# Patient Record
Sex: MALE | Race: WHITE | HISPANIC OR LATINO | URBAN - METROPOLITAN AREA
[De-identification: names, ages, dates, MRNs, and addresses within clinical notes are randomized per-mention and may not be internally consistent; named-entity substitution may affect disease eponyms.]

---

## 2017-12-04 ENCOUNTER — APPOINTMENT (RX ONLY)
Dept: URBAN - METROPOLITAN AREA CLINIC 4 | Facility: CLINIC | Age: 67
Setting detail: DERMATOLOGY
End: 2017-12-04

## 2017-12-04 DIAGNOSIS — L82.0 INFLAMED SEBORRHEIC KERATOSIS: ICD-10-CM

## 2017-12-04 DIAGNOSIS — L57.0 ACTINIC KERATOSIS: ICD-10-CM

## 2017-12-04 DIAGNOSIS — D22 MELANOCYTIC NEVI: ICD-10-CM

## 2017-12-04 DIAGNOSIS — Z85.820 PERSONAL HISTORY OF MALIGNANT MELANOMA OF SKIN: ICD-10-CM

## 2017-12-04 DIAGNOSIS — L82.1 OTHER SEBORRHEIC KERATOSIS: ICD-10-CM

## 2017-12-04 PROBLEM — I10 ESSENTIAL (PRIMARY) HYPERTENSION: Status: ACTIVE | Noted: 2017-12-04

## 2017-12-04 PROBLEM — E13.9 OTHER SPECIFIED DIABETES MELLITUS WITHOUT COMPLICATIONS: Status: ACTIVE | Noted: 2017-12-04

## 2017-12-04 PROBLEM — D22.71 MELANOCYTIC NEVI OF RIGHT LOWER LIMB, INCLUDING HIP: Status: ACTIVE | Noted: 2017-12-04

## 2017-12-04 PROBLEM — E78.5 HYPERLIPIDEMIA, UNSPECIFIED: Status: ACTIVE | Noted: 2017-12-04

## 2017-12-04 PROBLEM — D22.5 MELANOCYTIC NEVI OF TRUNK: Status: ACTIVE | Noted: 2017-12-04

## 2017-12-04 PROBLEM — D22.72 MELANOCYTIC NEVI OF LEFT LOWER LIMB, INCLUDING HIP: Status: ACTIVE | Noted: 2017-12-04

## 2017-12-04 PROBLEM — D22.61 MELANOCYTIC NEVI OF RIGHT UPPER LIMB, INCLUDING SHOULDER: Status: ACTIVE | Noted: 2017-12-04

## 2017-12-04 PROBLEM — D48.5 NEOPLASM OF UNCERTAIN BEHAVIOR OF SKIN: Status: ACTIVE | Noted: 2017-12-04

## 2017-12-04 PROBLEM — D22.39 MELANOCYTIC NEVI OF OTHER PARTS OF FACE: Status: ACTIVE | Noted: 2017-12-04

## 2017-12-04 PROBLEM — D22.62 MELANOCYTIC NEVI OF LEFT UPPER LIMB, INCLUDING SHOULDER: Status: ACTIVE | Noted: 2017-12-04

## 2017-12-04 PROCEDURE — ? LIQUID NITROGEN

## 2017-12-04 PROCEDURE — 17003 DESTRUCT PREMALG LES 2-14: CPT

## 2017-12-04 PROCEDURE — ? NOTED ON EXAM BUT NOT TREATED

## 2017-12-04 PROCEDURE — ? OBSERVATION

## 2017-12-04 PROCEDURE — 99214 OFFICE O/P EST MOD 30 MIN: CPT | Mod: 25

## 2017-12-04 PROCEDURE — 17000 DESTRUCT PREMALG LESION: CPT

## 2017-12-04 PROCEDURE — ? COUNSELING

## 2017-12-04 ASSESSMENT — LOCATION ZONE DERM
LOCATION ZONE: NECK
LOCATION ZONE: FACE
LOCATION ZONE: LEG
LOCATION ZONE: SCALP
LOCATION ZONE: TRUNK
LOCATION ZONE: ARM

## 2017-12-04 ASSESSMENT — LOCATION DETAILED DESCRIPTION DERM
LOCATION DETAILED: RIGHT INFERIOR MEDIAL UPPER BACK
LOCATION DETAILED: LEFT PROXIMAL DORSAL FOREARM
LOCATION DETAILED: RIGHT SUPERIOR LATERAL NECK
LOCATION DETAILED: RIGHT LATERAL MALAR CHEEK
LOCATION DETAILED: INFERIOR THORACIC SPINE
LOCATION DETAILED: RIGHT ANTERIOR DISTAL THIGH
LOCATION DETAILED: RIGHT PROXIMAL DORSAL FOREARM
LOCATION DETAILED: EPIGASTRIC SKIN
LOCATION DETAILED: LEFT ANTERIOR DISTAL THIGH
LOCATION DETAILED: RIGHT MEDIAL INFERIOR CHEST
LOCATION DETAILED: RIGHT SUPERIOR FOREHEAD
LOCATION DETAILED: LEFT LATERAL PROXIMAL CALF
LOCATION DETAILED: RIGHT PROXIMAL POSTERIOR UPPER ARM
LOCATION DETAILED: RIGHT SUPERIOR UPPER BACK
LOCATION DETAILED: LEFT CENTRAL MALAR CHEEK
LOCATION DETAILED: RIGHT LATERAL ABDOMEN
LOCATION DETAILED: LEFT PROXIMAL POSTERIOR UPPER ARM
LOCATION DETAILED: LEFT INFERIOR POSTAURICULAR SKIN

## 2017-12-04 ASSESSMENT — LOCATION SIMPLE DESCRIPTION DERM
LOCATION SIMPLE: LEFT UPPER ARM
LOCATION SIMPLE: RIGHT FOREHEAD
LOCATION SIMPLE: SCALP
LOCATION SIMPLE: UPPER BACK
LOCATION SIMPLE: RIGHT CHEEK
LOCATION SIMPLE: NECK
LOCATION SIMPLE: LEFT CHEEK
LOCATION SIMPLE: ABDOMEN
LOCATION SIMPLE: LEFT LOWER LEG
LOCATION SIMPLE: LEFT THIGH
LOCATION SIMPLE: RIGHT FOREARM
LOCATION SIMPLE: RIGHT UPPER ARM
LOCATION SIMPLE: RIGHT THIGH
LOCATION SIMPLE: RIGHT UPPER BACK
LOCATION SIMPLE: LEFT FOREARM
LOCATION SIMPLE: CHEST

## 2017-12-04 NOTE — PROCEDURE: LIQUID NITROGEN
Post-Care Instructions: I reviewed with the patient in detail post-care instructions. Patient is to wear sunprotection, and avoid picking at any of the treated lesions. Pt may apply Vaseline to crusted or scabbing areas.
Detail Level: Detailed
Duration Of Freeze Thaw-Cycle (Seconds): 0
Render Post-Care Instructions In Note?: no
Number Of Freeze-Thaw Cycles: 2 freeze-thaw cycles
Consent: The patient's consent was obtained including but not limited to risks of blistering, scarring, darker or lighter pigmentary change, and recurrence.

## 2018-06-05 ENCOUNTER — APPOINTMENT (RX ONLY)
Dept: URBAN - METROPOLITAN AREA CLINIC 4 | Facility: CLINIC | Age: 68
Setting detail: DERMATOLOGY
End: 2018-06-05

## 2018-06-05 DIAGNOSIS — D22 MELANOCYTIC NEVI: ICD-10-CM

## 2018-06-05 DIAGNOSIS — Z85.828 PERSONAL HISTORY OF OTHER MALIGNANT NEOPLASM OF SKIN: ICD-10-CM

## 2018-06-05 DIAGNOSIS — Z85.820 PERSONAL HISTORY OF MALIGNANT MELANOMA OF SKIN: ICD-10-CM

## 2018-06-05 DIAGNOSIS — D18.0 HEMANGIOMA: ICD-10-CM

## 2018-06-05 DIAGNOSIS — L57.0 ACTINIC KERATOSIS: ICD-10-CM

## 2018-06-05 DIAGNOSIS — L91.8 OTHER HYPERTROPHIC DISORDERS OF THE SKIN: ICD-10-CM

## 2018-06-05 DIAGNOSIS — L82.1 OTHER SEBORRHEIC KERATOSIS: ICD-10-CM

## 2018-06-05 PROBLEM — D22.5 MELANOCYTIC NEVI OF TRUNK: Status: ACTIVE | Noted: 2018-06-05

## 2018-06-05 PROBLEM — D18.01 HEMANGIOMA OF SKIN AND SUBCUTANEOUS TISSUE: Status: ACTIVE | Noted: 2018-06-05

## 2018-06-05 PROCEDURE — 99214 OFFICE O/P EST MOD 30 MIN: CPT | Mod: 25

## 2018-06-05 PROCEDURE — 17003 DESTRUCT PREMALG LES 2-14: CPT

## 2018-06-05 PROCEDURE — 17000 DESTRUCT PREMALG LESION: CPT

## 2018-06-05 PROCEDURE — ? LIQUID NITROGEN

## 2018-06-05 PROCEDURE — ? COUNSELING

## 2018-06-05 ASSESSMENT — LOCATION ZONE DERM
LOCATION ZONE: EAR
LOCATION ZONE: LEG
LOCATION ZONE: NECK
LOCATION ZONE: HAND
LOCATION ZONE: TRUNK
LOCATION ZONE: FACE
LOCATION ZONE: ARM
LOCATION ZONE: SCALP

## 2018-06-05 ASSESSMENT — LOCATION DETAILED DESCRIPTION DERM
LOCATION DETAILED: RIGHT MEDIAL FOREHEAD
LOCATION DETAILED: LEFT SUPERIOR PARIETAL SCALP
LOCATION DETAILED: RIGHT LATERAL ABDOMEN
LOCATION DETAILED: LEFT SUPERIOR LATERAL MALAR CHEEK
LOCATION DETAILED: RIGHT CLAVICULAR NECK
LOCATION DETAILED: RIGHT DISTAL PRETIBIAL REGION
LOCATION DETAILED: RIGHT LATERAL SUPERIOR CHEST
LOCATION DETAILED: LEFT FOREHEAD
LOCATION DETAILED: LEFT SUPERIOR FOREHEAD
LOCATION DETAILED: RIGHT INFERIOR POSTAURICULAR SKIN
LOCATION DETAILED: LEFT SUPERIOR OCCIPITAL SCALP
LOCATION DETAILED: RIGHT SUPERIOR LATERAL NECK
LOCATION DETAILED: RIGHT SUPERIOR OCCIPITAL SCALP
LOCATION DETAILED: RIGHT FOREHEAD
LOCATION DETAILED: LEFT INFERIOR UPPER BACK
LOCATION DETAILED: RIGHT CENTRAL TEMPLE
LOCATION DETAILED: RIGHT SUPERIOR PARIETAL SCALP
LOCATION DETAILED: LEFT SUPERIOR PREAURICULAR CHEEK
LOCATION DETAILED: LEFT SUPERIOR LATERAL UPPER BACK
LOCATION DETAILED: RIGHT SUPERIOR MEDIAL UPPER BACK
LOCATION DETAILED: EPIGASTRIC SKIN
LOCATION DETAILED: LEFT DISTAL DORSAL FOREARM
LOCATION DETAILED: RIGHT MEDIAL SUPERIOR CHEST
LOCATION DETAILED: LEFT CENTRAL MALAR CHEEK
LOCATION DETAILED: RIGHT SCAPHA
LOCATION DETAILED: POSTERIOR MID-PARIETAL SCALP
LOCATION DETAILED: LEFT RADIAL DORSAL HAND

## 2018-06-05 ASSESSMENT — LOCATION SIMPLE DESCRIPTION DERM
LOCATION SIMPLE: LEFT FOREARM
LOCATION SIMPLE: LEFT HAND
LOCATION SIMPLE: RIGHT EAR
LOCATION SIMPLE: NECK
LOCATION SIMPLE: CHEST
LOCATION SIMPLE: RIGHT ANTERIOR NECK
LOCATION SIMPLE: POSTERIOR SCALP
LOCATION SIMPLE: LEFT UPPER BACK
LOCATION SIMPLE: SCALP
LOCATION SIMPLE: RIGHT UPPER BACK
LOCATION SIMPLE: LEFT CHEEK
LOCATION SIMPLE: LEFT OCCIPITAL SCALP
LOCATION SIMPLE: RIGHT OCCIPITAL SCALP
LOCATION SIMPLE: LEFT FOREHEAD
LOCATION SIMPLE: RIGHT TEMPLE
LOCATION SIMPLE: RIGHT PRETIBIAL REGION
LOCATION SIMPLE: ABDOMEN
LOCATION SIMPLE: RIGHT FOREHEAD

## 2018-06-05 NOTE — PROCEDURE: LIQUID NITROGEN
Consent: The patient's consent was obtained including but not limited to risks of blistering, scarring, darker or lighter pigmentary change, and recurrence.
Post-Care Instructions: I reviewed with the patient in detail post-care instructions. Patient is to wear sunprotection, and avoid picking at any of the treated lesions. Pt may apply Vaseline to crusted or scabbing areas.
Render Post-Care Instructions In Note?: no
Duration Of Freeze Thaw-Cycle (Seconds): 0
Number Of Freeze-Thaw Cycles: 2 freeze-thaw cycles
Detail Level: Detailed

## 2018-12-10 ENCOUNTER — APPOINTMENT (RX ONLY)
Dept: URBAN - METROPOLITAN AREA CLINIC 4 | Facility: CLINIC | Age: 68
Setting detail: DERMATOLOGY
End: 2018-12-10

## 2018-12-10 DIAGNOSIS — Z85.828 PERSONAL HISTORY OF OTHER MALIGNANT NEOPLASM OF SKIN: ICD-10-CM

## 2018-12-10 DIAGNOSIS — L57.0 ACTINIC KERATOSIS: ICD-10-CM

## 2018-12-10 DIAGNOSIS — D22 MELANOCYTIC NEVI: ICD-10-CM

## 2018-12-10 DIAGNOSIS — L82.1 OTHER SEBORRHEIC KERATOSIS: ICD-10-CM

## 2018-12-10 DIAGNOSIS — D17 BENIGN LIPOMATOUS NEOPLASM: ICD-10-CM

## 2018-12-10 DIAGNOSIS — L72.8 OTHER FOLLICULAR CYSTS OF THE SKIN AND SUBCUTANEOUS TISSUE: ICD-10-CM

## 2018-12-10 DIAGNOSIS — Z85.820 PERSONAL HISTORY OF MALIGNANT MELANOMA OF SKIN: ICD-10-CM

## 2018-12-10 DIAGNOSIS — D18.0 HEMANGIOMA: ICD-10-CM

## 2018-12-10 PROBLEM — D18.01 HEMANGIOMA OF SKIN AND SUBCUTANEOUS TISSUE: Status: ACTIVE | Noted: 2018-12-10

## 2018-12-10 PROBLEM — D48.5 NEOPLASM OF UNCERTAIN BEHAVIOR OF SKIN: Status: ACTIVE | Noted: 2018-12-10

## 2018-12-10 PROBLEM — D17.1 BENIGN LIPOMATOUS NEOPLASM OF SKIN AND SUBCUTANEOUS TISSUE OF TRUNK: Status: ACTIVE | Noted: 2018-12-10

## 2018-12-10 PROBLEM — D22.5 MELANOCYTIC NEVI OF TRUNK: Status: ACTIVE | Noted: 2018-12-10

## 2018-12-10 PROCEDURE — ? MEDICATION COUNSELING

## 2018-12-10 PROCEDURE — ? COUNSELING

## 2018-12-10 PROCEDURE — 17003 DESTRUCT PREMALG LES 2-14: CPT

## 2018-12-10 PROCEDURE — ? PRESCRIPTION

## 2018-12-10 PROCEDURE — ? LIQUID NITROGEN

## 2018-12-10 PROCEDURE — 11100: CPT | Mod: 59

## 2018-12-10 PROCEDURE — 17000 DESTRUCT PREMALG LESION: CPT

## 2018-12-10 PROCEDURE — 17261 DSTRJ MAL LES T/A/L .6-1.0CM: CPT | Mod: 59

## 2018-12-10 PROCEDURE — ? ADDITIONAL NOTES

## 2018-12-10 PROCEDURE — ? BIOPSY BY SHAVE METHOD

## 2018-12-10 PROCEDURE — ? OBSERVATION

## 2018-12-10 PROCEDURE — ? BIOPSY BY SHAVE METHOD AND DESTRUCTION

## 2018-12-10 PROCEDURE — 11101: CPT

## 2018-12-10 PROCEDURE — 99213 OFFICE O/P EST LOW 20 MIN: CPT | Mod: 25

## 2018-12-10 RX ORDER — FLUOROURACIL 50 MG/G
CREAM TOPICAL BID
Qty: 1 | Refills: 1 | Status: ERX

## 2018-12-10 ASSESSMENT — LOCATION ZONE DERM
LOCATION ZONE: FACE
LOCATION ZONE: HAND
LOCATION ZONE: NECK
LOCATION ZONE: ARM
LOCATION ZONE: TRUNK
LOCATION ZONE: LEG
LOCATION ZONE: EAR

## 2018-12-10 ASSESSMENT — LOCATION SIMPLE DESCRIPTION DERM
LOCATION SIMPLE: LEFT UPPER BACK
LOCATION SIMPLE: RIGHT EAR
LOCATION SIMPLE: RIGHT CHEEK
LOCATION SIMPLE: RIGHT UPPER BACK
LOCATION SIMPLE: RIGHT PRETIBIAL REGION
LOCATION SIMPLE: CHEST
LOCATION SIMPLE: NECK
LOCATION SIMPLE: UPPER BACK
LOCATION SIMPLE: ABDOMEN
LOCATION SIMPLE: RIGHT HAND
LOCATION SIMPLE: RIGHT LOWER BACK
LOCATION SIMPLE: RIGHT FOREARM

## 2018-12-10 ASSESSMENT — LOCATION DETAILED DESCRIPTION DERM
LOCATION DETAILED: RIGHT INFERIOR LATERAL UPPER BACK
LOCATION DETAILED: RIGHT INFERIOR PREAURICULAR CHEEK
LOCATION DETAILED: RIGHT RADIAL DORSAL HAND
LOCATION DETAILED: RIGHT ULNAR DORSAL HAND
LOCATION DETAILED: RIGHT SUPERIOR MEDIAL MIDBACK
LOCATION DETAILED: LEFT SUPERIOR MEDIAL UPPER BACK
LOCATION DETAILED: INFERIOR THORACIC SPINE
LOCATION DETAILED: RIGHT INFERIOR MEDIAL MIDBACK
LOCATION DETAILED: RIGHT DISTAL DORSAL FOREARM
LOCATION DETAILED: RIGHT SUPERIOR UPPER BACK
LOCATION DETAILED: RIGHT LATERAL SUPERIOR CHEST
LOCATION DETAILED: RIGHT DISTAL PRETIBIAL REGION
LOCATION DETAILED: RIGHT PROXIMAL DORSAL FOREARM
LOCATION DETAILED: RIGHT SUPERIOR HELIX
LOCATION DETAILED: LEFT RIB CAGE
LOCATION DETAILED: RIGHT SUPERIOR LATERAL NECK
LOCATION DETAILED: EPIGASTRIC SKIN

## 2018-12-10 NOTE — PROCEDURE: BIOPSY BY SHAVE METHOD AND DESTRUCTION
Post-Care Instructions: I reviewed with the patient in detail post-care instructions. Patient is to keep the biopsy site dry overnight, and then apply bacitracin twice daily until healed. Patient may apply hydrogen peroxide soaks to remove any crusting.
Size Of Lesion After Curettage: 0.8
Lab Facility: [id number]
Lab: [id number]
Consent: Written consent was obtained and risks were reviewed including but not limited to scarring, infection, bleeding, scabbing, incomplete removal, nerve damage and allergy to anesthesia.
Size Of Lesion In Cm (Optional): 0
Wound Care: Petrolatum
Detail Level: Detailed
Hemostasis: Drysol
Notification Instructions: Patient will be notified of biopsy results. However, patient instructed to call the office if not contacted within 2 weeks.
Number Of Curettages: 2
Bill As?: Malignant Destruction
Bill For Surgical Tray: no
Biopsy Type: H and E
Destruction Type: electrodesiccation
Billing Type: Third-Party Bill
Anesthesia Volume In Cc: 0.5
Anesthesia Type: 1% lidocaine with epinephrine

## 2018-12-10 NOTE — PROCEDURE: LIQUID NITROGEN
Detail Level: Detailed
Render Post-Care Instructions In Note?: no
Consent: The patient's consent was obtained including but not limited to risks of blistering, scarring, darker or lighter pigmentary change, and recurrence.
Duration Of Freeze Thaw-Cycle (Seconds): 0
Number Of Freeze-Thaw Cycles: 2 freeze-thaw cycles
Post-Care Instructions: I reviewed with the patient in detail post-care instructions. Patient is to wear sunprotection, and avoid picking at any of the treated lesions. Pt may apply Vaseline to crusted or scabbing areas.

## 2018-12-10 NOTE — PROCEDURE: BIOPSY BY SHAVE METHOD
Dressing: bandage
Was A Bandage Applied: Yes
Silver Nitrate Text: The wound bed was treated with silver nitrate after the biopsy was performed.
Electrodesiccation And Curettage Text: The wound bed was treated with electrodesiccation and curettage after the biopsy was performed.
Post-Care Instructions: I reviewed with the patient in detail post-care instructions. Patient is to keep the biopsy site dry overnight, and then apply bacitracin twice daily until healed. Patient may apply hydrogen peroxide soaks to remove any crusting.
Lab: [id number]
Curettage Text: The wound bed was treated with curettage after the biopsy was performed.
Consent: Written consent was obtained and risks were reviewed including but not limited to scarring, infection, bleeding, scabbing, incomplete removal, nerve damage and allergy to anesthesia.
Notification Instructions: Patient will be notified of biopsy results. However, patient instructed to call the office if not contacted within 2 weeks.
Biopsy Method: Personna blade
Hemostasis: Drysol
Detail Level: Detailed
Bill For Surgical Tray: no
Size Of Lesion In Cm: 0
Lab Facility: 
Depth Of Biopsy: dermis
Wound Care: Petrolatum
Anesthesia Type: 1% lidocaine with epinephrine
Cryotherapy Text: The wound bed was treated with cryotherapy after the biopsy was performed.
Billing Type: Third-Party Bill
Biopsy Type: H and E
Anesthesia Volume In Cc: 0.5
Type Of Destruction Used: Curettage
Electrodesiccation Text: The wound bed was treated with electrodesiccation after the biopsy was performed.

## 2018-12-10 NOTE — PROCEDURE: OBSERVATION
Size Of Lesion In Cm (Optional): 6
Detail Level: Detailed
X Size Of Lesion In Cm (Optional): 4
X Size Of Lesion In Cm (Optional): 0

## 2018-12-10 NOTE — PROCEDURE: MEDICATION COUNSELING
Doxepin Pregnancy And Lactation Text: This medication is Pregnancy Category C and it isn't known if it is safe during pregnancy. It is also excreted in breast milk and breast feeding isn't recommended.
Cyclophosphamide Pregnancy And Lactation Text: This medication is Pregnancy Category D and it isn't considered safe during pregnancy. This medication is excreted in breast milk.
Doxycycline Pregnancy And Lactation Text: This medication is Pregnancy Category D and not consider safe during pregnancy. It is also excreted in breast milk but is considered safe for shorter treatment courses.
Minoxidil Counseling: Minoxidil is a topical medication which can increase blood flow where it is applied. It is uncertain how this medication increases hair growth. Side effects are uncommon and include stinging and allergic reactions.
Rifampin Counseling: I discussed with the patient the risks of rifampin including but not limited to liver damage, kidney damage, red-orange body fluids, nausea/vomiting and severe allergy.
Xeljose mz Pregnancy And Lactation Text: This medication is Pregnancy Category D and is not considered safe during pregnancy.  The risk during breast feeding is also uncertain.
Zyclara Pregnancy And Lactation Text: This medication is Pregnancy Category C. It is unknown if this medication is excreted in breast milk.
Spironolactone Counseling: Patient advised regarding risks of diarrhea, abdominal pain, hyperkalemia, birth defects (for female patients), liver toxicity and renal toxicity. The patient may need blood work to monitor liver and kidney function and potassium levels while on therapy. The patient verbalized understanding of the proper use and possible adverse effects of spironolactone.  All of the patient's questions and concerns were addressed.
Nsaids Counseling: NSAID Counseling: I discussed with the patient that NSAIDs should be taken with food. Prolonged use of NSAIDs can result in the development of stomach ulcers.  Patient advised to stop taking NSAIDs if abdominal pain occurs.  The patient verbalized understanding of the proper use and possible adverse effects of NSAIDs.  All of the patient's questions and concerns were addressed.
Bexarotene Counseling:  I discussed with the patient the risks of bexarotene including but not limited to hair loss, dry lips/skin/eyes, liver abnormalities, hyperlipidemia, pancreatitis, depression/suicidal ideation, photosensitivity, drug rash/allergic reactions, hypothyroidism, anemia, leukopenia, infection, cataracts, and teratogenicity.  Patient understands that they will need regular blood tests to check lipid profile, liver function tests, white blood cell count, thyroid function tests and pregnancy test if applicable.
Dapsone Pregnancy And Lactation Text: This medication is Pregnancy Category C and is not considered safe during pregnancy or breast feeding.
Siliq Pregnancy And Lactation Text: The risk during pregnancy and breastfeeding is uncertain with this medication.
Drysol Pregnancy And Lactation Text: This medication is considered safe during pregnancy and breast feeding.
Tazorac Counseling:  Patient advised that medication is irritating and drying.  Patient may need to apply sparingly and wash off after an hour before eventually leaving it on overnight.  The patient verbalized understanding of the proper use and possible adverse effects of tazorac.  All of the patient's questions and concerns were addressed.
Nsaids Pregnancy And Lactation Text: These medications are considered safe up to 30 weeks gestation. It is excreted in breast milk.
Itraconazole Pregnancy And Lactation Text: This medication is Pregnancy Category C and it isn't know if it is safe during pregnancy. It is also excreted in breast milk.
Humira Counseling:  I discussed with the patient the risks of adalimumab including but not limited to myelosuppression, immunosuppression, autoimmune hepatitis, demyelinating diseases, lymphoma, and serious infections.  The patient understands that monitoring is required including a PPD at baseline and must alert us or the primary physician if symptoms of infection or other concerning signs are noted.
Xolair Counseling:  Patient informed of potential adverse effects including but not limited to fever, muscle aches, rash and allergic reactions.  The patient verbalized understanding of the proper use and possible adverse effects of Xolair.  All of the patient's questions and concerns were addressed.
Ketoconazole Counseling:   Patient counseled regarding improving absorption with orange juice.  Adverse effects include but are not limited to breast enlargement, headache, diarrhea, nausea, upset stomach, liver function test abnormalities, taste disturbance, and stomach pain.  There is a rare possibility of liver failure that can occur when taking ketoconazole. The patient understands that monitoring of LFTs may be required, especially at baseline. The patient verbalized understanding of the proper use and possible adverse effects of ketoconazole.  All of the patient's questions and concerns were addressed.
Humira Pregnancy And Lactation Text: This medication is Pregnancy Category B and is considered safe during pregnancy. It is unknown if this medication is excreted in breast milk.
Bexarotene Pregnancy And Lactation Text: This medication is Pregnancy Category X and should not be given to women who are pregnant or may become pregnant. This medication should not be used if you are breast feeding.
Erythromycin Counseling:  I discussed with the patient the risks of erythromycin including but not limited to GI upset, allergic reaction, drug rash, diarrhea, increase in liver enzymes, and yeast infections.
Hydroxyzine Counseling: Patient advised that the medication is sedating and not to drive a car after taking this medication.  Patient informed of potential adverse effects including but not limited to dry mouth, urinary retention, and blurry vision.  The patient verbalized understanding of the proper use and possible adverse effects of hydroxyzine.  All of the patient's questions and concerns were addressed.
Simponi Counseling:  I discussed with the patient the risks of golimumab including but not limited to myelosuppression, immunosuppression, autoimmune hepatitis, demyelinating diseases, lymphoma, and serious infections.  The patient understands that monitoring is required including a PPD at baseline and must alert us or the primary physician if symptoms of infection or other concerning signs are noted.
Minoxidil Pregnancy And Lactation Text: This medication has not been assigned a Pregnancy Risk Category but animal studies failed to show danger with the topical medication. It is unknown if the medication is excreted in breast milk.
Spironolactone Pregnancy And Lactation Text: This medication can cause feminization of the male fetus and should be avoided during pregnancy. The active metabolite is also found in breast milk.
Rifampin Pregnancy And Lactation Text: This medication is Pregnancy Category C and it isn't know if it is safe during pregnancy. It is also excreted in breast milk and should not be used if you are breast feeding.
Erivedge Counseling- I discussed with the patient the risks of Erivedge including but not limited to nausea, vomiting, diarrhea, constipation, weight loss, changes in the sense of taste, decreased appetite, muscle spasms, and hair loss.  The patient verbalized understanding of the proper use and possible adverse effects of Erivedge.  All of the patient's questions and concerns were addressed.
Azithromycin Counseling:  I discussed with the patient the risks of azithromycin including but not limited to GI upset, allergic reaction, drug rash, diarrhea, and yeast infections.
Tetracycline Counseling: Patient counseled regarding possible photosensitivity and increased risk for sunburn.  Patient instructed to avoid sunlight, if possible.  When exposed to sunlight, patients should wear protective clothing, sunglasses, and sunscreen.  The patient was instructed to call the office immediately if the following severe adverse effects occur:  hearing changes, easy bruising/bleeding, severe headache, or vision changes.  The patient verbalized understanding of the proper use and possible adverse effects of tetracycline.  All of the patient's questions and concerns were addressed. Patient understands to avoid pregnancy while on therapy due to potential birth defects.
Tazorac Pregnancy And Lactation Text: This medication is not safe during pregnancy. It is unknown if this medication is excreted in breast milk.
Azithromycin Pregnancy And Lactation Text: This medication is considered safe during pregnancy and is also secreted in breast milk.
Picato Counseling:  I discussed with the patient the risks of Picato including but not limited to erythema, scaling, itching, weeping, crusting, and pain.
Erivedge Pregnancy And Lactation Text: This medication is Pregnancy Category X and is absolutely contraindicated during pregnancy. It is unknown if it is excreted in breast milk.
Cyclosporine Counseling:  I discussed with the patient the risks of cyclosporine including but not limited to hypertension, gingival hyperplasia,myelosuppression, immunosuppression, liver damage, kidney damage, neurotoxicity, lymphoma, and serious infections. The patient understands that monitoring is required including baseline blood pressure, CBC, CMP, lipid panel and uric acid, and then 1-2 times monthly CMP and blood pressure.
Odomzo Counseling- I discussed with the patient the risks of Odomzo including but not limited to nausea, vomiting, diarrhea, constipation, weight loss, changes in the sense of taste, decreased appetite, muscle spasms, and hair loss.  The patient verbalized understanding of the proper use and possible adverse effects of Odomzo.  All of the patient's questions and concerns were addressed.
Cimzia Counseling:  I discussed with the patient the risks of Cimzia including but not limited to immunosuppression, allergic reactions and infections.  The patient understands that monitoring is required including a PPD at baseline and must alert us or the primary physician if symptoms of infection or other concerning signs are noted.
Elidel Counseling: Patient may experience a mild burning sensation during topical application. Elidel is not approved in children less than 2 years of age. There have been case reports of hematologic and skin malignancies in patients using topical calcineurin inhibitors although causality is questionable.
Arava Counseling:  Patient counseled regarding adverse effects of Arava including but not limited to nausea, vomiting, abnormalities in liver function tests. Patients may develop mouth sores, rash, diarrhea, and abnormalities in blood counts. The patient understands that monitoring is required including LFTs and blood counts.  There is a rare possibility of scarring of the liver and lung problems that can occur when taking methotrexate. Persistent nausea, loss of appetite, pale stools, dark urine, cough, and shortness of breath should be reported immediately. Patient advised to discontinue Arava treatment and consult with a physician prior to attempting conception. The patient will have to undergo a treatment to eliminate Arava from the body prior to conception.
Ketoconazole Pregnancy And Lactation Text: This medication is Pregnancy Category C and it isn't know if it is safe during pregnancy. It is also excreted in breast milk and breast feeding isn't recommended.
Ilumya Counseling: I discussed with the patient the risks of tildrakizumab including but not limited to immunosuppression, malignancy, posterior leukoencephalopathy syndrome, and serious infections.  The patient understands that monitoring is required including a PPD at baseline and must alert us or the primary physician if symptoms of infection or other concerning signs are noted.
Erythromycin Pregnancy And Lactation Text: This medication is Pregnancy Category B and is considered safe during pregnancy. It is also excreted in breast milk.
Hydroxyzine Pregnancy And Lactation Text: This medication is not safe during pregnancy and should not be taken. It is also excreted in breast milk and breast feeding isn't recommended.
SSKI Counseling:  I discussed with the patient the risks of SSKI including but not limited to thyroid abnormalities, metallic taste, GI upset, fever, headache, acne, arthralgias, paraesthesias, lymphadenopathy, easy bleeding, arrhythmias, and allergic reaction.
Metronidazole Counseling:  I discussed with the patient the risks of metronidazole including but not limited to seizures, nausea/vomiting, a metallic taste in the mouth, nausea/vomiting and severe allergy.
Stelara Counseling:  I discussed with the patient the risks of ustekinumab including but not limited to immunosuppression, malignancy, posterior leukoencephalopathy syndrome, and serious infections.  The patient understands that monitoring is required including a PPD at baseline and must alert us or the primary physician if symptoms of infection or other concerning signs are noted.
Cyclosporine Pregnancy And Lactation Text: This medication is Pregnancy Category C and it isn't know if it is safe during pregnancy. This medication is excreted in breast milk.
Sski Pregnancy And Lactation Text: This medication is Pregnancy Category D and isn't considered safe during pregnancy. It is excreted in breast milk.
Tetracycline Pregnancy And Lactation Text: This medication is Pregnancy Category D and not consider safe during pregnancy. It is also excreted in breast milk.
Cimzia Pregnancy And Lactation Text: This medication crosses the placenta but can be considered safe in certain situations. Cimzia may be excreted in breast milk.
Topical Clindamycin Counseling: Patient counseled that this medication may cause skin irritation or allergic reactions.  In the event of skin irritation, the patient was advised to reduce the amount of the drug applied or use it less frequently.   The patient verbalized understanding of the proper use and possible adverse effects of clindamycin.  All of the patient's questions and concerns were addressed.
Xolair Pregnancy And Lactation Text: This medication is Pregnancy Category B and is considered safe during pregnancy. This medication is excreted in breast milk.
Bactrim Counseling:  I discussed with the patient the risks of sulfa antibiotics including but not limited to GI upset, allergic reaction, drug rash, diarrhea, dizziness, photosensitivity, and yeast infections.  Rarely, more serious reactions can occur including but not limited to aplastic anemia, agranulocytosis, methemoglobinemia, blood dyscrasias, liver or kidney failure, lung infiltrates or desquamative/blistering drug rashes.
Gabapentin Counseling: I discussed with the patient the risks of gabapentin including but not limited to dizziness, somnolence, fatigue and ataxia.
Isotretinoin Counseling: Patient should get monthly blood tests, not donate blood, not drive at night if vision affected, not share medication, and not undergo elective surgery for 6 months after tx completed. Side effects reviewed, pt to contact office should one occur.
Terbinafine Counseling: Patient counseling regarding adverse effects of terbinafine including but not limited to headache, diarrhea, rash, upset stomach, liver function test abnormalities, itching, taste/smell disturbance, nausea, abdominal pain, and flatulence.  There is a rare possibility of liver failure that can occur when taking terbinafine.  The patient understands that a baseline LFT and kidney function test may be required. The patient verbalized understanding of the proper use and possible adverse effects of terbinafine.  All of the patient's questions and concerns were addressed.
Otezla Counseling: The side effects of Otezla were discussed with the patient, including but not limited to worsening or new depression, weight loss, diarrhea, nausea, upper respiratory tract infection, and headache. Patient instructed to call the office should any adverse effect occur.  The patient verbalized understanding of the proper use and possible adverse effects of Otezla.  All the patient's questions and concerns were addressed.
Eucrisa Counseling: Patient may experience a mild burning sensation during topical application. Eucrisa is not approved in children less than 2 years of age.
Topical Clindamycin Pregnancy And Lactation Text: This medication is Pregnancy Category B and is considered safe during pregnancy. It is unknown if it is excreted in breast milk.
Methotrexate Pregnancy And Lactation Text: This medication is Pregnancy Category X and is known to cause fetal harm. This medication is excreted in breast milk.
Methotrexate Counseling:  Patient counseled regarding adverse effects of methotrexate including but not limited to nausea, vomiting, abnormalities in liver function tests. Patients may develop mouth sores, rash, diarrhea, and abnormalities in blood counts. The patient understands that monitoring is required including LFT's and blood counts.  There is a rare possibility of scarring of the liver and lung problems that can occur when taking methotrexate. Persistent nausea, loss of appetite, pale stools, dark urine, cough, and shortness of breath should be reported immediately. Patient advised to discontinue methotrexate treatment at least three months before attempting to become pregnant.  I discussed the need for folate supplements while taking methotrexate.  These supplements can decrease side effects during methotrexate treatment. The patient verbalized understanding of the proper use and possible adverse effects of methotrexate.  All of the patient's questions and concerns were addressed.
Infliximab Counseling:  I discussed with the patient the risks of infliximab including but not limited to myelosuppression, immunosuppression, autoimmune hepatitis, demyelinating diseases, lymphoma, and serious infections.  The patient understands that monitoring is required including a PPD at baseline and must alert us or the primary physician if symptoms of infection or other concerning signs are noted.
Protopic Counseling: Patient may experience a mild burning sensation during topical application. Protopic is not approved in children less than 2 years of age. There have been case reports of hematologic and skin malignancies in patients using topical calcineurin inhibitors although causality is questionable.
Albendazole Counseling:  I discussed with the patient the risks of albendazole including but not limited to cytopenia, kidney damage, nausea/vomiting and severe allergy.  The patient understands that this medication is being used in an off-label manner.
Isotretinoin Pregnancy And Lactation Text: This medication is Pregnancy Category X and is considered extremely dangerous during pregnancy. It is unknown if it is excreted in breast milk.
Thalidomide Counseling: I discussed with the patient the risks of thalidomide including but not limited to birth defects, anxiety, weakness, chest pain, dizziness, cough and severe allergy.
Gabapentin Pregnancy And Lactation Text: This medication is Pregnancy Category C and isn't considered safe during pregnancy. It is excreted in breast milk.
Detail Level: Simple
Cosentyx Counseling:  I discussed with the patient the risks of Cosentyx including but not limited to worsening of Crohn's disease, immunosuppression, allergic reactions and infections.  The patient understands that monitoring is required including a PPD at baseline and must alert us or the primary physician if symptoms of infection or other concerning signs are noted.
Bactrim Pregnancy And Lactation Text: This medication is Pregnancy Category D and is known to cause fetal risk.  It is also excreted in breast milk.
Fluconazole Counseling:  Patient counseled regarding adverse effects of fluconazole including but not limited to headache, diarrhea, nausea, upset stomach, liver function test abnormalities, taste disturbance, and stomach pain.  There is a rare possibility of liver failure that can occur when taking fluconazole.  The patient understands that monitoring of LFTs and kidney function test may be required, especially at baseline. The patient verbalized understanding of the proper use and possible adverse effects of fluconazole.  All of the patient's questions and concerns were addressed.
Glycopyrrolate Counseling:  I discussed with the patient the risks of glycopyrrolate including but not limited to skin rash, drowsiness, dry mouth, difficulty urinating, and blurred vision.
Taltz Counseling: I discussed with the patient the risks of ixekizumab including but not limited to immunosuppression, serious infections, worsening of inflammatory bowel disease and drug reactions.  The patient understands that monitoring is required including a PPD at baseline and must alert us or the primary physician if symptoms of infection or other concerning signs are noted.
Cephalexin Counseling: I counseled the patient regarding use of cephalexin as an antibiotic for prophylactic and/or therapeutic purposes. Cephalexin (commonly prescribed under brand name Keflex) is a cephalosporin antibiotic which is active against numerous classes of bacteria, including most skin bacteria. Side effects may include nausea, diarrhea, gastrointestinal upset, rash, hives, yeast infections, and in rare cases, hepatitis, kidney disease, seizures, fever, confusion, neurologic symptoms, and others. Patients with severe allergies to penicillin medications are cautioned that there is about a 10% incidence of cross-reactivity with cephalosporins. When possible, patients with penicillin allergies should use alternatives to cephalosporins for antibiotic therapy.
Prednisone Counseling:  I discussed with the patient the risks of prolonged use of prednisone including but not limited to weight gain, insomnia, osteoporosis, mood changes, diabetes, susceptibility to infection, glaucoma and high blood pressure.  In cases where prednisone use is prolonged, patients should be monitored with blood pressure checks, serum glucose levels and an eye exam.  Additionally, the patient may need to be placed on GI prophylaxis, PCP prophylaxis, and calcium and vitamin D supplementation and/or a bisphosphonate.  The patient verbalized understanding of the proper use and the possible adverse effects of prednisone.  All of the patient's questions and concerns were addressed.
Terbinafine Pregnancy And Lactation Text: This medication is Pregnancy Category B and is considered safe during pregnancy. It is also excreted in breast milk and breast feeding isn't recommended.
Clofazimine Counseling:  I discussed with the patient the risks of clofazimine including but not limited to skin and eye pigmentation, liver damage, nausea/vomiting, gastrointestinal bleeding and allergy.
Azathioprine Pregnancy And Lactation Text: This medication is Pregnancy Category D and isn't considered safe during pregnancy. It is unknown if this medication is excreted in breast milk.
Otezla Pregnancy And Lactation Text: This medication is Pregnancy Category C and it isn't known if it is safe during pregnancy. It is unknown if it is excreted in breast milk.
Metronidazole Pregnancy And Lactation Text: This medication is Pregnancy Category B and considered safe during pregnancy.  It is also excreted in breast milk.
Topical Sulfur Applications Counseling: Topical Sulfur Counseling: Patient counseled that this medication may cause skin irritation or allergic reactions.  In the event of skin irritation, the patient was advised to reduce the amount of the drug applied or use it less frequently.   The patient verbalized understanding of the proper use and possible adverse effects of topical sulfur application.  All of the patient's questions and concerns were addressed.
High Dose Vitamin A Pregnancy And Lactation Text: High dose vitamin A therapy is contraindicated during pregnancy and breast feeding.
Azathioprine Counseling:  I discussed with the patient the risks of azathioprine including but not limited to myelosuppression, immunosuppression, hepatotoxicity, lymphoma, and infections.  The patient understands that monitoring is required including baseline LFTs, Creatinine, possible TPMP genotyping and weekly CBCs for the first month and then every 2 weeks thereafter.  The patient verbalized understanding of the proper use and possible adverse effects of azathioprine.  All of the patient's questions and concerns were addressed.
Protopic Pregnancy And Lactation Text: This medication is Pregnancy Category C. It is unknown if this medication is excreted in breast milk when applied topically.
High Dose Vitamin A Counseling: Side effects reviewed, pt to contact office should one occur.
Hydroquinone Counseling:  Patient advised that medication may result in skin irritation, lightening (hypopigmentation), dryness, and burning.  In the event of skin irritation, the patient was advised to reduce the amount of the drug applied or use it less frequently.  Rarely, spots that are treated with hydroquinone can become darker (pseudoochronosis).  Should this occur, patient instructed to stop medication and call the office. The patient verbalized understanding of the proper use and possible adverse effects of hydroquinone.  All of the patient's questions and concerns were addressed.
Albendazole Pregnancy And Lactation Text: This medication is Pregnancy Category C and it isn't known if it is safe during pregnancy. It is also excreted in breast milk.
Carac Counseling:  I discussed with the patient the risks of Carac including but not limited to erythema, scaling, itching, weeping, crusting, and pain.
Ivermectin Counseling:  Patient instructed to take medication on an empty stomach with a full glass of water.  Patient informed of potential adverse effects including but not limited to nausea, diarrhea, dizziness, itching, and swelling of the extremities or lymph nodes.  The patient verbalized understanding of the proper use and possible adverse effects of ivermectin.  All of the patient's questions and concerns were addressed.
Cellcept Counseling:  I discussed with the patient the risks of mycophenolate mofetil including but not limited to infection/immunosuppression, GI upset, hypokalemia, hypercholesterolemia, bone marrow suppression, lymphoproliferative disorders, malignancy, GI ulceration/bleed/perforation, colitis, interstitial lung disease, kidney failure, progressive multifocal leukoencephalopathy, and birth defects.  The patient understands that monitoring is required including a baseline creatinine and regular CBC testing. In addition, patient must alert us immediately if symptoms of infection or other concerning signs are noted.
Carac Pregnancy And Lactation Text: This medication is Pregnancy Category X and contraindicated in pregnancy and in women who may become pregnant. It is unknown if this medication is excreted in breast milk.
Valtrex Counseling: I discussed with the patient the risks of valacyclovir including but not limited to kidney damage, nausea, vomiting and severe allergy.  The patient understands that if the infection seems to be worsening or is not improving, they are to call.
Dupixent Counseling: I discussed with the patient the risks of dupilumab including but not limited to eye infection and irritation, cold sores, injection site reactions, worsening of asthma, allergic reactions and increased risk of parasitic infection.  Live vaccines should be avoided while taking dupilumab. Dupilumab will also interact with certain medications such as warfarin and cyclosporine. The patient understands that monitoring is required and they must alert us or the primary physician if symptoms of infection or other concerning signs are noted.
Topical Sulfur Applications Pregnancy And Lactation Text: This medication is Pregnancy Category C and has an unknown safety profile during pregnancy. It is unknown if this topical medication is excreted in breast milk.
Cephalexin Pregnancy And Lactation Text: This medication is Pregnancy Category B and considered safe during pregnancy.  It is also excreted in breast milk but can be used safely for shorter doses.
Oxybutynin Counseling:  I discussed with the patient the risks of oxybutynin including but not limited to skin rash, drowsiness, dry mouth, difficulty urinating, and blurred vision.
Glycopyrrolate Pregnancy And Lactation Text: This medication is Pregnancy Category B and is considered safe during pregnancy. It is unknown if it is excreted breast milk.
Minocycline Counseling: Patient advised regarding possible photosensitivity and discoloration of the teeth, skin, lips, tongue and gums.  Patient instructed to avoid sunlight, if possible.  When exposed to sunlight, patients should wear protective clothing, sunglasses, and sunscreen.  The patient was instructed to call the office immediately if the following severe adverse effects occur:  hearing changes, easy bruising/bleeding, severe headache, or vision changes.  The patient verbalized understanding of the proper use and possible adverse effects of minocycline.  All of the patient's questions and concerns were addressed.
Clindamycin Counseling: I counseled the patient regarding use of clindamycin as an antibiotic for prophylactic and/or therapeutic purposes. Clindamycin is active against numerous classes of bacteria, including skin bacteria. Side effects may include nausea, diarrhea, gastrointestinal upset, rash, hives, yeast infections, and in rare cases, colitis.
Cimetidine Counseling:  I discussed with the patient the risks of Cimetidine including but not limited to gynecomastia, headache, diarrhea, nausea, drowsiness, arrhythmias, pancreatitis, skin rashes, psychosis, bone marrow suppression and kidney toxicity.
Wartpeel Counseling:  I discussed with the patient the risks of Wartpeel including but not limited to erythema, scaling, itching, weeping, crusting, and pain.
Include Pregnancy/Lactation Warning?: No
Solaraze Counseling:  I discussed with the patient the risks of Solaraze including but not limited to erythema, scaling, itching, weeping, crusting, and pain.
Colchicine Counseling:  Patient counseled regarding adverse effects including but not limited to stomach upset (nausea, vomiting, stomach pain, or diarrhea).  Patient instructed to limit alcohol consumption while taking this medication.  Colchicine may reduce blood counts especially with prolonged use.  The patient understands that monitoring of kidney function and blood counts may be required, especially at baseline. The patient verbalized understanding of the proper use and possible adverse effects of colchicine.  All of the patient's questions and concerns were addressed.
Rituxan Counseling:  I discussed with the patient the risks of Rituxan infusions. Side effects can include infusion reactions, severe drug rashes including mucocutaneous reactions, reactivation of latent hepatitis and other infections and rarely progressive multifocal leukoencephalopathy.  All of the patient's questions and concerns were addressed.
5-Fu Counseling: 5-Fluorouracil Counseling:  I discussed with the patient the risks of 5-fluorouracil including but not limited to erythema, scaling, itching, weeping, crusting, and pain.
Benzoyl Peroxide Counseling: Patient counseled that medicine may cause skin irritation and bleach clothing.  In the event of skin irritation, the patient was advised to reduce the amount of the drug applied or use it less frequently.   The patient verbalized understanding of the proper use and possible adverse effects of benzoyl peroxide.  All of the patient's questions and concerns were addressed.
Rituxan Pregnancy And Lactation Text: This medication is Pregnancy Category C and it isn't know if it is safe during pregnancy. It is unknown if this medication is excreted in breast milk but similar antibodies are known to be excreted.
Solaraze Pregnancy And Lactation Text: This medication is Pregnancy Category B and is considered safe. There is some data to suggest avoiding during the third trimester. It is unknown if this medication is excreted in breast milk.
Valtrex Pregnancy And Lactation Text: this medication is Pregnancy Category B and is considered safe during pregnancy. This medication is not directly found in breast milk but it's metabolite acyclovir is present.
Hydroxychloroquine Counseling:  I discussed with the patient that a baseline ophthalmologic exam is needed at the start of therapy and every year thereafter while on therapy. A CBC may also be warranted for monitoring.  The side effects of this medication were discussed with the patient, including but not limited to agranulocytosis, aplastic anemia, seizures, rashes, retinopathy, and liver toxicity. Patient instructed to call the office should any adverse effect occur.  The patient verbalized understanding of the proper use and possible adverse effects of Plaquenil.  All the patient's questions and concerns were addressed.
Griseofulvin Counseling:  I discussed with the patient the risks of griseofulvin including but not limited to photosensitivity, cytopenia, liver damage, nausea/vomiting and severe allergy.  The patient understands that this medication is best absorbed when taken with a fatty meal (e.g., ice cream or french fries).
Dupixent Pregnancy And Lactation Text: This medication likely crosses the placenta but the risk for the fetus is uncertain. This medication is excreted in breast milk.
Griseofulvin Pregnancy And Lactation Text: This medication is Pregnancy Category X and is known to cause serious birth defects. It is unknown if this medication is excreted in breast milk but breast feeding should be avoided.
Hydroxychloroquine Pregnancy And Lactation Text: This medication has been shown to cause fetal harm but it isn't assigned a Pregnancy Risk Category. There are small amounts excreted in breast milk.
Enbrel Counseling:  I discussed with the patient the risks of etanercept including but not limited to myelosuppression, immunosuppression, autoimmune hepatitis, demyelinating diseases, lymphoma, and infections.  The patient understands that monitoring is required including a PPD at baseline and must alert us or the primary physician if symptoms of infection or other concerning signs are noted.
Clindamycin Pregnancy And Lactation Text: This medication can be used in pregnancy if certain situations. Clindamycin is also present in breast milk.
Tremfya Counseling: I discussed with the patient the risks of guselkumab including but not limited to immunosuppression, serious infections, worsening of inflammatory bowel disease and drug reactions.  The patient understands that monitoring is required including a PPD at baseline and must alert us or the primary physician if symptoms of infection or other concerning signs are noted.
Imiquimod Counseling:  I discussed with the patient the risks of imiquimod including but not limited to erythema, scaling, itching, weeping, crusting, and pain.  Patient understands that the inflammatory response to imiquimod is variable from person to person and was educated regarded proper titration schedule.  If flu-like symptoms develop, patient knows to discontinue the medication and contact us.
Birth Control Pills Counseling: Birth Control Pill Counseling: I discussed with the patient the potential side effects of OCPs including but not limited to increased risk of stroke, heart attack, thrombophlebitis, deep venous thrombosis, hepatic adenomas, breast changes, GI upset, headaches, and depression.  The patient verbalized understanding of the proper use and possible adverse effects of OCPs. All of the patient's questions and concerns were addressed.
Quinolones Counseling:  I discussed with the patient the risks of fluoroquinolones including but not limited to GI upset, allergic reaction, drug rash, diarrhea, dizziness, photosensitivity, yeast infections, liver function test abnormalities, tendonitis/tendon rupture.
Topical Retinoid counseling:  Patient advised to apply a pea-sized amount only at bedtime and wait 30 minutes after washing their face before applying.  If too drying, patient may add a non-comedogenic moisturizer. The patient verbalized understanding of the proper use and possible adverse effects of retinoids.  All of the patient's questions and concerns were addressed.
Dapsone Counseling: I discussed with the patient the risks of dapsone including but not limited to hemolytic anemia, agranulocytosis, rashes, methemoglobinemia, kidney failure, peripheral neuropathy, headaches, GI upset, and liver toxicity.  Patients who start dapsone require monitoring including baseline LFTs and weekly CBCs for the first month, then every month thereafter.  The patient verbalized understanding of the proper use and possible adverse effects of dapsone.  All of the patient's questions and concerns were addressed.
Siliq Counseling:  I discussed with the patient the risks of Siliq including but not limited to new or worsening depression, suicidal thoughts and behavior, immunosuppression, malignancy, posterior leukoencephalopathy syndrome, and serious infections.  The patient understands that monitoring is required including a PPD at baseline and must alert us or the primary physician if symptoms of infection or other concerning signs are noted. There is also a special program designed to monitor depression which is required with Siliq.
Benzoyl Peroxide Pregnancy And Lactation Text: This medication is Pregnancy Category C. It is unknown if benzoyl peroxide is excreted in breast milk.
Xeljanz Counseling: I discussed with the patient the risks of Xeljanz therapy including increased risk of infection, liver issues, headache, diarrhea, or cold symptoms. Live vaccines should be avoided. They were instructed to call if they have any problems.
Acitretin Pregnancy And Lactation Text: This medication is Pregnancy Category X and should not be given to women who are pregnant or may become pregnant in the future. This medication is excreted in breast milk.
Acitretin Counseling:  I discussed with the patient the risks of acitretin including but not limited to hair loss, dry lips/skin/eyes, liver damage, hyperlipidemia, depression/suicidal ideation, photosensitivity.  Serious rare side effects can include but are not limited to pancreatitis, pseudotumor cerebri, bony changes, clot formation/stroke/heart attack.  Patient understands that alcohol is contraindicated since it can result in liver toxicity and significantly prolong the elimination of the drug by many years.
Drysol Counseling:  I discussed with the patient the risks of drysol/aluminum chloride including but not limited to skin rash, itching, irritation, burning.
Itraconazole Counseling:  I discussed with the patient the risks of itraconazole including but not limited to liver damage, nausea/vomiting, neuropathy, and severe allergy.  The patient understands that this medication is best absorbed when taken with acidic beverages such as non-diet cola or ginger ale.  The patient understands that monitoring is required including baseline LFTs and repeat LFTs at intervals.  The patient understands that they are to contact us or the primary physician if concerning signs are noted.
Doxycycline Counseling:  Patient counseled regarding possible photosensitivity and increased risk for sunburn.  Patient instructed to avoid sunlight, if possible.  When exposed to sunlight, patients should wear protective clothing, sunglasses, and sunscreen.  The patient was instructed to call the office immediately if the following severe adverse effects occur:  hearing changes, easy bruising/bleeding, severe headache, or vision changes.  The patient verbalized understanding of the proper use and possible adverse effects of doxycycline.  All of the patient's questions and concerns were addressed.
Cyclophosphamide Counseling:  I discussed with the patient the risks of cyclophosphamide including but not limited to hair loss, hormonal abnormalities, decreased fertility, abdominal pain, diarrhea, nausea and vomiting, bone marrow suppression and infection. The patient understands that monitoring is required while taking this medication.
Zyclara Counseling:  I discussed with the patient the risks of imiquimod including but not limited to erythema, scaling, itching, weeping, crusting, and pain.  Patient understands that the inflammatory response to imiquimod is variable from person to person and was educated regarded proper titration schedule.  If flu-like symptoms develop, patient knows to discontinue the medication and contact us.
Doxepin Counseling:  Patient advised that the medication is sedating and not to drive a car after taking this medication. Patient informed of potential adverse effects including but not limited to dry mouth, urinary retention, and blurry vision.  The patient verbalized understanding of the proper use and possible adverse effects of doxepin.  All of the patient's questions and concerns were addressed.
Birth Control Pills Pregnancy And Lactation Text: This medication should be avoided if pregnant and for the first 30 days post-partum.

## 2018-12-10 NOTE — PROCEDURE: COUNSELING
Detail Level: Generalized
Detail Level: Zone
Detail Level: Detailed
Quality 137: Melanoma: Continuity Of Care - Recall System: Patient information entered into a recall system that includes: target date for the next exam specified AND a process to follow up with patients regarding missed or unscheduled appointments
Quality 224: Stage 0-Iic Melanoma: Overutilization Of Imaging Studies For Only Stage 0-Iic Melanoma: None of the following diagnostic imaging studies ordered: chest X-ray, CT, Ultrasound, MRI, PET, or nuclear medicine scans (ML)
Detail Level: Simple

## 2018-12-18 ENCOUNTER — APPOINTMENT (RX ONLY)
Dept: URBAN - METROPOLITAN AREA CLINIC 4 | Facility: CLINIC | Age: 68
Setting detail: DERMATOLOGY
End: 2018-12-18

## 2018-12-18 ENCOUNTER — RX ONLY (OUTPATIENT)
Age: 68
Setting detail: RX ONLY
End: 2018-12-18

## 2018-12-18 PROBLEM — C43.59 MALIGNANT MELANOMA OF OTHER PART OF TRUNK: Status: ACTIVE | Noted: 2018-12-18

## 2018-12-18 PROCEDURE — 11603 EXC TR-EXT MAL+MARG 2.1-3 CM: CPT | Mod: 79,76

## 2018-12-18 PROCEDURE — ? EXCISION

## 2018-12-18 PROCEDURE — 12034 INTMD RPR S/TR/EXT 7.6-12.5: CPT | Mod: 79

## 2018-12-18 PROCEDURE — 11603 EXC TR-EXT MAL+MARG 2.1-3 CM: CPT | Mod: 79

## 2018-12-18 RX ORDER — DOXYCYCLINE HYCLATE 100 MG/1
TABLET, COATED ORAL
Qty: 14 | Refills: 0 | Status: ERX

## 2018-12-18 NOTE — PROCEDURE: EXCISION
Slit Excision Additional Text (Leave Blank If You Do Not Want): A linear line was drawn on the skin overlying the lesion. An incision was made slowly until the lesion was visualized.  Once visualized, the lesion was removed with blunt dissection.
O-Z Plasty Text: The defect edges were debeveled with a #15 scalpel blade.  Given the location of the defect, shape of the defect and the proximity to free margins an O-Z plasty (double transposition flap) was deemed most appropriate.  Using a sterile surgical marker, the appropriate transposition flaps were drawn incorporating the defect and placing the expected incisions within the relaxed skin tension lines where possible.    The area thus outlined was incised deep to adipose tissue with a #15 scalpel blade.  The skin margins were undermined to an appropriate distance in all directions utilizing iris scissors.  Hemostasis was achieved with electrocautery.  The flaps were then transposed into place, one clockwise and the other counterclockwise, and anchored with interrupted buried subcutaneous sutures.
Muscle Hinge Flap Text: The defect edges were debeveled with a #15 scalpel blade.  Given the size, depth and location of the defect and the proximity to free margins a muscle hinge flap was deemed most appropriate.  Using a sterile surgical marker, an appropriate hinge flap was drawn incorporating the defect. The area thus outlined was incised with a #15 scalpel blade.  The skin margins were undermined to an appropriate distance in all directions utilizing iris scissors.
Show Repair Surgeon Variable: Yes
Repair Performed By Another Provider Text (Leave Blank If You Do Not Want): After the tissue was excised the defect was repaired by another provider.
Complex Repair And M Plasty Text: The defect edges were debeveled with a #15 scalpel blade.  The primary defect was closed partially with a complex linear closure.  Given the location of the remaining defect, shape of the defect and the proximity to free margins an M plasty was deemed most appropriate for complete closure of the defect.  Using a sterile surgical marker, an appropriate advancement flap was drawn incorporating the defect and placing the expected incisions within the relaxed skin tension lines where possible.    The area thus outlined was incised deep to adipose tissue with a #15 scalpel blade.  The skin margins were undermined to an appropriate distance in all directions utilizing iris scissors.
No Repair - Repaired With Adjacent Surgical Defect Text (Leave Blank If You Do Not Want): After the excision the defect was repaired concurrently with another surgical defect which was in close approximation.
Complex Repair And V-Y Plasty Text: The defect edges were debeveled with a #15 scalpel blade.  The primary defect was closed partially with a complex linear closure.  Given the location of the remaining defect, shape of the defect and the proximity to free margins a V-Y plasty was deemed most appropriate for complete closure of the defect.  Using a sterile surgical marker, an appropriate advancement flap was drawn incorporating the defect and placing the expected incisions within the relaxed skin tension lines where possible.    The area thus outlined was incised deep to adipose tissue with a #15 scalpel blade.  The skin margins were undermined to an appropriate distance in all directions utilizing iris scissors.
Post-Care Instructions: I reviewed with the patient in detail post-care instructions. Patient is not to engage in any heavy lifting, exercise, or swimming for the next 14 days. Should the patient develop any fevers, chills, bleeding, severe pain patient will contact the office immediately.
Dermal Autograft Text: The defect edges were debeveled with a #15 scalpel blade.  Given the location of the defect, shape of the defect and the proximity to free margins a dermal autograft was deemed most appropriate.  Using a sterile surgical marker, the primary defect shape was transferred to the donor site. The area thus outlined was incised deep to adipose tissue with a #15 scalpel blade.  The harvested graft was then trimmed of adipose and epidermal tissue until only dermis was left.  The skin graft was then placed in the primary defect and oriented appropriately.
Surgeon Performing The Repair (Optional): Pastor
Excisional Biopsy Additional Text (Leave Blank If You Do Not Want): The margin was drawn around the clinically apparent lesion. An elliptical shape was then drawn on the skin incorporating the lesion and margins.  Incisions were then made along these lines to the appropriate tissue plane and the lesion was extirpated.
Complex Repair And Double M Plasty Text: The defect edges were debeveled with a #15 scalpel blade.  The primary defect was closed partially with a complex linear closure.  Given the location of the remaining defect, shape of the defect and the proximity to free margins a double M plasty was deemed most appropriate for complete closure of the defect.  Using a sterile surgical marker, an appropriate advancement flap was drawn incorporating the defect and placing the expected incisions within the relaxed skin tension lines where possible.    The area thus outlined was incised deep to adipose tissue with a #15 scalpel blade.  The skin margins were undermined to an appropriate distance in all directions utilizing iris scissors.
S Plasty Text: Given the location and shape of the defect, and the orientation of relaxed skin tension lines, an S-plasty was deemed most appropriate for repair.  Using a sterile surgical marker, the appropriate outline of the S-plasty was drawn, incorporating the defect and placing the expected incisions within the relaxed skin tension lines where possible.  The area thus outlined was incised deep to adipose tissue with a #15 scalpel blade.  The skin margins were undermined to an appropriate distance in all directions utilizing iris scissors. The skin flaps were advanced over the defect.  The opposing margins were then approximated with interrupted buried subcutaneous sutures.
Melolabial Transposition Flap Text: The defect edges were debeveled with a #15 scalpel blade.  Given the location of the defect and the proximity to free margins a melolabial flap was deemed most appropriate.  Using a sterile surgical marker, an appropriate melolabial transposition flap was drawn incorporating the defect.    The area thus outlined was incised deep to adipose tissue with a #15 scalpel blade.  The skin margins were undermined to an appropriate distance in all directions utilizing iris scissors.
Validate Referring Provider (Can Hide Referring Provider In Settings Tab): No
V-Y Plasty Text: The defect edges were debeveled with a #15 scalpel blade.  Given the location of the defect, shape of the defect and the proximity to free margins an V-Y advancement flap was deemed most appropriate.  Using a sterile surgical marker, an appropriate advancement flap was drawn incorporating the defect and placing the expected incisions within the relaxed skin tension lines where possible.    The area thus outlined was incised deep to adipose tissue with a #15 scalpel blade.  The skin margins were undermined to an appropriate distance in all directions utilizing iris scissors.
Rhombic Flap Text: The defect edges were debeveled with a #15 scalpel blade.  Given the location of the defect and the proximity to free margins a rhombic flap was deemed most appropriate.  Using a sterile surgical marker, an appropriate rhombic flap was drawn incorporating the defect.    The area thus outlined was incised deep to adipose tissue with a #15 scalpel blade.  The skin margins were undermined to an appropriate distance in all directions utilizing iris scissors.
Home Suture Removal Text: Patient was provided a home suture removal kit and will remove their sutures at home.  If they have any questions or difficulties they will call the office.
Hemostasis: Electrocautery
Advancement Flap (Single) Text: The defect edges were debeveled with a #15 scalpel blade.  Given the location of the defect and the proximity to free margins a single advancement flap was deemed most appropriate.  Using a sterile surgical marker, an appropriate advancement flap was drawn incorporating the defect and placing the expected incisions within the relaxed skin tension lines where possible.    The area thus outlined was incised deep to adipose tissue with a #15 scalpel blade.  The skin margins were undermined to an appropriate distance in all directions utilizing iris scissors.
Skin Substitute Text: The defect edges were debeveled with a #15 scalpel blade.  Given the location of the defect, shape of the defect and the proximity to free margins a skin substitute graft was deemed most appropriate.  The graft material was trimmed to fit the size of the defect. The graft was then placed in the primary defect and oriented appropriately.
Complex Repair And W Plasty Text: The defect edges were debeveled with a #15 scalpel blade.  The primary defect was closed partially with a complex linear closure.  Given the location of the remaining defect, shape of the defect and the proximity to free margins a W plasty was deemed most appropriate for complete closure of the defect.  Using a sterile surgical marker, an appropriate advancement flap was drawn incorporating the defect and placing the expected incisions within the relaxed skin tension lines where possible.    The area thus outlined was incised deep to adipose tissue with a #15 scalpel blade.  The skin margins were undermined to an appropriate distance in all directions utilizing iris scissors.
Estimated Blood Loss (Cc): minimal
Lab: [id number]
Xenograft Text: The defect edges were debeveled with a #15 scalpel blade.  Given the location of the defect, shape of the defect and the proximity to free margins a xenograft was deemed most appropriate.  The graft was then trimmed to fit the size of the defect.  The graft was then placed in the primary defect and oriented appropriately.
Tissue Cultured Epidermal Autograft Text: The defect edges were debeveled with a #15 scalpel blade.  Given the location of the defect, shape of the defect and the proximity to free margins a tissue cultured epidermal autograft was deemed most appropriate.  The graft was then trimmed to fit the size of the defect.  The graft was then placed in the primary defect and oriented appropriately.
H Plasty Text: Given the location of the defect, shape of the defect and the proximity to free margins a H-plasty was deemed most appropriate for repair.  Using a sterile surgical marker, the appropriate advancement arms of the H-plasty were drawn incorporating the defect and placing the expected incisions within the relaxed skin tension lines where possible. The area thus outlined was incised deep to adipose tissue with a #15 scalpel blade. The skin margins were undermined to an appropriate distance in all directions utilizing iris scissors.  The opposing advancement arms were then advanced into place in opposite direction and anchored with interrupted buried subcutaneous sutures.
Perilesional Excision Additional Text (Leave Blank If You Do Not Want): The margin was drawn around the clinically apparent lesion. Incisions were then made along these lines to the appropriate tissue plane and the lesion was extirpated.
Bi-Rhombic Flap Text: The defect edges were debeveled with a #15 scalpel blade.  Given the location of the defect and the proximity to free margins a bi-rhombic flap was deemed most appropriate.  Using a sterile surgical marker, an appropriate rhombic flap was drawn incorporating the defect. The area thus outlined was incised deep to adipose tissue with a #15 scalpel blade.  The skin margins were undermined to an appropriate distance in all directions utilizing iris scissors.
Advancement Flap (Double) Text: The defect edges were debeveled with a #15 scalpel blade.  Given the location of the defect and the proximity to free margins a double advancement flap was deemed most appropriate.  Using a sterile surgical marker, the appropriate advancement flaps were drawn incorporating the defect and placing the expected incisions within the relaxed skin tension lines where possible.    The area thus outlined was incised deep to adipose tissue with a #15 scalpel blade.  The skin margins were undermined to an appropriate distance in all directions utilizing iris scissors.
Helical Rim Advancement Flap Text: The defect edges were debeveled with a #15 blade scalpel.  Given the location of the defect and the proximity to free margins (helical rim) a double helical rim advancement flap was deemed most appropriate.  Using a sterile surgical marker, the appropriate advancement flaps were drawn incorporating the defect and placing the expected incisions between the helical rim and antihelix where possible.  The area thus outlined was incised through and through with a #15 scalpel blade.  With a skin hook and iris scissors, the flaps were gently and sharply undermined and freed up.
Purse String (Intermediate) Text: Given the location of the defect and the characteristics of the surrounding skin a purse string intermediate closure was deemed most appropriate.  Undermining was performed circumfirentially around the surgical defect.  A purse string suture was then placed and tightened.
Lab Facility: [id number]
Burow's Advancement Flap Text: The defect edges were debeveled with a #15 scalpel blade.  Given the location of the defect and the proximity to free margins a Burow's advancement flap was deemed most appropriate.  Using a sterile surgical marker, the appropriate advancement flap was drawn incorporating the defect and placing the expected incisions within the relaxed skin tension lines where possible.    The area thus outlined was incised deep to adipose tissue with a #15 scalpel blade.  The skin margins were undermined to an appropriate distance in all directions utilizing iris scissors.
Positioning (Leave Blank If You Do Not Want): The patient was placed in a comfortable position exposing the surgical site.
W Plasty Text: The lesion was extirpated to the level of the fat with a #15 scalpel blade.  Given the location of the defect, shape of the defect and the proximity to free margins a W-plasty was deemed most appropriate for repair.  Using a sterile surgical marker, the appropriate transposition arms of the W-plasty were drawn incorporating the defect and placing the expected incisions within the relaxed skin tension lines where possible.    The area thus outlined was incised deep to adipose tissue with a #15 scalpel blade.  The skin margins were undermined to an appropriate distance in all directions utilizing iris scissors.  The opposing transposition arms were then transposed into place in opposite direction and anchored with interrupted buried subcutaneous sutures.
Complex Repair And Z Plasty Text: The defect edges were debeveled with a #15 scalpel blade.  The primary defect was closed partially with a complex linear closure.  Given the location of the remaining defect, shape of the defect and the proximity to free margins a Z plasty was deemed most appropriate for complete closure of the defect.  Using a sterile surgical marker, an appropriate advancement flap was drawn incorporating the defect and placing the expected incisions within the relaxed skin tension lines where possible.    The area thus outlined was incised deep to adipose tissue with a #15 scalpel blade.  The skin margins were undermined to an appropriate distance in all directions utilizing iris scissors.
Graft Donor Site Bandage (Optional-Leave Blank If You Don't Want In Note): Steri-strips and a pressure bandage were applied to the donor site.
Z Plasty Text: The lesion was extirpated to the level of the fat with a #15 scalpel blade.  Given the location of the defect, shape of the defect and the proximity to free margins a Z-plasty was deemed most appropriate for repair.  Using a sterile surgical marker, the appropriate transposition arms of the Z-plasty were drawn incorporating the defect and placing the expected incisions within the relaxed skin tension lines where possible.    The area thus outlined was incised deep to adipose tissue with a #15 scalpel blade.  The skin margins were undermined to an appropriate distance in all directions utilizing iris scissors.  The opposing transposition arms were then transposed into place in opposite direction and anchored with interrupted buried subcutaneous sutures.
Purse String (Simple) Text: Given the location of the defect and the characteristics of the surrounding skin a purse string simple closure was deemed most appropriate.  Undermining was performed circumferentially around the surgical defect.  A purse string suture was then placed and tightened.
Bilateral Helical Rim Advancement Flap Text: The defect edges were debeveled with a #15 blade scalpel.  Given the location of the defect and the proximity to free margins (helical rim) a bilateral helical rim advancement flap was deemed most appropriate.  Using a sterile surgical marker, the appropriate advancement flaps were drawn incorporating the defect and placing the expected incisions between the helical rim and antihelix where possible.  The area thus outlined was incised through and through with a #15 scalpel blade.  With a skin hook and iris scissors, the flaps were gently and sharply undermined and freed up.
Complex Repair And Dorsal Nasal Flap Text: The defect edges were debeveled with a #15 scalpel blade.  The primary defect was closed partially with a complex linear closure.  Given the location of the remaining defect, shape of the defect and the proximity to free margins a dorsal nasal flap was deemed most appropriate for complete closure of the defect.  Using a sterile surgical marker, an appropriate flap was drawn incorporating the defect and placing the expected incisions within the relaxed skin tension lines where possible.    The area thus outlined was incised deep to adipose tissue with a #15 scalpel blade.  The skin margins were undermined to an appropriate distance in all directions utilizing iris scissors.
Excision Depth: adipose tissue
Crescentic Advancement Flap Text: The defect edges were debeveled with a #15 scalpel blade.  Given the location of the defect and the proximity to free margins a crescentic advancement flap was deemed most appropriate.  Using a sterile surgical marker, the appropriate advancement flap was drawn incorporating the defect and placing the expected incisions within the relaxed skin tension lines where possible.    The area thus outlined was incised deep to adipose tissue with a #15 scalpel blade.  The skin margins were undermined to an appropriate distance in all directions utilizing iris scissors.
Pre-Excision Curettage Text (Leave Blank If You Do Not Want): Prior to drawing the surgical margin the visible lesion was removed with electrodesiccation and curettage to clearly define the lesion size.
A-T Advancement Flap Text: The defect edges were debeveled with a #15 scalpel blade.  Given the location of the defect, shape of the defect and the proximity to free margins an A-T advancement flap was deemed most appropriate.  Using a sterile surgical marker, an appropriate advancement flap was drawn incorporating the defect and placing the expected incisions within the relaxed skin tension lines where possible.    The area thus outlined was incised deep to adipose tissue with a #15 scalpel blade.  The skin margins were undermined to an appropriate distance in all directions utilizing iris scissors.
Cheek Interpolation Flap Text: A decision was made to reconstruct the defect utilizing an interpolation axial flap and a staged reconstruction.  A telfa template was made of the defect.  This telfa template was then used to outline the Cheek Interpolation flap.  The donor area for the pedicle flap was then injected with anesthesia.  The flap was excised through the skin and subcutaneous tissue down to the layer of the underlying musculature.  The interpolation flap was carefully excised within this deep plane to maintain its blood supply.  The edges of the donor site were undermined.   The donor site was closed in a primary fashion.  The pedicle was then rotated into position and sutured.  Once the tube was sutured into place, adequate blood supply was confirmed with blanching and refill.  The pedicle was then wrapped with xeroform gauze and dressed appropriately with a telfa and gauze bandage to ensure continued blood supply and protect the attached pedicle.
Intermediate Repair Preamble Text (Leave Blank If You Do Not Want): Undermining was performed with blunt dissection.
Ear Star Wedge Flap Text: The defect edges were debeveled with a #15 blade scalpel.  Given the location of the defect and the proximity to free margins (helical rim) an ear star wedge flap was deemed most appropriate.  Using a sterile surgical marker, the appropriate flap was drawn incorporating the defect and placing the expected incisions between the helical rim and antihelix where possible.  The area thus outlined was incised through and through with a #15 scalpel blade.
Scalpel Size: 10 blade
Complex Repair And Split-Thickness Skin Graft Text: The defect edges were debeveled with a #15 scalpel blade.  The primary defect was closed partially with a complex linear closure.  Given the location of the defect, shape of the defect and the proximity to free margins a split thickness skin graft was deemed most appropriate to repair the remaining defect.  The graft was trimmed to fit the size of the remaining defect.  The graft was then placed in the primary defect, oriented appropriately, and sutured into place.
Complex Repair Preamble Text (Leave Blank If You Do Not Want): Extensive wide undermining was performed.
Banner Transposition Flap Text: The defect edges were debeveled with a #15 scalpel blade.  Given the location of the defect and the proximity to free margins a Banner transposition flap was deemed most appropriate.  Using a sterile surgical marker, an appropriate flap drawn around the defect. The area thus outlined was incised deep to adipose tissue with a #15 scalpel blade.  The skin margins were undermined to an appropriate distance in all directions utilizing iris scissors.
Epidermal Sutures: 3-0 Monosof
Complex Repair And Ftsg Text: The defect edges were debeveled with a #15 scalpel blade.  The primary defect was closed partially with a complex linear closure.  Given the location of the defect, shape of the defect and the proximity to free margins a full thickness skin graft was deemed most appropriate to repair the remaining defect.  The graft was trimmed to fit the size of the remaining defect.  The graft was then placed in the primary defect, oriented appropriately, and sutured into place.
Partial Purse String (Intermediate) Text: Given the location of the defect and the characteristics of the surrounding skin an intermediate purse string closure was deemed most appropriate.  Undermining was performed circumferentially around the surgical defect.  A purse string suture was then placed and tightened. Wound tension of the circular defect prevented complete closure of the wound.
O-T Advancement Flap Text: The defect edges were debeveled with a #15 scalpel blade.  Given the location of the defect, shape of the defect and the proximity to free margins an O-T advancement flap was deemed most appropriate.  Using a sterile surgical marker, an appropriate advancement flap was drawn incorporating the defect and placing the expected incisions within the relaxed skin tension lines where possible.    The area thus outlined was incised deep to adipose tissue with a #15 scalpel blade.  The skin margins were undermined to an appropriate distance in all directions utilizing iris scissors.
Complex Repair And Epidermal Autograft Text: The defect edges were debeveled with a #15 scalpel blade.  The primary defect was closed partially with a complex linear closure.  Given the location of the defect, shape of the defect and the proximity to free margins an epidermal autograft was deemed most appropriate to repair the remaining defect.  The graft was trimmed to fit the size of the remaining defect.  The graft was then placed in the primary defect, oriented appropriately, and sutured into place.
Cheek-To-Nose Interpolation Flap Text: A decision was made to reconstruct the defect utilizing an interpolation axial flap and a staged reconstruction.  A telfa template was made of the defect.  This telfa template was then used to outline the Cheek-To-Nose Interpolation flap.  The donor area for the pedicle flap was then injected with anesthesia.  The flap was excised through the skin and subcutaneous tissue down to the layer of the underlying musculature.  The interpolation flap was carefully excised within this deep plane to maintain its blood supply.  The edges of the donor site were undermined.   The donor site was closed in a primary fashion.  The pedicle was then rotated into position and sutured.  Once the tube was sutured into place, adequate blood supply was confirmed with blanching and refill.  The pedicle was then wrapped with xeroform gauze and dressed appropriately with a telfa and gauze bandage to ensure continued blood supply and protect the attached pedicle.
Detail Level: Detailed
Interpolation Flap Text: A decision was made to reconstruct the defect utilizing an interpolation axial flap and a staged reconstruction.  A telfa template was made of the defect.  This telfa template was then used to outline the interpolation flap.  The donor area for the pedicle flap was then injected with anesthesia.  The flap was excised through the skin and subcutaneous tissue down to the layer of the underlying musculature.  The interpolation flap was carefully excised within this deep plane to maintain its blood supply.  The edges of the donor site were undermined.   The donor site was closed in a primary fashion.  The pedicle was then rotated into position and sutured.  Once the tube was sutured into place, adequate blood supply was confirmed with blanching and refill.  The pedicle was then wrapped with xeroform gauze and dressed appropriately with a telfa and gauze bandage to ensure continued blood supply and protect the attached pedicle.
Bilobed Flap Text: The defect edges were debeveled with a #15 scalpel blade.  Given the location of the defect and the proximity to free margins a bilobe flap was deemed most appropriate.  Using a sterile surgical marker, an appropriate bilobe flap drawn around the defect.    The area thus outlined was incised deep to adipose tissue with a #15 scalpel blade.  The skin margins were undermined to an appropriate distance in all directions utilizing iris scissors.
Partial Purse String (Simple) Text: Given the location of the defect and the characteristics of the surrounding skin a simple purse string closure was deemed most appropriate.  Undermining was performed circumferentially around the surgical defect.  A purse string suture was then placed and tightened. Wound tension of the circular defect prevented complete closure of the wound.
Melolabial Interpolation Flap Text: A decision was made to reconstruct the defect utilizing an interpolation axial flap and a staged reconstruction.  A telfa template was made of the defect.  This telfa template was then used to outline the melolabial interpolation flap.  The donor area for the pedicle flap was then injected with anesthesia.  The flap was excised through the skin and subcutaneous tissue down to the layer of the underlying musculature.  The pedicle flap was carefully excised within this deep plane to maintain its blood supply.  The edges of the donor site were undermined.   The donor site was closed in a primary fashion.  The pedicle was then rotated into position and sutured.  Once the tube was sutured into place, adequate blood supply was confirmed with blanching and refill.  The pedicle was then wrapped with xeroform gauze and dressed appropriately with a telfa and gauze bandage to ensure continued blood supply and protect the attached pedicle.
Complex Repair And Double Advancement Flap Text: The defect edges were debeveled with a #15 scalpel blade.  The primary defect was closed partially with a complex linear closure.  Given the location of the remaining defect, shape of the defect and the proximity to free margins a double advancement flap was deemed most appropriate for complete closure of the defect.  Using a sterile surgical marker, an appropriate advancement flap was drawn incorporating the defect and placing the expected incisions within the relaxed skin tension lines where possible.    The area thus outlined was incised deep to adipose tissue with a #15 scalpel blade.  The skin margins were undermined to an appropriate distance in all directions utilizing iris scissors.
O-L Flap Text: The defect edges were debeveled with a #15 scalpel blade.  Given the location of the defect, shape of the defect and the proximity to free margins an O-L flap was deemed most appropriate.  Using a sterile surgical marker, an appropriate advancement flap was drawn incorporating the defect and placing the expected incisions within the relaxed skin tension lines where possible.    The area thus outlined was incised deep to adipose tissue with a #15 scalpel blade.  The skin margins were undermined to an appropriate distance in all directions utilizing iris scissors.
Complex Repair And Single Advancement Flap Text: The defect edges were debeveled with a #15 scalpel blade.  The primary defect was closed partially with a complex linear closure.  Given the location of the remaining defect, shape of the defect and the proximity to free margins a single advancement flap was deemed most appropriate for complete closure of the defect.  Using a sterile surgical marker, an appropriate advancement flap was drawn incorporating the defect and placing the expected incisions within the relaxed skin tension lines where possible.    The area thus outlined was incised deep to adipose tissue with a #15 scalpel blade.  The skin margins were undermined to an appropriate distance in all directions utilizing iris scissors.
Size Of Lesion In Cm: 1.3
V-Y Flap Text: The defect edges were debeveled with a #15 scalpel blade.  Given the location of the defect, shape of the defect and the proximity to free margins a V-Y flap was deemed most appropriate.  Using a sterile surgical marker, an appropriate advancement flap was drawn incorporating the defect and placing the expected incisions within the relaxed skin tension lines where possible.    The area thus outlined was incised deep to adipose tissue with a #15 scalpel blade.  The skin margins were undermined to an appropriate distance in all directions utilizing iris scissors.
Bilobed Transposition Flap Text: The defect edges were debeveled with a #15 scalpel blade.  Given the location of the defect and the proximity to free margins a bilobed transposition flap was deemed most appropriate.  Using a sterile surgical marker, an appropriate bilobe flap drawn around the defect.    The area thus outlined was incised deep to adipose tissue with a #15 scalpel blade.  The skin margins were undermined to an appropriate distance in all directions utilizing iris scissors.
Trilobed Flap Text: The defect edges were debeveled with a #15 scalpel blade.  Given the location of the defect and the proximity to free margins a trilobed flap was deemed most appropriate.  Using a sterile surgical marker, an appropriate trilobed flap drawn around the defect.    The area thus outlined was incised deep to adipose tissue with a #15 scalpel blade.  The skin margins were undermined to an appropriate distance in all directions utilizing iris scissors.
Second Skin Substitute Units (Will Override Primary Defect Units If Greater Than 0): 0
Mastoid Interpolation Flap Text: A decision was made to reconstruct the defect utilizing an interpolation axial flap and a staged reconstruction.  A telfa template was made of the defect.  This telfa template was then used to outline the mastoid interpolation flap.  The donor area for the pedicle flap was then injected with anesthesia.  The flap was excised through the skin and subcutaneous tissue down to the layer of the underlying musculature.  The pedicle flap was carefully excised within this deep plane to maintain its blood supply.  The edges of the donor site were undermined.   The donor site was closed in a primary fashion.  The pedicle was then rotated into position and sutured.  Once the tube was sutured into place, adequate blood supply was confirmed with blanching and refill.  The pedicle was then wrapped with xeroform gauze and dressed appropriately with a telfa and gauze bandage to ensure continued blood supply and protect the attached pedicle.
Information: Selecting Yes will display possible errors in your note based on the variables you have selected. This validation is only offered as a suggestion for you. PLEASE NOTE THAT THE VALIDATION TEXT WILL BE REMOVED WHEN YOU FINALIZE YOUR NOTE. IF YOU WANT TO FAX A PRELIMINARY NOTE YOU WILL NEED TO TOGGLE THIS TO 'NO' IF YOU DO NOT WANT IT IN YOUR FAXED NOTE.
Complex Repair And Modified Advancement Flap Text: The defect edges were debeveled with a #15 scalpel blade.  The primary defect was closed partially with a complex linear closure.  Given the location of the remaining defect, shape of the defect and the proximity to free margins a modified advancement flap was deemed most appropriate for complete closure of the defect.  Using a sterile surgical marker, an appropriate advancement flap was drawn incorporating the defect and placing the expected incisions within the relaxed skin tension lines where possible.    The area thus outlined was incised deep to adipose tissue with a #15 scalpel blade.  The skin margins were undermined to an appropriate distance in all directions utilizing iris scissors.
Complex Repair And Tissue Cultured Epidermal Autograft Text: The defect edges were debeveled with a #15 scalpel blade.  The primary defect was closed partially with a complex linear closure.  Given the location of the defect, shape of the defect and the proximity to free margins an tissue cultured epidermal autograft was deemed most appropriate to repair the remaining defect.  The graft was trimmed to fit the size of the remaining defect.  The graft was then placed in the primary defect, oriented appropriately, and sutured into place.
Complex Repair And Dermal Autograft Text: The defect edges were debeveled with a #15 scalpel blade.  The primary defect was closed partially with a complex linear closure.  Given the location of the defect, shape of the defect and the proximity to free margins an dermal autograft was deemed most appropriate to repair the remaining defect.  The graft was trimmed to fit the size of the remaining defect.  The graft was then placed in the primary defect, oriented appropriately, and sutured into place.
Advancement-Rotation Flap Text: The defect edges were debeveled with a #15 scalpel blade.  Given the location of the defect, shape of the defect and the proximity to free margins an advancement-rotation flap was deemed most appropriate.  Using a sterile surgical marker, an appropriate flap was drawn incorporating the defect and placing the expected incisions within the relaxed skin tension lines where possible. The area thus outlined was incised deep to adipose tissue with a #15 scalpel blade.  The skin margins were undermined to an appropriate distance in all directions utilizing iris scissors.
Complex Repair And A-T Advancement Flap Text: The defect edges were debeveled with a #15 scalpel blade.  The primary defect was closed partially with a complex linear closure.  Given the location of the remaining defect, shape of the defect and the proximity to free margins an A-T advancement flap was deemed most appropriate for complete closure of the defect.  Using a sterile surgical marker, an appropriate advancement flap was drawn incorporating the defect and placing the expected incisions within the relaxed skin tension lines where possible.    The area thus outlined was incised deep to adipose tissue with a #15 scalpel blade.  The skin margins were undermined to an appropriate distance in all directions utilizing iris scissors.
Complex Repair And Xenograft Text: The defect edges were debeveled with a #15 scalpel blade.  The primary defect was closed partially with a complex linear closure.  Given the location of the defect, shape of the defect and the proximity to free margins a xenograft was deemed most appropriate to repair the remaining defect.  The graft was trimmed to fit the size of the remaining defect.  The graft was then placed in the primary defect, oriented appropriately, and sutured into place.
Dorsal Nasal Flap Text: The defect edges were debeveled with a #15 scalpel blade.  Given the location of the defect and the proximity to free margins a dorsal nasal flap was deemed most appropriate.  Using a sterile surgical marker, an appropriate dorsal nasal flap was drawn around the defect.    The area thus outlined was incised deep to adipose tissue with a #15 scalpel blade.  The skin margins were undermined to an appropriate distance in all directions utilizing iris scissors.
Excision Method: Elliptical
Size Of Margin In Cm: 0.4
Posterior Auricular Interpolation Flap Text: A decision was made to reconstruct the defect utilizing an interpolation axial flap and a staged reconstruction.  A telfa template was made of the defect.  This telfa template was then used to outline the posterior auricular interpolation flap.  The donor area for the pedicle flap was then injected with anesthesia.  The flap was excised through the skin and subcutaneous tissue down to the layer of the underlying musculature.  The pedicle flap was carefully excised within this deep plane to maintain its blood supply.  The edges of the donor site were undermined.   The donor site was closed in a primary fashion.  The pedicle was then rotated into position and sutured.  Once the tube was sutured into place, adequate blood supply was confirmed with blanching and refill.  The pedicle was then wrapped with xeroform gauze and dressed appropriately with a telfa and gauze bandage to ensure continued blood supply and protect the attached pedicle.
Mercedes Flap Text: The defect edges were debeveled with a #15 scalpel blade.  Given the location of the defect, shape of the defect and the proximity to free margins a Mercedes flap was deemed most appropriate.  Using a sterile surgical marker, an appropriate advancement flap was drawn incorporating the defect and placing the expected incisions within the relaxed skin tension lines where possible. The area thus outlined was incised deep to adipose tissue with a #15 scalpel blade.  The skin margins were undermined to an appropriate distance in all directions utilizing iris scissors.
Modified Advancement Flap Text: The defect edges were debeveled with a #15 scalpel blade.  Given the location of the defect, shape of the defect and the proximity to free margins a modified advancement flap was deemed most appropriate.  Using a sterile surgical marker, an appropriate advancement flap was drawn incorporating the defect and placing the expected incisions within the relaxed skin tension lines where possible.    The area thus outlined was incised deep to adipose tissue with a #15 scalpel blade.  The skin margins were undermined to an appropriate distance in all directions utilizing iris scissors.
Paramedian Forehead Flap Text: A decision was made to reconstruct the defect utilizing an interpolation axial flap and a staged reconstruction.  A telfa template was made of the defect.  This telfa template was then used to outline the paramedian forehead pedicle flap.  The donor area for the pedicle flap was then injected with anesthesia.  The flap was excised through the skin and subcutaneous tissue down to the layer of the underlying musculature.  The pedicle flap was carefully excised within this deep plane to maintain its blood supply.  The edges of the donor site were undermined.   The donor site was closed in a primary fashion.  The pedicle was then rotated into position and sutured.  Once the tube was sutured into place, adequate blood supply was confirmed with blanching and refill.  The pedicle was then wrapped with xeroform gauze and dressed appropriately with a telfa and gauze bandage to ensure continued blood supply and protect the attached pedicle.
Epidermal Closure: simple interrupted
Complex Repair And O-T Advancement Flap Text: The defect edges were debeveled with a #15 scalpel blade.  The primary defect was closed partially with a complex linear closure.  Given the location of the remaining defect, shape of the defect and the proximity to free margins an O-T advancement flap was deemed most appropriate for complete closure of the defect.  Using a sterile surgical marker, an appropriate advancement flap was drawn incorporating the defect and placing the expected incisions within the relaxed skin tension lines where possible.    The area thus outlined was incised deep to adipose tissue with a #15 scalpel blade.  The skin margins were undermined to an appropriate distance in all directions utilizing iris scissors.
Complex Repair And Skin Substitute Graft Text: The defect edges were debeveled with a #15 scalpel blade.  The primary defect was closed partially with a complex linear closure.  Given the location of the remaining defect, shape of the defect and the proximity to free margins a skin substitute graft was deemed most appropriate to repair the remaining defect.  The graft was trimmed to fit the size of the remaining defect.  The graft was then placed in the primary defect, oriented appropriately, and sutured into place.
Island Pedicle Flap Text: The defect edges were debeveled with a #15 scalpel blade.  Given the location of the defect, shape of the defect and the proximity to free margins an island pedicle advancement flap was deemed most appropriate.  Using a sterile surgical marker, an appropriate advancement flap was drawn incorporating the defect, outlining the appropriate donor tissue and placing the expected incisions within the relaxed skin tension lines where possible.    The area thus outlined was incised deep to adipose tissue with a #15 scalpel blade.  The skin margins were undermined to an appropriate distance in all directions around the primary defect and laterally outward around the island pedicle utilizing iris scissors.  There was minimal undermining beneath the pedicle flap.
Island Pedicle Flap With Canthal Suspension Text: The defect edges were debeveled with a #15 scalpel blade.  Given the location of the defect, shape of the defect and the proximity to free margins an island pedicle advancement flap was deemed most appropriate.  Using a sterile surgical marker, an appropriate advancement flap was drawn incorporating the defect, outlining the appropriate donor tissue and placing the expected incisions within the relaxed skin tension lines where possible. The area thus outlined was incised deep to adipose tissue with a #15 scalpel blade.  The skin margins were undermined to an appropriate distance in all directions around the primary defect and laterally outward around the island pedicle utilizing iris scissors.  There was minimal undermining beneath the pedicle flap. A suspension suture was placed in the canthal tendon to prevent tension and prevent ectropion.
Mucosal Advancement Flap Text: Given the location of the defect, shape of the defect and the proximity to free margins a mucosal advancement flap was deemed most appropriate. Incisions were made with a 15 blade scalpel in the appropriate fashion along the cutaneous vermilion border and the mucosal lip. The remaining actinically damaged mucosal tissue was excised.  The mucosal advancement flap was then elevated to the gingival sulcus with care taken to preserve the neurovascular structures and advanced into the primary defect. Care was taken to ensure that precise realignment of the vermilion border was achieved.
Ftsg Text: The defect edges were debeveled with a #15 scalpel blade.  Given the location of the defect, shape of the defect and the proximity to free margins a full thickness skin graft was deemed most appropriate.  Using a sterile surgical marker, the primary defect shape was transferred to the donor site. The area thus outlined was incised deep to adipose tissue with a #15 scalpel blade.  The harvested graft was then trimmed of adipose tissue until only dermis and epidermis was left.  The skin margins of the secondary defect were undermined to an appropriate distance in all directions utilizing iris scissors.  The secondary defect was closed with interrupted buried subcutaneous sutures.  The skin edges were then re-apposed with running  sutures.  The skin graft was then placed in the primary defect and oriented appropriately.
Path Notes (To The Dermatopathologist): Please check margins.
Anesthesia Type: 1% lidocaine with epinephrine
Lip Wedge Excision Repair Text: Given the location of the defect and the proximity to free margins a full thickness wedge repair was deemed most appropriate.  Using a sterile surgical marker, the appropriate repair was drawn incorporating the defect and placing the expected incisions perpendicular to the vermilion border.  The vermilion border was also meticulously outlined to ensure appropriate reapproximation during the repair.  The area thus outlined was incised through and through with a #15 scalpel blade.  The muscularis and dermis were reaproximated with deep sutures following hemostasis. Care was taken to realign the vermilion border before proceeding with the superficial closure.  Once the vermilion was realigned the superfical and mucosal closure was finished.
Complex Repair And O-L Flap Text: The defect edges were debeveled with a #15 scalpel blade.  The primary defect was closed partially with a complex linear closure.  Given the location of the remaining defect, shape of the defect and the proximity to free margins an O-L flap was deemed most appropriate for complete closure of the defect.  Using a sterile surgical marker, an appropriate flap was drawn incorporating the defect and placing the expected incisions within the relaxed skin tension lines where possible.    The area thus outlined was incised deep to adipose tissue with a #15 scalpel blade.  The skin margins were undermined to an appropriate distance in all directions utilizing iris scissors.
Anesthesia Volume In Cc: 6
Alar Island Pedicle Flap Text: The defect edges were debeveled with a #15 scalpel blade.  Given the location of the defect, shape of the defect and the proximity to the alar rim an island pedicle advancement flap was deemed most appropriate.  Using a sterile surgical marker, an appropriate advancement flap was drawn incorporating the defect, outlining the appropriate donor tissue and placing the expected incisions within the nasal ala running parallel to the alar rim. The area thus outlined was incised with a #15 scalpel blade.  The skin margins were undermined minimally to an appropriate distance in all directions around the primary defect and laterally outward around the island pedicle utilizing iris scissors.  There was minimal undermining beneath the pedicle flap.
Complex Repair And Melolabial Flap Text: The defect edges were debeveled with a #15 scalpel blade.  The primary defect was closed partially with a complex linear closure.  Given the location of the remaining defect, shape of the defect and the proximity to free margins a melolabial flap was deemed most appropriate for complete closure of the defect.  Using a sterile surgical marker, an appropriate advancement flap was drawn incorporating the defect and placing the expected incisions within the relaxed skin tension lines where possible.    The area thus outlined was incised deep to adipose tissue with a #15 scalpel blade.  The skin margins were undermined to an appropriate distance in all directions utilizing iris scissors.
Hatchet Flap Text: The defect edges were debeveled with a #15 scalpel blade.  Given the location of the defect, shape of the defect and the proximity to free margins a hatchet flap was deemed most appropriate.  Using a sterile surgical marker, an appropriate hatchet flap was drawn incorporating the defect and placing the expected incisions within the relaxed skin tension lines where possible.    The area thus outlined was incised deep to adipose tissue with a #15 scalpel blade.  The skin margins were undermined to an appropriate distance in all directions utilizing iris scissors.
Double Island Pedicle Flap Text: The defect edges were debeveled with a #15 scalpel blade.  Given the location of the defect, shape of the defect and the proximity to free margins a double island pedicle advancement flap was deemed most appropriate.  Using a sterile surgical marker, an appropriate advancement flap was drawn incorporating the defect, outlining the appropriate donor tissue and placing the expected incisions within the relaxed skin tension lines where possible.    The area thus outlined was incised deep to adipose tissue with a #15 scalpel blade.  The skin margins were undermined to an appropriate distance in all directions around the primary defect and laterally outward around the island pedicle utilizing iris scissors.  There was minimal undermining beneath the pedicle flap.
Complex Repair And Bilobe Flap Text: The defect edges were debeveled with a #15 scalpel blade.  The primary defect was closed partially with a complex linear closure.  Given the location of the remaining defect, shape of the defect and the proximity to free margins a bilobe flap was deemed most appropriate for complete closure of the defect.  Using a sterile surgical marker, an appropriate advancement flap was drawn incorporating the defect and placing the expected incisions within the relaxed skin tension lines where possible.    The area thus outlined was incised deep to adipose tissue with a #15 scalpel blade.  The skin margins were undermined to an appropriate distance in all directions utilizing iris scissors.
Rotation Flap Text: The defect edges were debeveled with a #15 scalpel blade.  Given the location of the defect, shape of the defect and the proximity to free margins a rotation flap was deemed most appropriate.  Using a sterile surgical marker, an appropriate rotation flap was drawn incorporating the defect and placing the expected incisions within the relaxed skin tension lines where possible.    The area thus outlined was incised deep to adipose tissue with a #15 scalpel blade.  The skin margins were undermined to an appropriate distance in all directions utilizing iris scissors.
Suture Removal: 14 days
Repair Type: Intermediate
Undermining Location (Optional): in the superficial subcutaneous fat
Wound Care: Petrolatum
Complex Repair And Rotation Flap Text: The defect edges were debeveled with a #15 scalpel blade.  The primary defect was closed partially with a complex linear closure.  Given the location of the remaining defect, shape of the defect and the proximity to free margins a rotation flap was deemed most appropriate for complete closure of the defect.  Using a sterile surgical marker, an appropriate advancement flap was drawn incorporating the defect and placing the expected incisions within the relaxed skin tension lines where possible.    The area thus outlined was incised deep to adipose tissue with a #15 scalpel blade.  The skin margins were undermined to an appropriate distance in all directions utilizing iris scissors.
Consent was obtained from the patient. The risks and benefits to therapy were discussed in detail. Specifically, the risks of infection, scarring, bleeding, prolonged wound healing, incomplete removal, allergy to anesthesia, nerve injury and recurrence were addressed. Prior to the procedure, the treatment site was clearly identified and confirmed by the patient.
Cartilage Graft Text: The defect edges were debeveled with a #15 scalpel blade.  Given the location of the defect, shape of the defect, the fact the defect involved a full thickness cartilage defect a cartilage graft was deemed most appropriate.  An appropriate donor site was identified, cleansed, and anesthetized. The cartilage graft was then harvested and transferred to the recipient site, oriented appropriately and then sutured into place.  The secondary defect was then repaired using a primary closure.
Split-Thickness Skin Graft Text: The defect edges were debeveled with a #15 scalpel blade.  Given the location of the defect, shape of the defect and the proximity to free margins a split thickness skin graft was deemed most appropriate.  Using a sterile surgical marker, the primary defect shape was transferred to the donor site. The split thickness graft was then harvested.  The skin graft was then placed in the primary defect and oriented appropriately.
Island Pedicle Flap-Requiring Vessel Identification Text: The defect edges were debeveled with a #15 scalpel blade.  Given the location of the defect, shape of the defect and the proximity to free margins an island pedicle advancement flap was deemed most appropriate.  Using a sterile surgical marker, an appropriate advancement flap was drawn, based on the axial vessel mentioned above, incorporating the defect, outlining the appropriate donor tissue and placing the expected incisions within the relaxed skin tension lines where possible.    The area thus outlined was incised deep to adipose tissue with a #15 scalpel blade.  The skin margins were undermined to an appropriate distance in all directions around the primary defect and laterally outward around the island pedicle utilizing iris scissors.  There was minimal undermining beneath the pedicle flap.
Curvilinear Excision Additional Text (Leave Blank If You Do Not Want): The margin was drawn around the clinically apparent lesion.  A curvilinear shape was then drawn on the skin incorporating the lesion and margins.  Incisions were then made along these lines to the appropriate tissue plane and the lesion was extirpated.
Spiral Flap Text: The defect edges were debeveled with a #15 scalpel blade.  Given the location of the defect, shape of the defect and the proximity to free margins a spiral flap was deemed most appropriate.  Using a sterile surgical marker, an appropriate rotation flap was drawn incorporating the defect and placing the expected incisions within the relaxed skin tension lines where possible. The area thus outlined was incised deep to adipose tissue with a #15 scalpel blade.  The skin margins were undermined to an appropriate distance in all directions utilizing iris scissors.
Complex Repair And Rhombic Flap Text: The defect edges were debeveled with a #15 scalpel blade.  The primary defect was closed partially with a complex linear closure.  Given the location of the remaining defect, shape of the defect and the proximity to free margins a rhombic flap was deemed most appropriate for complete closure of the defect.  Using a sterile surgical marker, an appropriate advancement flap was drawn incorporating the defect and placing the expected incisions within the relaxed skin tension lines where possible.    The area thus outlined was incised deep to adipose tissue with a #15 scalpel blade.  The skin margins were undermined to an appropriate distance in all directions utilizing iris scissors.
Additional Anesthesia Volume In Cc: 9
Composite Graft Text: The defect edges were debeveled with a #15 scalpel blade.  Given the location of the defect, shape of the defect, the proximity to free margins and the fact the defect was full thickness a composite graft was deemed most appropriate.  The defect was outline and then transferred to the donor site.  A full thickness graft was then excised from the donor site. The graft was then placed in the primary defect, oriented appropriately and then sutured into place.  The secondary defect was then repaired using a primary closure.
Intermediate / Complex Repair - Final Wound Length In Cm: 5
Keystone Flap Text: The defect edges were debeveled with a #15 scalpel blade.  Given the location of the defect, shape of the defect a keystone flap was deemed most appropriate.  Using a sterile surgical marker, an appropriate keystone flap was drawn incorporating the defect, outlining the appropriate donor tissue and placing the expected incisions within the relaxed skin tension lines where possible. The area thus outlined was incised deep to adipose tissue with a #15 scalpel blade.  The skin margins were undermined to an appropriate distance in all directions around the primary defect and laterally outward around the flap utilizing iris scissors.
Fusiform Excision Additional Text (Leave Blank If You Do Not Want): The margin was drawn around the clinically apparent lesion.  A fusiform shape was then drawn on the skin incorporating the lesion and margins.  Incisions were then made along these lines to the appropriate tissue plane and the lesion was extirpated.
Star Wedge Flap Text: The defect edges were debeveled with a #15 scalpel blade.  Given the location of the defect, shape of the defect and the proximity to free margins a star wedge flap was deemed most appropriate.  Using a sterile surgical marker, an appropriate rotation flap was drawn incorporating the defect and placing the expected incisions within the relaxed skin tension lines where possible. The area thus outlined was incised deep to adipose tissue with a #15 scalpel blade.  The skin margins were undermined to an appropriate distance in all directions utilizing iris scissors.
O-T Plasty Text: The defect edges were debeveled with a #15 scalpel blade.  Given the location of the defect, shape of the defect and the proximity to free margins an O-T plasty was deemed most appropriate.  Using a sterile surgical marker, an appropriate O-T plasty was drawn incorporating the defect and placing the expected incisions within the relaxed skin tension lines where possible.    The area thus outlined was incised deep to adipose tissue with a #15 scalpel blade.  The skin margins were undermined to an appropriate distance in all directions utilizing iris scissors.
Epidermal Autograft Text: The defect edges were debeveled with a #15 scalpel blade.  Given the location of the defect, shape of the defect and the proximity to free margins an epidermal autograft was deemed most appropriate.  Using a sterile surgical marker, the primary defect shape was transferred to the donor site. The epidermal graft was then harvested.  The skin graft was then placed in the primary defect and oriented appropriately.
Transposition Flap Text: The defect edges were debeveled with a #15 scalpel blade.  Given the location of the defect and the proximity to free margins a transposition flap was deemed most appropriate.  Using a sterile surgical marker, an appropriate transposition flap was drawn incorporating the defect.    The area thus outlined was incised deep to adipose tissue with a #15 scalpel blade.  The skin margins were undermined to an appropriate distance in all directions utilizing iris scissors.
Saucerization Excision Additional Text (Leave Blank If You Do Not Want): The margin was drawn around the clinically apparent lesion.  Incisions were then made along these lines, in a tangential fashion, to the appropriate tissue plane and the lesion was extirpated.
Deep Sutures: 3-0 PGA
Elliptical Excision Additional Text (Leave Blank If You Do Not Want): The margin was drawn around the clinically apparent lesion.  An elliptical shape was then drawn on the skin incorporating the lesion and margins.  Incisions were then made along these lines to the appropriate tissue plane and the lesion was extirpated.
Billing Type: Third-Party Bill
Complex Repair And Transposition Flap Text: The defect edges were debeveled with a #15 scalpel blade.  The primary defect was closed partially with a complex linear closure.  Given the location of the remaining defect, shape of the defect and the proximity to free margins a transposition flap was deemed most appropriate for complete closure of the defect.  Using a sterile surgical marker, an appropriate advancement flap was drawn incorporating the defect and placing the expected incisions within the relaxed skin tension lines where possible.    The area thus outlined was incised deep to adipose tissue with a #15 scalpel blade.  The skin margins were undermined to an appropriate distance in all directions utilizing iris scissors.
Dressing: pressure dressing
Epidermal Sutures: 4-0 Prolene
Deep Sutures: 3-0 Maxon
Size Of Lesion In Cm: 1.5
Rhomboid Transposition Flap Text: The defect edges were debeveled with a #15 scalpel blade.  Given the location of the defect and the proximity to free margins a rhomboid transposition flap was deemed most appropriate.  Using a sterile surgical marker, an appropriate rhomboid flap was drawn incorporating the defect.    The area thus outlined was incised deep to adipose tissue with a #15 scalpel blade.  The skin margins were undermined to an appropriate distance in all directions utilizing iris scissors.
Double O-Z Plasty Text: The defect edges were debeveled with a #15 scalpel blade.  Given the location of the defect, shape of the defect and the proximity to free margins a Double O-Z plasty (double transposition flap) was deemed most appropriate.  Using a sterile surgical marker, the appropriate transposition flaps were drawn incorporating the defect and placing the expected incisions within the relaxed skin tension lines where possible. The area thus outlined was incised deep to adipose tissue with a #15 scalpel blade.  The skin margins were undermined to an appropriate distance in all directions utilizing iris scissors.  Hemostasis was achieved with electrocautery.  The flaps were then transposed into place, one clockwise and the other counterclockwise, and anchored with interrupted buried subcutaneous sutures.

## 2019-01-02 ENCOUNTER — APPOINTMENT (RX ONLY)
Dept: URBAN - METROPOLITAN AREA CLINIC 4 | Facility: CLINIC | Age: 69
Setting detail: DERMATOLOGY
End: 2019-01-02

## 2019-01-02 DIAGNOSIS — Z48.02 ENCOUNTER FOR REMOVAL OF SUTURES: ICD-10-CM

## 2019-01-02 PROBLEM — C44.399 OTHER SPECIFIED MALIGNANT NEOPLASM OF SKIN OF OTHER PARTS OF FACE: Status: ACTIVE | Noted: 2019-01-02

## 2019-01-02 PROCEDURE — ? EXCISION

## 2019-01-02 PROCEDURE — 11642 EXC F/E/E/N/L MAL+MRG 1.1-2: CPT

## 2019-01-02 PROCEDURE — ? SUTURE REMOVAL (GLOBAL PERIOD)

## 2019-01-02 PROCEDURE — 12052 INTMD RPR FACE/MM 2.6-5.0 CM: CPT

## 2019-01-02 ASSESSMENT — LOCATION ZONE DERM: LOCATION ZONE: TRUNK

## 2019-01-02 ASSESSMENT — LOCATION SIMPLE DESCRIPTION DERM
LOCATION SIMPLE: LEFT UPPER BACK
LOCATION SIMPLE: RIGHT LOWER BACK

## 2019-01-02 ASSESSMENT — LOCATION DETAILED DESCRIPTION DERM
LOCATION DETAILED: RIGHT SUPERIOR LATERAL MIDBACK
LOCATION DETAILED: LEFT SUPERIOR UPPER BACK

## 2019-01-02 NOTE — PROCEDURE: SUTURE REMOVAL (GLOBAL PERIOD)
Detail Level: Detailed
Add 61536 Cpt? (Important Note: In 2017 The Use Of 34302 Is Being Tracked By Cms To Determine Future Global Period Reimbursement For Global Periods): no

## 2019-01-02 NOTE — PROCEDURE: EXCISION
Excision Method: Elliptical
Complex Repair And Ftsg Text: The defect edges were debeveled with a #15 scalpel blade.  The primary defect was closed partially with a complex linear closure.  Given the location of the defect, shape of the defect and the proximity to free margins a full thickness skin graft was deemed most appropriate to repair the remaining defect.  The graft was trimmed to fit the size of the remaining defect.  The graft was then placed in the primary defect, oriented appropriately, and sutured into place.
Saucerization Excision Additional Text (Leave Blank If You Do Not Want): The margin was drawn around the clinically apparent lesion.  Incisions were then made along these lines, in a tangential fashion, to the appropriate tissue plane and the lesion was extirpated.
V-Y Flap Text: The defect edges were debeveled with a #15 scalpel blade.  Given the location of the defect, shape of the defect and the proximity to free margins a V-Y flap was deemed most appropriate.  Using a sterile surgical marker, an appropriate advancement flap was drawn incorporating the defect and placing the expected incisions within the relaxed skin tension lines where possible.    The area thus outlined was incised deep to adipose tissue with a #15 scalpel blade.  The skin margins were undermined to an appropriate distance in all directions utilizing iris scissors.
Partial Purse String (Simple) Text: Given the location of the defect and the characteristics of the surrounding skin a simple purse string closure was deemed most appropriate.  Undermining was performed circumferentially around the surgical defect.  A purse string suture was then placed and tightened. Wound tension of the circular defect prevented complete closure of the wound.
Bilobed Flap Text: The defect edges were debeveled with a #15 scalpel blade.  Given the location of the defect and the proximity to free margins a bilobe flap was deemed most appropriate.  Using a sterile surgical marker, an appropriate bilobe flap drawn around the defect.    The area thus outlined was incised deep to adipose tissue with a #15 scalpel blade.  The skin margins were undermined to an appropriate distance in all directions utilizing iris scissors.
Show Additional Anesthesia Variables: Yes
Hemostasis: Electrocautery
Cheek-To-Nose Interpolation Flap Text: A decision was made to reconstruct the defect utilizing an interpolation axial flap and a staged reconstruction.  A telfa template was made of the defect.  This telfa template was then used to outline the Cheek-To-Nose Interpolation flap.  The donor area for the pedicle flap was then injected with anesthesia.  The flap was excised through the skin and subcutaneous tissue down to the layer of the underlying musculature.  The interpolation flap was carefully excised within this deep plane to maintain its blood supply.  The edges of the donor site were undermined.   The donor site was closed in a primary fashion.  The pedicle was then rotated into position and sutured.  Once the tube was sutured into place, adequate blood supply was confirmed with blanching and refill.  The pedicle was then wrapped with xeroform gauze and dressed appropriately with a telfa and gauze bandage to ensure continued blood supply and protect the attached pedicle.
Complex Repair And Split-Thickness Skin Graft Text: The defect edges were debeveled with a #15 scalpel blade.  The primary defect was closed partially with a complex linear closure.  Given the location of the defect, shape of the defect and the proximity to free margins a split thickness skin graft was deemed most appropriate to repair the remaining defect.  The graft was trimmed to fit the size of the remaining defect.  The graft was then placed in the primary defect, oriented appropriately, and sutured into place.
Slit Excision Additional Text (Leave Blank If You Do Not Want): A linear line was drawn on the skin overlying the lesion. An incision was made slowly until the lesion was visualized.  Once visualized, the lesion was removed with blunt dissection.
Complex Repair And Single Advancement Flap Text: The defect edges were debeveled with a #15 scalpel blade.  The primary defect was closed partially with a complex linear closure.  Given the location of the remaining defect, shape of the defect and the proximity to free margins a single advancement flap was deemed most appropriate for complete closure of the defect.  Using a sterile surgical marker, an appropriate advancement flap was drawn incorporating the defect and placing the expected incisions within the relaxed skin tension lines where possible.    The area thus outlined was incised deep to adipose tissue with a #15 scalpel blade.  The skin margins were undermined to an appropriate distance in all directions utilizing iris scissors.
Advancement-Rotation Flap Text: The defect edges were debeveled with a #15 scalpel blade.  Given the location of the defect, shape of the defect and the proximity to free margins an advancement-rotation flap was deemed most appropriate.  Using a sterile surgical marker, an appropriate flap was drawn incorporating the defect and placing the expected incisions within the relaxed skin tension lines where possible. The area thus outlined was incised deep to adipose tissue with a #15 scalpel blade.  The skin margins were undermined to an appropriate distance in all directions utilizing iris scissors.
Bilobed Transposition Flap Text: The defect edges were debeveled with a #15 scalpel blade.  Given the location of the defect and the proximity to free margins a bilobed transposition flap was deemed most appropriate.  Using a sterile surgical marker, an appropriate bilobe flap drawn around the defect.    The area thus outlined was incised deep to adipose tissue with a #15 scalpel blade.  The skin margins were undermined to an appropriate distance in all directions utilizing iris scissors.
Interpolation Flap Text: A decision was made to reconstruct the defect utilizing an interpolation axial flap and a staged reconstruction.  A telfa template was made of the defect.  This telfa template was then used to outline the interpolation flap.  The donor area for the pedicle flap was then injected with anesthesia.  The flap was excised through the skin and subcutaneous tissue down to the layer of the underlying musculature.  The interpolation flap was carefully excised within this deep plane to maintain its blood supply.  The edges of the donor site were undermined.   The donor site was closed in a primary fashion.  The pedicle was then rotated into position and sutured.  Once the tube was sutured into place, adequate blood supply was confirmed with blanching and refill.  The pedicle was then wrapped with xeroform gauze and dressed appropriately with a telfa and gauze bandage to ensure continued blood supply and protect the attached pedicle.
Billing Type: Third-Party Bill
Curettage Prior To Excision?: No
Complex Repair And Epidermal Autograft Text: The defect edges were debeveled with a #15 scalpel blade.  The primary defect was closed partially with a complex linear closure.  Given the location of the defect, shape of the defect and the proximity to free margins an epidermal autograft was deemed most appropriate to repair the remaining defect.  The graft was trimmed to fit the size of the remaining defect.  The graft was then placed in the primary defect, oriented appropriately, and sutured into place.
Anesthesia Volume In Cc: 3
Mercedes Flap Text: The defect edges were debeveled with a #15 scalpel blade.  Given the location of the defect, shape of the defect and the proximity to free margins a Mercedes flap was deemed most appropriate.  Using a sterile surgical marker, an appropriate advancement flap was drawn incorporating the defect and placing the expected incisions within the relaxed skin tension lines where possible. The area thus outlined was incised deep to adipose tissue with a #15 scalpel blade.  The skin margins were undermined to an appropriate distance in all directions utilizing iris scissors.
Trilobed Flap Text: The defect edges were debeveled with a #15 scalpel blade.  Given the location of the defect and the proximity to free margins a trilobed flap was deemed most appropriate.  Using a sterile surgical marker, an appropriate trilobed flap drawn around the defect.    The area thus outlined was incised deep to adipose tissue with a #15 scalpel blade.  The skin margins were undermined to an appropriate distance in all directions utilizing iris scissors.
Melolabial Interpolation Flap Text: A decision was made to reconstruct the defect utilizing an interpolation axial flap and a staged reconstruction.  A telfa template was made of the defect.  This telfa template was then used to outline the melolabial interpolation flap.  The donor area for the pedicle flap was then injected with anesthesia.  The flap was excised through the skin and subcutaneous tissue down to the layer of the underlying musculature.  The pedicle flap was carefully excised within this deep plane to maintain its blood supply.  The edges of the donor site were undermined.   The donor site was closed in a primary fashion.  The pedicle was then rotated into position and sutured.  Once the tube was sutured into place, adequate blood supply was confirmed with blanching and refill.  The pedicle was then wrapped with xeroform gauze and dressed appropriately with a telfa and gauze bandage to ensure continued blood supply and protect the attached pedicle.
Estimated Blood Loss (Cc): minimal
Complex Repair And Double Advancement Flap Text: The defect edges were debeveled with a #15 scalpel blade.  The primary defect was closed partially with a complex linear closure.  Given the location of the remaining defect, shape of the defect and the proximity to free margins a double advancement flap was deemed most appropriate for complete closure of the defect.  Using a sterile surgical marker, an appropriate advancement flap was drawn incorporating the defect and placing the expected incisions within the relaxed skin tension lines where possible.    The area thus outlined was incised deep to adipose tissue with a #15 scalpel blade.  The skin margins were undermined to an appropriate distance in all directions utilizing iris scissors.
Repair Type: Intermediate
Skin Substitute Units (Will Override Primary Defect Units If Greater Than 0): 0
Complex Repair And Dermal Autograft Text: The defect edges were debeveled with a #15 scalpel blade.  The primary defect was closed partially with a complex linear closure.  Given the location of the defect, shape of the defect and the proximity to free margins an dermal autograft was deemed most appropriate to repair the remaining defect.  The graft was trimmed to fit the size of the remaining defect.  The graft was then placed in the primary defect, oriented appropriately, and sutured into place.
Modified Advancement Flap Text: The defect edges were debeveled with a #15 scalpel blade.  Given the location of the defect, shape of the defect and the proximity to free margins a modified advancement flap was deemed most appropriate.  Using a sterile surgical marker, an appropriate advancement flap was drawn incorporating the defect and placing the expected incisions within the relaxed skin tension lines where possible.    The area thus outlined was incised deep to adipose tissue with a #15 scalpel blade.  The skin margins were undermined to an appropriate distance in all directions utilizing iris scissors.
Dorsal Nasal Flap Text: The defect edges were debeveled with a #15 scalpel blade.  Given the location of the defect and the proximity to free margins a dorsal nasal flap was deemed most appropriate.  Using a sterile surgical marker, an appropriate dorsal nasal flap was drawn around the defect.    The area thus outlined was incised deep to adipose tissue with a #15 scalpel blade.  The skin margins were undermined to an appropriate distance in all directions utilizing iris scissors.
Mastoid Interpolation Flap Text: A decision was made to reconstruct the defect utilizing an interpolation axial flap and a staged reconstruction.  A telfa template was made of the defect.  This telfa template was then used to outline the mastoid interpolation flap.  The donor area for the pedicle flap was then injected with anesthesia.  The flap was excised through the skin and subcutaneous tissue down to the layer of the underlying musculature.  The pedicle flap was carefully excised within this deep plane to maintain its blood supply.  The edges of the donor site were undermined.   The donor site was closed in a primary fashion.  The pedicle was then rotated into position and sutured.  Once the tube was sutured into place, adequate blood supply was confirmed with blanching and refill.  The pedicle was then wrapped with xeroform gauze and dressed appropriately with a telfa and gauze bandage to ensure continued blood supply and protect the attached pedicle.
Complex Repair And Modified Advancement Flap Text: The defect edges were debeveled with a #15 scalpel blade.  The primary defect was closed partially with a complex linear closure.  Given the location of the remaining defect, shape of the defect and the proximity to free margins a modified advancement flap was deemed most appropriate for complete closure of the defect.  Using a sterile surgical marker, an appropriate advancement flap was drawn incorporating the defect and placing the expected incisions within the relaxed skin tension lines where possible.    The area thus outlined was incised deep to adipose tissue with a #15 scalpel blade.  The skin margins were undermined to an appropriate distance in all directions utilizing iris scissors.
Positioning (Leave Blank If You Do Not Want): The patient was placed in a comfortable position exposing the surgical site.
Complex Repair And Tissue Cultured Epidermal Autograft Text: The defect edges were debeveled with a #15 scalpel blade.  The primary defect was closed partially with a complex linear closure.  Given the location of the defect, shape of the defect and the proximity to free margins an tissue cultured epidermal autograft was deemed most appropriate to repair the remaining defect.  The graft was trimmed to fit the size of the remaining defect.  The graft was then placed in the primary defect, oriented appropriately, and sutured into place.
Mucosal Advancement Flap Text: Given the location of the defect, shape of the defect and the proximity to free margins a mucosal advancement flap was deemed most appropriate. Incisions were made with a 15 blade scalpel in the appropriate fashion along the cutaneous vermilion border and the mucosal lip. The remaining actinically damaged mucosal tissue was excised.  The mucosal advancement flap was then elevated to the gingival sulcus with care taken to preserve the neurovascular structures and advanced into the primary defect. Care was taken to ensure that precise realignment of the vermilion border was achieved.
Island Pedicle Flap Text: The defect edges were debeveled with a #15 scalpel blade.  Given the location of the defect, shape of the defect and the proximity to free margins an island pedicle advancement flap was deemed most appropriate.  Using a sterile surgical marker, an appropriate advancement flap was drawn incorporating the defect, outlining the appropriate donor tissue and placing the expected incisions within the relaxed skin tension lines where possible.    The area thus outlined was incised deep to adipose tissue with a #15 scalpel blade.  The skin margins were undermined to an appropriate distance in all directions around the primary defect and laterally outward around the island pedicle utilizing iris scissors.  There was minimal undermining beneath the pedicle flap.
Posterior Auricular Interpolation Flap Text: A decision was made to reconstruct the defect utilizing an interpolation axial flap and a staged reconstruction.  A telfa template was made of the defect.  This telfa template was then used to outline the posterior auricular interpolation flap.  The donor area for the pedicle flap was then injected with anesthesia.  The flap was excised through the skin and subcutaneous tissue down to the layer of the underlying musculature.  The pedicle flap was carefully excised within this deep plane to maintain its blood supply.  The edges of the donor site were undermined.   The donor site was closed in a primary fashion.  The pedicle was then rotated into position and sutured.  Once the tube was sutured into place, adequate blood supply was confirmed with blanching and refill.  The pedicle was then wrapped with xeroform gauze and dressed appropriately with a telfa and gauze bandage to ensure continued blood supply and protect the attached pedicle.
Complex Repair And A-T Advancement Flap Text: The defect edges were debeveled with a #15 scalpel blade.  The primary defect was closed partially with a complex linear closure.  Given the location of the remaining defect, shape of the defect and the proximity to free margins an A-T advancement flap was deemed most appropriate for complete closure of the defect.  Using a sterile surgical marker, an appropriate advancement flap was drawn incorporating the defect and placing the expected incisions within the relaxed skin tension lines where possible.    The area thus outlined was incised deep to adipose tissue with a #15 scalpel blade.  The skin margins were undermined to an appropriate distance in all directions utilizing iris scissors.
Complex Repair And Xenograft Text: The defect edges were debeveled with a #15 scalpel blade.  The primary defect was closed partially with a complex linear closure.  Given the location of the defect, shape of the defect and the proximity to free margins a xenograft was deemed most appropriate to repair the remaining defect.  The graft was trimmed to fit the size of the remaining defect.  The graft was then placed in the primary defect, oriented appropriately, and sutured into place.
Pre-Excision Curettage Text (Leave Blank If You Do Not Want): Prior to drawing the surgical margin the visible lesion was removed with electrodesiccation and curettage to clearly define the lesion size.
Hatchet Flap Text: The defect edges were debeveled with a #15 scalpel blade.  Given the location of the defect, shape of the defect and the proximity to free margins a hatchet flap was deemed most appropriate.  Using a sterile surgical marker, an appropriate hatchet flap was drawn incorporating the defect and placing the expected incisions within the relaxed skin tension lines where possible.    The area thus outlined was incised deep to adipose tissue with a #15 scalpel blade.  The skin margins were undermined to an appropriate distance in all directions utilizing iris scissors.
Information: Selecting Yes will display possible errors in your note based on the variables you have selected. This validation is only offered as a suggestion for you. PLEASE NOTE THAT THE VALIDATION TEXT WILL BE REMOVED WHEN YOU FINALIZE YOUR NOTE. IF YOU WANT TO FAX A PRELIMINARY NOTE YOU WILL NEED TO TOGGLE THIS TO 'NO' IF YOU DO NOT WANT IT IN YOUR FAXED NOTE.
Island Pedicle Flap With Canthal Suspension Text: The defect edges were debeveled with a #15 scalpel blade.  Given the location of the defect, shape of the defect and the proximity to free margins an island pedicle advancement flap was deemed most appropriate.  Using a sterile surgical marker, an appropriate advancement flap was drawn incorporating the defect, outlining the appropriate donor tissue and placing the expected incisions within the relaxed skin tension lines where possible. The area thus outlined was incised deep to adipose tissue with a #15 scalpel blade.  The skin margins were undermined to an appropriate distance in all directions around the primary defect and laterally outward around the island pedicle utilizing iris scissors.  There was minimal undermining beneath the pedicle flap. A suspension suture was placed in the canthal tendon to prevent tension and prevent ectropion.
Epidermal Closure Graft Donor Site (Optional): simple interrupted
Paramedian Forehead Flap Text: A decision was made to reconstruct the defect utilizing an interpolation axial flap and a staged reconstruction.  A telfa template was made of the defect.  This telfa template was then used to outline the paramedian forehead pedicle flap.  The donor area for the pedicle flap was then injected with anesthesia.  The flap was excised through the skin and subcutaneous tissue down to the layer of the underlying musculature.  The pedicle flap was carefully excised within this deep plane to maintain its blood supply.  The edges of the donor site were undermined.   The donor site was closed in a primary fashion.  The pedicle was then rotated into position and sutured.  Once the tube was sutured into place, adequate blood supply was confirmed with blanching and refill.  The pedicle was then wrapped with xeroform gauze and dressed appropriately with a telfa and gauze bandage to ensure continued blood supply and protect the attached pedicle.
Suture Removal: 7 days
Complex Repair And O-T Advancement Flap Text: The defect edges were debeveled with a #15 scalpel blade.  The primary defect was closed partially with a complex linear closure.  Given the location of the remaining defect, shape of the defect and the proximity to free margins an O-T advancement flap was deemed most appropriate for complete closure of the defect.  Using a sterile surgical marker, an appropriate advancement flap was drawn incorporating the defect and placing the expected incisions within the relaxed skin tension lines where possible.    The area thus outlined was incised deep to adipose tissue with a #15 scalpel blade.  The skin margins were undermined to an appropriate distance in all directions utilizing iris scissors.
Complex Repair And Skin Substitute Graft Text: The defect edges were debeveled with a #15 scalpel blade.  The primary defect was closed partially with a complex linear closure.  Given the location of the remaining defect, shape of the defect and the proximity to free margins a skin substitute graft was deemed most appropriate to repair the remaining defect.  The graft was trimmed to fit the size of the remaining defect.  The graft was then placed in the primary defect, oriented appropriately, and sutured into place.
Complex Repair Preamble Text (Leave Blank If You Do Not Want): Extensive wide undermining was performed.
Epidermal Sutures: 6-0 Nylon
Rotation Flap Text: The defect edges were debeveled with a #15 scalpel blade.  Given the location of the defect, shape of the defect and the proximity to free margins a rotation flap was deemed most appropriate.  Using a sterile surgical marker, an appropriate rotation flap was drawn incorporating the defect and placing the expected incisions within the relaxed skin tension lines where possible.    The area thus outlined was incised deep to adipose tissue with a #15 scalpel blade.  The skin margins were undermined to an appropriate distance in all directions utilizing iris scissors.
Alar Island Pedicle Flap Text: The defect edges were debeveled with a #15 scalpel blade.  Given the location of the defect, shape of the defect and the proximity to the alar rim an island pedicle advancement flap was deemed most appropriate.  Using a sterile surgical marker, an appropriate advancement flap was drawn incorporating the defect, outlining the appropriate donor tissue and placing the expected incisions within the nasal ala running parallel to the alar rim. The area thus outlined was incised with a #15 scalpel blade.  The skin margins were undermined minimally to an appropriate distance in all directions around the primary defect and laterally outward around the island pedicle utilizing iris scissors.  There was minimal undermining beneath the pedicle flap.
Detail Level: Detailed
Lip Wedge Excision Repair Text: Given the location of the defect and the proximity to free margins a full thickness wedge repair was deemed most appropriate.  Using a sterile surgical marker, the appropriate repair was drawn incorporating the defect and placing the expected incisions perpendicular to the vermilion border.  The vermilion border was also meticulously outlined to ensure appropriate reapproximation during the repair.  The area thus outlined was incised through and through with a #15 scalpel blade.  The muscularis and dermis were reaproximated with deep sutures following hemostasis. Care was taken to realign the vermilion border before proceeding with the superficial closure.  Once the vermilion was realigned the superfical and mucosal closure was finished.
Complex Repair And O-L Flap Text: The defect edges were debeveled with a #15 scalpel blade.  The primary defect was closed partially with a complex linear closure.  Given the location of the remaining defect, shape of the defect and the proximity to free margins an O-L flap was deemed most appropriate for complete closure of the defect.  Using a sterile surgical marker, an appropriate flap was drawn incorporating the defect and placing the expected incisions within the relaxed skin tension lines where possible.    The area thus outlined was incised deep to adipose tissue with a #15 scalpel blade.  The skin margins were undermined to an appropriate distance in all directions utilizing iris scissors.
Intermediate Repair Preamble Text (Leave Blank If You Do Not Want): Undermining was performed with blunt dissection.
Excisional Biopsy Additional Text (Leave Blank If You Do Not Want): The margin was drawn around the clinically apparent lesion. An elliptical shape was then drawn on the skin incorporating the lesion and margins.  Incisions were then made along these lines to the appropriate tissue plane and the lesion was extirpated.
Path Notes (To The Dermatopathologist): Please check margins.
Spiral Flap Text: The defect edges were debeveled with a #15 scalpel blade.  Given the location of the defect, shape of the defect and the proximity to free margins a spiral flap was deemed most appropriate.  Using a sterile surgical marker, an appropriate rotation flap was drawn incorporating the defect and placing the expected incisions within the relaxed skin tension lines where possible. The area thus outlined was incised deep to adipose tissue with a #15 scalpel blade.  The skin margins were undermined to an appropriate distance in all directions utilizing iris scissors.
Double Island Pedicle Flap Text: The defect edges were debeveled with a #15 scalpel blade.  Given the location of the defect, shape of the defect and the proximity to free margins a double island pedicle advancement flap was deemed most appropriate.  Using a sterile surgical marker, an appropriate advancement flap was drawn incorporating the defect, outlining the appropriate donor tissue and placing the expected incisions within the relaxed skin tension lines where possible.    The area thus outlined was incised deep to adipose tissue with a #15 scalpel blade.  The skin margins were undermined to an appropriate distance in all directions around the primary defect and laterally outward around the island pedicle utilizing iris scissors.  There was minimal undermining beneath the pedicle flap.
Ftsg Text: The defect edges were debeveled with a #15 scalpel blade.  Given the location of the defect, shape of the defect and the proximity to free margins a full thickness skin graft was deemed most appropriate.  Using a sterile surgical marker, the primary defect shape was transferred to the donor site. The area thus outlined was incised deep to adipose tissue with a #15 scalpel blade.  The harvested graft was then trimmed of adipose tissue until only dermis and epidermis was left.  The skin margins of the secondary defect were undermined to an appropriate distance in all directions utilizing iris scissors.  The secondary defect was closed with interrupted buried subcutaneous sutures.  The skin edges were then re-apposed with running  sutures.  The skin graft was then placed in the primary defect and oriented appropriately.
Complex Repair And Bilobe Flap Text: The defect edges were debeveled with a #15 scalpel blade.  The primary defect was closed partially with a complex linear closure.  Given the location of the remaining defect, shape of the defect and the proximity to free margins a bilobe flap was deemed most appropriate for complete closure of the defect.  Using a sterile surgical marker, an appropriate advancement flap was drawn incorporating the defect and placing the expected incisions within the relaxed skin tension lines where possible.    The area thus outlined was incised deep to adipose tissue with a #15 scalpel blade.  The skin margins were undermined to an appropriate distance in all directions utilizing iris scissors.
Perilesional Excision Additional Text (Leave Blank If You Do Not Want): The margin was drawn around the clinically apparent lesion. Incisions were then made along these lines to the appropriate tissue plane and the lesion was extirpated.
Star Wedge Flap Text: The defect edges were debeveled with a #15 scalpel blade.  Given the location of the defect, shape of the defect and the proximity to free margins a star wedge flap was deemed most appropriate.  Using a sterile surgical marker, an appropriate rotation flap was drawn incorporating the defect and placing the expected incisions within the relaxed skin tension lines where possible. The area thus outlined was incised deep to adipose tissue with a #15 scalpel blade.  The skin margins were undermined to an appropriate distance in all directions utilizing iris scissors.
Island Pedicle Flap-Requiring Vessel Identification Text: The defect edges were debeveled with a #15 scalpel blade.  Given the location of the defect, shape of the defect and the proximity to free margins an island pedicle advancement flap was deemed most appropriate.  Using a sterile surgical marker, an appropriate advancement flap was drawn, based on the axial vessel mentioned above, incorporating the defect, outlining the appropriate donor tissue and placing the expected incisions within the relaxed skin tension lines where possible.    The area thus outlined was incised deep to adipose tissue with a #15 scalpel blade.  The skin margins were undermined to an appropriate distance in all directions around the primary defect and laterally outward around the island pedicle utilizing iris scissors.  There was minimal undermining beneath the pedicle flap.
Split-Thickness Skin Graft Text: The defect edges were debeveled with a #15 scalpel blade.  Given the location of the defect, shape of the defect and the proximity to free margins a split thickness skin graft was deemed most appropriate.  Using a sterile surgical marker, the primary defect shape was transferred to the donor site. The split thickness graft was then harvested.  The skin graft was then placed in the primary defect and oriented appropriately.
Complex Repair And Melolabial Flap Text: The defect edges were debeveled with a #15 scalpel blade.  The primary defect was closed partially with a complex linear closure.  Given the location of the remaining defect, shape of the defect and the proximity to free margins a melolabial flap was deemed most appropriate for complete closure of the defect.  Using a sterile surgical marker, an appropriate advancement flap was drawn incorporating the defect and placing the expected incisions within the relaxed skin tension lines where possible.    The area thus outlined was incised deep to adipose tissue with a #15 scalpel blade.  The skin margins were undermined to an appropriate distance in all directions utilizing iris scissors.
Transposition Flap Text: The defect edges were debeveled with a #15 scalpel blade.  Given the location of the defect and the proximity to free margins a transposition flap was deemed most appropriate.  Using a sterile surgical marker, an appropriate transposition flap was drawn incorporating the defect.    The area thus outlined was incised deep to adipose tissue with a #15 scalpel blade.  The skin margins were undermined to an appropriate distance in all directions utilizing iris scissors.
Keystone Flap Text: The defect edges were debeveled with a #15 scalpel blade.  Given the location of the defect, shape of the defect a keystone flap was deemed most appropriate.  Using a sterile surgical marker, an appropriate keystone flap was drawn incorporating the defect, outlining the appropriate donor tissue and placing the expected incisions within the relaxed skin tension lines where possible. The area thus outlined was incised deep to adipose tissue with a #15 scalpel blade.  The skin margins were undermined to an appropriate distance in all directions around the primary defect and laterally outward around the flap utilizing iris scissors.
Cartilage Graft Text: The defect edges were debeveled with a #15 scalpel blade.  Given the location of the defect, shape of the defect, the fact the defect involved a full thickness cartilage defect a cartilage graft was deemed most appropriate.  An appropriate donor site was identified, cleansed, and anesthetized. The cartilage graft was then harvested and transferred to the recipient site, oriented appropriately and then sutured into place.  The secondary defect was then repaired using a primary closure.
Complex Repair And Rotation Flap Text: The defect edges were debeveled with a #15 scalpel blade.  The primary defect was closed partially with a complex linear closure.  Given the location of the remaining defect, shape of the defect and the proximity to free margins a rotation flap was deemed most appropriate for complete closure of the defect.  Using a sterile surgical marker, an appropriate advancement flap was drawn incorporating the defect and placing the expected incisions within the relaxed skin tension lines where possible.    The area thus outlined was incised deep to adipose tissue with a #15 scalpel blade.  The skin margins were undermined to an appropriate distance in all directions utilizing iris scissors.
Consent was obtained from the patient. The risks and benefits to therapy were discussed in detail. Specifically, the risks of infection, scarring, bleeding, prolonged wound healing, incomplete removal, allergy to anesthesia, nerve injury and recurrence were addressed. Prior to the procedure, the treatment site was clearly identified and confirmed by the patient.
Repair Performed By Another Provider Text (Leave Blank If You Do Not Want): After the tissue was excised the defect was repaired by another provider.
O-T Plasty Text: The defect edges were debeveled with a #15 scalpel blade.  Given the location of the defect, shape of the defect and the proximity to free margins an O-T plasty was deemed most appropriate.  Using a sterile surgical marker, an appropriate O-T plasty was drawn incorporating the defect and placing the expected incisions within the relaxed skin tension lines where possible.    The area thus outlined was incised deep to adipose tissue with a #15 scalpel blade.  The skin margins were undermined to an appropriate distance in all directions utilizing iris scissors.
Composite Graft Text: The defect edges were debeveled with a #15 scalpel blade.  Given the location of the defect, shape of the defect, the proximity to free margins and the fact the defect was full thickness a composite graft was deemed most appropriate.  The defect was outline and then transferred to the donor site.  A full thickness graft was then excised from the donor site. The graft was then placed in the primary defect, oriented appropriately and then sutured into place.  The secondary defect was then repaired using a primary closure.
Muscle Hinge Flap Text: The defect edges were debeveled with a #15 scalpel blade.  Given the size, depth and location of the defect and the proximity to free margins a muscle hinge flap was deemed most appropriate.  Using a sterile surgical marker, an appropriate hinge flap was drawn incorporating the defect. The area thus outlined was incised with a #15 scalpel blade.  The skin margins were undermined to an appropriate distance in all directions utilizing iris scissors.
Complex Repair And Rhombic Flap Text: The defect edges were debeveled with a #15 scalpel blade.  The primary defect was closed partially with a complex linear closure.  Given the location of the remaining defect, shape of the defect and the proximity to free margins a rhombic flap was deemed most appropriate for complete closure of the defect.  Using a sterile surgical marker, an appropriate advancement flap was drawn incorporating the defect and placing the expected incisions within the relaxed skin tension lines where possible.    The area thus outlined was incised deep to adipose tissue with a #15 scalpel blade.  The skin margins were undermined to an appropriate distance in all directions utilizing iris scissors.
No Repair - Repaired With Adjacent Surgical Defect Text (Leave Blank If You Do Not Want): After the excision the defect was repaired concurrently with another surgical defect which was in close approximation.
O-Z Plasty Text: The defect edges were debeveled with a #15 scalpel blade.  Given the location of the defect, shape of the defect and the proximity to free margins an O-Z plasty (double transposition flap) was deemed most appropriate.  Using a sterile surgical marker, the appropriate transposition flaps were drawn incorporating the defect and placing the expected incisions within the relaxed skin tension lines where possible.    The area thus outlined was incised deep to adipose tissue with a #15 scalpel blade.  The skin margins were undermined to an appropriate distance in all directions utilizing iris scissors.  Hemostasis was achieved with electrocautery.  The flaps were then transposed into place, one clockwise and the other counterclockwise, and anchored with interrupted buried subcutaneous sutures.
Epidermal Autograft Text: The defect edges were debeveled with a #15 scalpel blade.  Given the location of the defect, shape of the defect and the proximity to free margins an epidermal autograft was deemed most appropriate.  Using a sterile surgical marker, the primary defect shape was transferred to the donor site. The epidermal graft was then harvested.  The skin graft was then placed in the primary defect and oriented appropriately.
Melolabial Transposition Flap Text: The defect edges were debeveled with a #15 scalpel blade.  Given the location of the defect and the proximity to free margins a melolabial flap was deemed most appropriate.  Using a sterile surgical marker, an appropriate melolabial transposition flap was drawn incorporating the defect.    The area thus outlined was incised deep to adipose tissue with a #15 scalpel blade.  The skin margins were undermined to an appropriate distance in all directions utilizing iris scissors.
Complex Repair And Transposition Flap Text: The defect edges were debeveled with a #15 scalpel blade.  The primary defect was closed partially with a complex linear closure.  Given the location of the remaining defect, shape of the defect and the proximity to free margins a transposition flap was deemed most appropriate for complete closure of the defect.  Using a sterile surgical marker, an appropriate advancement flap was drawn incorporating the defect and placing the expected incisions within the relaxed skin tension lines where possible.    The area thus outlined was incised deep to adipose tissue with a #15 scalpel blade.  The skin margins were undermined to an appropriate distance in all directions utilizing iris scissors.
Advancement Flap (Single) Text: The defect edges were debeveled with a #15 scalpel blade.  Given the location of the defect and the proximity to free margins a single advancement flap was deemed most appropriate.  Using a sterile surgical marker, an appropriate advancement flap was drawn incorporating the defect and placing the expected incisions within the relaxed skin tension lines where possible.    The area thus outlined was incised deep to adipose tissue with a #15 scalpel blade.  The skin margins were undermined to an appropriate distance in all directions utilizing iris scissors.
Scalpel Size: 15 blade
Double O-Z Plasty Text: The defect edges were debeveled with a #15 scalpel blade.  Given the location of the defect, shape of the defect and the proximity to free margins a Double O-Z plasty (double transposition flap) was deemed most appropriate.  Using a sterile surgical marker, the appropriate transposition flaps were drawn incorporating the defect and placing the expected incisions within the relaxed skin tension lines where possible. The area thus outlined was incised deep to adipose tissue with a #15 scalpel blade.  The skin margins were undermined to an appropriate distance in all directions utilizing iris scissors.  Hemostasis was achieved with electrocautery.  The flaps were then transposed into place, one clockwise and the other counterclockwise, and anchored with interrupted buried subcutaneous sutures.
Dermal Autograft Text: The defect edges were debeveled with a #15 scalpel blade.  Given the location of the defect, shape of the defect and the proximity to free margins a dermal autograft was deemed most appropriate.  Using a sterile surgical marker, the primary defect shape was transferred to the donor site. The area thus outlined was incised deep to adipose tissue with a #15 scalpel blade.  The harvested graft was then trimmed of adipose and epidermal tissue until only dermis was left.  The skin graft was then placed in the primary defect and oriented appropriately.
Anesthesia Type: 1% lidocaine with epinephrine
Complex Repair And V-Y Plasty Text: The defect edges were debeveled with a #15 scalpel blade.  The primary defect was closed partially with a complex linear closure.  Given the location of the remaining defect, shape of the defect and the proximity to free margins a V-Y plasty was deemed most appropriate for complete closure of the defect.  Using a sterile surgical marker, an appropriate advancement flap was drawn incorporating the defect and placing the expected incisions within the relaxed skin tension lines where possible.    The area thus outlined was incised deep to adipose tissue with a #15 scalpel blade.  The skin margins were undermined to an appropriate distance in all directions utilizing iris scissors.
Rhombic Flap Text: The defect edges were debeveled with a #15 scalpel blade.  Given the location of the defect and the proximity to free margins a rhombic flap was deemed most appropriate.  Using a sterile surgical marker, an appropriate rhombic flap was drawn incorporating the defect.    The area thus outlined was incised deep to adipose tissue with a #15 scalpel blade.  The skin margins were undermined to an appropriate distance in all directions utilizing iris scissors.
Post-Care Instructions: I reviewed with the patient in detail post-care instructions. Patient is not to engage in any heavy lifting, exercise, or swimming for the next 14 days. Should the patient develop any fevers, chills, bleeding, severe pain patient will contact the office immediately.
Advancement Flap (Double) Text: The defect edges were debeveled with a #15 scalpel blade.  Given the location of the defect and the proximity to free margins a double advancement flap was deemed most appropriate.  Using a sterile surgical marker, the appropriate advancement flaps were drawn incorporating the defect and placing the expected incisions within the relaxed skin tension lines where possible.    The area thus outlined was incised deep to adipose tissue with a #15 scalpel blade.  The skin margins were undermined to an appropriate distance in all directions utilizing iris scissors.
Skin Substitute Text: The defect edges were debeveled with a #15 scalpel blade.  Given the location of the defect, shape of the defect and the proximity to free margins a skin substitute graft was deemed most appropriate.  The graft material was trimmed to fit the size of the defect. The graft was then placed in the primary defect and oriented appropriately.
Rhomboid Transposition Flap Text: The defect edges were debeveled with a #15 scalpel blade.  Given the location of the defect and the proximity to free margins a rhomboid transposition flap was deemed most appropriate.  Using a sterile surgical marker, an appropriate rhomboid flap was drawn incorporating the defect.    The area thus outlined was incised deep to adipose tissue with a #15 scalpel blade.  The skin margins were undermined to an appropriate distance in all directions utilizing iris scissors.
Complex Repair And M Plasty Text: The defect edges were debeveled with a #15 scalpel blade.  The primary defect was closed partially with a complex linear closure.  Given the location of the remaining defect, shape of the defect and the proximity to free margins an M plasty was deemed most appropriate for complete closure of the defect.  Using a sterile surgical marker, an appropriate advancement flap was drawn incorporating the defect and placing the expected incisions within the relaxed skin tension lines where possible.    The area thus outlined was incised deep to adipose tissue with a #15 scalpel blade.  The skin margins were undermined to an appropriate distance in all directions utilizing iris scissors.
S Plasty Text: Given the location and shape of the defect, and the orientation of relaxed skin tension lines, an S-plasty was deemed most appropriate for repair.  Using a sterile surgical marker, the appropriate outline of the S-plasty was drawn, incorporating the defect and placing the expected incisions within the relaxed skin tension lines where possible.  The area thus outlined was incised deep to adipose tissue with a #15 scalpel blade.  The skin margins were undermined to an appropriate distance in all directions utilizing iris scissors. The skin flaps were advanced over the defect.  The opposing margins were then approximated with interrupted buried subcutaneous sutures.
Home Suture Removal Text: Patient was provided a home suture removal kit and will remove their sutures at home.  If they have any questions or difficulties they will call the office.
Burow's Advancement Flap Text: The defect edges were debeveled with a #15 scalpel blade.  Given the location of the defect and the proximity to free margins a Burow's advancement flap was deemed most appropriate.  Using a sterile surgical marker, the appropriate advancement flap was drawn incorporating the defect and placing the expected incisions within the relaxed skin tension lines where possible.    The area thus outlined was incised deep to adipose tissue with a #15 scalpel blade.  The skin margins were undermined to an appropriate distance in all directions utilizing iris scissors.
Excision Depth: adipose tissue
Tissue Cultured Epidermal Autograft Text: The defect edges were debeveled with a #15 scalpel blade.  Given the location of the defect, shape of the defect and the proximity to free margins a tissue cultured epidermal autograft was deemed most appropriate.  The graft was then trimmed to fit the size of the defect.  The graft was then placed in the primary defect and oriented appropriately.
Bi-Rhombic Flap Text: The defect edges were debeveled with a #15 scalpel blade.  Given the location of the defect and the proximity to free margins a bi-rhombic flap was deemed most appropriate.  Using a sterile surgical marker, an appropriate rhombic flap was drawn incorporating the defect. The area thus outlined was incised deep to adipose tissue with a #15 scalpel blade.  The skin margins were undermined to an appropriate distance in all directions utilizing iris scissors.
Complex Repair And Double M Plasty Text: The defect edges were debeveled with a #15 scalpel blade.  The primary defect was closed partially with a complex linear closure.  Given the location of the remaining defect, shape of the defect and the proximity to free margins a double M plasty was deemed most appropriate for complete closure of the defect.  Using a sterile surgical marker, an appropriate advancement flap was drawn incorporating the defect and placing the expected incisions within the relaxed skin tension lines where possible.    The area thus outlined was incised deep to adipose tissue with a #15 scalpel blade.  The skin margins were undermined to an appropriate distance in all directions utilizing iris scissors.
Graft Donor Site Bandage (Optional-Leave Blank If You Don't Want In Note): Steri-strips and a pressure bandage were applied to the donor site.
V-Y Plasty Text: The defect edges were debeveled with a #15 scalpel blade.  Given the location of the defect, shape of the defect and the proximity to free margins an V-Y advancement flap was deemed most appropriate.  Using a sterile surgical marker, an appropriate advancement flap was drawn incorporating the defect and placing the expected incisions within the relaxed skin tension lines where possible.    The area thus outlined was incised deep to adipose tissue with a #15 scalpel blade.  The skin margins were undermined to an appropriate distance in all directions utilizing iris scissors.
Crescentic Advancement Flap Text: The defect edges were debeveled with a #15 scalpel blade.  Given the location of the defect and the proximity to free margins a crescentic advancement flap was deemed most appropriate.  Using a sterile surgical marker, the appropriate advancement flap was drawn incorporating the defect and placing the expected incisions within the relaxed skin tension lines where possible.    The area thus outlined was incised deep to adipose tissue with a #15 scalpel blade.  The skin margins were undermined to an appropriate distance in all directions utilizing iris scissors.
Xenograft Text: The defect edges were debeveled with a #15 scalpel blade.  Given the location of the defect, shape of the defect and the proximity to free margins a xenograft was deemed most appropriate.  The graft was then trimmed to fit the size of the defect.  The graft was then placed in the primary defect and oriented appropriately.
Helical Rim Advancement Flap Text: The defect edges were debeveled with a #15 blade scalpel.  Given the location of the defect and the proximity to free margins (helical rim) a double helical rim advancement flap was deemed most appropriate.  Using a sterile surgical marker, the appropriate advancement flaps were drawn incorporating the defect and placing the expected incisions between the helical rim and antihelix where possible.  The area thus outlined was incised through and through with a #15 scalpel blade.  With a skin hook and iris scissors, the flaps were gently and sharply undermined and freed up.
Complex Repair And W Plasty Text: The defect edges were debeveled with a #15 scalpel blade.  The primary defect was closed partially with a complex linear closure.  Given the location of the remaining defect, shape of the defect and the proximity to free margins a W plasty was deemed most appropriate for complete closure of the defect.  Using a sterile surgical marker, an appropriate advancement flap was drawn incorporating the defect and placing the expected incisions within the relaxed skin tension lines where possible.    The area thus outlined was incised deep to adipose tissue with a #15 scalpel blade.  The skin margins were undermined to an appropriate distance in all directions utilizing iris scissors.
H Plasty Text: Given the location of the defect, shape of the defect and the proximity to free margins a H-plasty was deemed most appropriate for repair.  Using a sterile surgical marker, the appropriate advancement arms of the H-plasty were drawn incorporating the defect and placing the expected incisions within the relaxed skin tension lines where possible. The area thus outlined was incised deep to adipose tissue with a #15 scalpel blade. The skin margins were undermined to an appropriate distance in all directions utilizing iris scissors.  The opposing advancement arms were then advanced into place in opposite direction and anchored with interrupted buried subcutaneous sutures.
Lab Facility: 
Size Of Lesion In Cm: 0.6
Undermining Location (Optional): in the superficial subcutaneous fat
Wound Care: Petrolatum
Curvilinear Excision Additional Text (Leave Blank If You Do Not Want): The margin was drawn around the clinically apparent lesion.  A curvilinear shape was then drawn on the skin incorporating the lesion and margins.  Incisions were then made along these lines to the appropriate tissue plane and the lesion was extirpated.
A-T Advancement Flap Text: The defect edges were debeveled with a #15 scalpel blade.  Given the location of the defect, shape of the defect and the proximity to free margins an A-T advancement flap was deemed most appropriate.  Using a sterile surgical marker, an appropriate advancement flap was drawn incorporating the defect and placing the expected incisions within the relaxed skin tension lines where possible.    The area thus outlined was incised deep to adipose tissue with a #15 scalpel blade.  The skin margins were undermined to an appropriate distance in all directions utilizing iris scissors.
Purse String (Intermediate) Text: Given the location of the defect and the characteristics of the surrounding skin a purse string intermediate closure was deemed most appropriate.  Undermining was performed circumfirentially around the surgical defect.  A purse string suture was then placed and tightened.
Complex Repair And Z Plasty Text: The defect edges were debeveled with a #15 scalpel blade.  The primary defect was closed partially with a complex linear closure.  Given the location of the remaining defect, shape of the defect and the proximity to free margins a Z plasty was deemed most appropriate for complete closure of the defect.  Using a sterile surgical marker, an appropriate advancement flap was drawn incorporating the defect and placing the expected incisions within the relaxed skin tension lines where possible.    The area thus outlined was incised deep to adipose tissue with a #15 scalpel blade.  The skin margins were undermined to an appropriate distance in all directions utilizing iris scissors.
Bilateral Helical Rim Advancement Flap Text: The defect edges were debeveled with a #15 blade scalpel.  Given the location of the defect and the proximity to free margins (helical rim) a bilateral helical rim advancement flap was deemed most appropriate.  Using a sterile surgical marker, the appropriate advancement flaps were drawn incorporating the defect and placing the expected incisions between the helical rim and antihelix where possible.  The area thus outlined was incised through and through with a #15 scalpel blade.  With a skin hook and iris scissors, the flaps were gently and sharply undermined and freed up.
Lab: [id number]
W Plasty Text: The lesion was extirpated to the level of the fat with a #15 scalpel blade.  Given the location of the defect, shape of the defect and the proximity to free margins a W-plasty was deemed most appropriate for repair.  Using a sterile surgical marker, the appropriate transposition arms of the W-plasty were drawn incorporating the defect and placing the expected incisions within the relaxed skin tension lines where possible.    The area thus outlined was incised deep to adipose tissue with a #15 scalpel blade.  The skin margins were undermined to an appropriate distance in all directions utilizing iris scissors.  The opposing transposition arms were then transposed into place in opposite direction and anchored with interrupted buried subcutaneous sutures.
Additional Anesthesia Volume In Cc: 9
Purse String (Simple) Text: Given the location of the defect and the characteristics of the surrounding skin a purse string simple closure was deemed most appropriate.  Undermining was performed circumferentially around the surgical defect.  A purse string suture was then placed and tightened.
Fusiform Excision Additional Text (Leave Blank If You Do Not Want): The margin was drawn around the clinically apparent lesion.  A fusiform shape was then drawn on the skin incorporating the lesion and margins.  Incisions were then made along these lines to the appropriate tissue plane and the lesion was extirpated.
O-T Advancement Flap Text: The defect edges were debeveled with a #15 scalpel blade.  Given the location of the defect, shape of the defect and the proximity to free margins an O-T advancement flap was deemed most appropriate.  Using a sterile surgical marker, an appropriate advancement flap was drawn incorporating the defect and placing the expected incisions within the relaxed skin tension lines where possible.    The area thus outlined was incised deep to adipose tissue with a #15 scalpel blade.  The skin margins were undermined to an appropriate distance in all directions utilizing iris scissors.
Ear Star Wedge Flap Text: The defect edges were debeveled with a #15 blade scalpel.  Given the location of the defect and the proximity to free margins (helical rim) an ear star wedge flap was deemed most appropriate.  Using a sterile surgical marker, the appropriate flap was drawn incorporating the defect and placing the expected incisions between the helical rim and antihelix where possible.  The area thus outlined was incised through and through with a #15 scalpel blade.
Z Plasty Text: The lesion was extirpated to the level of the fat with a #15 scalpel blade.  Given the location of the defect, shape of the defect and the proximity to free margins a Z-plasty was deemed most appropriate for repair.  Using a sterile surgical marker, the appropriate transposition arms of the Z-plasty were drawn incorporating the defect and placing the expected incisions within the relaxed skin tension lines where possible.    The area thus outlined was incised deep to adipose tissue with a #15 scalpel blade.  The skin margins were undermined to an appropriate distance in all directions utilizing iris scissors.  The opposing transposition arms were then transposed into place in opposite direction and anchored with interrupted buried subcutaneous sutures.
Deep Sutures: 5-0 Polysorb
Dressing: pressure dressing
Complex Repair And Dorsal Nasal Flap Text: The defect edges were debeveled with a #15 scalpel blade.  The primary defect was closed partially with a complex linear closure.  Given the location of the remaining defect, shape of the defect and the proximity to free margins a dorsal nasal flap was deemed most appropriate for complete closure of the defect.  Using a sterile surgical marker, an appropriate flap was drawn incorporating the defect and placing the expected incisions within the relaxed skin tension lines where possible.    The area thus outlined was incised deep to adipose tissue with a #15 scalpel blade.  The skin margins were undermined to an appropriate distance in all directions utilizing iris scissors.
Size Of Margin In Cm: 0.3
Elliptical Excision Additional Text (Leave Blank If You Do Not Want): The margin was drawn around the clinically apparent lesion.  An elliptical shape was then drawn on the skin incorporating the lesion and margins.  Incisions were then made along these lines to the appropriate tissue plane and the lesion was extirpated.
Partial Purse String (Intermediate) Text: Given the location of the defect and the characteristics of the surrounding skin an intermediate purse string closure was deemed most appropriate.  Undermining was performed circumferentially around the surgical defect.  A purse string suture was then placed and tightened. Wound tension of the circular defect prevented complete closure of the wound.
O-L Flap Text: The defect edges were debeveled with a #15 scalpel blade.  Given the location of the defect, shape of the defect and the proximity to free margins an O-L flap was deemed most appropriate.  Using a sterile surgical marker, an appropriate advancement flap was drawn incorporating the defect and placing the expected incisions within the relaxed skin tension lines where possible.    The area thus outlined was incised deep to adipose tissue with a #15 scalpel blade.  The skin margins were undermined to an appropriate distance in all directions utilizing iris scissors.
Banner Transposition Flap Text: The defect edges were debeveled with a #15 scalpel blade.  Given the location of the defect and the proximity to free margins a Banner transposition flap was deemed most appropriate.  Using a sterile surgical marker, an appropriate flap drawn around the defect. The area thus outlined was incised deep to adipose tissue with a #15 scalpel blade.  The skin margins were undermined to an appropriate distance in all directions utilizing iris scissors.
Cheek Interpolation Flap Text: A decision was made to reconstruct the defect utilizing an interpolation axial flap and a staged reconstruction.  A telfa template was made of the defect.  This telfa template was then used to outline the Cheek Interpolation flap.  The donor area for the pedicle flap was then injected with anesthesia.  The flap was excised through the skin and subcutaneous tissue down to the layer of the underlying musculature.  The interpolation flap was carefully excised within this deep plane to maintain its blood supply.  The edges of the donor site were undermined.   The donor site was closed in a primary fashion.  The pedicle was then rotated into position and sutured.  Once the tube was sutured into place, adequate blood supply was confirmed with blanching and refill.  The pedicle was then wrapped with xeroform gauze and dressed appropriately with a telfa and gauze bandage to ensure continued blood supply and protect the attached pedicle.

## 2019-01-09 ENCOUNTER — APPOINTMENT (RX ONLY)
Dept: URBAN - METROPOLITAN AREA CLINIC 4 | Facility: CLINIC | Age: 69
Setting detail: DERMATOLOGY
End: 2019-01-09

## 2019-01-09 DIAGNOSIS — Z48.02 ENCOUNTER FOR REMOVAL OF SUTURES: ICD-10-CM

## 2019-01-09 PROCEDURE — ? SUTURE REMOVAL (GLOBAL PERIOD)

## 2019-01-09 ASSESSMENT — LOCATION SIMPLE DESCRIPTION DERM: LOCATION SIMPLE: LEFT TEMPLE

## 2019-01-09 ASSESSMENT — LOCATION ZONE DERM: LOCATION ZONE: FACE

## 2019-01-09 ASSESSMENT — LOCATION DETAILED DESCRIPTION DERM: LOCATION DETAILED: LEFT MEDIAL TEMPLE

## 2019-01-09 NOTE — PROCEDURE: SUTURE REMOVAL (GLOBAL PERIOD)
Add 31619 Cpt? (Important Note: In 2017 The Use Of 19092 Is Being Tracked By Cms To Determine Future Global Period Reimbursement For Global Periods): no
Detail Level: Detailed

## 2019-03-18 ENCOUNTER — APPOINTMENT (RX ONLY)
Dept: URBAN - METROPOLITAN AREA CLINIC 4 | Facility: CLINIC | Age: 69
Setting detail: DERMATOLOGY
End: 2019-03-18

## 2019-03-18 DIAGNOSIS — Z09 ENCOUNTER FOR FOLLOW-UP EXAMINATION AFTER COMPLETED TREATMENT FOR CONDITIONS OTHER THAN MALIGNANT NEOPLASM: ICD-10-CM

## 2019-03-18 DIAGNOSIS — D22 MELANOCYTIC NEVI: ICD-10-CM

## 2019-03-18 PROBLEM — D48.5 NEOPLASM OF UNCERTAIN BEHAVIOR OF SKIN: Status: ACTIVE | Noted: 2019-03-18

## 2019-03-18 PROBLEM — D22.5 MELANOCYTIC NEVI OF TRUNK: Status: ACTIVE | Noted: 2019-03-18

## 2019-03-18 PROBLEM — L20.84 INTRINSIC (ALLERGIC) ECZEMA: Status: ACTIVE | Noted: 2019-03-18

## 2019-03-18 PROCEDURE — ? TREATMENT REGIMEN

## 2019-03-18 PROCEDURE — 11103 TANGNTL BX SKIN EA SEP/ADDL: CPT

## 2019-03-18 PROCEDURE — 99213 OFFICE O/P EST LOW 20 MIN: CPT | Mod: 25

## 2019-03-18 PROCEDURE — 11102 TANGNTL BX SKIN SINGLE LES: CPT

## 2019-03-18 PROCEDURE — ? BIOPSY BY SHAVE METHOD

## 2019-03-18 PROCEDURE — ? OBSERVATION

## 2019-03-18 PROCEDURE — ? COUNSELING

## 2019-03-18 ASSESSMENT — LOCATION DETAILED DESCRIPTION DERM
LOCATION DETAILED: LEFT LATERAL UPPER BACK
LOCATION DETAILED: LEFT SUPERIOR LATERAL UPPER BACK
LOCATION DETAILED: RIGHT MEDIAL SUPERIOR CHEST
LOCATION DETAILED: LEFT INFERIOR UPPER BACK
LOCATION DETAILED: LEFT LATERAL ABDOMEN
LOCATION DETAILED: RIGHT DISTAL DORSAL FOREARM
LOCATION DETAILED: LEFT PROXIMAL DORSAL FOREARM
LOCATION DETAILED: LEFT INFERIOR MEDIAL MIDBACK

## 2019-03-18 ASSESSMENT — LOCATION SIMPLE DESCRIPTION DERM
LOCATION SIMPLE: LEFT LOWER BACK
LOCATION SIMPLE: LEFT FOREARM
LOCATION SIMPLE: ABDOMEN
LOCATION SIMPLE: RIGHT FOREARM
LOCATION SIMPLE: LEFT UPPER BACK
LOCATION SIMPLE: CHEST

## 2019-03-18 ASSESSMENT — LOCATION ZONE DERM
LOCATION ZONE: ARM
LOCATION ZONE: TRUNK

## 2019-03-18 NOTE — PROCEDURE: BIOPSY BY SHAVE METHOD
Anesthesia Volume In Cc: 0.5
Dressing: bandage
Type Of Destruction Used: Curettage
Electrodesiccation And Curettage Text: The wound bed was treated with electrodesiccation and curettage after the biopsy was performed.
Billing Type: Third-Party Bill
Hemostasis: Drysol
Was A Bandage Applied: Yes
Notification Instructions: Patient will be notified of biopsy results. However, patient instructed to call the office if not contacted within 2 weeks.
Curettage Text: The wound bed was treated with curettage after the biopsy was performed.
Lab: [id number]
Bill For Surgical Tray: no
Detail Level: Detailed
Post-Care Instructions: I reviewed with the patient in detail post-care instructions. Patient is to keep the biopsy site dry overnight, and then apply bacitracin twice daily until healed. Patient may apply hydrogen peroxide soaks to remove any crusting.
Silver Nitrate Text: The wound bed was treated with silver nitrate after the biopsy was performed.
Biopsy Method: Personna blade
Consent: Written consent was obtained and risks were reviewed including but not limited to scarring, infection, bleeding, scabbing, incomplete removal, nerve damage and allergy to anesthesia.
Cryotherapy Text: The wound bed was treated with cryotherapy after the biopsy was performed.
Anesthesia Type: 1% lidocaine with epinephrine
Size Of Lesion In Cm: 0
Electrodesiccation Text: The wound bed was treated with electrodesiccation after the biopsy was performed.
Biopsy Type: H and E
Lab Facility: 
Wound Care: Petrolatum
Depth Of Biopsy: dermis

## 2019-06-19 ENCOUNTER — APPOINTMENT (RX ONLY)
Dept: URBAN - METROPOLITAN AREA CLINIC 4 | Facility: CLINIC | Age: 69
Setting detail: DERMATOLOGY
End: 2019-06-19

## 2019-06-19 DIAGNOSIS — Z85.820 PERSONAL HISTORY OF MALIGNANT MELANOMA OF SKIN: ICD-10-CM

## 2019-06-19 DIAGNOSIS — D18.0 HEMANGIOMA: ICD-10-CM

## 2019-06-19 DIAGNOSIS — L57.0 ACTINIC KERATOSIS: ICD-10-CM

## 2019-06-19 DIAGNOSIS — D22 MELANOCYTIC NEVI: ICD-10-CM

## 2019-06-19 PROBLEM — D22.5 MELANOCYTIC NEVI OF TRUNK: Status: ACTIVE | Noted: 2019-06-19

## 2019-06-19 PROBLEM — D48.5 NEOPLASM OF UNCERTAIN BEHAVIOR OF SKIN: Status: ACTIVE | Noted: 2019-06-19

## 2019-06-19 PROBLEM — D18.01 HEMANGIOMA OF SKIN AND SUBCUTANEOUS TISSUE: Status: ACTIVE | Noted: 2019-06-19

## 2019-06-19 PROCEDURE — ? OBSERVATION

## 2019-06-19 PROCEDURE — 99214 OFFICE O/P EST MOD 30 MIN: CPT | Mod: 25

## 2019-06-19 PROCEDURE — ? COUNSELING

## 2019-06-19 PROCEDURE — ? LIQUID NITROGEN

## 2019-06-19 PROCEDURE — ? BIOPSY BY SHAVE METHOD

## 2019-06-19 PROCEDURE — 11102 TANGNTL BX SKIN SINGLE LES: CPT

## 2019-06-19 PROCEDURE — 17003 DESTRUCT PREMALG LES 2-14: CPT

## 2019-06-19 PROCEDURE — 17000 DESTRUCT PREMALG LESION: CPT | Mod: 59

## 2019-06-19 ASSESSMENT — LOCATION DETAILED DESCRIPTION DERM
LOCATION DETAILED: LEFT SUPERIOR LATERAL NECK
LOCATION DETAILED: RIGHT LATERAL SUPERIOR CHEST
LOCATION DETAILED: RIGHT LATERAL ABDOMEN
LOCATION DETAILED: RIGHT INFERIOR UPPER BACK
LOCATION DETAILED: EPIGASTRIC SKIN
LOCATION DETAILED: RIGHT SUPERIOR UPPER BACK
LOCATION DETAILED: RIGHT RADIAL DORSAL HAND
LOCATION DETAILED: RIGHT MEDIAL UPPER BACK
LOCATION DETAILED: RIGHT ULNAR DORSAL HAND
LOCATION DETAILED: RIGHT INFERIOR LATERAL NECK

## 2019-06-19 ASSESSMENT — LOCATION SIMPLE DESCRIPTION DERM
LOCATION SIMPLE: RIGHT ANTERIOR NECK
LOCATION SIMPLE: ABDOMEN
LOCATION SIMPLE: CHEST
LOCATION SIMPLE: NECK
LOCATION SIMPLE: RIGHT UPPER BACK
LOCATION SIMPLE: RIGHT HAND

## 2019-06-19 ASSESSMENT — LOCATION ZONE DERM
LOCATION ZONE: HAND
LOCATION ZONE: NECK
LOCATION ZONE: TRUNK

## 2019-06-19 NOTE — PROCEDURE: OBSERVATION
Detail Level: Detailed
X Size Of Lesion In Cm (Optional): 0
Morphology Per Location (Optional): Irregular brown macule

## 2019-06-19 NOTE — PROCEDURE: COUNSELING
Detail Level: Generalized
When Should The Patient Follow-Up For Their Next Full-Body Skin Exam?: 3 Months
Quality 137: Melanoma: Continuity Of Care - Recall System: Patient information entered into a recall system that includes: target date for the next exam specified AND a process to follow up with patients regarding missed or unscheduled appointments
Detail Level: Detailed
Detail Level: Zone

## 2019-06-19 NOTE — PROCEDURE: BIOPSY BY SHAVE METHOD
Bill For Surgical Tray: no
Lab: [id number]
Consent: Written consent was obtained and risks were reviewed including but not limited to scarring, infection, bleeding, scabbing, incomplete removal, nerve damage and allergy to anesthesia.
Anesthesia Type: 1% lidocaine with epinephrine
Size Of Lesion In Cm: 0
Curettage Text: The wound bed was treated with curettage after the biopsy was performed.
Notification Instructions: Patient will be notified of biopsy results. However, patient instructed to call the office if not contacted within 2 weeks.
Wound Care: Petrolatum
Lab Facility: 
Cryotherapy Text: The wound bed was treated with cryotherapy after the biopsy was performed.
Depth Of Biopsy: dermis
Type Of Destruction Used: Curettage
Anesthesia Volume In Cc: 0.5
Biopsy Type: H and E
Electrodesiccation Text: The wound bed was treated with electrodesiccation after the biopsy was performed.
Was A Bandage Applied: Yes
Hemostasis: Drysol
Silver Nitrate Text: The wound bed was treated with silver nitrate after the biopsy was performed.
Detail Level: Detailed
Biopsy Method: Personna blade
Electrodesiccation And Curettage Text: The wound bed was treated with electrodesiccation and curettage after the biopsy was performed.
Billing Type: Third-Party Bill
Dressing: bandage
Post-Care Instructions: I reviewed with the patient in detail post-care instructions. Patient is to keep the biopsy site dry overnight, and then apply bacitracin twice daily until healed. Patient may apply hydrogen peroxide soaks to remove any crusting.

## 2019-06-19 NOTE — HPI: MELANOMA F/U (HISTORY OF MALIGNANT MELANOMA)
What Is The Reason For Today's Visit?: Evaluation for suspicious moles
Additional History: Patient recently had a open MRI scan at spine office , he states they found a mass on his thoracic spine. He is going to be scheduled for further evaluation.
Year Excised?: 2018, 2008, 2001

## 2019-06-19 NOTE — PROCEDURE: LIQUID NITROGEN
Render Note In Bullet Format When Appropriate: No
Duration Of Freeze Thaw-Cycle (Seconds): 0
Detail Level: Detailed
Consent: The patient's consent was obtained including but not limited to risks of blistering, scarring, darker or lighter pigmentary change, and recurrence.
Number Of Freeze-Thaw Cycles: 2 freeze-thaw cycles
Post-Care Instructions: I reviewed with the patient in detail post-care instructions. Patient is to wear sunprotection, and avoid picking at any of the treated lesions. Pt may apply Vaseline to crusted or scabbing areas.

## 2019-07-15 ENCOUNTER — APPOINTMENT (OUTPATIENT)
Dept: RADIOLOGY | Facility: MEDICAL CENTER | Age: 69
DRG: 029 | End: 2019-07-15
Attending: INTERNAL MEDICINE
Payer: MEDICARE

## 2019-07-15 ENCOUNTER — HOSPITAL ENCOUNTER (INPATIENT)
Facility: MEDICAL CENTER | Age: 69
LOS: 10 days | DRG: 029 | End: 2019-07-25
Attending: EMERGENCY MEDICINE | Admitting: INTERNAL MEDICINE
Payer: MEDICARE

## 2019-07-15 ENCOUNTER — APPOINTMENT (OUTPATIENT)
Dept: RADIOLOGY | Facility: MEDICAL CENTER | Age: 69
DRG: 029 | End: 2019-07-15
Attending: EMERGENCY MEDICINE
Payer: MEDICARE

## 2019-07-15 DIAGNOSIS — M48.9 MASS OF THORACIC VERTEBRA: ICD-10-CM

## 2019-07-15 DIAGNOSIS — E11.9 TYPE 2 DIABETES MELLITUS WITHOUT COMPLICATION, WITHOUT LONG-TERM CURRENT USE OF INSULIN (HCC): ICD-10-CM

## 2019-07-15 DIAGNOSIS — R53.1 WEAKNESS: ICD-10-CM

## 2019-07-15 LAB
ALBUMIN SERPL BCP-MCNC: 4.7 G/DL (ref 3.2–4.9)
ALBUMIN/GLOB SERPL: 1.5 G/DL
ALP SERPL-CCNC: 50 U/L (ref 30–99)
ALT SERPL-CCNC: 28 U/L (ref 2–50)
ANION GAP SERPL CALC-SCNC: 14 MMOL/L (ref 0–11.9)
AST SERPL-CCNC: 20 U/L (ref 12–45)
BASOPHILS # BLD AUTO: 0.5 % (ref 0–1.8)
BASOPHILS # BLD: 0.04 K/UL (ref 0–0.12)
BILIRUB SERPL-MCNC: 0.5 MG/DL (ref 0.1–1.5)
BUN SERPL-MCNC: 25 MG/DL (ref 8–22)
CALCIUM SERPL-MCNC: 9.7 MG/DL (ref 8.5–10.5)
CHLORIDE SERPL-SCNC: 100 MMOL/L (ref 96–112)
CO2 SERPL-SCNC: 18 MMOL/L (ref 20–33)
CREAT SERPL-MCNC: 1.08 MG/DL (ref 0.5–1.4)
EOSINOPHIL # BLD AUTO: 0.36 K/UL (ref 0–0.51)
EOSINOPHIL NFR BLD: 4.1 % (ref 0–6.9)
ERYTHROCYTE [DISTWIDTH] IN BLOOD BY AUTOMATED COUNT: 44.1 FL (ref 35.9–50)
GLOBULIN SER CALC-MCNC: 3.2 G/DL (ref 1.9–3.5)
GLUCOSE SERPL-MCNC: 113 MG/DL (ref 65–99)
HCT VFR BLD AUTO: 32.6 % (ref 42–52)
HGB BLD-MCNC: 11.1 G/DL (ref 14–18)
IMM GRANULOCYTES # BLD AUTO: 0.05 K/UL (ref 0–0.11)
IMM GRANULOCYTES NFR BLD AUTO: 0.6 % (ref 0–0.9)
LYMPHOCYTES # BLD AUTO: 1.96 K/UL (ref 1–4.8)
LYMPHOCYTES NFR BLD: 22.1 % (ref 22–41)
MCH RBC QN AUTO: 32.5 PG (ref 27–33)
MCHC RBC AUTO-ENTMCNC: 34 G/DL (ref 33.7–35.3)
MCV RBC AUTO: 95.3 FL (ref 81.4–97.8)
MONOCYTES # BLD AUTO: 0.74 K/UL (ref 0–0.85)
MONOCYTES NFR BLD AUTO: 8.3 % (ref 0–13.4)
NEUTROPHILS # BLD AUTO: 5.73 K/UL (ref 1.82–7.42)
NEUTROPHILS NFR BLD: 64.4 % (ref 44–72)
NRBC # BLD AUTO: 0 K/UL
NRBC BLD-RTO: 0 /100 WBC
PLATELET # BLD AUTO: 196 K/UL (ref 164–446)
PMV BLD AUTO: 9.7 FL (ref 9–12.9)
POTASSIUM SERPL-SCNC: 4.4 MMOL/L (ref 3.6–5.5)
PROT SERPL-MCNC: 7.9 G/DL (ref 6–8.2)
RBC # BLD AUTO: 3.42 M/UL (ref 4.7–6.1)
SODIUM SERPL-SCNC: 132 MMOL/L (ref 135–145)
WBC # BLD AUTO: 8.9 K/UL (ref 4.8–10.8)

## 2019-07-15 PROCEDURE — 85025 COMPLETE CBC W/AUTO DIFF WBC: CPT

## 2019-07-15 PROCEDURE — 72128 CT CHEST SPINE W/O DYE: CPT

## 2019-07-15 PROCEDURE — 99223 1ST HOSP IP/OBS HIGH 75: CPT | Performed by: INTERNAL MEDICINE

## 2019-07-15 PROCEDURE — 71046 X-RAY EXAM CHEST 2 VIEWS: CPT

## 2019-07-15 PROCEDURE — 80053 COMPREHEN METABOLIC PANEL: CPT

## 2019-07-15 PROCEDURE — 99285 EMERGENCY DEPT VISIT HI MDM: CPT

## 2019-07-15 PROCEDURE — 700102 HCHG RX REV CODE 250 W/ 637 OVERRIDE(OP): Performed by: EMERGENCY MEDICINE

## 2019-07-15 PROCEDURE — A9270 NON-COVERED ITEM OR SERVICE: HCPCS | Performed by: EMERGENCY MEDICINE

## 2019-07-15 PROCEDURE — 770001 HCHG ROOM/CARE - MED/SURG/GYN PRIV*

## 2019-07-15 RX ORDER — OMEPRAZOLE 20 MG/1
20 CAPSULE, DELAYED RELEASE ORAL DAILY
COMMUNITY
End: 2020-02-16

## 2019-07-15 RX ORDER — LORATADINE 10 MG/1
10 TABLET ORAL DAILY
Status: DISCONTINUED | OUTPATIENT
Start: 2019-07-16 | End: 2019-07-25 | Stop reason: HOSPADM

## 2019-07-15 RX ORDER — ATORVASTATIN CALCIUM 20 MG/1
20 TABLET, FILM COATED ORAL NIGHTLY
Status: DISCONTINUED | OUTPATIENT
Start: 2019-07-15 | End: 2019-07-25 | Stop reason: HOSPADM

## 2019-07-15 RX ORDER — SODIUM CHLORIDE 9 MG/ML
INJECTION, SOLUTION INTRAVENOUS CONTINUOUS
Status: DISCONTINUED | OUTPATIENT
Start: 2019-07-16 | End: 2019-07-17

## 2019-07-15 RX ORDER — TERAZOSIN 5 MG/1
5 CAPSULE ORAL 2 TIMES DAILY
Status: DISCONTINUED | OUTPATIENT
Start: 2019-07-16 | End: 2019-07-25 | Stop reason: HOSPADM

## 2019-07-15 RX ORDER — SPIRONOLACTONE 25 MG/1
25 TABLET ORAL DAILY
COMMUNITY
End: 2020-02-16

## 2019-07-15 RX ORDER — POLYETHYLENE GLYCOL 3350 17 G/17G
1 POWDER, FOR SOLUTION ORAL
Status: DISCONTINUED | OUTPATIENT
Start: 2019-07-15 | End: 2019-07-20

## 2019-07-15 RX ORDER — GLIPIZIDE 5 MG/1
5 TABLET ORAL
Status: DISCONTINUED | OUTPATIENT
Start: 2019-07-16 | End: 2019-07-17

## 2019-07-15 RX ORDER — OXYCODONE HYDROCHLORIDE 5 MG/1
5 TABLET ORAL
Status: DISCONTINUED | OUTPATIENT
Start: 2019-07-15 | End: 2019-07-20

## 2019-07-15 RX ORDER — ACETAMINOPHEN 325 MG/1
650 TABLET ORAL EVERY 6 HOURS PRN
Status: DISCONTINUED | OUTPATIENT
Start: 2019-07-15 | End: 2019-07-25 | Stop reason: HOSPADM

## 2019-07-15 RX ORDER — METHOCARBAMOL 750 MG/1
750 TABLET, FILM COATED ORAL 2 TIMES DAILY
Status: DISCONTINUED | OUTPATIENT
Start: 2019-07-15 | End: 2019-07-20

## 2019-07-15 RX ORDER — MORPHINE SULFATE 4 MG/ML
4 INJECTION, SOLUTION INTRAMUSCULAR; INTRAVENOUS
Status: DISCONTINUED | OUTPATIENT
Start: 2019-07-15 | End: 2019-07-20

## 2019-07-15 RX ORDER — LISINOPRIL 40 MG/1
40 TABLET ORAL DAILY
Status: ON HOLD | COMMUNITY
End: 2019-08-19

## 2019-07-15 RX ORDER — AMOXICILLIN 250 MG
2 CAPSULE ORAL 2 TIMES DAILY
Status: DISCONTINUED | OUTPATIENT
Start: 2019-07-15 | End: 2019-07-20

## 2019-07-15 RX ORDER — AMLODIPINE BESYLATE 5 MG/1
10 TABLET ORAL DAILY
Status: DISCONTINUED | OUTPATIENT
Start: 2019-07-16 | End: 2019-07-25 | Stop reason: HOSPADM

## 2019-07-15 RX ORDER — ONDANSETRON 2 MG/ML
4 INJECTION INTRAMUSCULAR; INTRAVENOUS EVERY 4 HOURS PRN
Status: DISCONTINUED | OUTPATIENT
Start: 2019-07-15 | End: 2019-07-20

## 2019-07-15 RX ORDER — GLIPIZIDE 5 MG/1
2.5 TABLET ORAL
Status: DISCONTINUED | OUTPATIENT
Start: 2019-07-16 | End: 2019-07-17

## 2019-07-15 RX ORDER — OMEPRAZOLE 20 MG/1
20 CAPSULE, DELAYED RELEASE ORAL DAILY
Status: DISCONTINUED | OUTPATIENT
Start: 2019-07-16 | End: 2019-07-25 | Stop reason: HOSPADM

## 2019-07-15 RX ORDER — GLIPIZIDE 5 MG/1
2.5-5 TABLET ORAL 2 TIMES DAILY
Status: DISCONTINUED | OUTPATIENT
Start: 2019-07-15 | End: 2019-07-15

## 2019-07-15 RX ORDER — HYDROCODONE BITARTRATE AND ACETAMINOPHEN 5; 325 MG/1; MG/1
1 TABLET ORAL ONCE
Status: COMPLETED | OUTPATIENT
Start: 2019-07-15 | End: 2019-07-15

## 2019-07-15 RX ORDER — LISINOPRIL 20 MG/1
40 TABLET ORAL DAILY
Status: DISCONTINUED | OUTPATIENT
Start: 2019-07-16 | End: 2019-07-25 | Stop reason: HOSPADM

## 2019-07-15 RX ORDER — SPIRONOLACTONE 25 MG/1
25 TABLET ORAL DAILY
Status: DISCONTINUED | OUTPATIENT
Start: 2019-07-16 | End: 2019-07-25 | Stop reason: HOSPADM

## 2019-07-15 RX ORDER — OXYCODONE HYDROCHLORIDE 10 MG/1
10 TABLET ORAL
Status: DISCONTINUED | OUTPATIENT
Start: 2019-07-15 | End: 2019-07-20

## 2019-07-15 RX ORDER — TRAMADOL HYDROCHLORIDE 50 MG/1
50 TABLET ORAL EVERY 4 HOURS PRN
Status: ON HOLD | COMMUNITY
End: 2019-07-25

## 2019-07-15 RX ORDER — ONDANSETRON 4 MG/1
4 TABLET, ORALLY DISINTEGRATING ORAL EVERY 4 HOURS PRN
Status: DISCONTINUED | OUTPATIENT
Start: 2019-07-15 | End: 2019-07-20

## 2019-07-15 RX ORDER — BISACODYL 10 MG
10 SUPPOSITORY, RECTAL RECTAL
Status: DISCONTINUED | OUTPATIENT
Start: 2019-07-15 | End: 2019-07-20

## 2019-07-15 RX ORDER — CLOPIDOGREL BISULFATE 75 MG/1
75 TABLET ORAL DAILY
Status: ON HOLD | COMMUNITY
End: 2019-07-25

## 2019-07-15 RX ORDER — PRAZOSIN HYDROCHLORIDE 5 MG/1
5 CAPSULE ORAL NIGHTLY
Status: DISCONTINUED | OUTPATIENT
Start: 2019-07-15 | End: 2019-07-17

## 2019-07-15 RX ORDER — DICLOFENAC SODIUM 75 MG/1
75 TABLET, DELAYED RELEASE ORAL 2 TIMES DAILY PRN
Status: ON HOLD | COMMUNITY
End: 2019-07-25

## 2019-07-15 RX ORDER — METHOCARBAMOL 750 MG/1
750 TABLET, FILM COATED ORAL 2 TIMES DAILY
Status: ON HOLD | COMMUNITY
End: 2019-07-25

## 2019-07-15 RX ADMIN — HYDROCODONE BITARTRATE AND ACETAMINOPHEN 1 TABLET: 5; 325 TABLET ORAL at 21:24

## 2019-07-15 ASSESSMENT — ENCOUNTER SYMPTOMS
BLURRED VISION: 0
DIZZINESS: 0
MYALGIAS: 0
DIARRHEA: 0
SPUTUM PRODUCTION: 0
COUGH: 0
VOMITING: 0
SEIZURES: 0
SORE THROAT: 0
FOCAL WEAKNESS: 1
ABDOMINAL PAIN: 0
HEADACHES: 0
SHORTNESS OF BREATH: 0
NECK PAIN: 0
WHEEZING: 0
BRUISES/BLEEDS EASILY: 0
FLANK PAIN: 0
BLOOD IN STOOL: 0
CHILLS: 0
DIAPHORESIS: 0
NAUSEA: 0
SENSORY CHANGE: 1
FEVER: 0
PALPITATIONS: 0
BACK PAIN: 1

## 2019-07-16 ENCOUNTER — ANESTHESIA (OUTPATIENT)
Dept: RADIOLOGY | Facility: MEDICAL CENTER | Age: 69
DRG: 029 | End: 2019-07-16
Payer: MEDICARE

## 2019-07-16 ENCOUNTER — APPOINTMENT (OUTPATIENT)
Dept: RADIOLOGY | Facility: MEDICAL CENTER | Age: 69
DRG: 029 | End: 2019-07-16
Attending: INTERNAL MEDICINE
Payer: MEDICARE

## 2019-07-16 ENCOUNTER — ANESTHESIA EVENT (OUTPATIENT)
Dept: RADIOLOGY | Facility: MEDICAL CENTER | Age: 69
DRG: 029 | End: 2019-07-16
Payer: MEDICARE

## 2019-07-16 LAB
GLUCOSE BLD-MCNC: 140 MG/DL (ref 65–99)
GLUCOSE BLD-MCNC: 180 MG/DL (ref 65–99)
GLUCOSE BLD-MCNC: 224 MG/DL (ref 65–99)

## 2019-07-16 PROCEDURE — 700105 HCHG RX REV CODE 258: Performed by: INTERNAL MEDICINE

## 2019-07-16 PROCEDURE — 700117 HCHG RX CONTRAST REV CODE 255: Performed by: INTERNAL MEDICINE

## 2019-07-16 PROCEDURE — A9270 NON-COVERED ITEM OR SERVICE: HCPCS | Performed by: INTERNAL MEDICINE

## 2019-07-16 PROCEDURE — 700102 HCHG RX REV CODE 250 W/ 637 OVERRIDE(OP): Performed by: HOSPITALIST

## 2019-07-16 PROCEDURE — 82962 GLUCOSE BLOOD TEST: CPT

## 2019-07-16 PROCEDURE — 700111 HCHG RX REV CODE 636 W/ 250 OVERRIDE (IP)

## 2019-07-16 PROCEDURE — 72147 MRI CHEST SPINE W/DYE: CPT

## 2019-07-16 PROCEDURE — 94760 N-INVAS EAR/PLS OXIMETRY 1: CPT

## 2019-07-16 PROCEDURE — A9585 GADOBUTROL INJECTION: HCPCS | Performed by: INTERNAL MEDICINE

## 2019-07-16 PROCEDURE — 770001 HCHG ROOM/CARE - MED/SURG/GYN PRIV*

## 2019-07-16 PROCEDURE — 700111 HCHG RX REV CODE 636 W/ 250 OVERRIDE (IP): Performed by: HOSPITALIST

## 2019-07-16 PROCEDURE — 700101 HCHG RX REV CODE 250

## 2019-07-16 PROCEDURE — 99233 SBSQ HOSP IP/OBS HIGH 50: CPT | Performed by: HOSPITALIST

## 2019-07-16 PROCEDURE — 4410588 MR-THORACIC SPINE-WITH

## 2019-07-16 PROCEDURE — 700102 HCHG RX REV CODE 250 W/ 637 OVERRIDE(OP): Performed by: INTERNAL MEDICINE

## 2019-07-16 PROCEDURE — 160002 HCHG RECOVERY MINUTES (STAT)

## 2019-07-16 PROCEDURE — 71260 CT THORAX DX C+: CPT

## 2019-07-16 RX ORDER — MIDAZOLAM HYDROCHLORIDE 1 MG/ML
1 INJECTION INTRAMUSCULAR; INTRAVENOUS
Status: DISCONTINUED | OUTPATIENT
Start: 2019-07-16 | End: 2019-07-16 | Stop reason: HOSPADM

## 2019-07-16 RX ORDER — METOPROLOL TARTRATE 1 MG/ML
1 INJECTION, SOLUTION INTRAVENOUS
Status: DISCONTINUED | OUTPATIENT
Start: 2019-07-16 | End: 2019-07-16 | Stop reason: HOSPADM

## 2019-07-16 RX ORDER — OXYCODONE HCL 5 MG/5 ML
10 SOLUTION, ORAL ORAL
Status: DISCONTINUED | OUTPATIENT
Start: 2019-07-16 | End: 2019-07-16 | Stop reason: HOSPADM

## 2019-07-16 RX ORDER — HALOPERIDOL 5 MG/ML
1 INJECTION INTRAMUSCULAR
Status: DISCONTINUED | OUTPATIENT
Start: 2019-07-16 | End: 2019-07-16 | Stop reason: HOSPADM

## 2019-07-16 RX ORDER — MORPHINE SULFATE 4 MG/ML
2 INJECTION, SOLUTION INTRAMUSCULAR; INTRAVENOUS
Status: DISCONTINUED | OUTPATIENT
Start: 2019-07-16 | End: 2019-07-16 | Stop reason: HOSPADM

## 2019-07-16 RX ORDER — MORPHINE SULFATE 10 MG/ML
5 INJECTION, SOLUTION INTRAMUSCULAR; INTRAVENOUS
Status: DISCONTINUED | OUTPATIENT
Start: 2019-07-16 | End: 2019-07-16 | Stop reason: HOSPADM

## 2019-07-16 RX ORDER — SODIUM CHLORIDE, SODIUM LACTATE, POTASSIUM CHLORIDE, CALCIUM CHLORIDE 600; 310; 30; 20 MG/100ML; MG/100ML; MG/100ML; MG/100ML
INJECTION, SOLUTION INTRAVENOUS CONTINUOUS
Status: DISCONTINUED | OUTPATIENT
Start: 2019-07-16 | End: 2019-07-16 | Stop reason: HOSPADM

## 2019-07-16 RX ORDER — MIDAZOLAM HYDROCHLORIDE 1 MG/ML
INJECTION INTRAMUSCULAR; INTRAVENOUS
Status: DISPENSED
Start: 2019-07-16 | End: 2019-07-17

## 2019-07-16 RX ORDER — DEXAMETHASONE SODIUM PHOSPHATE 4 MG/ML
4 INJECTION, SOLUTION INTRA-ARTICULAR; INTRALESIONAL; INTRAMUSCULAR; INTRAVENOUS; SOFT TISSUE EVERY 6 HOURS
Status: DISCONTINUED | OUTPATIENT
Start: 2019-07-17 | End: 2019-07-16

## 2019-07-16 RX ORDER — HYDRALAZINE HYDROCHLORIDE 20 MG/ML
10 INJECTION INTRAMUSCULAR; INTRAVENOUS
Status: DISCONTINUED | OUTPATIENT
Start: 2019-07-16 | End: 2019-07-16 | Stop reason: HOSPADM

## 2019-07-16 RX ORDER — DEXAMETHASONE SODIUM PHOSPHATE 4 MG/ML
4 INJECTION, SOLUTION INTRA-ARTICULAR; INTRALESIONAL; INTRAMUSCULAR; INTRAVENOUS; SOFT TISSUE EVERY 6 HOURS
Status: DISCONTINUED | OUTPATIENT
Start: 2019-07-16 | End: 2019-07-20

## 2019-07-16 RX ORDER — GADOBUTROL 604.72 MG/ML
10 INJECTION INTRAVENOUS ONCE
Status: COMPLETED | OUTPATIENT
Start: 2019-07-16 | End: 2019-07-16

## 2019-07-16 RX ORDER — DIPHENHYDRAMINE HYDROCHLORIDE 50 MG/ML
12.5 INJECTION INTRAMUSCULAR; INTRAVENOUS
Status: DISCONTINUED | OUTPATIENT
Start: 2019-07-16 | End: 2019-07-16 | Stop reason: HOSPADM

## 2019-07-16 RX ORDER — MEPERIDINE HYDROCHLORIDE 25 MG/ML
12.5 INJECTION INTRAMUSCULAR; INTRAVENOUS; SUBCUTANEOUS
Status: DISCONTINUED | OUTPATIENT
Start: 2019-07-16 | End: 2019-07-16 | Stop reason: HOSPADM

## 2019-07-16 RX ADMIN — ATORVASTATIN CALCIUM 20 MG: 20 TABLET, FILM COATED ORAL at 20:22

## 2019-07-16 RX ADMIN — INSULIN HUMAN 1 UNITS: 100 INJECTION, SOLUTION PARENTERAL at 22:15

## 2019-07-16 RX ADMIN — OMEPRAZOLE 20 MG: 20 CAPSULE, DELAYED RELEASE ORAL at 06:20

## 2019-07-16 RX ADMIN — ACETAMINOPHEN 650 MG: 325 TABLET, FILM COATED ORAL at 22:22

## 2019-07-16 RX ADMIN — OXYCODONE HYDROCHLORIDE 10 MG: 10 TABLET ORAL at 23:26

## 2019-07-16 RX ADMIN — TERAZOSIN HYDROCHLORIDE 5 MG: 5 CAPSULE ORAL at 16:23

## 2019-07-16 RX ADMIN — IOHEXOL 100 ML: 350 INJECTION, SOLUTION INTRAVENOUS at 11:55

## 2019-07-16 RX ADMIN — GADOBUTROL 10 ML: 604.72 INJECTION INTRAVENOUS at 13:25

## 2019-07-16 RX ADMIN — TERAZOSIN HYDROCHLORIDE 5 MG: 5 CAPSULE ORAL at 06:22

## 2019-07-16 RX ADMIN — SITAGLIPTIN 100 MG: 100 TABLET, FILM COATED ORAL at 06:20

## 2019-07-16 RX ADMIN — VERAPAMIL HYDROCHLORIDE 180 MG: 180 TABLET, FILM COATED, EXTENDED RELEASE ORAL at 06:20

## 2019-07-16 RX ADMIN — PRAZOSIN HYDROCHLORIDE 5 MG: 5 CAPSULE ORAL at 02:29

## 2019-07-16 RX ADMIN — AMLODIPINE BESYLATE 10 MG: 5 TABLET ORAL at 06:20

## 2019-07-16 RX ADMIN — GLIPIZIDE 2.5 MG: 5 TABLET ORAL at 01:55

## 2019-07-16 RX ADMIN — SPIRONOLACTONE 25 MG: 25 TABLET ORAL at 06:22

## 2019-07-16 RX ADMIN — PRAZOSIN HYDROCHLORIDE 5 MG: 5 CAPSULE ORAL at 20:47

## 2019-07-16 RX ADMIN — OXYCODONE HYDROCHLORIDE 10 MG: 10 TABLET ORAL at 20:22

## 2019-07-16 RX ADMIN — GLIPIZIDE 2.5 MG: 5 TABLET ORAL at 20:21

## 2019-07-16 RX ADMIN — SODIUM CHLORIDE: 9 INJECTION, SOLUTION INTRAVENOUS at 16:24

## 2019-07-16 RX ADMIN — SODIUM CHLORIDE: 9 INJECTION, SOLUTION INTRAVENOUS at 01:58

## 2019-07-16 RX ADMIN — VERAPAMIL HYDROCHLORIDE 180 MG: 180 TABLET, FILM COATED, EXTENDED RELEASE ORAL at 01:54

## 2019-07-16 RX ADMIN — OXYCODONE HYDROCHLORIDE 5 MG: 5 TABLET ORAL at 11:04

## 2019-07-16 RX ADMIN — VERAPAMIL HYDROCHLORIDE 180 MG: 180 TABLET, FILM COATED, EXTENDED RELEASE ORAL at 16:22

## 2019-07-16 RX ADMIN — ATORVASTATIN CALCIUM 20 MG: 20 TABLET, FILM COATED ORAL at 01:55

## 2019-07-16 RX ADMIN — METHOCARBAMOL TABLETS 750 MG: 750 TABLET, COATED ORAL at 16:22

## 2019-07-16 RX ADMIN — METHOCARBAMOL TABLETS 750 MG: 750 TABLET, COATED ORAL at 06:19

## 2019-07-16 RX ADMIN — LISINOPRIL 40 MG: 20 TABLET ORAL at 06:20

## 2019-07-16 RX ADMIN — LORATADINE 10 MG: 10 TABLET ORAL at 06:20

## 2019-07-16 RX ADMIN — OXYCODONE HYDROCHLORIDE 5 MG: 5 TABLET ORAL at 02:33

## 2019-07-16 RX ADMIN — GLIPIZIDE 5 MG: 5 TABLET ORAL at 06:22

## 2019-07-16 RX ADMIN — DEXAMETHASONE SODIUM PHOSPHATE 4 MG: 4 INJECTION, SOLUTION INTRA-ARTICULAR; INTRALESIONAL; INTRAMUSCULAR; INTRAVENOUS; SOFT TISSUE at 21:42

## 2019-07-16 RX ADMIN — METHOCARBAMOL TABLETS 750 MG: 750 TABLET, COATED ORAL at 01:54

## 2019-07-16 ASSESSMENT — ENCOUNTER SYMPTOMS
HEARTBURN: 0
DEPRESSION: 0
TINGLING: 0
NECK PAIN: 0
HEMOPTYSIS: 0
SINUS PAIN: 0
NAUSEA: 0
VOMITING: 0
MYALGIAS: 0
BRUISES/BLEEDS EASILY: 0
HEADACHES: 0
COUGH: 0
DIZZINESS: 0
BLURRED VISION: 0
FEVER: 0
PALPITATIONS: 0

## 2019-07-16 ASSESSMENT — COPD QUESTIONNAIRES
COPD SCREENING SCORE: 4
DO YOU EVER COUGH UP ANY MUCUS OR PHLEGM?: NO/ONLY WITH OCCASIONAL COLDS OR INFECTIONS
HAVE YOU SMOKED AT LEAST 100 CIGARETTES IN YOUR ENTIRE LIFE: YES
DURING THE PAST 4 WEEKS HOW MUCH DID YOU FEEL SHORT OF BREATH: NONE/LITTLE OF THE TIME

## 2019-07-16 ASSESSMENT — COGNITIVE AND FUNCTIONAL STATUS - GENERAL
TURNING FROM BACK TO SIDE WHILE IN FLAT BAD: A LITTLE
MOVING FROM LYING ON BACK TO SITTING ON SIDE OF FLAT BED: A LOT
DAILY ACTIVITIY SCORE: 19
STANDING UP FROM CHAIR USING ARMS: A LOT
DRESSING REGULAR UPPER BODY CLOTHING: A LITTLE
SUGGESTED CMS G CODE MODIFIER MOBILITY: CL
CLIMB 3 TO 5 STEPS WITH RAILING: TOTAL
DRESSING REGULAR LOWER BODY CLOTHING: A LITTLE
TOILETING: A LOT
SUGGESTED CMS G CODE MODIFIER DAILY ACTIVITY: CK
MOVING TO AND FROM BED TO CHAIR: A LITTLE
HELP NEEDED FOR BATHING: A LITTLE
MOBILITY SCORE: 13
WALKING IN HOSPITAL ROOM: A LOT

## 2019-07-16 ASSESSMENT — LIFESTYLE VARIABLES
ALCOHOL_USE: YES
TOTAL SCORE: 0
HOW MANY TIMES IN THE PAST YEAR HAVE YOU HAD 5 OR MORE DRINKS IN A DAY: 0
ON A TYPICAL DAY WHEN YOU DRINK ALCOHOL HOW MANY DRINKS DO YOU HAVE: 2
HAVE YOU EVER FELT YOU SHOULD CUT DOWN ON YOUR DRINKING: NO
EVER_SMOKED: YES
HAVE PEOPLE ANNOYED YOU BY CRITICIZING YOUR DRINKING: NO
TOTAL SCORE: 0
EVER HAD A DRINK FIRST THING IN THE MORNING TO STEADY YOUR NERVES TO GET RID OF A HANGOVER: NO
CONSUMPTION TOTAL: NEGATIVE
AVERAGE NUMBER OF DAYS PER WEEK YOU HAVE A DRINK CONTAINING ALCOHOL: 1
EVER FELT BAD OR GUILTY ABOUT YOUR DRINKING: NO
TOTAL SCORE: 0

## 2019-07-16 NOTE — THERAPY
Occupational Therapy Contact Note    OT order received. Pt with new T5 mass, awaiting neurosurgery consultation. Will hold until POC has been established.    Sarah Beth Parsons, OTR/L

## 2019-07-16 NOTE — ED TRIAGE NOTES
Chief Complaint   Patient presents with   • Sent by MD     unable to walk x1wk worsening, found t5 mass     Pt wheeled to triage sent by [facility] for further eval. Symptom started 3wks ago with back pain, progressed to unable to ambulate. He had CT showing t5 mass affecting spinal cord . Sensation intact however unable to standup with out falling. He had GLF couple days ago.

## 2019-07-16 NOTE — ED NOTES
Med rec updated and complete. Allergies reviewed. Pt denies  Oral antibiotic use in last 14 days at home. All morning doses   Taken.  Home pharmacy [facility]

## 2019-07-16 NOTE — OR NURSING
1344 Pt arrived from MRI, rec'd report from . [facility]. Oral airway in place, no s/s of resp distress. Pt sleepy.   1350 Oral airway d/c'd by . FSBS 131,  aware. Pt awake, denies pain.   1400 Pt tolerating sips of water. Pt moved to Phase 2.   1415 Pt eating popsicle without difficulty.   1430 Report to MAURIZIO Barajas. Waiting for transport.   1500 Continuing to wait for transport. Pt denies needs or pain at this time.   1505 Transport here. Pt taken via gurney. No belongings. Pt appeared stable.

## 2019-07-16 NOTE — PROGRESS NOTES
Neurosurgery Progress Note    Subjective:  This is a 69-year-old male who was seen in the office of [facility] in  for complaints of back pain with bilateral lower extremity dysfunction.  Imaging at that time did reveal a mass at the level of T5.  He was to obtain MRI with contrast.  That had not yet been done.  He has now noted rapid change in his ability to ambulate.  He states his legs give way from underneath him.  He has had multiple falls.  Due to this, he was seen in the emergency department yesterday.  MRI with contrast is pending.  He denies upper extremity symptoms.    Exam:  The thoracic spine is nontender.  There is no step-off or deformity.  Motor is 5/5 to the bilateral upper extremities.  There is no Zhou's.  Straight leg raising is negative.  Motor is intact to manual muscle strength testing.  Sensation is intact.  Positional sense is intact.  There is no clonus.    BP  Min: 125/72  Max: 180/84  Pulse  Av.6  Min: 74  Max: 93  Resp  Av.4  Min: 16  Max: 18  Temp  Av.9 °C (98.4 °F)  Min: 36.8 °C (98.2 °F)  Max: 37 °C (98.6 °F)  SpO2  Av.1 %  Min: 95 %  Max: 99 %    No Data Recorded    Recent Labs      07/15/19   2115   WBC  8.9   RBC  3.42*   HEMOGLOBIN  11.1*   HEMATOCRIT  32.6*   MCV  95.3   MCH  32.5   MCHC  34.0   RDW  44.1   PLATELETCT  196   MPV  9.7     Recent Labs      07/15/19   2115   SODIUM  132*   POTASSIUM  4.4   CHLORIDE  100   CO2  18*   GLUCOSE  113*   BUN  25*   CREATININE  1.08   CALCIUM  9.7               Intake/Output       07/15/19 07 - 19 0659 19 07 - 19 0659       Total  Total       Intake    Total Intake -- -- -- -- -- --       Output    Urine  --  350 350  --  -- --    Number of Times Voided -- 1 x 1 x -- -- --    Urine Void (mL) -- 350 350 -- -- --    Total Output -- 350 350 -- -- --       Net I/O     -- -350 -350 -- -- --            Intake/Output Summary (Last 24 hours) at 19  0738  Last data filed at 07/15/19 2101   Gross per 24 hour   Intake                0 ml   Output              350 ml   Net             -350 ml            • senna-docusate  2 Tab BID    And   • polyethylene glycol/lytes  1 Packet QDAY PRN    And   • magnesium hydroxide  30 mL QDAY PRN    And   • bisacodyl  10 mg QDAY PRN   • Respiratory Care per Protocol   Continuous RT   • NS   Continuous   • acetaminophen  650 mg Q6HRS PRN   • Pharmacy Consult Request  1 Each PHARMACY TO DOSE    And   • oxyCODONE immediate-release  5 mg Q3HRS PRN    And   • oxyCODONE immediate-release  10 mg Q3HRS PRN    And   • morphine injection  4 mg Q3HRS PRN   • ondansetron  4 mg Q4HRS PRN   • ondansetron  4 mg Q4HRS PRN   • amLODIPine  10 mg DAILY   • atorvastatin  20 mg Nightly   • lisinopril  40 mg DAILY   • loratadine  10 mg DAILY   • methocarbamol  750 mg BID   • omeprazole  20 mg DAILY   • prazosin  5 mg Nightly   • SITagliptin  100 mg DAILY   • spironolactone  25 mg DAILY   • terazosin  5 mg BID   • verapamil ER  180 mg BID   • glipiZIDE  5 mg QAM AC    And   • glipiZIDE  2.5 mg QHS       Assessment and Plan:  Hospital day #2  T5 mass noted on CT scan.  MRI with and without contrast of the thoracic spine is pending.  We will follow.

## 2019-07-16 NOTE — DISCHARGE PLANNING
Anticipated Discharge Disposition:   home    Action:   Met with pt. He lives with wife. He has a FWW at home.   He has no dc concerns at this point.     Assessment completed.     Barriers to Discharge:   Medical clearance-MRI/CT pending.     Plan:   RN kindly notify CM if there are any discharge concerns.

## 2019-07-16 NOTE — CARE PLAN
Problem: Safety  Goal: Will remain free from injury  Outcome: PROGRESSING AS EXPECTED  Fall risk assessed using Acosta-Conor. Fall precautions in place. Pt calls appropriately.     Problem: Infection  Goal: Will remain free from infection  Outcome: PROGRESSING AS EXPECTED  VSS, no s/s of infection. Hand hygiene completed before and after pt care.

## 2019-07-16 NOTE — PROGRESS NOTES
[facility] Medicine Daily Progress Note    Date of Service  7/16/2019    Chief Complaint  69 y.o. male admitted 7/15/2019 with back pain with a T 5 mass      Interval Problem Update  Patient is resting in bed, continue having back pain, bilateral leg weakness but only with standing up for ambulation, no numbness no tingling, no bowel or urinary problems, he is alert oriented follows commands he is able to go to give good history.  There is concern for possible spine compression, MRI report still pending     Consultants/Specialty  Neurosurgery    Code Status  Full code    Disposition  To be determined    Review of Systems  Review of Systems   Constitutional: Negative for fever.   HENT: Negative for congestion and sinus pain.    Eyes: Negative for blurred vision.   Respiratory: Negative for cough and hemoptysis.    Cardiovascular: Negative for chest pain and palpitations.   Gastrointestinal: Negative for heartburn, nausea and vomiting.   Genitourinary: Negative for dysuria, frequency and urgency.   Musculoskeletal: Negative for myalgias and neck pain.   Skin: Negative for itching.   Neurological: Negative for dizziness, tingling and headaches.   Endo/Heme/Allergies: Does not bruise/bleed easily.   Psychiatric/Behavioral: Negative for depression.        Physical Exam  Temp:  [36.7 °C (98 °F)-37.3 °C (99.1 °F)] 36.7 °C (98 °F)  Pulse:  [70-93] 77  Resp:  [12-18] 16  BP: (125-180)/(72-85) 158/73  SpO2:  [95 %-99 %] 99 %    Physical Exam   Constitutional: He is oriented to person, place, and time. He appears well-nourished. No distress.   HENT:   Head: Normocephalic and atraumatic.   Mouth/Throat: No oropharyngeal exudate.   Eyes: Conjunctivae are normal. Right eye exhibits no discharge. Left eye exhibits no discharge. No scleral icterus.   Neck: Normal range of motion. Neck supple. No JVD present.   Cardiovascular: Regular rhythm and normal heart sounds.    Pulmonary/Chest: Effort normal and breath sounds normal. No  stridor. No respiratory distress. He has no wheezes. He has no rales.   Abdominal: Soft. Bowel sounds are normal. He exhibits no distension. There is no tenderness. There is no rebound.   Musculoskeletal: Normal range of motion. He exhibits no edema.   Lymphadenopathy:     He has no cervical adenopathy.   Neurological: He is alert and oriented to person, place, and time. He exhibits normal muscle tone.   Skin: Skin is dry. No erythema.   Psychiatric: He has a normal mood and affect.   Nursing note and vitals reviewed.      Fluids    Intake/Output Summary (Last 24 hours) at 07/16/19 1418  Last data filed at 07/16/19 0954   Gross per 24 hour   Intake                0 ml   Output             1525 ml   Net            -1525 ml       Laboratory  Recent Labs      07/15/19   2115   WBC  8.9   RBC  3.42*   HEMOGLOBIN  11.1*   HEMATOCRIT  32.6*   MCV  95.3   MCH  32.5   MCHC  34.0   RDW  44.1   PLATELETCT  196   MPV  9.7     Recent Labs      07/15/19   2115   SODIUM  132*   POTASSIUM  4.4   CHLORIDE  100   CO2  18*   GLUCOSE  113*   BUN  25*   CREATININE  1.08   CALCIUM  9.7                   Imaging  CT-CHEST,ABDOMEN,PELVIS WITH   Final Result      Small bilateral pulmonary nodules. Follow-up CT chest is recommended.      Large lobulated mass replacing the posterior elements of T5 with significant spinal canal stenosis.      Atherosclerotic plaque, including coronary artery calcification.      Mildly enlarged subcarinal, cardiophrenic and borderline retroperitoneal and retrocrural lymph nodes are nonspecific.      Enlarged left adrenal gland with a 1.8 cm nodule which is indeterminate and could be metastatic. PET/CT would be beneficial for further evaluation.      Moderate amount of colonic stool. Colonic diverticulosis.      Distended bladder.                  CT-TSPINE W/O PLUS RECONS   Final Result         1.  Lytic expansile lesion in the pedicles and posterior elements, appearance most compatible with metastatic or  primary bone disease. Question possible compromise of the cord. Could be further evaluated with MRI without and with contrast.   2.  Atherosclerotic calcifications are noted.      These findings were discussed with the patient's clinician, SUSAN BARLOW, on 7/15/2019 10:29 PM.      DX-CHEST-2 VIEWS   Final Result         1.  Right infrahilar density suggests infiltrate.      MR-THORACIC SPINE-WITH    (Results Pending)        Assessment/Plan  Mass of thoracic vertebra- (present on admission)   Assessment & Plan    Neurosurgery has been consulted  MRI T-spine today, continue pain management, neurological exam is intact.     Obesity- (present on admission)   Assessment & Plan    Body mass index is 31.57 kg/m².       Dyslipidemia- (present on admission)   Assessment & Plan    Continue Lipitor     Essential hypertension- (present on admission)   Assessment & Plan    Continue verapamil, Hytrin, Aldactone and prazosin     Type 2 diabetes mellitus (HCC)- (present on admission)   Assessment & Plan    Continue glipizide and Januvia  Will start Accu-Cheks every 6 hours if blood sugar is elevated will add sliding scale insulin.              VTE prophylaxis: SCDs    Discussed case with Dr Marroquin regarding MRI findings recommending iv decadron 4 mg iv q6hrs, neurosurgery will re-eval in am.  No surgical intervention at this time.  neurochecks every 4hrs.

## 2019-07-16 NOTE — ANESTHESIA PREPROCEDURE EVALUATION
Relevant Problems   (+) Dyslipidemia   (+) Essential hypertension   (+) Mass of thoracic vertebra   (+) Obesity   (+) Type 2 diabetes mellitus (HCC)       Physical Exam    Airway   Mallampati: II  TM distance: >3 FB  Neck ROM: full       Cardiovascular - normal exam  Rhythm: regular  Rate: normal  (-) murmur     Dental - normal exam         Pulmonary - normal exam  Breath sounds clear to auscultation     Abdominal    Neurological - normal exam                 Anesthesia Plan    ASA 2       Plan - general       Airway plan will be LMA        Induction: intravenous    Postoperative Plan: Postoperative administration of opioids is intended.    Pertinent diagnostic labs and testing reviewed    Informed Consent:    Anesthetic plan and risks discussed with patient.    Use of blood products discussed with: patient whom consented to blood products.

## 2019-07-16 NOTE — ED NOTES
Patient able to stand while bracing himself. Patient reports that he feels weak in the legs and reports that he cannot ambulate safely. Patient assisted to bed using a turn and pivot. Patient denies any incontinence issues.

## 2019-07-16 NOTE — PROGRESS NOTES
0125 Pt transferred to unit. Pt A&Ox4. Numbness and tingling to bilateral feet (new today per pt). Pain 4/10, declines medication at this time. Bed alarm on. Bed locked and in lowest position. Treaded socks in place. Pt educated to call for assistance. Call light within reach. SCDs on.

## 2019-07-16 NOTE — ASSESSMENT & PLAN NOTE
POD #6  Path results still pending - good candidate for acute rehab but they recommend path results review prior to transfer  Continue PT/ OT   Continue supportive care

## 2019-07-16 NOTE — ED PROVIDER NOTES
"ED Provider Note    Scribed for Amado Doyle M.D. by Maximilian Conley. 7/15/2019, 9:04 PM.    Primary care provider: Mario Latham M.D.  Means of arrival: Walk In  History obtained from: Patient  History limited by: None    CHIEF COMPLAINT  Chief Complaint   Patient presents with   • Sent by MD     unable to walk x1wk worsening, found t5 mass       HPI  Ian Laird is a 69 y.o. male who presents to the Emergency Department for evaluation of constant back pain onset 6 weeks ago and worsening in severity since then. He reports that his pain radiates into his bilateral shoulder blades. The patient states that recently he went to the ED in [place] for his pain where they did X rays and a CT and thought he might have arthritis in his back. In late June he went to [facility] and had an MRI done which showed a mass around T5. The patient also endorses experiencing associated bilateral leg weakness and numbness which he describes as his legs being \"limp noodles\", causing him to fall occasionally. He has not had any previous back surgeries. He states that he was a regular smoker but quit 11 years ago. He denies experiencing recent weight loss, chest pain, shortness of breath, arm numbness, and incontinence.     REVIEW OF SYSTEMS  Pertinent positives include back pain, leg numbness, and leg weakness. Pertinent negatives include no recent weight loss, chest pain, shortness of breath, arm numbness, and incontinence. All other systems negative.    PAST MEDICAL HISTORY   has a past medical history of Cancer (HCC); Diabetes; Dyslipidemia; and Hypertension.    SURGICAL HISTORY   has a past surgical history that includes other surgical procedure.    SOCIAL HISTORY  Social History   Substance Use Topics   • Smoking status: Former Smoker   • Smokeless tobacco: Never Used   • Alcohol use Yes      History   Drug Use No       FAMILY HISTORY  Family History   Problem Relation Age of Onset   • Cancer Mother    • " "Cancer Father        CURRENT MEDICATIONS  Home Medications     Reviewed by Lisa Pinedo (Pharmacy Tech) on 07/15/19 at 2314  Med List Status: Complete   Medication Last Dose Status   amlodipine (NORVASC) 10 MG TABS 7/15/2019 Active   atorvastatin (LIPITOR) 20 MG TABS 7/14/2019 Active   Cholecalciferol (VITAMIN D-3) 5000 UNITS TABS 7/15/2019 Active   clopidogrel (PLAVIX) 75 MG Tab 7/15/2019 Active   diclofenac EC (VOLTAREN) 75 MG Tablet Delayed Response 7/15/2019 Active   glipiZIDE (GLUCOTROL) 5 MG TABS 7/15/2019 Active   lisinopril (PRINIVIL, ZESTRIL) 40 MG tablet 7/15/2019 Active   loratadine (CLARITIN) 10 MG TABS 7/15/2019 Active   metformin (GLUCOPHAGE) 1000 MG tablet 7/15/2019 Active   methocarbamol (ROBAXIN) 750 MG Tab 7/15/2019 Active   omeprazole (PRILOSEC) 20 MG delayed-release capsule 7/15/2019 Active   prazosin (MINIPRESS) 5 MG CAPS  Active   sitagliptin (JANUVIA) 100 MG TABS 7/15/2019 Active   spironolactone (ALDACTONE) 25 MG Tab 7/15/2019 Active   terazosin (HYTRIN) 5 MG CAPS 7/15/2019 Active   tramadol (ULTRAM) 50 MG Tab 7/15/2019 Active   verapamil ER (CALAN-SR) 180 MG Tab CR 7/15/2019 Active                ALLERGIES  No Known Allergies    PHYSICAL EXAM  VITAL SIGNS: BP (!) 165/85   Pulse 84   Temp 37 °C (98.6 °F) (Temporal)   Resp 18   Ht 1.778 m (5' 10\")   Wt 99.8 kg (220 lb)   SpO2 99%   BMI 31.57 kg/m²     Constitutional: Well developed, Well nourished, no acute distress.   HENT: Normocephalic, Atraumatic, Oropharynx moist.   Eyes: Conjunctiva normal, No discharge.   Neck: Supple, No stridor, no midline tenderness.    Cardiovascular: Normal heart rate, Normal rhythm, No murmurs, equal pulses.   Pulmonary: Normal breath sounds, No respiratory distress, No wheezing, No rales, No rhonchi.  Chest: No chest wall tenderness or deformity.   Abdomen:Soft, Obese, No tenderness, No masses, no rebound, no guarding.   Back: No CVA tenderness. No bony tenderness throughout.   Musculoskeletal: No " major deformities noted, No tenderness.   Skin: Warm, Dry, No erythema, No rash.   Neurologic: Alert & oriented x 3, Normal motor function,  No focal deficits noted. 5/5 strength in upper and lower extremities. Subjective decreased sensation to the left leg which he states is chronic for the last 4 years.   Psychiatric: Affect normal, Judgment normal, Mood normal.     LABS  Results for orders placed or performed during the hospital encounter of 07/15/19   CBC WITH DIFFERENTIAL   Result Value Ref Range    WBC 8.9 4.8 - 10.8 K/uL    RBC 3.42 (L) 4.70 - 6.10 M/uL    Hemoglobin 11.1 (L) 14.0 - 18.0 g/dL    Hematocrit 32.6 (L) 42.0 - 52.0 %    MCV 95.3 81.4 - 97.8 fL    MCH 32.5 27.0 - 33.0 pg    MCHC 34.0 33.7 - 35.3 g/dL    RDW 44.1 35.9 - 50.0 fL    Platelet Count 196 164 - 446 K/uL    MPV 9.7 9.0 - 12.9 fL    Neutrophils-Polys 64.40 44.00 - 72.00 %    Lymphocytes 22.10 22.00 - 41.00 %    Monocytes 8.30 0.00 - 13.40 %    Eosinophils 4.10 0.00 - 6.90 %    Basophils 0.50 0.00 - 1.80 %    Immature Granulocytes 0.60 0.00 - 0.90 %    Nucleated RBC 0.00 /100 WBC    Neutrophils (Absolute) 5.73 1.82 - 7.42 K/uL    Lymphs (Absolute) 1.96 1.00 - 4.80 K/uL    Monos (Absolute) 0.74 0.00 - 0.85 K/uL    Eos (Absolute) 0.36 0.00 - 0.51 K/uL    Baso (Absolute) 0.04 0.00 - 0.12 K/uL    Immature Granulocytes (abs) 0.05 0.00 - 0.11 K/uL    NRBC (Absolute) 0.00 K/uL   COMP METABOLIC PANEL   Result Value Ref Range    Sodium 132 (L) 135 - 145 mmol/L    Potassium 4.4 3.6 - 5.5 mmol/L    Chloride 100 96 - 112 mmol/L    Co2 18 (L) 20 - 33 mmol/L    Anion Gap 14.0 (H) 0.0 - 11.9    Glucose 113 (H) 65 - 99 mg/dL    Bun 25 (H) 8 - 22 mg/dL    Creatinine 1.08 0.50 - 1.40 mg/dL    Calcium 9.7 8.5 - 10.5 mg/dL    AST(SGOT) 20 12 - 45 U/L    ALT(SGPT) 28 2 - 50 U/L    Alkaline Phosphatase 50 30 - 99 U/L    Total Bilirubin 0.5 0.1 - 1.5 mg/dL    Albumin 4.7 3.2 - 4.9 g/dL    Total Protein 7.9 6.0 - 8.2 g/dL    Globulin 3.2 1.9 - 3.5 g/dL    A-G Ratio  "1.5 g/dL   ESTIMATED GFR   Result Value Ref Range    GFR If African American >60 >60 mL/min/1.73 m 2    GFR If Non African American >60 >60 mL/min/1.73 m 2       All labs reviewed by me.     RADIOLOGY  CT-TSPINE W/O PLUS RECONS   Final Result         1.  Lytic expansile lesion in the pedicles and posterior elements, appearance most compatible with metastatic or primary bone disease. Question possible compromise of the cord. Could be further evaluated with MRI without and with contrast.   2.  Atherosclerotic calcifications are noted.      These findings were discussed with the patient's clinician, SUSAN BARLOW, on 7/15/2019 10:29 PM.      DX-CHEST-2 VIEWS   Final Result         1.  Right infrahilar density suggests infiltrate.      CT-CHEST,ABDOMEN,PELVIS WITH    (Results Pending)   MR-THORACIC SPINE-WITH    (Results Pending)     The radiologist's interpretation of all radiological studies have been reviewed by me.    COURSE & MEDICAL DECISION MAKING  Pertinent Labs & Imaging studies reviewed. (See chart for details)    9:04 PM - Patient seen and examined at bedside. Patient will be treated with Norco. Ordered CT-TSpine, Dx-Chest, MR-Thoracic, CBC, and CMP to evaluate his symptoms. The differential diagnoses include but are not limited to: Thoracic mass, possible metastatic disease.    9:06 PM I obtained and reviewed the patient's previous MRI results which indicated that he has an \"expansile STIR hyperintense mass in the posterior elements T5 with posterior epidural extension severely narrows the spinal canal at the T5 level with mass effect on the cord and resulting moderate bilateral neural foraminal narrowing at T5-T6. No other osseous lesion is seen.     9:11 PM Consult with Dr. Early (Neurosurgery) who recommended getting an MRI with contrast and a CT with and without contrast of the T spine. He also wanted a hospitalist admit.     9:14 PM I updated the patient on the plan of care including hospital " admission and scans. He understood and agreed to the plan of care.     10:15 PM Consult with Dr. Schuster (Hospitalist) who agreed to admit the patient.     Medical Decision Making: At this point time patient appears to have a expansile mass at the T4 vertebrae.  Patient strength seems to be equal bilaterally.  He has not had any loss of bowel or bladder function I do not think he has cauda equina.  Patient will be admitted for further evaluation and neurosurgical consult.    DISPOSITION:  Patient will be admitted to Dr. Schuster (Hospitalist) in guarded condition.     FINAL IMPRESSION  1. Mass of thoracic vertebra    2. Weakness          Maximilian DE LA ROSA (Scribe), am scribing for, and in the presence of, Amado Doyle M.D.    Electronically signed by: Maximilian Conley (Scribe), 7/15/2019    Amado DE LA ROSA M.D. personally performed the services described in this documentation, as scribed by Maximilian Conley in my presence, and it is both accurate and complete. C.     The note accurately reflects work and decisions made by me.  Amado Doyle  7/16/2019  5:00 AM

## 2019-07-16 NOTE — PROGRESS NOTES
"2 RN skin check complete.   Devices in place: SCD, and PIV.  Skin assessed under devices intact.  No confirmed pressure ulcers found. No signs of skin breakdown  Scabbed abrasions to bilateral knees, pt stated from fall. Scab to heel of left foot. No bleeding, will continue to monitor.  Scattered scabs and bruising. Scab to top of pts head, pt states \"squamous cell to be removed by dermatologist\"    Pillows in place for support and position. Pt turns independently.  "

## 2019-07-16 NOTE — DISCHARGE PLANNING
Care Transition Team Assessment    Information Source  Orientation : Oriented x 4  Who is responsible for making decisions for patient? : Patient         Elopement Risk  Legal Hold: No  Ambulatory or Self Mobile in Wheelchair: Yes  Disoriented: No  Psychiatric Symptoms: None  History of Wandering: No  Elopement this Admit: No  Vocalizing Wanting to Leave: No  Displays Behaviors, Body Language Wanting to Leave: No-Not at Risk for Elopement  Elopement Risk: Not at Risk for Elopement    Interdisciplinary Discharge Planning  Does Admitting Nurse Feel This Could be a Complex Discharge?: No  Primary Care Physician: Dr. Latham  Lives with - Patient's Self Care Capacity: Spouse  Patient or legal guardian wants to designate a caregiver (see row info): No  Support Systems: Spouse / Significant Other  Housing / Facility: [address]  Do You Take your Prescribed Medications Regularly: Yes  Able to Return to Previous ADL's: Yes  Mobility Issues: Yes  Prior Services: None  Patient Expects to be Discharged to:: Home  Assistance Needed: No  Durable Medical Equipment: Walker    Discharge Preparedness  What is your plan after discharge?: Home with help  What are your discharge supports?: Spouse  Prior Functional Level: Ambulatory, Drives Self, Independent with Activities of Daily Living, Independent with Medication Management  Difficulity with ADLs: Walking  Difficulity with IADLs: Driving    Functional Assesment  Prior Functional Level: Ambulatory, Drives Self, Independent with Activities of Daily Living, Independent with Medication Management    Finances  Financial Barriers to Discharge: No  Prescription Coverage: Yes    Vision / Hearing Impairment  Right Eye Vision: Impaired, Wears Glasses  Left Eye Vision: Impaired, Wears Glasses              Domestic Abuse  Have you ever been the victim of abuse or violence?: No    Psychological Assessment  History of Substance Abuse: None    Discharge Risks or Barriers  Discharge risks or  barriers?: No    Anticipated Discharge Information  Anticipated discharge disposition: Home

## 2019-07-16 NOTE — H&P
Hospital Medicine History & Physical Note    Date of Service  7/15/2019    Primary Care Physician  Mario Latham M.D.    Consultants  Neurosurgery    Code Status  Full code    Chief Complaint  Back pain    History of Presenting Illness  69 y.o. male with a past medical history of type 2 diabetes mellitus, dyslipidemia, hypertension, melanoma who presented 7/15/2019 with worsening back pain for the past 6 weeks.  The patient reports progressively worsening mid back pain.  He also developed symptoms of bilateral lower extremity weakness with numbness.  He was evaluated by [facility] and underwent an MRI thoracic spine that revealed a T5 mass with posterior epidural extension severely narrowing the spinal canal at T5 level with mass-effect on the cord and resulting moderate bilateral neuroforaminal narrowing at T5-T6.  He denies any fevers, chills, chest pain, shortness of breath, bowel or bladder incontinence.  He reports a history of melanoma and underwent resection x2 with clear margins.  His father has a history of prostate cancer.  He takes Plavix daily.  He is an ex-smoker and quit 20 years ago.  He denies any unintentional weight loss or night sweats.    Chest x-ray interpreted by me reveals right infrahilar density    Review of Systems  Review of Systems   Constitutional: Negative for chills, diaphoresis and fever.   HENT: Negative for hearing loss and sore throat.    Eyes: Negative for blurred vision.   Respiratory: Negative for cough, sputum production, shortness of breath and wheezing.    Cardiovascular: Negative for chest pain, palpitations and leg swelling.   Gastrointestinal: Negative for abdominal pain, blood in stool, diarrhea, nausea and vomiting.   Genitourinary: Negative for dysuria, flank pain and urgency.   Musculoskeletal: Positive for back pain. Negative for joint pain, myalgias and neck pain.   Skin: Negative for rash.   Neurological: Positive for sensory change and focal weakness. Negative for  dizziness, seizures and headaches.   Endo/Heme/Allergies: Does not bruise/bleed easily.   Psychiatric/Behavioral: Negative for suicidal ideas.   All other systems reviewed and are negative.      Past Medical History   has a past medical history of Cancer (HCC); Diabetes; Dyslipidemia; and Hypertension.    Surgical History   has a past surgical history that includes other surgical procedure.     Family History  family history includes Cancer in his father and mother.     Social History   reports that he has quit smoking. He has never used smokeless tobacco. He reports that he drinks alcohol. He reports that he does not use drugs.    Allergies  No Known Allergies    Medications  Prior to Admission Medications   Prescriptions Last Dose Informant Patient Reported? Taking?   Cholecalciferol (VITAMIN D-3) 5000 UNITS TABS 7/15/2019 at 0800  Yes No   Sig: Take 5,000 Units by mouth every day.   amlodipine (NORVASC) 10 MG TABS 7/15/2019 at 0800  Yes No   Sig: Take 10 mg by mouth every day.   atorvastatin (LIPITOR) 20 MG TABS 7/14/2019 at 2100  Yes No   Sig: Take 20 mg by mouth every evening.   clopidogrel (PLAVIX) 75 MG Tab 7/15/2019 at 0800  Yes Yes   Sig: Take 75 mg by mouth every day.   diclofenac EC (VOLTAREN) 75 MG Tablet Delayed Response 7/15/2019 at 0800  Yes Yes   Sig: Take 75 mg by mouth 2 times a day as needed.   glipiZIDE (GLUCOTROL) 5 MG TABS 7/15/2019 at 0800  Yes No   Sig: Take 2.5-5 mg by mouth 2 times a day. 5 mg with breakfast  2.5 mg with dinner   lisinopril (PRINIVIL, ZESTRIL) 40 MG tablet 7/15/2019 at 0800  Yes Yes   Sig: Take 40 mg by mouth every day.   loratadine (CLARITIN) 10 MG TABS 7/15/2019 at 0800  Yes No   Sig: Take 10 mg by mouth every day.   metformin (GLUCOPHAGE) 1000 MG tablet 7/15/2019 at 0900  Yes No   Sig: Take 500-1,000 mg by mouth 2 times a day, with meals. 1000 mg with breakfast    500 mg with dinner   methocarbamol (ROBAXIN) 750 MG Tab 7/15/2019 at 0800  Yes Yes   Sig: Take 750 mg by  mouth 2 Times a Day.   omeprazole (PRILOSEC) 20 MG delayed-release capsule 7/15/2019 at 0800  Yes Yes   Sig: Take 20 mg by mouth every day.   prazosin (MINIPRESS) 5 MG CAPS   Yes No   Sig: Take 5 mg by mouth every evening.   sitagliptin (JANUVIA) 100 MG TABS 7/15/2019 at 0800  Yes No   Sig: Take 100 mg by mouth every day.   spironolactone (ALDACTONE) 25 MG Tab 7/15/2019 at 0800  Yes Yes   Sig: Take 25 mg by mouth every day.   terazosin (HYTRIN) 5 MG CAPS 7/15/2019 at 0800  Yes No   Sig: Take 5 mg by mouth 2 times a day.   tramadol (ULTRAM) 50 MG Tab 7/15/2019 at 0800  Yes Yes   Sig: Take 50 mg by mouth every four hours as needed for Mild Pain.   verapamil ER (CALAN-SR) 180 MG Tab CR 7/15/2019 at 0800  Yes Yes   Sig: Take 180 mg by mouth 2 Times a Day.      Facility-Administered Medications: None       Physical Exam  Temp:  [36.8 °C (98.2 °F)-37 °C (98.6 °F)] 37 °C (98.6 °F)  Pulse:  [79-86] 86  Resp:  [16-18] 18  BP: (156-165)/(73-85) 165/85  SpO2:  [95 %-99 %] 95 %    Physical Exam   Constitutional: He is oriented to person, place, and time. He appears well-developed and well-nourished. No distress.   HENT:   Head: Normocephalic and atraumatic.   Mouth/Throat: Oropharynx is clear and moist.   Eyes: Pupils are equal, round, and reactive to light. Conjunctivae are normal. No scleral icterus.   Neck: Normal range of motion. Neck supple.   Cardiovascular: Normal rate, regular rhythm and normal heart sounds.    Pulmonary/Chest: Effort normal and breath sounds normal. No respiratory distress. He has no wheezes. He has no rales.   Abdominal: Soft. Bowel sounds are normal. He exhibits no distension. There is no tenderness. There is no rebound.   Musculoskeletal: Normal range of motion. He exhibits no edema or tenderness.   Lymphadenopathy:     He has no cervical adenopathy.   Neurological: He is alert and oriented to person, place, and time. No cranial nerve deficit. Coordination normal.   Strength and sensation intact  bilaterally   Skin: Skin is warm. No rash noted.   Psychiatric: He has a normal mood and affect. His behavior is normal.   Nursing note and vitals reviewed.      Laboratory:  Recent Labs      07/15/19   2115   WBC  8.9   RBC  3.42*   HEMOGLOBIN  11.1*   HEMATOCRIT  32.6*   MCV  95.3   MCH  32.5   MCHC  34.0   RDW  44.1   PLATELETCT  196   MPV  9.7     Recent Labs      07/15/19   2115   SODIUM  132*   POTASSIUM  4.4   CHLORIDE  100   CO2  18*   GLUCOSE  113*   BUN  25*   CREATININE  1.08   CALCIUM  9.7     Recent Labs      07/15/19   2115   ALTSGPT  28   ASTSGOT  20   ALKPHOSPHAT  50   TBILIRUBIN  0.5   GLUCOSE  113*                 No results for input(s): TROPONINI in the last 72 hours.    Urinalysis:    No results found     Imaging:  DX-CHEST-2 VIEWS   Final Result         1.  Right infrahilar density suggests infiltrate.      MR-THORACIC SPINE-WITH    (Results Pending)   CT-TSPINE W/O PLUS RECONS    (Results Pending)   CT-CHEST,ABDOMEN,PELVIS WITH    (Results Pending)         Assessment/Plan:  I anticipate this patient will require at least two midnights for appropriate medical management, necessitating inpatient admission.    Mass of thoracic vertebra- (present on admission)   Assessment & Plan    Neurosurgery has been consulted  MRI thoracic spine with IV contrast and CT thoracic spine with and without IV contrast has been ordered  CT chest, abdomen and pelvis with IV contrast to evaluate for other lesions  Pain control with Tylenol, oxycodone and IV morphine for breakthrough.  Monitor respiratory status closely  PT OT     Obesity- (present on admission)   Assessment & Plan    Body mass index is 31.57 kg/m².       Dyslipidemia- (present on admission)   Assessment & Plan    Continue Lipitor     Essential hypertension- (present on admission)   Assessment & Plan    Continue verapamil, Hytrin, Aldactone and prazosin     Type 2 diabetes mellitus (HCC)- (present on admission)   Assessment & Plan    Continue glipizide  and Januvia  Start on insulin sliding scale with serial Accu-Checks  Hypoglycemic protocol in place             VTE prophylaxis: SCD

## 2019-07-17 PROBLEM — I73.9 PVD (PERIPHERAL VASCULAR DISEASE) (HCC): Status: ACTIVE | Noted: 2019-07-17

## 2019-07-17 LAB
ANION GAP SERPL CALC-SCNC: 12 MMOL/L (ref 0–11.9)
BASOPHILS # BLD AUTO: 0.1 % (ref 0–1.8)
BASOPHILS # BLD: 0.01 K/UL (ref 0–0.12)
BUN SERPL-MCNC: 18 MG/DL (ref 8–22)
CALCIUM SERPL-MCNC: 9.6 MG/DL (ref 8.5–10.5)
CHLORIDE SERPL-SCNC: 101 MMOL/L (ref 96–112)
CO2 SERPL-SCNC: 19 MMOL/L (ref 20–33)
CREAT SERPL-MCNC: 1.09 MG/DL (ref 0.5–1.4)
EKG IMPRESSION: NORMAL
EOSINOPHIL # BLD AUTO: 0 K/UL (ref 0–0.51)
EOSINOPHIL NFR BLD: 0 % (ref 0–6.9)
ERYTHROCYTE [DISTWIDTH] IN BLOOD BY AUTOMATED COUNT: 46.5 FL (ref 35.9–50)
EST. AVERAGE GLUCOSE BLD GHB EST-MCNC: 148 MG/DL
GLUCOSE BLD-MCNC: 209 MG/DL (ref 65–99)
GLUCOSE BLD-MCNC: 232 MG/DL (ref 65–99)
GLUCOSE BLD-MCNC: 274 MG/DL (ref 65–99)
GLUCOSE BLD-MCNC: 281 MG/DL (ref 65–99)
GLUCOSE SERPL-MCNC: 322 MG/DL (ref 65–99)
HBA1C MFR BLD: 6.8 % (ref 0–5.6)
HCT VFR BLD AUTO: 33.6 % (ref 42–52)
HGB BLD-MCNC: 11.6 G/DL (ref 14–18)
IMM GRANULOCYTES # BLD AUTO: 0.06 K/UL (ref 0–0.11)
IMM GRANULOCYTES NFR BLD AUTO: 0.6 % (ref 0–0.9)
INR PPP: 1.07 (ref 0.87–1.13)
LYMPHOCYTES # BLD AUTO: 0.71 K/UL (ref 1–4.8)
LYMPHOCYTES NFR BLD: 7.1 % (ref 22–41)
MCH RBC QN AUTO: 33.9 PG (ref 27–33)
MCHC RBC AUTO-ENTMCNC: 34.5 G/DL (ref 33.7–35.3)
MCV RBC AUTO: 98.2 FL (ref 81.4–97.8)
MONOCYTES # BLD AUTO: 0.16 K/UL (ref 0–0.85)
MONOCYTES NFR BLD AUTO: 1.6 % (ref 0–13.4)
NEUTROPHILS # BLD AUTO: 9.01 K/UL (ref 1.82–7.42)
NEUTROPHILS NFR BLD: 90.6 % (ref 44–72)
NRBC # BLD AUTO: 0 K/UL
NRBC BLD-RTO: 0 /100 WBC
PLATELET # BLD AUTO: 204 K/UL (ref 164–446)
PMV BLD AUTO: 9.8 FL (ref 9–12.9)
POTASSIUM SERPL-SCNC: 4.5 MMOL/L (ref 3.6–5.5)
PROTHROMBIN TIME: 14.1 SEC (ref 12–14.6)
RBC # BLD AUTO: 3.42 M/UL (ref 4.7–6.1)
SODIUM SERPL-SCNC: 132 MMOL/L (ref 135–145)
TSH SERPL DL<=0.005 MIU/L-ACNC: 1.15 UIU/ML (ref 0.38–5.33)
WBC # BLD AUTO: 10 K/UL (ref 4.8–10.8)

## 2019-07-17 PROCEDURE — 700105 HCHG RX REV CODE 258: Performed by: INTERNAL MEDICINE

## 2019-07-17 PROCEDURE — 700111 HCHG RX REV CODE 636 W/ 250 OVERRIDE (IP): Performed by: HOSPITALIST

## 2019-07-17 PROCEDURE — 82962 GLUCOSE BLOOD TEST: CPT

## 2019-07-17 PROCEDURE — 770001 HCHG ROOM/CARE - MED/SURG/GYN PRIV*

## 2019-07-17 PROCEDURE — 93010 ELECTROCARDIOGRAM REPORT: CPT | Performed by: INTERNAL MEDICINE

## 2019-07-17 PROCEDURE — 83036 HEMOGLOBIN GLYCOSYLATED A1C: CPT

## 2019-07-17 PROCEDURE — 85025 COMPLETE CBC W/AUTO DIFF WBC: CPT

## 2019-07-17 PROCEDURE — 85610 PROTHROMBIN TIME: CPT

## 2019-07-17 PROCEDURE — 36415 COLL VENOUS BLD VENIPUNCTURE: CPT

## 2019-07-17 PROCEDURE — 99232 SBSQ HOSP IP/OBS MODERATE 35: CPT | Performed by: FAMILY MEDICINE

## 2019-07-17 PROCEDURE — 700102 HCHG RX REV CODE 250 W/ 637 OVERRIDE(OP): Performed by: INTERNAL MEDICINE

## 2019-07-17 PROCEDURE — 84443 ASSAY THYROID STIM HORMONE: CPT

## 2019-07-17 PROCEDURE — 80048 BASIC METABOLIC PNL TOTAL CA: CPT

## 2019-07-17 PROCEDURE — 93005 ELECTROCARDIOGRAM TRACING: CPT | Performed by: FAMILY MEDICINE

## 2019-07-17 PROCEDURE — A9270 NON-COVERED ITEM OR SERVICE: HCPCS | Performed by: INTERNAL MEDICINE

## 2019-07-17 RX ORDER — HYDRALAZINE HYDROCHLORIDE 20 MG/ML
10 INJECTION INTRAMUSCULAR; INTRAVENOUS EVERY 6 HOURS PRN
Status: DISCONTINUED | OUTPATIENT
Start: 2019-07-17 | End: 2019-07-25 | Stop reason: HOSPADM

## 2019-07-17 RX ADMIN — INSULIN HUMAN 2 UNITS: 100 INJECTION, SOLUTION PARENTERAL at 07:35

## 2019-07-17 RX ADMIN — VERAPAMIL HYDROCHLORIDE 180 MG: 180 TABLET, FILM COATED, EXTENDED RELEASE ORAL at 05:23

## 2019-07-17 RX ADMIN — SPIRONOLACTONE 25 MG: 25 TABLET ORAL at 05:23

## 2019-07-17 RX ADMIN — DEXAMETHASONE SODIUM PHOSPHATE 4 MG: 4 INJECTION, SOLUTION INTRA-ARTICULAR; INTRALESIONAL; INTRAMUSCULAR; INTRAVENOUS; SOFT TISSUE at 11:34

## 2019-07-17 RX ADMIN — TERAZOSIN HYDROCHLORIDE 5 MG: 5 CAPSULE ORAL at 05:23

## 2019-07-17 RX ADMIN — LORATADINE 10 MG: 10 TABLET ORAL at 05:23

## 2019-07-17 RX ADMIN — GLIPIZIDE 5 MG: 5 TABLET ORAL at 05:22

## 2019-07-17 RX ADMIN — LISINOPRIL 40 MG: 20 TABLET ORAL at 05:22

## 2019-07-17 RX ADMIN — DEXAMETHASONE SODIUM PHOSPHATE 4 MG: 4 INJECTION, SOLUTION INTRA-ARTICULAR; INTRALESIONAL; INTRAMUSCULAR; INTRAVENOUS; SOFT TISSUE at 05:23

## 2019-07-17 RX ADMIN — DEXAMETHASONE SODIUM PHOSPHATE 4 MG: 4 INJECTION, SOLUTION INTRA-ARTICULAR; INTRALESIONAL; INTRAMUSCULAR; INTRAVENOUS; SOFT TISSUE at 23:54

## 2019-07-17 RX ADMIN — ATORVASTATIN CALCIUM 20 MG: 20 TABLET, FILM COATED ORAL at 21:33

## 2019-07-17 RX ADMIN — METHOCARBAMOL TABLETS 750 MG: 750 TABLET, COATED ORAL at 16:40

## 2019-07-17 RX ADMIN — OMEPRAZOLE 20 MG: 20 CAPSULE, DELAYED RELEASE ORAL at 05:22

## 2019-07-17 RX ADMIN — DEXAMETHASONE SODIUM PHOSPHATE 4 MG: 4 INJECTION, SOLUTION INTRA-ARTICULAR; INTRALESIONAL; INTRAMUSCULAR; INTRAVENOUS; SOFT TISSUE at 16:41

## 2019-07-17 RX ADMIN — VERAPAMIL HYDROCHLORIDE 180 MG: 180 TABLET, FILM COATED, EXTENDED RELEASE ORAL at 16:57

## 2019-07-17 RX ADMIN — TERAZOSIN HYDROCHLORIDE 5 MG: 5 CAPSULE ORAL at 16:40

## 2019-07-17 RX ADMIN — SODIUM CHLORIDE: 9 INJECTION, SOLUTION INTRAVENOUS at 05:23

## 2019-07-17 RX ADMIN — OXYCODONE HYDROCHLORIDE 5 MG: 5 TABLET ORAL at 16:41

## 2019-07-17 RX ADMIN — AMLODIPINE BESYLATE 10 MG: 5 TABLET ORAL at 05:22

## 2019-07-17 RX ADMIN — SITAGLIPTIN 100 MG: 100 TABLET, FILM COATED ORAL at 05:22

## 2019-07-17 RX ADMIN — METHOCARBAMOL TABLETS 750 MG: 750 TABLET, COATED ORAL at 05:22

## 2019-07-17 ASSESSMENT — ENCOUNTER SYMPTOMS
DIARRHEA: 0
NERVOUS/ANXIOUS: 0
SPEECH CHANGE: 0
SENSORY CHANGE: 1
DIZZINESS: 0
FALLS: 0
FLANK PAIN: 0
WEAKNESS: 0
BLURRED VISION: 0
HEADACHES: 0
HEARTBURN: 0
FOCAL WEAKNESS: 1
CHILLS: 0
ABDOMINAL PAIN: 0
SHORTNESS OF BREATH: 0
NECK PAIN: 0
FEVER: 0
SORE THROAT: 0
PALPITATIONS: 0
NAUSEA: 0
VOMITING: 0
WHEEZING: 0
COUGH: 0
BACK PAIN: 1

## 2019-07-17 NOTE — PROGRESS NOTES
07/16/19 RN MOBILITY NOTE    Surgery patient?: no  Date of surgery: n/a  Ambulated 50 ft on day of surgery? (N/A if today is not date of surgery): n/a  Number of times ambulated 50 feet or greater today: 2  Patient has been up to chair, edge of bed or HOB 90 degrees for all meals?: 2/2  Goal met? (goal is ambulating at least 50 feet 2 times on day shift, one time on night shift): no  If patient did not meet mobility goal, why?: pt unsteady, x2 assist

## 2019-07-17 NOTE — PROGRESS NOTES
2030 Pt moved to chair, sitting up. Pt A&Ox4. Numbness and tingling to bilateral feet. Pain 8/10, previously medicated per MAR. Bed locked and in lowest position. Chair alarm in use. Pt educated to call for assistance. Call light within reach. Treaded socks in use. SCDs off at this time.

## 2019-07-17 NOTE — DISCHARGE PLANNING
Anticipated Discharge Disposition:   TBD    Action:   Discussed in IDT rounds. Pt is pending for surgery so MD said that dispo is dependent on outcome of surgery    Barriers to Discharge:   Pending surgery for decompression and biopsy    Plan:   RN kindly notify CM if there are any discharge concerns.   CM following.

## 2019-07-17 NOTE — CARE PLAN
Problem: Safety  Goal: Will remain free from injury  Outcome: PROGRESSING AS EXPECTED  Patient educated to call for assistance. Precautions in place; Call light within reach. Bed alarm in use.  Bed locked and in lowest position. Treaded socks in place. Hourly rounding. Will continue to monitor.    Problem: Bowel/Gastric:  Goal: Normal bowel function is maintained or improved  Outcome: PROGRESSING AS EXPECTED

## 2019-07-17 NOTE — PROGRESS NOTES
[facility] Medicine Daily Progress Note    Date of Service  7/17/2019    Chief Complaint  69 y.o. male admitted 7/15/2019 with T5 mass.    Hospital Course  Admitted with T5 mass with complaints of increasing weakness of both legs.    Interval Problem Update  T5 mass - will need surgery  HTN - sbp 120-160  Diabetes - mid 100s to 200s    Consultants/Specialty  Neurosurgery    Code Status  Full    Disposition  TBD    Review of Systems  Review of Systems   Constitutional: Negative for chills, fever and malaise/fatigue.   HENT: Negative for hearing loss and sore throat.    Eyes: Negative for blurred vision.   Respiratory: Negative for cough, shortness of breath and wheezing.    Cardiovascular: Negative for chest pain, palpitations and leg swelling.   Gastrointestinal: Negative for abdominal pain, diarrhea, heartburn, nausea and vomiting.   Genitourinary: Negative for dysuria, flank pain and hematuria.   Musculoskeletal: Positive for back pain. Negative for falls and neck pain.   Skin: Negative for rash.   Neurological: Positive for sensory change and focal weakness. Negative for dizziness, speech change, weakness and headaches.   Psychiatric/Behavioral: The patient is not nervous/anxious.         Physical Exam  Temp:  [36.6 °C (97.9 °F)-37.2 °C (99 °F)] 37.1 °C (98.8 °F)  Pulse:  [69-93] 93  Resp:  [12-18] 16  BP: (125-163)/(68-82) 125/71  SpO2:  [93 %-99 %] 93 %    Physical Exam   Constitutional: He is oriented to person, place, and time. He appears well-developed and well-nourished.   HENT:   Head: Normocephalic and atraumatic.   Eyes: Pupils are equal, round, and reactive to light. Conjunctivae are normal.   Neck: No tracheal deviation present. No thyromegaly present.   Cardiovascular: Normal rate and regular rhythm.    Pulmonary/Chest: Effort normal and breath sounds normal.   Abdominal: Soft. Bowel sounds are normal. He exhibits no distension. There is no tenderness.   Musculoskeletal: He exhibits no edema.    Lymphadenopathy:     He has no cervical adenopathy.   Neurological: He is alert and oriented to person, place, and time. No cranial nerve deficit.   MMT 5/5   Skin: Skin is warm and dry.   Nursing note and vitals reviewed.      Fluids    Intake/Output Summary (Last 24 hours) at 07/17/19 1127  Last data filed at 07/17/19 0222   Gross per 24 hour   Intake              480 ml   Output             2300 ml   Net            -1820 ml       Laboratory  Recent Labs      07/15/19   2115  07/17/19   1011   WBC  8.9  10.0   RBC  3.42*  3.42*   HEMOGLOBIN  11.1*  11.6*   HEMATOCRIT  32.6*  33.6*   MCV  95.3  98.2*   MCH  32.5  33.9*   MCHC  34.0  34.5   RDW  44.1  46.5   PLATELETCT  196  204   MPV  9.7  9.8     Recent Labs      07/15/19   2115  07/17/19   1011   SODIUM  132*  132*   POTASSIUM  4.4  4.5   CHLORIDE  100  101   CO2  18*  19*   GLUCOSE  113*  322*   BUN  25*  18   CREATININE  1.08  1.09   CALCIUM  9.7  9.6     Recent Labs      07/17/19   1011   INR  1.07               Imaging  MR-THORACIC SPINE-WITH   Final Result      1.  Destructive marrow replacing mass involving the T5 vertebra with destruction of the posterior elements and epidural soft tissue mass primarily dorsal with cord compression and marked central stenosis. Differential considerations would include osseous    metastatic disease, plasmacytoma (multiple myeloma), or other primary spinal tumors. The lesion appears amenable to needle biopsy.   2.  T9-T10 small left paramedian disc protrusion.   3.  Bilateral foraminal stenosis at T5-T6 where the T5 soft tissue mass encroaches on the foramina.   4. The case was discussed (call report) with DR PLUMMER on duty for  [facility] at 1445 hours 7/16/2019.      CT-CHEST,ABDOMEN,PELVIS WITH   Final Result      Small bilateral pulmonary nodules. Follow-up CT chest is recommended.      Large lobulated mass replacing the posterior elements of T5 with significant spinal canal stenosis.      Atherosclerotic  plaque, including coronary artery calcification.      Mildly enlarged subcarinal, cardiophrenic and borderline retroperitoneal and retrocrural lymph nodes are nonspecific.      Enlarged left adrenal gland with a 1.8 cm nodule which is indeterminate and could be metastatic. PET/CT would be beneficial for further evaluation.      Moderate amount of colonic stool. Colonic diverticulosis.      Distended bladder.                  CT-TSPINE W/O PLUS RECONS   Final Result         1.  Lytic expansile lesion in the pedicles and posterior elements, appearance most compatible with metastatic or primary bone disease. Question possible compromise of the cord. Could be further evaluated with MRI without and with contrast.   2.  Atherosclerotic calcifications are noted.      These findings were discussed with the patient's clinician, SUSAN BARLOW, on 7/15/2019 10:29 PM.      DX-CHEST-2 VIEWS   Final Result         1.  Right infrahilar density suggests infiltrate.      EC-ECHOCARDIOGRAM COMPLETE W/O CONT    (Results Pending)        Assessment/Plan  * Mass of thoracic vertebra- (present on admission)   Assessment & Plan    IV Decadron  Neurosurgery following     PVD (peripheral vascular disease) (HCC)- (present on admission)   Assessment & Plan    Lipitor  Hold Plavix     Essential hypertension- (present on admission)   Assessment & Plan    Verapamil, Aldactone, Terazosin, Norvasc, Lisinopril  Add IV Hydralazine prn  Check Echocardiogram, EKG     Type 2 diabetes mellitus without complication, without long-term current use of insulin (HCC)- (present on admission)   Assessment & Plan    Adjust SSI     Obesity- (present on admission)   Assessment & Plan    Body mass index is 31.57 kg/m².       Dyslipidemia- (present on admission)   Assessment & Plan    Lipitor          VTE prophylaxis: SCD

## 2019-07-17 NOTE — PROGRESS NOTES
Neurosurgery Progress Note    Subjective:  This is a 69-year-old male who was seen in the office of [facility] in  for complaints of back pain with bilateral lower extremity dysfunction.  Imaging at that time did reveal a mass at the level of T5.  He was to obtain MRI with contrast.  That had not yet been done.  He has now noted rapid change in his ability to ambulate.  He states his legs give way from underneath him.  He has had multiple falls.  Due to this, he was seen in the emergency department   Patient still feels unsteady on his feet.   Continues to have thoracic spine pain.   T MRI shows soft tissue/bone lesion that extends into the posterior canal causing severe stenosis.   Dr. Marroquin has reviewed the imaging.     Exam:  The thoracic spine is nontender.  There is no step-off or deformity.    Lower motor is 5/5.    There is no Zhou's.    Straight leg raising is negative.     Sensation is intact.  Positional sense is intact.    There is no clonus.    BP  Min: 125/71  Max: 163/82  Pulse  Av.3  Min: 69  Max: 93  Resp  Av.4  Min: 12  Max: 18  Temp  Av.9 °C (98.5 °F)  Min: 36.6 °C (97.9 °F)  Max: 37.2 °C (99 °F)  SpO2  Av.3 %  Min: 93 %  Max: 99 %    No Data Recorded    Recent Labs      07/15/19   2115   WBC  8.9   RBC  3.42*   HEMOGLOBIN  11.1*   HEMATOCRIT  32.6*   MCV  95.3   MCH  32.5   MCHC  34.0   RDW  44.1   PLATELETCT  196   MPV  9.7     Recent Labs      07/15/19   2115   SODIUM  132*   POTASSIUM  4.4   CHLORIDE  100   CO2  18*   GLUCOSE  113*   BUN  25*   CREATININE  1.08   CALCIUM  9.7               Intake/Output       19 07 - 19 0659 19 07 - 19 0659      [id number] Total 3644-1613 9700-6849 Total       Intake    P.O.  360  120 480  --  -- --    P.O. 360 120 480 -- -- --    Total Intake 360 120 480 -- -- --       Output    Urine  1575  1900 3475  --  -- --    Number of Times Voided 4 x 3 x 7 x -- -- --    Urine Void (mL) 9829 8013 2604  -- -- --    Stool  --  -- --  --  -- --    Number of Times Stooled 1 x -- 1 x -- -- --    Total Output 1578 2447 8142 -- -- --       Net I/O     -[phone] -9219 -3267 -- -- --            Intake/Output Summary (Last 24 hours) at 07/17/19 0815  Last data filed at 07/17/19 0222   Gross per 24 hour   Intake              480 ml   Output             2975 ml   Net            -2495 ml            • hydrALAZINE  10 mg Q6HRS PRN   • insulin regular  1-6 Units 4X/DAY ACHS    And   • glucose  16 g Q15 MIN PRN    And   • dextrose 10% bolus  250 mL Q15 MIN PRN   • dexamethasone  4 mg Q6HRS   • senna-docusate  2 Tab BID    And   • polyethylene glycol/lytes  1 Packet QDAY PRN    And   • magnesium hydroxide  30 mL QDAY PRN    And   • bisacodyl  10 mg QDAY PRN   • Respiratory Care per Protocol   Continuous RT   • NS   Continuous   • acetaminophen  650 mg Q6HRS PRN   • Pharmacy Consult Request  1 Each PHARMACY TO DOSE    And   • oxyCODONE immediate-release  5 mg Q3HRS PRN    And   • oxyCODONE immediate-release  10 mg Q3HRS PRN    And   • morphine injection  4 mg Q3HRS PRN   • ondansetron  4 mg Q4HRS PRN   • ondansetron  4 mg Q4HRS PRN   • amLODIPine  10 mg DAILY   • atorvastatin  20 mg Nightly   • lisinopril  40 mg DAILY   • loratadine  10 mg DAILY   • methocarbamol  750 mg BID   • omeprazole  20 mg DAILY   • prazosin  5 mg Nightly   • SITagliptin  100 mg DAILY   • spironolactone  25 mg DAILY   • terazosin  5 mg BID   • verapamil ER  180 mg BID       Assessment and Plan:  Hospital day #3  Patient will require open decompression and biopsy  Dr Velacso has him on the schedule for Sunday for a tumor resection and possible fusion  Continue steroids

## 2019-07-17 NOTE — CARE PLAN
Problem: Venous Thromboembolism (VTW)/Deep Vein Thrombosis (DVT) Prevention:  Goal: Patient will participate in Venous Thrombosis (VTE)/Deep Vein Thrombosis (DVT)Prevention Measures  Outcome: PROGRESSING AS EXPECTED  Pt declined SCD's. Has been in a chair most of the day.     Problem: Respiratory:  Goal: Respiratory status will improve  Outcome: PROGRESSING AS EXPECTED  Pt tolerating room air.

## 2019-07-17 NOTE — THERAPY
consult received; per chart, patient will require open decompression and biopsy with Dr. Marroquin/Rod sorting out OR time / plan; will hold until post op or final neuro POC establishe

## 2019-07-18 ENCOUNTER — APPOINTMENT (OUTPATIENT)
Dept: RADIOLOGY | Facility: MEDICAL CENTER | Age: 69
DRG: 029 | End: 2019-07-18
Attending: HOSPITALIST
Payer: MEDICARE
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
## 2019-07-18 LAB
ANION GAP SERPL CALC-SCNC: 12 MMOL/L (ref 0–11.9)
BUN SERPL-MCNC: 25 MG/DL (ref 8–22)
CALCIUM SERPL-MCNC: 10.2 MG/DL (ref 8.5–10.5)
CHLORIDE SERPL-SCNC: 99 MMOL/L (ref 96–112)
CO2 SERPL-SCNC: 20 MMOL/L (ref 20–33)
CREAT SERPL-MCNC: 1.06 MG/DL (ref 0.5–1.4)
GLUCOSE BLD-MCNC: 232 MG/DL (ref 65–99)
GLUCOSE BLD-MCNC: 235 MG/DL (ref 65–99)
GLUCOSE BLD-MCNC: 259 MG/DL (ref 65–99)
GLUCOSE SERPL-MCNC: 258 MG/DL (ref 65–99)
POTASSIUM SERPL-SCNC: 4.5 MMOL/L (ref 3.6–5.5)
SODIUM SERPL-SCNC: 131 MMOL/L (ref 135–145)

## 2019-07-18 PROCEDURE — 700102 HCHG RX REV CODE 250 W/ 637 OVERRIDE(OP): Performed by: INTERNAL MEDICINE

## 2019-07-18 PROCEDURE — 99232 SBSQ HOSP IP/OBS MODERATE 35: CPT | Performed by: FAMILY MEDICINE

## 2019-07-18 PROCEDURE — 70450 CT HEAD/BRAIN W/O DYE: CPT

## 2019-07-18 PROCEDURE — 80048 BASIC METABOLIC PNL TOTAL CA: CPT

## 2019-07-18 PROCEDURE — 36415 COLL VENOUS BLD VENIPUNCTURE: CPT

## 2019-07-18 PROCEDURE — 700102 HCHG RX REV CODE 250 W/ 637 OVERRIDE(OP): Performed by: FAMILY MEDICINE

## 2019-07-18 PROCEDURE — 82962 GLUCOSE BLOOD TEST: CPT

## 2019-07-18 PROCEDURE — A9270 NON-COVERED ITEM OR SERVICE: HCPCS | Performed by: INTERNAL MEDICINE

## 2019-07-18 PROCEDURE — 770001 HCHG ROOM/CARE - MED/SURG/GYN PRIV*

## 2019-07-18 PROCEDURE — 700111 HCHG RX REV CODE 636 W/ 250 OVERRIDE (IP): Performed by: HOSPITALIST

## 2019-07-18 RX ORDER — INSULIN GLARGINE 100 [IU]/ML
10 INJECTION, SOLUTION SUBCUTANEOUS
Status: DISCONTINUED | OUTPATIENT
Start: 2019-07-18 | End: 2019-07-19

## 2019-07-18 RX ADMIN — INSULIN GLARGINE 10 UNITS: 100 INJECTION, SOLUTION SUBCUTANEOUS at 11:47

## 2019-07-18 RX ADMIN — DEXAMETHASONE SODIUM PHOSPHATE 4 MG: 4 INJECTION, SOLUTION INTRA-ARTICULAR; INTRALESIONAL; INTRAMUSCULAR; INTRAVENOUS; SOFT TISSUE at 04:40

## 2019-07-18 RX ADMIN — TERAZOSIN HYDROCHLORIDE 5 MG: 5 CAPSULE ORAL at 04:40

## 2019-07-18 RX ADMIN — DEXAMETHASONE SODIUM PHOSPHATE 4 MG: 4 INJECTION, SOLUTION INTRA-ARTICULAR; INTRALESIONAL; INTRAMUSCULAR; INTRAVENOUS; SOFT TISSUE at 11:52

## 2019-07-18 RX ADMIN — DEXAMETHASONE SODIUM PHOSPHATE 4 MG: 4 INJECTION, SOLUTION INTRA-ARTICULAR; INTRALESIONAL; INTRAMUSCULAR; INTRAVENOUS; SOFT TISSUE at 16:35

## 2019-07-18 RX ADMIN — ATORVASTATIN CALCIUM 20 MG: 20 TABLET, FILM COATED ORAL at 22:04

## 2019-07-18 RX ADMIN — METHOCARBAMOL TABLETS 750 MG: 750 TABLET, COATED ORAL at 16:30

## 2019-07-18 RX ADMIN — VERAPAMIL HYDROCHLORIDE 180 MG: 180 TABLET, FILM COATED, EXTENDED RELEASE ORAL at 16:30

## 2019-07-18 RX ADMIN — LISINOPRIL 40 MG: 20 TABLET ORAL at 04:39

## 2019-07-18 RX ADMIN — OMEPRAZOLE 20 MG: 20 CAPSULE, DELAYED RELEASE ORAL at 04:40

## 2019-07-18 RX ADMIN — LORATADINE 10 MG: 10 TABLET ORAL at 04:40

## 2019-07-18 RX ADMIN — SPIRONOLACTONE 25 MG: 25 TABLET ORAL at 04:40

## 2019-07-18 RX ADMIN — VERAPAMIL HYDROCHLORIDE 180 MG: 180 TABLET, FILM COATED, EXTENDED RELEASE ORAL at 04:40

## 2019-07-18 RX ADMIN — TERAZOSIN HYDROCHLORIDE 5 MG: 5 CAPSULE ORAL at 16:30

## 2019-07-18 RX ADMIN — METHOCARBAMOL TABLETS 750 MG: 750 TABLET, COATED ORAL at 04:40

## 2019-07-18 RX ADMIN — OXYCODONE HYDROCHLORIDE 5 MG: 5 TABLET ORAL at 22:05

## 2019-07-18 RX ADMIN — AMLODIPINE BESYLATE 10 MG: 5 TABLET ORAL at 04:39

## 2019-07-18 ASSESSMENT — ENCOUNTER SYMPTOMS
FOCAL WEAKNESS: 1
BLURRED VISION: 0
HEARTBURN: 0
SHORTNESS OF BREATH: 0
BACK PAIN: 1
NAUSEA: 0
FALLS: 0
NERVOUS/ANXIOUS: 0
FLANK PAIN: 0
SENSORY CHANGE: 1
WEAKNESS: 0
ABDOMINAL PAIN: 0
DIZZINESS: 0
PALPITATIONS: 0
WHEEZING: 0
COUGH: 0
FEVER: 0
NECK PAIN: 0
CHILLS: 0
SPEECH CHANGE: 0
SORE THROAT: 0
HEADACHES: 0
DIARRHEA: 0
VOMITING: 0

## 2019-07-18 ASSESSMENT — PATIENT HEALTH QUESTIONNAIRE - PHQ9
2. FEELING DOWN, DEPRESSED, IRRITABLE, OR HOPELESS: NOT AT ALL
1. LITTLE INTEREST OR PLEASURE IN DOING THINGS: NOT AT ALL
SUM OF ALL RESPONSES TO PHQ9 QUESTIONS 1 AND 2: 0

## 2019-07-18 NOTE — THERAPY
Pt now scheduled for surgery on Sunday; please reconsult post op as appropriate to assist with dc planning as needed; per chart, pt is mobilizing as able with nursing staff currently

## 2019-07-18 NOTE — PROGRESS NOTES
Neurosurgery Progress Note    Subjective:  This is a 69-year-old male who was seen in the office of Vero in  for complaints of back pain with bilateral lower extremity dysfunction.  Imaging at that time did reveal a mass at the level of T5.    He has now noted rapid change in his ability to ambulate.  He states his legs give way from underneath him.   He was readmitted through the ER with rapid worsening of symptoms  He did sustain a fall last night attempting to get to the bathroom on his own  He also had a bout of confusion and a CT head was performed which demonstrated no acute abnormality  This morning his mentation is clear  Continues to have thoracic spine pain.   He denies bowel or bladder dysfunction  Thoracic MRI with gadolinium demonstrates destructive mass involving the T5 vertebrae with destruction of the posterior elements and epidural soft tissue mass primarily dorsal with cord compression and marketed central stenosis      Exam:  The thoracic spine is nontender.  There is no step-off or deformity.    Motor is 4+/5 to the anterior tibialis and quadriceps otherwise 5/5  There is no Zhou's.    Straight leg raising is negative.     There is sensory dysesthesia to the bilateral feet in a nondermatomal distribution  There is no clonus.    BP  Min: 137/95  Max: 167/80  Pulse  Av  Min: 84  Max: 95  Resp  Av.4  Min: 16  Max: 18  Temp  Av.9 °C (98.5 °F)  Min: 36.8 °C (98.3 °F)  Max: 37.2 °C (98.9 °F)  SpO2  Av.8 %  Min: 94 %  Max: 97 %    No Data Recorded    Recent Labs      07/15/19   2115  07/17/19   1011   WBC  8.9  10.0   RBC  3.42*  3.42*   HEMOGLOBIN  11.1*  11.6*   HEMATOCRIT  32.6*  33.6*   MCV  95.3  98.2*   MCH  32.5  33.9*   MCHC  34.0  34.5   RDW  44.1  46.5   PLATELETCT  196  204   MPV  9.7  9.8     Recent Labs      07/15/19   2115  07/17/19   1011   SODIUM  132*  132*   POTASSIUM  4.4  4.5   CHLORIDE  100  101   CO2  18*  19*   GLUCOSE  113*  322*   BUN  25*  18    CREATININE  1.08  1.09   CALCIUM  9.7  9.6     Recent Labs      07/17/19   1011   INR  1.07           Intake/Output       07/17/19 0700 - 07/18/19 0659 07/18/19 0700 - 07/19/19 0659      8340-9169 5809-2077 Total 5515-8374 7181-7645 Total       Intake    Total Intake -- -- -- -- -- --       Output    Urine  --  2000 2000  --  -- --    Number of Times Voided 1 x 3 x 4 x -- -- --    Urine Void (mL) -- 2000 2000 -- -- --    Stool  --  -- --  --  -- --    Number of Times Stooled 1 x -- 1 x -- -- --    Total Output -- 2000 2000 -- -- --       Net I/O     -- -2000 -2000 -- -- --            Intake/Output Summary (Last 24 hours) at 07/18/19 0818  Last data filed at 07/18/19 0400   Gross per 24 hour   Intake                0 ml   Output             2000 ml   Net            -2000 ml            • insulin glargine  10 Units QAM INSULIN   • hydrALAZINE  10 mg Q6HRS PRN   • insulin regular  2-10 Units 4X/DAY ACHS    And   • glucose  16 g Q15 MIN PRN    And   • dextrose 10% bolus  250 mL Q15 MIN PRN   • dexamethasone  4 mg Q6HRS   • senna-docusate  2 Tab BID    And   • polyethylene glycol/lytes  1 Packet QDAY PRN    And   • magnesium hydroxide  30 mL QDAY PRN    And   • bisacodyl  10 mg QDAY PRN   • Respiratory Care per Protocol   Continuous RT   • acetaminophen  650 mg Q6HRS PRN   • Pharmacy Consult Request  1 Each PHARMACY TO DOSE    And   • oxyCODONE immediate-release  5 mg Q3HRS PRN    And   • oxyCODONE immediate-release  10 mg Q3HRS PRN    And   • morphine injection  4 mg Q3HRS PRN   • ondansetron  4 mg Q4HRS PRN   • ondansetron  4 mg Q4HRS PRN   • amLODIPine  10 mg DAILY   • atorvastatin  20 mg Nightly   • lisinopril  40 mg DAILY   • loratadine  10 mg DAILY   • methocarbamol  750 mg BID   • omeprazole  20 mg DAILY   • spironolactone  25 mg DAILY   • terazosin  5 mg BID   • verapamil ER  180 mg BID       Assessment and Plan:  Hospital day #4  Patient will require open decompression and biopsy  Dr Velasco has him on the  schedule for Sunday for a tumor resection and possible fusion  Continue steroids  Patient should not ambulate independently without wheelchair  We will discuss with Dr. Velasco

## 2019-07-18 NOTE — PROGRESS NOTES
Patient with progressive myelopathy. Full strength with some ankle inversion, sensation intact. No known primary,history of melanoma, this would not be typical of melanoma in this location, primary bone tumor v other mets. OR first case Saturday for T4-6 lami, bilateral T5 transpedicular approach for epidural and soft tissue tumor debulking (will send intraop frozen),will try for near complete enbloc resection, T4-6 stealth fusion. Made patient npo midnight Saturday am, he understands R/B/A

## 2019-07-18 NOTE — PROGRESS NOTES
RN MOBILITY NOTE     Surgery patient?: NO  Date of surgery:  NA  Ambulated 50 ft on day of surgery? (N/A if today is not date of surgery): NA  Number of times ambulated 50 feet or greater today: 1  Patient has been up to chair, edge of bed or HOB 90 degrees for all meals?: Yes (up to chair all day)  Goal met? (goal is ambulating at least 50 feet 2 times on day shift, one time on night shift): No  If patient did not meet mobility goal, why?: Pt has LE weakness, x2 assist. Pt scheduled for surgery Saturday.

## 2019-07-18 NOTE — PROGRESS NOTES
Bedside report recieved, assumed care.   Pt is A&Ox4, denies pain, reports numbness in BLE.   Pt up to chair for breakfast.   Plan of care discussed. All needs met at this time.   Chair alarm on. Bed locked and in bed in low position, call light and belongings within reach, upper rails up. Treaded socks on.    Pt has home wheelchair in bathroom.

## 2019-07-18 NOTE — CARE PLAN
Problem: Safety  Goal: Will remain free from falls  Outcome: PROGRESSING SLOWER THAN EXPECTED  Fall risk assessed using Acosta-Conor Scale. High Fall risk. Bed alarm and chair alarm are in place. Pt educated to call for assistance when needing to use restroom. x2 person assist. Pt verbalized understanding.   Pt had fall last night. MD's aware.     Problem: Mobility  Goal: Risk for activity intolerance will decrease  Outcome: PROGRESSING SLOWER THAN EXPECTED  BLE numbness and weakness. X2 person assist with FWW. Mobilizing to bathroom once per shift. Up to chair during daytime.

## 2019-07-18 NOTE — PROGRESS NOTES
"2125 Pt sitting up in chair. Pt A&Ox4. Numbness and tingling to bilateral feet. Pain 2/10, Medication declined at this time. Bed locked and in lowest position. Chair alarm in use. Pt educated to call for assistance. Call light within reach. Treaded socks in use. SCDs refused, education provided. Bed alarm placed.    2354 Administered medication to pt. Pt A&Ox4. Numbness and tingling to bilateral feet. Pain 2/10, Medication declined at this time. All needs met at this time    07/18/19  0135 Alarm from bathroom ringing. This RN ran to pt's room. Pt found in bathroom on ground. This RN with help from another RN moved pt to wheelchair. Pt A&Ox4. PERRL. Pt states he did not hit his head, states \"my legs gave out while I was holding the hand rail and I slowly slid to the ground\". No complaints of pain at this time. Scab on right knee reopened and bleeding. Wound cleaned and bandaged. MEHTA. Vital signs stable. Pt moved back to bed. Asked pt why he did not call, pt stated \"I did not want to bother you. I thought I could do it on my own\". Bed alarm in place, sent down to clinical engineering for evaluation. Frame alarm on. Pt re-educated to call for assistance. Call light within reach. Treaded socks in use. Pt requests we do not call his wife at this time.    0150  Paged hospitalist on call.     0151 Dr. Gasca notified. No new orders at this time.    0310 Frame alarm went off. Pt attempting to get out of bed. Pt confused. Pt A&Ox2, unable to answer where he is and why. Pt remembers fall, still states he did not hit head. Paged hospitalist for STAT head CT. All other neuro intake.    0330 Pt left unit to CT scan with transfer. Pt in stable condition.     0400 Pt arrived back from CT scan with transfer.  Pt A&Ox4. Bed locked and in lowest position. Frame alarm in use. Pt educated to call for assistance. Call light within reach. Treaded socks in use.  "

## 2019-07-18 NOTE — PROGRESS NOTES
07/17/19          RN MOBILITY NOTE     Surgery patient?: no  Date of surgery: n/a  Ambulated 50 ft on day of surgery? (N/A if today is not date of surgery): n/a  Number of times ambulated 50 feet or greater today: 1  Patient has been up to chair, edge of bed or HOB 90 degrees for all meals?: 3/3  Goal met? (goal is ambulating at least 50 feet 2 times on day shift, one time on night shift): no  If patient did not meet mobility goal, why?: pt unsteady, x2 assist

## 2019-07-18 NOTE — PROGRESS NOTES
[facility] Medicine Daily Progress Note    Date of Service  7/18/2019    Chief Complaint  69 y.o. male admitted 7/15/2019 with T5 mass.    Hospital Course  Admitted with T5 mass with complaints of increasing weakness of both legs.    Interval Problem Update  T5 mass - for surgery 7/21/2019  HTN - sbp 120-160, Echo pending  Diabetes - BS 200s  Fall - he fell down getting out of bed himself and scraped his knee    Consultants/Specialty  Neurosurgery    Code Status  Full    Disposition  TBD    Review of Systems  Review of Systems   Constitutional: Negative for chills, fever and malaise/fatigue.   HENT: Negative for hearing loss and sore throat.    Eyes: Negative for blurred vision.   Respiratory: Negative for cough, shortness of breath and wheezing.    Cardiovascular: Negative for chest pain, palpitations and leg swelling.   Gastrointestinal: Negative for abdominal pain, diarrhea, heartburn, nausea and vomiting.   Genitourinary: Negative for dysuria, flank pain and hematuria.   Musculoskeletal: Positive for back pain. Negative for falls and neck pain.   Skin: Negative for rash.   Neurological: Positive for sensory change and focal weakness. Negative for dizziness, speech change, weakness and headaches.   Psychiatric/Behavioral: The patient is not nervous/anxious.         Physical Exam  Temp:  [36.8 °C (98.3 °F)-37.2 °C (98.9 °F)] 37.1 °C (98.8 °F)  Pulse:  [84-95] 85  Resp:  [16-18] 18  BP: (122-167)/(68-95) 122/68  SpO2:  [94 %-97 %] 95 %    Physical Exam   Constitutional: He is oriented to person, place, and time. He appears well-developed and well-nourished.   HENT:   Head: Normocephalic and atraumatic.   Eyes: Pupils are equal, round, and reactive to light. Conjunctivae are normal.   Neck: No tracheal deviation present. No thyromegaly present.   Cardiovascular: Normal rate and regular rhythm.    Pulmonary/Chest: Effort normal and breath sounds normal.   Abdominal: Soft. Bowel sounds are normal. He exhibits no  distension. There is no tenderness.   Musculoskeletal: He exhibits no edema.   Lymphadenopathy:     He has no cervical adenopathy.   Neurological: He is alert and oriented to person, place, and time. No cranial nerve deficit.   MMT 5/5   Skin: Skin is warm and dry.   Nursing note and vitals reviewed.      Fluids    Intake/Output Summary (Last 24 hours) at 07/18/19 1121  Last data filed at 07/18/19 0800   Gross per 24 hour   Intake              320 ml   Output             2000 ml   Net            -1680 ml       Laboratory  Recent Labs      07/15/19   2115  07/17/19   1011   WBC  8.9  10.0   RBC  3.42*  3.42*   HEMOGLOBIN  11.1*  11.6*   HEMATOCRIT  32.6*  33.6*   MCV  95.3  98.2*   MCH  32.5  33.9*   MCHC  34.0  34.5   RDW  44.1  46.5   PLATELETCT  196  204   MPV  9.7  9.8     Recent Labs      07/15/19   2115  07/17/19   1011  07/18/19   0807   SODIUM  132*  132*  131*   POTASSIUM  4.4  4.5  4.5   CHLORIDE  100  101  99   CO2  18*  19*  20   GLUCOSE  113*  322*  258*   BUN  25*  18  25*   CREATININE  1.08  1.09  1.06   CALCIUM  9.7  9.6  10.2     Recent Labs      07/17/19   1011   INR  1.07               Imaging  CT-HEAD W/O   Final Result         1.  No acute intracranial abnormality is identified, there are nonspecific white matter changes, commonly associated with small vessel ischemic disease.  Associated mild cerebral atrophy is noted.   2.  Atherosclerosis.      MR-THORACIC SPINE-WITH   Final Result      1.  Destructive marrow replacing mass involving the T5 vertebra with destruction of the posterior elements and epidural soft tissue mass primarily dorsal with cord compression and marked central stenosis. Differential considerations would include osseous    metastatic disease, plasmacytoma (multiple myeloma), or other primary spinal tumors. The lesion appears amenable to needle biopsy.   2.  T9-T10 small left paramedian disc protrusion.   3.  Bilateral foraminal stenosis at T5-T6 where the T5 soft tissue mass  encroaches on the foramina.   4. The case was discussed (call report) with DR PLUMMER on duty for  LACY FERRARO at 1445 hours 7/16/2019.      CT-CHEST,ABDOMEN,PELVIS WITH   Final Result      Small bilateral pulmonary nodules. Follow-up CT chest is recommended.      Large lobulated mass replacing the posterior elements of T5 with significant spinal canal stenosis.      Atherosclerotic plaque, including coronary artery calcification.      Mildly enlarged subcarinal, cardiophrenic and borderline retroperitoneal and retrocrural lymph nodes are nonspecific.      Enlarged left adrenal gland with a 1.8 cm nodule which is indeterminate and could be metastatic. PET/CT would be beneficial for further evaluation.      Moderate amount of colonic stool. Colonic diverticulosis.      Distended bladder.                  CT-TSPINE W/O PLUS RECONS   Final Result         1.  Lytic expansile lesion in the pedicles and posterior elements, appearance most compatible with metastatic or primary bone disease. Question possible compromise of the cord. Could be further evaluated with MRI without and with contrast.   2.  Atherosclerotic calcifications are noted.      These findings were discussed with the patient's clinician, SUSAN BARLOW, on 7/15/2019 10:29 PM.      DX-CHEST-2 VIEWS   Final Result         1.  Right infrahilar density suggests infiltrate.      EC-ECHOCARDIOGRAM COMPLETE W/O CONT    (Results Pending)        Assessment/Plan  * Mass of thoracic vertebra- (present on admission)   Assessment & Plan    IV Decadron  Plan for Surgery - 7/21/2019     Fall   Assessment & Plan    PT and OT  Check Vit b12 and D levels     PVD (peripheral vascular disease) (HCC)- (present on admission)   Assessment & Plan    Lipitor  Hold Plavix     Essential hypertension- (present on admission)   Assessment & Plan    Verapamil, Aldactone, Terazosin, Norvasc, Lisinopril  IV Hydralazine prn  Echocardiogram pending     Type 2 diabetes mellitus  without complication, without long-term current use of insulin (HCC)- (present on admission)   Assessment & Plan    SSI, start Lantus 10 u     Obesity- (present on admission)   Assessment & Plan    Body mass index is 31.57 kg/m².       Dyslipidemia- (present on admission)   Assessment & Plan    Lipitor          VTE prophylaxis: SCD

## 2019-07-18 NOTE — THERAPY
Occupational Therapy Contact Note    Pt has been scheduled for surgery on Sunday. Has been getting up to chair/wheelchair with nursing staff. Will defer eval until post op and attempt as appropriate.    Sarah Beth Parsons, OTR/L

## 2019-07-19 ENCOUNTER — APPOINTMENT (OUTPATIENT)
Dept: CARDIOLOGY | Facility: MEDICAL CENTER | Age: 69
DRG: 029 | End: 2019-07-19
Attending: FAMILY MEDICINE
Payer: MEDICARE

## 2019-07-19 PROBLEM — R79.89 LOW VITAMIN B12 LEVEL: Status: ACTIVE | Noted: 2019-07-19

## 2019-07-19 LAB
25(OH)D3 SERPL-MCNC: 71 NG/ML (ref 30–100)
ANION GAP SERPL CALC-SCNC: 12 MMOL/L (ref 0–11.9)
APTT PPP: 23.6 SEC (ref 24.7–36)
BUN SERPL-MCNC: 27 MG/DL (ref 8–22)
CALCIUM SERPL-MCNC: 10.1 MG/DL (ref 8.5–10.5)
CHLORIDE SERPL-SCNC: 99 MMOL/L (ref 96–112)
CO2 SERPL-SCNC: 21 MMOL/L (ref 20–33)
CREAT SERPL-MCNC: 1.06 MG/DL (ref 0.5–1.4)
GLUCOSE BLD-MCNC: 238 MG/DL (ref 65–99)
GLUCOSE BLD-MCNC: 253 MG/DL (ref 65–99)
GLUCOSE BLD-MCNC: 263 MG/DL (ref 65–99)
GLUCOSE BLD-MCNC: 268 MG/DL (ref 65–99)
GLUCOSE BLD-MCNC: 273 MG/DL (ref 65–99)
GLUCOSE SERPL-MCNC: 225 MG/DL (ref 65–99)
LV EJECT FRACT  99904: 65
LV EJECT FRACT MOD 2C 99903: 57.3
LV EJECT FRACT MOD 4C 99902: 60
LV EJECT FRACT MOD BP 99901: 49.48
POTASSIUM SERPL-SCNC: 4.4 MMOL/L (ref 3.6–5.5)
SODIUM SERPL-SCNC: 132 MMOL/L (ref 135–145)
VIT B12 SERPL-MCNC: 388 PG/ML (ref 211–911)

## 2019-07-19 PROCEDURE — A9270 NON-COVERED ITEM OR SERVICE: HCPCS | Performed by: INTERNAL MEDICINE

## 2019-07-19 PROCEDURE — 82962 GLUCOSE BLOOD TEST: CPT

## 2019-07-19 PROCEDURE — 85730 THROMBOPLASTIN TIME PARTIAL: CPT

## 2019-07-19 PROCEDURE — 36415 COLL VENOUS BLD VENIPUNCTURE: CPT

## 2019-07-19 PROCEDURE — 82607 VITAMIN B-12: CPT

## 2019-07-19 PROCEDURE — 82306 VITAMIN D 25 HYDROXY: CPT

## 2019-07-19 PROCEDURE — 700111 HCHG RX REV CODE 636 W/ 250 OVERRIDE (IP): Performed by: HOSPITALIST

## 2019-07-19 PROCEDURE — 93306 TTE W/DOPPLER COMPLETE: CPT | Mod: 26 | Performed by: INTERNAL MEDICINE

## 2019-07-19 PROCEDURE — 80048 BASIC METABOLIC PNL TOTAL CA: CPT

## 2019-07-19 PROCEDURE — 700102 HCHG RX REV CODE 250 W/ 637 OVERRIDE(OP): Performed by: INTERNAL MEDICINE

## 2019-07-19 PROCEDURE — 99232 SBSQ HOSP IP/OBS MODERATE 35: CPT | Performed by: FAMILY MEDICINE

## 2019-07-19 PROCEDURE — 770001 HCHG ROOM/CARE - MED/SURG/GYN PRIV*

## 2019-07-19 PROCEDURE — 93306 TTE W/DOPPLER COMPLETE: CPT

## 2019-07-19 PROCEDURE — 700102 HCHG RX REV CODE 250 W/ 637 OVERRIDE(OP): Performed by: FAMILY MEDICINE

## 2019-07-19 RX ORDER — INSULIN GLARGINE 100 [IU]/ML
20 INJECTION, SOLUTION SUBCUTANEOUS
Status: DISCONTINUED | OUTPATIENT
Start: 2019-07-20 | End: 2019-07-25 | Stop reason: HOSPADM

## 2019-07-19 RX ADMIN — METHOCARBAMOL TABLETS 750 MG: 750 TABLET, COATED ORAL at 05:24

## 2019-07-19 RX ADMIN — AMLODIPINE BESYLATE 10 MG: 5 TABLET ORAL at 05:24

## 2019-07-19 RX ADMIN — DEXAMETHASONE SODIUM PHOSPHATE 4 MG: 4 INJECTION, SOLUTION INTRA-ARTICULAR; INTRALESIONAL; INTRAMUSCULAR; INTRAVENOUS; SOFT TISSUE at 00:00

## 2019-07-19 RX ADMIN — METHOCARBAMOL TABLETS 750 MG: 750 TABLET, COATED ORAL at 17:37

## 2019-07-19 RX ADMIN — INSULIN GLARGINE 10 UNITS: 100 INJECTION, SOLUTION SUBCUTANEOUS at 06:00

## 2019-07-19 RX ADMIN — DEXAMETHASONE SODIUM PHOSPHATE 4 MG: 4 INJECTION, SOLUTION INTRA-ARTICULAR; INTRALESIONAL; INTRAMUSCULAR; INTRAVENOUS; SOFT TISSUE at 17:41

## 2019-07-19 RX ADMIN — LISINOPRIL 40 MG: 20 TABLET ORAL at 05:23

## 2019-07-19 RX ADMIN — DEXAMETHASONE SODIUM PHOSPHATE 4 MG: 4 INJECTION, SOLUTION INTRA-ARTICULAR; INTRALESIONAL; INTRAMUSCULAR; INTRAVENOUS; SOFT TISSUE at 06:00

## 2019-07-19 RX ADMIN — DEXAMETHASONE SODIUM PHOSPHATE 4 MG: 4 INJECTION, SOLUTION INTRA-ARTICULAR; INTRALESIONAL; INTRAMUSCULAR; INTRAVENOUS; SOFT TISSUE at 12:46

## 2019-07-19 RX ADMIN — VERAPAMIL HYDROCHLORIDE 180 MG: 180 TABLET, FILM COATED, EXTENDED RELEASE ORAL at 05:24

## 2019-07-19 RX ADMIN — SENNOSIDES, DOCUSATE SODIUM 2 TABLET: 50; 8.6 TABLET, FILM COATED ORAL at 05:24

## 2019-07-19 RX ADMIN — TERAZOSIN HYDROCHLORIDE 5 MG: 5 CAPSULE ORAL at 17:45

## 2019-07-19 RX ADMIN — OXYCODONE HYDROCHLORIDE 5 MG: 5 TABLET ORAL at 19:26

## 2019-07-19 RX ADMIN — DEXAMETHASONE SODIUM PHOSPHATE 4 MG: 4 INJECTION, SOLUTION INTRA-ARTICULAR; INTRALESIONAL; INTRAMUSCULAR; INTRAVENOUS; SOFT TISSUE at 23:16

## 2019-07-19 RX ADMIN — VERAPAMIL HYDROCHLORIDE 180 MG: 180 TABLET, FILM COATED, EXTENDED RELEASE ORAL at 17:44

## 2019-07-19 RX ADMIN — OMEPRAZOLE 20 MG: 20 CAPSULE, DELAYED RELEASE ORAL at 05:23

## 2019-07-19 RX ADMIN — TERAZOSIN HYDROCHLORIDE 5 MG: 5 CAPSULE ORAL at 06:00

## 2019-07-19 RX ADMIN — SPIRONOLACTONE 25 MG: 25 TABLET ORAL at 06:00

## 2019-07-19 RX ADMIN — ATORVASTATIN CALCIUM 20 MG: 20 TABLET, FILM COATED ORAL at 21:09

## 2019-07-19 RX ADMIN — SENNOSIDES, DOCUSATE SODIUM 2 TABLET: 50; 8.6 TABLET, FILM COATED ORAL at 17:37

## 2019-07-19 RX ADMIN — LORATADINE 10 MG: 10 TABLET ORAL at 05:23

## 2019-07-19 ASSESSMENT — ENCOUNTER SYMPTOMS
BLURRED VISION: 0
COUGH: 0
SENSORY CHANGE: 1
WHEEZING: 0
CHILLS: 0
BACK PAIN: 1
FEVER: 0
NERVOUS/ANXIOUS: 0
PALPITATIONS: 0
SORE THROAT: 0
FOCAL WEAKNESS: 1
DIARRHEA: 0
SPEECH CHANGE: 0
NAUSEA: 0
FLANK PAIN: 0
WEAKNESS: 0
VOMITING: 0
NECK PAIN: 0
FALLS: 0
HEARTBURN: 0
SHORTNESS OF BREATH: 0
HEADACHES: 0
ABDOMINAL PAIN: 0
DIZZINESS: 0

## 2019-07-19 NOTE — PROGRESS NOTES
Neurosurgery Progress Note    Subjective:  Patient seen and evaluated by Dr. Velasco yesterday   Admitted with progressive myelopathy  Continues to have thoracic spine pain.   He denies bowel or bladder dysfunction  Thoracic MRI with gadolinium demonstrates destructive mass involving the T5 vertebrae with destruction of the posterior elements and epidural soft tissue mass primarily dorsal with cord compression and marketed central stenosis  Scheduled for OR tomorrow AM      Exam:  The thoracic spine is nontender.  There is no step-off or deformity.    Motor is 4+/5 to the anterior tibialis and quadriceps otherwise 5/5  There is no Zhou's.    Straight leg raising is negative.     There is sensory dysesthesia to the bilateral feet in a nondermatomal distribution  There is no clonus.    BP  Min: 122/68  Max: 149/74  Pulse  Av  Min: 72  Max: 85  Resp  Av  Min: 16  Max: 18  Temp  Av °C (98.6 °F)  Min: 36.8 °C (98.2 °F)  Max: 37.1 °C (98.8 °F)  SpO2  Av.3 %  Min: 95 %  Max: 100 %    No Data Recorded    Recent Labs      19   1011   WBC  10.0   RBC  3.42*   HEMOGLOBIN  11.6*   HEMATOCRIT  33.6*   MCV  98.2*   MCH  33.9*   MCHC  34.5   RDW  46.5   PLATELETCT  204   MPV  9.8     Recent Labs      19   1011  19   0807  19   0351   SODIUM  132*  131*  132*   POTASSIUM  4.5  4.5  4.4   CHLORIDE  101  99  99   CO2  19*  20  21   GLUCOSE  322*  258*  225*   BUN  18  25*  27*   CREATININE  1.09  1.06  1.06   CALCIUM  9.6  10.2  10.1     Recent Labs      19   1011  19   0351   APTT   --   23.6*   INR  1.07   --            Intake/Output       19 07 - 19 0659 19 07 - 19 0659      [id number] Total 4086-0934 [id number] Total       Intake    P.O.  920  -- 920  --  -- --    P.O. 920 -- 920 -- -- --    Total Intake 920 -- 920 -- -- --       Output    Urine  --  -- --  --  -- --    Number of Times Voided 2 x -- 2 x -- -- --    Stool  --  -- --  --   -- --    Number of Times Stooled 1 x -- 1 x -- -- --    Total Output -- -- -- -- -- --       Net I/O     920 -- 920 -- -- --            Intake/Output Summary (Last 24 hours) at 07/19/19 0822  Last data filed at 07/18/19 1800   Gross per 24 hour   Intake              600 ml   Output                0 ml   Net              600 ml            • [START ON 7/20/2019] insulin glargine  20 Units QAM INSULIN   • hydrALAZINE  10 mg Q6HRS PRN   • insulin regular  2-10 Units 4X/DAY ACHS    And   • glucose  16 g Q15 MIN PRN    And   • dextrose 10% bolus  250 mL Q15 MIN PRN   • dexamethasone  4 mg Q6HRS   • senna-docusate  2 Tab BID    And   • polyethylene glycol/lytes  1 Packet QDAY PRN    And   • magnesium hydroxide  30 mL QDAY PRN    And   • bisacodyl  10 mg QDAY PRN   • Respiratory Care per Protocol   Continuous RT   • acetaminophen  650 mg Q6HRS PRN   • Pharmacy Consult Request  1 Each PHARMACY TO DOSE    And   • oxyCODONE immediate-release  5 mg Q3HRS PRN    And   • oxyCODONE immediate-release  10 mg Q3HRS PRN    And   • morphine injection  4 mg Q3HRS PRN   • ondansetron  4 mg Q4HRS PRN   • ondansetron  4 mg Q4HRS PRN   • amLODIPine  10 mg DAILY   • atorvastatin  20 mg Nightly   • lisinopril  40 mg DAILY   • loratadine  10 mg DAILY   • methocarbamol  750 mg BID   • omeprazole  20 mg DAILY   • spironolactone  25 mg DAILY   • terazosin  5 mg BID   • verapamil ER  180 mg BID       Assessment and Plan:  Hospital day #5  Scheduled for thoracic 4-6 laminectomy, bilateral T5 transpedicular approach for tumor debulking, thoracic 4-6 fusion with stealth guidance  Continue steroids  Consent/R/B/A  NPO after MN

## 2019-07-19 NOTE — PROGRESS NOTES
Hospital Medicine Daily Progress Note    Date of Service  7/19/2019    Chief Complaint  69 y.o. male admitted 7/15/2019 with T5 mass.    Hospital Course  Admitted with T5 mass with complaints of increasing weakness of both legs.    Interval Problem Update  T5 mass - surgery has been moved up to tomorrow  HTN - sbp 120-140, Echo pending  Diabetes - BS 200s  Low B12    Consultants/Specialty  Neurosurgery    Code Status  Full    Disposition  Rehab?    Review of Systems  Review of Systems   Constitutional: Negative for chills, fever and malaise/fatigue.   HENT: Negative for hearing loss and sore throat.    Eyes: Negative for blurred vision.   Respiratory: Negative for cough, shortness of breath and wheezing.    Cardiovascular: Negative for chest pain, palpitations and leg swelling.   Gastrointestinal: Negative for abdominal pain, diarrhea, heartburn, nausea and vomiting.   Genitourinary: Negative for dysuria, flank pain and hematuria.   Musculoskeletal: Positive for back pain. Negative for falls and neck pain.   Skin: Negative for rash.   Neurological: Positive for sensory change and focal weakness. Negative for dizziness, speech change, weakness and headaches.   Psychiatric/Behavioral: The patient is not nervous/anxious.         Physical Exam  Temp:  [36.7 °C (98 °F)-37.1 °C (98.7 °F)] 36.7 °C (98 °F)  Pulse:  [72-80] 76  Resp:  [16-18] 18  BP: (136-149)/(66-74) 136/69  SpO2:  [95 %-100 %] 95 %    Physical Exam   Constitutional: He is oriented to person, place, and time. He appears well-developed and well-nourished.   HENT:   Head: Normocephalic and atraumatic.   Eyes: Pupils are equal, round, and reactive to light. Conjunctivae are normal.   Neck: No tracheal deviation present. No thyromegaly present.   Cardiovascular: Normal rate and regular rhythm.    Pulmonary/Chest: Effort normal and breath sounds normal.   Abdominal: Soft. Bowel sounds are normal. He exhibits no distension. There is no tenderness.    Musculoskeletal: He exhibits no edema.   Lymphadenopathy:     He has no cervical adenopathy.   Neurological: He is alert and oriented to person, place, and time. No cranial nerve deficit.   MMT 5/5   Skin: Skin is warm and dry.   Nursing note and vitals reviewed.      Fluids    Intake/Output Summary (Last 24 hours) at 07/19/19 1335  Last data filed at 07/19/19 0831   Gross per 24 hour   Intake              360 ml   Output                0 ml   Net              360 ml       Laboratory  Recent Labs      07/17/19   1011   WBC  10.0   RBC  3.42*   HEMOGLOBIN  11.6*   HEMATOCRIT  33.6*   MCV  98.2*   MCH  33.9*   MCHC  34.5   RDW  46.5   PLATELETCT  204   MPV  9.8     Recent Labs      07/17/19   1011  07/18/19   0807  07/19/19   0351   SODIUM  132*  131*  132*   POTASSIUM  4.5  4.5  4.4   CHLORIDE  101  99  99   CO2  19*  20  21   GLUCOSE  322*  258*  225*   BUN  18  25*  27*   CREATININE  1.09  1.06  1.06   CALCIUM  9.6  10.2  10.1     Recent Labs      07/17/19   1011  07/19/19   0351   APTT   --   23.6*   INR  1.07   --                Imaging  CT-HEAD W/O   Final Result         1.  No acute intracranial abnormality is identified, there are nonspecific white matter changes, commonly associated with small vessel ischemic disease.  Associated mild cerebral atrophy is noted.   2.  Atherosclerosis.      MR-THORACIC SPINE-WITH   Final Result      1.  Destructive marrow replacing mass involving the T5 vertebra with destruction of the posterior elements and epidural soft tissue mass primarily dorsal with cord compression and marked central stenosis. Differential considerations would include osseous    metastatic disease, plasmacytoma (multiple myeloma), or other primary spinal tumors. The lesion appears amenable to needle biopsy.   2.  T9-T10 small left paramedian disc protrusion.   3.  Bilateral foraminal stenosis at T5-T6 where the T5 soft tissue mass encroaches on the foramina.   4. The case was discussed (call report)  with DR PLUMMER on duty for  LACY FERRARO at 1445 hours 7/16/2019.      CT-CHEST,ABDOMEN,PELVIS WITH   Final Result      Small bilateral pulmonary nodules. Follow-up CT chest is recommended.      Large lobulated mass replacing the posterior elements of T5 with significant spinal canal stenosis.      Atherosclerotic plaque, including coronary artery calcification.      Mildly enlarged subcarinal, cardiophrenic and borderline retroperitoneal and retrocrural lymph nodes are nonspecific.      Enlarged left adrenal gland with a 1.8 cm nodule which is indeterminate and could be metastatic. PET/CT would be beneficial for further evaluation.      Moderate amount of colonic stool. Colonic diverticulosis.      Distended bladder.                  CT-TSPINE W/O PLUS RECONS   Final Result         1.  Lytic expansile lesion in the pedicles and posterior elements, appearance most compatible with metastatic or primary bone disease. Question possible compromise of the cord. Could be further evaluated with MRI without and with contrast.   2.  Atherosclerotic calcifications are noted.      These findings were discussed with the patient's clinician, SUSAN BARLOW, on 7/15/2019 10:29 PM.      DX-CHEST-2 VIEWS   Final Result         1.  Right infrahilar density suggests infiltrate.      EC-ECHOCARDIOGRAM COMPLETE W/O CONT    (Results Pending)        Assessment/Plan  * Mass of thoracic vertebra- (present on admission)   Assessment & Plan    IV Decadron  Plan for Surgery - 7/20/2019     Fall   Assessment & Plan    PT and OT     Low vitamin B12 level- (present on admission)   Assessment & Plan    Check homocysteine level     PVD (peripheral vascular disease) (HCC)- (present on admission)   Assessment & Plan    Lipitor  Hold Plavix     Essential hypertension- (present on admission)   Assessment & Plan    Verapamil, Aldactone, Terazosin, Norvasc, Lisinopril  IV Hydralazine prn  Echocardiogram pending     Type 2 diabetes mellitus  without complication, without long-term current use of insulin (HCC)- (present on admission)   Assessment & Plan    SSI, increase Lantus to 20 u     Obesity- (present on admission)   Assessment & Plan    Body mass index is 31.57 kg/m².       Dyslipidemia- (present on admission)   Assessment & Plan    Lipitor          VTE prophylaxis: SCD

## 2019-07-19 NOTE — DISCHARGE PLANNING
· RPG ordered received from Dr. Lamas  · Medicare listed as insurance.   · Dx:  T5 mass; surgery planned for 7/20. Progressive myelopathy.    · This referral will not be sent to Physiatry at this time.  Will continue to follow.  · Thank you and I appreciate referral.

## 2019-07-19 NOTE — DISCHARGE PLANNING
Anticipated Discharge Disposition:   CM asked MD for physiatry consult.     Action:   Notified Dr. Aquino of patient who agreed to follow pt after surgery.  Discussed during IDT rounds. MD is agreeable to order a physiatry consult.   Rounding done. Pt aware that physiatry will see him after surgery.     Barriers to Discharge:   Pending surgery.    Plan:   Follow up with Dr. Aquino post surgery.

## 2019-07-20 ENCOUNTER — ANESTHESIA EVENT (OUTPATIENT)
Dept: SURGERY | Facility: MEDICAL CENTER | Age: 69
DRG: 029 | End: 2019-07-20
Payer: MEDICARE

## 2019-07-20 ENCOUNTER — APPOINTMENT (OUTPATIENT)
Dept: RADIOLOGY | Facility: MEDICAL CENTER | Age: 69
DRG: 029 | End: 2019-07-20
Attending: NEUROLOGICAL SURGERY
Payer: MEDICARE

## 2019-07-20 ENCOUNTER — ANESTHESIA (OUTPATIENT)
Dept: SURGERY | Facility: MEDICAL CENTER | Age: 69
DRG: 029 | End: 2019-07-20
Payer: MEDICARE

## 2019-07-20 LAB
ABO + RH BLD: NORMAL
ABO GROUP BLD: NORMAL
BARCODED ABORH UBTYP: 6200
BARCODED ABORH UBTYP: 6200
BARCODED ABORH UBTYP: 9500
BARCODED PRD CODE UBPRD: NORMAL
BARCODED UNIT NUM UBUNT: NORMAL
BLD GP AB SCN SERPL QL: NORMAL
COMPONENT R 8504R: NORMAL
GLUCOSE BLD-MCNC: 263 MG/DL (ref 65–99)
GLUCOSE BLD-MCNC: 294 MG/DL (ref 65–99)
GLUCOSE BLD-MCNC: 295 MG/DL (ref 65–99)
HCYS SERPL-SCNC: 17.17 UMOL/L
PRODUCT TYPE UPROD: NORMAL
RH BLD: NORMAL
UNIT STATUS USTAT: NORMAL

## 2019-07-20 PROCEDURE — 4A11X4G MONITORING OF PERIPHERAL NERVOUS ELECTRICAL ACTIVITY, INTRAOPERATIVE, EXTERNAL APPROACH: ICD-10-PCS | Performed by: NEUROLOGICAL SURGERY

## 2019-07-20 PROCEDURE — 700105 HCHG RX REV CODE 258: Performed by: PHYSICIAN ASSISTANT

## 2019-07-20 PROCEDURE — 95861 NEEDLE EMG 2 EXTREMITIES: CPT | Performed by: NEUROLOGICAL SURGERY

## 2019-07-20 PROCEDURE — 86923 COMPATIBILITY TEST ELECTRIC: CPT | Mod: 91

## 2019-07-20 PROCEDURE — 110371 HCHG SHELL REV 272: Performed by: NEUROLOGICAL SURGERY

## 2019-07-20 PROCEDURE — 88342 IMHCHEM/IMCYTCHM 1ST ANTB: CPT

## 2019-07-20 PROCEDURE — 700105 HCHG RX REV CODE 258: Performed by: ANESTHESIOLOGY

## 2019-07-20 PROCEDURE — 82962 GLUCOSE BLOOD TEST: CPT

## 2019-07-20 PROCEDURE — 500864 HCHG NEEDLE, SPINAL 18G: Performed by: NEUROLOGICAL SURGERY

## 2019-07-20 PROCEDURE — 700111 HCHG RX REV CODE 636 W/ 250 OVERRIDE (IP): Performed by: NEUROLOGICAL SURGERY

## 2019-07-20 PROCEDURE — 88331 PATH CONSLTJ SURG 1 BLK 1SPC: CPT | Mod: 91

## 2019-07-20 PROCEDURE — 700102 HCHG RX REV CODE 250 W/ 637 OVERRIDE(OP): Performed by: INTERNAL MEDICINE

## 2019-07-20 PROCEDURE — 700102 HCHG RX REV CODE 250 W/ 637 OVERRIDE(OP): Performed by: PHYSICIAN ASSISTANT

## 2019-07-20 PROCEDURE — 88369 M/PHMTRC ALYSISHQUANT/SEMIQ: CPT

## 2019-07-20 PROCEDURE — 86850 RBC ANTIBODY SCREEN: CPT

## 2019-07-20 PROCEDURE — 88305 TISSUE EXAM BY PATHOLOGIST: CPT | Mod: 59

## 2019-07-20 PROCEDURE — A9270 NON-COVERED ITEM OR SERVICE: HCPCS | Performed by: PHYSICIAN ASSISTANT

## 2019-07-20 PROCEDURE — 500445 HCHG HEMOSTAT, SURGICEL 4X8: Performed by: NEUROLOGICAL SURGERY

## 2019-07-20 PROCEDURE — 01N80ZZ RELEASE THORACIC NERVE, OPEN APPROACH: ICD-10-PCS | Performed by: NEUROLOGICAL SURGERY

## 2019-07-20 PROCEDURE — 95937 NEUROMUSCULAR JUNCTION TEST: CPT | Performed by: NEUROLOGICAL SURGERY

## 2019-07-20 PROCEDURE — 160031 HCHG SURGERY MINUTES - 1ST 30 MINS LEVEL 5: Performed by: NEUROLOGICAL SURGERY

## 2019-07-20 PROCEDURE — 160009 HCHG ANES TIME/MIN: Performed by: NEUROLOGICAL SURGERY

## 2019-07-20 PROCEDURE — 700106 HCHG RX REV CODE 271: Performed by: NEUROLOGICAL SURGERY

## 2019-07-20 PROCEDURE — 95925 SOMATOSENSORY TESTING: CPT | Performed by: NEUROLOGICAL SURGERY

## 2019-07-20 PROCEDURE — 160048 HCHG OR STATISTICAL LEVEL 1-5: Performed by: NEUROLOGICAL SURGERY

## 2019-07-20 PROCEDURE — 88311 DECALCIFY TISSUE: CPT

## 2019-07-20 PROCEDURE — 501838 HCHG SUTURE GENERAL: Performed by: NEUROLOGICAL SURGERY

## 2019-07-20 PROCEDURE — 700105 HCHG RX REV CODE 258: Performed by: NEUROLOGICAL SURGERY

## 2019-07-20 PROCEDURE — 95939 C MOTOR EVOKED UPR&LWR LIMBS: CPT | Performed by: NEUROLOGICAL SURGERY

## 2019-07-20 PROCEDURE — 700111 HCHG RX REV CODE 636 W/ 250 OVERRIDE (IP): Performed by: ANESTHESIOLOGY

## 2019-07-20 PROCEDURE — 770001 HCHG ROOM/CARE - MED/SURG/GYN PRIV*

## 2019-07-20 PROCEDURE — A4314 CATH W/DRAINAGE 2-WAY LATEX: HCPCS | Performed by: NEUROLOGICAL SURGERY

## 2019-07-20 PROCEDURE — 700111 HCHG RX REV CODE 636 W/ 250 OVERRIDE (IP): Performed by: PHYSICIAN ASSISTANT

## 2019-07-20 PROCEDURE — A9270 NON-COVERED ITEM OR SERVICE: HCPCS | Performed by: INTERNAL MEDICINE

## 2019-07-20 PROCEDURE — 700101 HCHG RX REV CODE 250: Performed by: ANESTHESIOLOGY

## 2019-07-20 PROCEDURE — 88313 SPECIAL STAINS GROUP 2: CPT

## 2019-07-20 PROCEDURE — 00BT0ZZ EXCISION OF SPINAL MENINGES, OPEN APPROACH: ICD-10-PCS | Performed by: NEUROLOGICAL SURGERY

## 2019-07-20 PROCEDURE — 00NX0ZZ RELEASE THORACIC SPINAL CORD, OPEN APPROACH: ICD-10-PCS | Performed by: NEUROLOGICAL SURGERY

## 2019-07-20 PROCEDURE — 160042 HCHG SURGERY MINUTES - EA ADDL 1 MIN LEVEL 5: Performed by: NEUROLOGICAL SURGERY

## 2019-07-20 PROCEDURE — 700111 HCHG RX REV CODE 636 W/ 250 OVERRIDE (IP): Performed by: HOSPITALIST

## 2019-07-20 PROCEDURE — 700102 HCHG RX REV CODE 250 W/ 637 OVERRIDE(OP): Performed by: HOSPITALIST

## 2019-07-20 PROCEDURE — C1713 ANCHOR/SCREW BN/BN,TIS/BN: HCPCS | Performed by: NEUROLOGICAL SURGERY

## 2019-07-20 PROCEDURE — 88341 IMHCHEM/IMCYTCHM EA ADD ANTB: CPT | Mod: 91

## 2019-07-20 PROCEDURE — 72070 X-RAY EXAM THORAC SPINE 2VWS: CPT

## 2019-07-20 PROCEDURE — 700112 HCHG RX REV CODE 229: Performed by: PHYSICIAN ASSISTANT

## 2019-07-20 PROCEDURE — 99232 SBSQ HOSP IP/OBS MODERATE 35: CPT | Performed by: HOSPITALIST

## 2019-07-20 PROCEDURE — 500885 HCHG PACK, JACKSON TABLE: Performed by: NEUROLOGICAL SURGERY

## 2019-07-20 PROCEDURE — 160036 HCHG PACU - EA ADDL 30 MINS PHASE I: Performed by: NEUROLOGICAL SURGERY

## 2019-07-20 PROCEDURE — 0RG70K1 FUSION OF 2 TO 7 THORACIC VERTEBRAL JOINTS WITH NONAUTOLOGOUS TISSUE SUBSTITUTE, POSTERIOR APPROACH, POSTERIOR COLUMN, OPEN APPROACH: ICD-10-PCS | Performed by: NEUROLOGICAL SURGERY

## 2019-07-20 PROCEDURE — 86900 BLOOD TYPING SEROLOGIC ABO: CPT

## 2019-07-20 PROCEDURE — 88307 TISSUE EXAM BY PATHOLOGIST: CPT

## 2019-07-20 PROCEDURE — 160002 HCHG RECOVERY MINUTES (STAT): Performed by: NEUROLOGICAL SURGERY

## 2019-07-20 PROCEDURE — 700101 HCHG RX REV CODE 250: Performed by: NEUROLOGICAL SURGERY

## 2019-07-20 PROCEDURE — 83090 ASSAY OF HOMOCYSTEINE: CPT

## 2019-07-20 PROCEDURE — 160035 HCHG PACU - 1ST 60 MINS PHASE I: Performed by: NEUROLOGICAL SURGERY

## 2019-07-20 PROCEDURE — 88368 INSITU HYBRIDIZATION MANUAL: CPT

## 2019-07-20 PROCEDURE — A9270 NON-COVERED ITEM OR SERVICE: HCPCS | Performed by: HOSPITALIST

## 2019-07-20 PROCEDURE — 88342 IMHCHEM/IMCYTCHM 1ST ANTB: CPT | Mod: 91

## 2019-07-20 PROCEDURE — 95940 IONM IN OPERATNG ROOM 15 MIN: CPT | Performed by: NEUROLOGICAL SURGERY

## 2019-07-20 PROCEDURE — 86901 BLOOD TYPING SEROLOGIC RH(D): CPT

## 2019-07-20 PROCEDURE — 36415 COLL VENOUS BLD VENIPUNCTURE: CPT

## 2019-07-20 DEVICE — SCREW MAS  SOLERA 5.5 X 30MM (1TCX8+3TCX6=26): Type: IMPLANTABLE DEVICE | Status: FUNCTIONAL

## 2019-07-20 DEVICE — SCREW SOLERA SET SCREW (1TCX40+3TCX21+2TCX10=123): Type: IMPLANTABLE DEVICE | Status: FUNCTIONAL

## 2019-07-20 DEVICE — SCREW MAS  SOLERA 5.5 X 40MM (1TCX8+3TCX8=32): Type: IMPLANTABLE DEVICE | Status: FUNCTIONAL

## 2019-07-20 DEVICE — MAGNIFUSE 1X5CM: Type: IMPLANTABLE DEVICE | Status: FUNCTIONAL

## 2019-07-20 DEVICE — ROD STRAIGHT TITANIUM ALLOY ROD 5.5 X 500MM (1TCX3+3TCX2=9): Type: IMPLANTABLE DEVICE | Status: FUNCTIONAL

## 2019-07-20 DEVICE — SCREW MAS SOLERA 4.0MM X 30MM (2TCONX6=12): Type: IMPLANTABLE DEVICE | Status: FUNCTIONAL

## 2019-07-20 RX ORDER — ALPRAZOLAM 0.25 MG/1
0.5 TABLET ORAL 3 TIMES DAILY PRN
Status: DISCONTINUED | OUTPATIENT
Start: 2019-07-20 | End: 2019-07-25 | Stop reason: HOSPADM

## 2019-07-20 RX ORDER — HYDROMORPHONE HYDROCHLORIDE 1 MG/ML
0.4 INJECTION, SOLUTION INTRAMUSCULAR; INTRAVENOUS; SUBCUTANEOUS
Status: DISCONTINUED | OUTPATIENT
Start: 2019-07-20 | End: 2019-07-20 | Stop reason: HOSPADM

## 2019-07-20 RX ORDER — HYDRALAZINE HYDROCHLORIDE 20 MG/ML
5 INJECTION INTRAMUSCULAR; INTRAVENOUS
Status: DISCONTINUED | OUTPATIENT
Start: 2019-07-20 | End: 2019-07-20 | Stop reason: HOSPADM

## 2019-07-20 RX ORDER — DIPHENHYDRAMINE HCL 25 MG
25 TABLET ORAL EVERY 6 HOURS PRN
Status: DISCONTINUED | OUTPATIENT
Start: 2019-07-20 | End: 2019-07-25 | Stop reason: HOSPADM

## 2019-07-20 RX ORDER — ONDANSETRON 2 MG/ML
4 INJECTION INTRAMUSCULAR; INTRAVENOUS
Status: DISCONTINUED | OUTPATIENT
Start: 2019-07-20 | End: 2019-07-20 | Stop reason: HOSPADM

## 2019-07-20 RX ORDER — OXYCODONE HYDROCHLORIDE 5 MG/1
5 TABLET ORAL
Status: DISCONTINUED | OUTPATIENT
Start: 2019-07-20 | End: 2019-07-25 | Stop reason: HOSPADM

## 2019-07-20 RX ORDER — ONDANSETRON 4 MG/1
4 TABLET, ORALLY DISINTEGRATING ORAL EVERY 4 HOURS PRN
Status: DISCONTINUED | OUTPATIENT
Start: 2019-07-20 | End: 2019-07-25 | Stop reason: HOSPADM

## 2019-07-20 RX ORDER — HYDROMORPHONE HYDROCHLORIDE 1 MG/ML
0.5 INJECTION, SOLUTION INTRAMUSCULAR; INTRAVENOUS; SUBCUTANEOUS
Status: DISCONTINUED | OUTPATIENT
Start: 2019-07-20 | End: 2019-07-25 | Stop reason: HOSPADM

## 2019-07-20 RX ORDER — VANCOMYCIN HYDROCHLORIDE 1 G/20ML
INJECTION, POWDER, LYOPHILIZED, FOR SOLUTION INTRAVENOUS
Status: COMPLETED | OUTPATIENT
Start: 2019-07-20 | End: 2019-07-20

## 2019-07-20 RX ORDER — SODIUM CHLORIDE 9 MG/ML
INJECTION, SOLUTION INTRAVENOUS CONTINUOUS
Status: DISCONTINUED | OUTPATIENT
Start: 2019-07-20 | End: 2019-07-22

## 2019-07-20 RX ORDER — SODIUM CHLORIDE, SODIUM LACTATE, POTASSIUM CHLORIDE, AND CALCIUM CHLORIDE .6; .31; .03; .02 G/100ML; G/100ML; G/100ML; G/100ML
IRRIGANT IRRIGATION
Status: DISCONTINUED | OUTPATIENT
Start: 2019-07-20 | End: 2019-07-20 | Stop reason: HOSPADM

## 2019-07-20 RX ORDER — DOCUSATE SODIUM 100 MG/1
100 CAPSULE, LIQUID FILLED ORAL 2 TIMES DAILY
Status: DISCONTINUED | OUTPATIENT
Start: 2019-07-20 | End: 2019-07-25 | Stop reason: HOSPADM

## 2019-07-20 RX ORDER — CALCIUM CARBONATE 500 MG/1
500 TABLET, CHEWABLE ORAL 2 TIMES DAILY
Status: DISCONTINUED | OUTPATIENT
Start: 2019-07-20 | End: 2019-07-25 | Stop reason: HOSPADM

## 2019-07-20 RX ORDER — SODIUM CHLORIDE, SODIUM LACTATE, POTASSIUM CHLORIDE, CALCIUM CHLORIDE 600; 310; 30; 20 MG/100ML; MG/100ML; MG/100ML; MG/100ML
INJECTION, SOLUTION INTRAVENOUS
Status: DISCONTINUED | OUTPATIENT
Start: 2019-07-20 | End: 2019-07-20 | Stop reason: SURG

## 2019-07-20 RX ORDER — METHOCARBAMOL 750 MG/1
750 TABLET, FILM COATED ORAL 4 TIMES DAILY
Status: DISCONTINUED | OUTPATIENT
Start: 2019-07-20 | End: 2019-07-25 | Stop reason: HOSPADM

## 2019-07-20 RX ORDER — SODIUM CHLORIDE 9 MG/ML
INJECTION, SOLUTION INTRAVENOUS
Status: COMPLETED | OUTPATIENT
Start: 2019-07-20 | End: 2019-07-20

## 2019-07-20 RX ORDER — DEXAMETHASONE SODIUM PHOSPHATE 4 MG/ML
4 INJECTION, SOLUTION INTRA-ARTICULAR; INTRALESIONAL; INTRAMUSCULAR; INTRAVENOUS; SOFT TISSUE EVERY 8 HOURS
Status: COMPLETED | OUTPATIENT
Start: 2019-07-20 | End: 2019-07-21

## 2019-07-20 RX ORDER — OXYCODONE HYDROCHLORIDE 5 MG/1
10 TABLET ORAL
Status: DISCONTINUED | OUTPATIENT
Start: 2019-07-20 | End: 2019-07-25 | Stop reason: HOSPADM

## 2019-07-20 RX ORDER — OXYCODONE HCL 5 MG/5 ML
10 SOLUTION, ORAL ORAL
Status: DISCONTINUED | OUTPATIENT
Start: 2019-07-20 | End: 2019-07-20 | Stop reason: HOSPADM

## 2019-07-20 RX ORDER — BACITRACIN 50000 [IU]/1
INJECTION, POWDER, FOR SOLUTION INTRAMUSCULAR
Status: DISCONTINUED | OUTPATIENT
Start: 2019-07-20 | End: 2019-07-20 | Stop reason: HOSPADM

## 2019-07-20 RX ORDER — BUPIVACAINE HYDROCHLORIDE AND EPINEPHRINE 5; 5 MG/ML; UG/ML
INJECTION, SOLUTION EPIDURAL; INTRACAUDAL; PERINEURAL
Status: DISCONTINUED | OUTPATIENT
Start: 2019-07-20 | End: 2019-07-20 | Stop reason: HOSPADM

## 2019-07-20 RX ORDER — BISACODYL 10 MG
10 SUPPOSITORY, RECTAL RECTAL
Status: DISCONTINUED | OUTPATIENT
Start: 2019-07-20 | End: 2019-07-25 | Stop reason: HOSPADM

## 2019-07-20 RX ORDER — ENEMA 19; 7 G/133ML; G/133ML
1 ENEMA RECTAL
Status: DISCONTINUED | OUTPATIENT
Start: 2019-07-20 | End: 2019-07-25 | Stop reason: HOSPADM

## 2019-07-20 RX ORDER — CLONIDINE HYDROCHLORIDE 0.1 MG/1
0.1 TABLET ORAL EVERY 4 HOURS PRN
Status: DISCONTINUED | OUTPATIENT
Start: 2019-07-20 | End: 2019-07-25 | Stop reason: HOSPADM

## 2019-07-20 RX ORDER — ONDANSETRON 2 MG/ML
INJECTION INTRAMUSCULAR; INTRAVENOUS PRN
Status: DISCONTINUED | OUTPATIENT
Start: 2019-07-20 | End: 2019-07-20 | Stop reason: SURG

## 2019-07-20 RX ORDER — MEPERIDINE HYDROCHLORIDE 25 MG/ML
12.5 INJECTION INTRAMUSCULAR; INTRAVENOUS; SUBCUTANEOUS
Status: DISCONTINUED | OUTPATIENT
Start: 2019-07-20 | End: 2019-07-20 | Stop reason: HOSPADM

## 2019-07-20 RX ORDER — POLYETHYLENE GLYCOL 3350 17 G/17G
1 POWDER, FOR SOLUTION ORAL 2 TIMES DAILY PRN
Status: DISCONTINUED | OUTPATIENT
Start: 2019-07-20 | End: 2019-07-25 | Stop reason: HOSPADM

## 2019-07-20 RX ORDER — HYDROMORPHONE HYDROCHLORIDE 2 MG/ML
INJECTION, SOLUTION INTRAMUSCULAR; INTRAVENOUS; SUBCUTANEOUS PRN
Status: DISCONTINUED | OUTPATIENT
Start: 2019-07-20 | End: 2019-07-20 | Stop reason: SURG

## 2019-07-20 RX ORDER — MAGNESIUM HYDROXIDE 1200 MG/15ML
LIQUID ORAL
Status: COMPLETED | OUTPATIENT
Start: 2019-07-20 | End: 2019-07-20

## 2019-07-20 RX ORDER — DEXAMETHASONE SODIUM PHOSPHATE 4 MG/ML
INJECTION, SOLUTION INTRA-ARTICULAR; INTRALESIONAL; INTRAMUSCULAR; INTRAVENOUS; SOFT TISSUE
Status: DISPENSED
Start: 2019-07-20 | End: 2019-07-21

## 2019-07-20 RX ORDER — DIPHENHYDRAMINE HYDROCHLORIDE 50 MG/ML
12.5 INJECTION INTRAMUSCULAR; INTRAVENOUS
Status: DISCONTINUED | OUTPATIENT
Start: 2019-07-20 | End: 2019-07-20 | Stop reason: HOSPADM

## 2019-07-20 RX ORDER — CEFAZOLIN SODIUM 2 G/100ML
2 INJECTION, SOLUTION INTRAVENOUS EVERY 8 HOURS
Status: COMPLETED | OUTPATIENT
Start: 2019-07-20 | End: 2019-07-20

## 2019-07-20 RX ORDER — SODIUM CHLORIDE, SODIUM LACTATE, POTASSIUM CHLORIDE, CALCIUM CHLORIDE 600; 310; 30; 20 MG/100ML; MG/100ML; MG/100ML; MG/100ML
INJECTION, SOLUTION INTRAVENOUS CONTINUOUS
Status: DISCONTINUED | OUTPATIENT
Start: 2019-07-20 | End: 2019-07-20 | Stop reason: HOSPADM

## 2019-07-20 RX ORDER — HYDROMORPHONE HYDROCHLORIDE 1 MG/ML
0.1 INJECTION, SOLUTION INTRAMUSCULAR; INTRAVENOUS; SUBCUTANEOUS
Status: DISCONTINUED | OUTPATIENT
Start: 2019-07-20 | End: 2019-07-20 | Stop reason: HOSPADM

## 2019-07-20 RX ORDER — DIPHENHYDRAMINE HYDROCHLORIDE 50 MG/ML
25 INJECTION INTRAMUSCULAR; INTRAVENOUS EVERY 6 HOURS PRN
Status: DISCONTINUED | OUTPATIENT
Start: 2019-07-20 | End: 2019-07-25 | Stop reason: HOSPADM

## 2019-07-20 RX ORDER — DEXAMETHASONE SODIUM PHOSPHATE 4 MG/ML
INJECTION, SOLUTION INTRA-ARTICULAR; INTRALESIONAL; INTRAMUSCULAR; INTRAVENOUS; SOFT TISSUE PRN
Status: DISCONTINUED | OUTPATIENT
Start: 2019-07-20 | End: 2019-07-20 | Stop reason: SURG

## 2019-07-20 RX ORDER — ONDANSETRON 2 MG/ML
4 INJECTION INTRAMUSCULAR; INTRAVENOUS EVERY 4 HOURS PRN
Status: DISCONTINUED | OUTPATIENT
Start: 2019-07-20 | End: 2019-07-25 | Stop reason: HOSPADM

## 2019-07-20 RX ORDER — CEFAZOLIN SODIUM 1 G/3ML
INJECTION, POWDER, FOR SOLUTION INTRAMUSCULAR; INTRAVENOUS PRN
Status: DISCONTINUED | OUTPATIENT
Start: 2019-07-20 | End: 2019-07-20 | Stop reason: SURG

## 2019-07-20 RX ORDER — AMOXICILLIN 250 MG
1 CAPSULE ORAL NIGHTLY
Status: DISCONTINUED | OUTPATIENT
Start: 2019-07-20 | End: 2019-07-25 | Stop reason: HOSPADM

## 2019-07-20 RX ORDER — BUPIVACAINE HYDROCHLORIDE 5 MG/ML
INJECTION, SOLUTION EPIDURAL; INTRACAUDAL
Status: DISCONTINUED | OUTPATIENT
Start: 2019-07-20 | End: 2019-07-20 | Stop reason: HOSPADM

## 2019-07-20 RX ORDER — PHENYLEPHRINE HYDROCHLORIDE 10 MG/ML
INJECTION, SOLUTION INTRAMUSCULAR; INTRAVENOUS; SUBCUTANEOUS PRN
Status: DISCONTINUED | OUTPATIENT
Start: 2019-07-20 | End: 2019-07-20 | Stop reason: SURG

## 2019-07-20 RX ORDER — OXYCODONE HCL 5 MG/5 ML
5 SOLUTION, ORAL ORAL
Status: DISCONTINUED | OUTPATIENT
Start: 2019-07-20 | End: 2019-07-20 | Stop reason: HOSPADM

## 2019-07-20 RX ORDER — HYDROMORPHONE HYDROCHLORIDE 1 MG/ML
0.2 INJECTION, SOLUTION INTRAMUSCULAR; INTRAVENOUS; SUBCUTANEOUS
Status: DISCONTINUED | OUTPATIENT
Start: 2019-07-20 | End: 2019-07-20 | Stop reason: HOSPADM

## 2019-07-20 RX ORDER — AMOXICILLIN 250 MG
1 CAPSULE ORAL
Status: DISCONTINUED | OUTPATIENT
Start: 2019-07-20 | End: 2019-07-25 | Stop reason: HOSPADM

## 2019-07-20 RX ADMIN — OMEPRAZOLE 20 MG: 20 CAPSULE, DELAYED RELEASE ORAL at 04:54

## 2019-07-20 RX ADMIN — DEXAMETHASONE SODIUM PHOSPHATE 4 MG: 4 INJECTION, SOLUTION INTRA-ARTICULAR; INTRALESIONAL; INTRAMUSCULAR; INTRAVENOUS; SOFT TISSUE at 20:42

## 2019-07-20 RX ADMIN — MIDAZOLAM HYDROCHLORIDE 2 MG: 1 INJECTION, SOLUTION INTRAMUSCULAR; INTRAVENOUS at 07:42

## 2019-07-20 RX ADMIN — PHENYLEPHRINE HYDROCHLORIDE 100 MCG: 10 INJECTION INTRAVENOUS at 08:40

## 2019-07-20 RX ADMIN — SODIUM CHLORIDE: 9 INJECTION, SOLUTION INTRAVENOUS at 15:18

## 2019-07-20 RX ADMIN — VERAPAMIL HYDROCHLORIDE 180 MG: 180 TABLET, FILM COATED, EXTENDED RELEASE ORAL at 17:29

## 2019-07-20 RX ADMIN — PROPOFOL 130 MG: 10 INJECTION, EMULSION INTRAVENOUS at 07:47

## 2019-07-20 RX ADMIN — CEFAZOLIN SODIUM 2 G: 2 INJECTION, SOLUTION INTRAVENOUS at 22:12

## 2019-07-20 RX ADMIN — METHOCARBAMOL TABLETS 750 MG: 750 TABLET, COATED ORAL at 04:54

## 2019-07-20 RX ADMIN — SENNOSIDES, DOCUSATE SODIUM 1 TABLET: 50; 8.6 TABLET, FILM COATED ORAL at 20:40

## 2019-07-20 RX ADMIN — METHOCARBAMOL TABLETS 750 MG: 750 TABLET, COATED ORAL at 17:31

## 2019-07-20 RX ADMIN — ACETAMINOPHEN 650 MG: 325 TABLET, FILM COATED ORAL at 22:10

## 2019-07-20 RX ADMIN — FENTANYL CITRATE 50 MCG: 50 INJECTION, SOLUTION INTRAMUSCULAR; INTRAVENOUS at 08:05

## 2019-07-20 RX ADMIN — HYDROMORPHONE HYDROCHLORIDE 1 MG: 2 INJECTION, SOLUTION INTRAMUSCULAR; INTRAVENOUS; SUBCUTANEOUS at 11:47

## 2019-07-20 RX ADMIN — PHENYLEPHRINE HYDROCHLORIDE 100 MCG: 10 INJECTION INTRAVENOUS at 08:52

## 2019-07-20 RX ADMIN — PHENYLEPHRINE HYDROCHLORIDE 100 MCG: 10 INJECTION INTRAVENOUS at 09:05

## 2019-07-20 RX ADMIN — SODIUM CHLORIDE: 9 INJECTION, SOLUTION INTRAVENOUS at 22:13

## 2019-07-20 RX ADMIN — METHOCARBAMOL TABLETS 750 MG: 750 TABLET, COATED ORAL at 15:21

## 2019-07-20 RX ADMIN — FENTANYL CITRATE 50 MCG: 50 INJECTION, SOLUTION INTRAMUSCULAR; INTRAVENOUS at 10:04

## 2019-07-20 RX ADMIN — PROPOFOL 120 MCG/KG/MIN: 10 INJECTION, EMULSION INTRAVENOUS at 07:47

## 2019-07-20 RX ADMIN — FENTANYL CITRATE 50 MCG: 0.05 INJECTION, SOLUTION INTRAMUSCULAR; INTRAVENOUS at 13:36

## 2019-07-20 RX ADMIN — FENTANYL CITRATE 50 MCG: 50 INJECTION, SOLUTION INTRAMUSCULAR; INTRAVENOUS at 11:21

## 2019-07-20 RX ADMIN — AMLODIPINE BESYLATE 10 MG: 5 TABLET ORAL at 04:54

## 2019-07-20 RX ADMIN — VITAMIN D, TAB 1000IU (100/BT) 5000 UNITS: 25 TAB at 15:21

## 2019-07-20 RX ADMIN — ANTACID TABLETS 500 MG: 500 TABLET, CHEWABLE ORAL at 17:29

## 2019-07-20 RX ADMIN — VERAPAMIL HYDROCHLORIDE 180 MG: 180 TABLET, FILM COATED, EXTENDED RELEASE ORAL at 04:54

## 2019-07-20 RX ADMIN — SODIUM CHLORIDE, POTASSIUM CHLORIDE, SODIUM LACTATE AND CALCIUM CHLORIDE: 600; 310; 30; 20 INJECTION, SOLUTION INTRAVENOUS at 08:55

## 2019-07-20 RX ADMIN — ROCURONIUM BROMIDE 28 MG: 10 INJECTION, SOLUTION INTRAVENOUS at 07:47

## 2019-07-20 RX ADMIN — METFORMIN HYDROCHLORIDE 500 MG: 500 TABLET, FILM COATED ORAL at 17:00

## 2019-07-20 RX ADMIN — LORATADINE 10 MG: 10 TABLET ORAL at 04:54

## 2019-07-20 RX ADMIN — PHENYLEPHRINE HYDROCHLORIDE 100 MCG: 10 INJECTION INTRAVENOUS at 10:43

## 2019-07-20 RX ADMIN — FENTANYL CITRATE 50 MCG: 50 INJECTION, SOLUTION INTRAMUSCULAR; INTRAVENOUS at 08:15

## 2019-07-20 RX ADMIN — ONDANSETRON 4 MG: 2 INJECTION INTRAMUSCULAR; INTRAVENOUS at 11:43

## 2019-07-20 RX ADMIN — ALPRAZOLAM 0.5 MG: 0.25 TABLET ORAL at 22:10

## 2019-07-20 RX ADMIN — SODIUM CHLORIDE, POTASSIUM CHLORIDE, SODIUM LACTATE AND CALCIUM CHLORIDE: 600; 310; 30; 20 INJECTION, SOLUTION INTRAVENOUS at 07:47

## 2019-07-20 RX ADMIN — METHOCARBAMOL TABLETS 750 MG: 750 TABLET, COATED ORAL at 20:40

## 2019-07-20 RX ADMIN — DOCUSATE SODIUM 100 MG: 100 CAPSULE, LIQUID FILLED ORAL at 18:00

## 2019-07-20 RX ADMIN — DEXAMETHASONE SODIUM PHOSPHATE 4 MG: 4 INJECTION, SOLUTION INTRA-ARTICULAR; INTRALESIONAL; INTRAMUSCULAR; INTRAVENOUS; SOFT TISSUE at 13:29

## 2019-07-20 RX ADMIN — SENNOSIDES, DOCUSATE SODIUM 2 TABLET: 50; 8.6 TABLET, FILM COATED ORAL at 04:55

## 2019-07-20 RX ADMIN — CEFAZOLIN 2 G: 330 INJECTION, POWDER, FOR SOLUTION INTRAMUSCULAR; INTRAVENOUS at 07:52

## 2019-07-20 RX ADMIN — CEFAZOLIN SODIUM 2 G: 2 INJECTION, SOLUTION INTRAVENOUS at 15:18

## 2019-07-20 RX ADMIN — SODIUM CHLORIDE, POTASSIUM CHLORIDE, SODIUM LACTATE AND CALCIUM CHLORIDE: 600; 310; 30; 20 INJECTION, SOLUTION INTRAVENOUS at 10:20

## 2019-07-20 RX ADMIN — TERAZOSIN HYDROCHLORIDE 5 MG: 5 CAPSULE ORAL at 18:00

## 2019-07-20 RX ADMIN — FENTANYL CITRATE 50 MCG: 50 INJECTION, SOLUTION INTRAMUSCULAR; INTRAVENOUS at 07:47

## 2019-07-20 RX ADMIN — ATORVASTATIN CALCIUM 20 MG: 20 TABLET, FILM COATED ORAL at 20:40

## 2019-07-20 RX ADMIN — DEXAMETHASONE SODIUM PHOSPHATE 4 MG: 4 INJECTION, SOLUTION INTRA-ARTICULAR; INTRALESIONAL; INTRAMUSCULAR; INTRAVENOUS; SOFT TISSUE at 04:56

## 2019-07-20 RX ADMIN — HYDROMORPHONE HYDROCHLORIDE 1 MG: 2 INJECTION, SOLUTION INTRAMUSCULAR; INTRAVENOUS; SUBCUTANEOUS at 11:53

## 2019-07-20 RX ADMIN — DEXAMETHASONE SODIUM PHOSPHATE 4 MG: 4 INJECTION, SOLUTION INTRA-ARTICULAR; INTRALESIONAL; INTRAMUSCULAR; INTRAVENOUS; SOFT TISSUE at 07:55

## 2019-07-20 RX ADMIN — PHENYLEPHRINE HYDROCHLORIDE 100 MCG: 10 INJECTION INTRAVENOUS at 09:36

## 2019-07-20 RX ADMIN — OXYCODONE HYDROCHLORIDE 5 MG: 5 TABLET ORAL at 20:40

## 2019-07-20 RX ADMIN — LIDOCAINE HYDROCHLORIDE 40 MG: 20 INJECTION, SOLUTION INTRAVENOUS at 07:47

## 2019-07-20 ASSESSMENT — ENCOUNTER SYMPTOMS
CHILLS: 0
WHEEZING: 0
NECK PAIN: 0
FOCAL WEAKNESS: 1
SPEECH CHANGE: 0
SORE THROAT: 0
BACK PAIN: 0
NERVOUS/ANXIOUS: 0
ABDOMINAL PAIN: 0
FEVER: 0
COUGH: 0
SENSORY CHANGE: 0
NAUSEA: 0
DIZZINESS: 0
PALPITATIONS: 0
VOMITING: 0
FLANK PAIN: 0
BLURRED VISION: 0
DIARRHEA: 0
WEAKNESS: 0
FALLS: 0
SHORTNESS OF BREATH: 0
HEARTBURN: 0
HEADACHES: 0

## 2019-07-20 ASSESSMENT — PAIN SCALES - GENERAL: PAIN_LEVEL: 1

## 2019-07-20 NOTE — ANESTHESIA PREPROCEDURE EVALUATION
Relevant Problems   (+) Essential hypertension   (+) Mass of thoracic vertebra   (+) Type 2 diabetes mellitus without complication, without long-term current use of insulin (HCC)       Physical Exam    Airway   Mallampati: III  TM distance: >3 FB  Neck ROM: full       Cardiovascular - normal exam  Rhythm: regular  Rate: normal  (-) murmur     Dental - normal exam  (+) implants           Pulmonary - normal exam  Breath sounds clear to auscultation  (+) decreased breath sounds     Abdominal    Neurological - normal exam                 Anesthesia Plan    ASA 2       Plan - general       Airway plan will be ETT        Induction: intravenous    Postoperative Plan: Postoperative administration of opioids is intended.    Pertinent diagnostic labs and testing reviewed    Informed Consent:    Anesthetic plan and risks discussed with patient.    Use of blood products discussed with: patient whom consented to blood products.

## 2019-07-20 NOTE — PROGRESS NOTES
Dr. Gasca was called via telephone and asked if pt is supposed to get his trazosin before Sx. MD stated to hold the med

## 2019-07-20 NOTE — OR SURGEON
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    Anesthesiologist/Type of Anesthesia:  Anesthesiologist: Gorge Thomas M.D./General    Surgical Staff:  Assistant: Trae Covarrubias P.A.-C.  Circulator: Val Encarnacion; Brandi Gabriel R.NMaru  Relief Scrub: Ziggy Wayne  Scrub Person: Stuart Dennis  Radiology Technologist: Jose Regalado : Trina Heller    Specimens removed if any:  ID Type Source Tests Collected by Time Destination   A : THORACIC EPIDURAL TUMOR Other Other PATHOLOGY SPECIMEN Val Encarnacion 7/20/2019 0849    B : T5 MUSCLE Other Other PATHOLOGY SPECIMEN Win Velasco III, M.D. 7/20/2019 0850    C : RIGHT T5 MUSCLE Other Other PATHOLOGY SPECIMEN Win Velasco III, M.D. 7/20/2019 0854    D : LEFT T5 MUSCLE Other Other PATHOLOGY SPECIMEN Win Velasco III, M.D. 7/20/2019 0854    E : T4 Spinous Process Other Other PATHOLOGY SPECIMEN Win Velasco III, M.D. 7/20/2019 0953    F : Thoracic Epidural Tumor Other Other PATHOLOGY SPECIMEN Win Velasco III, M.D. 7/20/2019 0957    G : T5 Pedicle Margin Other Other PATHOLOGY SPECIMEN Win Velasco III, M.D. 7/20/2019 1055        Estimated Blood Loss: 500 cc    Findings: See Op Note    Complications: None        7/20/2019 12:12 PM Trae Covarrubias P.A.-C.

## 2019-07-20 NOTE — ANESTHESIA POSTPROCEDURE EVALUATION
After Visit Summary   11/30/2017    Reji Lockwood    MRN: [id number]           Patient Information     Date Of Birth          1984        Visit Information        Provider Department      11/30/2017 9:45 AM [facility] [employer]; NURSE ONLY [facility] [facility]        Today's Diagnoses     Cervical insufficiency during pregnancy, antepartum, second trimester        Supervision of high risk pregnancy, antepartum, second trimester        AMA (advanced maternal age) multigravida 35+, second trimester           Follow-ups after your visit        Your next 10 appointments already scheduled     Dec 04, 2017  2:45 PM CST   Telephone Visit with Mg Cde Provider   [facility]    [address] [place] [id number]   [phone]           Note: this is not an onsite visit; there is no need to come to the facility.            Dec 08, 2017  1:00 PM CST   Office Visit with Main Norwood MD   [facility]    [address] [place] [id number] [phone]           Bring a current list of meds and any records pertaining to this visit. For Physicals, please bring immunization records and any forms needing to be filled out. Please arrive 10 minutes early to complete paperwork.            Dec 12, 2017 11:00 AM CST   Return Visit with Mg Cde Provider   [facility]    [address] [place] [id number]   [phone]            Mar 08, 2018 10:40 AM CST   Return Visit with Breana Hood MD   [facility])    [address] [place] [id number]   [phone]              Who to contact     If you have questions or need follow up information about today's clinic visit or your schedule please contact [facility] directly at        Patient: Ian Laird    Procedure Summary     Date:  07/20/19 Room / Location:  [facility] 05 / SURGERY [facility]    Anesthesia Start:  0742 Anesthesia Stop:  1218    Procedures:       EXTRADURAL TUMOR COMPLETE RESECTION, T3-T7 STEALTH FUSION (Spine Thoracic)      LAMINECTOMY, SPINE, THORACIC- T4-6; BILATERAL T5 TRANSPEDICULAR APPROACH (Spine Thoracic) Diagnosis:  (Thoracic Epidural Tumor, Thoracic Myelopathy)    Surgeon:  Win Velasco III, M.D. Responsible Provider:  Gorge Thomas M.D.    Anesthesia Type:  general ASA Status:  2          Final Anesthesia Type: general  Last vitals  BP   Blood Pressure : 151/75, NIBP: 119/53    Temp   37 °C (98.6 °F)    Pulse   Pulse: 78, Heart Rate (Monitored): 77   Resp   (!) 10    SpO2   100 %      Anesthesia Post Evaluation    Patient location during evaluation: PACU  Patient participation: complete - patient participated  Level of consciousness: confused  Pain score: 1    Airway patency: patent  Anesthetic complications: no  Cardiovascular status: hemodynamically stable  Respiratory status: acceptable  Hydration status: euvolemic    PONV: none           Nurse Pain Score: 1 (NPRS)         "[phone].  Normal or non-critical lab and imaging results will be communicated to you by FreshRealmhart, letter or phone within 4 business days after the clinic has received the results. If you do not hear from us within 7 days, please contact the clinic through FreshRealmhart or phone. If you have a critical or abnormal lab result, we will notify you by phone as soon as possible.  Submit refill requests through Tiller or call your pharmacy and they will forward the refill request to us. Please allow 3 business days for your refill to be completed.          Additional Information About Your Visit        FreshRealmharCoupmon Information     Tiller lets you send messages to your doctor, view your test results, renew your prescriptions, schedule appointments and more. To sign up, go to [email or web address] . Click on \"Log in\" on the left side of the screen, which will take you to the Welcome page. Then click on \"Sign up Now\" on the right side of the page.     You will be asked to enter the access code listed below, as well as some personal information. Please follow the directions to create your username and password.     Your access code is: [email or web address]  Expires: 12/10/2017  2:39 PM     Your access code will  in 90 days. If you need help or a new code, please call your [facility] clinic or [phone].        Care EveryWhere ID     This is your Care EveryWhere ID. This could be used by other organizations to access your [facility] medical records  [id number]        Your Vitals Were     Last Period                   2017 (Within Weeks)            Blood Pressure from Last 3 Encounters:   17 110/68   17 103/70   17 103/68    Weight from Last 3 Encounters:   17 65.8 kg (145 lb)   17 64.9 kg (143 lb)   17 65.8 kg (145 lb 1 oz)              We Performed the Following     HYDROXYPROGESTERONE      INJECTION INTRAMUSCULAR OR SUB-Q          Today's Medication Changes          These changes " are accurate as of: 11/30/17 10:48 AM.  If you have any questions, ask your nurse or doctor.               These medicines have changed or have updated prescriptions.        Dose/Directions    * insulin  UNIT/ML injection   Commonly known as:  NovoLIN N VIAL   This may have changed:  Another medication with the same name was changed. Make sure you understand how and when to take each.   Used for:  Gestational diabetes mellitus        5 units before breakfast and 5 units before dinner   Quantity:  10 mL   Refills:  3       * insulin isophane human 100 UNIT/ML injection   Commonly known as:  HumuLIN N PEN   This may have changed:  additional instructions   Used for:  Gestational diabetes mellitus        Take 5 units before breakfast  And 5 units before dinner   Quantity:  15 mL   Refills:  1       * Notice:  This list has 2 medication(s) that are the same as other medications prescribed for you. Read the directions carefully, and ask your doctor or other care provider to review them with you.             Primary Care Provider Office Phone # Fax #    Fadia Georgia Serrano [phone]       [address]  [facility] [address]        Equal Access to Services     [facility]: Hadii aad ku hadasho Soomaali, waaxda luqadaha, qaybta kaalmada adeegyaangela, ana daily . So [facility] [phone].    ATENCIÓN: Si habla español, tiene a gilliam disposición servicios gratuitos de asistencia lingüística. Deborah al [phone].    We comply with applicable federal civil rights laws and [place] laws. We do not discriminate on the basis of race, color, national origin, age, disability, sex, sexual orientation, or gender identity.            Thank you!     Thank you for choosing [facility]  for your care. Our goal is always to provide you with excellent care. Hearing back from our patients is one way we can continue to improve our services. Please take a  few minutes to complete the written survey that you may receive in the mail after your visit with us. Thank you!             Your Updated Medication List - Protect others around you: Learn how to safely use, store and throw away your medicines at www.disposemymeds.org.          This list is accurate as of: 11/30/17 10:48 AM.  Always use your most recent med list.                   Brand Name Dispense Instructions for use Diagnosis    acetaminophen 325 MG tablet    TYLENOL    100 tablet    Take 2 tablets (650 mg) by mouth every 4 hours as needed for mild pain or fever    Cervical insufficiency during pregnancy, antepartum, second trimester       acetone (Urine) test Strp     25 each    Use one strip daily to check for urine ketones    Abnormal maternal glucose tolerance, antepartum       * blood glucose monitoring lancets     100 each    Use to test blood sugar 4 times daily or as directed.    Abnormal maternal glucose tolerance, antepartum       * blood glucose monitoring lancets     100 each    Use to test blood sugar 4 times daily or as directed.    Gestational diabetes mellitus       blood glucose monitoring meter device kit     1 kit    Use to test blood sugar 4 times daily or as directed.    Gestational diabetes mellitus (GDM), antepartum, gestational diabetes method of control unspecified       blood glucose monitoring test strip    no brand specified    200 strip    Use to test blood sugars 4 times daily or as directed    Abnormal maternal glucose tolerance, antepartum       docusate sodium 100 MG tablet    COLACE    60 tablet    Take 100 mg by mouth daily    Cervical insufficiency during pregnancy, antepartum, second trimester       hydroxyprogesterone caproate in oil 250 mg/mL intramuscular injection    LYDIA    5 mL    Inject 1 mL (250 mg) into the muscle every 7 days    Cervical insufficiency during pregnancy, antepartum, second trimester, Supervision of high risk pregnancy, antepartum, second trimester,  "History of  delivery, currently pregnant in second trimester       * insulin  UNIT/ML injection    NovoLIN N VIAL    10 mL    5 units before breakfast and 5 units before dinner    Gestational diabetes mellitus       * insulin isophane human 100 UNIT/ML injection    HumuLIN N PEN    15 mL    Take 5 units before breakfast  And 5 units before dinner    Gestational diabetes mellitus       insulin pen needle 31G X 5 MM     100 each    Use 2 pen needles daily or as directed.    Gestational diabetes mellitus       insulin syringe-needle U-100 31G X 15/64\" 0.3 ML     100 each    Use 2 syringes daily or as directed.    Gestational diabetes mellitus       ondansetron 4 MG ODT tab    ZOFRAN ODT    30 tablet    Take 1-2 tablets (4-8 mg) by mouth every 8 hours as needed for nausea    Nausea/vomiting in pregnancy       prenatal multivitamin plus iron 27-0.8 MG Tabs per tablet     100 tablet    Take 1 tablet by mouth daily    Pregnancy test positive       tenofovir 300 MG tablet    VIREAD    90 tablet    Take 1 tablet (300 mg) by mouth daily    Viral hepatitis B chronic (H)       * Notice:  This list has 4 medication(s) that are the same as other medications prescribed for you. Read the directions carefully, and ask your doctor or other care provider to review them with you.      "

## 2019-07-20 NOTE — OR NURSING
PT from OR w/ OPA in place, accompanied by RN/ANES.  OPA d/c at 1249, Dr. Velasco called after PT awake and moving extremities, PT still w/ weakness on RLE, Dr. Velasco advised of same.  Telephone order given for Decadron 4mg once dose now and q8hrs for three doses. PT A/O x1 presently, is able to tell me he is having pain but not able to verbalize severity, PT confused and wanting to get up out of bed, able to redirect PT verbally and reorient to place/time/event.  Attempted to call PT wife for update, however, phone number is house phone and PT wife not in waiting area for surgery, went for lunch.  Longoria w/ stat lock in place draining clear yellow urine, RASHARD to bulb suction draining serosanguinous drainage, emptied x3.  DSG over staples, xeroform, 4x4 w/ medipore tape, CDI. PT given Fentanyl 50 mcg, VSS, Oxygen weaned to 3L Oxymask.  Will call report to floor.

## 2019-07-20 NOTE — ANESTHESIA PROCEDURE NOTES
Airway  Date/Time: 7/20/2019 7:50 AM  Performed by: STEPHANIE MONTOYA  Authorized by: STEPHANIE MONTOYA     Location:  OR  Urgency:  Elective  Difficult Airway: No    Indications for Airway Management:  Anesthesia  Spontaneous Ventilation: absent    Sedation Level:  Deep  Preoxygenated: Yes    Patient Position:  Sniffing  Mask Difficulty Assessment:  1 - vent by mask  Final Airway Type:  Endotracheal airway  Final Endotracheal Airway:  ETT  Cuffed: Yes    Technique Used for Successful ETT Placement:  Direct laryngoscopy  Insertion Site:  Oral  Blade Type:  Trey  Laryngoscope Blade/Videolaryngoscope Blade Size:  3  ETT Size (mm):  7.5  Measured from:  Teeth  ETT to Teeth (cm):  25  Placement Verified by: auscultation and capnometry    Cormack-Lehane Classification:  Grade IIb - view of arytenoids or posterior of glottis only  Number of Attempts at Approach:  1

## 2019-07-20 NOTE — ANESTHESIA TIME REPORT
Anesthesia Start and Stop Event Times     Date Time Event    7/20/2019 0742 Anesthesia Start     1218 Anesthesia Stop        Responsible Staff  07/20/19    Name Role Begin End    Gorge Thomas M.D. Anesth 0742 1218        Preop Diagnosis (Free Text):  Pre-op Diagnosis     Thoracic Epidural Tumor, Thoracic Myelopathy        Preop Diagnosis (Codes):  Diagnosis Information     Diagnosis Code(s):         Post op Diagnosis  Intradural extramedullary thoracic tumor  Thoracic Myelopathy    Premium Reason  E. Weekend    Comments:

## 2019-07-20 NOTE — OP REPORT
DATE OF SERVICE:  07/20/2019    PREOPERATIVE DIAGNOSES:  1.  Thoracic myelopathy.  2.  Thoracic stenosis.  3.  Extradural thoracic mass.    POSTOPERATIVE DIAGNOSES:  1.  Thoracic myelopathy.  2.  Thoracic stenosis.  3.  Extradural thoracic mass.    PROCEDURES PERFORMED:  1.  Stealth guidance for the spine.  2.  T3, T4, T6, and T7 stealth-guided pedicle screw fixation.  3.  T4, T5, and T6 laminectomy and decompression of thecal sac and nerve   roots.  4.  Left T5 transpedicular approach, 59 modifier.  5.  Right T5 transpedicular approach, 59 modifier.  6.  Thoracic extradural mass resection in gross total.  7.  T3, T4, T5, T6, T7, posterolateral allograft/pillo fusion.  8.  Intraoperative monitoring.    SURGEON:  Win Velasco III, MD    ASSISTANT:  Trae Covarrubias PA-C    ANESTHESIA:  General.    ESTIMATED BLOOD LOSS:  300 mL    FINDINGS:  Well-decompressed thecal sac complete resection with essentially   negative margins.  Frozen consistent with either melanoma or neural origin   tumor, unlikely sarcoma.  No change in motors and SSEPs.    DRAINS:  Left subfascial RASHARD.    COMPLICATIONS:  None.    SPECIMENS SENT; frozen (neural or possible melanoma origin, negative muscle margins), permanents    DISPOSITION:  Extubated to recovery and to the floor.    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  I explained the risks,   benefits, and alternatives of a transpedicular approach, decompression and   fusion including pain, infection, bleeding, CSF leak, failure to completely   resolve symptoms, neurologic deficit including pain, weakness, numbness, bowel   or bladder difficulties, failure fixation, failure fusion, need for rostral   caudal extensions due to junctional stenosis, and need for adjuvant   treatments.  He understood the risks, benefits, and agreed  to consent.    SUMMARY OF OPERATIVE PROCEDURE:  The patient was brought to the operating   suite, placed under general anesthesia, Longoria catheter was placed, and lines   secured.  Upper extremity motors and SSEPs were run at baseline by NMA   monitoring.  There are very poor signals, but followable in the distal lower   extremities.  MAPs were kept greater than 80 during the case.  He was rolled   in the prone position on a chest, hip, thigh Juan table, arms in a    tucked position.  All padded pressure points secured.  X-ray fluoroscopy was   brought in to draw a midline T3-T7 incision.  This was infiltrated with   Marcaine with epinephrine.  Preoperative antibiotics were given.  Proper   timeout was performed.  The patient was prepped and draped in sterile fashion.    A linear incision was made and soft tissue dissected with bipolar and   monopolar electrocautery.  We found the dorsal fascia, incised it sharply.    Using subperiosteal techniques, we stripped the muscle off the T3, T4, T5, T6,   and T7 lamina.  We then carefully constructed margins due to the questionable   soft tissue extension.  There was definitely appeared some muscle involved as well, so that was resected around T5 using visualization techniques of tissue consistency to look for 1 cm margins.    We sent muscle biopsies around the periphery and only a piece that was deep to   right along the bone of T5, came back positive on part, so we think we have essentially negative   margins in the muscle.  There was not too much muscle resected that would that require a   plastic surgery muscle flap reconstruction.  Intermittent motors and SSEPs only   changed during some of the extradural mass removal on the right side, but it   returned to normal within 20 minutes.  Once we isolated all the correct levels, we turned our   attention on the stealth guidance.    Stealth guidance for the spine:  Using a spinous process clamp with the   [employer]  O-arm system set on T6, we draped the patient appropriately and ran   an O-arm spin and all the trajectories were excellent.  Respirations were held   to improve accuracy.    T3-T4 and T6-T7 stealth-guided pedicle screw fixation:  T5 pedicles were not   usable due to being infiltrated by tumor.  Using the [employer] O-arm reference   frame tools, we make small corticectomy guided by the reference frame.  I   made a small  hole, awled in under stealth, undertapped under stealth.    On the left T3, we placed 5.5x30 mm screw and on the right 4.0x30 mm screw.    At T4, we placed a 5.5x30 mm screw on the left and 4.0x30 mm screw on the   right.  On bilaterally at T6, we placed 5.5x40 mm screws and bilaterally at   T7, we placed 5.5x40 mm screws.  We draped the patient again with the O-arm   spin and all our trajectories were excellent for the screws.    T4, T5, and T6 laminectomy, foraminotomy, and decompression of thecal sac and   nerve roots:  We noticed that the T4 lamina had mostly been eaten away and   isolated soft, fleshy tumor sending for frozen and it came back a questionable   neural versus melanoma origin.  It did not appear to be sarcoma.  We decided,   we are still going to be able to get because the bony margins on the CAT scan   showed it was just involving T5.  We again were able to get a complete   resection.  We removed the spinous process of T4 and T6 and then drilled off   the lamina of T4, T5, and T6 widely.  T5 is essentially out of the laminectomy   due to tumor.  We used sweeping with the [employer] and loupe   magnification with some microscopic instruments to tease tumor off of the T4   and the T6 area.  We had wide decompression.  We bipolared back to bleeding,   epidural veins and turned our attention to the transpedicular approach.    Left T5 and right T5 transpedicular approach, 59 modifier:  This maneuver   required additional level of skills, risk and expertise to do so without    causing neurologic deficit while skeletonizing the exiting T5 nerve roots to allow for gross total resection,   justifying 59 modifier coding for the entire case.  Using sweeping with the   [employer] dental and loupe magnification techniques, we were able to tease off the   nerve roots exiting at T5, both left and the right.  We then cored out the   pedicle deeper than what the stealth showed.  It was the level of tumor   involvement and sent tumor biopsies for permanent of the T5 pedicle basis to   prove we got through to it and used a curette circumferentially in that   particular space to make sure we had all the way down to normal bone.  We were   able to resect with a transpedicular approaches down to the costal head above   and below and we saw those clean bony margins and along with not violating   the pleura getting down to the pleural edge, we felt we had a gross total   resection.  The epidural tumor along the lateral spaces was also easily   identifiable.    Thoracic epidural, gross total resection of tumor:  Using loupe magnification   and careful instruments work, we slowly peeled off the tumor completely   circumferentially at T4, T5, and T6.  It was globular, mildly vascular, not particularly colored, appearing to me like adenomatous-type tissue. Again, with the transpedicular approach,   we were able to get into the pedicle, resected the eaten out pedicle tumor   and using curettes and getting down to the pedicle of T4 and the pedicle of T6   to make sure the creeping epidural tumor was completely resected.  We felt   very confident we had a gross total resection.  There did not appear to be any   of T4-T6 bone invaded by the tumor.  The right side was a poly   rootlet exit zone with the DRG well noted.  Some of the tumor was very   adherent.  We were able to tease it off.  We did run motors and there was a   slight drop on the right side about 15% in the motors amplitudes that returned   after about  30 minutes.  We were able to get complete resection with the   transpedicular approaches going down into the pedicle.       T3, T4, T5, T6, and T7 allograft/pillo fusion:  We decorticated the transverse   process and costal heads of T3, T4, T5, T6, and T7.  To that, we laid down   bags of allograft MagniFuse absorbable allograft for eventual onlay fusion.    This was supplemented with a 5.5 lordotic titanium alloy pillo bent with   kyphosis, laid neutrally in the setscrews using manufacture's torque device to   tighten on the locking caps.  We used 2 crosslinks.  We were are happy with   our final construct.  There were no changes in the motors and SSEPs.    We copiously irrigated with bacitracin infused saline.  We tunneled out 7 flat   fluted RASHARD subfascially and secured to the skin with a stitch.  We laid down   vancomycin powder and closed the wound in anatomic layers after infiltrating   the muscles with Marcaine with 0 Vicryl for the muscle, 0 Vicryl for the   fascia, 2-0 Vicryl for the dermis, and staples for the skin.  Sterile dressing   was applied.  The patient was rolled from prone to supine, Longoria removed, and   extubated without incident.    There were no complications.  Needles and sponge count were correct at the end   of the case.       ____________________________________     MD PACO Biggs IIIP / ROMANA    DD:  07/20/2019 12:10:45  DT:  07/20/2019 14:32:53    D#:  [id number]  Job#:  [id number]

## 2019-07-20 NOTE — PROGRESS NOTES
Pt was told he was NPO (nothing by mouth) and to avoid eating or drinking anything in prep for Sx in AM. Pt verbalized understanding. NPO sign placed on doorway

## 2019-07-20 NOTE — PROGRESS NOTES
[facility] Medicine Daily Progress Note    Date of Service  7/20/2019    Chief Complaint  69 y.o. male admitted 7/15/2019 with T5 mass.    Hospital Course  Admitted with T5 mass with complaints of increasing weakness of both legs.    Interval Problem Update  Some confusion s/p surgery consistent with anesthesia  Denies pain  No sob    Consultants/Specialty  Neurosurgery    Code Status  Full    Disposition  Rehab?    Review of Systems  Review of Systems   Constitutional: Negative for chills, fever and malaise/fatigue.   HENT: Negative for hearing loss and sore throat.    Eyes: Negative for blurred vision.   Respiratory: Negative for cough, shortness of breath and wheezing.    Cardiovascular: Negative for chest pain, palpitations and leg swelling.   Gastrointestinal: Negative for abdominal pain, diarrhea, heartburn, nausea and vomiting.   Genitourinary: Negative for dysuria, flank pain and hematuria.   Musculoskeletal: Negative for back pain, falls and neck pain.   Skin: Negative for rash.   Neurological: Positive for focal weakness. Negative for dizziness, sensory change, speech change, weakness and headaches.   Psychiatric/Behavioral: The patient is not nervous/anxious.         Physical Exam  Temp:  [36.2 °C (97.1 °F)-37 °C (98.6 °F)] 37 °C (98.6 °F)  Pulse:  [65-83] 78  Resp:  [7-20] 10  BP: (145-154)/(59-75) 151/75  SpO2:  [86 %-100 %] 100 %    Physical Exam   Constitutional: He is oriented to person, place, and time. He appears well-developed and well-nourished.   HENT:   Head: Normocephalic and atraumatic.   Eyes: Pupils are equal, round, and reactive to light. Conjunctivae are normal.   Neck: No tracheal deviation present. No thyromegaly present.   Cardiovascular: Normal rate and regular rhythm.    Pulmonary/Chest: Effort normal and breath sounds normal.   Abdominal: Soft. Bowel sounds are normal. He exhibits no distension. There is no tenderness.   Musculoskeletal: He exhibits no edema.   Lymphadenopathy:     He  has no cervical adenopathy.   Neurological: He is alert and oriented to person, place, and time. No cranial nerve deficit.   Right LE weakness   Skin: Skin is warm and dry.   Surgical bandages in place   Nursing note and vitals reviewed.      Fluids    Intake/Output Summary (Last 24 hours) at 07/20/19 1542  Last data filed at 07/20/19 1400   Gross per 24 hour   Intake             3240 ml   Output             4650 ml   Net            -1410 ml       Laboratory      Recent Labs      07/18/19   0807  07/19/19   0351   SODIUM  131*  132*   POTASSIUM  4.5  4.4   CHLORIDE  99  99   CO2  20  21   GLUCOSE  258*  225*   BUN  25*  27*   CREATININE  1.06  1.06   CALCIUM  10.2  10.1     Recent Labs      07/19/19   0351   APTT  23.6*               Imaging  DX-THORACIC SPINE-2 VIEWS   Final Result      3 intraoperative spot images submitted for surgical hardware localization for diagnostic purposes demonstrate posterior pillo and transpedicular screw fixation transfixing previously seen destructive T5 mass. See surgical report for details.   Fluoroscopy Time:  12 seconds.  3 fluoroscopic images were obtained.      EC-ECHOCARDIOGRAM COMPLETE W/O CONT   Final Result      CT-HEAD W/O   Final Result         1.  No acute intracranial abnormality is identified, there are nonspecific white matter changes, commonly associated with small vessel ischemic disease.  Associated mild cerebral atrophy is noted.   2.  Atherosclerosis.      MR-THORACIC SPINE-WITH   Final Result      1.  Destructive marrow replacing mass involving the T5 vertebra with destruction of the posterior elements and epidural soft tissue mass primarily dorsal with cord compression and marked central stenosis. Differential considerations would include osseous    metastatic disease, plasmacytoma (multiple myeloma), or other primary spinal tumors. The lesion appears amenable to needle biopsy.   2.  T9-T10 small left paramedian disc protrusion.   3.  Bilateral foraminal stenosis  at T5-T6 where the T5 soft tissue mass encroaches on the foramina.   4. The case was discussed (call report) with DR PLUMMER on duty for  [facility] at 1445 hours 7/16/2019.      CT-CHEST,ABDOMEN,PELVIS WITH   Final Result      Small bilateral pulmonary nodules. Follow-up CT chest is recommended.      Large lobulated mass replacing the posterior elements of T5 with significant spinal canal stenosis.      Atherosclerotic plaque, including coronary artery calcification.      Mildly enlarged subcarinal, cardiophrenic and borderline retroperitoneal and retrocrural lymph nodes are nonspecific.      Enlarged left adrenal gland with a 1.8 cm nodule which is indeterminate and could be metastatic. PET/CT would be beneficial for further evaluation.      Moderate amount of colonic stool. Colonic diverticulosis.      Distended bladder.                  CT-TSPINE W/O PLUS RECONS   Final Result         1.  Lytic expansile lesion in the pedicles and posterior elements, appearance most compatible with metastatic or primary bone disease. Question possible compromise of the cord. Could be further evaluated with MRI without and with contrast.   2.  Atherosclerotic calcifications are noted.      These findings were discussed with the patient's clinician, SUSAN BARLOW, on 7/15/2019 10:29 PM.      DX-CHEST-2 VIEWS   Final Result         1.  Right infrahilar density suggests infiltrate.      DX-PORTABLE FLUORO > 1 HOUR    (Results Pending)   DX-O-ARM    (Results Pending)        Assessment/Plan  * Mass of thoracic vertebra- (present on admission)   Assessment & Plan    IV Decadron  Plan for Surgery - 7/20/2019     Fall   Assessment & Plan    PT and OT     Low vitamin B12 level- (present on admission)   Assessment & Plan    Check homocysteine level     PVD (peripheral vascular disease) (HCC)- (present on admission)   Assessment & Plan    Lipitor  Hold Plavix     Essential hypertension- (present on admission)   Assessment &  Plan    Verapamil, Aldactone, Terazosin, Norvasc, Lisinopril  IV Hydralazine prn  Echocardiogram pending     Type 2 diabetes mellitus without complication, without long-term current use of insulin (HCC)- (present on admission)   Assessment & Plan    SSI, increase Lantus to 20 u     Obesity- (present on admission)   Assessment & Plan    Body mass index is 31.57 kg/m².       Dyslipidemia- (present on admission)   Assessment & Plan    Lipitor          VTE prophylaxis: SCD       no

## 2019-07-21 PROBLEM — K59.00 CONSTIPATION: Status: ACTIVE | Noted: 2019-07-21

## 2019-07-21 LAB
ALBUMIN SERPL BCP-MCNC: 3.4 G/DL (ref 3.2–4.9)
ALBUMIN/GLOB SERPL: 1.5 G/DL
ALP SERPL-CCNC: 34 U/L (ref 30–99)
ALT SERPL-CCNC: 22 U/L (ref 2–50)
ANION GAP SERPL CALC-SCNC: 10 MMOL/L (ref 0–11.9)
AST SERPL-CCNC: 29 U/L (ref 12–45)
BASOPHILS # BLD AUTO: 0.1 % (ref 0–1.8)
BASOPHILS # BLD: 0.01 K/UL (ref 0–0.12)
BILIRUB SERPL-MCNC: 0.4 MG/DL (ref 0.1–1.5)
BUN SERPL-MCNC: 29 MG/DL (ref 8–22)
CALCIUM SERPL-MCNC: 8.7 MG/DL (ref 8.5–10.5)
CHLORIDE SERPL-SCNC: 95 MMOL/L (ref 96–112)
CO2 SERPL-SCNC: 23 MMOL/L (ref 20–33)
CREAT SERPL-MCNC: 1.05 MG/DL (ref 0.5–1.4)
EOSINOPHIL # BLD AUTO: 0 K/UL (ref 0–0.51)
EOSINOPHIL NFR BLD: 0 % (ref 0–6.9)
ERYTHROCYTE [DISTWIDTH] IN BLOOD BY AUTOMATED COUNT: 45.1 FL (ref 35.9–50)
GLOBULIN SER CALC-MCNC: 2.3 G/DL (ref 1.9–3.5)
GLUCOSE BLD-MCNC: 167 MG/DL (ref 65–99)
GLUCOSE BLD-MCNC: 172 MG/DL (ref 65–99)
GLUCOSE BLD-MCNC: 284 MG/DL (ref 65–99)
GLUCOSE BLD-MCNC: 289 MG/DL (ref 65–99)
GLUCOSE SERPL-MCNC: 244 MG/DL (ref 65–99)
HCT VFR BLD AUTO: 25.1 % (ref 42–52)
HGB BLD-MCNC: 8.5 G/DL (ref 14–18)
IMM GRANULOCYTES # BLD AUTO: 0.15 K/UL (ref 0–0.11)
IMM GRANULOCYTES NFR BLD AUTO: 1 % (ref 0–0.9)
LYMPHOCYTES # BLD AUTO: 0.67 K/UL (ref 1–4.8)
LYMPHOCYTES NFR BLD: 4.4 % (ref 22–41)
MCH RBC QN AUTO: 33.2 PG (ref 27–33)
MCHC RBC AUTO-ENTMCNC: 33.6 G/DL (ref 33.7–35.3)
MCV RBC AUTO: 98.8 FL (ref 81.4–97.8)
MONOCYTES # BLD AUTO: 1.48 K/UL (ref 0–0.85)
MONOCYTES NFR BLD AUTO: 9.8 % (ref 0–13.4)
NEUTROPHILS # BLD AUTO: 12.8 K/UL (ref 1.82–7.42)
NEUTROPHILS NFR BLD: 84.7 % (ref 44–72)
NRBC # BLD AUTO: 0 K/UL
NRBC BLD-RTO: 0 /100 WBC
PLATELET # BLD AUTO: 161 K/UL (ref 164–446)
PMV BLD AUTO: 10 FL (ref 9–12.9)
POTASSIUM SERPL-SCNC: 4.6 MMOL/L (ref 3.6–5.5)
PROT SERPL-MCNC: 5.7 G/DL (ref 6–8.2)
RBC # BLD AUTO: 2.5 M/UL (ref 4.7–6.1)
SODIUM SERPL-SCNC: 128 MMOL/L (ref 135–145)
WBC # BLD AUTO: 15.1 K/UL (ref 4.8–10.8)

## 2019-07-21 PROCEDURE — 80053 COMPREHEN METABOLIC PANEL: CPT

## 2019-07-21 PROCEDURE — 700112 HCHG RX REV CODE 229: Performed by: PHYSICIAN ASSISTANT

## 2019-07-21 PROCEDURE — 700102 HCHG RX REV CODE 250 W/ 637 OVERRIDE(OP): Performed by: INTERNAL MEDICINE

## 2019-07-21 PROCEDURE — 36415 COLL VENOUS BLD VENIPUNCTURE: CPT

## 2019-07-21 PROCEDURE — 700111 HCHG RX REV CODE 636 W/ 250 OVERRIDE (IP): Performed by: PHYSICIAN ASSISTANT

## 2019-07-21 PROCEDURE — A9270 NON-COVERED ITEM OR SERVICE: HCPCS | Performed by: INTERNAL MEDICINE

## 2019-07-21 PROCEDURE — A9270 NON-COVERED ITEM OR SERVICE: HCPCS | Performed by: HOSPITALIST

## 2019-07-21 PROCEDURE — 97166 OT EVAL MOD COMPLEX 45 MIN: CPT

## 2019-07-21 PROCEDURE — A9270 NON-COVERED ITEM OR SERVICE: HCPCS | Performed by: PHYSICIAN ASSISTANT

## 2019-07-21 PROCEDURE — 700102 HCHG RX REV CODE 250 W/ 637 OVERRIDE(OP): Performed by: FAMILY MEDICINE

## 2019-07-21 PROCEDURE — 700102 HCHG RX REV CODE 250 W/ 637 OVERRIDE(OP): Performed by: PHYSICIAN ASSISTANT

## 2019-07-21 PROCEDURE — 770001 HCHG ROOM/CARE - MED/SURG/GYN PRIV*

## 2019-07-21 PROCEDURE — 82962 GLUCOSE BLOOD TEST: CPT | Mod: 91

## 2019-07-21 PROCEDURE — 97162 PT EVAL MOD COMPLEX 30 MIN: CPT

## 2019-07-21 PROCEDURE — 700105 HCHG RX REV CODE 258: Performed by: PHYSICIAN ASSISTANT

## 2019-07-21 PROCEDURE — 85025 COMPLETE CBC W/AUTO DIFF WBC: CPT

## 2019-07-21 PROCEDURE — 700102 HCHG RX REV CODE 250 W/ 637 OVERRIDE(OP): Performed by: HOSPITALIST

## 2019-07-21 PROCEDURE — 700111 HCHG RX REV CODE 636 W/ 250 OVERRIDE (IP): Performed by: NEUROLOGICAL SURGERY

## 2019-07-21 PROCEDURE — 99232 SBSQ HOSP IP/OBS MODERATE 35: CPT | Performed by: HOSPITALIST

## 2019-07-21 RX ADMIN — VERAPAMIL HYDROCHLORIDE 180 MG: 180 TABLET, FILM COATED, EXTENDED RELEASE ORAL at 16:28

## 2019-07-21 RX ADMIN — DOCUSATE SODIUM 100 MG: 100 CAPSULE, LIQUID FILLED ORAL at 04:41

## 2019-07-21 RX ADMIN — VITAMIN D, TAB 1000IU (100/BT) 5000 UNITS: 25 TAB at 04:42

## 2019-07-21 RX ADMIN — ANTACID TABLETS 500 MG: 500 TABLET, CHEWABLE ORAL at 04:41

## 2019-07-21 RX ADMIN — TERAZOSIN HYDROCHLORIDE 5 MG: 5 CAPSULE ORAL at 16:27

## 2019-07-21 RX ADMIN — METHOCARBAMOL TABLETS 750 MG: 750 TABLET, COATED ORAL at 16:27

## 2019-07-21 RX ADMIN — ALPRAZOLAM 0.5 MG: 0.25 TABLET ORAL at 19:53

## 2019-07-21 RX ADMIN — METHOCARBAMOL TABLETS 750 MG: 750 TABLET, COATED ORAL at 19:53

## 2019-07-21 RX ADMIN — SODIUM CHLORIDE: 9 INJECTION, SOLUTION INTRAVENOUS at 04:38

## 2019-07-21 RX ADMIN — SPIRONOLACTONE 25 MG: 25 TABLET ORAL at 04:42

## 2019-07-21 RX ADMIN — ATORVASTATIN CALCIUM 20 MG: 20 TABLET, FILM COATED ORAL at 19:53

## 2019-07-21 RX ADMIN — AMLODIPINE BESYLATE 10 MG: 5 TABLET ORAL at 04:40

## 2019-07-21 RX ADMIN — INSULIN GLARGINE 20 UNITS: 100 INJECTION, SOLUTION SUBCUTANEOUS at 08:38

## 2019-07-21 RX ADMIN — TERAZOSIN HYDROCHLORIDE 5 MG: 5 CAPSULE ORAL at 04:44

## 2019-07-21 RX ADMIN — ANTACID TABLETS 500 MG: 500 TABLET, CHEWABLE ORAL at 16:28

## 2019-07-21 RX ADMIN — LORATADINE 10 MG: 10 TABLET ORAL at 04:42

## 2019-07-21 RX ADMIN — OXYCODONE HYDROCHLORIDE 10 MG: 5 TABLET ORAL at 17:44

## 2019-07-21 RX ADMIN — METFORMIN HYDROCHLORIDE 500 MG: 500 TABLET, FILM COATED ORAL at 16:28

## 2019-07-21 RX ADMIN — POLYETHYLENE GLYCOL 3350 1 PACKET: 17 POWDER, FOR SOLUTION ORAL at 04:46

## 2019-07-21 RX ADMIN — SENNOSIDES, DOCUSATE SODIUM 1 TABLET: 50; 8.6 TABLET, FILM COATED ORAL at 19:53

## 2019-07-21 RX ADMIN — HYDROMORPHONE HYDROCHLORIDE 0.5 MG: 1 INJECTION, SOLUTION INTRAMUSCULAR; INTRAVENOUS; SUBCUTANEOUS at 06:30

## 2019-07-21 RX ADMIN — OXYCODONE HYDROCHLORIDE 5 MG: 5 TABLET ORAL at 21:36

## 2019-07-21 RX ADMIN — DOCUSATE SODIUM 100 MG: 100 CAPSULE, LIQUID FILLED ORAL at 16:28

## 2019-07-21 RX ADMIN — OMEPRAZOLE 20 MG: 20 CAPSULE, DELAYED RELEASE ORAL at 04:41

## 2019-07-21 RX ADMIN — OXYCODONE HYDROCHLORIDE 10 MG: 5 TABLET ORAL at 11:37

## 2019-07-21 RX ADMIN — VERAPAMIL HYDROCHLORIDE 180 MG: 180 TABLET, FILM COATED, EXTENDED RELEASE ORAL at 04:41

## 2019-07-21 RX ADMIN — SODIUM CHLORIDE: 9 INJECTION, SOLUTION INTRAVENOUS at 14:56

## 2019-07-21 RX ADMIN — ENOXAPARIN SODIUM 40 MG: 100 INJECTION SUBCUTANEOUS at 16:28

## 2019-07-21 RX ADMIN — OXYCODONE HYDROCHLORIDE 10 MG: 5 TABLET ORAL at 08:32

## 2019-07-21 RX ADMIN — LISINOPRIL 40 MG: 20 TABLET ORAL at 04:40

## 2019-07-21 RX ADMIN — METHOCARBAMOL TABLETS 750 MG: 750 TABLET, COATED ORAL at 09:38

## 2019-07-21 RX ADMIN — ACETAMINOPHEN 650 MG: 325 TABLET, FILM COATED ORAL at 19:53

## 2019-07-21 RX ADMIN — METFORMIN HYDROCHLORIDE 1000 MG: 500 TABLET, FILM COATED ORAL at 08:33

## 2019-07-21 RX ADMIN — DEXAMETHASONE SODIUM PHOSPHATE 4 MG: 4 INJECTION, SOLUTION INTRA-ARTICULAR; INTRALESIONAL; INTRAMUSCULAR; INTRAVENOUS; SOFT TISSUE at 04:47

## 2019-07-21 RX ADMIN — OXYCODONE HYDROCHLORIDE 10 MG: 5 TABLET ORAL at 04:41

## 2019-07-21 ASSESSMENT — COGNITIVE AND FUNCTIONAL STATUS - GENERAL
SUGGESTED CMS G CODE MODIFIER MOBILITY: CL
TOILETING: A LITTLE
PERSONAL GROOMING: A LITTLE
DRESSING REGULAR UPPER BODY CLOTHING: A LITTLE
EATING MEALS: A LITTLE
MOBILITY SCORE: 12
MOVING FROM LYING ON BACK TO SITTING ON SIDE OF FLAT BED: UNABLE
MOVING TO AND FROM BED TO CHAIR: A LOT
SUGGESTED CMS G CODE MODIFIER DAILY ACTIVITY: CK
STANDING UP FROM CHAIR USING ARMS: A LITTLE
TURNING FROM BACK TO SIDE WHILE IN FLAT BAD: A LITTLE
HELP NEEDED FOR BATHING: A LOT
DAILY ACTIVITIY SCORE: 16
DRESSING REGULAR LOWER BODY CLOTHING: A LOT
CLIMB 3 TO 5 STEPS WITH RAILING: TOTAL
WALKING IN HOSPITAL ROOM: A LOT

## 2019-07-21 ASSESSMENT — GAIT ASSESSMENTS
DEVIATION: ATAXIC;DECREASED BASE OF SUPPORT
ASSISTIVE DEVICE: FRONT WHEEL WALKER
GAIT LEVEL OF ASSIST: MODERATE ASSIST
DISTANCE (FEET): 15

## 2019-07-21 ASSESSMENT — ENCOUNTER SYMPTOMS
FOCAL WEAKNESS: 0
DIARRHEA: 0
FLANK PAIN: 0
WHEEZING: 0
FALLS: 0
WEAKNESS: 0
SENSORY CHANGE: 0
CHILLS: 0
SHORTNESS OF BREATH: 0
COUGH: 0
SORE THROAT: 0
NAUSEA: 0
PALPITATIONS: 0
ABDOMINAL PAIN: 0
NECK PAIN: 0
DIZZINESS: 0
SPEECH CHANGE: 0
FEVER: 0
BACK PAIN: 1
BLURRED VISION: 0
NERVOUS/ANXIOUS: 0
HEARTBURN: 0
VOMITING: 0
HEADACHES: 0

## 2019-07-21 ASSESSMENT — ACTIVITIES OF DAILY LIVING (ADL): TOILETING: INDEPENDENT

## 2019-07-21 NOTE — CARE PLAN
Problem: Respiratory:  Goal: Respiratory status will improve    Intervention: Educate and encourage incentive spirometry usage  Pt was given an IS and was educated on its use. Patient demonstrated proper use of the device (see flow sheet)

## 2019-07-21 NOTE — THERAPY
"Physical Therapy Evaluation completed.   Bed Mobility:  Supine to Sit:  (up in chair)  Transfers: Sit to Stand: Minimal Assist  Gait: Level Of Assist: Moderate Assist with Front-Wheel Walker       Plan of Care: Will benefit from Physical Therapy 5 times per week  Discharge Recommendations: Equipment: Will Continue to Assess for Equipment Needs. Post-acute therapy Discharge to a transitional care facility for continued skilled therapy services.    See \"Rehab Therapy-Acute\" Patient Summary Report for complete documentation.       Pt is a 68 y/o male presenting to acute setting s/p T4 - T6 laminectomy with resection of tumor, T3 - T7 stealth guided fusion. Pt with decreased balance, coordination and safety awareness overall but motivated to mobilize and increase functional mobility. Prior to admission, he was using a FWW and assistance from his spouse to get around at home and in the community. Mod assist required for ambulation to assist in maintaining upright posture and correct LOB as Pt with delayed to absent self correction. PT to continue working with Pt in acute setting to increase functional mobility and coordination, recommend DC to post acute transitional care setting for continued therapy intervention prior to return home.   "

## 2019-07-21 NOTE — PROGRESS NOTES
[facility] Medicine Daily Progress Note    Date of Service  7/21/2019    Chief Complaint  69 y.o. male admitted 7/15/2019 with T5 mass.    Hospital Course  Admitted with T5 mass with complaints of increasing weakness of both legs.    Interval Problem Update  Confusion improving but not completely resolved  Surgical site pain 3/10, no numbness or tingling  axox3    Consultants/Specialty  Neurosurgery    Code Status  Full    Disposition  snf or rehab    Review of Systems  Review of Systems   Constitutional: Negative for chills, fever and malaise/fatigue.   HENT: Negative for hearing loss and sore throat.    Eyes: Negative for blurred vision.   Respiratory: Negative for cough, shortness of breath and wheezing.    Cardiovascular: Negative for chest pain, palpitations and leg swelling.   Gastrointestinal: Negative for abdominal pain, diarrhea, heartburn, nausea and vomiting.   Genitourinary: Negative for dysuria, flank pain and hematuria.   Musculoskeletal: Positive for back pain. Negative for falls and neck pain.   Skin: Negative for rash.   Neurological: Negative for dizziness, sensory change, speech change, focal weakness, weakness and headaches.   Psychiatric/Behavioral: The patient is not nervous/anxious.         Physical Exam  Temp:  [36.2 °C (97.1 °F)-37 °C (98.6 °F)] 36.9 °C (98.4 °F)  Pulse:  [71-85] 75  Resp:  [9-20] 17  BP: (110-113)/(50-59) 113/50  SpO2:  [85 %-100 %] 95 %    Physical Exam   Constitutional: He is oriented to person, place, and time. He appears well-developed and well-nourished. No distress.   HENT:   Head: Normocephalic and atraumatic.   Right Ear: External ear normal.   Left Ear: External ear normal.   Nose: Nose normal.   Mouth/Throat: Oropharynx is clear and moist. No oropharyngeal exudate.   Eyes: Pupils are equal, round, and reactive to light. Conjunctivae and EOM are normal. Right eye exhibits no discharge. Left eye exhibits no discharge. No scleral icterus.   Neck: Normal range of  motion. Neck supple. No JVD present. No tracheal deviation present. No thyromegaly present.   Cardiovascular: Normal rate, regular rhythm, normal heart sounds and intact distal pulses.  Exam reveals no gallop and no friction rub.    No murmur heard.  Pulmonary/Chest: Effort normal and breath sounds normal. No stridor. No respiratory distress. He has no wheezes. He has no rales. He exhibits no tenderness.   Abdominal: Soft. Bowel sounds are normal. He exhibits no distension and no mass. There is no tenderness. There is no rebound and no guarding.   Musculoskeletal: Normal range of motion. He exhibits no edema, tenderness or deformity.   Lymphadenopathy:     He has no cervical adenopathy.   Neurological: He is alert and oriented to person, place, and time. He has normal reflexes. No cranial nerve deficit. He exhibits normal muscle tone. Coordination normal.   Skin: Skin is warm and dry. No rash noted. He is not diaphoretic. No erythema. No pallor.   katelin and staples in place   Psychiatric: He has a normal mood and affect.   Nursing note and vitals reviewed.      Fluids    Intake/Output Summary (Last 24 hours) at 07/21/19 1241  Last data filed at 07/21/19 1139   Gross per 24 hour   Intake             1950 ml   Output             3090 ml   Net            -1140 ml       Laboratory  Recent Labs      07/21/19   0259   WBC  15.1*   RBC  2.50*   HEMOGLOBIN  8.5*   HEMATOCRIT  25.1*   MCV  98.8*   MCH  33.2*   MCHC  33.6*   RDW  45.1   PLATELETCT  161*   MPV  10.0     Recent Labs      07/19/19   0351  07/21/19   0259   SODIUM  132*  128*   POTASSIUM  4.4  4.6   CHLORIDE  99  95*   CO2  21  23   GLUCOSE  225*  244*   BUN  27*  29*   CREATININE  1.06  1.05   CALCIUM  10.1  8.7     Recent Labs      07/19/19   0351   APTT  23.6*               Imaging  DX-THORACIC SPINE-2 VIEWS   Final Result      3 intraoperative spot images submitted for surgical hardware localization for diagnostic purposes demonstrate posterior pillo and  transpedicular screw fixation transfixing previously seen destructive T5 mass. See surgical report for details.   Fluoroscopy Time:  12 seconds.  3 fluoroscopic images were obtained.      EC-ECHOCARDIOGRAM COMPLETE W/O CONT   Final Result      CT-HEAD W/O   Final Result         1.  No acute intracranial abnormality is identified, there are nonspecific white matter changes, commonly associated with small vessel ischemic disease.  Associated mild cerebral atrophy is noted.   2.  Atherosclerosis.      MR-THORACIC SPINE-WITH   Final Result      1.  Destructive marrow replacing mass involving the T5 vertebra with destruction of the posterior elements and epidural soft tissue mass primarily dorsal with cord compression and marked central stenosis. Differential considerations would include osseous    metastatic disease, plasmacytoma (multiple myeloma), or other primary spinal tumors. The lesion appears amenable to needle biopsy.   2.  T9-T10 small left paramedian disc protrusion.   3.  Bilateral foraminal stenosis at T5-T6 where the T5 soft tissue mass encroaches on the foramina.   4. The case was discussed (call report) with DR PLUMMER on duty for  [facility] at 1445 hours 7/16/2019.      CT-CHEST,ABDOMEN,PELVIS WITH   Final Result      Small bilateral pulmonary nodules. Follow-up CT chest is recommended.      Large lobulated mass replacing the posterior elements of T5 with significant spinal canal stenosis.      Atherosclerotic plaque, including coronary artery calcification.      Mildly enlarged subcarinal, cardiophrenic and borderline retroperitoneal and retrocrural lymph nodes are nonspecific.      Enlarged left adrenal gland with a 1.8 cm nodule which is indeterminate and could be metastatic. PET/CT would be beneficial for further evaluation.      Moderate amount of colonic stool. Colonic diverticulosis.      Distended bladder.                  CT-TSPINE W/O PLUS RECONS   Final Result         1.  Lytic  expansile lesion in the pedicles and posterior elements, appearance most compatible with metastatic or primary bone disease. Question possible compromise of the cord. Could be further evaluated with MRI without and with contrast.   2.  Atherosclerotic calcifications are noted.      These findings were discussed with the patient's clinician, SUSAN BARLOW, on 7/15/2019 10:29 PM.      DX-CHEST-2 VIEWS   Final Result         1.  Right infrahilar density suggests infiltrate.      DX-PORTABLE FLUORO > 1 HOUR    (Results Pending)   DX-O-ARM    (Results Pending)        Assessment/Plan  * Mass of thoracic vertebra- (present on admission)   Assessment & Plan    POD #1  Path pending  PT/ OT pending  Pain controlled     Fall   Assessment & Plan    PT and OT     PVD (peripheral vascular disease) (HCC)- (present on admission)   Assessment & Plan    Lipitor  Plavix held     Essential hypertension- (present on admission)   Assessment & Plan    Continue current regimen as tolerated     Type 2 diabetes mellitus without complication, without long-term current use of insulin (HCC)- (present on admission)   Assessment & Plan    Continue ssi and lantus     Constipation   Assessment & Plan    No abdominal pain, + flatus, tolerating clears  Following  Discussed with nursing - will increase bowel regimen     Obesity- (present on admission)   Assessment & Plan    Body mass index is 31.57 kg/m².       Dyslipidemia- (present on admission)   Assessment & Plan    Lipitor          VTE prophylaxis: SCD

## 2019-07-21 NOTE — THERAPY
"Occupational Therapy Evaluation completed.   Functional Status: Pt is a 70 y/o male admitted with BLE weakness found to have T5 mass, now s/p T3-T7 lami/fusion with tumor resection. He was pleasant and cooperative, however with poor insight into deficits and poor safety awareness in general. Latanya sit<>stand. ModA functional mobility with FWW to the bathroom. Latanya toilet txf. Latanya toileting. MaxA LB dressing. Pt limited by weakness, fatigue, impaired balance, and impaired safety awareness which impacts independence in self care and functional mobility.  Plan of Care: Will benefit from Occupational Therapy 5 times per week  Discharge Recommendations:  Equipment: Will Continue to Assess for Equipment Needs. Recommend post-acute placement for additional occupational therapy services prior to discharge home. Patient can tolerate post-acute therapies at a 5x/week frequency. Please consider PM&R consult.      See \"Rehab Therapy-Acute\" Patient Summary Report for complete documentation.    "

## 2019-07-21 NOTE — PROGRESS NOTES
Received report from PACU RN, pt arrived to floor via transport in bed, pt confused, wife at bedside, will continue to monitor, pt assessed and stable, bed locked and in lowest position, call light within reach. Hourly rounding in place.

## 2019-07-22 PROBLEM — D61.818 PANCYTOPENIA (HCC): Status: ACTIVE | Noted: 2019-07-22

## 2019-07-22 LAB
ANION GAP SERPL CALC-SCNC: 7 MMOL/L (ref 0–11.9)
BASOPHILS # BLD AUTO: 0.1 % (ref 0–1.8)
BASOPHILS # BLD: 0.01 K/UL (ref 0–0.12)
BUN SERPL-MCNC: 41 MG/DL (ref 8–22)
CALCIUM SERPL-MCNC: 8.5 MG/DL (ref 8.5–10.5)
CHLORIDE SERPL-SCNC: 100 MMOL/L (ref 96–112)
CO2 SERPL-SCNC: 23 MMOL/L (ref 20–33)
CREAT SERPL-MCNC: 1.26 MG/DL (ref 0.5–1.4)
EOSINOPHIL # BLD AUTO: 0.01 K/UL (ref 0–0.51)
EOSINOPHIL NFR BLD: 0.1 % (ref 0–6.9)
ERYTHROCYTE [DISTWIDTH] IN BLOOD BY AUTOMATED COUNT: 46.5 FL (ref 35.9–50)
ERYTHROCYTE [DISTWIDTH] IN BLOOD BY AUTOMATED COUNT: 46.6 FL (ref 35.9–50)
GLUCOSE BLD-MCNC: 110 MG/DL (ref 65–99)
GLUCOSE BLD-MCNC: 157 MG/DL (ref 65–99)
GLUCOSE BLD-MCNC: 172 MG/DL (ref 65–99)
GLUCOSE BLD-MCNC: 186 MG/DL (ref 65–99)
GLUCOSE SERPL-MCNC: 115 MG/DL (ref 65–99)
HCT VFR BLD AUTO: 20.9 % (ref 42–52)
HCT VFR BLD AUTO: 21.3 % (ref 42–52)
HGB BLD-MCNC: 7 G/DL (ref 14–18)
HGB BLD-MCNC: 7.1 G/DL (ref 14–18)
IMM GRANULOCYTES # BLD AUTO: 0.06 K/UL (ref 0–0.11)
IMM GRANULOCYTES NFR BLD AUTO: 0.6 % (ref 0–0.9)
LYMPHOCYTES # BLD AUTO: 1.58 K/UL (ref 1–4.8)
LYMPHOCYTES NFR BLD: 15.8 % (ref 22–41)
MCH RBC QN AUTO: 33.2 PG (ref 27–33)
MCH RBC QN AUTO: 33.3 PG (ref 27–33)
MCHC RBC AUTO-ENTMCNC: 33.3 G/DL (ref 33.7–35.3)
MCHC RBC AUTO-ENTMCNC: 33.5 G/DL (ref 33.7–35.3)
MCV RBC AUTO: 99.5 FL (ref 81.4–97.8)
MCV RBC AUTO: 99.5 FL (ref 81.4–97.8)
MONOCYTES # BLD AUTO: 1.18 K/UL (ref 0–0.85)
MONOCYTES NFR BLD AUTO: 11.8 % (ref 0–13.4)
NEUTROPHILS # BLD AUTO: 7.19 K/UL (ref 1.82–7.42)
NEUTROPHILS NFR BLD: 71.6 % (ref 44–72)
NRBC # BLD AUTO: 0 K/UL
NRBC BLD-RTO: 0 /100 WBC
PATHOLOGY CONSULT NOTE: NORMAL
PLATELET # BLD AUTO: 135 K/UL (ref 164–446)
PLATELET # BLD AUTO: 150 K/UL (ref 164–446)
PMV BLD AUTO: 10.4 FL (ref 9–12.9)
PMV BLD AUTO: 9.6 FL (ref 9–12.9)
POTASSIUM SERPL-SCNC: 4 MMOL/L (ref 3.6–5.5)
RBC # BLD AUTO: 2.1 M/UL (ref 4.7–6.1)
RBC # BLD AUTO: 2.14 M/UL (ref 4.7–6.1)
SODIUM SERPL-SCNC: 130 MMOL/L (ref 135–145)
WBC # BLD AUTO: 10 K/UL (ref 4.8–10.8)
WBC # BLD AUTO: 8.1 K/UL (ref 4.8–10.8)

## 2019-07-22 PROCEDURE — A9270 NON-COVERED ITEM OR SERVICE: HCPCS | Performed by: INTERNAL MEDICINE

## 2019-07-22 PROCEDURE — 85025 COMPLETE CBC W/AUTO DIFF WBC: CPT

## 2019-07-22 PROCEDURE — 770001 HCHG ROOM/CARE - MED/SURG/GYN PRIV*

## 2019-07-22 PROCEDURE — 82962 GLUCOSE BLOOD TEST: CPT | Mod: 91

## 2019-07-22 PROCEDURE — 80048 BASIC METABOLIC PNL TOTAL CA: CPT

## 2019-07-22 PROCEDURE — A9270 NON-COVERED ITEM OR SERVICE: HCPCS | Performed by: PHYSICIAN ASSISTANT

## 2019-07-22 PROCEDURE — 700102 HCHG RX REV CODE 250 W/ 637 OVERRIDE(OP): Performed by: HOSPITALIST

## 2019-07-22 PROCEDURE — 700102 HCHG RX REV CODE 250 W/ 637 OVERRIDE(OP): Performed by: PHYSICIAN ASSISTANT

## 2019-07-22 PROCEDURE — 99232 SBSQ HOSP IP/OBS MODERATE 35: CPT | Performed by: HOSPITALIST

## 2019-07-22 PROCEDURE — 700112 HCHG RX REV CODE 229: Performed by: PHYSICIAN ASSISTANT

## 2019-07-22 PROCEDURE — 85027 COMPLETE CBC AUTOMATED: CPT

## 2019-07-22 PROCEDURE — A9270 NON-COVERED ITEM OR SERVICE: HCPCS | Performed by: HOSPITALIST

## 2019-07-22 PROCEDURE — 700105 HCHG RX REV CODE 258: Performed by: PHYSICIAN ASSISTANT

## 2019-07-22 PROCEDURE — 36415 COLL VENOUS BLD VENIPUNCTURE: CPT

## 2019-07-22 PROCEDURE — 700102 HCHG RX REV CODE 250 W/ 637 OVERRIDE(OP): Performed by: INTERNAL MEDICINE

## 2019-07-22 PROCEDURE — 700102 HCHG RX REV CODE 250 W/ 637 OVERRIDE(OP): Performed by: FAMILY MEDICINE

## 2019-07-22 RX ADMIN — VERAPAMIL HYDROCHLORIDE 180 MG: 180 TABLET, FILM COATED, EXTENDED RELEASE ORAL at 05:33

## 2019-07-22 RX ADMIN — OXYCODONE HYDROCHLORIDE 10 MG: 5 TABLET ORAL at 10:20

## 2019-07-22 RX ADMIN — OXYCODONE HYDROCHLORIDE 10 MG: 5 TABLET ORAL at 16:21

## 2019-07-22 RX ADMIN — LISINOPRIL 40 MG: 20 TABLET ORAL at 05:33

## 2019-07-22 RX ADMIN — ACETAMINOPHEN 650 MG: 325 TABLET, FILM COATED ORAL at 05:34

## 2019-07-22 RX ADMIN — METHOCARBAMOL TABLETS 750 MG: 750 TABLET, COATED ORAL at 20:32

## 2019-07-22 RX ADMIN — ATORVASTATIN CALCIUM 20 MG: 20 TABLET, FILM COATED ORAL at 20:32

## 2019-07-22 RX ADMIN — ANTACID TABLETS 500 MG: 500 TABLET, CHEWABLE ORAL at 05:36

## 2019-07-22 RX ADMIN — INSULIN GLARGINE 20 UNITS: 100 INJECTION, SOLUTION SUBCUTANEOUS at 07:55

## 2019-07-22 RX ADMIN — VERAPAMIL HYDROCHLORIDE 180 MG: 180 TABLET, FILM COATED, EXTENDED RELEASE ORAL at 17:24

## 2019-07-22 RX ADMIN — OXYCODONE HYDROCHLORIDE 10 MG: 5 TABLET ORAL at 20:31

## 2019-07-22 RX ADMIN — METFORMIN HYDROCHLORIDE 500 MG: 500 TABLET, FILM COATED ORAL at 17:24

## 2019-07-22 RX ADMIN — ANTACID TABLETS 500 MG: 500 TABLET, CHEWABLE ORAL at 17:24

## 2019-07-22 RX ADMIN — METFORMIN HYDROCHLORIDE 1000 MG: 500 TABLET, FILM COATED ORAL at 07:55

## 2019-07-22 RX ADMIN — MAGNESIUM HYDROXIDE 30 ML: 400 SUSPENSION ORAL at 06:15

## 2019-07-22 RX ADMIN — DOCUSATE SODIUM 100 MG: 100 CAPSULE, LIQUID FILLED ORAL at 05:32

## 2019-07-22 RX ADMIN — TERAZOSIN HYDROCHLORIDE 5 MG: 5 CAPSULE ORAL at 05:35

## 2019-07-22 RX ADMIN — AMLODIPINE BESYLATE 10 MG: 5 TABLET ORAL at 05:32

## 2019-07-22 RX ADMIN — METHOCARBAMOL TABLETS 750 MG: 750 TABLET, COATED ORAL at 17:24

## 2019-07-22 RX ADMIN — SODIUM CHLORIDE: 9 INJECTION, SOLUTION INTRAVENOUS at 00:30

## 2019-07-22 RX ADMIN — METHOCARBAMOL TABLETS 750 MG: 750 TABLET, COATED ORAL at 13:25

## 2019-07-22 RX ADMIN — TERAZOSIN HYDROCHLORIDE 5 MG: 5 CAPSULE ORAL at 17:24

## 2019-07-22 RX ADMIN — OMEPRAZOLE 20 MG: 20 CAPSULE, DELAYED RELEASE ORAL at 05:32

## 2019-07-22 RX ADMIN — VITAMIN D, TAB 1000IU (100/BT) 5000 UNITS: 25 TAB at 05:35

## 2019-07-22 RX ADMIN — LORATADINE 10 MG: 10 TABLET ORAL at 05:33

## 2019-07-22 RX ADMIN — METHOCARBAMOL TABLETS 750 MG: 750 TABLET, COATED ORAL at 09:28

## 2019-07-22 RX ADMIN — SPIRONOLACTONE 25 MG: 25 TABLET ORAL at 05:33

## 2019-07-22 ASSESSMENT — ENCOUNTER SYMPTOMS
BACK PAIN: 1
FOCAL WEAKNESS: 0
NAUSEA: 0
CHILLS: 0
COUGH: 0
PALPITATIONS: 0
DIARRHEA: 0
NECK PAIN: 0
DIZZINESS: 0
WHEEZING: 0
VOMITING: 0
SORE THROAT: 0
HEADACHES: 0
ABDOMINAL PAIN: 0
NERVOUS/ANXIOUS: 0
WEAKNESS: 0
SHORTNESS OF BREATH: 0
FLANK PAIN: 0
FEVER: 0
FALLS: 0
HEARTBURN: 0
SPEECH CHANGE: 0
SENSORY CHANGE: 0
BLURRED VISION: 0

## 2019-07-22 NOTE — DISCHARGE PLANNING
Physiatry Dr. Aquino recommending pt appropriate for inpatient rehab. Will follow for medical clearance/bed availability [facility].

## 2019-07-22 NOTE — PROGRESS NOTES
[facility] Medicine Daily Progress Note    Date of Service  7/22/2019    Chief Complaint  69 y.o. male admitted 7/15/2019 with T5 mass.    Hospital Course  Admitted with T5 mass with complaints of increasing weakness of both legs.    Interval Problem Update  Confusion resolving  axox3  Current back pain 7/10, no numbness or tingling  Large BM this am  Denies sob  No dizziness,   Blood loss and anemia discussed along for transfusion if hbg less 7, risks/ benefits understood and accepted  His wife is at bedside    Consultants/Specialty  Neurosurgery    Code Status  Full    Disposition  snf or rehab    Review of Systems  Review of Systems   Constitutional: Negative for chills, fever and malaise/fatigue.   HENT: Negative for hearing loss and sore throat.    Eyes: Negative for blurred vision.   Respiratory: Negative for cough, shortness of breath and wheezing.    Cardiovascular: Negative for chest pain, palpitations and leg swelling.   Gastrointestinal: Negative for abdominal pain, diarrhea, heartburn, nausea and vomiting.   Genitourinary: Negative for dysuria, flank pain and hematuria.   Musculoskeletal: Positive for back pain. Negative for falls and neck pain.   Skin: Negative for rash.   Neurological: Negative for dizziness, sensory change, speech change, focal weakness, weakness and headaches.   Psychiatric/Behavioral: The patient is not nervous/anxious.         Physical Exam  Temp:  [36.7 °C (98.1 °F)-37.3 °C (99.2 °F)] 37.3 °C (99.2 °F)  Pulse:  [70-83] 81  Resp:  [16-17] 16  BP: (100-143)/(50-66) 128/61  SpO2:  [86 %-98 %] 97 %    Physical Exam   Constitutional: He is oriented to person, place, and time. He appears well-developed and well-nourished. No distress.   HENT:   Head: Normocephalic and atraumatic.   Right Ear: External ear normal.   Left Ear: External ear normal.   Nose: Nose normal.   Mouth/Throat: Oropharynx is clear and moist. No oropharyngeal exudate.   Eyes: Pupils are equal, round, and reactive to  light. Conjunctivae and EOM are normal. Right eye exhibits no discharge. Left eye exhibits no discharge. No scleral icterus.   Neck: Normal range of motion. Neck supple. No JVD present. No tracheal deviation present. No thyromegaly present.   Cardiovascular: Normal rate, regular rhythm, normal heart sounds and intact distal pulses.  Exam reveals no gallop and no friction rub.    No murmur heard.  Pulmonary/Chest: Effort normal and breath sounds normal. No stridor. No respiratory distress. He has no wheezes. He has no rales. He exhibits no tenderness.   Abdominal: Soft. Bowel sounds are normal. He exhibits no distension and no mass. There is no tenderness. There is no rebound and no guarding.   Musculoskeletal: Normal range of motion. He exhibits no edema, tenderness or deformity.   Lymphadenopathy:     He has no cervical adenopathy.   Neurological: He is alert and oriented to person, place, and time. He has normal reflexes. No cranial nerve deficit. He exhibits normal muscle tone. Coordination normal.   Skin: Skin is warm and dry. No rash noted. He is not diaphoretic. No erythema. No pallor.   katelin and staples in place   Psychiatric: He has a normal mood and affect.   Nursing note and vitals reviewed.      Fluids    Intake/Output Summary (Last 24 hours) at 07/22/19 0732  Last data filed at 07/22/19 0612   Gross per 24 hour   Intake             2640 ml   Output             2725 ml   Net              -85 ml       Laboratory  Recent Labs      07/21/19   0259  07/22/19   0233  07/22/19   0630   WBC  15.1*  10.0  8.1   RBC  2.50*  2.14*  2.10*   HEMOGLOBIN  8.5*  7.1*  7.0*   HEMATOCRIT  25.1*  21.3*  20.9*   MCV  98.8*  99.5*  99.5*   MCH  33.2*  33.2*  33.3*   MCHC  33.6*  33.3*  33.5*   RDW  45.1  46.5  46.6   PLATELETCT  161*  150*  135*   MPV  10.0  10.4  9.6     Recent Labs      07/21/19   0259  07/22/19   0233   SODIUM  128*  130*   POTASSIUM  4.6  4.0   CHLORIDE  95*  100   CO2  23  23   GLUCOSE  244*  115*   BUN   29*  41*   CREATININE  1.05  1.26   CALCIUM  8.7  8.5                   Imaging  DX-THORACIC SPINE-2 VIEWS   Final Result      3 intraoperative spot images submitted for surgical hardware localization for diagnostic purposes demonstrate posterior pillo and transpedicular screw fixation transfixing previously seen destructive T5 mass. See surgical report for details.   Fluoroscopy Time:  12 seconds.  3 fluoroscopic images were obtained.      EC-ECHOCARDIOGRAM COMPLETE W/O CONT   Final Result      CT-HEAD W/O   Final Result         1.  No acute intracranial abnormality is identified, there are nonspecific white matter changes, commonly associated with small vessel ischemic disease.  Associated mild cerebral atrophy is noted.   2.  Atherosclerosis.      MR-THORACIC SPINE-WITH   Final Result      1.  Destructive marrow replacing mass involving the T5 vertebra with destruction of the posterior elements and epidural soft tissue mass primarily dorsal with cord compression and marked central stenosis. Differential considerations would include osseous    metastatic disease, plasmacytoma (multiple myeloma), or other primary spinal tumors. The lesion appears amenable to needle biopsy.   2.  T9-T10 small left paramedian disc protrusion.   3.  Bilateral foraminal stenosis at T5-T6 where the T5 soft tissue mass encroaches on the foramina.   4. The case was discussed (call report) with DR PLUMMER on duty for  [facility] at 1445 hours 7/16/2019.      CT-CHEST,ABDOMEN,PELVIS WITH   Final Result      Small bilateral pulmonary nodules. Follow-up CT chest is recommended.      Large lobulated mass replacing the posterior elements of T5 with significant spinal canal stenosis.      Atherosclerotic plaque, including coronary artery calcification.      Mildly enlarged subcarinal, cardiophrenic and borderline retroperitoneal and retrocrural lymph nodes are nonspecific.      Enlarged left adrenal gland with a 1.8 cm nodule which is  indeterminate and could be metastatic. PET/CT would be beneficial for further evaluation.      Moderate amount of colonic stool. Colonic diverticulosis.      Distended bladder.                  CT-TSPINE W/O PLUS RECONS   Final Result         1.  Lytic expansile lesion in the pedicles and posterior elements, appearance most compatible with metastatic or primary bone disease. Question possible compromise of the cord. Could be further evaluated with MRI without and with contrast.   2.  Atherosclerotic calcifications are noted.      These findings were discussed with the patient's clinician, SUSAN BARLOW, on 7/15/2019 10:29 PM.      DX-CHEST-2 VIEWS   Final Result         1.  Right infrahilar density suggests infiltrate.      DX-PORTABLE FLUORO > 1 HOUR    (Results Pending)   DX-O-ARM    (Results Pending)        Assessment/Plan  * Mass of thoracic vertebra- (present on admission)   Assessment & Plan    POD #2  Path still pending  PT/ OT   Continue pain control     Fall   Assessment & Plan    PT and OT     PVD (peripheral vascular disease) (HCC)- (present on admission)   Assessment & Plan    Lipitor  Plavix held     Essential hypertension- (present on admission)   Assessment & Plan    Continue current regimen as tolerated     Type 2 diabetes mellitus without complication, without long-term current use of insulin (HCC)- (present on admission)   Assessment & Plan    Continue ssi and lantus     Pancytopenia (HCC)   Assessment & Plan    Wbc wnl  With some acute blood loss at surgery site - plavix and lovenox held - following  Thrombocytopenia mild - continue to follow     Constipation   Assessment & Plan    No abdominal pain, + flatus, tolerating clears  Following  Discussed with nursing - will increase bowel regimen     Obesity- (present on admission)   Assessment & Plan    Body mass index is 31.57 kg/m².       Dyslipidemia- (present on admission)   Assessment & Plan    Lipitor          VTE prophylaxis: SCD

## 2019-07-22 NOTE — DISCHARGE PLANNING
Anticipated Discharge Disposition:   [facility] is accepting.     Action:   Discussed with MD during IDT rounds. Not medically clear yet    Barriers to Discharge:   High drain output and H/H down trending. Path results pending.     Plan:   Follow up with MD and [facility].     Addendum:  Discussed with Dr Aquino. He is waiting for path result.

## 2019-07-22 NOTE — PROGRESS NOTES
Neurosurgery Progress Note    Subjective:  POD#2  T4 - T6 laminectomy with resection of tumor, T3 - T7 stealth guided fusion.  Doing well.with good strength in legs.  Pain to op site.  Controlled with Meds.  Hgb at 7.0/Hct 20.9.  Pt had been typed and crossed for 2 units at time of surgery.  Will discuss with Hospitalists and consider transfusing 1 unit today.  Ambulated with PT  No upper or lower extremity radicular symptoms.  Awaiting final pathology  Drain at 100cc/8hours    Exam:  Dressing dry  Motor 5/5 to bilateral upper and lower ext.  Sensory intact.    BP  Min: 100/53  Max: 143/53  Pulse  Av.4  Min: 70  Max: 83  Resp  Av.5  Min: 16  Max: 17  Temp  Av.1 °C (98.7 °F)  Min: 36.7 °C (98.1 °F)  Max: 37.3 °C (99.2 °F)  SpO2  Av.9 %  Min: 86 %  Max: 98 %    No Data Recorded    Recent Labs      19   0233  07/22/19   0630   WBC  15.1*  10.0  8.1   RBC  2.50*  2.14*  2.10*   HEMOGLOBIN  8.5*  7.1*  7.0*   HEMATOCRIT  25.1*  21.3*  20.9*   MCV  98.8*  99.5*  99.5*   MCH  33.2*  33.2*  33.3*   MCHC  33.6*  33.3*  33.5*   RDW  45.1  46.5  46.6   PLATELETCT  161*  150*  135*   MPV  10.0  10.4  9.6     Recent Labs      19   0233   SODIUM  128*  130*   POTASSIUM  4.6  4.0   CHLORIDE  95*  100   CO2  23  23   GLUCOSE  244*  115*   BUN  29*  41*   CREATININE  1.05  1.26   CALCIUM  8.7  8.5               Intake/Output       19 - 19 - 19 Total  Total       Intake    P.O.  --  240 240  --  -- --    P.O. -- 240 240 -- -- --    I.V.  --   2400  --  -- --    Volume (mL) (NS infusion) -- 2400 -- -- --    Total Intake -- 2640 -- -- --       Output    Urine  --  2425  --  -- --    Number of Times Voided 2 x -- 2 x -- -- --    Output (mL) ([REMOVED] Urethral Catheter Latex 16 Fr.) -- 2425 -- -- --    Drains  100  200 300  --  -- --    Output (mL)  (Closed/Suction Drain 1 Left Back Juan Rea) 100 200 300 -- -- --    Total Output 100 2625 2725 -- -- --       Net I/O     -100 15 -85 -- -- --            Intake/Output Summary (Last 24 hours) at 07/22/19 0734  Last data filed at 07/22/19 0612   Gross per 24 hour   Intake             2640 ml   Output             2725 ml   Net              -85 ml            • Pharmacy Consult Request  1 Each PHARMACY TO DOSE   • MD ALERT...DO NOT ADMINISTER NSAIDS or ASPIRIN unless ORDERED By Neurosurgery  1 Each PRN   • docusate sodium  100 mg BID   • senna-docusate  1 Tab Nightly   • senna-docusate  1 Tab Q24HRS PRN   • polyethylene glycol/lytes  1 Packet BID PRN   • magnesium hydroxide  30 mL QDAY PRN   • bisacodyl  10 mg Q24HRS PRN   • fleet  1 Each Once PRN   • NS   Continuous   • enoxaparin  40 mg DAILY   • HYDROmorphone  0.5 mg Q3HRS PRN   • oxyCODONE immediate release  10 mg Q3HRS PRN   • oxyCODONE immediate-release  5 mg Q3HRS PRN   • diphenhydrAMINE  25 mg Q6HRS PRN    Or   • diphenhydrAMINE  25 mg Q6HRS PRN   • methocarbamol  750 mg 4X/DAY   • ondansetron  4 mg Q4HRS PRN   • ondansetron  4 mg Q4HRS PRN   • ALPRAZolam  0.5 mg TID PRN   • cloNIDine  0.1 mg Q4HRS PRN   • benzocaine-menthol  1 Lozenge Q2HRS PRN   • calcium carbonate  500 mg BID   • vitamin D  5,000 Units DAILY   • metFORMIN  1,000 mg QDAY with Breakfast    And   • metFORMIN  500 mg AC PM MEAL   • insulin glargine  20 Units QAM INSULIN   • hydrALAZINE  10 mg Q6HRS PRN   • insulin regular  2-10 Units 4X/DAY ACHS    And   • glucose  16 g Q15 MIN PRN    And   • dextrose 10% bolus  250 mL Q15 MIN PRN   • Respiratory Care per Protocol   Continuous RT   • acetaminophen  650 mg Q6HRS PRN   • amLODIPine  10 mg DAILY   • atorvastatin  20 mg Nightly   • lisinopril  40 mg DAILY   • loratadine  10 mg DAILY   • omeprazole  20 mg DAILY   • spironolactone  25 mg DAILY   • terazosin  5 mg BID   • verapamil ER  180 mg BID       Assessment and Plan:  Hospital day #8  POD #2  T4 - T6 laminectomy with resection of tumor, T3 - T7 stealth guided fusion.  Hospitalists to decide on whether to transfuse 1 unit today as Hgb 7.0/Hct 20.9  PT/OT to continue to ambulate.  Drain to thumbprint compression  Awaiting Pathology.

## 2019-07-22 NOTE — PROGRESS NOTES
Patient's AM Hgb was 7.1 and 8.5 yesterday AM. Patient got dose of Lovenox at 1648 yesterday. Patient does have RASHARD drain that is still putting out. It put out 100cc this AM and 100cc yesterday evening. Patient has lovenox ordered for this AM. Current bp is 143/53 and satting at 98% on 0.5LNC. Page sent to Dr. Carter for clarification on AM lovenox.     0545: Dr. Carter called back. New order received: get another Hgb draw at 0630 and hold Lovenox for now pending result of 0630 blood draw. Call her back with result

## 2019-07-22 NOTE — CONSULTS
Medical chart review completed.     Patient is a 69 y.o. year-old male with a past medical history significant for Type 2 diabetes, dyslipidemia, hypertension, melanoma admitted to [facility] on 7/15/2019  8:36 PM with worsening back pain for the past 6 weeks, bilateral lower extremity weakness and numbness.  He was found to have a T5 mass with posterior epidural extension severely narrowing of the canal at T5.  He underwent T4-T6 laminectomy and T3-T7 posterior spinal fixation with resection of extradural mass by Dr. Velasco on 7/20.   Pathology is still pending.    He denied bowel or bladder incontinence.  He had a history of melanoma and underwent resection x2 with clean margins.    Prior function:  Prior surgery patient was able to mobilize short household distances with front wheel walker.  Spouse assisted in the community and up and down porch steps    Current function:  Physical Therapy Evaluation completed.   Bed Mobility:  Supine to Sit:  (up in chair)  Transfers: Sit to Stand: Minimal Assist  Gait: Level Of Assist: Moderate Assist with Front-Wheel Walker         Occupational Therapy Evaluation completed.   Functional Status: Pt is a 68 y/o male admitted with BLE weakness found to have T5 mass, now s/p T3-T7 lami/fusion with tumor resection. He was pleasant and cooperative, however with poor insight into deficits and poor safety awareness in general. Latanya sit<>stand. ModA functional mobility with FWW to the bathroom. Latanya toilet txf. Latanya toileting. MaxA LB dressing. Pt limited by weakness, fatigue, impaired balance, and impaired safety awareness which impacts independence in self care and functional mobility.    Patient lives in a single-story home with 2 stairs to enter with his wife, previously used single-point cane, front wheel walker    PMH:  Past Medical History:   Diagnosis Date   • Cancer (HCC)    • Diabetes    • Dyslipidemia    • Hypertension        PSH:  Past Surgical History:    Procedure Laterality Date   • THORACIC FUSION O-ARM  7/20/2019    Procedure: EXTRADURAL TUMOR COMPLETE RESECTION, T3-T7 STEALTH FUSION;  Surgeon: Win Velasco III, M.D.;  Location: SUR[facility];  Service: Neurosurgery   • THORACIC LAMINECTOMY  7/20/2019    Procedure: LAMINECTOMY, SPINE, THORACIC- T4-6; BILATERAL T5 TRANSPEDICULAR APPROACH;  Surgeon: Win Velasco III, M.D.;  Location: SURGERY [facility];  Service: Neurosurgery   • OTHER SURGICAL PROCEDURE      has melonoma excisions every 3 mos       FAMILY HISTORY:  Family History   Problem Relation Age of Onset   • Cancer Mother    • Cancer Father        MEDICATIONS:  Current Facility-Administered Medications   Medication Dose   • Pharmacy Consult Request ...Pain Management Review 1 Each  1 Each   • MD ALERT...DO NOT ADMINISTER NSAIDS or ASPIRIN unless ORDERED By Neurosurgery 1 Each  1 Each   • docusate sodium (COLACE) capsule 100 mg  100 mg   • senna-docusate (PERICOLACE or SENOKOT S) 8.6-50 MG per tablet 1 Tab  1 Tab   • senna-docusate (PERICOLACE or SENOKOT S) 8.6-50 MG per tablet 1 Tab  1 Tab   • polyethylene glycol/lytes (MIRALAX) PACKET 1 Packet  1 Packet   • magnesium hydroxide (MILK OF MAGNESIA) suspension 30 mL  30 mL   • bisacodyl (DULCOLAX) suppository 10 mg  10 mg   • fleet enema 133 mL  1 Each   • enoxaparin (LOVENOX) inj 40 mg  40 mg   • HYDROmorphone pf (DILAUDID) injection 0.5 mg  0.5 mg   • oxyCODONE immediate-release (ROXICODONE) tablet 10 mg  10 mg   • oxyCODONE immediate-release (ROXICODONE) tablet 5 mg  5 mg   • diphenhydrAMINE (BENADRYL) tablet/capsule 25 mg  25 mg    Or   • diphenhydrAMINE (BENADRYL) injection 25 mg  25 mg   • methocarbamol (ROBAXIN) tablet 750 mg  750 mg   • ondansetron (ZOFRAN) syringe/vial injection 4 mg  4 mg   • ondansetron (ZOFRAN ODT) dispertab 4 mg  4 mg   • ALPRAZolam (XANAX) tablet 0.5 mg  0.5 mg   • cloNIDine (CATAPRES) tablet 0.1 mg  0.1 mg   • benzocaine-menthol (CEPACOL) lozenge 1 Lozenge   1 Lozenge   • calcium carbonate (TUMS) chewable tab 500 mg  500 mg   • vitamin D (cholecalciferol) tablet 5,000 Units  5,000 Units   • metFORMIN (GLUCOPHAGE) tablet 1,000 mg  1,000 mg    And   • metFORMIN (GLUCOPHAGE) tablet 500 mg  500 mg   • insulin glargine (LANTUS) injection 20 Units  20 Units   • hydrALAZINE (APRESOLINE) injection 10 mg  10 mg   • insulin regular (HUMULIN R) injection 2-10 Units  2-10 Units    And   • glucose 4 g chewable tablet 16 g  16 g    And   • DEXTROSE 10% BOLUS 250 mL  250 mL   • Respiratory Care per Protocol     • acetaminophen (TYLENOL) tablet 650 mg  650 mg   • amLODIPine (NORVASC) tablet 10 mg  10 mg   • atorvastatin (LIPITOR) tablet 20 mg  20 mg   • lisinopril (PRINIVIL) tablet 40 mg  40 mg   • loratadine (CLARITIN) tablet 10 mg  10 mg   • omeprazole (PRILOSEC) capsule 20 mg  20 mg   • spironolactone (ALDACTONE) tablet 25 mg  25 mg   • terazosin (HYTRIN) capsule 5 mg  5 mg   • verapamil ER (CALAN-SR) tablet 180 mg  180 mg       ALLERGIES:  Patient has no known allergies.    PSYCHOSOCIAL HISTORY:  Living Site:  Home  Living With:  spouse  Caregiver's availability:  24/7  Number of stairs:  2  Substance use history:  none    T5 spinal mass: T5 spinal cord injury status post T4-6 laminectomy, resection of extradural mass, T3-7 posterior spinal fixation/fusion.  Resulting lower extremity weakness and numbness  -Pathology is still pending, would recommend verifying primary source of extradural thoracic mass prior to transfer to acute rehabilitation, allow for further work-up if required.    Orthostatic hypotension: Patient is at high risk for orthostatic hypotension after incomplete spinal cord injury at the level of T5 with history of essential hypertension  - Monitor orthostatic BP, may require significant alteration in antihypertensive medication.  - If dizziness is limiting participation consider starting Midrin 5 mg 3 times daily, or 5 mg as needed 30 minutes prior to getting out  of bed    Hyponatremia: Na 130  -Consider further work-up of cause of hyponatremia, urine electrolytes, creatinine, osmoles, serum electrolytes, osmole's    The patient presents  with cognitive deficits and functional deficits in mobility/self-cares/swallowing/speech, and Minimal  de-conditioning. Pre-morbidly, this patient lived in a single level home with Two steps to enter,with spouse  The patient was evaluated by acute care Physical Therapy and Occupational Therapy; currently requiring moderate assistance for mobility and moderate assistance for ADLs. The patient's current diet is Regular with Thin liquids.     The patient is   a Very Good candidate for an acute inpatient rehabilitation program with a coordinated program of care at an intensity and frequency not available at a lower level of care.     Note: This recommendation requires that patient has at least CGA/Minimal Assistance needs in at least two therapy disciplines.  If patient progresses to no longer need CGA/Latanya with at least two therapy disciplines they may be more appropriate for Skilled nursing facility versus home with home health.      This recommendation is substantiated by the patient's current medical condition with intervention and assessment of medical issues requiring an acute level of care for patient's safety and maximum outcome. A coordinated program of care will be provided by an interdisciplinary team including physical therapy, occupational therapy, hospitalist, physiatry, rehab nursing and rehab psychology. Rehab goals include improved , mobility, self-care management, strength and conditioning/endurance, pain management, bowel and bladder management, mood and affect, and safety with independent home management including caregiver training. Estimated length of stay is approximately 21 days. Rehab potential: Very Good. Disposition: to pre-morbid independent living setting with supportive care of patient's spouse. We will continue to  follow with you in anticipation of discharge to acute inpatient rehabilitation when medically stable to do so at the discretion of the attending physician. Thank you for allowing us to participate in this patient's care. Please call with any questions regarding this recommendation.    Adelfo Aquino D.O.

## 2019-07-22 NOTE — PROGRESS NOTES
Received report from night RN, POC discussed. Rounded with Trae Covarrubias PA-C, changed RASHARD compression to thumbprint.

## 2019-07-22 NOTE — CARE PLAN
Problem: Safety  Goal: Will remain free from falls  Outcome: PROGRESSING AS EXPECTED  Assess LOC, assess environment. Maintain clutter-free environment, call light in place, treaded socks on, bed alarm on, bed lowered and locked, personal belongings close to pt. Encourage pt to call before getting out of bed. Offer toileting opportunities. Fall precautions in place, upper bedrails up.     Problem: Respiratory:  Goal: Respiratory status will improve  Outcome: PROGRESSING AS EXPECTED  Assess V/S, assess work of breathing, assess perfusion. Administer oxygen as ordered. Demonstrate, educate and encourage proper use of IS. Demonstrate proper coughing.

## 2019-07-22 NOTE — PROGRESS NOTES
Seen by MD at bedside, wife at bedside, plan of care discussed, IVF stopped per MD, MD aware about the Hbg and Hct result, no new orders made will continue to monitor.

## 2019-07-22 NOTE — ASSESSMENT & PLAN NOTE
Wbc wnl  H/h continue to slowly decrease - acute blood loss at surgery site - plavix and lovenox held - following and hemodilution likely contributing - iv fluids stopped  See above  Thrombocytopenia mild - continue to follow

## 2019-07-23 ENCOUNTER — HOSPITAL ENCOUNTER (OUTPATIENT)
Dept: RADIOLOGY | Facility: MEDICAL CENTER | Age: 69
End: 2019-07-23

## 2019-07-23 PROBLEM — D61.818 PANCYTOPENIA (HCC): Status: RESOLVED | Noted: 2019-07-22 | Resolved: 2019-07-23

## 2019-07-23 PROBLEM — D64.9 ANEMIA: Status: ACTIVE | Noted: 2019-07-23

## 2019-07-23 PROBLEM — D69.6 THROMBOCYTOPENIA (HCC): Status: ACTIVE | Noted: 2019-07-23

## 2019-07-23 LAB
ANION GAP SERPL CALC-SCNC: 4 MMOL/L (ref 0–11.9)
BASOPHILS # BLD AUTO: 0.1 % (ref 0–1.8)
BASOPHILS # BLD: 0.01 K/UL (ref 0–0.12)
BUN SERPL-MCNC: 26 MG/DL (ref 8–22)
CALCIUM SERPL-MCNC: 8.1 MG/DL (ref 8.5–10.5)
CHLORIDE SERPL-SCNC: 100 MMOL/L (ref 96–112)
CO2 SERPL-SCNC: 27 MMOL/L (ref 20–33)
CREAT SERPL-MCNC: 1.02 MG/DL (ref 0.5–1.4)
EOSINOPHIL # BLD AUTO: 0.15 K/UL (ref 0–0.51)
EOSINOPHIL NFR BLD: 1.6 % (ref 0–6.9)
ERYTHROCYTE [DISTWIDTH] IN BLOOD BY AUTOMATED COUNT: 46.2 FL (ref 35.9–50)
GLUCOSE BLD-MCNC: 119 MG/DL (ref 65–99)
GLUCOSE BLD-MCNC: 135 MG/DL (ref 65–99)
GLUCOSE BLD-MCNC: 155 MG/DL (ref 65–99)
GLUCOSE BLD-MCNC: 173 MG/DL (ref 65–99)
GLUCOSE BLD-MCNC: 173 MG/DL (ref 65–99)
GLUCOSE SERPL-MCNC: 148 MG/DL (ref 65–99)
HCT VFR BLD AUTO: 19.9 % (ref 42–52)
HGB BLD-MCNC: 6.7 G/DL (ref 14–18)
IMM GRANULOCYTES # BLD AUTO: 0.06 K/UL (ref 0–0.11)
IMM GRANULOCYTES NFR BLD AUTO: 0.6 % (ref 0–0.9)
LYMPHOCYTES # BLD AUTO: 1.42 K/UL (ref 1–4.8)
LYMPHOCYTES NFR BLD: 15.3 % (ref 22–41)
MCH RBC QN AUTO: 33.2 PG (ref 27–33)
MCHC RBC AUTO-ENTMCNC: 33.7 G/DL (ref 33.7–35.3)
MCV RBC AUTO: 98.5 FL (ref 81.4–97.8)
MONOCYTES # BLD AUTO: 0.93 K/UL (ref 0–0.85)
MONOCYTES NFR BLD AUTO: 10 % (ref 0–13.4)
NEUTROPHILS # BLD AUTO: 6.69 K/UL (ref 1.82–7.42)
NEUTROPHILS NFR BLD: 72.4 % (ref 44–72)
NRBC # BLD AUTO: 0 K/UL
NRBC BLD-RTO: 0 /100 WBC
PLATELET # BLD AUTO: 146 K/UL (ref 164–446)
PMV BLD AUTO: 9.5 FL (ref 9–12.9)
POTASSIUM SERPL-SCNC: 4.3 MMOL/L (ref 3.6–5.5)
RBC # BLD AUTO: 2.02 M/UL (ref 4.7–6.1)
SODIUM SERPL-SCNC: 131 MMOL/L (ref 135–145)
WBC # BLD AUTO: 9.3 K/UL (ref 4.8–10.8)

## 2019-07-23 PROCEDURE — 85025 COMPLETE CBC W/AUTO DIFF WBC: CPT

## 2019-07-23 PROCEDURE — 770001 HCHG ROOM/CARE - MED/SURG/GYN PRIV*

## 2019-07-23 PROCEDURE — A9270 NON-COVERED ITEM OR SERVICE: HCPCS | Performed by: HOSPITALIST

## 2019-07-23 PROCEDURE — 700102 HCHG RX REV CODE 250 W/ 637 OVERRIDE(OP): Performed by: INTERNAL MEDICINE

## 2019-07-23 PROCEDURE — 36415 COLL VENOUS BLD VENIPUNCTURE: CPT

## 2019-07-23 PROCEDURE — 99232 SBSQ HOSP IP/OBS MODERATE 35: CPT | Performed by: HOSPITALIST

## 2019-07-23 PROCEDURE — P9016 RBC LEUKOCYTES REDUCED: HCPCS

## 2019-07-23 PROCEDURE — A9270 NON-COVERED ITEM OR SERVICE: HCPCS | Performed by: PHYSICIAN ASSISTANT

## 2019-07-23 PROCEDURE — 700102 HCHG RX REV CODE 250 W/ 637 OVERRIDE(OP): Performed by: HOSPITALIST

## 2019-07-23 PROCEDURE — 80048 BASIC METABOLIC PNL TOTAL CA: CPT

## 2019-07-23 PROCEDURE — A9270 NON-COVERED ITEM OR SERVICE: HCPCS | Performed by: INTERNAL MEDICINE

## 2019-07-23 PROCEDURE — 97530 THERAPEUTIC ACTIVITIES: CPT

## 2019-07-23 PROCEDURE — 700112 HCHG RX REV CODE 229: Performed by: PHYSICIAN ASSISTANT

## 2019-07-23 PROCEDURE — 700102 HCHG RX REV CODE 250 W/ 637 OVERRIDE(OP): Performed by: PHYSICIAN ASSISTANT

## 2019-07-23 PROCEDURE — 97116 GAIT TRAINING THERAPY: CPT

## 2019-07-23 PROCEDURE — 97110 THERAPEUTIC EXERCISES: CPT

## 2019-07-23 PROCEDURE — 36430 TRANSFUSION BLD/BLD COMPNT: CPT

## 2019-07-23 PROCEDURE — 700102 HCHG RX REV CODE 250 W/ 637 OVERRIDE(OP): Performed by: FAMILY MEDICINE

## 2019-07-23 PROCEDURE — 82962 GLUCOSE BLOOD TEST: CPT

## 2019-07-23 PROCEDURE — 86923 COMPATIBILITY TEST ELECTRIC: CPT

## 2019-07-23 RX ADMIN — DOCUSATE SODIUM 100 MG: 100 CAPSULE, LIQUID FILLED ORAL at 05:55

## 2019-07-23 RX ADMIN — LISINOPRIL 40 MG: 20 TABLET ORAL at 05:56

## 2019-07-23 RX ADMIN — DOCUSATE SODIUM 100 MG: 100 CAPSULE, LIQUID FILLED ORAL at 17:54

## 2019-07-23 RX ADMIN — ANTACID TABLETS 500 MG: 500 TABLET, CHEWABLE ORAL at 17:54

## 2019-07-23 RX ADMIN — TERAZOSIN HYDROCHLORIDE 5 MG: 5 CAPSULE ORAL at 05:56

## 2019-07-23 RX ADMIN — METHOCARBAMOL TABLETS 750 MG: 750 TABLET, COATED ORAL at 20:17

## 2019-07-23 RX ADMIN — OXYCODONE HYDROCHLORIDE 10 MG: 5 TABLET ORAL at 19:22

## 2019-07-23 RX ADMIN — OXYCODONE HYDROCHLORIDE 5 MG: 5 TABLET ORAL at 15:14

## 2019-07-23 RX ADMIN — SPIRONOLACTONE 25 MG: 25 TABLET ORAL at 05:54

## 2019-07-23 RX ADMIN — OXYCODONE HYDROCHLORIDE 10 MG: 5 TABLET ORAL at 12:01

## 2019-07-23 RX ADMIN — OXYCODONE HYDROCHLORIDE 10 MG: 5 TABLET ORAL at 01:11

## 2019-07-23 RX ADMIN — VITAMIN D, TAB 1000IU (100/BT) 5000 UNITS: 25 TAB at 05:53

## 2019-07-23 RX ADMIN — METHOCARBAMOL TABLETS 750 MG: 750 TABLET, COATED ORAL at 12:01

## 2019-07-23 RX ADMIN — METFORMIN HYDROCHLORIDE 1000 MG: 500 TABLET, FILM COATED ORAL at 08:17

## 2019-07-23 RX ADMIN — TERAZOSIN HYDROCHLORIDE 5 MG: 5 CAPSULE ORAL at 17:54

## 2019-07-23 RX ADMIN — METFORMIN HYDROCHLORIDE 500 MG: 500 TABLET, FILM COATED ORAL at 17:54

## 2019-07-23 RX ADMIN — AMLODIPINE BESYLATE 10 MG: 5 TABLET ORAL at 05:56

## 2019-07-23 RX ADMIN — INSULIN GLARGINE 20 UNITS: 100 INJECTION, SOLUTION SUBCUTANEOUS at 06:00

## 2019-07-23 RX ADMIN — ATORVASTATIN CALCIUM 20 MG: 20 TABLET, FILM COATED ORAL at 20:17

## 2019-07-23 RX ADMIN — SENNOSIDES, DOCUSATE SODIUM 1 TABLET: 50; 8.6 TABLET, FILM COATED ORAL at 20:17

## 2019-07-23 RX ADMIN — OXYCODONE HYDROCHLORIDE 10 MG: 5 TABLET ORAL at 08:17

## 2019-07-23 RX ADMIN — METHOCARBAMOL TABLETS 750 MG: 750 TABLET, COATED ORAL at 17:54

## 2019-07-23 RX ADMIN — OMEPRAZOLE 20 MG: 20 CAPSULE, DELAYED RELEASE ORAL at 05:53

## 2019-07-23 RX ADMIN — VERAPAMIL HYDROCHLORIDE 180 MG: 180 TABLET, FILM COATED, EXTENDED RELEASE ORAL at 05:54

## 2019-07-23 RX ADMIN — METHOCARBAMOL TABLETS 750 MG: 750 TABLET, COATED ORAL at 08:17

## 2019-07-23 RX ADMIN — LORATADINE 10 MG: 10 TABLET ORAL at 05:55

## 2019-07-23 RX ADMIN — ANTACID TABLETS 500 MG: 500 TABLET, CHEWABLE ORAL at 05:53

## 2019-07-23 RX ADMIN — VERAPAMIL HYDROCHLORIDE 180 MG: 180 TABLET, FILM COATED, EXTENDED RELEASE ORAL at 17:54

## 2019-07-23 ASSESSMENT — ENCOUNTER SYMPTOMS
SORE THROAT: 0
VOMITING: 0
CHILLS: 0
ABDOMINAL PAIN: 0
SPEECH CHANGE: 0
FALLS: 0
COUGH: 0
HEADACHES: 0
BLURRED VISION: 0
DIZZINESS: 0
PALPITATIONS: 0
SENSORY CHANGE: 0
BACK PAIN: 1
SHORTNESS OF BREATH: 0
NECK PAIN: 0
FOCAL WEAKNESS: 0
HEARTBURN: 0
FEVER: 0
WHEEZING: 0
NAUSEA: 0
NERVOUS/ANXIOUS: 0
DIARRHEA: 0
FLANK PAIN: 0
WEAKNESS: 0

## 2019-07-23 ASSESSMENT — COGNITIVE AND FUNCTIONAL STATUS - GENERAL
STANDING UP FROM CHAIR USING ARMS: A LITTLE
MOVING TO AND FROM BED TO CHAIR: A LOT
WALKING IN HOSPITAL ROOM: A LOT
TURNING FROM BACK TO SIDE WHILE IN FLAT BAD: A LOT
MOVING FROM LYING ON BACK TO SITTING ON SIDE OF FLAT BED: UNABLE
MOBILITY SCORE: 11
SUGGESTED CMS G CODE MODIFIER MOBILITY: CL
CLIMB 3 TO 5 STEPS WITH RAILING: TOTAL

## 2019-07-23 ASSESSMENT — GAIT ASSESSMENTS
DISTANCE (FEET): 10
ASSISTIVE DEVICE: FRONT WHEEL WALKER
GAIT LEVEL OF ASSIST: MODERATE ASSIST

## 2019-07-23 NOTE — PROGRESS NOTES
"2 RN skin check complete.   Devices in place PIV and SCDs.  Skin assessed under devices intact.  No confirmed pressure ulcers found. No signs of skin breakdown.  Bottom pink and blanching. Heels pink and blanching.  Surgical incision to thoracic spine (see wound LDA).   Scabbed abrasions to bilateral knees, pt states from fall at home. No bleeding noted, open to air. Scattered scabs. Scab to top of pts head, pt states \"to be removed by dermatologist\".    Interventions in place include: frequent repositioning, pillows for support and positioning.  "

## 2019-07-23 NOTE — THERAPY
"Physical Therapy Treatment completed.   Bed Mobility:  Supine to Sit:  (up in chair)  Transfers: Sit to Stand: Moderate Assist (Min-ModA dependent on fatigue)  Gait: Level Of Assist: Moderate Assist with Front-Wheel Walker       Plan of Care: Will benefit from Physical Therapy 5 times per week  Discharge Recommendations: Equipment: Will Continue to Assess for Equipment Needs. Post-acute therapy Recommend post-acute placement for continued physical therapy services prior to discharge home. Patient can tolerate post-acute therapies at a 5x/week frequency.       See \"Rehab Therapy-Acute\" Patient Summary Report for complete documentation.     Per RN, MD states pt not getting redrawn for HgB until tomorrow and is cleared to work w/ PT. Pt continues to present very motivated to participate. Pt has a hard time sequencing mobility. He requires visual and manual cues to perform most cues. Pt w/ delayed initiation for gait causing him to have fast bursts of gait. He demonstrates good strength however poor control of strength over time. Pt continues to be at a level in which he will benefit from post acute therapy.  "

## 2019-07-23 NOTE — PROGRESS NOTES
[facility] Medicine Daily Progress Note    Date of Service  7/23/2019    Chief Complaint  69 y.o. male admitted 7/15/2019 with T5 mass.    Hospital Course  Admitted with T5 mass with complaints of increasing weakness of both legs.    Interval Problem Update  Pain currently controlled and is positional, rated as 4/10  Denies dizziness, no lightheadedness, denies sob, no cp  Tolerated prbc transfusion early this am  axox3  Ros otherwise negative    Consultants/Specialty  Neurosurgery    Code Status  Full    Disposition  Acute rehab    Review of Systems  Review of Systems   Constitutional: Negative for chills, fever and malaise/fatigue.   HENT: Negative for hearing loss and sore throat.    Eyes: Negative for blurred vision.   Respiratory: Negative for cough, shortness of breath and wheezing.    Cardiovascular: Negative for chest pain, palpitations and leg swelling.   Gastrointestinal: Negative for abdominal pain, diarrhea, heartburn, nausea and vomiting.   Genitourinary: Negative for dysuria, flank pain and hematuria.   Musculoskeletal: Positive for back pain. Negative for falls and neck pain.   Skin: Negative for rash.   Neurological: Negative for dizziness, sensory change, speech change, focal weakness, weakness and headaches.   Psychiatric/Behavioral: The patient is not nervous/anxious.         Physical Exam  Temp:  [36.8 °C (98.3 °F)-37.4 °C (99.4 °F)] 37.1 °C (98.8 °F)  Pulse:  [72-85] 74  Resp:  [15-18] 16  BP: (126-151)/(46-64) 151/64  SpO2:  [94 %-99 %] 94 %    Physical Exam   Constitutional: He is oriented to person, place, and time. He appears well-developed and well-nourished. No distress.   HENT:   Head: Normocephalic and atraumatic.   Right Ear: External ear normal.   Left Ear: External ear normal.   Nose: Nose normal.   Mouth/Throat: Oropharynx is clear and moist. No oropharyngeal exudate.   Eyes: Pupils are equal, round, and reactive to light. Conjunctivae and EOM are normal. Right eye exhibits no  discharge. Left eye exhibits no discharge. No scleral icterus.   Neck: Normal range of motion. Neck supple. No JVD present. No tracheal deviation present. No thyromegaly present.   Cardiovascular: Normal rate, regular rhythm, normal heart sounds and intact distal pulses.  Exam reveals no gallop and no friction rub.    No murmur heard.  Pulmonary/Chest: Effort normal and breath sounds normal. No stridor. No respiratory distress. He has no wheezes. He has no rales. He exhibits no tenderness.   Abdominal: Soft. Bowel sounds are normal. He exhibits no distension and no mass. There is no tenderness. There is no rebound and no guarding.   Musculoskeletal: Normal range of motion. He exhibits no edema, tenderness or deformity.   Lymphadenopathy:     He has no cervical adenopathy.   Neurological: He is alert and oriented to person, place, and time. He has normal reflexes. No cranial nerve deficit. He exhibits normal muscle tone. Coordination normal.   Skin: Skin is warm and dry. No rash noted. He is not diaphoretic. No erythema. No pallor.   katelin and staples in place   Psychiatric: He has a normal mood and affect.   Nursing note and vitals reviewed.      Fluids    Intake/Output Summary (Last 24 hours) at 07/23/19 1144  Last data filed at 07/23/19 0345   Gross per 24 hour   Intake              750 ml   Output             1375 ml   Net             -625 ml       Laboratory  Recent Labs      07/22/19   0233  07/22/19   0630  07/23/19   0203   WBC  10.0  8.1  9.3   RBC  2.14*  2.10*  2.02*   HEMOGLOBIN  7.1*  7.0*  6.7*   HEMATOCRIT  21.3*  20.9*  19.9*   MCV  99.5*  99.5*  98.5*   MCH  33.2*  33.3*  33.2*   MCHC  33.3*  33.5*  33.7   RDW  46.5  46.6  46.2   PLATELETCT  150*  135*  146*   MPV  10.4  9.6  9.5     Recent Labs      07/21/19   0259  07/22/19   0233  07/23/19   0203   SODIUM  128*  130*  131*   POTASSIUM  4.6  4.0  4.3   CHLORIDE  95*  100  100   CO2  23 23  27   GLUCOSE  244*  115*  148*   BUN  29*  41*  26*    CREATININE  1.05  1.26  1.02   CALCIUM  8.7  8.5  8.1*                   Imaging  OUTSIDE IMAGES-MR LUMBAR SPINE   Final Result      OUTSIDE IMAGES-MR THORACIC SPINE   Final Result      DX-O-ARM   Final Result      Portable O-arm utilized for 4.17 seconds.         INTERPRETING LOCATION: [address] [place] [address], [place], [id number]      DX-PORTABLE FLUORO > 1 HOUR   Final Result         Portable fluoroscopy utilized for 12 seconds.      DX-THORACIC SPINE-2 VIEWS   Final Result      3 intraoperative spot images submitted for surgical hardware localization for diagnostic purposes demonstrate posterior pillo and transpedicular screw fixation transfixing previously seen destructive T5 mass. See surgical report for details.   Fluoroscopy Time:  12 seconds.  3 fluoroscopic images were obtained.      EC-ECHOCARDIOGRAM COMPLETE W/O CONT   Final Result      CT-HEAD W/O   Final Result         1.  No acute intracranial abnormality is identified, there are nonspecific white matter changes, commonly associated with small vessel ischemic disease.  Associated mild cerebral atrophy is noted.   2.  Atherosclerosis.      MR-THORACIC SPINE-WITH   Final Result      1.  Destructive marrow replacing mass involving the T5 vertebra with destruction of the posterior elements and epidural soft tissue mass primarily dorsal with cord compression and marked central stenosis. Differential considerations would include osseous    metastatic disease, plasmacytoma (multiple myeloma), or other primary spinal tumors. The lesion appears amenable to needle biopsy.   2.  T9-T10 small left paramedian disc protrusion.   3.  Bilateral foraminal stenosis at T5-T6 where the T5 soft tissue mass encroaches on the foramina.   4. The case was discussed (call report) with DR PLUMMER on duty for  [facility] at 1445 hours 7/16/2019.      CT-CHEST,ABDOMEN,PELVIS WITH   Final Result      Small bilateral pulmonary nodules. Follow-up CT chest is recommended.      Large lobulated  mass replacing the posterior elements of T5 with significant spinal canal stenosis.      Atherosclerotic plaque, including coronary artery calcification.      Mildly enlarged subcarinal, cardiophrenic and borderline retroperitoneal and retrocrural lymph nodes are nonspecific.      Enlarged left adrenal gland with a 1.8 cm nodule which is indeterminate and could be metastatic. PET/CT would be beneficial for further evaluation.      Moderate amount of colonic stool. Colonic diverticulosis.      Distended bladder.                  CT-TSPINE W/O PLUS RECONS   Final Result         1.  Lytic expansile lesion in the pedicles and posterior elements, appearance most compatible with metastatic or primary bone disease. Question possible compromise of the cord. Could be further evaluated with MRI without and with contrast.   2.  Atherosclerotic calcifications are noted.      These findings were discussed with the patient's clinician, SUSAN BARLOW, on 7/15/2019 10:29 PM.      DX-CHEST-2 VIEWS   Final Result         1.  Right infrahilar density suggests infiltrate.           Assessment/Plan  * Mass of thoracic vertebra- (present on admission)   Assessment & Plan    POD #4  Path results pending - seen by psychiatry and good candidate for acute rehab but they recommend path results review prior to transfer  PT/ OT   Continue pain control     Fall   Assessment & Plan    PT and OT     PVD (peripheral vascular disease) (HCC)- (present on admission)   Assessment & Plan    Lipitor  Plavix held     Essential hypertension- (present on admission)   Assessment & Plan    Continue current regimen as tolerated     Type 2 diabetes mellitus without complication, without long-term current use of insulin (HCC)- (present on admission)   Assessment & Plan    Continue ssi and lantus     Thrombocytopenia (HCC)   Assessment & Plan    Mild  improving     Anemia   Assessment & Plan    Acute blood loss anemia   Bloody output from katelin  decreasing  Plavix and lovenox stopped  H/h continue to slowly decrease, prbc transfusion in progress  Continue to follow     Constipation   Assessment & Plan    resolved     Obesity- (present on admission)   Assessment & Plan    Body mass index is 31.57 kg/m².       Dyslipidemia- (present on admission)   Assessment & Plan    Lipitor          VTE prophylaxis: SCD

## 2019-07-23 NOTE — PROGRESS NOTES
RN MOBILITY NOTE     Surgery patient?: YES  Date of surgery: 07/20/2019   Ambulated 50 ft on day of surgery? (N/A if today is not date of surgery): N/A  Number of times ambulated 50 feet or greater today: 4  Patient has been up to chair, edge of bed or HOB 90 degrees for all meals?: YES  Goal met? (goal is ambulating at least 50 feet 2 times on day shift, one time on night shift): GOAL MET  If patient did not meet mobility goal, why?:

## 2019-07-23 NOTE — PROGRESS NOTES
Bedside report received from night shift RN, care assumed. Pt assessed, A&Ox4. Pt denies any pain/needs at this time.Bed locked in lowest position, bed alarm on. Call light and personal belongings within reach.    0820: pt is sitting in the chair, pain 6/10, medicated for pain per MAR.

## 2019-07-23 NOTE — ASSESSMENT & PLAN NOTE
Acute blood loss anemia   Bloody output from katelin resolved  Plavix and lovenox stopped  H/h much improved s/o transfusion  Continue to follow

## 2019-07-23 NOTE — PROGRESS NOTES
RN MOBILITY NOTE - 7/22/19    Surgery patient?: yes  Date of surgery: 07/20  Ambulated 50 ft on day of surgery? (N/A if today is not date of surgery): n/a  Number of times ambulated 50 feet or greater today: 2  Patient has been up to chair, edge of bed or HOB 90 degrees for all meals?: 3/3  Goal met? (goal is ambulating at least 50 feet 2 times on day shift, one time on night shift): no  If patient did not meet mobility goal, why?: pt x2 assist, unstable

## 2019-07-23 NOTE — PROGRESS NOTES
2010 Pt moved to bathroom via [employer] x2 assist. Pt A&Ox3, disoriented to time. Pt denies numbness and tingling at this time. Pain 8/10, medicated per MAR. Bed alarm on. Bed locked and in lowest position. Call light within reach. Treaded socks in place. SCDs in use. Pt educated to call for assistance. Verbalized understanding. Pt denies nausea and dizziness at this time    07/23/19  0250 Hospitalist paged regarding pt's Hgb of 6.7    0306 Second page.     0309 Dr. Gasca updated. Order to transfuse 1 unit of packed RBCs    0405  Blood transfusion started.

## 2019-07-23 NOTE — PROGRESS NOTES
Neurosurgery Progress Note    Subjective:  POD#3  T4 - T6 laminectomy with resection of tumor, T3 - T7 stealth guided fusion.  Doing well.with good strength in legs.  Pain to op site.  Controlled with Meds.  Hgb at 6.7/Hct 19.9.  Pt got one unit of blood this AM after this lab was drawn.   Ambulated with PT  No upper or lower extremity radicular symptoms.  Has some incisional pain.  Awaiting final pathology still.  Drain at 70cc/8hours.  Rehab has seen and stated that it would be best to hold off on transferring to rehab until after final pathology is obtained.     Exam:  Dressing dry  Motor 5/5 to bilateral upper and lower ext.  Sensory intact.    BP  Min: 126/46  Max: 151/64  Pulse  Av.5  Min: 72  Max: 85  Resp  Av.3  Min: 15  Max: 18  Temp  Av.1 °C (98.8 °F)  Min: 36.8 °C (98.3 °F)  Max: 37.4 °C (99.4 °F)  SpO2  Av.2 %  Min: 94 %  Max: 99 %    No Data Recorded    Recent Labs      19   0233  19   0630  19   0203   WBC  10.0  8.1  9.3   RBC  2.14*  2.10*  2.02*   HEMOGLOBIN  7.1*  7.0*  6.7*   HEMATOCRIT  21.3*  20.9*  19.9*   MCV  99.5*  99.5*  98.5*   MCH  33.2*  33.3*  33.2*   MCHC  33.3*  33.5*  33.7   RDW  46.5  46.6  46.2   PLATELETCT  150*  135*  146*   MPV  10.4  9.6  9.5     Recent Labs      19   0259  19   0233  19   0203   SODIUM  128*  130*  131*   POTASSIUM  4.6  4.0  4.3   CHLORIDE  95*  100  100   CO2  23  23  27   GLUCOSE  244*  115*  148*   BUN  29*  41*  26*   CREATININE  1.05  1.26  1.02   CALCIUM  8.7  8.5  8.1*               Intake/Output       19 07 - 19 0659 19 07 - 19 0659       Total  Total       Intake    P.O.  765  -- 765  --  -- --    P.O. 765 -- 765 -- -- --    Blood  --  350 350  --  -- --    Volume (RELEASE RED BLOOD CELLS) -- 350 350 -- -- --    Total Intake  -- -- --       Output    Urine  525  700 1225  --  -- --    Number of Times Voided 2 x 3 x 5 x  -- -- --    Urine Void (mL)  -- -- --    Drains  85  150 235  --  -- --    Output (mL) (Closed/Suction Drain 1 Left Back Juan Rea) 85 150 235 -- -- --    Stool  --  -- --  --  -- --    Number of Times Stooled 1 x -- 1 x -- -- --    Total Output  -- -- --       Net I/O     155 -500 -345 -- -- --            Intake/Output Summary (Last 24 hours) at 07/23/19 0804  Last data filed at 07/23/19 0345   Gross per 24 hour   Intake             1115 ml   Output             1460 ml   Net             -345 ml            • Pharmacy Consult Request  1 Each PHARMACY TO DOSE   • MD ALERT...DO NOT ADMINISTER NSAIDS or ASPIRIN unless ORDERED By Neurosurgery  1 Each PRN   • docusate sodium  100 mg BID   • senna-docusate  1 Tab Nightly   • senna-docusate  1 Tab Q24HRS PRN   • polyethylene glycol/lytes  1 Packet BID PRN   • magnesium hydroxide  30 mL QDAY PRN   • bisacodyl  10 mg Q24HRS PRN   • fleet  1 Each Once PRN   • enoxaparin  40 mg DAILY   • HYDROmorphone  0.5 mg Q3HRS PRN   • oxyCODONE immediate release  10 mg Q3HRS PRN   • oxyCODONE immediate-release  5 mg Q3HRS PRN   • diphenhydrAMINE  25 mg Q6HRS PRN    Or   • diphenhydrAMINE  25 mg Q6HRS PRN   • methocarbamol  750 mg 4X/DAY   • ondansetron  4 mg Q4HRS PRN   • ondansetron  4 mg Q4HRS PRN   • ALPRAZolam  0.5 mg TID PRN   • cloNIDine  0.1 mg Q4HRS PRN   • benzocaine-menthol  1 Lozenge Q2HRS PRN   • calcium carbonate  500 mg BID   • vitamin D  5,000 Units DAILY   • metFORMIN  1,000 mg QDAY with Breakfast    And   • metFORMIN  500 mg AC PM MEAL   • insulin glargine  20 Units QAM INSULIN   • hydrALAZINE  10 mg Q6HRS PRN   • insulin regular  2-10 Units 4X/DAY ACHS    And   • glucose  16 g Q15 MIN PRN    And   • dextrose 10% bolus  250 mL Q15 MIN PRN   • Respiratory Care per Protocol   Continuous RT   • acetaminophen  650 mg Q6HRS PRN   • amLODIPine  10 mg DAILY   • atorvastatin  20 mg Nightly   • lisinopril  40 mg DAILY   • loratadine  10 mg DAILY   •  omeprazole  20 mg DAILY   • spironolactone  25 mg DAILY   • terazosin  5 mg BID   • verapamil ER  180 mg BID       Assessment and Plan:  Hospital day #9  POD #3 T4 - T6 laminectomy with resection of tumor, T3 - T7 stealth guided fusion.  Monitor H&H daily.   PT/OT to continue to ambulate.  Drain to zero compression now.  Awaiting Pathology.  Rehab transfer pending final path results.

## 2019-07-24 PROBLEM — D69.6 THROMBOCYTOPENIA (HCC): Status: RESOLVED | Noted: 2019-07-23 | Resolved: 2019-07-24

## 2019-07-24 LAB
BASOPHILS # BLD AUTO: 0 % (ref 0–1.8)
BASOPHILS # BLD: 0 K/UL (ref 0–0.12)
EOSINOPHIL # BLD AUTO: 0.3 K/UL (ref 0–0.51)
EOSINOPHIL NFR BLD: 3 % (ref 0–6.9)
ERYTHROCYTE [DISTWIDTH] IN BLOOD BY AUTOMATED COUNT: 50.2 FL (ref 35.9–50)
GLUCOSE BLD-MCNC: 146 MG/DL (ref 65–99)
GLUCOSE BLD-MCNC: 164 MG/DL (ref 65–99)
GLUCOSE BLD-MCNC: 169 MG/DL (ref 65–99)
GLUCOSE BLD-MCNC: 173 MG/DL (ref 65–99)
GLUCOSE BLD-MCNC: 199 MG/DL (ref 65–99)
HCT VFR BLD AUTO: 24 % (ref 42–52)
HGB BLD-MCNC: 8.2 G/DL (ref 14–18)
IMM GRANULOCYTES # BLD AUTO: 0.1 K/UL (ref 0–0.11)
IMM GRANULOCYTES NFR BLD AUTO: 1 % (ref 0–0.9)
LYMPHOCYTES # BLD AUTO: 1.47 K/UL (ref 1–4.8)
LYMPHOCYTES NFR BLD: 14.6 % (ref 22–41)
MCH RBC QN AUTO: 32.9 PG (ref 27–33)
MCHC RBC AUTO-ENTMCNC: 34.5 G/DL (ref 33.7–35.3)
MCV RBC AUTO: 95.5 FL (ref 81.4–97.8)
MONOCYTES # BLD AUTO: 1 K/UL (ref 0–0.85)
MONOCYTES NFR BLD AUTO: 10 % (ref 0–13.4)
NEUTROPHILS # BLD AUTO: 7.18 K/UL (ref 1.82–7.42)
NEUTROPHILS NFR BLD: 71.4 % (ref 44–72)
NRBC # BLD AUTO: 0 K/UL
NRBC BLD-RTO: 0 /100 WBC
PLATELET # BLD AUTO: 165 K/UL (ref 164–446)
PMV BLD AUTO: 9.9 FL (ref 9–12.9)
RBC # BLD AUTO: 2.46 M/UL (ref 4.7–6.1)
WBC # BLD AUTO: 10.1 K/UL (ref 4.8–10.8)

## 2019-07-24 PROCEDURE — A9270 NON-COVERED ITEM OR SERVICE: HCPCS | Performed by: PHYSICIAN ASSISTANT

## 2019-07-24 PROCEDURE — A9270 NON-COVERED ITEM OR SERVICE: HCPCS | Performed by: INTERNAL MEDICINE

## 2019-07-24 PROCEDURE — 770001 HCHG ROOM/CARE - MED/SURG/GYN PRIV*

## 2019-07-24 PROCEDURE — 99232 SBSQ HOSP IP/OBS MODERATE 35: CPT | Performed by: HOSPITALIST

## 2019-07-24 PROCEDURE — 700112 HCHG RX REV CODE 229: Performed by: PHYSICIAN ASSISTANT

## 2019-07-24 PROCEDURE — 82962 GLUCOSE BLOOD TEST: CPT | Mod: 91

## 2019-07-24 PROCEDURE — 97535 SELF CARE MNGMENT TRAINING: CPT

## 2019-07-24 PROCEDURE — 700102 HCHG RX REV CODE 250 W/ 637 OVERRIDE(OP): Performed by: FAMILY MEDICINE

## 2019-07-24 PROCEDURE — A9270 NON-COVERED ITEM OR SERVICE: HCPCS | Performed by: HOSPITALIST

## 2019-07-24 PROCEDURE — 700102 HCHG RX REV CODE 250 W/ 637 OVERRIDE(OP): Performed by: INTERNAL MEDICINE

## 2019-07-24 PROCEDURE — 85025 COMPLETE CBC W/AUTO DIFF WBC: CPT

## 2019-07-24 PROCEDURE — 700102 HCHG RX REV CODE 250 W/ 637 OVERRIDE(OP): Performed by: HOSPITALIST

## 2019-07-24 PROCEDURE — 700102 HCHG RX REV CODE 250 W/ 637 OVERRIDE(OP): Performed by: PHYSICIAN ASSISTANT

## 2019-07-24 PROCEDURE — 36415 COLL VENOUS BLD VENIPUNCTURE: CPT

## 2019-07-24 RX ADMIN — TERAZOSIN HYDROCHLORIDE 5 MG: 5 CAPSULE ORAL at 06:16

## 2019-07-24 RX ADMIN — METFORMIN HYDROCHLORIDE 500 MG: 500 TABLET, FILM COATED ORAL at 17:57

## 2019-07-24 RX ADMIN — METFORMIN HYDROCHLORIDE 1000 MG: 500 TABLET, FILM COATED ORAL at 08:04

## 2019-07-24 RX ADMIN — METHOCARBAMOL TABLETS 750 MG: 750 TABLET, COATED ORAL at 17:56

## 2019-07-24 RX ADMIN — OXYCODONE HYDROCHLORIDE 5 MG: 5 TABLET ORAL at 18:40

## 2019-07-24 RX ADMIN — LORATADINE 10 MG: 10 TABLET ORAL at 06:14

## 2019-07-24 RX ADMIN — VERAPAMIL HYDROCHLORIDE 180 MG: 180 TABLET, FILM COATED, EXTENDED RELEASE ORAL at 06:16

## 2019-07-24 RX ADMIN — AMLODIPINE BESYLATE 10 MG: 5 TABLET ORAL at 06:16

## 2019-07-24 RX ADMIN — METHOCARBAMOL TABLETS 750 MG: 750 TABLET, COATED ORAL at 12:14

## 2019-07-24 RX ADMIN — TERAZOSIN HYDROCHLORIDE 5 MG: 5 CAPSULE ORAL at 17:57

## 2019-07-24 RX ADMIN — OMEPRAZOLE 20 MG: 20 CAPSULE, DELAYED RELEASE ORAL at 06:16

## 2019-07-24 RX ADMIN — METHOCARBAMOL TABLETS 750 MG: 750 TABLET, COATED ORAL at 08:04

## 2019-07-24 RX ADMIN — SENNOSIDES, DOCUSATE SODIUM 1 TABLET: 50; 8.6 TABLET, FILM COATED ORAL at 20:42

## 2019-07-24 RX ADMIN — ATORVASTATIN CALCIUM 20 MG: 20 TABLET, FILM COATED ORAL at 20:42

## 2019-07-24 RX ADMIN — METHOCARBAMOL TABLETS 750 MG: 750 TABLET, COATED ORAL at 20:42

## 2019-07-24 RX ADMIN — ANTACID TABLETS 500 MG: 500 TABLET, CHEWABLE ORAL at 06:14

## 2019-07-24 RX ADMIN — LISINOPRIL 40 MG: 20 TABLET ORAL at 06:15

## 2019-07-24 RX ADMIN — OXYCODONE HYDROCHLORIDE 10 MG: 5 TABLET ORAL at 10:19

## 2019-07-24 RX ADMIN — ANTACID TABLETS 500 MG: 500 TABLET, CHEWABLE ORAL at 17:57

## 2019-07-24 RX ADMIN — INSULIN GLARGINE 20 UNITS: 100 INJECTION, SOLUTION SUBCUTANEOUS at 06:20

## 2019-07-24 RX ADMIN — DOCUSATE SODIUM 100 MG: 100 CAPSULE, LIQUID FILLED ORAL at 06:14

## 2019-07-24 RX ADMIN — SPIRONOLACTONE 25 MG: 25 TABLET ORAL at 06:17

## 2019-07-24 RX ADMIN — VERAPAMIL HYDROCHLORIDE 180 MG: 180 TABLET, FILM COATED, EXTENDED RELEASE ORAL at 17:56

## 2019-07-24 RX ADMIN — VITAMIN D, TAB 1000IU (100/BT) 5000 UNITS: 25 TAB at 06:14

## 2019-07-24 RX ADMIN — OXYCODONE HYDROCHLORIDE 5 MG: 5 TABLET ORAL at 14:36

## 2019-07-24 RX ADMIN — DOCUSATE SODIUM 100 MG: 100 CAPSULE, LIQUID FILLED ORAL at 17:57

## 2019-07-24 RX ADMIN — OXYCODONE HYDROCHLORIDE 10 MG: 5 TABLET ORAL at 07:12

## 2019-07-24 ASSESSMENT — ENCOUNTER SYMPTOMS
BACK PAIN: 1
FOCAL WEAKNESS: 0
PALPITATIONS: 0
BLURRED VISION: 0
CHILLS: 0
NAUSEA: 0
HEARTBURN: 0
ABDOMINAL PAIN: 0
WEAKNESS: 0
FEVER: 0
SPEECH CHANGE: 0
FLANK PAIN: 0
COUGH: 0
DIARRHEA: 0
HEADACHES: 0
NERVOUS/ANXIOUS: 0
WHEEZING: 0
DIZZINESS: 0
SENSORY CHANGE: 0
NECK PAIN: 0
FALLS: 0
VOMITING: 0
SORE THROAT: 0
SHORTNESS OF BREATH: 0

## 2019-07-24 ASSESSMENT — COGNITIVE AND FUNCTIONAL STATUS - GENERAL
EATING MEALS: A LITTLE
TOILETING: A LITTLE
PERSONAL GROOMING: A LITTLE
DRESSING REGULAR UPPER BODY CLOTHING: A LITTLE
DAILY ACTIVITIY SCORE: 16
SUGGESTED CMS G CODE MODIFIER DAILY ACTIVITY: CK
HELP NEEDED FOR BATHING: A LOT
DRESSING REGULAR LOWER BODY CLOTHING: A LOT

## 2019-07-24 NOTE — PROGRESS NOTES
RN MOBILITY NOTE - 7/23/19     Surgery patient?: yes  Date of surgery: 07/20  Ambulated 50 ft on day of surgery? (N/A if today is not date of surgery): n/a  Number of times ambulated 50 feet or greater today: 4  Patient has been up to chair, edge of bed or HOB 90 degrees for all meals?: 3/3  Goal met? (goal is ambulating at least 50 feet 2 times on day shift, one time on night shift): no  If patient did not meet mobility goal, why?: pt x2 assist, unstable

## 2019-07-24 NOTE — THERAPY
"Occupational Therapy Treatment completed with focus on ADLs, ADL transfers, patient education and cognition.  Functional Status: Pt seen today for OT treatment. He was pleasant and cooperative, still with mild difficulty sequencing ADLs and txfs. Min-modA sit<>stand with FWW, improved as session went on but initially more difficulty for him. Latanya functional mobility to the sink with FWW. Latanya grooming in stance. Pt fatigued and required sitting down to finish rest of grooming routine. MaxA LB dressing. He is making progress, remains limited by weakness, fatigue, impaired balance, and impaired cognition which impacts independence in self care and functional mobility.  Plan of Care: Will benefit from Occupational Therapy 5 times per week  Discharge Recommendations:  Equipment Will Continue to Assess for Equipment Needs. Recommend post-acute placement for additional occupational therapy services prior to discharge home. Patient can tolerate post-acute therapies at a 5x/week frequency.      See \"Rehab Therapy-Acute\" Patient Summary Report for complete documentation.   "

## 2019-07-24 NOTE — PROGRESS NOTES
2008  Pt A&Ox4. Pt denies numbness and tingling at this time. Pain 5/10, repositioned. Bed alarm on. Bed locked and in lowest position. Call light within reach. Treaded socks in place. SCDs in use. Pt educated to call for assistance. Verbalized understanding. Pt denies nausea and dizziness at this time

## 2019-07-24 NOTE — CARE PLAN
Problem: Safety  Goal: Will remain free from injury  Outcome: PROGRESSING AS EXPECTED  Patient educated to call for assistance. Precautions in place; Call light within reach. Bed alarm in use.  Bed locked and in lowest position. Treaded socks in place. Hourly rounding. Will continue to monitor.    Problem: Skin Integrity  Goal: Risk for impaired skin integrity will decrease  Outcome: PROGRESSING AS EXPECTED

## 2019-07-24 NOTE — PROGRESS NOTES
[facility] Medicine Daily Progress Note    Date of Service  7/24/2019    Chief Complaint  69 y.o. male admitted 7/15/2019 with T5 mass.    Hospital Course  Admitted with T5 mass with complaints of increasing weakness of both legs.    Interval Problem Update  Did well with PT this am  Reports surgical site pain improved currently 4/10  Path reports pending  No sob  axox3  Ros otherwise negative    Consultants/Specialty  Neurosurgery    Code Status  Full    Disposition  Acute rehab    Review of Systems  Review of Systems   Constitutional: Negative for chills, fever and malaise/fatigue.   HENT: Negative for hearing loss and sore throat.    Eyes: Negative for blurred vision.   Respiratory: Negative for cough, shortness of breath and wheezing.    Cardiovascular: Negative for chest pain, palpitations and leg swelling.   Gastrointestinal: Negative for abdominal pain, diarrhea, heartburn, nausea and vomiting.   Genitourinary: Negative for dysuria, flank pain and hematuria.   Musculoskeletal: Positive for back pain. Negative for falls and neck pain.   Skin: Negative for rash.   Neurological: Negative for dizziness, sensory change, speech change, focal weakness, weakness and headaches.   Psychiatric/Behavioral: The patient is not nervous/anxious.         Physical Exam  Temp:  [36.7 °C (98.1 °F)-37.5 °C (99.5 °F)] 37.1 °C (98.8 °F)  Pulse:  [77-90] 83  Resp:  [16-18] 16  BP: (102-151)/(56-70) 102/56  SpO2:  [94 %-98 %] 98 %    Physical Exam   Constitutional: He is oriented to person, place, and time. He appears well-developed and well-nourished. No distress.   HENT:   Head: Normocephalic and atraumatic.   Right Ear: External ear normal.   Left Ear: External ear normal.   Nose: Nose normal.   Mouth/Throat: Oropharynx is clear and moist. No oropharyngeal exudate.   Eyes: Pupils are equal, round, and reactive to light. Conjunctivae and EOM are normal. Right eye exhibits no discharge. Left eye exhibits no discharge. No scleral  icterus.   Neck: Normal range of motion. Neck supple. No JVD present. No tracheal deviation present. No thyromegaly present.   Cardiovascular: Normal rate, regular rhythm, normal heart sounds and intact distal pulses.  Exam reveals no gallop and no friction rub.    No murmur heard.  Pulmonary/Chest: Effort normal and breath sounds normal. No stridor. No respiratory distress. He has no wheezes. He has no rales. He exhibits no tenderness.   Abdominal: Soft. Bowel sounds are normal. He exhibits no distension and no mass. There is no tenderness. There is no rebound and no guarding.   Musculoskeletal: Normal range of motion. He exhibits no edema, tenderness or deformity.   Lymphadenopathy:     He has no cervical adenopathy.   Neurological: He is alert and oriented to person, place, and time. He has normal reflexes. No cranial nerve deficit. He exhibits normal muscle tone. Coordination normal.   Skin: Skin is warm and dry. No rash noted. He is not diaphoretic. No erythema. No pallor.   katelin and staples in place   Psychiatric: He has a normal mood and affect.   Nursing note and vitals reviewed.      Fluids    Intake/Output Summary (Last 24 hours) at 07/24/19 1128  Last data filed at 07/24/19 0635   Gross per 24 hour   Intake              720 ml   Output             3220 ml   Net            -2500 ml       Laboratory  Recent Labs      07/22/19   0630  07/23/19   0203  07/24/19   0250   WBC  8.1  9.3  10.1   RBC  2.10*  2.02*  2.46*   HEMOGLOBIN  7.0*  6.7*  8.2*   HEMATOCRIT  20.9*  19.9*  24.0*   MCV  99.5*  98.5*  95.5   MCH  33.3*  33.2*  32.9   MCHC  33.5*  33.7  34.5   RDW  46.6  46.2  50.2*   PLATELETCT  135*  146*  165   MPV  9.6  9.5  9.9     Recent Labs      07/22/19   0233  07/23/19   0203   SODIUM  130*  131*   POTASSIUM  4.0  4.3   CHLORIDE  100  100   CO2  23  27   GLUCOSE  115*  148*   BUN  41*  26*   CREATININE  1.26  1.02   CALCIUM  8.5  8.1*                   Imaging  OUTSIDE IMAGES-MR LUMBAR SPINE   Final  Result      OUTSIDE IMAGES-MR THORACIC SPINE   Final Result      DX-O-ARM   Final Result      Portable O-arm utilized for 4.17 seconds.         INTERPRETING LOCATION: [facility], [place], [address]      DX-PORTABLE FLUORO > 1 HOUR   Final Result         Portable fluoroscopy utilized for 12 seconds.      DX-THORACIC SPINE-2 VIEWS   Final Result      3 intraoperative spot images submitted for surgical hardware localization for diagnostic purposes demonstrate posterior pillo and transpedicular screw fixation transfixing previously seen destructive T5 mass. See surgical report for details.   Fluoroscopy Time:  12 seconds.  3 fluoroscopic images were obtained.      EC-ECHOCARDIOGRAM COMPLETE W/O CONT   Final Result      CT-HEAD W/O   Final Result         1.  No acute intracranial abnormality is identified, there are nonspecific white matter changes, commonly associated with small vessel ischemic disease.  Associated mild cerebral atrophy is noted.   2.  Atherosclerosis.      MR-THORACIC SPINE-WITH   Final Result      1.  Destructive marrow replacing mass involving the T5 vertebra with destruction of the posterior elements and epidural soft tissue mass primarily dorsal with cord compression and marked central stenosis. Differential considerations would include osseous    metastatic disease, plasmacytoma (multiple myeloma), or other primary spinal tumors. The lesion appears amenable to needle biopsy.   2.  T9-T10 small left paramedian disc protrusion.   3.  Bilateral foraminal stenosis at T5-T6 where the T5 soft tissue mass encroaches on the foramina.   4. The case was discussed (call report) with DR PLUMMER on duty for  [facility] at 1445 hours 7/16/2019.      CT-CHEST,ABDOMEN,PELVIS WITH   Final Result      Small bilateral pulmonary nodules. Follow-up CT chest is recommended.      Large lobulated mass replacing the posterior elements of T5 with significant spinal canal stenosis.      Atherosclerotic plaque, including  coronary artery calcification.      Mildly enlarged subcarinal, cardiophrenic and borderline retroperitoneal and retrocrural lymph nodes are nonspecific.      Enlarged left adrenal gland with a 1.8 cm nodule which is indeterminate and could be metastatic. PET/CT would be beneficial for further evaluation.      Moderate amount of colonic stool. Colonic diverticulosis.      Distended bladder.                  CT-TSPINE W/O PLUS RECONS   Final Result         1.  Lytic expansile lesion in the pedicles and posterior elements, appearance most compatible with metastatic or primary bone disease. Question possible compromise of the cord. Could be further evaluated with MRI without and with contrast.   2.  Atherosclerotic calcifications are noted.      These findings were discussed with the patient's clinician, SUSAN BARLOW, on 7/15/2019 10:29 PM.      DX-CHEST-2 VIEWS   Final Result         1.  Right infrahilar density suggests infiltrate.           Assessment/Plan  * Mass of thoracic vertebra- (present on admission)   Assessment & Plan    POD #5  Path results pending - seen by psychiatry and good candidate for acute rehab but they recommend path results review prior to transfer  Continue PT/ OT   Continue pain control     Fall   Assessment & Plan    Continue PT and OT     PVD (peripheral vascular disease) (HCC)- (present on admission)   Assessment & Plan    Lipitor  Plavix held     Essential hypertension- (present on admission)   Assessment & Plan    Continue current regimen as tolerated     Type 2 diabetes mellitus without complication, without long-term current use of insulin (HCC)- (present on admission)   Assessment & Plan    Continue ssi and lantus     Anemia   Assessment & Plan    Acute blood loss anemia   Bloody output from katelin resolved  Plavix and lovenox stopped  H/h much improved s/o transfusion  Continue to follow     Constipation   Assessment & Plan    resolved     Obesity- (present on admission)    Assessment & Plan    Body mass index is 31.57 kg/m².       Dyslipidemia- (present on admission)   Assessment & Plan    Lipitor          VTE prophylaxis: SCD

## 2019-07-24 NOTE — PROGRESS NOTES
Neurosurgery Progress Note    Subjective:  POD#4  T4 - T6 laminectomy with resection of tumor, T3 - T7 stealth guided fusion.  Doing well with good strength in legs.  Pain to op site.  Controlled with Meds.  Transfused yesterday; Hb 8.2 yesterday  Ambulated with PT  No upper or lower extremity radicular symptoms.  Has some incisional pain.  Awaiting final pathology still.  Drain at 10cc/8hours.  Rehab has seen and stated that it would be best to hold off on transferring to rehab until after final pathology is obtained.     Exam:  Dressing dry with strike through  Motor 5/5 to bilateral upper and lower ext.  Sensory intact.    BP  Min: 102/56  Max: 151/70  Pulse  Av.8  Min: 77  Max: 90  Resp  Av.5  Min: 16  Max: 18  Temp  Av.2 °C (98.9 °F)  Min: 36.7 °C (98.1 °F)  Max: 37.5 °C (99.5 °F)  SpO2  Av.5 %  Min: 94 %  Max: 98 %    No Data Recorded    Recent Labs      19   0630  19   0203  19   0250   WBC  8.1  9.3  10.1   RBC  2.10*  2.02*  2.46*   HEMOGLOBIN  7.0*  6.7*  8.2*   HEMATOCRIT  20.9*  19.9*  24.0*   MCV  99.5*  98.5*  95.5   MCH  33.3*  33.2*  32.9   MCHC  33.5*  33.7  34.5   RDW  46.6  46.2  50.2*   PLATELETCT  135*  146*  165   MPV  9.6  9.5  9.9     Recent Labs      19   0233  19   0203   SODIUM  130*  131*   POTASSIUM  4.0  4.3   CHLORIDE  100  100   CO2  23  27   GLUCOSE  115*  148*   BUN  41*  26*   CREATININE  1.26  1.02   CALCIUM  8.5  8.1*               Intake/Output       19 - 1959 19 - 19 0659       Total  Total       Intake    P.O.  360  360 720  --  -- --    P.O. 360 360 720 -- -- --    Total Intake 360 360 720 -- -- --       Output    Urine  --  3150 3150  --  -- --    Number of Times Voided 3 x -- 3 x -- -- --    Urine Void (mL) -- 3150 3150 -- -- --    Drains  50  20 70  --  -- --    Output (mL) (Closed/Suction Drain 1 Left Back [facility]) 50 20 70 -- -- --    Stool   --  -- --  --  -- --    Number of Times Stooled 1 x -- 1 x -- -- --    Total Output 50 3170 3220 -- -- --       Net I/O     310 -0052 -2500 -- -- --            Intake/Output Summary (Last 24 hours) at 07/24/19 0820  Last data filed at 07/24/19 0635   Gross per 24 hour   Intake              720 ml   Output             3220 ml   Net            -2500 ml            • Pharmacy Consult Request  1 Each PHARMACY TO DOSE   • MD ALERT...DO NOT ADMINISTER NSAIDS or ASPIRIN unless ORDERED By Neurosurgery  1 Each PRN   • docusate sodium  100 mg BID   • senna-docusate  1 Tab Nightly   • senna-docusate  1 Tab Q24HRS PRN   • polyethylene glycol/lytes  1 Packet BID PRN   • magnesium hydroxide  30 mL QDAY PRN   • bisacodyl  10 mg Q24HRS PRN   • fleet  1 Each Once PRN   • HYDROmorphone  0.5 mg Q3HRS PRN   • oxyCODONE immediate release  10 mg Q3HRS PRN   • oxyCODONE immediate-release  5 mg Q3HRS PRN   • diphenhydrAMINE  25 mg Q6HRS PRN    Or   • diphenhydrAMINE  25 mg Q6HRS PRN   • methocarbamol  750 mg 4X/DAY   • ondansetron  4 mg Q4HRS PRN   • ondansetron  4 mg Q4HRS PRN   • ALPRAZolam  0.5 mg TID PRN   • cloNIDine  0.1 mg Q4HRS PRN   • benzocaine-menthol  1 Lozenge Q2HRS PRN   • calcium carbonate  500 mg BID   • vitamin D  5,000 Units DAILY   • metFORMIN  1,000 mg QDAY with Breakfast    And   • metFORMIN  500 mg AC PM MEAL   • insulin glargine  20 Units QAM INSULIN   • hydrALAZINE  10 mg Q6HRS PRN   • insulin regular  2-10 Units 4X/DAY ACHS    And   • glucose  16 g Q15 MIN PRN    And   • dextrose 10% bolus  250 mL Q15 MIN PRN   • Respiratory Care per Protocol   Continuous RT   • acetaminophen  650 mg Q6HRS PRN   • amLODIPine  10 mg DAILY   • atorvastatin  20 mg Nightly   • lisinopril  40 mg DAILY   • loratadine  10 mg DAILY   • omeprazole  20 mg DAILY   • spironolactone  25 mg DAILY   • terazosin  5 mg BID   • verapamil ER  180 mg BID       Assessment and Plan:  Hospital day #10  POD #4 T4 - T6 laminectomy with resection of tumor,  T3 - T7 stealth guided fusion.  Monitor H&H daily.   PT/OT to continue to ambulate.  Dc drain  Awaiting Pathology.  Rehab transfer pending final path results.

## 2019-07-25 ENCOUNTER — HOSPITAL ENCOUNTER (INPATIENT)
Facility: REHABILITATION | Age: 69
LOS: 25 days | DRG: 052 | End: 2019-08-19
Attending: PHYSICAL MEDICINE & REHABILITATION | Admitting: PHYSICAL MEDICINE & REHABILITATION
Payer: MEDICARE

## 2019-07-25 VITALS
SYSTOLIC BLOOD PRESSURE: 124 MMHG | HEIGHT: 70 IN | OXYGEN SATURATION: 93 % | RESPIRATION RATE: 18 BRPM | HEART RATE: 69 BPM | BODY MASS INDEX: 31.5 KG/M2 | TEMPERATURE: 98.1 F | DIASTOLIC BLOOD PRESSURE: 48 MMHG | WEIGHT: 220 LBS

## 2019-07-25 PROBLEM — N17.9 ACUTE KIDNEY INJURY (HCC): Status: ACTIVE | Noted: 2019-07-25

## 2019-07-25 PROBLEM — K59.2 NEUROGENIC BOWEL: Status: ACTIVE | Noted: 2019-07-25

## 2019-07-25 PROBLEM — K59.00 CONSTIPATION: Status: RESOLVED | Noted: 2019-07-21 | Resolved: 2019-07-25

## 2019-07-25 PROBLEM — S24.102A: Status: ACTIVE | Noted: 2019-07-25

## 2019-07-25 PROBLEM — N31.9 NEUROGENIC BLADDER: Status: ACTIVE | Noted: 2019-07-25

## 2019-07-25 PROBLEM — C90.30 PLASMACYTOMA OF BONE (HCC): Status: ACTIVE | Noted: 2019-07-25

## 2019-07-25 PROBLEM — M79.2 NEUROPATHIC PAIN: Status: ACTIVE | Noted: 2019-07-25

## 2019-07-25 PROBLEM — E55.9 VITAMIN D DEFICIENCY: Status: ACTIVE | Noted: 2019-07-25

## 2019-07-25 PROBLEM — E87.1 HYPONATREMIA: Status: ACTIVE | Noted: 2019-07-25

## 2019-07-25 PROBLEM — M48.9 MASS OF THORACIC VERTEBRA: Status: RESOLVED | Noted: 2019-07-15 | Resolved: 2019-07-25

## 2019-07-25 LAB
BASOPHILS # BLD AUTO: 0.1 % (ref 0–1.8)
BASOPHILS # BLD: 0.01 K/UL (ref 0–0.12)
EOSINOPHIL # BLD AUTO: 0.3 K/UL (ref 0–0.51)
EOSINOPHIL NFR BLD: 2.9 % (ref 0–6.9)
ERYTHROCYTE [DISTWIDTH] IN BLOOD BY AUTOMATED COUNT: 48.2 FL (ref 35.9–50)
GLUCOSE BLD-MCNC: 119 MG/DL (ref 65–99)
GLUCOSE BLD-MCNC: 151 MG/DL (ref 65–99)
GLUCOSE BLD-MCNC: 155 MG/DL (ref 65–99)
GLUCOSE BLD-MCNC: 162 MG/DL (ref 65–99)
GLUCOSE BLD-MCNC: 166 MG/DL (ref 65–99)
HCT VFR BLD AUTO: 23.8 % (ref 42–52)
HGB BLD-MCNC: 8.4 G/DL (ref 14–18)
IMM GRANULOCYTES # BLD AUTO: 0.09 K/UL (ref 0–0.11)
IMM GRANULOCYTES NFR BLD AUTO: 0.9 % (ref 0–0.9)
LYMPHOCYTES # BLD AUTO: 1.34 K/UL (ref 1–4.8)
LYMPHOCYTES NFR BLD: 12.9 % (ref 22–41)
MCH RBC QN AUTO: 33.6 PG (ref 27–33)
MCHC RBC AUTO-ENTMCNC: 35.3 G/DL (ref 33.7–35.3)
MCV RBC AUTO: 95.2 FL (ref 81.4–97.8)
MONOCYTES # BLD AUTO: 1.13 K/UL (ref 0–0.85)
MONOCYTES NFR BLD AUTO: 10.9 % (ref 0–13.4)
NEUTROPHILS # BLD AUTO: 7.51 K/UL (ref 1.82–7.42)
NEUTROPHILS NFR BLD: 72.3 % (ref 44–72)
NRBC # BLD AUTO: 0 K/UL
NRBC BLD-RTO: 0 /100 WBC
PLATELET # BLD AUTO: 179 K/UL (ref 164–446)
PMV BLD AUTO: 9.9 FL (ref 9–12.9)
RBC # BLD AUTO: 2.5 M/UL (ref 4.7–6.1)
WBC # BLD AUTO: 10.4 K/UL (ref 4.8–10.8)

## 2019-07-25 PROCEDURE — 85025 COMPLETE CBC W/AUTO DIFF WBC: CPT

## 2019-07-25 PROCEDURE — 97116 GAIT TRAINING THERAPY: CPT

## 2019-07-25 PROCEDURE — A9270 NON-COVERED ITEM OR SERVICE: HCPCS | Performed by: PHYSICAL MEDICINE & REHABILITATION

## 2019-07-25 PROCEDURE — 82962 GLUCOSE BLOOD TEST: CPT | Mod: 91

## 2019-07-25 PROCEDURE — A9270 NON-COVERED ITEM OR SERVICE: HCPCS | Performed by: INTERNAL MEDICINE

## 2019-07-25 PROCEDURE — 82962 GLUCOSE BLOOD TEST: CPT

## 2019-07-25 PROCEDURE — 700102 HCHG RX REV CODE 250 W/ 637 OVERRIDE(OP): Performed by: INTERNAL MEDICINE

## 2019-07-25 PROCEDURE — 700112 HCHG RX REV CODE 229: Performed by: PHYSICIAN ASSISTANT

## 2019-07-25 PROCEDURE — 700102 HCHG RX REV CODE 250 W/ 637 OVERRIDE(OP): Performed by: PHYSICIAN ASSISTANT

## 2019-07-25 PROCEDURE — 36415 COLL VENOUS BLD VENIPUNCTURE: CPT

## 2019-07-25 PROCEDURE — A9270 NON-COVERED ITEM OR SERVICE: HCPCS | Performed by: HOSPITALIST

## 2019-07-25 PROCEDURE — 99239 HOSP IP/OBS DSCHRG MGMT >30: CPT | Performed by: HOSPITALIST

## 2019-07-25 PROCEDURE — A9270 NON-COVERED ITEM OR SERVICE: HCPCS | Performed by: PHYSICIAN ASSISTANT

## 2019-07-25 PROCEDURE — 700102 HCHG RX REV CODE 250 W/ 637 OVERRIDE(OP): Performed by: PHYSICAL MEDICINE & REHABILITATION

## 2019-07-25 PROCEDURE — 700102 HCHG RX REV CODE 250 W/ 637 OVERRIDE(OP): Performed by: HOSPITALIST

## 2019-07-25 PROCEDURE — 94760 N-INVAS EAR/PLS OXIMETRY 1: CPT

## 2019-07-25 PROCEDURE — 700102 HCHG RX REV CODE 250 W/ 637 OVERRIDE(OP): Performed by: FAMILY MEDICINE

## 2019-07-25 PROCEDURE — 770010 HCHG ROOM/CARE - REHAB SEMI PRIVAT*

## 2019-07-25 PROCEDURE — 97530 THERAPEUTIC ACTIVITIES: CPT

## 2019-07-25 PROCEDURE — 99223 1ST HOSP IP/OBS HIGH 75: CPT | Mod: AI | Performed by: PHYSICAL MEDICINE & REHABILITATION

## 2019-07-25 RX ORDER — AMLODIPINE BESYLATE 5 MG/1
10 TABLET ORAL DAILY
Status: DISCONTINUED | OUTPATIENT
Start: 2019-07-26 | End: 2019-08-19 | Stop reason: HOSPADM

## 2019-07-25 RX ORDER — METHOCARBAMOL 750 MG/1
750 TABLET, FILM COATED ORAL 4 TIMES DAILY
Status: CANCELLED | OUTPATIENT
Start: 2019-07-25

## 2019-07-25 RX ORDER — ONDANSETRON 2 MG/ML
4 INJECTION INTRAMUSCULAR; INTRAVENOUS 4 TIMES DAILY PRN
Status: DISCONTINUED | OUTPATIENT
Start: 2019-07-25 | End: 2019-08-19 | Stop reason: HOSPADM

## 2019-07-25 RX ORDER — TERAZOSIN 5 MG/1
5 CAPSULE ORAL 2 TIMES DAILY
Status: DISCONTINUED | OUTPATIENT
Start: 2019-07-25 | End: 2019-08-19 | Stop reason: HOSPADM

## 2019-07-25 RX ORDER — DEXTROSE MONOHYDRATE 25 G/50ML
50 INJECTION, SOLUTION INTRAVENOUS
Status: DISCONTINUED | OUTPATIENT
Start: 2019-07-25 | End: 2019-07-26

## 2019-07-25 RX ORDER — ECHINACEA PURPUREA EXTRACT 125 MG
2 TABLET ORAL PRN
Status: DISCONTINUED | OUTPATIENT
Start: 2019-07-25 | End: 2019-08-19 | Stop reason: HOSPADM

## 2019-07-25 RX ORDER — LISINOPRIL 20 MG/1
40 TABLET ORAL DAILY
Status: DISCONTINUED | OUTPATIENT
Start: 2019-07-26 | End: 2019-08-19 | Stop reason: HOSPADM

## 2019-07-25 RX ORDER — TRAZODONE HYDROCHLORIDE 50 MG/1
50 TABLET ORAL
Status: DISCONTINUED | OUTPATIENT
Start: 2019-07-25 | End: 2019-08-19 | Stop reason: HOSPADM

## 2019-07-25 RX ORDER — DULOXETIN HYDROCHLORIDE 30 MG/1
30 CAPSULE, DELAYED RELEASE ORAL DAILY
Status: DISCONTINUED | OUTPATIENT
Start: 2019-07-25 | End: 2019-07-31

## 2019-07-25 RX ORDER — ALUMINA, MAGNESIA, AND SIMETHICONE 2400; 2400; 240 MG/30ML; MG/30ML; MG/30ML
20 SUSPENSION ORAL
Status: DISCONTINUED | OUTPATIENT
Start: 2019-07-25 | End: 2019-08-19 | Stop reason: HOSPADM

## 2019-07-25 RX ORDER — OXYCODONE HYDROCHLORIDE 5 MG/1
5 TABLET ORAL EVERY 6 HOURS PRN
Start: 2019-07-25 | End: 2019-07-25

## 2019-07-25 RX ORDER — SPIRONOLACTONE 25 MG/1
25 TABLET ORAL DAILY
Status: DISCONTINUED | OUTPATIENT
Start: 2019-07-26 | End: 2019-08-19 | Stop reason: HOSPADM

## 2019-07-25 RX ORDER — ACETAMINOPHEN 325 MG/1
650 TABLET ORAL EVERY 4 HOURS PRN
Status: DISCONTINUED | OUTPATIENT
Start: 2019-07-25 | End: 2019-08-19 | Stop reason: HOSPADM

## 2019-07-25 RX ORDER — OMEPRAZOLE 20 MG/1
20 CAPSULE, DELAYED RELEASE ORAL DAILY
Status: CANCELLED | OUTPATIENT
Start: 2019-07-26

## 2019-07-25 RX ORDER — METHOCARBAMOL 750 MG/1
750 TABLET, FILM COATED ORAL 4 TIMES DAILY
Status: DISCONTINUED | OUTPATIENT
Start: 2019-07-25 | End: 2019-08-02

## 2019-07-25 RX ORDER — LORATADINE 10 MG/1
10 TABLET ORAL DAILY
Status: DISCONTINUED | OUTPATIENT
Start: 2019-07-26 | End: 2019-08-19 | Stop reason: HOSPADM

## 2019-07-25 RX ORDER — INSULIN GLARGINE 100 [IU]/ML
20 INJECTION, SOLUTION SUBCUTANEOUS EVERY MORNING
Qty: 10 ML | Status: ON HOLD
Start: 2019-07-26 | End: 2019-08-19

## 2019-07-25 RX ORDER — CALCIUM CARBONATE 500 MG/1
500 TABLET, CHEWABLE ORAL 2 TIMES DAILY
Status: DISCONTINUED | OUTPATIENT
Start: 2019-07-25 | End: 2019-08-16

## 2019-07-25 RX ORDER — INSULIN GLARGINE 100 [IU]/ML
20 INJECTION, SOLUTION SUBCUTANEOUS EVERY MORNING
Status: DISCONTINUED | OUTPATIENT
Start: 2019-07-26 | End: 2019-07-26

## 2019-07-25 RX ORDER — PRAZOSIN HYDROCHLORIDE 1 MG/1
5 CAPSULE ORAL NIGHTLY
Status: DISCONTINUED | OUTPATIENT
Start: 2019-07-25 | End: 2019-07-25

## 2019-07-25 RX ORDER — OXYCODONE HYDROCHLORIDE 5 MG/1
5 TABLET ORAL
Status: DISCONTINUED | OUTPATIENT
Start: 2019-07-25 | End: 2019-08-12

## 2019-07-25 RX ORDER — ACETAMINOPHEN 325 MG/1
650 TABLET ORAL EVERY 6 HOURS PRN
Qty: 30 TAB | Refills: 0 | Status: ON HOLD
Start: 2019-07-25 | End: 2019-08-19

## 2019-07-25 RX ORDER — OXYCODONE HYDROCHLORIDE 10 MG/1
10 TABLET ORAL EVERY 6 HOURS PRN
Start: 2019-07-25 | End: 2019-07-25

## 2019-07-25 RX ORDER — INSULIN GLARGINE 100 [IU]/ML
0.2 INJECTION, SOLUTION SUBCUTANEOUS EVERY EVENING
Status: DISCONTINUED | OUTPATIENT
Start: 2019-07-25 | End: 2019-07-25

## 2019-07-25 RX ORDER — POLYVINYL ALCOHOL 14 MG/ML
1 SOLUTION/ DROPS OPHTHALMIC PRN
Status: DISCONTINUED | OUTPATIENT
Start: 2019-07-25 | End: 2019-08-19 | Stop reason: HOSPADM

## 2019-07-25 RX ORDER — OXYCODONE HYDROCHLORIDE 10 MG/1
10 TABLET ORAL
Status: DISCONTINUED | OUTPATIENT
Start: 2019-07-25 | End: 2019-08-12

## 2019-07-25 RX ORDER — OMEPRAZOLE 20 MG/1
20 CAPSULE, DELAYED RELEASE ORAL DAILY
Status: DISCONTINUED | OUTPATIENT
Start: 2019-07-26 | End: 2019-07-25

## 2019-07-25 RX ORDER — ONDANSETRON 4 MG/1
4 TABLET, ORALLY DISINTEGRATING ORAL 4 TIMES DAILY PRN
Status: DISCONTINUED | OUTPATIENT
Start: 2019-07-25 | End: 2019-08-19 | Stop reason: HOSPADM

## 2019-07-25 RX ORDER — ATORVASTATIN CALCIUM 10 MG/1
20 TABLET, FILM COATED ORAL NIGHTLY
Status: DISCONTINUED | OUTPATIENT
Start: 2019-07-25 | End: 2019-08-19 | Stop reason: HOSPADM

## 2019-07-25 RX ORDER — CALCIUM CARBONATE 500 MG/1
500 TABLET, CHEWABLE ORAL 2 TIMES DAILY
Status: CANCELLED | OUTPATIENT
Start: 2019-07-25

## 2019-07-25 RX ORDER — OMEPRAZOLE 20 MG/1
20 CAPSULE, DELAYED RELEASE ORAL DAILY
Status: DISCONTINUED | OUTPATIENT
Start: 2019-07-26 | End: 2019-08-19 | Stop reason: HOSPADM

## 2019-07-25 RX ORDER — ACETAMINOPHEN 500 MG
1000 TABLET ORAL 3 TIMES DAILY
Status: DISCONTINUED | OUTPATIENT
Start: 2019-07-25 | End: 2019-08-02

## 2019-07-25 RX ORDER — ALPRAZOLAM 0.5 MG/1
0.5 TABLET ORAL 3 TIMES DAILY PRN
Qty: 30 TAB | Refills: 0 | Status: ON HOLD
Start: 2019-07-25 | End: 2019-08-19

## 2019-07-25 RX ORDER — M-VIT,TX,IRON,MINS/CALC/FOLIC 27MG-0.4MG
1 TABLET ORAL
Status: DISCONTINUED | OUTPATIENT
Start: 2019-07-26 | End: 2019-08-19 | Stop reason: HOSPADM

## 2019-07-25 RX ADMIN — SPIRONOLACTONE 25 MG: 25 TABLET ORAL at 06:32

## 2019-07-25 RX ADMIN — METHOCARBAMOL 750 MG: 750 TABLET, FILM COATED ORAL at 20:37

## 2019-07-25 RX ADMIN — ANTACID TABLETS 500 MG: 500 TABLET, CHEWABLE ORAL at 06:29

## 2019-07-25 RX ADMIN — TERAZOSIN HYDROCHLORIDE 5 MG: 5 CAPSULE ORAL at 06:32

## 2019-07-25 RX ADMIN — INSULIN GLARGINE 20 UNITS: 100 INJECTION, SOLUTION SUBCUTANEOUS at 06:34

## 2019-07-25 RX ADMIN — ACETAMINOPHEN 1000 MG: 500 TABLET, FILM COATED ORAL at 20:37

## 2019-07-25 RX ADMIN — OXYCODONE HYDROCHLORIDE 10 MG: 5 TABLET ORAL at 10:27

## 2019-07-25 RX ADMIN — TERAZOSIN HYDROCHLORIDE 5 MG: 5 CAPSULE ORAL at 18:40

## 2019-07-25 RX ADMIN — METHOCARBAMOL TABLETS 750 MG: 750 TABLET, COATED ORAL at 14:09

## 2019-07-25 RX ADMIN — AMLODIPINE BESYLATE 10 MG: 5 TABLET ORAL at 06:30

## 2019-07-25 RX ADMIN — DULOXETINE HYDROCHLORIDE 30 MG: 30 CAPSULE, DELAYED RELEASE ORAL at 18:41

## 2019-07-25 RX ADMIN — OXYCODONE HYDROCHLORIDE 10 MG: 5 TABLET ORAL at 15:48

## 2019-07-25 RX ADMIN — VITAMIN D, TAB 1000IU (100/BT) 5000 UNITS: 25 TAB at 06:32

## 2019-07-25 RX ADMIN — OXYCODONE HYDROCHLORIDE 5 MG: 5 TABLET ORAL at 00:34

## 2019-07-25 RX ADMIN — METHOCARBAMOL 750 MG: 750 TABLET, FILM COATED ORAL at 18:41

## 2019-07-25 RX ADMIN — OMEPRAZOLE 20 MG: 20 CAPSULE, DELAYED RELEASE ORAL at 06:30

## 2019-07-25 RX ADMIN — ACETAMINOPHEN 1000 MG: 500 TABLET, FILM COATED ORAL at 18:41

## 2019-07-25 RX ADMIN — METHOCARBAMOL TABLETS 750 MG: 750 TABLET, COATED ORAL at 07:55

## 2019-07-25 RX ADMIN — LISINOPRIL 40 MG: 20 TABLET ORAL at 06:30

## 2019-07-25 RX ADMIN — INSULIN LISPRO 1 UNITS: 100 INJECTION, SOLUTION INTRAVENOUS; SUBCUTANEOUS at 20:48

## 2019-07-25 RX ADMIN — LORATADINE 10 MG: 10 TABLET ORAL at 06:32

## 2019-07-25 RX ADMIN — CALCIUM CARBONATE (ANTACID) CHEW TAB 500 MG 500 MG: 500 CHEW TAB at 20:37

## 2019-07-25 RX ADMIN — VERAPAMIL HYDROCHLORIDE 180 MG: 180 TABLET, FILM COATED, EXTENDED RELEASE ORAL at 06:30

## 2019-07-25 RX ADMIN — VERAPAMIL HYDROCHLORIDE 180 MG: 180 TABLET, FILM COATED, EXTENDED RELEASE ORAL at 20:37

## 2019-07-25 RX ADMIN — ATORVASTATIN CALCIUM 20 MG: 10 TABLET, FILM COATED ORAL at 20:36

## 2019-07-25 RX ADMIN — OXYCODONE HYDROCHLORIDE 5 MG: 5 TABLET ORAL at 06:30

## 2019-07-25 RX ADMIN — METFORMIN HYDROCHLORIDE 1000 MG: 500 TABLET, FILM COATED ORAL at 07:54

## 2019-07-25 RX ADMIN — DOCUSATE SODIUM 100 MG: 100 CAPSULE, LIQUID FILLED ORAL at 06:30

## 2019-07-25 RX ADMIN — METFORMIN HYDROCHLORIDE 500 MG: 500 TABLET, FILM COATED ORAL at 18:41

## 2019-07-25 ASSESSMENT — LIFESTYLE VARIABLES
ON A TYPICAL DAY WHEN YOU DRINK ALCOHOL HOW MANY DRINKS DO YOU HAVE: 1
HAVE YOU EVER FELT YOU SHOULD CUT DOWN ON YOUR DRINKING: NO
TOTAL SCORE: 0
AVERAGE NUMBER OF DAYS PER WEEK YOU HAVE A DRINK CONTAINING ALCOHOL: 0
HAVE PEOPLE ANNOYED YOU BY CRITICIZING YOUR DRINKING: NO
TOTAL SCORE: 0
TOTAL SCORE: 0
EVER FELT BAD OR GUILTY ABOUT YOUR DRINKING: NO
EVER_SMOKED: YES
ALCOHOL_USE: YES
CONSUMPTION TOTAL: NEGATIVE
HOW MANY TIMES IN THE PAST YEAR HAVE YOU HAD 5 OR MORE DRINKS IN A DAY: 0
EVER HAD A DRINK FIRST THING IN THE MORNING TO STEADY YOUR NERVES TO GET RID OF A HANGOVER: NO

## 2019-07-25 ASSESSMENT — ENCOUNTER SYMPTOMS
HEADACHES: 0
DIARRHEA: 0
SHORTNESS OF BREATH: 0
WEAKNESS: 0
SORE THROAT: 0
SPEECH CHANGE: 0
FOCAL WEAKNESS: 0
FEVER: 0
PALPITATIONS: 0
COUGH: 0
HEARTBURN: 0
FLANK PAIN: 0
NECK PAIN: 0
BACK PAIN: 1
FALLS: 0
DIZZINESS: 0
BLURRED VISION: 0
SENSORY CHANGE: 0
VOMITING: 0
WHEEZING: 0
NAUSEA: 0
CHILLS: 0
NERVOUS/ANXIOUS: 0
ABDOMINAL PAIN: 0

## 2019-07-25 ASSESSMENT — COGNITIVE AND FUNCTIONAL STATUS - GENERAL
MOVING TO AND FROM BED TO CHAIR: A LOT
TURNING FROM BACK TO SIDE WHILE IN FLAT BAD: A LOT
STANDING UP FROM CHAIR USING ARMS: A LITTLE
CLIMB 3 TO 5 STEPS WITH RAILING: TOTAL
MOVING FROM LYING ON BACK TO SITTING ON SIDE OF FLAT BED: UNABLE
MOBILITY SCORE: 11
SUGGESTED CMS G CODE MODIFIER MOBILITY: CL
WALKING IN HOSPITAL ROOM: A LOT

## 2019-07-25 ASSESSMENT — GAIT ASSESSMENTS
ASSISTIVE DEVICE: FRONT WHEEL WALKER
GAIT LEVEL OF ASSIST: MODERATE ASSIST
DISTANCE (FEET): 15

## 2019-07-25 NOTE — DISCHARGE INSTRUCTIONS
Discharge Instructions    Discharged to home by car with friend. Discharged via wheelchair, hospital escort: Yes.  Special equipment needed: Walker    Be sure to schedule a follow-up appointment with your primary care doctor or any specialists as instructed.     Discharge Plan:   Diet Plan: Discussed  Activity Level: Discussed  Confirmed Follow up Appointment: Patient to Call and Schedule Appointment  Confirmed Symptoms Management: Discussed  Medication Reconciliation Updated: No (Comments)  Influenza Vaccine Indication: Not indicated: Previously immunized this influenza season and > 8 years of age    I understand that a diet low in cholesterol, fat, and sodium is recommended for good health. Unless I have been given specific instructions below for another diet, I accept this instruction as my diet prescription.   Other diet: diabetic    Special Instructions: None    · Is patient discharged on Warfarin / Coumadin?   No     Depression / Suicide Risk    As you are discharged from this [facility] facility, it is important to learn how to keep safe from harming yourself.    Recognize the warning signs:  · Abrupt changes in personality, positive or negative- including increase in energy   · Giving away possessions  · Change in eating patterns- significant weight changes-  positive or negative  · Change in sleeping patterns- unable to sleep or sleeping all the time   · Unwillingness or inability to communicate  · Depression  · Unusual sadness, discouragement and loneliness  · Talk of wanting to die  · Neglect of personal appearance   · Rebelliousness- reckless behavior  · Withdrawal from people/activities they love  · Confusion- inability to concentrate     If you or a loved one observes any of these behaviors or has concerns about self-harm, here's what you can do:  · Talk about it- your feelings and reasons for harming yourself  · Remove any means that you might use to hurt yourself (examples: pills, rope, extension  cords, firearm)  · Get professional help from the community (Mental Health, Substance Abuse, psychological counseling)  · Do not be alone:Call your Safe Contact- someone whom you trust who will be there for you.  · Call your local CRISIS HOTLINE 054-3446 or 646-113-2599  · Call your local Children's Mobile Crisis Response Team [place] (090) 829-3315 or www.[employer]  · Call the toll free National Suicide Prevention Hotlines   · National Suicide Prevention Lifeline 528-061-PSHK (3208)  · National Hope Line Network 800-SUICIDE (731-4362)

## 2019-07-25 NOTE — PROGRESS NOTES
RN MOBILITY NOTE - 7/24/19     Surgery patient?: yes  Date of surgery: 07/20  Ambulated 50 ft on day of surgery? (N/A if today is not date of surgery): n/a  Number of times ambulated 50 feet or greater today: 1  Patient has been up to chair, edge of bed or HOB 90 degrees for all meals?: 1/3  Goal met? (goal is ambulating at least 50 feet 2 times on day shift, one time on night shift): no  If patient did not meet mobility goal, why?: pt x2 assist, unstable

## 2019-07-25 NOTE — THERAPY
"Physical Therapy Treatment completed.   Bed Mobility:  Supine to Sit:  (up in chair)  Transfers: Sit to Stand: Moderate Assist (Latanya w/ repetition)  Gait: Level Of Assist: Moderate Assist with Front-Wheel Walker       Plan of Care: Will benefit from Physical Therapy 5 times per week  Discharge Recommendations: Equipment: Will Continue to Assess for Equipment Needs. Post-acute therapy Recommend post-acute placement for continued physical therapy services prior to discharge home. Patient can tolerate post-acute therapies at a 5x/week frequency.       See \"Rehab Therapy-Acute\" Patient Summary Report for complete documentation.     Pt presenting w/ more energy than previous tx. Pt did not remember any education from previous tx. He needed max cues for safety awareness. He is easily distracted and almost fell while attempting to stand d/t this distraction. Pt did well w/ repetition and able to stand w/ only Latanya. Pt does require a lot of encouragement as he is hard on himself. Pt is very motivated to participate and will benefit from post acute therapy.  "

## 2019-07-25 NOTE — PROGRESS NOTES
Neurosurgery Progress Note    Subjective:  POD#5  T4 - T6 laminectomy with resection of tumor, T3 - T7 stealth guided fusion.  Doing well with good strength in legs.  Pain to op site.  Controlled with Meds.  Transfused yesterday; Hb 8.2 yesterday  Ambulated with PT  No upper or lower extremity radicular symptoms.  Has some incisional pain.  Awaiting final pathology still.  Rehab has seen and stated that it would be best to hold off on transferring to rehab until after final pathology is obtained.     Exam:  Dressing dry with strike through  Motor 5/5 to bilateral upper and lower ext.  Sensory intact.    BP  Min: 118/97  Max: 136/64  Pulse  Av.7  Min: 78  Max: 92  Resp  Av.3  Min: 15  Max: 17  Temp  Av.3 °C (99.1 °F)  Min: 36.8 °C (98.3 °F)  Max: 37.6 °C (99.7 °F)  SpO2  Av %  Min: 96 %  Max: 98 %    No Data Recorded    Recent Labs      19   0203  19   0250  19   0304   WBC  9.3  10.1  10.4   RBC  2.02*  2.46*  2.50*   HEMOGLOBIN  6.7*  8.2*  8.4*   HEMATOCRIT  19.9*  24.0*  23.8*   MCV  98.5*  95.5  95.2   MCH  33.2*  32.9  33.6*   MCHC  33.7  34.5  35.3   RDW  46.2  50.2*  48.2   PLATELETCT  146*  165  179   MPV  9.5  9.9  9.9     Recent Labs      19   0203   SODIUM  131*   POTASSIUM  4.3   CHLORIDE  100   CO2  27   GLUCOSE  148*   BUN  26*   CREATININE  1.02   CALCIUM  8.1*               Intake/Output       19 - 19 0659 19 - 19 Total [id number] Total       Intake    Total Intake -- -- -- -- -- --       Output    Urine  --  1700 1700  --  -- --    Number of Times Voided 1 x 3 x 4 x -- -- --    Urine Void (mL) -- 1700 1700 -- -- --    Total Output -- 1700 1700 -- -- --       Net I/O     -- -1700 -1700 -- -- --            Intake/Output Summary (Last 24 hours) at 19 0820  Last data filed at 19 0220   Gross per 24 hour   Intake                0 ml   Output             1700 ml   Net             -1700 ml            • Pharmacy Consult Request  1 Each PHARMACY TO DOSE   • MD ALERT...DO NOT ADMINISTER NSAIDS or ASPIRIN unless ORDERED By Neurosurgery  1 Each PRN   • docusate sodium  100 mg BID   • senna-docusate  1 Tab Nightly   • senna-docusate  1 Tab Q24HRS PRN   • polyethylene glycol/lytes  1 Packet BID PRN   • magnesium hydroxide  30 mL QDAY PRN   • bisacodyl  10 mg Q24HRS PRN   • fleet  1 Each Once PRN   • HYDROmorphone  0.5 mg Q3HRS PRN   • oxyCODONE immediate release  10 mg Q3HRS PRN   • oxyCODONE immediate-release  5 mg Q3HRS PRN   • diphenhydrAMINE  25 mg Q6HRS PRN    Or   • diphenhydrAMINE  25 mg Q6HRS PRN   • methocarbamol  750 mg 4X/DAY   • ondansetron  4 mg Q4HRS PRN   • ondansetron  4 mg Q4HRS PRN   • ALPRAZolam  0.5 mg TID PRN   • cloNIDine  0.1 mg Q4HRS PRN   • benzocaine-menthol  1 Lozenge Q2HRS PRN   • calcium carbonate  500 mg BID   • vitamin D  5,000 Units DAILY   • metFORMIN  1,000 mg QDAY with Breakfast    And   • metFORMIN  500 mg AC PM MEAL   • insulin glargine  20 Units QAM INSULIN   • hydrALAZINE  10 mg Q6HRS PRN   • insulin regular  2-10 Units 4X/DAY ACHS    And   • glucose  16 g Q15 MIN PRN    And   • dextrose 10% bolus  250 mL Q15 MIN PRN   • Respiratory Care per Protocol   Continuous RT   • acetaminophen  650 mg Q6HRS PRN   • amLODIPine  10 mg DAILY   • atorvastatin  20 mg Nightly   • lisinopril  40 mg DAILY   • loratadine  10 mg DAILY   • omeprazole  20 mg DAILY   • spironolactone  25 mg DAILY   • terazosin  5 mg BID   • verapamil ER  180 mg BID       Assessment and Plan:  Hospital day #11  POD #5 T4 - T6 laminectomy with resection of tumor, T3 - T7 stealth guided fusion.  Monitor H&H daily.   PT/OT to continue to ambulate.  Awaiting Pathology.  Rehab transfer pending final path results.

## 2019-07-25 NOTE — DISCHARGE PLANNING
F/U with client/spouse Marion at bedside to discuss IRF referral. Explained specifics of inpatient rehab, answered questions/concerns. Both agreeable to admission. They are aware admit pending Friday morning, 10a. Provided [facility] contact information for any additional questions. Will follow to assist as needed with transition to [facility].

## 2019-07-25 NOTE — H&P
REHABILITATION HISTORY AND PHYSICAL/POST ADMISSION EVALUATION    7/25/2019  4:54 PM  Ian Laird  [birth year]  Admission  7/25/2019  4:21 PM    PRIMARY REHAB DIAGNOSIS:    This patient is a 69 y.o. male admitted for acute inpatient rehabilitation with reason for admission/[facility] code of 04.111 Paraplegia, Incomplete due to the etiologic diagnosis of T5 spinal cord injury (HCC).    HPI:  Patient is a 69-year-old male with past medical history significant for type 2 diabetes, dyslipidemia, hypertension, melanoma x2 status post resection, admitted to [facility] on 7/15 with worsening mid back pain for the past 6 weeks, bilateral lower extremity weakness and numbness.  He was found to have a T5 mass with posterior epidural extension, severely narrowing of the canal at T5.  He underwent T4-T6 laminectomy and T3-T7 posterior spinal fusion with resection of epidural mass by Dr. Velasco on 7/20.  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  Sample is still undergoing studies to evaluate for lymphoma.  His hospitalization was complicated by blood loss anemia with hemoglobin down to 6.7, required 1 unit of packed red blood cell transfusion on 7/23.  His Plavix and Lovenox were held.  Reinitiation of these agents will need to be cleared through neurosurgery.  Prior to surgery patient had decreased his functional status to mobilization of short distances in the household with front wheel walker.  Spouse was currently assisting him in the community and up and down steps.      Patient was evaluated by physiatry and Physical Therapy, Occupational Therapy and determined to be appropriate for acute inpatient rehab and was admitted to [facility] on 7/25    Upon admission, the patient reports significant bloating and gas, and frequent bowel movements, difficulty with a sensation of bowel movements, has had  multiple incontinence.    He reports significant improving strength in bilateral lower extremities right is still stronger than left he reports improvement in sensation in bilateral lower extremities since surgery.  He still feels weak when going to stand up or utilize his lower extremities.  He reports difficulty with motor planning.    He localizes pain to the midline incision in his mid back, he describes this as 6-8/10 achy-sharp, increased with mobility, constant, no radiation down the lower extremities, started after surgery.    Pre-mobidly, the patient lived in a single level home with spouse.  With this acute therapeutic intervention, this patient hopes to improve his functional status, and return to independent living with the supportive care of spouse.        REVIEW OF SYSTEMS:     ROS:  Gen: No fatigue, confusion, significant weight loss  Eyes: no blurry vision, double vision or pain in eyes  ENT: no changes in hearing, runny nose, nose bleeds, sinus pain  CV: No CP, palpitation  Lungs: no shortness of breath, changes in secretions, changes in cough, pain with coughing  Abd: No abd pain, nausea, vomiting, pain with eating  : no blood in urine, suprapubic pain  Ext: No swelling in legs, asymmetric atrophy  Neuro: no changes in strength or sensation in last 24 hours  Skin: no new ulcers/skin breakdown appreciated by patient  Mood: No changes in mood today, increase in depression or anxiety  Heme: no bruising, or bleeding  Rest of 14 point review of systems is negative        PMH:  Past Medical History:   Diagnosis Date   • Cancer (HCC)    • Diabetes    • Dyslipidemia    • Hypertension        PSH:  Past Surgical History:   Procedure Laterality Date   • THORACIC FUSION O-ARM  7/20/2019    Procedure: EXTRADURAL TUMOR COMPLETE RESECTION, T3-T7 STEALTH FUSION;  Surgeon: Win Velasco III, M.D.;  Location: SURGERY [facility];  Service: Neurosurgery   • THORACIC LAMINECTOMY  7/20/2019    Procedure:  LAMINECTOMY, SPINE, THORACIC- T4-6; BILATERAL T5 TRANSPEDICULAR APPROACH;  Surgeon: Win Velasco III, M.D.;  Location: SURGERY [facility];  Service: Neurosurgery   • OTHER SURGICAL PROCEDURE      has melonoma excisions every 3 mos       FAMILY HISTORY:  No family history of plasmacytoma or spinal cord injury    MEDICATIONS:  Current Facility-Administered Medications   Medication Dose   • [START ON 7/26/2019] amLODIPine (NORVASC) tablet 10 mg  10 mg   • atorvastatin (LIPITOR) tablet 20 mg  20 mg   • [START ON 7/26/2019] vitamin D (cholecalciferol) tablet 4,000 Units  4,000 Units   • glucose 4 g chewable tablet 16 g  16 g   • [START ON 7/26/2019] insulin glargine (LANTUS) injection 20 Units  20 Units   • [START ON 7/26/2019] lisinopril (PRINIVIL) tablet 40 mg  40 mg   • loratadine (CLARITIN) tablet 10 mg  10 mg   • metFORMIN (GLUCOPHAGE) tablet 500-1,000 mg  500-1,000 mg   • omeprazole (PRILOSEC) capsule 20 mg  20 mg   • prazosin (MINIPRESS) capsule 5 mg  5 mg   • spironolactone (ALDACTONE) tablet 25 mg  25 mg   • terazosin (HYTRIN) capsule 5 mg  5 mg   • verapamil ER (CALAN-SR) tablet 180 mg  180 mg   • Pharmacy Consult Request ...Pain Management Review 1 Each  1 Each   • Respiratory Care per Protocol     • acetaminophen (TYLENOL) tablet 1,000 mg  1,000 mg   • oxyCODONE immediate-release (ROXICODONE) tablet 5 mg  5 mg   • oxyCODONE immediate release (ROXICODONE) tablet 10 mg  10 mg   • acetaminophen (TYLENOL) tablet 650 mg  650 mg   • insulin glargine (LANTUS) injection 0.2 Units/kg/day  0.2 Units/kg/day    And   • insulin lispro (HUMALOG) injection 0.2 Units/kg/day  0.2 Units/kg/day    And   • insulin lispro (HUMALOG) injection 1-6 Units  1-6 Units    And   • glucose 4 g chewable tablet 16 g  16 g    And   • DEXTROSE 10% BOLUS 250 mL  250 mL   • enoxaparin (LOVENOX) inj 40 mg  40 mg   • [START ON 7/26/2019] therapeutic multivitamin-minerals (THERAGRAN-M) tablet 1 Tab  1 Tab   • artificial tears 1.4 %  ophthalmic solution 1 Drop  1 Drop   • benzocaine-menthol (CEPACOL) lozenge 1 Lozenge  1 Lozenge   • mag hydrox-al hydrox-simeth (MAALOX PLUS ES or MYLANTA DS) suspension 20 mL  20 mL   • ondansetron (ZOFRAN ODT) dispertab 4 mg  4 mg    Or   • ondansetron (ZOFRAN) syringe/vial injection 4 mg  4 mg   • traZODone (DESYREL) tablet 50 mg  50 mg   • sodium chloride (OCEAN) 0.65 % nasal spray 2 Spray  2 Spray   • calcium carbonate (TUMS) chewable tab 500 mg  500 mg   • methocarbamol (ROBAXIN) tablet 750 mg  750 mg   • [START ON 7/26/2019] metFORMIN (GLUCOPHAGE) tablet 1,000 mg  1,000 mg    And   • metFORMIN (GLUCOPHAGE) tablet 500 mg  500 mg   • [START ON 7/26/2019] omeprazole (PRILOSEC) capsule 20 mg  20 mg       ALLERGIES:  Patient has no known allergies.    PSYCHOSOCIAL HISTORY:  Living Site:  Home  Living With:  spouse  Caregiver's availability:  24/7  Number of stairs:  2  Substance use history:  none  Occupation:    LEVEL OF FUNCTION PRIOR TO DISABILTY:  Prior to onset patient was independent, immediately prior to hospitalization patient was utilizing a four-wheel walker for household mobility and required significant assistance from wife stairs    LEVEL OF FUNCTION PRIOR TO ADMISSION to [facility]:  Physical Therapy Treatment completed.   Bed Mobility: Supine to Sit: (up in chair)  Transfers: Sit to Stand: Moderate Assist (Latanya w/ repetition)  Gait: Level Of Assist: Moderate Assist with Front-Wheel Walker    Occupational Therapy Treatment completed with focus on ADLs, ADL transfers, patient education and cognition.  Functional Status: Pt seen today for OT treatment. He was pleasant and cooperative, still with mild difficulty sequencing ADLs and txfs. Min-modA sit<>stand with FWW, improved as session went on but initially more difficulty for him. Latanya functional mobility to the sink with FWW. Latanya grooming in stance. Pt fatigued and required sitting down to finish rest of grooming routine. MaxA  "LB dressing. He is making progress, remains limited by weakness, fatigue, impaired balance, and impaired cognition which impacts independence in self care and functional mobility.    CURRENT LEVEL OF FUNCTION:   Same as level of function prior to admission to [facility]    PHYSICAL EXAM:     VITAL SIGNS:   height is 1.778 m (5' 10\") and weight is 100.8 kg (222 lb 4.8 oz). His oral temperature is 36.9 °C (98.4 °F). His blood pressure is 153/61 and his pulse is 80. His respiration is 20 and oxygen saturation is 96%.     GENERAL: No apparent distress  HEENT: Normocephalic/atraumatic, EOMI, PERRL and No nystagmus  CARDIAC: Regular rate and rhythm, normal S1, S2   LUNGS: Clear to auscultation bilaterally.  Cough force is strong enough to independently clear secretions.  There is no sign of accessory muscle use.    ABDOMINAL: bowel sounds present, soft, nontender and protuberant    EXTREMITIES: no contractures, spasticity, or edema.  No calf tenderness bilaterally, 2+ bilateral DP/PT pulses.    SKIN:  There is no sign of pressure injury or skin breakdown.  The back surgical incision is clean, dry and intact, without signs of dehiscence.      NEURO:    Mental status:  A&Ox4 (person, place, date, situation) answers questions appropriately follows commands  Speech/language: fluent, no aphasia or dysarthria    CRANIAL NERVES:  2,3: visual acuity grossly intact, PERRL  3,4,6: EOMI bilaterally, no nystagmus or diplopia  5: intact in all branches  7: no facial asymmetry  8: hearing grossly intact  9,10: symmetric palate elevation  11: SCM/Trapezius strength 5/5 bilaterally  12: tongue protrudes midline    Motor:   Motor Exam Upper Extremities   ? Myotome R L   Shoulder flexion C5 5 5   Elbow flexion C5 5 5   Wrist extension C6 5 5   Elbow extension C7 5 5   Finger flexion C8 5 5   Finger abduction T1 5 5     Motor Exam Lower Extremities    ? Myotome R L   Hip flexion L2 4 4   Knee extension L3 4 4   Ankle " dorsiflexion L4 4 4   Toe extension L5 4 4   Ankle plantarflexion S1 4 4   Left leg is significantly weaker than right      Sensory: With light touch/pin prick, last normal sensory level is T5      DTRs: 2+ in bilateral biceps, brachioradialis, patellar, and Achilles tendons  Babinski: - bilaterally  Zhou: - bilaterally    Tone: no spasticity noted      RADIOLOGY:  MRI of T-spine 7/16:  1.  Destructive marrow replacing mass involving the T5 vertebra with destruction of the posterior elements and epidural soft tissue mass primarily dorsal with cord compression and marked central stenosis. Differential considerations would include osseous   metastatic disease, plasmacytoma (multiple myeloma), or other primary spinal tumors. The lesion appears amenable to needle biopsy.  2.  T9-T10 small left paramedian disc protrusion.  3.  Bilateral foraminal stenosis at T5-T6 where the T5 soft tissue mass encroaches on the foramina.      LABS:  Recent Labs      07/23/19   0203   SODIUM  131*   POTASSIUM  4.3   CHLORIDE  100   CO2  27   GLUCOSE  148*   BUN  26*   CREATININE  1.02   CALCIUM  8.1*     Recent Labs      07/23/19   0203  07/24/19   0250  07/25/19   0304   WBC  9.3  10.1  10.4   RBC  2.02*  2.46*  2.50*   HEMOGLOBIN  6.7*  8.2*  8.4*   HEMATOCRIT  19.9*  24.0*  23.8*   MCV  98.5*  95.5  95.2   MCH  33.2*  32.9  33.6*   MCHC  33.7  34.5  35.3   RDW  46.2  50.2*  48.2   PLATELETCT  146*  165  179   MPV  9.5  9.9  9.9             IMPAIRMENTS:   ADLs/IADLs  Mobility      RELEVANT CHANGES SINCE PREADMISSION EVALUATION:    Status unchanged    The patient's rehabilitation potential is Excellent  The patient's medical prognosis is excellent    PLAN:   Discussion and Recommendations:   1. The patient requires an acute inpatient rehabilitation program with a coordinated program of care at an intensity and frequency not available at a lower level of care. This recommendation is substantiated by the patient's medical physicians who  recommend that the patient's intervention and assessment of medical issues needs to be done at an acute level of care for patient's safety and maximum outcome.   2. A coordinated program of care will be supplied by an interdisciplinary team of physical therapy, occupational therapy, rehab physician, rehab nursing, and, if needed, speech therapy and rehab psychology. Rehab team presents a patient-specific rehabilitation and education program concentrating on prevention of future problems related to accessibility, mobility, skin, bowel, bladder, sexuality, and psychosocial and medical/surgical problems.   3. Need for Rehabilitation Physician: The rehab physician will be evaluating the patient on a multi-weekly basis to help coordinate the program of care. The rehab physician communicates between medical physicians, therapists, and nurses to maximize the patient's potential outcome. Specific areas in which the rehab physician will be providing daily assessment include the following:   A. Assessing the patient's heart rate and blood pressure response (vitals monitoring) to activity and making adjustments in medications or conservative measures as needed.   B. The rehab physician will be assessing the frequency at which the program can be increased to allow the patient to reach optimal functional outcome.   C. The rehab physician will also provide assessments in daily skin care, especially in light of patient's impairments in mobility.   D. The rehab physician will provide special expertise in understanding how to work with functional impairment and recommend appropriate interventions, compensatory techniques, and education that will facilitate the patient's outcome.   4. Rehab R.N.   The rehab RN will be working with patient to carry over in room mobility and activities of daily living when the patient is not in 3 hours of skilled therapy. Rehab nursing will be working in conjunction with rehab physician to address all  the medical issues above and continue to assess laboratory work and discuss abnormalities with the treating physicians, assess vitals, and response to activity, and discuss and report abnormalities with the rehab physician. Rehab RN will also continue daily skin care, supervise bladder/bowel program, instruct in medication administration, and ensure patient safety.     5. Therapies to treat at intensity and frequency of (may change after completion of evaluation by all therapeutic disciplines):       PT:  Physical therapy to address mobility, transfer, gait training and evaluation for adaptive equipment needs 1hour/day at least 5 days/week for the duration of the ELOS (see below)       OT:  Occupational therapy to address ADLs, self-care, home management training, functional mobility/transfers and assistive device evaluation, and community re-integration 1hour/day at least 5 days/week for the duration of the ELOS (see below).        ST/Dysphagia:  Speech therapy to address speech, language, and cognitive deficits as well as swallowing difficulties with retraining/dysphagia management and community re-integration with comprehension, expression, cognitive training 1hour/day at least 5 days/week for the duration of the ELOS (see below).     6. Medical management / Rehabilitation Issues/ Adverse Potential as part of rehabilitation plan       T AIS D spinal cord injury: Status post T4-6 laminectomy and T3-7 posterior spinal fusion with resection by Dr. Velasco on 7/20 preliminary pathology suggesting plasmablastic plasmacytoma, significant improvement in lower extremity strength since surgical resection.  -No brace at this time  -The patient will begin a comprehensive interdisciplinary rehabilitation program. In the first few days, we will focus on mobilization with a goal of wheelchair propulsion within the first week of the rehabilitation hospital stay.    -Throughout the rehabilitation hospital stay, we will continue to  work on educating the patient regarding the complications of spinal cord injury including, but not limited to, neurogenic bowel, neurogenic bladder, sexual health, autonomic dysfunction, autonomic dysreflexia, and protection principles, and the long-term sequela of spinal cord injury. We will also work with our colleagues in physical and occupational therapy on optimal equipment management in anticipation of a community discharge.    Plasmablastic plasmacytoma: Primary mass, sample is still undergoing multiple tests to rule out lymphoma  -We will need to follow-up with oncology and radiation oncology as outpatient  -Commonly progresses to multiple myeloma, will discuss with hospitalist about completing further work-up with SPEP and UPEP    Neuropathic pain: Below level neuropathic pain with pins-and-needles sensation  -Start gabapentin 300 mg 3 times daily  Nociceptive pain: Acute postop pain, at midline, site of surgery  -Gabapentin has also been shown to improve nociceptive pain up to 600 mg 3 times a day  -Oxycodone 5-10 mg as needed every 3 hours  -Tylenol 1000 mg 3 times daily  -Robaxin 750 mg 4 times a day  -Lidocaine patch to either side of surgical incision, periscapular area    PRECIOUS: Creatinine elevated 1.26 on 7/22, likely dehydration  -Check CMP in a.m.  -Educate patient on increasing p.o. fluid intake, goal of 2 L/day with majority of intake prior to 5:00pm    Episode of blood loss anemia: Hemoglobin on 7/23 of 6.7, status post 1 unit packed red blood cells  -Hemoglobin on 7/25 of 8.4  -Check CBC in a.m.  -Plan for transfusion if hemoglobin is less than 7.0    Hyponatremia: Sodium on 7/23 131  -Check CMP in a.m.  -Consult hospitalist  -If sodium remains low we will perform full work-up including urine lytes, creatinine, osmolarity, serum electrolytes and osmolarity drawn at the same time    Hypertension: Consult hospitalist  -Norvasc 10 mg daily  -Lisinopril 40 mg daily  -Spironolactone 25 mg  daily  -Terazosin 5 mg twice daily  -Verapamil 80 mg twice daily    Type 2 diabetes:  -Metformin 1000 mg every morning/500 mill  -Lantus 20 units every morning  -Insulin sliding scale, moderate  -Consult hospitalist    BPH:  -Terazosin 5 mg twice daily    Anxiety/adjustment disorder:  -Start Cymbalta 30 mg daily, will also improve neuropathic pain  -Consults psychologist    Neurogenic bladder  -Discussed with patient at length possible bladder/detrusor dyssynergy, will monitor with postvoid residuals  -Start voiding trial, if can't void in 6 hours or prn check pvr and if >500cc then ICP,if able to void then check PVR, if PVR is >200cc then ICP      Neurogenic bowel: Significant constipation and increased gas, incontinence  -In the setting of an upper motor neuron bowel, we will need to utilize a suppository along with digital stimulation to facilitate complete evacuation of the bowels. We will also need to utilize a shower commode chair in order to facilitate an upright posture which will allow for a more efficient elimination pattern.  - BTP including senna 2 tablets q. noon, Colace 200 mg twice daily, Magic bullet suppository every afternoon with digital stimulation.      Skin  Due to the sensory impairments following spinal cord injury, skin protection principles are of the utmost importance.     Plan to institute an every two-hour turning pattern overnight while the patient is in bed. When the patient is out of bed, an every 15 minute repositioning or weight shifting pattern will be utilized in order to help train the patient for long-term skin protection. We will also discuss skin monitoring and its importance with the patient and the caregivers.      Vitamin deficiency  In the posttraumatic setting, there is a high likelihood of vitamin D deficiency. We will plan to check a vitamin D level on admission and supplement as necessary to improve fusion and wound healing.    Nutrition  With the assistance of our  dietitian team, we will work to optimize nutrition for wound healing and recovery from spinal cord injury. We will also discussed long-term dietary needs following a spinal injury in order to prevent excessive weight gain in the future.    DVT prophylaxis  Patient required holding of Plavix and Lovenox secondary to blood loss.  We will left clear with neurosurgery prior to reinitiating Lovenox.  Discussed with patient risks and benefits      Estimated discharge: 10-14 days    Verified CODE STATUS as full on admission     I completed medication reconciliation on admission and did not identify any clinically significant medication issues    Admission care coordination time today was 75 min, and entire time spent in face-to-face contact was >50% in counseling and coordination of care as detailed in A/P above.        GOALS/EXPECTED LEVEL OF FUNCTION BASED ON CURRENT MEDICAL AND FUNCTIONAL STATUS (may change based on patient's medical status and rate of impairment recovery):  Transfers:   Modified Independent  Mobility/Gait:   Modified Independent  ADL's:   Modified Independent    DISPOSITION: Discharge to pre-morbid independent living setting with the supportive care of patient's spouse.      Adelfo Aquino D.O.  7/25/2019  4:54 PM

## 2019-07-25 NOTE — DISCHARGE SUMMARY
Discharge Summary    CHIEF COMPLAINT ON ADMISSION  Chief Complaint   Patient presents with   • Sent by MD     unable to walk x1wk worsening, found t5 mass       Reason for Admission  weakness in both legs, back pain     CODE STATUS  Full Code    HPI & HOSPITAL COURSE  This is a 69 y.o. Male with history of DM, htn, DL, and melanoma who presented with a 6 week history of worsening back pain and progressive weakness.  He was found to have a mass at T5 with secondary mass effect.  He underwent surgical resection of the tumor and did require a blood transfusion perioperatively.  His acute surgical site bleeding has now resolved and his hemoglobin is stable.  His plavix was held and lovenox was stopped and re initiation of these agents will need to be cleared by neurosurgery prior to restarting.  Pathology results are still pending and will need to be followed up at rehab.  He is doing well with physical therapy.  His insulin regimen and blood pressure regimens will require monitoring and possible titration.  He is being transferred to acute rehab this afternoon.       Therefore, he is discharged in fair and stable condition to an inpatient rehabilitation hospital.    The patient met 2-midnight criteria for an inpatient stay at the time of discharge.      FOLLOW UP ITEMS POST DISCHARGE  Acute rehab    DISCHARGE DIAGNOSES  Principal Problem (Resolved):    Mass of thoracic vertebra POA: Yes  Active Problems:    Fall POA: No    Type 2 diabetes mellitus without complication, without long-term current use of insulin (HCC) POA: Yes    Essential hypertension POA: Yes    PVD (peripheral vascular disease) (HCC) POA: Yes    Low vitamin B12 level POA: Yes    Dyslipidemia POA: Yes    Obesity POA: Yes    Anemia POA: Unknown  Resolved Problems:    Constipation POA: Unknown    Pancytopenia (HCC) POA: Unknown    Thrombocytopenia (HCC) POA: Unknown      FOLLOW UP  No future appointments.  No follow-up provider specified.    MEDICATIONS  ON DISCHARGE     Medication List      START taking these medications      Instructions   acetaminophen 325 MG Tabs  Commonly known as:  TYLENOL   Take 2 Tabs by mouth every 6 hours as needed (Mild Pain; (Pain scale 1-3); Temp greater than 100.5 F).  Dose:  650 mg     ALPRAZolam 0.5 MG Tabs  Commonly known as:  XANAX   Take 1 Tab by mouth 3 times a day as needed for Anxiety or Sleep for up to 3 days.  Dose:  0.5 mg     benzocaine-menthol 15-3.6 MG Lozg  Commonly known as:  CEPACOL   Spray 1 Lozenge in mouth/throat every 2 hours as needed (for sore throat).  Dose:  1 Lozenge     glucose 4 g chewable tablet   Take 4 Tabs by mouth as needed for Low Blood Sugar (If FSBG is less than or equal to 70 mg/dL and patient able to eat or drink).  Dose:  16 g     insulin glargine 100 UNIT/ML Soln  Start taking on:  7/26/2019  Commonly known as:  LANTUS   Inject 20 Units as instructed every morning.  Dose:  20 Units     insulin regular 100 Unit/mL Soln  Commonly known as:  HUMULIN R   Inject 2-10 Units as instructed 4 Times a Day,Before Meals and at Bedtime.  Dose:  2-10 Units     * oxyCODONE immediate release 10 MG immediate release tablet  Commonly known as:  ROXICODONE   Take 1 Tab by mouth every 6 hours as needed for up to 3 days.  Dose:  10 mg     * oxyCODONE immediate-release 5 MG Tabs  Commonly known as:  ROXICODONE   Take 1 Tab by mouth every 6 hours as needed for up to 3 days.  Dose:  5 mg        * This list has 2 medication(s) that are the same as other medications prescribed for you. Read the directions carefully, and ask your doctor or other care provider to review them with you.            CONTINUE taking these medications      Instructions   GLUCOPHAGE 1000 MG tablet  Generic drug:  metformin   Take 500-1,000 mg by mouth 2 times a day, with meals. 1000 mg with breakfast  500 mg with dinner  Dose:  500-1000 mg     LIPITOR 20 MG Tabs  Generic drug:  atorvastatin   Take 20 mg by mouth every evening.  Dose:  20 mg      lisinopril 40 MG tablet  Commonly known as:  PRINIVIL, ZESTRIL   Take 40 mg by mouth every day.  Dose:  40 mg     loratadine 10 MG Tabs  Commonly known as:  CLARITIN   Take 10 mg by mouth every day.  Dose:  10 mg     NORVASC 10 MG Tabs  Generic drug:  amLODIPine   Take 10 mg by mouth every day.  Dose:  10 mg     omeprazole 20 MG delayed-release capsule  Commonly known as:  PRILOSEC   Take 20 mg by mouth every day.  Dose:  20 mg     prazosin 5 MG Caps  Commonly known as:  MINIPRESS   Take 5 mg by mouth every evening.  Dose:  5 mg     spironolactone 25 MG Tabs  Commonly known as:  ALDACTONE   Take 25 mg by mouth every day.  Dose:  25 mg     terazosin 5 MG Caps  Commonly known as:  HYTRIN   Take 5 mg by mouth 2 times a day.  Dose:  5 mg     verapamil  MG Tbcr  Commonly known as:  CALAN-SR   Take 180 mg by mouth 2 Times a Day.  Dose:  180 mg     Vitamin D-3 5000 units Tabs   Take 5,000 Units by mouth every day.  Dose:  5000 Units        STOP taking these medications    diclofenac EC 75 MG Tbec  Commonly known as:  VOLTAREN     glipiZIDE 5 MG Tabs  Commonly known as:  GLUCOTROL     JANUVIA 100 MG Tabs  Generic drug:  SITagliptin     methocarbamol 750 MG Tabs  Commonly known as:  ROBAXIN     PLAVIX 75 MG Tabs  Generic drug:  clopidogrel     tramadol 50 MG Tabs  Commonly known as:  ULTRAM            Allergies  No Known Allergies    DIET  Orders Placed This Encounter   Procedures   • Diet Order Diabetic     Standing Status:   Standing     Number of Occurrences:   1     Order Specific Question:   Diet:     Answer:   Diabetic [3]       ACTIVITY  As tolerated and directed by rehab.    LINES, DRAINS, AND WOUNDS  This is an automated list. Peripheral IVs will be removed prior to discharge.  Peripheral IV 07/20/19 22 G Left Wrist (Active)   Site Assessment Clean;Dry;Intact 7/24/2019  8:25 PM   Dressing Type Transparent 7/24/2019  8:25 PM   Line Status Saline locked 7/24/2019  8:25 PM   Dressing Status Clean;Dry;Intact  7/24/2019  8:25 PM   Dressing Intervention N/A 7/22/2019  9:00 AM   Infiltration Grading ([facility], [facility]) 0 7/24/2019  8:25 PM   Phlebitis Scale ([facility] Only) 0 7/24/2019  8:25 PM          Peripheral IV 07/20/19 22 G Left Wrist (Active)   Site Assessment Clean;Dry;Intact 7/24/2019  8:25 PM   Dressing Type Transparent 7/24/2019  8:25 PM   Line Status Saline locked 7/24/2019  8:25 PM   Dressing Status Clean;Dry;Intact 7/24/2019  8:25 PM   Dressing Intervention N/A 7/22/2019  9:00 AM   Infiltration Grading ([facility], CV) 0 7/24/2019  8:25 PM   Phlebitis Scale ([facility] Only) 0 7/24/2019  8:25 PM               MENTAL STATUS ON TRANSFER  Level of Consciousness: Alert  Orientation : Disoriented to Time  Speech: Speech Clear    CONSULTATIONS  Rod    PROCEDURES  OUTSIDE IMAGES-MR LUMBAR SPINE   Final Result      OUTSIDE IMAGES-MR THORACIC SPINE   Final Result      DX-O-ARM   Final Result      Portable O-arm utilized for 4.17 seconds.         INTERPRETING LOCATION: [address] [place] [address], [id number]      DX-PORTABLE FLUORO > 1 HOUR   Final Result         Portable fluoroscopy utilized for 12 seconds.      DX-THORACIC SPINE-2 VIEWS   Final Result      3 intraoperative spot images submitted for surgical hardware localization for diagnostic purposes demonstrate posterior pillo and transpedicular screw fixation transfixing previously seen destructive T5 mass. See surgical report for details.   Fluoroscopy Time:  12 seconds.  3 fluoroscopic images were obtained.      EC-ECHOCARDIOGRAM COMPLETE W/O CONT   Final Result      CT-HEAD W/O   Final Result         1.  No acute intracranial abnormality is identified, there are nonspecific white matter changes, commonly associated with small vessel ischemic disease.  Associated mild cerebral atrophy is noted.   2.  Atherosclerosis.      MR-THORACIC SPINE-WITH   Final Result      1.  Destructive marrow replacing mass involving the T5 vertebra with destruction of the posterior elements and epidural  soft tissue mass primarily dorsal with cord compression and marked central stenosis. Differential considerations would include osseous    metastatic disease, plasmacytoma (multiple myeloma), or other primary spinal tumors. The lesion appears amenable to needle biopsy.   2.  T9-T10 small left paramedian disc protrusion.   3.  Bilateral foraminal stenosis at T5-T6 where the T5 soft tissue mass encroaches on the foramina.   4. The case was discussed (call report) with DR PLUMMER on duty for  [facility] at 1445 hours 7/16/2019.      CT-CHEST,ABDOMEN,PELVIS WITH   Final Result      Small bilateral pulmonary nodules. Follow-up CT chest is recommended.      Large lobulated mass replacing the posterior elements of T5 with significant spinal canal stenosis.      Atherosclerotic plaque, including coronary artery calcification.      Mildly enlarged subcarinal, cardiophrenic and borderline retroperitoneal and retrocrural lymph nodes are nonspecific.      Enlarged left adrenal gland with a 1.8 cm nodule which is indeterminate and could be metastatic. PET/CT would be beneficial for further evaluation.      Moderate amount of colonic stool. Colonic diverticulosis.      Distended bladder.                  CT-TSPINE W/O PLUS RECONS   Final Result         1.  Lytic expansile lesion in the pedicles and posterior elements, appearance most compatible with metastatic or primary bone disease. Question possible compromise of the cord. Could be further evaluated with MRI without and with contrast.   2.  Atherosclerotic calcifications are noted.      These findings were discussed with the patient's clinician, SUSAN BARLOW, on 7/15/2019 10:29 PM.      DX-CHEST-2 VIEWS   Final Result         1.  Right infrahilar density suggests infiltrate.            LABORATORY  Lab Results   Component Value Date    SODIUM 131 (L) 07/23/2019    POTASSIUM 4.3 07/23/2019    CHLORIDE 100 07/23/2019    CO2 27 07/23/2019    GLUCOSE 148 (H) 07/23/2019     BUN 26 (H) 07/23/2019    CREATININE 1.02 07/23/2019        Lab Results   Component Value Date    WBC 10.4 07/25/2019    HEMOGLOBIN 8.4 (L) 07/25/2019    HEMATOCRIT 23.8 (L) 07/25/2019    PLATELETCT 179 07/25/2019        Total time of the discharge process exceeds 45 minutes.

## 2019-07-25 NOTE — DISCHARGE PLANNING
Anticipated Discharge Disposition:   [facility]    Action:   [facility] confirmed that they are accepting pt today at 4pm  Dr. Murphy agreeable.   Pt aware.     Barriers to Discharge:   none    Plan:   Dc at 4pm

## 2019-07-25 NOTE — PROGRESS NOTES
"2025 Pt A&Ox3, disoriented to time. Pt states he has \"muscle pain to left leg\". Pt denies numbness and tingling. Pain 4/10, rest and repositioned. Bed alarm on. Bed locked and in lowest position. Pt educated to call for assistance. Call light within reach. SCDs in use. Treaded socks in place.  "

## 2019-07-25 NOTE — PREADMISSION SCREENING NOTE
Pre-Admission Screening Form    Patient Information:   Name: Ian Laird     MRN: [id number]       : 1950      Age: 69 y.o.   Gender: male      Race: White [7]       Marital Status:  [2]  Family Contact: Marion Laird        Relationship: Spouse [17]  Home Phone: [phone]           Cell Phone:   Advanced Directives: None  Code Status:  FULL  Current Attending Provider: Usha Murphy M.D.  Referring Physician: Dr. Lamas      Physiatrist Consult: Dr. Aquino        Referral Date: 2019  Primary Payor Source:  MEDICARE  Secondary Payor Source:  [employer]    Medical Information:   Date of Admission to Acute Care Sett[place]/15/2019  Room Number: S154/00  Rehabilitation Diagnosis: 04.111 Paraplegia, Incomplete    There is no immunization history on file for this patient.  No Known Allergies  Past Medical History:   Diagnosis Date   • Cancer (HCC)    • Diabetes    • Dyslipidemia    • Hypertension      Past Surgical History:   Procedure Laterality Date   • THORACIC FUSION O-ARM  2019    Procedure: EXTRADURAL TUMOR COMPLETE RESECTION, T3-T7 STEALTH FUSION;  Surgeon: Win Velasco III, M.D.;  Location: SURGERY [facility];  Service: Neurosurgery   • THORACIC LAMINECTOMY  2019    Procedure: LAMINECTOMY, SPINE, THORACIC- T4-6; BILATERAL T5 TRANSPEDICULAR APPROACH;  Surgeon: Win Velasco III, M.D.;  Location: SURGERY [facility];  Service: Neurosurgery   • OTHER SURGICAL PROCEDURE      has melonoma excisions every 3 mos       History Leading to Admission, Conditions that Caused the Need for Rehab (CMS):     David Schuster M.D. Physician Signed Hospital Medicine H&P Date of Service: 7/15/2019 11:20 PM         []Hide copied text  []Hover for attribution information  Hospital Medicine History & Physical Note     Date of Service  7/15/2019     Primary Care Physician  Mario Latham M.D.     Consultants  Neurosurgery     Code Status  Full code     Chief Complaint  Back pain     History of  Presenting Illness  69 y.o. male with a past medical history of type 2 diabetes mellitus, dyslipidemia, hypertension, melanoma who presented 7/15/2019 with worsening back pain for the past 6 weeks.  The patient reports progressively worsening mid back pain.  He also developed symptoms of bilateral lower extremity weakness with numbness.  He was evaluated by [facility] [place] and underwent an MRI thoracic spine that revealed a T5 mass with posterior epidural extension severely narrowing the spinal canal at T5 level with mass-effect on the cord and resulting moderate bilateral neuroforaminal narrowing at T5-T6.  He denies any fevers, chills, chest pain, shortness of breath, bowel or bladder incontinence.  He reports a history of melanoma and underwent resection x2 with clear margins.  His father has a history of prostate cancer.  He takes Plavix daily.  He is an ex-smoker and quit 20 years ago.  He denies any unintentional weight loss or night sweats.             Assessment/Plan:  I anticipate this patient will require at least two midnights for appropriate medical management, necessitating inpatient admission.         Mass of thoracic vertebra- (present on admission)   Assessment & Plan     Neurosurgery has been consulted  MRI thoracic spine with IV contrast and CT thoracic spine with and without IV contrast has been ordered  CT chest, abdomen and pelvis with IV contrast to evaluate for other lesions  Pain control with Tylenol, oxycodone and IV morphine for breakthrough.  Monitor respiratory status closely  PT OT      Obesity- (present on admission)   Assessment & Plan     Body mass index is 31.57 kg/m².         Dyslipidemia- (present on admission)   Assessment & Plan     Continue Lipitor      Essential hypertension- (present on admission)   Assessment & Plan     Continue verapamil, Hytrin, Aldactone and prazosin      Type 2 diabetes mellitus (HCC)- (present on admission)   Assessment & Plan     Continue glipizide and  Januvia  Start on insulin sliding scale with serial Accu-Checks  Hypoglycemic protocol in place                  VTE prophylaxis: SCD    Win Velasco III, M.D. Physician Signed Surgery Neurosurgery OP Report Date of Service: 7/20/2019 12:00 AM         []Hide copied text  []Hover for attribution information  DATE OF SERVICE:  07/20/2019     PREOPERATIVE DIAGNOSES:  1.  Thoracic myelopathy.  2.  Thoracic stenosis.  3.  Extradural thoracic mass.     POSTOPERATIVE DIAGNOSES:  1.  Thoracic myelopathy.  2.  Thoracic stenosis.  3.  Extradural thoracic mass.     PROCEDURES PERFORMED:  1.  Stealth guidance for the spine.  2.  T3, T4, T6, and T7 stealth-guided pedicle screw fixation.  3.  T4, T5, and T6 laminectomy and decompression of thecal sac and nerve   roots.  4.  Left T5 transpedicular approach, 59 modifier.  5.  Right T5 transpedicular approach, 59 modifier.  6.  Thoracic extradural mass resection in gross total.  7.  T3, T4, T5, T6, T7, posterolateral allograft/pillo fusion.  8.  Intraoperative monitoring.     SURGEON:  MD Adelfo Biggs III, D.O. Physician Addendum Physical Medicine & Rehab Consults Date of Service: 7/22/2019 11:53 AM   Consult Orders:   IP CONSULT FOR PHYSIATRY [[id number]] ordered by Cisco Lamas M.D. at 07/22/19 0935      Expand All Collapse All    []Hide copied text  Medical chart review completed.      Patient is a 69 y.o. year-old male with a past medical history significant for Type 2 diabetes, dyslipidemia, hypertension, melanoma admitted to [facility] on 7/15/2019  8:36 PM with worsening back pain for the past 6 weeks, bilateral lower extremity weakness and numbness.  He was found to have a T5 mass with posterior epidural extension severely narrowing of the canal at T5.  He underwent T4-T6 laminectomy and T3-T7 posterior spinal fixation with resection of extradural mass by Dr. Velasco on 7/20.   Pathology is still pending.     He denied bowel or  bladder incontinence.  He had a history of melanoma and underwent resection x2 with clean margins.     Prior function:  Prior surgery patient was able to mobilize short household distances with front wheel walker.  Spouse assisted in the community and up and down porch steps     Current function:  Physical Therapy Evaluation completed.   Bed Mobility:  Supine to Sit:  (up in chair)  Transfers: Sit to Stand: Minimal Assist  Gait: Level Of Assist: Moderate Assist with Front-Wheel Walker          Occupational Therapy Evaluation completed.   Functional Status: Pt is a 70 y/o male admitted with BLE weakness found to have T5 mass, now s/p T3-T7 lami/fusion with tumor resection. He was pleasant and cooperative, however with poor insight into deficits and poor safety awareness in general. Latanya sit<>stand. ModA functional mobility with FWW to the bathroom. Latanya toilet txf. Latanya toileting. MaxA LB dressing. Pt limited by weakness, fatigue, impaired balance, and impaired safety awareness which impacts independence in self care and functional mobility.     Patient lives in a single-story home with 2 stairs to enter with his wife, previously used single-point cane, front wheel walker         PSYCHOSOCIAL HISTORY:  Living Site:  Home  Living With:  spouse  Caregiver's availability:  24/7  Number of stairs:  2  Substance use history:  none     T5 spinal mass: T5 spinal cord injury status post T4-6 laminectomy, resection of extradural mass, T3-7 posterior spinal fixation/fusion.  Resulting lower extremity weakness and numbness  -Pathology is still pending, would recommend verifying primary source of extradural thoracic mass prior to transfer to acute rehabilitation, allow for further work-up if required.     Orthostatic hypotension: Patient is at high risk for orthostatic hypotension after incomplete spinal cord injury at the level of T5 with history of essential hypertension  - Monitor orthostatic BP, may require significant  alteration in antihypertensive medication.  - If dizziness is limiting participation consider starting Midrin 5 mg 3 times daily, or 5 mg as needed 30 minutes prior to getting out of bed     Hyponatremia: Na 130  -Consider further work-up of cause of hyponatremia, urine electrolytes, creatinine, osmoles, serum electrolytes, osmole's     The patient presents  with cognitive deficits and functional deficits in mobility/self-cares/swallowing/speech, and Minimal  de-conditioning. Pre-morbidly, this patient lived in a single level home with Two steps to enter,with spouse  The patient was evaluated by acute care Physical Therapy and Occupational Therapy; currently requiring moderate assistance for mobility and moderate assistance for ADLs. The patient's current diet is Regular with Thin liquids.      The patient is   a Very Good candidate for an acute inpatient rehabilitation program with a coordinated program of care at an intensity and frequency not available at a lower level of care.      Note: This recommendation requires that patient has at least CGA/Minimal Assistance needs in at least two therapy disciplines.  If patient progresses to no longer need CGA/Latanya with at least two therapy disciplines they may be more appropriate for Skilled nursing facility versus home with home health.       This recommendation is substantiated by the patient's current medical condition with intervention and assessment of medical issues requiring an acute level of care for patient's safety and maximum outcome. A coordinated program of care will be provided by an interdisciplinary team including physical therapy, occupational therapy, hospitalist, physiatry, rehab nursing and rehab psychology. Rehab goals include improved , mobility, self-care management, strength and conditioning/endurance, pain management, bowel and bladder management, mood and affect, and safety with independent home management including caregiver training. Estimated  "length of stay is approximately 21 days. Rehab potential: Very Good. Disposition: to pre-morbid independent living setting with supportive care of patient's spouse. We will continue to follow with you in anticipation of discharge to acute inpatient rehabilitation when medically stable to do so at the discretion of the attending physician. Thank you for allowing us to participate in this patient's care. Please call with any questions regarding this recommendation.     Adelfo Aquino D.O.       Co-morbidities: as listed above and below   Potential Risk - Complications: Contractures, Deep Vein Thrombosis, Incontinence, Malnutrition, Pain, Paralysis, Perceptual Impairment, Pneumonia, Pressure Ulcer, Urinary Tract Infection and surgical wound risk infection risk , oncology follow up   Level of Risk: High    Ongoing Medical Management Needed (Medical/Nursing Needs):   Patient Active Problem List    Diagnosis Date Noted   • Fall 07/18/2019     Priority: High   • Mass of thoracic vertebra 07/15/2019     Priority: High   • Low vitamin B12 level 07/19/2019     Priority: Medium   • PVD (peripheral vascular disease) ([facility]) 07/17/2019     Priority: Medium   • Type 2 diabetes mellitus without complication, without long-term current use of insulin ([facility]) 07/13/2015     Priority: Medium   • Essential hypertension 07/13/2015     Priority: Medium   • Dyslipidemia 07/13/2015     Priority: Low   • Obesity 07/13/2015     Priority: Low   • Anemia 07/23/2019   • Constipation 07/21/2019     Jenn Gallo R.N. Registered Nurse Addendum Progress Notes Date of Service: 7/24/2019  8:25 PM           2025  Pt A&Ox3, disoriented to time. Pt states he has \"muscle pain to left leg\". Pt denies numbness and tingling. Pain 4/10, rest and repositioned. Bed alarm on. Bed locked and in lowest position. Pt educated to call for assistance. Call light within reach. SCDs in use. Treaded socks in place.          Current Vital Signs:   Temperature: 36.2 °C " "(97.1 °F) Pulse: 83 Respiration: 20 Blood Pressure : 132/55  Weight: 99.8 kg (220 lb) Height: 177.8 cm (5' 10\")  Pulse Oximetry: 97 % O2 (LPM): 0      Completed Laboratory Reports:  Recent Labs      07/22/19   1137  07/22/19   1717  07/22/19   2034  07/23/19   0203  07/23/19   0559  07/23/19   0816  07/23/19   1204  07/23/19   1743  07/23/19 2020 07/24/19   0250  07/24/19   0619  07/24/19   0802  07/24/19   1138  07/24/19   1754  07/24/19   2045  07/25/19   0304  07/25/19   0625  07/25/19   0752   WBC   --    --    --   9.3   --    --    --    --    --   10.1   --    --    --    --    --   10.4   --    --    HEMOGLOBIN   --    --    --   6.7*   --    --    --    --    --   8.2*   --    --    --    --    --   8.4*   --    --    HEMATOCRIT   --    --    --   19.9*   --    --    --    --    --   24.0*   --    --    --    --    --   23.8*   --    --    PLATELETCT   --    --    --   146*   --    --    --    --    --   165   --    --    --    --    --   179   --    --    SODIUM   --    --    --   131*   --    --    --    --    --    --    --    --    --    --    --    --    --    --    POTASSIUM   --    --    --   4.3   --    --    --    --    --    --    --    --    --    --    --    --    --    --    BUN   --    --    --   26*   --    --    --    --    --    --    --    --    --    --    --    --    --    --    CREATININE   --    --    --   1.02   --    --    --    --    --    --    --    --    --    --    --    --    --    --    GLUCOSE   --    --    --   148*   --    --    --    --    --    --    --    --    --    --    --    --    --    --    POCGLUCOSE  172*  157*  186*   --   119*  135*  155*  173*  173*   --   146*  199*  173*  164*  169*   --   162*  155*     Additional Labs: Not Applicable    Prior Living Situation:   Housing / Facility: 1 Story House  Steps Into Home: 2  Steps In Home: 0  Lives with - Patient's Self Care Capacity: Spouse  Equipment Owned: Single Point Cane, Wheelchair, Front-Wheel " Walker    Prior Level of Function / Living Situation:   Physical Therapy: Prior Services: Intermittent Physical Support for ADL Per Family  Housing / Facility: 1 Story House  Steps Into Home: 2  Steps In Home: 0  Bathroom Set up: Walk In Shower, Shower Chair  Equipment Owned: Single Point Cane, Wheelchair, Front-Wheel Walker  Lives with - Patient's Self Care Capacity: Spouse  Bed Mobility: Independent  Transfer Status: Independent  Ambulation: Independent  Distance Ambulation (Feet): 150  Assistive Devices Used: Front-Wheel Walker  Stairs: Required Assist  Current Level of Function:   Level Of Assist: Moderate Assist  Assistive Device: Front Wheel Walker  Distance (Feet): 10  Deviation: Ataxic, Increased Base Of Support, Other (Comment), Shuffled Gait (Inconsistent step length)  # of Stairs Climbed: 0  Weight Bearing Status: no restrictions   Comments: Pt w/ fast bursts of gait after delayed initiation.  Supine to Sit:  (up in chair)  Sit to Supine:  (back to a chair )  Scooting: Minimal Assist  Comments: NT up in chair pre/post tx  Sit to Stand: Moderate Assist (to Isabel)  Bed, Chair, Wheelchair Transfer: Minimal Assist  Toilet Transfers: Minimal Assist  Transfer Method: Stand Pivot  Skilled Intervention: Verbal Cuing, Tactile Cuing, Sequencing  Comments: difficulty sequencing sit<>stand initially from chair. did better second time.  Sitting in Chair: 10+ min  Sitting Edge of Bed: NT  Standing: 10 min total  Occupational Therapy:   Self Feeding: Independent  Grooming / Hygiene: Independent  Bathing: Independent  Dressing: Independent  Toileting: Independent  Medication Management: Independent  Laundry: Independent  Kitchen Mobility: Independent  Finances: Independent  Home Management: Independent  Shopping: Independent  Prior Level Of Mobility: Independent Without Device in Community, Independent Without Device in Home  Driving / Transportation: Driving Independent  Prior Services: Intermittent Physical Support for  ADL Per Family  Housing / Facility: 1 Story House  Occupation (Pre-Hospital Vocational): Retired Due To Age  Current Level of Function:   Upper Body Dressing: Minimal Assist  Lower Body Dressing: Maximal Assist  Toileting: Minimal Assist  Skilled Intervention: Verbal Cuing, Tactile Cuing, Sequencing, Postural Facilitation  Speech Language Pathology:      Rehabilitation Prognosis/Potential: Good  Estimated Length of Stay: 14-21 days    Nursing:   Orientation : Disoriented to Time  Continent    Scope/Intensity of Services Recommended:  Physical Therapy: 1 hr / day  5 days / week. Therapeutic Interventions Required: Maximize Endurance, Mobility, Strength and Safety  Occupational Therapy: 1 hr / day 5 days / week. Therapeutic Interventions Required: Maximize Self Care, ADLs, IADLs and Energy Conservation  Speech & Language Pathology: 1 hr / day 5 days / week. Therapeutic Interventions Required: Maximize Cognition and Safety  Rehabilitation Nursin/7. Therapeutic Interventions Required: Monitor Pain, Skin, Wound(s), Vital Signs, Intake and Output, Labs, Safety and Family Training  Rehabilitation Physician: 3 - 5 days / week. Therapeutic Interventions Required: Medical Management  Respiratory Care: consult . Therapeutic Interventions Required: Pulmonary Toileting  Dietician: consult . Therapeutic Interventions Required: nutritional need     Rehabilitation Goals and Plan (Expected frequency & duration of treatment in the IRF):   Return to the Community, Modified Independent Level of Care and Minimal Assist Level of Care  Anticipated Date of Rehabilitation Admission: 2019  Patient/Family oriented IRF level of care/facility/plan: Yes  Patient/Family willing to participate in IRF care/facility/plan: Yes  Patient able to tolerate IRF level of care proposed: Yes  Patient has potential to benefit IRF level of care proposed: Yes  Comments: Not Applicable    Special Needs or Precautions - Medical Necessity:  Safety  Concerns/Precautions:  Fall Risk / High Risk for Falls, Balance and Cognition  Complex Wound Care: post surgical   Pain Management  Current Medications:    Current Facility-Administered Medications Ordered in Epic   Medication Dose Route Frequency Provider Last Rate Last Dose   • Pharmacy Consult Request ...Pain Management Review 1 Each  1 Each Other PHARMACY TO DOSE TREVOR JettCMaru       • MD ALERT...DO NOT ADMINISTER NSAIDS or ASPIRIN unless ORDERED By Neurosurgery 1 Each  1 Each Other PRN ARUN Jett.-C.       • docusate sodium (COLACE) capsule 100 mg  100 mg Oral BID DAVID Jett.A.-C.   100 mg at 07/25/19 0630   • senna-docusate (PERICOLACE or SENOKOT S) 8.6-50 MG per tablet 1 Tab  1 Tab Oral Nightly DAVID Jett.A.-C.   1 Tab at 07/24/19 2042   • senna-docusate (PERICOLACE or SENOKOT S) 8.6-50 MG per tablet 1 Tab  1 Tab Oral Q24HRS PRN DAVID Jett.A.-C.       • polyethylene glycol/lytes (MIRALAX) PACKET 1 Packet  1 Packet Oral BID PRN MARIS JettA.-C.   1 Packet at 07/21/19 0446   • magnesium hydroxide (MILK OF MAGNESIA) suspension 30 mL  30 mL Oral QDAY PRN DAVID Jett.A.-CMaru   30 mL at 07/22/19 0615   • bisacodyl (DULCOLAX) suppository 10 mg  10 mg Rectal Q24HRS PRN DAVID Jett.A.-C.       • fleet enema 133 mL  1 Each Rectal Once PRN DAVID Jett.A.-C.       • HYDROmorphone pf (DILAUDID) injection 0.5 mg  0.5 mg Intravenous Q3HRS PRN DAVID Jett.A.-CMaru   0.5 mg at 07/21/19 0630   • oxyCODONE immediate-release (ROXICODONE) tablet 10 mg  10 mg Oral Q3HRS PRN DAVID Jett.A.-C.   10 mg at 07/25/19 1027   • oxyCODONE immediate-release (ROXICODONE) tablet 5 mg  5 mg Oral Q3HRS PRN Trae Covarrubias P.A.-C.   5 mg at 07/25/19 0630   • diphenhydrAMINE (BENADRYL) tablet/capsule 25 mg  25 mg Oral Q6HRS PRN Trae Covarrubias P.A.-C.        Or   • diphenhydrAMINE (BENADRYL) injection 25 mg  25 mg Intravenous Q6HRS PRN  DAVID Jett.A.-C.       • methocarbamol (ROBAXIN) tablet 750 mg  750 mg Oral 4X/DAY DAVID Jett.A.-C.   750 mg at 07/25/19 0755   • ondansetron (ZOFRAN) syringe/vial injection 4 mg  4 mg Intravenous Q4HRS PRN DAVID Jett.A.-C.       • ondansetron (ZOFRAN ODT) dispertab 4 mg  4 mg Oral Q4HRS PRN DAVID Jett.A.-C.       • ALPRAZolam (XANAX) tablet 0.5 mg  0.5 mg Oral TID PRN DAVID Jett.A.-C.   0.5 mg at 07/21/19 1953   • cloNIDine (CATAPRES) tablet 0.1 mg  0.1 mg Oral Q4HRS PRN DAVID Jett.A.-C.       • benzocaine-menthol (CEPACOL) lozenge 1 Lozenge  1 Lozenge Mouth/Throat Q2HRS PRN DAVID Jett.A.-C.       • calcium carbonate (TUMS) chewable tab 500 mg  500 mg Oral BID DAVID Jett.A.-C.   500 mg at 07/25/19 0629   • vitamin D (cholecalciferol) tablet 5,000 Units  5,000 Units Oral DAILY DAVID Jett.A.-CMaru   5,000 Units at 07/25/19 0632   • metFORMIN (GLUCOPHAGE) tablet 1,000 mg  1,000 mg Oral QDAY with Breakfast Usha Murphy M.D.   1,000 mg at 07/25/19 0754    And   • metFORMIN (GLUCOPHAGE) tablet 500 mg  500 mg Oral AC PM MEAL Usha Murphy M.D.   500 mg at 07/24/19 1757   • insulin glargine (LANTUS) injection 20 Units  20 Units Subcutaneous QAM JAYCEE Lamas M.D.   20 Units at 07/25/19 0634   • hydrALAZINE (APRESOLINE) injection 10 mg  10 mg Intravenous Q6HRS PRN Peck Liwanag, M.D.       • insulin regular (HUMULIN R) injection 2-10 Units  2-10 Units Subcutaneous 4X/DAY JUAN Lamas M.D.   2 Units at 07/25/19 0756    And   • glucose 4 g chewable tablet 16 g  16 g Oral Q15 MIN PRN Cisco Lamas M.D.        And   • DEXTROSE 10% BOLUS 250 mL  250 mL Intravenous Q15 MIN PRN Cisco Lamas M.D.       • Respiratory Care per Protocol   Nebulization Continuous RT David Schuster M.D.       • acetaminophen (TYLENOL) tablet 650 mg  650 mg Oral Q6HRS PRN David Schuster M.D.   650 mg at 07/22/19 0576   • amLODIPine  (NORVASC) tablet 10 mg  10 mg Oral DAILY David Schuster, M.D.   10 mg at 07/25/19 0630   • atorvastatin (LIPITOR) tablet 20 mg  20 mg Oral Nightly David Mariely, M.D.   20 mg at 07/24/19 2042   • lisinopril (PRINIVIL) tablet 40 mg  40 mg Oral DAILY David Mariely, M.D.   40 mg at 07/25/19 0630   • loratadine (CLARITIN) tablet 10 mg  10 mg Oral DAILY David Mariely, M.D.   10 mg at 07/25/19 0632   • omeprazole (PRILOSEC) capsule 20 mg  20 mg Oral DAILY David Mariely, M.D.   20 mg at 07/25/19 0630   • spironolactone (ALDACTONE) tablet 25 mg  25 mg Oral DAILY David Schuster, M.D.   25 mg at 07/25/19 0632   • terazosin (HYTRIN) capsule 5 mg  5 mg Oral BID David Schuster, M.D.   5 mg at 07/25/19 0632   • verapamil ER (CALAN-SR) tablet 180 mg  180 mg Oral BID David Schuster, M.D.   180 mg at 07/25/19 0630     No current [facility]-ordered outpatient prescriptions on file.     Diet:   DIET ORDERS (Through next 24h)    Start     Ordered    07/20/19 1724  Diet Order Diabetic  ALL MEALS     Question:  Diet:  Answer:  Diabetic    07/20/19 1723          Anticipated Discharge Destination / Patient/Family Goal:  Destination: Home with Assistance Support System: Spouse and Family   Anticipated home health services: OT, PT, Nursing and Aide  Previously used HH service/ provider: Not Applicable  Anticipated DME Needs: Walker  Outpatient Services: OT and PT  Alternative resources to address additional identified needs:     Pre-Screen Completed: 7/25/2019 11:27 AM Dewayne Marsh

## 2019-07-25 NOTE — PROGRESS NOTES
[facility] Medicine Daily Progress Note    Date of Service  7/25/2019    Chief Complaint  69 y.o. male admitted 7/15/2019 with T5 mass.    Hospital Course  Admitted with T5 mass with complaints of increasing weakness of both legs.    Interval Problem Update  Pain well controlled  Continues to do well with PT  Denies dizziness  No numbness  axox4  Discussed with neurosurgery  Path still pending    Consultants/Specialty  Neurosurgery    Code Status  Full    Disposition  Acute rehab    Review of Systems  Review of Systems   Constitutional: Negative for chills, fever and malaise/fatigue.   HENT: Negative for hearing loss and sore throat.    Eyes: Negative for blurred vision.   Respiratory: Negative for cough, shortness of breath and wheezing.    Cardiovascular: Negative for chest pain, palpitations and leg swelling.   Gastrointestinal: Negative for abdominal pain, diarrhea, heartburn, nausea and vomiting.   Genitourinary: Negative for dysuria, flank pain and hematuria.   Musculoskeletal: Positive for back pain. Negative for falls and neck pain.   Skin: Negative for rash.   Neurological: Negative for dizziness, sensory change, speech change, focal weakness, weakness and headaches.   Psychiatric/Behavioral: The patient is not nervous/anxious.         Physical Exam  Temp:  [36.2 °C (97.1 °F)-37.6 °C (99.7 °F)] 36.2 °C (97.1 °F)  Pulse:  [78-92] 83  Resp:  [15-20] 20  BP: (118-136)/(55-97) 132/55  SpO2:  [96 %-98 %] 97 %    Physical Exam   Constitutional: He is oriented to person, place, and time. He appears well-developed and well-nourished. No distress.   HENT:   Head: Normocephalic and atraumatic.   Right Ear: External ear normal.   Left Ear: External ear normal.   Nose: Nose normal.   Mouth/Throat: Oropharynx is clear and moist. No oropharyngeal exudate.   Eyes: Pupils are equal, round, and reactive to light. Conjunctivae and EOM are normal. Right eye exhibits no discharge. Left eye exhibits no discharge. No scleral  icterus.   Neck: Normal range of motion. Neck supple. No JVD present. No tracheal deviation present. No thyromegaly present.   Cardiovascular: Normal rate, regular rhythm, normal heart sounds and intact distal pulses.  Exam reveals no gallop and no friction rub.    No murmur heard.  Pulmonary/Chest: Effort normal and breath sounds normal. No stridor. No respiratory distress. He has no wheezes. He has no rales. He exhibits no tenderness.   Abdominal: Soft. Bowel sounds are normal. He exhibits no distension and no mass. There is no tenderness. There is no rebound and no guarding.   Musculoskeletal: Normal range of motion. He exhibits no edema, tenderness or deformity.   Lymphadenopathy:     He has no cervical adenopathy.   Neurological: He is alert and oriented to person, place, and time. He has normal reflexes. No cranial nerve deficit. He exhibits normal muscle tone. Coordination normal.   Skin: Skin is warm and dry. No rash noted. He is not diaphoretic. No erythema. No pallor.   katelin and staples in place   Psychiatric: He has a normal mood and affect.   Nursing note and vitals reviewed.      Fluids    Intake/Output Summary (Last 24 hours) at 07/25/19 1044  Last data filed at 07/25/19 0220   Gross per 24 hour   Intake                0 ml   Output             1700 ml   Net            -1700 ml       Laboratory  Recent Labs      07/23/19   0203  07/24/19   0250  07/25/19   0304   WBC  9.3  10.1  10.4   RBC  2.02*  2.46*  2.50*   HEMOGLOBIN  6.7*  8.2*  8.4*   HEMATOCRIT  19.9*  24.0*  23.8*   MCV  98.5*  95.5  95.2   MCH  33.2*  32.9  33.6*   MCHC  33.7  34.5  35.3   RDW  46.2  50.2*  48.2   PLATELETCT  146*  165  179   MPV  9.5  9.9  9.9     Recent Labs      07/23/19   0203   SODIUM  131*   POTASSIUM  4.3   CHLORIDE  100   CO2  27   GLUCOSE  148*   BUN  26*   CREATININE  1.02   CALCIUM  8.1*                   Imaging  OUTSIDE IMAGES-MR LUMBAR SPINE   Final Result      OUTSIDE IMAGES-MR THORACIC SPINE   Final Result       DX-O-ARM   Final Result      Portable O-arm utilized for 4.17 seconds.         INTERPRETING LOCATION: [address] [place] [address], [place], [address]      DX-PORTABLE FLUORO > 1 HOUR   Final Result         Portable fluoroscopy utilized for 12 seconds.      DX-THORACIC SPINE-2 VIEWS   Final Result      3 intraoperative spot images submitted for surgical hardware localization for diagnostic purposes demonstrate posterior pillo and transpedicular screw fixation transfixing previously seen destructive T5 mass. See surgical report for details.   Fluoroscopy Time:  12 seconds.  3 fluoroscopic images were obtained.      EC-ECHOCARDIOGRAM COMPLETE W/O CONT   Final Result      CT-HEAD W/O   Final Result         1.  No acute intracranial abnormality is identified, there are nonspecific white matter changes, commonly associated with small vessel ischemic disease.  Associated mild cerebral atrophy is noted.   2.  Atherosclerosis.      MR-THORACIC SPINE-WITH   Final Result      1.  Destructive marrow replacing mass involving the T5 vertebra with destruction of the posterior elements and epidural soft tissue mass primarily dorsal with cord compression and marked central stenosis. Differential considerations would include osseous    metastatic disease, plasmacytoma (multiple myeloma), or other primary spinal tumors. The lesion appears amenable to needle biopsy.   2.  T9-T10 small left paramedian disc protrusion.   3.  Bilateral foraminal stenosis at T5-T6 where the T5 soft tissue mass encroaches on the foramina.   4. The case was discussed (call report) with DR PLUMMER on duty for  [facility] at 1445 hours 7/16/2019.      CT-CHEST,ABDOMEN,PELVIS WITH   Final Result      Small bilateral pulmonary nodules. Follow-up CT chest is recommended.      Large lobulated mass replacing the posterior elements of T5 with significant spinal canal stenosis.      Atherosclerotic plaque, including coronary artery calcification.      Mildly enlarged subcarinal,  cardiophrenic and borderline retroperitoneal and retrocrural lymph nodes are nonspecific.      Enlarged left adrenal gland with a 1.8 cm nodule which is indeterminate and could be metastatic. PET/CT would be beneficial for further evaluation.      Moderate amount of colonic stool. Colonic diverticulosis.      Distended bladder.                  CT-TSPINE W/O PLUS RECONS   Final Result         1.  Lytic expansile lesion in the pedicles and posterior elements, appearance most compatible with metastatic or primary bone disease. Question possible compromise of the cord. Could be further evaluated with MRI without and with contrast.   2.  Atherosclerotic calcifications are noted.      These findings were discussed with the patient's clinician, SUSAN BARLOW, on 7/15/2019 10:29 PM.      DX-CHEST-2 VIEWS   Final Result         1.  Right infrahilar density suggests infiltrate.           Assessment/Plan  * Mass of thoracic vertebra- (present on admission)   Assessment & Plan    POD #5  Path results pending - seen by psychiatry and good candidate for acute rehab but they recommend path results review prior to transfer  Continue PT/ OT   Continue pain control     Fall   Assessment & Plan    Continue PT and OT     PVD (peripheral vascular disease) (HCC)- (present on admission)   Assessment & Plan    Lipitor  Plavix held     Essential hypertension- (present on admission)   Assessment & Plan    Continue current regimen as tolerated     Type 2 diabetes mellitus without complication, without long-term current use of insulin (HCC)- (present on admission)   Assessment & Plan    Continue ssi and lantus     Anemia   Assessment & Plan    Acute blood loss anemia   Bloody output from katelin resolved  Plavix and lovenox stopped  H/h much improved s/o transfusion  Continue to follow     Constipation   Assessment & Plan    resolved     Obesity- (present on admission)   Assessment & Plan    Body mass index is 31.57 kg/m².        Dyslipidemia- (present on admission)   Assessment & Plan    Lipitor          VTE prophylaxis: SCD

## 2019-07-25 NOTE — DISCHARGE PLANNING
Dr. Aquino will accept Ian to acute inpatient rehab this afternoon, pending medical clearance. Tiger text to Dr. Usha Murphy. BILL Lloyd.  Bedside RN Rodrigue aware. Monitoring for updates.

## 2019-07-26 PROBLEM — Z85.820 HISTORY OF MELANOMA: Status: ACTIVE | Noted: 2019-07-26

## 2019-07-26 PROBLEM — N40.0 BPH (BENIGN PROSTATIC HYPERPLASIA): Status: ACTIVE | Noted: 2019-07-26

## 2019-07-26 LAB
25(OH)D3 SERPL-MCNC: 65 NG/ML (ref 30–100)
ALBUMIN SERPL BCP-MCNC: 3.2 G/DL (ref 3.2–4.9)
ALBUMIN/GLOB SERPL: 1.1 G/DL
ALP SERPL-CCNC: 36 U/L (ref 30–99)
ALT SERPL-CCNC: 14 U/L (ref 2–50)
ANION GAP SERPL CALC-SCNC: 9 MMOL/L (ref 0–11.9)
AST SERPL-CCNC: 15 U/L (ref 12–45)
BASOPHILS # BLD AUTO: 0.1 % (ref 0–1.8)
BASOPHILS # BLD: 0.01 K/UL (ref 0–0.12)
BILIRUB SERPL-MCNC: 0.4 MG/DL (ref 0.1–1.5)
BUN SERPL-MCNC: 16 MG/DL (ref 8–22)
CALCIUM SERPL-MCNC: 8.4 MG/DL (ref 8.5–10.5)
CHLORIDE SERPL-SCNC: 96 MMOL/L (ref 96–112)
CHOLEST SERPL-MCNC: 101 MG/DL (ref 100–199)
CO2 SERPL-SCNC: 25 MMOL/L (ref 20–33)
CREAT SERPL-MCNC: 1.02 MG/DL (ref 0.5–1.4)
CREAT UR-MCNC: 70.1 MG/DL
EOSINOPHIL # BLD AUTO: 0.35 K/UL (ref 0–0.51)
EOSINOPHIL NFR BLD: 3.4 % (ref 0–6.9)
ERYTHROCYTE [DISTWIDTH] IN BLOOD BY AUTOMATED COUNT: 48.7 FL (ref 35.9–50)
EST. AVERAGE GLUCOSE BLD GHB EST-MCNC: 148 MG/DL
GLOBULIN SER CALC-MCNC: 2.8 G/DL (ref 1.9–3.5)
GLUCOSE BLD-MCNC: 117 MG/DL (ref 65–99)
GLUCOSE BLD-MCNC: 153 MG/DL (ref 65–99)
GLUCOSE BLD-MCNC: 179 MG/DL (ref 65–99)
GLUCOSE SERPL-MCNC: 148 MG/DL (ref 65–99)
HBA1C MFR BLD: 6.8 % (ref 0–5.6)
HCT VFR BLD AUTO: 23.8 % (ref 42–52)
HDLC SERPL-MCNC: 24 MG/DL
HGB BLD-MCNC: 8 G/DL (ref 14–18)
IMM GRANULOCYTES # BLD AUTO: 0.18 K/UL (ref 0–0.11)
IMM GRANULOCYTES NFR BLD AUTO: 1.7 % (ref 0–0.9)
INR PPP: 1.01 (ref 0.87–1.13)
LDLC SERPL CALC-MCNC: 58 MG/DL
LYMPHOCYTES # BLD AUTO: 1.59 K/UL (ref 1–4.8)
LYMPHOCYTES NFR BLD: 15.3 % (ref 22–41)
MCH RBC QN AUTO: 32.7 PG (ref 27–33)
MCHC RBC AUTO-ENTMCNC: 33.6 G/DL (ref 33.7–35.3)
MCV RBC AUTO: 97.1 FL (ref 81.4–97.8)
MONOCYTES # BLD AUTO: 1.21 K/UL (ref 0–0.85)
MONOCYTES NFR BLD AUTO: 11.6 % (ref 0–13.4)
NEUTROPHILS # BLD AUTO: 7.07 K/UL (ref 1.82–7.42)
NEUTROPHILS NFR BLD: 67.9 % (ref 44–72)
NRBC # BLD AUTO: 0 K/UL
NRBC BLD-RTO: 0 /100 WBC
OSMOLALITY UR: 299 MOSM/KG H2O (ref 300–900)
PLATELET # BLD AUTO: 194 K/UL (ref 164–446)
PMV BLD AUTO: 9.7 FL (ref 9–12.9)
POTASSIUM SERPL-SCNC: 4 MMOL/L (ref 3.6–5.5)
PREALB SERPL-MCNC: 16 MG/DL (ref 18–38)
PROT SERPL-MCNC: 6 G/DL (ref 6–8.2)
PROTHROMBIN TIME: 13.5 SEC (ref 12–14.6)
RBC # BLD AUTO: 2.45 M/UL (ref 4.7–6.1)
SODIUM SERPL-SCNC: 130 MMOL/L (ref 135–145)
SODIUM UR-SCNC: 44 MMOL/L
TRIGL SERPL-MCNC: 95 MG/DL (ref 0–149)
TSH SERPL DL<=0.005 MIU/L-ACNC: 2.69 UIU/ML (ref 0.38–5.33)
WBC # BLD AUTO: 10.4 K/UL (ref 4.8–10.8)

## 2019-07-26 PROCEDURE — 97166 OT EVAL MOD COMPLEX 45 MIN: CPT

## 2019-07-26 PROCEDURE — 85610 PROTHROMBIN TIME: CPT

## 2019-07-26 PROCEDURE — 80053 COMPREHEN METABOLIC PANEL: CPT

## 2019-07-26 PROCEDURE — 82570 ASSAY OF URINE CREATININE: CPT

## 2019-07-26 PROCEDURE — 85025 COMPLETE CBC W/AUTO DIFF WBC: CPT

## 2019-07-26 PROCEDURE — 83036 HEMOGLOBIN GLYCOSYLATED A1C: CPT

## 2019-07-26 PROCEDURE — 92610 EVALUATE SWALLOWING FUNCTION: CPT

## 2019-07-26 PROCEDURE — 83935 ASSAY OF URINE OSMOLALITY: CPT

## 2019-07-26 PROCEDURE — 82962 GLUCOSE BLOOD TEST: CPT | Mod: 91

## 2019-07-26 PROCEDURE — 97530 THERAPEUTIC ACTIVITIES: CPT

## 2019-07-26 PROCEDURE — 99223 1ST HOSP IP/OBS HIGH 75: CPT | Performed by: HOSPITALIST

## 2019-07-26 PROCEDURE — A9270 NON-COVERED ITEM OR SERVICE: HCPCS | Performed by: HOSPITALIST

## 2019-07-26 PROCEDURE — A9270 NON-COVERED ITEM OR SERVICE: HCPCS | Performed by: PHYSICAL MEDICINE & REHABILITATION

## 2019-07-26 PROCEDURE — 82306 VITAMIN D 25 HYDROXY: CPT

## 2019-07-26 PROCEDURE — 84300 ASSAY OF URINE SODIUM: CPT

## 2019-07-26 PROCEDURE — 84443 ASSAY THYROID STIM HORMONE: CPT

## 2019-07-26 PROCEDURE — 92523 SPEECH SOUND LANG COMPREHEN: CPT

## 2019-07-26 PROCEDURE — 97535 SELF CARE MNGMENT TRAINING: CPT

## 2019-07-26 PROCEDURE — 99233 SBSQ HOSP IP/OBS HIGH 50: CPT | Performed by: PHYSICAL MEDICINE & REHABILITATION

## 2019-07-26 PROCEDURE — 700101 HCHG RX REV CODE 250: Performed by: PHYSICAL MEDICINE & REHABILITATION

## 2019-07-26 PROCEDURE — 97162 PT EVAL MOD COMPLEX 30 MIN: CPT

## 2019-07-26 PROCEDURE — 84134 ASSAY OF PREALBUMIN: CPT

## 2019-07-26 PROCEDURE — 770010 HCHG ROOM/CARE - REHAB SEMI PRIVAT*

## 2019-07-26 PROCEDURE — 36415 COLL VENOUS BLD VENIPUNCTURE: CPT

## 2019-07-26 PROCEDURE — 700102 HCHG RX REV CODE 250 W/ 637 OVERRIDE(OP): Performed by: HOSPITALIST

## 2019-07-26 PROCEDURE — 700102 HCHG RX REV CODE 250 W/ 637 OVERRIDE(OP): Performed by: PHYSICAL MEDICINE & REHABILITATION

## 2019-07-26 PROCEDURE — 80061 LIPID PANEL: CPT

## 2019-07-26 RX ORDER — INSULIN GLARGINE 100 [IU]/ML
18 INJECTION, SOLUTION SUBCUTANEOUS EVERY MORNING
Status: DISCONTINUED | OUTPATIENT
Start: 2019-07-27 | End: 2019-07-28

## 2019-07-26 RX ORDER — GABAPENTIN 300 MG/1
300 CAPSULE ORAL 3 TIMES DAILY
Status: DISCONTINUED | OUTPATIENT
Start: 2019-07-26 | End: 2019-07-29

## 2019-07-26 RX ORDER — LIDOCAINE 50 MG/G
2 PATCH TOPICAL EVERY 24 HOURS
Status: DISCONTINUED | OUTPATIENT
Start: 2019-07-26 | End: 2019-08-19 | Stop reason: HOSPADM

## 2019-07-26 RX ORDER — DEXTROSE MONOHYDRATE 25 G/50ML
50 INJECTION, SOLUTION INTRAVENOUS
Status: DISCONTINUED | OUTPATIENT
Start: 2019-07-26 | End: 2019-08-07

## 2019-07-26 RX ADMIN — DULOXETINE HYDROCHLORIDE 30 MG: 30 CAPSULE, DELAYED RELEASE ORAL at 08:22

## 2019-07-26 RX ADMIN — TERAZOSIN HYDROCHLORIDE 5 MG: 5 CAPSULE ORAL at 17:48

## 2019-07-26 RX ADMIN — OMEPRAZOLE 20 MG: 20 CAPSULE, DELAYED RELEASE ORAL at 08:22

## 2019-07-26 RX ADMIN — GABAPENTIN 300 MG: 300 CAPSULE ORAL at 14:38

## 2019-07-26 RX ADMIN — METHOCARBAMOL 750 MG: 750 TABLET, FILM COATED ORAL at 08:22

## 2019-07-26 RX ADMIN — INSULIN GLARGINE 20 UNITS: 100 INJECTION, SOLUTION SUBCUTANEOUS at 08:01

## 2019-07-26 RX ADMIN — CALCIUM CARBONATE (ANTACID) CHEW TAB 500 MG 500 MG: 500 CHEW TAB at 20:07

## 2019-07-26 RX ADMIN — ACETAMINOPHEN 1000 MG: 500 TABLET, FILM COATED ORAL at 08:22

## 2019-07-26 RX ADMIN — METHOCARBAMOL 750 MG: 750 TABLET, FILM COATED ORAL at 20:06

## 2019-07-26 RX ADMIN — METFORMIN HYDROCHLORIDE 1000 MG: 500 TABLET, FILM COATED ORAL at 08:22

## 2019-07-26 RX ADMIN — CALCIUM CARBONATE (ANTACID) CHEW TAB 500 MG 500 MG: 500 CHEW TAB at 08:22

## 2019-07-26 RX ADMIN — INSULIN LISPRO 2 UNITS: 100 INJECTION, SOLUTION INTRAVENOUS; SUBCUTANEOUS at 20:13

## 2019-07-26 RX ADMIN — VERAPAMIL HYDROCHLORIDE 180 MG: 180 TABLET, FILM COATED, EXTENDED RELEASE ORAL at 22:05

## 2019-07-26 RX ADMIN — ACETAMINOPHEN 1000 MG: 500 TABLET, FILM COATED ORAL at 14:38

## 2019-07-26 RX ADMIN — LISINOPRIL 40 MG: 20 TABLET ORAL at 08:22

## 2019-07-26 RX ADMIN — LORATADINE 10 MG: 10 TABLET ORAL at 08:22

## 2019-07-26 RX ADMIN — METHOCARBAMOL 750 MG: 750 TABLET, FILM COATED ORAL at 17:48

## 2019-07-26 RX ADMIN — INSULIN LISPRO 1 UNITS: 100 INJECTION, SOLUTION INTRAVENOUS; SUBCUTANEOUS at 08:01

## 2019-07-26 RX ADMIN — ACETAMINOPHEN 1000 MG: 500 TABLET, FILM COATED ORAL at 20:06

## 2019-07-26 RX ADMIN — GABAPENTIN 300 MG: 300 CAPSULE ORAL at 11:21

## 2019-07-26 RX ADMIN — METHOCARBAMOL 750 MG: 750 TABLET, FILM COATED ORAL at 13:00

## 2019-07-26 RX ADMIN — GABAPENTIN 300 MG: 300 CAPSULE ORAL at 20:06

## 2019-07-26 RX ADMIN — MULTIPLE VITAMINS W/ MINERALS TAB 1 TABLET: TAB at 11:21

## 2019-07-26 RX ADMIN — LIDOCAINE 2 PATCH: 50 PATCH CUTANEOUS at 11:18

## 2019-07-26 RX ADMIN — METFORMIN HYDROCHLORIDE 1000 MG: 500 TABLET, FILM COATED ORAL at 17:48

## 2019-07-26 RX ADMIN — ATORVASTATIN CALCIUM 20 MG: 10 TABLET, FILM COATED ORAL at 20:06

## 2019-07-26 ASSESSMENT — PATIENT HEALTH QUESTIONNAIRE - PHQ9
1. LITTLE INTEREST OR PLEASURE IN DOING THINGS: NOT AT ALL
SUM OF ALL RESPONSES TO PHQ9 QUESTIONS 1 AND 2: 0
2. FEELING DOWN, DEPRESSED, IRRITABLE, OR HOPELESS: NOT AT ALL

## 2019-07-26 ASSESSMENT — ENCOUNTER SYMPTOMS
EYES NEGATIVE: 1
PALPITATIONS: 0
COUGH: 0
FEVER: 0
FOCAL WEAKNESS: 1
SHORTNESS OF BREATH: 0
NAUSEA: 0
VOMITING: 0
CHILLS: 0
BACK PAIN: 1
ABDOMINAL PAIN: 0

## 2019-07-26 ASSESSMENT — BRIEF INTERVIEW FOR MENTAL STATUS (BIMS)
ASKED TO RECALL SOCK: YES, NO CUE REQUIRED
GENERAL MEMORY AND RECALL ABILITY: CURRENT SEASON;LOCATION OF OWN ROOM;STAFF NAMES AND FACES;RECOGNIZES APPROPRIATE HEALTHCARE SETTING
ASKED TO RECALL BLUE: YES, NO CUE REQUIRED
INITIAL REPETITION OF BED BLUE SOCK - FIRST ATTEMPT: 3
GENERAL MEMORY AND RECALL ABILITY: CURRENT SEASON;LOCATION OF OWN ROOM;RECOGNIZES APPROPRIATE HEALTHCARE SETTING
BIMS SUMMARY SCORE: 15
WHAT YEAR IS IT: CORRECT
WHAT MONTH IS IT: ACCURATE WITHIN 5 DAYS
WHAT DAY OF THE WEEK IS IT: CORRECT
ASKED TO RECALL BED: YES, NO CUE REQUIRED

## 2019-07-26 ASSESSMENT — ACTIVITIES OF DAILY LIVING (ADL): TOILETING: INDEPENDENT

## 2019-07-26 NOTE — CARE PLAN
Problem: Safety  Goal: Will remain free from injury  Call light with in reach. Redirection to use call light for assistance. Non skid socks. Upper siderails up x2 while in bed. Denies any pain. No respiratory distress. Able to make needs known. Assisted as needed. HS [id number], insulin given as ordered. Will continue to monitor.

## 2019-07-26 NOTE — ASSESSMENT & PLAN NOTE
H&H: 8.2 (8/13) --> 8.6 (8/15)  Fe: < 10%  B12: 263  Folate: 8.8  S/P PRBC prior to Rehab transfer  2nd to Fe deficiency  ? 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

## 2019-07-26 NOTE — ASSESSMENT & PLAN NOTE
BP a little labile and occ rises up  On Norvasc: 10 mg daily  On Lisinopril: 40 mg daily  On Verapamil ER: 180 mg bid  Note: also on Aldactone  Cont to monitor

## 2019-07-26 NOTE — CARE PLAN
Problem: Safety  Goal: Will remain free from injury  Outcome: PROGRESSING AS EXPECTED  Patient demonstrates good safety technique this shift.  Asks for assistance when needed and does not attempt self transfer.  Able to verbalize needs.      Problem: Bowel/Gastric:  Goal: Normal bowel function is maintained or improved  Outcome: PROGRESSING AS EXPECTED  Patient having regular bowel movements; last BM 7/25/19.  Denies s/s constipation; bowel meds available if needed.

## 2019-07-26 NOTE — PROGRESS NOTES
Patient admitted to facility at 1630 via w/c; accompanied by hospital transport.  Patient assisted to room and positioned in bed for comfort and safety; call light within reach.  Patient assisted with stowing belongings and oriented to room and facility.  Admission assessment performed and documented in computer.  Received and reviewed education binder. Admission paperwork completed; signed copies placed in chart.  Will continue to monitor.

## 2019-07-26 NOTE — CARE PLAN
Problem: Safety  Goal: Will remain free from injury  Outcome: PROGRESSING AS EXPECTED  Patient use call light for assistance, remains free from injury. Wife is by bedside.     Problem: Pain Management  Goal: Pain level will decrease to patient's comfort goal  Outcome: PROGRESSING AS EXPECTED  Patient said his pain is well controlled. Some soreness around incision site. Lidocaine patches ordered and placed to area. Up and participated in therapies. Tolerated well.

## 2019-07-26 NOTE — FLOWSHEET NOTE
07/25/19 1934   Type of Assessment   Assessment Yes   Patient History   Pulmonary Diagnosis None   Surgical Procedures Laminectomy/fusion/removal of spinal mass   Home O2 No   Home Treatments/Frequency No   COPD Risk Screening   Do you have a history of COPD? No   Smoking History   Have you ever smoked Yes   Have you smoked in the last 12 months No   Confirm Quit Date 07/25/07   Level Of Consciousness   Level of Consciousness Alert   Chest Exam   Respiration 16   Pulse 81   Oximetry   #Pulse Oximetry (Single Determination) Yes   Oxygen   Home O2 Use Prior To Admission? No   Pulse Oximetry 96 %   O2 Daily Delivery Respiratory  Room Air with O2 Available

## 2019-07-26 NOTE — CONSULTS
HOSPITAL MEDICINE CONSULTATION    Requesting Physician:  Dr. Aquino    Reason for Consult:  Hypertension, Diabetes, Hyponatremia    History of Present Illness:  The patient is a 69-year-old  male with past medical history significant for peripheral vascular disease, hypertension, diabetes, dyslipidemia, benign prostatic hyperplasia, and melanoma status post resection x 2.  He was admitted to [facility] on 7/15/19 for progressively worsening back pain and leg weakness x 6 weeks.  MRI revealed a spinal mass with cord compression and central stenosis.  The patient underwent extradural mass resection at T5, T4-T6 laminectomy, and T3-T7 spinal fusion on 7/20/19 by Dr. Velasco.  Post-operatively, he required blood transfusion and has remained off Plavix.  Echocardiogram shows ejection fraction 65% and mild aortic stenosis.  Due to his ongoing functional debility, the patient was transferred to [facility] on 7/25/19.  Hospital Medicine consultation is requested to assist in the management of this patient's HTN and DM.  His outpatient blood pressure regimen includes Norvasc, Lisinopril, Verapamil, and Aldactone.  He takes Metformin, Glipizide, and Januvia for his diabetes.    Review of Systems:  Review of Systems   Constitutional: Negative for chills and fever.   HENT: Negative.    Eyes: Negative.    Respiratory: Negative for cough and shortness of breath.    Cardiovascular: Negative for chest pain and palpitations.   Gastrointestinal: Negative for abdominal pain, nausea and vomiting.   Musculoskeletal: Positive for back pain.        Wound pain   Skin: Negative for itching and rash.   Neurological: Positive for focal weakness.       Allergies:  No Known Allergies    Medications:    Current Facility-Administered Medications:   •  gabapentin (NEURONTIN) capsule 300 mg, 300 mg, Oral, TID, Adelfo Aquino, D.O., 300 mg at 07/26/19 1121  •  lidocaine (LIDODERM) 5 % 2 Patch, 2  Patch, Transdermal, Q24HR, Adelfo Aquino D.O., 2 Patch at 07/26/19 1118  •  [DISCONTINUED] insulin glargine (LANTUS) injection 0.2 Units/kg/day, 0.2 Units/kg/day, Subcutaneous, Q EVENING **AND** [DISCONTINUED] insulin lispro (HUMALOG) injection 0.2 Units/kg/day, 0.2 Units/kg/day, Subcutaneous, TID AC, Stopped at 07/25/19 1715 **AND** insulin lispro (HUMALOG) injection 2-12 Units, 2-12 Units, Subcutaneous, 4X/DAY ACHS **AND** Accu-Chek ACHS, , , Q AC AND BEDTIME(S) **AND** NOTIFY MD and PharmD, , , Once **AND** glucose 4 g chewable tablet 16 g, 16 g, Oral, Q15 MIN PRN **AND** dextrose 50% (D50W) injection 50 mL, 50 mL, Intravenous, Q15 MIN PRN, Marina Plata M.D.  •  [START ON 7/27/2019] insulin glargine (LANTUS) injection 18 Units, 18 Units, Subcutaneous, QAM, Marina Plata M.D.  •  metFORMIN (GLUCOPHAGE) tablet 1,000 mg, 1,000 mg, Oral, BID WITH MEALS, Marina Plata M.D.  •  amLODIPine (NORVASC) tablet 10 mg, 10 mg, Oral, DAILY, JUAN JOSÉ Vickers.O., Stopped at 07/26/19 0900  •  atorvastatin (LIPITOR) tablet 20 mg, 20 mg, Oral, Nightly, Adelfo Aquino D.O., 20 mg at 07/25/19 2036  •  glucose 4 g chewable tablet 16 g, 16 g, Oral, PRN, Adelfo Aquino D.O.  •  lisinopril (PRINIVIL) tablet 40 mg, 40 mg, Oral, DAILY, Adelfo Aquino D.O., 40 mg at 07/26/19 0822  •  loratadine (CLARITIN) tablet 10 mg, 10 mg, Oral, DAILY, Adelfo Aquino D.O., 10 mg at 07/26/19 0822  •  omeprazole (PRILOSEC) capsule 20 mg, 20 mg, Oral, DAILY, ANISH VickersO., 20 mg at 07/26/19 0822  •  spironolactone (ALDACTONE) tablet 25 mg, 25 mg, Oral, DAILY, JUAN JOSÉ Vickers.O., Stopped at 07/26/19 0900  •  terazosin (HYTRIN) capsule 5 mg, 5 mg, Oral, BID, ANISH VickersO., Stopped at 07/26/19 0500  •  verapamil ER (CALAN-SR) tablet 180 mg, 180 mg, Oral, BID, ANISH VickersO., Stopped at 07/26/19 0900  •  Pharmacy Consult Request ...Pain Management Review 1 Each, 1 Each, Other, PHARMACY TO DOSE, Adelfo Aquino D.O.  •   Respiratory Care per Protocol, , Nebulization, Continuous RT, Adelfo Aquino D.O.  •  acetaminophen (TYLENOL) tablet 1,000 mg, 1,000 mg, Oral, TID, Adelfo Aquino D.O., 1,000 mg at 07/26/19 0822  •  oxyCODONE immediate-release (ROXICODONE) tablet 5 mg, 5 mg, Oral, Q3HRS PRN, Adelfo Aquino D.O.  •  oxyCODONE immediate release (ROXICODONE) tablet 10 mg, 10 mg, Oral, Q3HRS PRN, Adelfo Aquino D.O.  •  acetaminophen (TYLENOL) tablet 650 mg, 650 mg, Oral, Q4HRS PRN, JUAN JOSÉ Vickers.O.  •  therapeutic multivitamin-minerals (THERAGRAN-M) tablet 1 Tab, 1 Tab, Oral, DAILY WITH LUNCH, JUAN JOSÉ Vickers.O., 1 Tab at 07/26/19 1121  •  artificial tears 1.4 % ophthalmic solution 1 Drop, 1 Drop, Both Eyes, PRN, Adelfo Aquino D.O.  •  benzocaine-menthol (CEPACOL) lozenge 1 Lozenge, 1 Lozenge, Mouth/Throat, Q2HRS PRN, JUAN JOSÉ Vickers.O.  •  mag hydrox-al hydrox-simeth (MAALOX PLUS ES or MYLANTA DS) suspension 20 mL, 20 mL, Oral, Q2HRS PRN, Adelfo Aquino D.O.  •  ondansetron (ZOFRAN ODT) dispertab 4 mg, 4 mg, Oral, 4X/DAY PRN **OR** ondansetron (ZOFRAN) syringe/vial injection 4 mg, 4 mg, Intramuscular, 4X/DAY PRN, Adelfo Aquino, D.O.  •  traZODone (DESYREL) tablet 50 mg, 50 mg, Oral, QHS PRN, JUAN JOSÉ Vickers.O.  •  sodium chloride (OCEAN) 0.65 % nasal spray 2 Spray, 2 Spray, Nasal, PRN, Adelfo Aquino D.O.  •  calcium carbonate (TUMS) chewable tab 500 mg, 500 mg, Oral, BID, Adelfo Aquino D.O., 500 mg at 07/26/19 0822  •  methocarbamol (ROBAXIN) tablet 750 mg, 750 mg, Oral, 4X/DAY, Adelfo Aquino D.O., 750 mg at 07/26/19 0822  •  DULoxetine (CYMBALTA) capsule 30 mg, 30 mg, Oral, DAILY, Adelfo Aquino D.O., 30 mg at 07/26/19 0822    Past Medical/Surgical History:  Past Medical History:   Diagnosis Date   • Cancer (HCC)    • Diabetes    • Dyslipidemia    • Hypertension      Past Surgical History:   Procedure Laterality Date   • THORACIC FUSION O-ARM  7/20/2019    Procedure: EXTRADURAL  TUMOR COMPLETE RESECTION, T3-T7 STEALTH FUSION;  Surgeon: Win Velasco III, M.D.;  Location: SURGERY [facility];  Service: Neurosurgery   • THORACIC LAMINECTOMY  7/20/2019    Procedure: LAMINECTOMY, SPINE, THORACIC- T4-6; BILATERAL T5 TRANSPEDICULAR APPROACH;  Surgeon: Win Velasco III, M.D.;  Location: SURGERY [facility];  Service: Neurosurgery   • OTHER SURGICAL PROCEDURE      has melonoma excisions every 3 mos       Social History:  Social History     Social History   • Marital status:      Spouse name: N/A   • Number of children: N/A   • Years of education: N/A     Occupational History   • Not on file.     Social History Main Topics   • Smoking status: Former Smoker   • Smokeless tobacco: Never Used   • Alcohol use Yes   • Drug use: No   • Sexual activity: Yes     Partners: Female     Other Topics Concern   • Not on file     Social History Narrative   • No narrative on file       Family History  Family History   Problem Relation Age of Onset   • Cancer Mother    • Cancer Father        Physical Examination:   Vitals:    07/25/19 1829 07/25/19 1934 07/26/19 0500 07/26/19 0645   BP: 121/55  108/58 117/58   Pulse: 67 81  70   Resp: 18 16  18   Temp: 36.6 °C (97.8 °F)   36.6 °C (97.8 °F)   TempSrc: Oral   Temporal   SpO2:  96%     Weight:       Height:           Physical Exam   Constitutional: He is oriented to person, place, and time. No distress.   HENT:   Head: Normocephalic and atraumatic.   Right Ear: External ear normal.   Left Ear: External ear normal.   Eyes: Conjunctivae and EOM are normal. Right eye exhibits no discharge. Left eye exhibits no discharge.   Neck: Normal range of motion. Neck supple. No tracheal deviation present.   Cardiovascular: Normal rate and regular rhythm.    Pulmonary/Chest: Effort normal and breath sounds normal. No stridor. No respiratory distress. He has no wheezes.   Abdominal: Soft. Bowel sounds are normal. He exhibits no distension. There is no tenderness.    Musculoskeletal: He exhibits no edema or tenderness.   Mid-upper back incision clean and dry w/ intact staples   Neurological: He is alert and oriented to person, place, and time.   Skin: Skin is warm and dry. He is not diaphoretic.   Vitals reviewed.      Laboratory Data:  Recent Labs      07/24/19   0250  07/25/19   0304  07/26/19   0555   WBC  10.1  10.4  10.4   RBC  2.46*  2.50*  2.45*   HEMOGLOBIN  8.2*  8.4*  8.0*   HEMATOCRIT  24.0*  23.8*  23.8*   MCV  95.5  95.2  97.1   MCH  32.9  33.6*  32.7   MCHC  34.5  35.3  33.6*   RDW  50.2*  48.2  48.7   PLATELETCT  165  179  194   MPV  9.9  9.9  9.7     Recent Labs      07/26/19   0555   SODIUM  130*   POTASSIUM  4.0   CHLORIDE  96   CO2  25   GLUCOSE  148*   BUN  16   CREATININE  1.02   CALCIUM  8.4*       Imaging:  No orders to display       Impressions/Recommendations:  Type 2 diabetes mellitus without complication, without long-term current use of insulin ([facility])  HbA1c pending  Increase Metformin  Decrease Lantus and adjust SSI  Outpt meds include Metformin, Glipizide, and Januvia    Essential hypertension  Echo EF 65% w/ mild AS  On max doses of Norvasc and Lisinopril  Also on Aldactone and Verapamil  Observe blood pressure trends    Anemia  S/P PRBC prior to Rehab transfer  Follow H/H    T5 spinal cord injury (HCC)  S/P T5 extradural mass resection w/ T4-T6 laminectomy and T3-T7 fusion on 7/20/19 by Dr. Velasco  Pathology pending  Wound care    Hyponatremia  Treatment usually not indicated unless Na+<130 or pt symptomatic  Monitor electrolytes for now    Dyslipidemia  On Lipitor    BPH (benign prostatic hyperplasia)  Monitor for orthostatic hypotension on Hytrin    History of melanoma  S/P resection x 2  Outpt F/U    PVD (peripheral vascular disease) (HCC)  Plavix on hold post-operatively    Full Code    Thank you for the opportunity to assist in this patient's care.  We will continue to follow along with you.

## 2019-07-26 NOTE — ASSESSMENT & PLAN NOTE
Hba1c 6.8 (7/26)  BS   On Metformin: 1000 mg bid  On accuchecks bid  Note: home meds include Metformin, Glipizide, and Januvia  Cont to monitor

## 2019-07-26 NOTE — REHAB-CM IDT TEAM NOTE
Case Management    DC Planning  DC destination/dispostion:  Patient lives in a single level home with his wife.  He has 2 entry steps.    Referrals: DM[employer] placard    DC Needs: Patient has a 4ww, cane, w/c, shower chair and grab bars.  He will need follow up therapy in [place].  He will need follow up with PCP and neurosurgery.    Barriers to discharge:       Strengths: good support for home.    Section completed by:  Marium Naqvi R.N.

## 2019-07-26 NOTE — DISCHARGE PLANNING
CASE MANAGEMENT INITIAL ASSESSMENT    Admit Date:  7/25/2019     I met with patient and his wife to discuss role of case management / discharge planning / team conference.  Patient is a  69 y.o. male transferred from [facility].  He is alert and able to provide information.  His wife also assisted with this.  His admitting physician for rehab is Dr. Aquino.    Diagnosis: 04.111 paraplegia, incomplete  T5 spinal cord injury (HCC)    Co-morbidities:   Patient Active Problem List    Diagnosis Date Noted   • Fall 07/18/2019     Priority: High   • Low vitamin B12 level 07/19/2019     Priority: Medium   • PVD (peripheral vascular disease) ([facility]) 07/17/2019     Priority: Medium   • Type 2 diabetes mellitus without complication, without long-term current use of insulin ([facility]) 07/13/2015     Priority: Medium   • Essential hypertension 07/13/2015     Priority: Medium   • Dyslipidemia 07/13/2015     Priority: Low   • Obesity 07/13/2015     Priority: Low   • BPH (benign prostatic hyperplasia) 07/26/2019   • History of melanoma 07/26/2019   • T5 spinal cord injury (HCC) 07/25/2019   • Neuropathic pain 07/25/2019   • Neurogenic bladder 07/25/2019   • Neurogenic bowel 07/25/2019   • Hyponatremia 07/25/2019   • 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HCC) 07/25/2019   • Acute kidney injury (HCC) 07/25/2019   • Vitamin D deficiency 07/25/2019   • Anemia 07/23/2019     Prior Living Situation:  Housing / Facility: 1 Story House  Lives with - Patient's Self Care Capacity: Spouse    Prior Level of Function:  Medication Management: Independent  Finances: Independent  Home Management: Independent  Shopping: Independent  Prior Level Of Mobility: Independent Without Device in Community, Supervision Without Device in Home  Driving / Transportation: Driving Independent    Support Systems:  Primary : Wife is Marion Laird at [phone] or [phone]  Other support systems:      Previous Services Utilized:   Equipment Owned: 4-Wheel Walker, Single Point  Cane, Wheelchair, Tub / Shower Seat, Grab Bar(s) In Tub / Shower  Prior Services: None, Intermittent Physical Support for ADL Per Family    Other Information:  Occupation (Pre-Hospital Vocational): Retired Due To Age  Primary Payor Source: Medicare A, Medicare B  Secondary Payor Source: Supplemental Insurance  Primary Care Practitioner : Dr. Mario Latham  Other MDs: Dr. Velasco for neurosurgery    Additional Case Management Questions:  Have you ever received case management services for yourself or a family member?  no    Do you feel you have and an understanding of what services  provide?  We have reviewed this and I have provided introductory letter.    Do you have any additional questions regarding case management?    no      CASE MANAGEMENT PLAN OF CARE   Individualized Goals:   1. Patient wants to be able to don and wear his own pants.  2. He would like to be able to go up his 2 entry steps at his home.    Barriers:   1. Stairs.    Patient / Family Goal:  Patient / Family Goal: Patient plans to return home with his wife.  He will need therapy follow up in [place].  His wife is very supportive.    Plan:  1. Continue to follow patient through hospitalization and provide discharge planning in collaboration with patient, family, physicians and ancillary services.     2. Utilize community resources to ensure a safe discharge.

## 2019-07-26 NOTE — THERAPY
Speech Language Pathology   Initial Assessment     Patient Name: Ian Laird  AGE:  69 y.o., SEX:  male  Medical Record #: [id number]  Today's Date: 7/26/2019     Subjective  Patient seen for 2 part evaluation this date.  Patient and spouse report patient below cognitive baseline at this time.  Report high prior level of function with no cognitive-linguistic or swallow concerns prior to referring episode.  Patient PLOF was I for ADLs/IADLs.  Patient does not endorse any swallowing impairment at this time. Spouse present and serves as supplemental .      Objective   07/26/19 1131   Prior Living Situation   Prior Services None   Housing / Facility [address]   Lives with - Patient's Self Care Capacity Spouse   Prior Level Of Function   Communication Within Functional Limits   Swallow Within Functional Limits   Dentition Intact   Dentures None   Hearing Within Functional Limits for Evaluation   Hearing Aid None   Vision Wears Corrective Lenses;Within Functional Limits for Evaluation   Patient's Primary Language English   Education Completed College   Education Years Completed 16   Occupation (Pre-Hospital Vocational) Retired Due To Age   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    IRF-ZEUS:  Prior Functioning: Everyday Activities   Self Care Independent   Indoor Mobility (Ambulation) Independent   Stairs Independent   Functional Cognition Independent   IRF-ZEUS:  Cognitive Pattern Assessment   Cognitive Pattern Assessment Used Staff Assessment   IRF-ZEUS:  Staff Assessment for Mental Status   Memory/Recall Ability (3-Day Assessment Period) Current season;Location of own room;Recognizes appropriate healthcare setting   Tracheostomy   Tracheostomy No   Labial Function   Labial Function (WDL) WDL   Lingual Function   Lingual Function (WDL) WDL   Jaw Function   Jaw Function (WDL) WDL   Velar Function   Velar Function (WDL) WDL   Laryngeal Function   Laryngeal Function (WDL) WDL   Swallowing    Swallowing (WDL) WDL   Dysphagia Strategies / Recommendations   Strategies / Interventions Recommended (Yes / No) No   Barriers To Oral Feeding   Barriers To Oral Feeding None   Cervical Ausculatation Not Tested   Pre-Feeding Oral Stimulation Trial Not Tested   IRF-ZEUS:  Swallowing/Nutritional Status   Swallowing/Nutritional Status Regular food   IRF-ZEUS:  Eating   Assistance Needed Set-up / clean-up   [place] Physical Assistance Level No physical assistance or only touching/steadying assist   CARE Score 5   Discharge Goal:  Assistance Needed Independent   Discharge Goal:  Physical Assistance Level No physical assistance or only touching/steadying assist   Discharge Goal:  Score 6   Outcome Measures   Outcome Measures Utilized MASA   MASA ([place] Assessment of Swallowing Ability)   Alertness 10   Cooperation 10   Auditory Comprehension 10   Respiration 10   Respiratory Rate for Swallow 5   Dysphasia 5   Dyspraxia 5   Dysarthria 5   Saliva 5   Lip Seal 5   Tongue Movement 10   Tongue Strength 10   Tongue Coordination 10   Oral Preparation 10   Gag 5   Palate 10   Bolus Clearance 10   Oral Transit 10   Cough Reflex 1  (unable to evaluate reflexive coughing)   Voluntary Cough 8   Voice 10   Trache 10   Pharygneal Phase 10   Pharyngeal Response 10   Diet Recommendations Regular   Fluid Recommendations Regular   Swallow Integrity Unlikely dysphagia/aspiration   Total Score 194   Dysphagia Severity No abnormality detected   Aspiration Severity No abnormality detected   Problem List   Problem List Memory Deficit;Executive Function Deficit   Current Discharge Plan   Current Discharge Plan Return to Prior Living Situation   Benefit   Therapy Benefit Patient would benefit from Inpatient Rehab Speech-Language Pathology to address above identified deficits.   Interdisciplinary Plan of Care Collaboration   IDT Collaboration with  Family / Caregiver   Patient Position at End of Therapy Seated  (eating lunch in dining room with  spouse.)   Speech Language Pathologist Assigned   Assigned SLP / Pager # [phone]   SLP Total Time Spent   SLP Individual Total Time Spent (Mins) 30   SLP Charge Group   SLP Oral Pharyngeal Evaluation Oral Pharyngeal Evaluation        07/26/19 1331   SCCAN (Scales of Cognitive and Communicative Ability for Neurorehabilitation)   Oral Expression - Raw Score 16   Oral Expression - Scale Performance Score 84   Orientation - Raw Score 12   Orientation - Scale Performance Score 100   Memory - Raw Score 12   Memory - Scale Performance Score 63   Speech Comprehension - Raw Score 13   Speech Comprehension - Scale Performance Score 100   Reading Comprehension - Raw Score 9   Reading Comprehension - Scale Performance Score 75   Writing - Raw Score 6   Writing - Scale Performance Score 86   Attention - Raw Score 9   Attention - Scale Performance Score 56   Problem Solving - Raw Score 15   Problem Solving - Scale Performance Score 65   SCCAN Total Raw Score 75   SCCAN Degree of Severity Mild Impairment   Problem List   Problem List Memory Deficit;Verbal Problem Solving Deficits;Attention Deficit   Interdisciplinary Plan of Care Collaboration   IDT Collaboration with  Family / Caregiver   Patient Position at End of Therapy In Bed;Bed Alarm On;Call Light within Reach;Tray Table within Reach;Phone within Reach;Family / Friend in Room   Collaboration Comments Discussed evaluation findings and plan of care.   SLP Total Time Spent   SLP Individual Total Time Spent (Mins) 60   SLP Charge Group   SLP Speech Language Evaluation Speech Sound Language Comprehension       FIM Eating Score:  5 - Standby Prompting/Supervision or Set-up  Eating Description:  Set-up of equipment or meal/tube feeding    FIM Comprehension Score:  4 - Minimal Assistance  Comprehension Description:  Verbal cues    FIM Expression Score:  7 - Independent  Expression Description:       FIM Social Interaction Score:  6 - Modified Independent  Social Interaction  Description:  Medication    FIM Problem Solving Score:  5 - Standby Prompting/Supervision or Set-up  Problem Solving Description:  Bed/chair alarm, Seat belt, Therapy schedule, Verbal cueing    FIM Memory Score:  5 - Standby Prompting/Supervision or Set-up  Memory Description:  Verbal cueing, Bed/chair alarm, Seat belt, Therapy schedule      Assessment  Patient is 69 y.o. male with a diagnosis of incomplete paraplegia (T5 SCI) with new onset cognitive-communication impairments.  Clinical swallow evaluation completed this date is within functional limits.  No evidence of dysphagia, apraxia, dysarthria, or aphasia.  Oral motor mechanism and cranial nerve testing unremarkable.  Jeannie protocol completed with PASS response. Recommend continue with current diet of regular texture and thin liquids and no SLP dysphagia follow up.  Cognitive-Communication assessment via SCCAN with Mild impairment with substantial impairments in memory, attention, and problem solving.  Patient and spouse report patient is below cognitive baseline.  Additional factors influencing patient status/progress (ie: cognitive factors, co-morbidities, social support, etc): strong family support, new onset cognitive-communication impairments, high PLOF, motivation to improve, emerging insight/awareness to cognitive status, cognitive-communication deficit.        Plan  Recommend Speech Therapy 30-60 minutes per day 5-6 days per week for 2-3 weeks for the following treatments:  SLP Self Care / ADL Training , SLP Cognitive Skill Development and SLP Group Treatment.      Goals:  Long term and short term goals have been discussed with patient and spouse and they are in agreement.         Speech Therapy Problems           Problem: Memory STGs     Dates: Start: 07/26/19       Goal: STG-Within one week, patient will     Dates: Start: 07/26/19       Description: 1) Individualized goal:  perform simple prospective memory/recall tasks with 90% when provided  clinician supervision assist  2) Interventions:  SLP Cognitive Skill Development and SLP Group Treatment               Problem: Problem Solving STGs     Dates: Start: 07/26/19       Goal: STG-Within one week, patient will     Dates: Start: 07/26/19       Description: 1) Individualized goal:  Perform complex attention tasks with 90% accuracy and clinician supervision  2) Interventions: SLP Self Care / ADL Training , SLP Cognitive Skill Development and SLP Group Treatme               Problem: Speech/Swallowing LTGs     Dates: Start: 07/26/19       Goal: LTG-By discharge, patient will solve complex problem     Dates: Start: 07/26/19       Description: 1) Individualized goal:  For home and community with 90% accuracy and MOD I for meta-cognitive strategies  2) Interventions:  SLP Self Care / ADL Training , SLP Cognitive Skill Development and SLP Group Treatment             Goal: LTG-By discharge, patient will     Dates: Start: 07/26/19       Description: 1) Individualized goal:  Perform functional prospective memory tasks with mod I to achieve 90% accuracy.  2) Interventions:  SLP Self Care / ADL Training , SLP Cognitive Skill Development and SLP Group Treatme

## 2019-07-26 NOTE — PROGRESS NOTES
"Rehab Progress Note     Encounter Date: 7/26/2019    CC: LE weakness    Interval Events (Subjective)  He reports lower extremity weakness left greater than right  He had episode of waking up in the middle the night not knowing where he was.   He denies any confusion this morning.  He denies any dizziness when getting out of bed    He reports continued pain at the site of the incision.  He describes the pain as 5/10 achy-sharp, constant, worse with movement, no radiation, started after surgery.     He urinated >1000 cc this morning PVR of 146  Bowel: Small BM this morning, was able to hold long enough to get to the toilet, no incontinence    ROS:  Gen: No fatigue, confusion, significant weight loss  Eyes: no blurry vision, double vision or pain in eyes  ENT: no changes in hearing, runny nose, nose bleeds, sinus pain  CV: No CP, palpitations,   Lungs: no shortness of breath, changes in secretions, changes in cough, pain with coughing  Abd: mild abd pain, no nausea, vomiting, pain with eating  : no blood in urine,suprapubic pain  Ext: No swelling in legs, asymmetric atrophy  Neuro: no changes in strength or sensation  Skin: no new ulcers/skin breakdown appreciated by patient  Mood: No changes in mood today, increase in depression or anxiety  Heme: no bruising, or bleeding  Rest of 14 point review of systems is negative    Objective:  Vitals: /58   Pulse 70   Temp 36.6 °C (97.8 °F) (Temporal)   Resp 18   Ht 1.778 m (5' 10\")   Wt 100.8 kg (222 lb 4.8 oz)   SpO2 96%   Gen: NAD, Body mass index is 31.9 kg/m².  HEENT: NC/AT, PERRLA, moist mucous membranes  Cardio: RRR, no mumurs  Pulm: CTAB, with normal respiratory effort  Abd: Hard mild tenderness, protuberant, active bowel sounds,   Ext: No peripheral edema. +dorsal pedalis pulses bilaterally.    Motor Exam Upper Extremities   ? Myotome R L   Shoulder flexion C5 5 5   Elbow flexion C5 5 5   Wrist extension C6 5 5   Elbow extension C7 5 5   Finger flexion C8 " 5 5   Finger abduction T1 5 5     Motor Exam Lower Extremities    ? Myotome R L   Hip flexion L2 4 4   Knee extension L3 5 4   Ankle dorsiflexion L4 4 4   Toe extension L5 5 4   Ankle plantarflexion S1 4 4     Tone: No past density present, no clonus present    Recent Results (from the past 72 hour(s))   ACCU-CHEK GLUCOSE    Collection Time: 07/23/19 12:04 PM   Result Value Ref Range    Glucose - Accu-Ck 155 (H) 65 - 99 mg/dL   ACCU-CHEK GLUCOSE    Collection Time: 07/23/19  5:43 PM   Result Value Ref Range    Glucose - Accu-Ck 173 (H) 65 - 99 mg/dL   ACCU-CHEK GLUCOSE    Collection Time: 07/23/19  8:20 PM   Result Value Ref Range    Glucose - Accu-Ck 173 (H) 65 - 99 mg/dL   CBC WITH DIFFERENTIAL    Collection Time: 07/24/19  2:50 AM   Result Value Ref Range    WBC 10.1 4.8 - 10.8 K/uL    RBC 2.46 (L) 4.70 - 6.10 M/uL    Hemoglobin 8.2 (L) 14.0 - 18.0 g/dL    Hematocrit 24.0 (L) 42.0 - 52.0 %    MCV 95.5 81.4 - 97.8 fL    MCH 32.9 27.0 - 33.0 pg    MCHC 34.5 33.7 - 35.3 g/dL    RDW 50.2 (H) 35.9 - 50.0 fL    Platelet Count 165 164 - 446 K/uL    MPV 9.9 9.0 - 12.9 fL    Neutrophils-Polys 71.40 44.00 - 72.00 %    Lymphocytes 14.60 (L) 22.00 - 41.00 %    Monocytes 10.00 0.00 - 13.40 %    Eosinophils 3.00 0.00 - 6.90 %    Basophils 0.00 0.00 - 1.80 %    Immature Granulocytes 1.00 (H) 0.00 - 0.90 %    Nucleated RBC 0.00 /100 WBC    Neutrophils (Absolute) 7.18 1.82 - 7.42 K/uL    Lymphs (Absolute) 1.47 1.00 - 4.80 K/uL    Monos (Absolute) 1.00 (H) 0.00 - 0.85 K/uL    Eos (Absolute) 0.30 0.00 - 0.51 K/uL    Baso (Absolute) 0.00 0.00 - 0.12 K/uL    Immature Granulocytes (abs) 0.10 0.00 - 0.11 K/uL    NRBC (Absolute) 0.00 K/uL   ACCU-CHEK GLUCOSE    Collection Time: 07/24/19  6:19 AM   Result Value Ref Range    Glucose - Accu-Ck 146 (H) 65 - 99 mg/dL   ACCU-CHEK GLUCOSE    Collection Time: 07/24/19  8:02 AM   Result Value Ref Range    Glucose - Accu-Ck 199 (H) 65 - 99 mg/dL   ACCU-CHEK GLUCOSE    Collection Time: 07/24/19  11:38 AM   Result Value Ref Range    Glucose - Accu-Ck 173 (H) 65 - 99 mg/dL   ACCU-CHEK GLUCOSE    Collection Time: 07/24/19  5:54 PM   Result Value Ref Range    Glucose - Accu-Ck 164 (H) 65 - 99 mg/dL   ACCU-CHEK GLUCOSE    Collection Time: 07/24/19  8:45 PM   Result Value Ref Range    Glucose - Accu-Ck 169 (H) 65 - 99 mg/dL   CBC WITH DIFFERENTIAL    Collection Time: 07/25/19  3:04 AM   Result Value Ref Range    WBC 10.4 4.8 - 10.8 K/uL    RBC 2.50 (L) 4.70 - 6.10 M/uL    Hemoglobin 8.4 (L) 14.0 - 18.0 g/dL    Hematocrit 23.8 (L) 42.0 - 52.0 %    MCV 95.2 81.4 - 97.8 fL    MCH 33.6 (H) 27.0 - 33.0 pg    MCHC 35.3 33.7 - 35.3 g/dL    RDW 48.2 35.9 - 50.0 fL    Platelet Count 179 164 - 446 K/uL    MPV 9.9 9.0 - 12.9 fL    Neutrophils-Polys 72.30 (H) 44.00 - 72.00 %    Lymphocytes 12.90 (L) 22.00 - 41.00 %    Monocytes 10.90 0.00 - 13.40 %    Eosinophils 2.90 0.00 - 6.90 %    Basophils 0.10 0.00 - 1.80 %    Immature Granulocytes 0.90 0.00 - 0.90 %    Nucleated RBC 0.00 /100 WBC    Neutrophils (Absolute) 7.51 (H) 1.82 - 7.42 K/uL    Lymphs (Absolute) 1.34 1.00 - 4.80 K/uL    Monos (Absolute) 1.13 (H) 0.00 - 0.85 K/uL    Eos (Absolute) 0.30 0.00 - 0.51 K/uL    Baso (Absolute) 0.01 0.00 - 0.12 K/uL    Immature Granulocytes (abs) 0.09 0.00 - 0.11 K/uL    NRBC (Absolute) 0.00 K/uL   ACCU-CHEK GLUCOSE    Collection Time: 07/25/19  6:25 AM   Result Value Ref Range    Glucose - Accu-Ck 162 (H) 65 - 99 mg/dL   ACCU-CHEK GLUCOSE    Collection Time: 07/25/19  7:52 AM   Result Value Ref Range    Glucose - Accu-Ck 155 (H) 65 - 99 mg/dL   ACCU-CHEK GLUCOSE    Collection Time: 07/25/19 12:14 PM   Result Value Ref Range    Glucose - Accu-Ck 151 (H) 65 - 99 mg/dL   ACCU-CHEK GLUCOSE    Collection Time: 07/25/19  5:44 PM   Result Value Ref Range    Glucose - Accu-Ck 119 (H) 65 - 99 mg/dL   ACCU-CHEK GLUCOSE    Collection Time: 07/25/19  8:32 PM   Result Value Ref Range    Glucose - Accu-Ck 166 (H) 65 - 99 mg/dL   CBC with  Differential    Collection Time: 07/26/19  5:55 AM   Result Value Ref Range    WBC 10.4 4.8 - 10.8 K/uL    RBC 2.45 (L) 4.70 - 6.10 M/uL    Hemoglobin 8.0 (L) 14.0 - 18.0 g/dL    Hematocrit 23.8 (L) 42.0 - 52.0 %    MCV 97.1 81.4 - 97.8 fL    MCH 32.7 27.0 - 33.0 pg    MCHC 33.6 (L) 33.7 - 35.3 g/dL    RDW 48.7 35.9 - 50.0 fL    Platelet Count 194 164 - 446 K/uL    MPV 9.7 9.0 - 12.9 fL    Neutrophils-Polys 67.90 44.00 - 72.00 %    Lymphocytes 15.30 (L) 22.00 - 41.00 %    Monocytes 11.60 0.00 - 13.40 %    Eosinophils 3.40 0.00 - 6.90 %    Basophils 0.10 0.00 - 1.80 %    Immature Granulocytes 1.70 (H) 0.00 - 0.90 %    Nucleated RBC 0.00 /100 WBC    Neutrophils (Absolute) 7.07 1.82 - 7.42 K/uL    Lymphs (Absolute) 1.59 1.00 - 4.80 K/uL    Monos (Absolute) 1.21 (H) 0.00 - 0.85 K/uL    Eos (Absolute) 0.35 0.00 - 0.51 K/uL    Baso (Absolute) 0.01 0.00 - 0.12 K/uL    Immature Granulocytes (abs) 0.18 (H) 0.00 - 0.11 K/uL    NRBC (Absolute) 0.00 K/uL   Comp Metabolic Panel (CMP)    Collection Time: 07/26/19  5:55 AM   Result Value Ref Range    Sodium 130 (L) 135 - 145 mmol/L    Potassium 4.0 3.6 - 5.5 mmol/L    Chloride 96 96 - 112 mmol/L    Co2 25 20 - 33 mmol/L    Anion Gap 9.0 0.0 - 11.9    Glucose 148 (H) 65 - 99 mg/dL    Bun 16 8 - 22 mg/dL    Creatinine 1.02 0.50 - 1.40 mg/dL    Calcium 8.4 (L) 8.5 - 10.5 mg/dL    AST(SGOT) 15 12 - 45 U/L    ALT(SGPT) 14 2 - 50 U/L    Alkaline Phosphatase 36 30 - 99 U/L    Total Bilirubin 0.4 0.1 - 1.5 mg/dL    Albumin 3.2 3.2 - 4.9 g/dL    Total Protein 6.0 6.0 - 8.2 g/dL    Globulin 2.8 1.9 - 3.5 g/dL    A-G Ratio 1.1 g/dL   Lipid Profile (Lipid Panel)    Collection Time: 07/26/19  5:55 AM   Result Value Ref Range    Cholesterol,Tot 101 100 - 199 mg/dL    Triglycerides 95 0 - 149 mg/dL    HDL 24 (A) >=40 mg/dL    LDL 58 <100 mg/dL   Prealbumin    Collection Time: 07/26/19  5:55 AM   Result Value Ref Range    Pre-Albumin 16.0 (L) 18.0 - 38.0 mg/dL   Prothrombin time    Collection  Time: 07/26/19  5:55 AM   Result Value Ref Range    PT 13.5 12.0 - 14.6 sec    INR 1.01 0.87 - 1.13   TSH with Reflex to FT4    Collection Time: 07/26/19  5:55 AM   Result Value Ref Range    TSH 2.690 0.380 - 5.330 uIU/mL   Vitamin D, 25-hydroxy (blood)    Collection Time: 07/26/19  5:55 AM   Result Value Ref Range    25-Hydroxy   Vitamin D 25 65 30 - 100 ng/mL   ESTIMATED GFR    Collection Time: 07/26/19  5:55 AM   Result Value Ref Range    GFR If African American >60 >60 mL/min/1.73 m 2    GFR If Non African American >60 >60 mL/min/1.73 m 2       Current Facility-Administered Medications   Medication Frequency   • amLODIPine (NORVASC) tablet 10 mg DAILY   • atorvastatin (LIPITOR) tablet 20 mg Nightly   • vitamin D (cholecalciferol) tablet 4,000 Units DAILY AT NOON   • glucose 4 g chewable tablet 16 g PRN   • insulin glargine (LANTUS) injection 20 Units QAM   • lisinopril (PRINIVIL) tablet 40 mg DAILY   • loratadine (CLARITIN) tablet 10 mg DAILY   • omeprazole (PRILOSEC) capsule 20 mg DAILY   • spironolactone (ALDACTONE) tablet 25 mg DAILY   • terazosin (HYTRIN) capsule 5 mg BID   • verapamil ER (CALAN-SR) tablet 180 mg BID   • Pharmacy Consult Request ...Pain Management Review 1 Each PHARMACY TO DOSE   • Respiratory Care per Protocol Continuous RT   • acetaminophen (TYLENOL) tablet 1,000 mg TID   • oxyCODONE immediate-release (ROXICODONE) tablet 5 mg Q3HRS PRN   • oxyCODONE immediate release (ROXICODONE) tablet 10 mg Q3HRS PRN   • acetaminophen (TYLENOL) tablet 650 mg Q4HRS PRN   • insulin lispro (HUMALOG) injection 1-6 Units 4X/DAY ACHS    And   • glucose 4 g chewable tablet 16 g Q15 MIN PRN    And   • dextrose 50% (D50W) injection 50 mL Q15 MIN PRN   • therapeutic multivitamin-minerals (THERAGRAN-M) tablet 1 Tab DAILY WITH LUNCH   • artificial tears 1.4 % ophthalmic solution 1 Drop PRN   • benzocaine-menthol (CEPACOL) lozenge 1 Lozenge Q2HRS PRN   • mag hydrox-al hydrox-simeth (MAALOX PLUS ES or MYLANTA DS)  suspension 20 mL Q2HRS PRN   • ondansetron (ZOFRAN ODT) dispertab 4 mg 4X/DAY PRN    Or   • ondansetron (ZOFRAN) syringe/vial injection 4 mg 4X/DAY PRN   • traZODone (DESYREL) tablet 50 mg QHS PRN   • sodium chloride (OCEAN) 0.65 % nasal spray 2 Spray PRN   • calcium carbonate (TUMS) chewable tab 500 mg BID   • methocarbamol (ROBAXIN) tablet 750 mg 4X/DAY   • metFORMIN (GLUCOPHAGE) tablet 1,000 mg QDAY with Breakfast    And   • metFORMIN (GLUCOPHAGE) tablet 500 mg AC PM MEAL   • DULoxetine (CYMBALTA) capsule 30 mg DAILY       Orders Placed This Encounter   Procedures   • Diet Order Diabetic     Standing Status:   Standing     Number of Occurrences:   1     Order Specific Question:   Diet:     Answer:   Diabetic [3]       Assessment:  Active Hospital Problems    Diagnosis   • *T5 spinal cord injury (HCC)   • PVD (peripheral vascular disease) (HCC)   • Type 2 diabetes mellitus without complication, without long-term current use of insulin (HCC)   • Essential hypertension   • Dyslipidemia   • Obesity   • Neuropathic pain   • Neurogenic bladder   • Neurogenic bowel   • Hyponatremia   • 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HCC)   • Acute kidney injury (HCC)   • Vitamin D deficiency   • Anemia       Medical Decision Making and Plan:    T5 AIS D spinal cord injury: Status post T4-6 laminectomy and T3-7 posterior spinal fusion with resection of mass by Dr. Velasco on 7/20, preliminary pathology suggest plasmablastic plasmacytoma, significant improvement in lower extremity strength since surgical resection.  -No brace at this time  -Continue comprehensive acute inpatient rehabilitation    Plasmablastic plasmacytoma: Primary mass  - Final pathology is pending  - Follow-up with oncology and radiation oncology as outpatient    Neuropathic pain: Lower extremity pins-and-needles on admission  - Gabapentin 300 mg 3 times daily    Nociceptive pain: Acute postoperative pain, site of incision  - Oxycodone 5 mg - 10 mg as needed every 3  hours  -Tylenol 1000 mg 3 times daily  -Robaxin 750 mg 4 times a day  -Lidocaine patch x2 to either side of incision    Acute kidney injury: Creatinine elevated up to 1.26 on 7/22  -Creatinine 1.02 on admission    Blood loss anemia: Hemoglobin on 7/23 of 6.7, status post 1 unit packed red blood cell transfusion, hemoglobin of 8.0 on 7/20  - Plan for transfusion if hemoglobin is less than 7.0  -Holding Plavix which was originally started for peripheral vascular disease  -Holding Lovenox secondary to bleeding on 7/22  -Recheck CBC in a.m.    Hyponatremia: Sodium on admission of 130  -Consult hospitalist  - Check serum electrolytes and osmolarity in a.m., check urine electrolytes and osmolarity in a.m.    Hypertension: Consult hospitalist  -Norvasc 10 mg daily  -Lisinopril 40 mg daily  -Spironolactone 25 mg daily  - Terazosin 5 mg twice daily  -Verapamil 80 mg twice daily    Type 2 diabetes:  -Metformin 1000 mg every morning/500 mg every afternoon  -Lantus 20 units every morning  -Insulin sliding scale, moderate  - Consult hospitalist    BPH:  -Terazosin 5 mg twice daily    Anxiety/adjustment disorder:  -Cymbalta 30 mg daily, this will also aid and neuropathic pain  -Consult psychologist    Neurogenic bladder:  - voiding trial, if can't void in 6 hours or prn check pvr and if >500cc then ICP,if able to void then check PVR, if PVR is >200cc then ICP    Neurogenic bowel:  -BTP including senna 2 tablets q. noon, Colace 200 mg twice daily, Magic bullet suppository with digital stimulation every afternoon    Vitamin D deficiency: Vitamin D level on admission of 65, toxic level  - Discontinue cholecalciferol    DVT prophylaxis: Lovenox held secondary to bleeding at site of incision.  -SCDs when in bed  -Discussed with neurosurgery on when to restart Lovenox    Total time:  38 minutes.  I spent greater than 50% of the time for patient care and coordination on this date, including unit/floor time, and face-to-face time with  the patient as per assessment and plan above including anemia management with rechecking in a.m., holding of Lovenox and Plavix, hyponatremia evaluation with labs in the morning, vitamin D toxicity.    Adelfo Aquino D.O.

## 2019-07-26 NOTE — THERAPY
"Occupational Therapy   Initial Evaluation     Patient Name: Ian Laird  Age:  69 y.o., Sex:  male  Medical Record #: [id number]  Today's Date: 7/26/2019     Subjective    \"I haven't had a shower in weeks, this is a luxury!\"     Objective     07/26/19 0701   Prior Living Situation   Prior Services None;Home-Independent  (spouse assist with stairs and IADLs prior to surgery)   Housing / Facility 1 Story House  (in [place])   Steps Into Home 2   Steps In Home 0   Rail None   Elevator No   Bathroom Set up Walk In Shower;Grab Bars;Shower Chair  (shower with 4\" lip)   Equipment Owned Front-Wheel Walker;4-Wheel Walker;Single Point Cane;Wheelchair;Tub / Shower Seat;Grab Bar(s) In Tub / Shower   Lives with - Patient's Self Care Capacity Spouse   Comments pt reports that spouse was providing assist and he was using FWW and w/c prior to surgery as his function was declining.   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)   Leisure Interests Other (Comments)  (has 5 goats, tractor, farming)   IRF-ZEUS:  Prior Functioning: Everyday Activities   Self Care Independent   Indoor Mobility (Ambulation) Independent   Stairs Independent  (needed some assist prior to surgery)   Functional Cognition Independent   Prior Device Use None of the given options  (using SPC, FWW and w/c prior to surgery)   Cognition    Orientation Level Oriented x 4   Level of Consciousness Alert   IRF-ZEUS:  Cognitive " Pattern Assessment   Cognitive Pattern Assessment Used Staff Assessment   IRF-ZEUS:  Staff Assessment for Mental Status   Memory/Recall Ability (3-Day Assessment Period) Current season;Location of own room;Staff names and faces;Recognizes appropriate healthcare setting   Vision Screen   Vision Not tested  (wears glasses, reports no changes/difficulty)   Passive ROM Upper Body   Passive ROM Upper Body WDL   Active ROM Upper Body   Active ROM Upper Body  WDL   Dominant Hand Right   Strength Upper Body   Upper Body Strength  WDL   Sensation Upper Body   Upper Extremity Sensation  WDL   Comments reports impaired BLE sensation   Upper Body Muscle Tone   Upper Body Muscle Tone  WDL   Comments no spasticity noted.   Balance Assessment   Sitting Balance (Static) Fair   Sitting Balance (Dynamic) Fair -   Standing Balance (Static) Fair -   Standing Balance (Dynamic) Poor +   Weight Shift Sitting Fair   Weight Shift Standing Poor   Comments during ADLs and bathroom transfers.   Bed Mobility    Supine to Sit Moderate Assist   Sit to Stand Moderate Assist   Scooting Minimal Assist   Rolling Minimal Assist to Rt.   Coordination Upper Body   Coordination WDL   Comments intact for finger to nose and thumb/finger opposition bilaterally   IRF-ZEUS:  Eating   Assistance Needed Supervision;Set-up / clean-up   Strong Physical Assistance Level No physical assistance or only touching/steadying assist   CARE Score 4   Discharge Goal:  Assistance Needed Independent   Discharge Goal:  Physical Assistance Level No physical assistance or only touching/steadying assist   Discharge Goal:  Score 6   IRF-ZEUS:  Oral Hygiene   Assistance Needed Set-up / clean-up;Supervision   Physical Assistance Level No physical assistance   CARE Score 4   Discharge Goal:  Assistance Needed Independent   Discharge Goal:  Physical Assistance Level No physical assistance or only touching/steadying assist   Discharge Goal:  Score 6   IRF-ZEUS:  Shower/Bathe Self    Assistance Needed Set-up / clean-up;Supervision;Verbal cues;Physical assistance   Physical Assistance Level 25%-49%   CARE Score 3   Discharge Goal:  Assistance Needed Independent;Adaptive equipment   Discharge Goal:  Physical Assistance Level No physical assistance or only touching/steadying assist   Discharge Goal Score 6   IRF-ZEUS:  Upper Body Dressing   Assistance Needed Set-up / clean-up;Supervision   Physical Assistance Level No physical assistance or only touching/steadying assist   CARE Score 4   Discharge Goal:  Assistance Needed Independent   Discharge Goal:  Physical Assistance Level No physical assistance or only touching/steadying assist   Dischage Goal:  Score 6   IRF-ZEUS:  Lower Body Dressing   Assistance Needed Set-up / clean-up;Supervision;Verbal cues;Physical assistance   Physical Assistance Level 50%-74%   CARE Score 2   Discharge Goal:  Assistance Needed Independent;Adaptive equipment   Discharge Goal:  Physical Assistance Level No physical assistance or only touching/steadying assist   Discharge Goal:  Score 6   IRF ZEUS:  Putting On/Taking Off Footwear   Assistance Needed Physical assistance   Physical Assistance Level Total assistance   CARE Score 1   Discharge Goal:  Assistance Needed Independent;Adaptive equipment   Discharge Goal:  Physical Assistance Level No physical assistance or only touching/steadying assist   Discharge Goal:  Score 6   IRF-ZEUS:  Toileting Hygiene   Assistance Needed Set-up / clean-up;Supervision;Verbal cues;Physical assistance   Physical Assistance Level 25%-49%   CARE Score 3   Discharge Goal:  Assistance Needed Independent;Adaptive equipment   Discharge Goal:  Physical Assistance Level No physical assistance or only touching/steadying assist   Discharge Goal:  Score 6   IRF-ZEUS:  Toilet Transfer   Assistance Needed Supervision;Verbal cues;Physical assistance   Physical Assistance Level 25%-49%   CARE Score 3   Discharge Goal:  Assistance Needed Independent;Adaptive  equipment   Discharge Goal:  Physical Assistance Level No physical assistance or only touching/steadying assist   Discahrge Goal:  Score 6   IRF-ZEUS:  Hearing, Speech, and Vision   Expression of Ideas and Wants 4   Understanding Verbal and Non-Verbal Content 4   Problem List   Problem List Decreased Active Daily Living Skills;Decreased Homemaking Skills;Decreased Functional Mobility;Decreased Activity Tolerance;Impaired Postural Control / Balance;Limited Knowledge of Post Op Precautions;Other (Comments)  (spinal prec, BLE weakness and impaired sensation)   Precautions   Precautions Fall Risk;Spinal / Back Precautions ;Other (See Comments)   Comments no brace, monitor for orthostatic BP   Current Discharge Plan   Current Discharge Plan Return to Prior Living Situation   Benefit    Therapy Benefit Patient Would Benefit from Inpatient Rehab Occupational Therapy to Maximize Coffee with ADLs, IADLs and Functional Mobility.   Interdisciplinary Plan of Care Collaboration   IDT Collaboration with  Nursing   Patient Position at End of Therapy Seated;Other (Comments)  (cafeteria for breakfast)   Collaboration Comments RN for medications   Equipment Needs   Assistive Device / DME Parallel Bars;Front-Wheel Walker;Wheelchair;Shower Chair;Grab Bars In Shower / Tub;Grab Bars By Toilet   Adaptive Equipment Reacher;Sock Aide;Dressing Stick;Long Handled Shoe Horn;Elastic Shoe Laces;Long Handled Sponge   OT Total Time Spent   OT Individual Total Time Spent (Mins) 60   OT Charge Group   Charges Yes   OT Self Care / ADL 1   OT Evaluation OT Evaluation Mod       FIM Eating Score:  5 - Standby Prompting/Supervision or Set-up  Eating Description:  Set-up of equipment or meal/tube feeding    FIM Grooming Score:  5 - Standby Prompting/Supervision or Set-up  Grooming Description:  Supervision for safety, Set-up of equipment (set up/supv for oral care and combing hair seated at sink.)    FIM Bathing Score:  3 - Moderate  Assistance  Bathing Description:  Grab bar, Tub bench, Hand held shower, Increased time, Verbal cueing, Supervision for safety, Set-up of equipment (assist to wash distal BLEs/feet; CGA/GB for standing balance and assist for thouroughness with washing buttocks.)    FIM Upper Body Dressin - Standby Prompting/Supervision or Set-up  Upper Body Dressing Description:  Increased time, Supervision for safety, Set-up of equipment (SBA/set up to don/doff pullover shirt.)    FIM Lower Body Dressing Score:  2 - Max Assistance  Lower Body Dressing Description:  Increased time, Supervision for safety, Verbal cueing, Set-up of equipment (assist to thread BLEs into underwear and pants; GB/CGA and min A to pull up pants; total A to don tino hose and tread socks.)    FIM Toiletin - Max Assistance  Toileting Description:  Grab bar, Increased time, Supervision for safety, Verbal cueing, Set-up of equipment    FIM Toilet Transfer Score:  3 - Moderate Assistance  Toilet Transfer Description:  Grab bar, Increased time, Supervision for safety, Verbal cueing, Set-up of equipment, Requires lift (w/c<>toilet SPT with GB and assist for lift.)    FIM Tub/Shower Transfers Score:  3 - Moderate Assistance  Tub/Shower Transfers Description:  Grab bar, Increased time, Supervision for safety, Verbal cueing, Set-up of equipment, Requires lift (w/c<>tub bench SPT with GB and assist for lift.)      Assessment  Patient is 69 y.o. male with a diagnosis of T4-T6 laminectomy and T3-T7 posterior spinal fusion with resection of epidural mass after worsening mid back pain for the past 6 weeks, bilateral lower extremity weakness and numbness.  Per H&P pt has PMH significant for type 2 diabetes, dyslipidemia, hypertension, melanoma x2 status post resection. Pt lives in a [facility] with [facility] in [place] with his spouse. Pt reports that he was independent with ADLs, IADLs, functional mobility and driving, and very [place]. Just prior to  his surgery, his spouse was providing assist and he was using a w/c and FWW as his function was declining. During OT evaluation pt completed ADLs with max A to supervision, and transfers from w/c level with mod A. Pt presents with spinal prec, BLE weakness, impaired BLE sensation, impaired motor planning, and limited endurance. Pt will benefit from skilled inpt OT services to address these deficits to return to independent living with the support of his family.       Plan  Recommend Occupational Therapy  minutes per day 5-7 days per week for 10-14 days for the following treatments:  OT Group Therapy, OT Self Care/ADL, OT Community Reintegration, OT Manual Ther Technique, OT Neuro Re-Ed/Balance, OT Sensory Int Techniques, OT Therapeutic Activity, OT Evaluation and OT Therapeutic Exercise.    Goals:  Long term and short term goals have been discussed with patient and they are in agreement.         Occupational Therapy Goals           Problem: Bathing     Dates: Start: 07/26/19       Goal: STG-Within one week, patient will bathe     Dates: Start: 07/26/19       Description: 1) Individualized Goal:  Supervision with use of AE/DME prn to maintain spinal prec.  2) Interventions:  OT Group Therapy, OT Self Care/ADL, OT Community Reintegration, OT Manual Ther Technique, OT Neuro Re-Ed/Balance, OT Sensory Int Techniques, OT Therapeutic Activity, OT Evaluation and OT Therapeutic Exercise               Problem: Dressing     Dates: Start: 07/26/19       Goal: STG-Within one week, patient will dress LB     Dates: Start: 07/26/19       Description: 1) Individualized Goal:  supervision with use of AE prn to maintain spinal prec.  2) Interventions:  OT Group Therapy, OT Self Care/ADL, OT Community Reintegration, OT Manual Ther Technique, OT Neuro Re-Ed/Balance, OT Sensory Int Techniques, OT Therapeutic Activity, OT Evaluation and OT Therapeutic Exercise               Problem: Functional Transfers     Dates: Start: 07/26/19        Goal: STG-Within one week, patient will     Dates: Start: 07/26/19       Description: 1) Individualized Goal:  Transfer to toilet and shower at supervision level with AE/DME prn.  2) Interventions:  OT Group Therapy, OT Self Care/ADL, OT Community Reintegration, OT Manual Ther Technique, OT Neuro Re-Ed/Balance, OT Sensory Int Techniques, OT Therapeutic Activity, OT Evaluation and OT Therapeutic Exercise               Problem: IADL's     Dates: Start: 07/26/19       Goal: STG-Within one week, patient will access kitchen area     Dates: Start: 07/26/19       Description: 1) Individualized Goal:  Supervision with AE/DME prn.  2) Interventions:  OT Group Therapy, OT Self Care/ADL, OT Community Reintegration, OT Manual Ther Technique, OT Neuro Re-Ed/Balance, OT Sensory Int Techniques, OT Therapeutic Activity, OT Evaluation and OT Therapeutic Exercise               Problem: OT Long Term Goals     Dates: Start: 07/26/19       Goal: LTG-By discharge, patient will complete basic self care tasks     Dates: Start: 07/26/19       Description: 1) Individualized Goal:  Mod I with use of AE/AD/DME prn.  2) Interventions:  OT Group Therapy, OT Self Care/ADL, OT Community Reintegration, OT Manual Ther Technique, OT Neuro Re-Ed/Balance, OT Sensory Int Techniques, OT Therapeutic Activity, OT Evaluation and OT Therapeutic Exercise             Goal: LTG-By discharge, patient will perform bathroom transfers     Dates: Start: 07/26/19       Description: 1) Individualized Goal:  Mod I with use of AD/DME prn.  2) Interventions:  OT Group Therapy, OT Self Care/ADL, OT Community Reintegration, OT Manual Ther Technique, OT Neuro Re-Ed/Balance, OT Sensory Int Techniques, OT Therapeutic Activity, OT Evaluation and OT Therapeutic Exercise             Goal: LTG-By discharge, patient will complete basic home management     Dates: Start: 07/26/19       Description: 1) Individualized Goal:  Mod I with use of AE/AD/DME prn.  2) Interventions:  OT  Group Therapy, OT Self Care/ADL, OT Community Reintegration, OT Manual Ther Technique, OT Neuro Re-Ed/Balance, OT Sensory Int Techniques, OT Therapeutic Activity, OT Evaluation and OT Therapeutic Exercise

## 2019-07-26 NOTE — PROGRESS NOTES
Received shift report and assumed care of patient.  Patient awake, calm and stable, taking shower with OT at this time. Will continue to monitor.

## 2019-07-27 PROBLEM — R14.0 ABDOMINAL DISTENSION: Status: ACTIVE | Noted: 2019-07-27

## 2019-07-27 LAB
ANION GAP SERPL CALC-SCNC: 10 MMOL/L (ref 0–11.9)
BASOPHILS # BLD AUTO: 0.1 % (ref 0–1.8)
BASOPHILS # BLD: 0.01 K/UL (ref 0–0.12)
BUN SERPL-MCNC: 13 MG/DL (ref 8–22)
CALCIUM SERPL-MCNC: 8.6 MG/DL (ref 8.5–10.5)
CHLORIDE SERPL-SCNC: 96 MMOL/L (ref 96–112)
CO2 SERPL-SCNC: 25 MMOL/L (ref 20–33)
CREAT SERPL-MCNC: 0.98 MG/DL (ref 0.5–1.4)
EOSINOPHIL # BLD AUTO: 0.24 K/UL (ref 0–0.51)
EOSINOPHIL NFR BLD: 2.9 % (ref 0–6.9)
ERYTHROCYTE [DISTWIDTH] IN BLOOD BY AUTOMATED COUNT: 46.9 FL (ref 35.9–50)
GLUCOSE BLD-MCNC: 112 MG/DL (ref 65–99)
GLUCOSE BLD-MCNC: 121 MG/DL (ref 65–99)
GLUCOSE BLD-MCNC: 122 MG/DL (ref 65–99)
GLUCOSE BLD-MCNC: 124 MG/DL (ref 65–99)
GLUCOSE BLD-MCNC: 126 MG/DL (ref 65–99)
GLUCOSE SERPL-MCNC: 122 MG/DL (ref 65–99)
HCT VFR BLD AUTO: 25 % (ref 42–52)
HGB BLD-MCNC: 8.4 G/DL (ref 14–18)
IMM GRANULOCYTES # BLD AUTO: 0.13 K/UL (ref 0–0.11)
IMM GRANULOCYTES NFR BLD AUTO: 1.6 % (ref 0–0.9)
LYMPHOCYTES # BLD AUTO: 1.37 K/UL (ref 1–4.8)
LYMPHOCYTES NFR BLD: 16.6 % (ref 22–41)
MCH RBC QN AUTO: 32.4 PG (ref 27–33)
MCHC RBC AUTO-ENTMCNC: 33.6 G/DL (ref 33.7–35.3)
MCV RBC AUTO: 96.5 FL (ref 81.4–97.8)
MONOCYTES # BLD AUTO: 0.83 K/UL (ref 0–0.85)
MONOCYTES NFR BLD AUTO: 10.1 % (ref 0–13.4)
NEUTROPHILS # BLD AUTO: 5.67 K/UL (ref 1.82–7.42)
NEUTROPHILS NFR BLD: 68.7 % (ref 44–72)
NRBC # BLD AUTO: 0 K/UL
NRBC BLD-RTO: 0 /100 WBC
OSMOLALITY SERPL: 276 MOSM/KG H2O (ref 278–298)
PLATELET # BLD AUTO: 221 K/UL (ref 164–446)
PMV BLD AUTO: 9.6 FL (ref 9–12.9)
POTASSIUM SERPL-SCNC: 3.8 MMOL/L (ref 3.6–5.5)
RBC # BLD AUTO: 2.59 M/UL (ref 4.7–6.1)
SODIUM SERPL-SCNC: 131 MMOL/L (ref 135–145)
WBC # BLD AUTO: 8.3 K/UL (ref 4.8–10.8)

## 2019-07-27 PROCEDURE — 99233 SBSQ HOSP IP/OBS HIGH 50: CPT | Performed by: HOSPITALIST

## 2019-07-27 PROCEDURE — 80048 BASIC METABOLIC PNL TOTAL CA: CPT

## 2019-07-27 PROCEDURE — 85025 COMPLETE CBC W/AUTO DIFF WBC: CPT

## 2019-07-27 PROCEDURE — 82962 GLUCOSE BLOOD TEST: CPT

## 2019-07-27 PROCEDURE — G0515 COGNITIVE SKILLS DEVELOPMENT: HCPCS

## 2019-07-27 PROCEDURE — A9270 NON-COVERED ITEM OR SERVICE: HCPCS | Performed by: HOSPITALIST

## 2019-07-27 PROCEDURE — 83930 ASSAY OF BLOOD OSMOLALITY: CPT

## 2019-07-27 PROCEDURE — 700101 HCHG RX REV CODE 250: Performed by: PHYSICAL MEDICINE & REHABILITATION

## 2019-07-27 PROCEDURE — 700102 HCHG RX REV CODE 250 W/ 637 OVERRIDE(OP): Performed by: HOSPITALIST

## 2019-07-27 PROCEDURE — 770010 HCHG ROOM/CARE - REHAB SEMI PRIVAT*

## 2019-07-27 PROCEDURE — 97112 NEUROMUSCULAR REEDUCATION: CPT

## 2019-07-27 PROCEDURE — A9270 NON-COVERED ITEM OR SERVICE: HCPCS | Performed by: PHYSICAL MEDICINE & REHABILITATION

## 2019-07-27 PROCEDURE — 97530 THERAPEUTIC ACTIVITIES: CPT

## 2019-07-27 PROCEDURE — 36415 COLL VENOUS BLD VENIPUNCTURE: CPT

## 2019-07-27 PROCEDURE — 99232 SBSQ HOSP IP/OBS MODERATE 35: CPT | Performed by: PHYSICAL MEDICINE & REHABILITATION

## 2019-07-27 PROCEDURE — 700102 HCHG RX REV CODE 250 W/ 637 OVERRIDE(OP): Performed by: PHYSICAL MEDICINE & REHABILITATION

## 2019-07-27 RX ORDER — SIMETHICONE 80 MG
80 TABLET,CHEWABLE ORAL
Status: DISCONTINUED | OUTPATIENT
Start: 2019-07-27 | End: 2019-07-30

## 2019-07-27 RX ADMIN — TERAZOSIN HYDROCHLORIDE 5 MG: 5 CAPSULE ORAL at 05:49

## 2019-07-27 RX ADMIN — METHOCARBAMOL 750 MG: 750 TABLET, FILM COATED ORAL at 08:17

## 2019-07-27 RX ADMIN — SIMETHICONE CHEW TAB 80 MG 80 MG: 80 TABLET ORAL at 20:27

## 2019-07-27 RX ADMIN — TERAZOSIN HYDROCHLORIDE 5 MG: 5 CAPSULE ORAL at 17:51

## 2019-07-27 RX ADMIN — LIDOCAINE 2 PATCH: 50 PATCH CUTANEOUS at 08:19

## 2019-07-27 RX ADMIN — METHOCARBAMOL 750 MG: 750 TABLET, FILM COATED ORAL at 11:52

## 2019-07-27 RX ADMIN — ATORVASTATIN CALCIUM 20 MG: 10 TABLET, FILM COATED ORAL at 20:27

## 2019-07-27 RX ADMIN — MULTIPLE VITAMINS W/ MINERALS TAB 1 TABLET: TAB at 11:52

## 2019-07-27 RX ADMIN — GABAPENTIN 300 MG: 300 CAPSULE ORAL at 08:17

## 2019-07-27 RX ADMIN — ACETAMINOPHEN 1000 MG: 500 TABLET, FILM COATED ORAL at 15:22

## 2019-07-27 RX ADMIN — CALCIUM CARBONATE (ANTACID) CHEW TAB 500 MG 500 MG: 500 CHEW TAB at 08:17

## 2019-07-27 RX ADMIN — METFORMIN HYDROCHLORIDE 1000 MG: 500 TABLET, FILM COATED ORAL at 17:51

## 2019-07-27 RX ADMIN — ACETAMINOPHEN 1000 MG: 500 TABLET, FILM COATED ORAL at 20:27

## 2019-07-27 RX ADMIN — INSULIN GLARGINE 18 UNITS: 100 INJECTION, SOLUTION SUBCUTANEOUS at 07:18

## 2019-07-27 RX ADMIN — OMEPRAZOLE 20 MG: 20 CAPSULE, DELAYED RELEASE ORAL at 08:17

## 2019-07-27 RX ADMIN — GABAPENTIN 300 MG: 300 CAPSULE ORAL at 15:22

## 2019-07-27 RX ADMIN — SPIRONOLACTONE 25 MG: 25 TABLET ORAL at 08:17

## 2019-07-27 RX ADMIN — AMLODIPINE BESYLATE 10 MG: 5 TABLET ORAL at 08:16

## 2019-07-27 RX ADMIN — METFORMIN HYDROCHLORIDE 1000 MG: 500 TABLET, FILM COATED ORAL at 08:17

## 2019-07-27 RX ADMIN — GABAPENTIN 300 MG: 300 CAPSULE ORAL at 20:27

## 2019-07-27 RX ADMIN — LISINOPRIL 40 MG: 20 TABLET ORAL at 08:17

## 2019-07-27 RX ADMIN — METHOCARBAMOL 750 MG: 750 TABLET, FILM COATED ORAL at 17:52

## 2019-07-27 RX ADMIN — SIMETHICONE CHEW TAB 80 MG 80 MG: 80 TABLET ORAL at 17:51

## 2019-07-27 RX ADMIN — DULOXETINE HYDROCHLORIDE 30 MG: 30 CAPSULE, DELAYED RELEASE ORAL at 08:17

## 2019-07-27 RX ADMIN — ACETAMINOPHEN 1000 MG: 500 TABLET, FILM COATED ORAL at 08:17

## 2019-07-27 RX ADMIN — VERAPAMIL HYDROCHLORIDE 180 MG: 180 TABLET, FILM COATED, EXTENDED RELEASE ORAL at 20:27

## 2019-07-27 RX ADMIN — VERAPAMIL HYDROCHLORIDE 180 MG: 180 TABLET, FILM COATED, EXTENDED RELEASE ORAL at 08:17

## 2019-07-27 RX ADMIN — METHOCARBAMOL 750 MG: 750 TABLET, FILM COATED ORAL at 20:27

## 2019-07-27 RX ADMIN — LORATADINE 10 MG: 10 TABLET ORAL at 08:17

## 2019-07-27 RX ADMIN — CALCIUM CARBONATE (ANTACID) CHEW TAB 500 MG 500 MG: 500 CHEW TAB at 20:28

## 2019-07-27 ASSESSMENT — ENCOUNTER SYMPTOMS
CONSTIPATION: 1
VOMITING: 0
COUGH: 0
BACK PAIN: 1
FOCAL WEAKNESS: 1
SHORTNESS OF BREATH: 0
NAUSEA: 0
PALPITATIONS: 0
ABDOMINAL PAIN: 0
EYES NEGATIVE: 1
CHILLS: 0
FEVER: 0

## 2019-07-27 NOTE — CARE PLAN
Problem: Bowel/Gastric:  Goal: Normal bowel function is maintained or improved    Intervention: Educate patient and significant other/support system about diet, fluid intake, medications and activity to promote bowel function  Patient educated regarding diet, fluids, medications and mobilization to maximize potential for regular bowel movements. Patient engages in education and verbalizes understanding.

## 2019-07-27 NOTE — PROGRESS NOTES
"Rehab Progress Note     Date of Service: 7/27/2019  Chief Complaint: follow up SCI    Interval Events (Subjective)    Patient seen and examined in the therapy gym today.  He just did a transfer over to the mat.  He denies any side effects or increased fatigue noted from the gabapentin.  He is urinating without any issues and has moved his bowels today.    Objective:  VITAL SIGNS: /66   Pulse 82   Temp 36.7 °C (98.1 °F) (Oral)   Resp 16   Ht 1.778 m (5' 10\")   Wt 100.8 kg (222 lb 4.8 oz)   SpO2 96%   BMI 31.90 kg/m²   Gen: alert, no apparent distress  CV: regular rate and rhythm, no murmurs, no peripheral edema  Resp: clear to ascultation bilaterally, normal respiratory effort  GI: soft, non-tender abdomen, bowel sounds present    Recent Results (from the past 72 hour(s))   ACCU-CHEK GLUCOSE    Collection Time: 07/24/19 11:38 AM   Result Value Ref Range    Glucose - Accu-Ck 173 (H) 65 - 99 mg/dL   ACCU-CHEK GLUCOSE    Collection Time: 07/24/19  5:54 PM   Result Value Ref Range    Glucose - Accu-Ck 164 (H) 65 - 99 mg/dL   ACCU-CHEK GLUCOSE    Collection Time: 07/24/19  8:45 PM   Result Value Ref Range    Glucose - Accu-Ck 169 (H) 65 - 99 mg/dL   CBC WITH DIFFERENTIAL    Collection Time: 07/25/19  3:04 AM   Result Value Ref Range    WBC 10.4 4.8 - 10.8 K/uL    RBC 2.50 (L) 4.70 - 6.10 M/uL    Hemoglobin 8.4 (L) 14.0 - 18.0 g/dL    Hematocrit 23.8 (L) 42.0 - 52.0 %    MCV 95.2 81.4 - 97.8 fL    MCH 33.6 (H) 27.0 - 33.0 pg    MCHC 35.3 33.7 - 35.3 g/dL    RDW 48.2 35.9 - 50.0 fL    Platelet Count 179 164 - 446 K/uL    MPV 9.9 9.0 - 12.9 fL    Neutrophils-Polys 72.30 (H) 44.00 - 72.00 %    Lymphocytes 12.90 (L) 22.00 - 41.00 %    Monocytes 10.90 0.00 - 13.40 %    Eosinophils 2.90 0.00 - 6.90 %    Basophils 0.10 0.00 - 1.80 %    Immature Granulocytes 0.90 0.00 - 0.90 %    Nucleated RBC 0.00 /100 WBC    Neutrophils (Absolute) 7.51 (H) 1.82 - 7.42 K/uL    Lymphs (Absolute) 1.34 1.00 - 4.80 K/uL    Monos " (Absolute) 1.13 (H) 0.00 - 0.85 K/uL    Eos (Absolute) 0.30 0.00 - 0.51 K/uL    Baso (Absolute) 0.01 0.00 - 0.12 K/uL    Immature Granulocytes (abs) 0.09 0.00 - 0.11 K/uL    NRBC (Absolute) 0.00 K/uL   ACCU-CHEK GLUCOSE    Collection Time: 07/25/19  6:25 AM   Result Value Ref Range    Glucose - Accu-Ck 162 (H) 65 - 99 mg/dL   ACCU-CHEK GLUCOSE    Collection Time: 07/25/19  7:52 AM   Result Value Ref Range    Glucose - Accu-Ck 155 (H) 65 - 99 mg/dL   ACCU-CHEK GLUCOSE    Collection Time: 07/25/19 12:14 PM   Result Value Ref Range    Glucose - Accu-Ck 151 (H) 65 - 99 mg/dL   ACCU-CHEK GLUCOSE    Collection Time: 07/25/19  5:44 PM   Result Value Ref Range    Glucose - Accu-Ck 119 (H) 65 - 99 mg/dL   ACCU-CHEK GLUCOSE    Collection Time: 07/25/19  8:32 PM   Result Value Ref Range    Glucose - Accu-Ck 166 (H) 65 - 99 mg/dL   CBC with Differential    Collection Time: 07/26/19  5:55 AM   Result Value Ref Range    WBC 10.4 4.8 - 10.8 K/uL    RBC 2.45 (L) 4.70 - 6.10 M/uL    Hemoglobin 8.0 (L) 14.0 - 18.0 g/dL    Hematocrit 23.8 (L) 42.0 - 52.0 %    MCV 97.1 81.4 - 97.8 fL    MCH 32.7 27.0 - 33.0 pg    MCHC 33.6 (L) 33.7 - 35.3 g/dL    RDW 48.7 35.9 - 50.0 fL    Platelet Count 194 164 - 446 K/uL    MPV 9.7 9.0 - 12.9 fL    Neutrophils-Polys 67.90 44.00 - 72.00 %    Lymphocytes 15.30 (L) 22.00 - 41.00 %    Monocytes 11.60 0.00 - 13.40 %    Eosinophils 3.40 0.00 - 6.90 %    Basophils 0.10 0.00 - 1.80 %    Immature Granulocytes 1.70 (H) 0.00 - 0.90 %    Nucleated RBC 0.00 /100 WBC    Neutrophils (Absolute) 7.07 1.82 - 7.42 K/uL    Lymphs (Absolute) 1.59 1.00 - 4.80 K/uL    Monos (Absolute) 1.21 (H) 0.00 - 0.85 K/uL    Eos (Absolute) 0.35 0.00 - 0.51 K/uL    Baso (Absolute) 0.01 0.00 - 0.12 K/uL    Immature Granulocytes (abs) 0.18 (H) 0.00 - 0.11 K/uL    NRBC (Absolute) 0.00 K/uL   Comp Metabolic Panel (CMP)    Collection Time: 07/26/19  5:55 AM   Result Value Ref Range    Sodium 130 (L) 135 - 145 mmol/L    Potassium 4.0 3.6 -  5.5 mmol/L    Chloride 96 96 - 112 mmol/L    Co2 25 20 - 33 mmol/L    Anion Gap 9.0 0.0 - 11.9    Glucose 148 (H) 65 - 99 mg/dL    Bun 16 8 - 22 mg/dL    Creatinine 1.02 0.50 - 1.40 mg/dL    Calcium 8.4 (L) 8.5 - 10.5 mg/dL    AST(SGOT) 15 12 - 45 U/L    ALT(SGPT) 14 2 - 50 U/L    Alkaline Phosphatase 36 30 - 99 U/L    Total Bilirubin 0.4 0.1 - 1.5 mg/dL    Albumin 3.2 3.2 - 4.9 g/dL    Total Protein 6.0 6.0 - 8.2 g/dL    Globulin 2.8 1.9 - 3.5 g/dL    A-G Ratio 1.1 g/dL   HEMOGLOBIN A1C    Collection Time: 07/26/19  5:55 AM   Result Value Ref Range    Glycohemoglobin 6.8 (H) 0.0 - 5.6 %    Est Avg Glucose 148 mg/dL   Lipid Profile (Lipid Panel)    Collection Time: 07/26/19  5:55 AM   Result Value Ref Range    Cholesterol,Tot 101 100 - 199 mg/dL    Triglycerides 95 0 - 149 mg/dL    HDL 24 (A) >=40 mg/dL    LDL 58 <100 mg/dL   Prealbumin    Collection Time: 07/26/19  5:55 AM   Result Value Ref Range    Pre-Albumin 16.0 (L) 18.0 - 38.0 mg/dL   Prothrombin time    Collection Time: 07/26/19  5:55 AM   Result Value Ref Range    PT 13.5 12.0 - 14.6 sec    INR 1.01 0.87 - 1.13   TSH with Reflex to FT4    Collection Time: 07/26/19  5:55 AM   Result Value Ref Range    TSH 2.690 0.380 - 5.330 uIU/mL   Vitamin D, 25-hydroxy (blood)    Collection Time: 07/26/19  5:55 AM   Result Value Ref Range    25-Hydroxy   Vitamin D 25 65 30 - 100 ng/mL   ESTIMATED GFR    Collection Time: 07/26/19  5:55 AM   Result Value Ref Range    GFR If African American >60 >60 mL/min/1.73 m 2    GFR If Non African American >60 >60 mL/min/1.73 m 2   ACCU-CHEK GLUCOSE    Collection Time: 07/26/19  8:00 AM   Result Value Ref Range    Glucose - Accu-Ck 153 (H) 65 - 99 mg/dL   ACCU-CHEK GLUCOSE    Collection Time: 07/26/19 11:18 AM   Result Value Ref Range    Glucose - Accu-Ck 117 (H) 65 - 99 mg/dL   URINE CREATININE RANDOM    Collection Time: 07/26/19  3:35 PM   Result Value Ref Range    Creatinine, Random Urine 70.10 mg/dL   URINE SODIUM RANDOM     Collection Time: 07/26/19  3:35 PM   Result Value Ref Range    Sodium, Urine -per volume 44 mmol/L   OSMOLALITY URINE    Collection Time: 07/26/19  3:35 PM   Result Value Ref Range    Osmolality Urine 299 (L) 300 - 900 mOsm/kg H2O   ACCU-CHEK GLUCOSE    Collection Time: 07/26/19  5:16 PM   Result Value Ref Range    Glucose - Accu-Ck 122 (H) 65 - 99 mg/dL   ACCU-CHEK GLUCOSE    Collection Time: 07/26/19  8:12 PM   Result Value Ref Range    Glucose - Accu-Ck 179 (H) 65 - 99 mg/dL   Basic Metabolic Panel    Collection Time: 07/27/19  5:59 AM   Result Value Ref Range    Sodium 131 (L) 135 - 145 mmol/L    Potassium 3.8 3.6 - 5.5 mmol/L    Chloride 96 96 - 112 mmol/L    Co2 25 20 - 33 mmol/L    Glucose 122 (H) 65 - 99 mg/dL    Bun 13 8 - 22 mg/dL    Creatinine 0.98 0.50 - 1.40 mg/dL    Calcium 8.6 8.5 - 10.5 mg/dL    Anion Gap 10.0 0.0 - 11.9   OSMOLALITY SERUM    Collection Time: 07/27/19  5:59 AM   Result Value Ref Range    Osmolality Serum 276 (L) 278 - 298 mOsm/kg H2O   CBC WITH DIFFERENTIAL    Collection Time: 07/27/19  5:59 AM   Result Value Ref Range    WBC 8.3 4.8 - 10.8 K/uL    RBC 2.59 (L) 4.70 - 6.10 M/uL    Hemoglobin 8.4 (L) 14.0 - 18.0 g/dL    Hematocrit 25.0 (L) 42.0 - 52.0 %    MCV 96.5 81.4 - 97.8 fL    MCH 32.4 27.0 - 33.0 pg    MCHC 33.6 (L) 33.7 - 35.3 g/dL    RDW 46.9 35.9 - 50.0 fL    Platelet Count 221 164 - 446 K/uL    MPV 9.6 9.0 - 12.9 fL    Neutrophils-Polys 68.70 44.00 - 72.00 %    Lymphocytes 16.60 (L) 22.00 - 41.00 %    Monocytes 10.10 0.00 - 13.40 %    Eosinophils 2.90 0.00 - 6.90 %    Basophils 0.10 0.00 - 1.80 %    Immature Granulocytes 1.60 (H) 0.00 - 0.90 %    Nucleated RBC 0.00 /100 WBC    Neutrophils (Absolute) 5.67 1.82 - 7.42 K/uL    Lymphs (Absolute) 1.37 1.00 - 4.80 K/uL    Monos (Absolute) 0.83 0.00 - 0.85 K/uL    Eos (Absolute) 0.24 0.00 - 0.51 K/uL    Baso (Absolute) 0.01 0.00 - 0.12 K/uL    Immature Granulocytes (abs) 0.13 (H) 0.00 - 0.11 K/uL    NRBC (Absolute) 0.00 K/uL    ESTIMATED GFR    Collection Time: 07/27/19  5:59 AM   Result Value Ref Range    GFR If African American >60 >60 mL/min/1.73 m 2    GFR If Non African American >60 >60 mL/min/1.73 m 2       Current Facility-Administered Medications   Medication Frequency   • gabapentin (NEURONTIN) capsule 300 mg TID   • lidocaine (LIDODERM) 5 % 2 Patch Q24HR   • insulin lispro (HUMALOG) injection 2-12 Units 4X/DAY ACHS    And   • glucose 4 g chewable tablet 16 g Q15 MIN PRN    And   • dextrose 50% (D50W) injection 50 mL Q15 MIN PRN   • insulin glargine (LANTUS) injection 18 Units QAM   • metFORMIN (GLUCOPHAGE) tablet 1,000 mg BID WITH MEALS   • amLODIPine (NORVASC) tablet 10 mg DAILY   • atorvastatin (LIPITOR) tablet 20 mg Nightly   • glucose 4 g chewable tablet 16 g PRN   • lisinopril (PRINIVIL) tablet 40 mg DAILY   • loratadine (CLARITIN) tablet 10 mg DAILY   • omeprazole (PRILOSEC) capsule 20 mg DAILY   • spironolactone (ALDACTONE) tablet 25 mg DAILY   • terazosin (HYTRIN) capsule 5 mg BID   • verapamil ER (CALAN-SR) tablet 180 mg BID   • Pharmacy Consult Request ...Pain Management Review 1 Each PHARMACY TO DOSE   • Respiratory Care per Protocol Continuous RT   • acetaminophen (TYLENOL) tablet 1,000 mg TID   • oxyCODONE immediate-release (ROXICODONE) tablet 5 mg Q3HRS PRN   • oxyCODONE immediate release (ROXICODONE) tablet 10 mg Q3HRS PRN   • acetaminophen (TYLENOL) tablet 650 mg Q4HRS PRN   • therapeutic multivitamin-minerals (THERAGRAN-M) tablet 1 Tab DAILY WITH LUNCH   • artificial tears 1.4 % ophthalmic solution 1 Drop PRN   • benzocaine-menthol (CEPACOL) lozenge 1 Lozenge Q2HRS PRN   • mag hydrox-al hydrox-simeth (MAALOX PLUS ES or MYLANTA DS) suspension 20 mL Q2HRS PRN   • ondansetron (ZOFRAN ODT) dispertab 4 mg 4X/DAY PRN    Or   • ondansetron (ZOFRAN) syringe/vial injection 4 mg 4X/DAY PRN   • traZODone (DESYREL) tablet 50 mg QHS PRN   • sodium chloride (OCEAN) 0.65 % nasal spray 2 Spray PRN   • calcium carbonate (TUMS)  chewable tab 500 mg BID   • methocarbamol (ROBAXIN) tablet 750 mg 4X/DAY   • DULoxetine (CYMBALTA) capsule 30 mg DAILY       Orders Placed This Encounter   Procedures   • Diet Order Diabetic     Standing Status:   Standing     Number of Occurrences:   1     Order Specific Question:   Diet:     Answer:   Diabetic [3]       Assessment:  Active Hospital Problems    Diagnosis   • *T5 spinal cord injury (HCC)   • PVD (peripheral vascular disease) (HCC)   • Type 2 diabetes mellitus without complication, without long-term current use of insulin (HCC)   • Essential hypertension   • Dyslipidemia   • Obesity   • BPH (benign prostatic hyperplasia)   • History of melanoma   • Neuropathic pain   • Neurogenic bladder   • Neurogenic bowel   • Hyponatremia   • 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HCC)   • Acute kidney injury (HCC)   • Vitamin D deficiency   • Anemia       Medical Decision Making and Plan:    T5 AIS D spinal cord injury  Continue full rehab program  PT/OT, 1.5 hr each discipline, 5 days per week    Neuropathic pain management  Continue gabapentin, currently tolerating    Nociceptive pain management  Continue Robaxin, tylenol, lidocaine patch, PRN oxy    Neurogenic bladder  Currently urinating    Neurogenic bowel  Continue bowel program  Last BM 7/27    DVT prophylaxis  Contraindicated due to bleeding at site of incision  Increase mobility, monitor for edema      Total time:  25 minutes.  I spent greater than 50% of the time for patient care, counseling, and coordination on this date, including patient face-to face time, unit/floor time with review of records/pertinent lab data and studies, as well as discussing diagnostic evaluation/work up, planned therapeutic interventions, and future disposition of care, as per the interval events/subjective and the assessment and plan as noted above.      Mini Rodriguez M.D.   Physical Medicine and Rehabilitation

## 2019-07-27 NOTE — THERAPY
Physical Therapy   Daily Treatment     Patient Name: Ian Laird  Age:  69 y.o., Sex:  male  Medical Record #: [id number]  Today's Date: 7/27/2019     Precautions  Precautions: Spinal / Back Precautions , Fall Risk  Comments: T3-T7 spinal fusion    Subjective    Pt reports he is feeling tired this afternoon but agreeable to PT session     Objective       07/27/19 1231   Vitals   Patient BP Position Sitting   Blood Pressure  100/55   Bed Mobility    Sit to Stand Contact Guard Assist  (BUE from // bars, repeated practice cues for extension)   Interdisciplinary Plan of Care Collaboration   Patient Position at End of Therapy Seated  (handoff SLP)   PT Total Time Spent   PT Individual Total Time Spent (Mins) 60   PT Charge Group   PT Neuromuscular Re-Education / Balance 2   PT Therapeutic Activities 2       Pt edu and orientation to passport - goals, purpose, process.    Standing tolerance with repeated STS practice in // bars. Reps between 60-80 sec, limited by fatigue and light headedness. No drop in BP in standing.     Pregait, lateral wt shift and mini marches in standing, CGA throughout, limited by fatigue and light headedness.    Pt edu on e-stim and NMES for goals and benefits. Pt assisted in setting intensity values for NMES focused on achieving stim-elicited contraction to tolerance in anterior tibs. 8 min for B tib anteriors with cues for active contraction during 10 sec on cycle and relaxation during 10 sec off cycle.    Assessment    Pt demos improving STS ability with decreased level of assist, reports verbal understanding of all edu on passport. Pt responds well to estim with no adverse reaction or pain during treatment.     Plan    BWSTT with tech assist, ?, progress through passport, standing tolerance and independence, mat program, transfer training, determine need for AD, progress towards overground gait training with appropriate AD, wc skills?

## 2019-07-27 NOTE — THERAPY
Speech Language Pathology  Daily Treatment     Patient Name: Ian Laird  Age:  69 y.o., Sex:  male  Medical Record #: [id number]  Today's Date: 7/27/2019     Subjective  Patient assisted to SLP area by OT.  Patient pleasant and cooperative throughout.      Objective   07/27/19 1331   Cognition   Complex Attention Moderate (3)   Complex Reasoning  / Problem Solving Moderate (3)   Self Monitoring Moderate (3)   Medication Management  Severe (2)   Interdisciplinary Plan of Care Collaboration   IDT Collaboration with  Nursing   Patient Position at End of Therapy In Bed;Bed Alarm On;Call Light within Reach;Tray Table within Reach;Phone within Reach   Collaboration Comments Reported return to bed after session with safety precautions in place.     SLP Total Time Spent   SLP Individual Total Time Spent (Mins) 60   Charge Group   SLP Cognitive Skill Development 4     FIM Comprehension Score:  5 - Stand-by Prompting/Supervision or Set-up  Comprehension Description:  Verbal cues    FIM Expression Score:  7 - Independent  Expression Description:       FIM Social Interaction Score:  6 - Modified Independent  Social Interaction Description:  Medication    FIM Problem Solving Score:  4 - Minimal Assistance  Problem Solving Description:  Bed/chair alarm, Therapy schedule, Increased time, Seat belt, Verbal cueing    FIM Memory Score:  5 - Standby Prompting/Supervision or Set-up  Memory Description:  Bed/chair alarm, Therapy schedule, Verbal cueing    Assessment  Patient seen for 1:1 direct SLP treatment targeting complex attention and functional recall/memory.  Patient recalling recent events with spv for extra time and coaxing.  Attempted Medi-cog but discontinued due complexity. Completed functional medication sorting task #1 with moderate assist.     Plan  Continue complex attention, functional memory, and executive function training.

## 2019-07-27 NOTE — PROGRESS NOTES
[facility] Medicine Daily Progress Note    Date of Service  7/27/2019    Chief Complaint  HTN, DM, Hyponatremia    Interval Problem Update  Pt c/o abd bloating and constipation.    Review of Systems  Review of Systems   Constitutional: Negative for chills and fever.   HENT: Negative.    Eyes: Negative.    Respiratory: Negative for cough and shortness of breath.    Cardiovascular: Negative for chest pain and palpitations.   Gastrointestinal: Positive for constipation. Negative for abdominal pain, nausea and vomiting.   Musculoskeletal: Positive for back pain.        Wound pain   Skin: Negative for itching and rash.   Neurological: Positive for focal weakness.        Physical Exam  Temp:  [36.7 °C (98 °F)-36.8 °C (98.3 °F)] 36.7 °C (98.1 °F)  Pulse:  [72-82] 82  Resp:  [16-18] 16  BP: (121-157)/(65-74) 151/66    Physical Exam   Constitutional: He is oriented to person, place, and time. No distress.   HENT:   Head: Normocephalic and atraumatic.   Right Ear: External ear normal.   Left Ear: External ear normal.   Eyes: Conjunctivae and EOM are normal. Right eye exhibits no discharge. Left eye exhibits no discharge.   Neck: Normal range of motion. Neck supple. No tracheal deviation present.   Cardiovascular: Normal rate and regular rhythm.    Pulmonary/Chest: Effort normal and breath sounds normal. No stridor. No respiratory distress. He has no wheezes.   Abdominal: Soft. Bowel sounds are normal. There is no tenderness. There is no rebound and no guarding.   mild distension   Musculoskeletal: He exhibits no edema or tenderness.   Neurological: He is alert and oriented to person, place, and time.   Skin: Skin is warm and dry. He is not diaphoretic.   Vitals reviewed.      Fluids    Intake/Output Summary (Last 24 hours) at 07/27/19 1209  Last data filed at 07/27/19 0838   Gross per 24 hour   Intake              460 ml   Output             3000 ml   Net            -2540 ml       Laboratory  Recent Labs      07/25/19   9753   07/26/19   0555  07/27/19   0559   WBC  10.4  10.4  8.3   RBC  2.50*  2.45*  2.59*   HEMOGLOBIN  8.4*  8.0*  8.4*   HEMATOCRIT  23.8*  23.8*  25.0*   MCV  95.2  97.1  96.5   MCH  33.6*  32.7  32.4   MCHC  35.3  33.6*  33.6*   RDW  48.2  48.7  46.9   PLATELETCT  179  194  221   MPV  9.9  9.7  9.6     Recent Labs      07/26/19   0555  07/27/19   0559   SODIUM  130*  131*   POTASSIUM  4.0  3.8   CHLORIDE  96  96   CO2  25  25   GLUCOSE  148*  122*   BUN  16  13   CREATININE  1.02  0.98   CALCIUM  8.4*  8.6     Recent Labs      07/26/19   0555   INR  1.01         Recent Labs      07/26/19   0555   TRIGLYCERIDE  95   HDL  24*   LDL  58           Assessment/Plan  * T5 spinal cord injury (HCC)- (present on admission)   Assessment & Plan    S/P T5 extradural mass resection w/ T4-T6 laminectomy and T3-T7 fusion on 7/20/19 by Dr. Velasco  Pathology pending  Wound care     PVD (peripheral vascular disease) (HCC)- (present on admission)   Assessment & Plan    Plavix on hold post-operatively     Essential hypertension- (present on admission)   Assessment & Plan    Echo EF 65% w/ mild AS  On max doses of Norvasc and Lisinopril  Also on Aldactone and Verapamil  Observe blood pressure trends     Type 2 diabetes mellitus without complication, without long-term current use of insulin (HCC)- (present on admission)   Assessment & Plan    HbA1c 6.8  On Metformin, Lantus, and SSI  Observe serum glucose trends  Outpt meds include Metformin, Glipizide, and Januvia     Abdominal distension   Assessment & Plan    Start Simethicone for pt c/o gas     History of melanoma   Assessment & Plan    S/P resection x 2  Outpt F/U     BPH (benign prostatic hyperplasia)   Assessment & Plan    Monitor for orthostatic hypotension on Hytrin     Hyponatremia- (present on admission)   Assessment & Plan    Treatment usually not indicated unless Na+<130 or pt symptomatic  Continue to monitor electrolytes     Anemia- (present on admission)   Assessment & Plan     S/P PRBC prior to Rehab transfer  Following H/H     Dyslipidemia- (present on admission)   Assessment & Plan    On Lipitor     Full Code

## 2019-07-27 NOTE — THERAPY
"Occupational Therapy  Daily Treatment     Patient Name: Ian Laird  Age:  69 y.o., Sex:  male  Medical Record #: [id number]  Today's Date: 7/27/2019     Precautions  Precautions: (P) Spinal / Back Precautions , Fall Risk  Comments: (P) T3-T7 spinal fusion         Subjective    \"I failed the bed test yesterday.\"     Objective       07/27/19 0931   Precautions   Precautions Spinal / Back Precautions ;Fall Risk   Comments T3-T7 spinal fusion   Pain 0 - 10 Group   Location Back   Location Orientation Upper   Therapist Pain Assessment During Activity;6  (pain indicated in flat back supine position on mat)   Cognition    Level of Consciousness Alert   Neuro-Muscular Treatments   Neuro-Muscular Treatments Joint Approximation;Postural Changes;Postural Facilitation;Sequencing;Verbal Cuing;Weight Shift Right;Weight Shift Left   Comments Blocked segmental log roll practice supine <>EOM with leg  x 5.     Balance   Sitting Balance (Static) Fair +   Sitting Balance (Dynamic) Fair   Standing Balance (Static) Poor +   Standing Balance (Dynamic) Poor   Weight Shift Sitting Fair   Weight Shift Standing Poor   Skilled Intervention Postural Facilitation;Sequencing;Tactile Cuing;Verbal Cuing   Comments Observed during EOM and functional transfer w/c <>EOM   Bed Mobility    Comments Blocked segmental log roll practice supine <>EOM with leg  x 5.   OT Total Time Spent   OT Individual Total Time Spent (Mins) 60   OT Charge Group   Charges Yes   OT Neuromuscular Re-education / Balance 2   OT Therapy Activity 2       Assessment    Pt attended to EOM and supine mat activity with fair safety awareness and limited s/s pain with movement.  Increased independence with EOM to supine transition with use of leg .  Pt limited by generalized weakness, balance deficits, endurance deficits, BLE weakness.       Plan    Cont OT for increased strengthening, balance, compensatory strategies and AE use to increase independence and " safety with ADL/IADL pursuits.

## 2019-07-27 NOTE — CARE PLAN
Problem: Safety  Goal: Will remain free from injury  Patient educated on importance of utilizing call light to minimize the potential of injury from falls. Patient verbalizes understanding.

## 2019-07-27 NOTE — PROGRESS NOTES
Received shift report and assumed care of patient.  Patient awake, calm and stable,up into wheelchair. Denies pain or discomfort at this time.  Will continue to monitor.

## 2019-07-28 LAB
GLUCOSE BLD-MCNC: 133 MG/DL (ref 65–99)
GLUCOSE BLD-MCNC: 144 MG/DL (ref 65–99)
GLUCOSE BLD-MCNC: 88 MG/DL (ref 65–99)
GLUCOSE BLD-MCNC: 96 MG/DL (ref 65–99)

## 2019-07-28 PROCEDURE — A9270 NON-COVERED ITEM OR SERVICE: HCPCS | Performed by: HOSPITALIST

## 2019-07-28 PROCEDURE — 770010 HCHG ROOM/CARE - REHAB SEMI PRIVAT*

## 2019-07-28 PROCEDURE — 700101 HCHG RX REV CODE 250: Performed by: PHYSICAL MEDICINE & REHABILITATION

## 2019-07-28 PROCEDURE — 700102 HCHG RX REV CODE 250 W/ 637 OVERRIDE(OP): Performed by: PHYSICAL MEDICINE & REHABILITATION

## 2019-07-28 PROCEDURE — A9270 NON-COVERED ITEM OR SERVICE: HCPCS | Performed by: PHYSICAL MEDICINE & REHABILITATION

## 2019-07-28 PROCEDURE — 99232 SBSQ HOSP IP/OBS MODERATE 35: CPT | Performed by: HOSPITALIST

## 2019-07-28 PROCEDURE — 82962 GLUCOSE BLOOD TEST: CPT

## 2019-07-28 PROCEDURE — 700102 HCHG RX REV CODE 250 W/ 637 OVERRIDE(OP): Performed by: HOSPITALIST

## 2019-07-28 RX ORDER — GLIPIZIDE 2.5 MG/1
2.5 TABLET, EXTENDED RELEASE ORAL
Status: DISCONTINUED | OUTPATIENT
Start: 2019-07-28 | End: 2019-07-28

## 2019-07-28 RX ORDER — INSULIN GLARGINE 100 [IU]/ML
10 INJECTION, SOLUTION SUBCUTANEOUS EVERY EVENING
Status: DISCONTINUED | OUTPATIENT
Start: 2019-07-29 | End: 2019-07-30

## 2019-07-28 RX ORDER — GLIPIZIDE 2.5 MG/1
2.5 TABLET, EXTENDED RELEASE ORAL
Status: DISCONTINUED | OUTPATIENT
Start: 2019-07-29 | End: 2019-07-30

## 2019-07-28 RX ADMIN — METHOCARBAMOL 750 MG: 750 TABLET, FILM COATED ORAL at 17:38

## 2019-07-28 RX ADMIN — INSULIN GLARGINE 18 UNITS: 100 INJECTION, SOLUTION SUBCUTANEOUS at 08:09

## 2019-07-28 RX ADMIN — TERAZOSIN HYDROCHLORIDE 5 MG: 5 CAPSULE ORAL at 05:30

## 2019-07-28 RX ADMIN — ACETAMINOPHEN 1000 MG: 500 TABLET, FILM COATED ORAL at 14:02

## 2019-07-28 RX ADMIN — SIMETHICONE CHEW TAB 80 MG 80 MG: 80 TABLET ORAL at 11:53

## 2019-07-28 RX ADMIN — VERAPAMIL HYDROCHLORIDE 180 MG: 180 TABLET, FILM COATED, EXTENDED RELEASE ORAL at 08:05

## 2019-07-28 RX ADMIN — LISINOPRIL 40 MG: 20 TABLET ORAL at 08:05

## 2019-07-28 RX ADMIN — LORATADINE 10 MG: 10 TABLET ORAL at 08:06

## 2019-07-28 RX ADMIN — SPIRONOLACTONE 25 MG: 25 TABLET ORAL at 08:06

## 2019-07-28 RX ADMIN — METHOCARBAMOL 750 MG: 750 TABLET, FILM COATED ORAL at 08:05

## 2019-07-28 RX ADMIN — GABAPENTIN 300 MG: 300 CAPSULE ORAL at 08:05

## 2019-07-28 RX ADMIN — METFORMIN HYDROCHLORIDE 1000 MG: 500 TABLET, FILM COATED ORAL at 08:04

## 2019-07-28 RX ADMIN — MULTIPLE VITAMINS W/ MINERALS TAB 1 TABLET: TAB at 11:53

## 2019-07-28 RX ADMIN — AMLODIPINE BESYLATE 10 MG: 5 TABLET ORAL at 08:05

## 2019-07-28 RX ADMIN — DULOXETINE HYDROCHLORIDE 30 MG: 30 CAPSULE, DELAYED RELEASE ORAL at 08:05

## 2019-07-28 RX ADMIN — GABAPENTIN 300 MG: 300 CAPSULE ORAL at 14:02

## 2019-07-28 RX ADMIN — ACETAMINOPHEN 1000 MG: 500 TABLET, FILM COATED ORAL at 21:14

## 2019-07-28 RX ADMIN — CALCIUM CARBONATE (ANTACID) CHEW TAB 500 MG 500 MG: 500 CHEW TAB at 21:14

## 2019-07-28 RX ADMIN — SIMETHICONE CHEW TAB 80 MG 80 MG: 80 TABLET ORAL at 08:06

## 2019-07-28 RX ADMIN — METFORMIN HYDROCHLORIDE 1000 MG: 500 TABLET, FILM COATED ORAL at 17:38

## 2019-07-28 RX ADMIN — SIMETHICONE CHEW TAB 80 MG 80 MG: 80 TABLET ORAL at 17:38

## 2019-07-28 RX ADMIN — OMEPRAZOLE 20 MG: 20 CAPSULE, DELAYED RELEASE ORAL at 08:06

## 2019-07-28 RX ADMIN — TERAZOSIN HYDROCHLORIDE 5 MG: 5 CAPSULE ORAL at 17:41

## 2019-07-28 RX ADMIN — CALCIUM CARBONATE (ANTACID) CHEW TAB 500 MG 500 MG: 500 CHEW TAB at 08:06

## 2019-07-28 RX ADMIN — ACETAMINOPHEN 1000 MG: 500 TABLET, FILM COATED ORAL at 08:06

## 2019-07-28 RX ADMIN — LIDOCAINE 2 PATCH: 50 PATCH CUTANEOUS at 09:00

## 2019-07-28 RX ADMIN — METHOCARBAMOL 750 MG: 750 TABLET, FILM COATED ORAL at 14:02

## 2019-07-28 RX ADMIN — VERAPAMIL HYDROCHLORIDE 180 MG: 180 TABLET, FILM COATED, EXTENDED RELEASE ORAL at 21:15

## 2019-07-28 RX ADMIN — METHOCARBAMOL 750 MG: 750 TABLET, FILM COATED ORAL at 21:15

## 2019-07-28 RX ADMIN — ATORVASTATIN CALCIUM 20 MG: 10 TABLET, FILM COATED ORAL at 21:14

## 2019-07-28 RX ADMIN — SIMETHICONE CHEW TAB 80 MG 80 MG: 80 TABLET ORAL at 21:14

## 2019-07-28 RX ADMIN — GABAPENTIN 300 MG: 300 CAPSULE ORAL at 21:22

## 2019-07-28 ASSESSMENT — PATIENT HEALTH QUESTIONNAIRE - PHQ9
1. LITTLE INTEREST OR PLEASURE IN DOING THINGS: NOT AT ALL
2. FEELING DOWN, DEPRESSED, IRRITABLE, OR HOPELESS: NOT AT ALL
SUM OF ALL RESPONSES TO PHQ9 QUESTIONS 1 AND 2: 0

## 2019-07-28 ASSESSMENT — ENCOUNTER SYMPTOMS
PALPITATIONS: 0
FEVER: 0
ABDOMINAL PAIN: 0
SHORTNESS OF BREATH: 0
EYES NEGATIVE: 1
COUGH: 0
FOCAL WEAKNESS: 1
BACK PAIN: 1
NAUSEA: 0
VOMITING: 0
CHILLS: 0

## 2019-07-28 NOTE — CARE PLAN
Problem: Safety  Goal: Will remain free from injury  Pt uses call light consistently and appropriately. Waits for assistance does not attempt self transfer this shift. Able to verbalize needs.    Problem: Infection  Goal: Will remain free from infection  Patient remains free of infection as evidenced by normal vital signs and breath sounds. Will continue monitoring.    Problem: Pain Management  Goal: Pain level will decrease to patient's comfort goal  Patient able to verbalize pain level and verbalize an acceptable level of pain.

## 2019-07-28 NOTE — CONSULTS
DATE OF SERVICE:  07/26/2019    BEHAVIORAL MEDICINE EVALUATION    BRIEF HISTORY OF PRESENTING COMPLAINTS:  The patient is a 69-year-old white    male who is referred for a behavioral medicine evaluation by Dr. Aquino.  The patient was transferred to rehab from acute where he presented to   [facility] on 07/15/2019 with worsening mid back pain, bilateral lower extremity   weakness and numbness.  The patient was found to have a T5 mass with posterior   epidural extension, severely nearing his spinal canal at T5.  The patient   underwent a T4-T6 laminectomy and T3-T7 posterior spinal fusion with resection   of the epidural mass by Dr. Velasco on 07/20/2019.  The patient is awaiting   pulmonary pathology.  The patient's samples undergoing studies to evaluate for   a lymphoma.  The patient was stabilized acutely.  He was then sent to rehab   to address his general debility.    PAST MEDICAL HISTORY:  Significant for colon cancer, diabetes, dyslipidemia,   and hypertension.    PSYCHOLOGICAL STATUS:  MENTAL STATUS EXAMINATION:  The patient is a well-nourished, obese male of   medium stature who appeared somewhat older than his stated age of 69.  At   presentation, the patient was alert.  He was sitting upright in his bed when   approached.  The patient oriented well to my presence.  The patient was kempt   in appearance.  He was dressed casually in street attire that was appropriate   to his age and setting.  The patient's manner of presentation was cooperative   and friendly.    The patient was well oriented to time, place, and person.  His language was   logical and goal oriented, and her speech was normal for rate and rhythm.  The   patient's concentration and memory functioning appeared intact.    The patient's affect was well modulated, stable, and mildly intense.  He   related well.  His mood appeared dysphoric, but appropriate to the context.    There was no evidence of delusional or perceptual disturbance.  Also, the    patient showed no unusual pain or motor behavior during the interview.    SPECIFIC BEHAVIORAL COMPLAINTS:  The patient admitted to mild symptoms of   depression.  He reported in the past several days he has felt sad and   depressed and worried about his health.  He reported no strong feelings of   nervousness or anger.  He also denied any thoughts of wanting to die.    Other problems mentioned by the patient included mild back pain, diminished   appetite, and low levels of energy.  He also reported restless sleep, mostly   related to the hospital routine.    The patient denied any interpersonal discord or discomfort, marital strife or   any family disharmony.  He also reported he was managing his day-to-day   stressors well.    The patient also reported no ETOH or other drug abuse or any use of tobacco.    PSYCHIATRIC HISTORY:  The patient denied any history significant for   psychiatric disturbance or treatment including in or outpatient care.    PSYCHOMETRIC TESTING:  The patient was administered 2 psychometric tests and 2   screening instruments.  The PS/PC-R revealed mild symptoms of depression.    His CDR survey showed no problems with level of consciousness, attention,   thinking, perception, speech or memory.  He did admit to problems with   behavioral activation and basic self-care.  He also reported restless sleep,   loss of vigor, diminished appetite and mild back pain.    The patient was screened for any elder abuse or risk of suicide.  There is no   strong evidence for either problem.    SOCIAL HISTORY:  The patient is a retired [employer] professor.  He is .  He   lives with his wife in [place], [place].    IMPRESSION:  Adjustment disorder with depressed mood.    RECOMMENDATIONS:  The patient will be followed for status and supportive care.       ____________________________________     THEODORE GUEVARA, PHD    SABAS / ROMANA    DD:  07/28/2019 13:48:52  DT:  07/28/2019 14:12:56    D#:  [id number]  Job#:   [id number]

## 2019-07-28 NOTE — PROGRESS NOTES
[facility] Daily Progress Note    Date of Service  7/28/2019    Chief Complaint  HTN, DM, Hyponatremia    Interval Problem Update  Abd bloating and constipation both better today.    Review of Systems  Review of Systems   Constitutional: Negative for chills and fever.   HENT: Negative.    Eyes: Negative.    Respiratory: Negative for cough and shortness of breath.    Cardiovascular: Negative for chest pain and palpitations.   Gastrointestinal: Negative for abdominal pain, nausea and vomiting.   Musculoskeletal: Positive for back pain.        Wound pain   Skin: Negative for itching and rash.   Neurological: Positive for focal weakness.        Physical Exam  Temp:  [36.6 °C (97.9 °F)-36.8 °C (98.2 °F)] 36.7 °C (98 °F)  Pulse:  [62-81] 81  Resp:  [18] 18  BP: (112-135)/(62-72) 135/72  SpO2:  [97 %] 97 %    Physical Exam   Constitutional: He is oriented to person, place, and time. No distress.   HENT:   Head: Normocephalic and atraumatic.   Right Ear: External ear normal.   Left Ear: External ear normal.   Eyes: Conjunctivae and EOM are normal. Right eye exhibits no discharge. Left eye exhibits no discharge.   Neck: Normal range of motion. Neck supple. No tracheal deviation present.   Cardiovascular: Normal rate and regular rhythm.    Pulmonary/Chest: Effort normal and breath sounds normal. No stridor. No respiratory distress. He has no wheezes.   Abdominal: Soft. Bowel sounds are normal. He exhibits no distension. There is no tenderness. There is no rebound and no guarding.   Musculoskeletal: He exhibits no edema or tenderness.   Neurological: He is alert and oriented to person, place, and time.   Skin: Skin is warm and dry. He is not diaphoretic.   Vitals reviewed.      Fluids    Intake/Output Summary (Last 24 hours) at 07/28/19 1256  Last data filed at 07/28/19 0830   Gross per 24 hour   Intake              480 ml   Output             2000 ml   Net            -1520 ml       Laboratory  Recent Labs       07/26/19   0555  07/27/19   0559   WBC  10.4  8.3   RBC  2.45*  2.59*   HEMOGLOBIN  8.0*  8.4*   HEMATOCRIT  23.8*  25.0*   MCV  97.1  96.5   MCH  32.7  32.4   MCHC  33.6*  33.6*   RDW  48.7  46.9   PLATELETCT  194  221   MPV  9.7  9.6     Recent Labs      07/26/19   0555  07/27/19   0559   SODIUM  130*  131*   POTASSIUM  4.0  3.8   CHLORIDE  96  96   CO2  25  25   GLUCOSE  148*  122*   BUN  16  13   CREATININE  1.02  0.98   CALCIUM  8.4*  8.6     Recent Labs      07/26/19   0555   INR  1.01         Recent Labs      07/26/19   0555   TRIGLYCERIDE  95   HDL  24*   LDL  58           Assessment/Plan  * T5 spinal cord injury (HCC)- (present on admission)   Assessment & Plan    S/P T5 extradural mass resection w/ T4-T6 laminectomy and T3-T7 fusion on 7/20/19 by Dr. Velasco  Pathology pending  Wound care     PVD (peripheral vascular disease) (HCC)- (present on admission)   Assessment & Plan    Plavix on hold post-operatively     Essential hypertension- (present on admission)   Assessment & Plan    Echo EF 65% w/ mild AS  On max doses of Norvasc and Lisinopril  Also on Aldactone and Verapamil  Observe blood pressure trends     Type 2 diabetes mellitus without complication, without long-term current use of insulin (HCC)- (present on admission)   Assessment & Plan    HbA1c 6.8  Resume Glipizide and decrease Lantus  Continue Metformin and SSI  Outpt meds include Metformin, Glipizide, and Januvia     Abdominal distension   Assessment & Plan    Symptoms improving on Simethicone     History of melanoma   Assessment & Plan    S/P resection x 2  Outpt F/U     BPH (benign prostatic hyperplasia)   Assessment & Plan    Monitor for orthostatic hypotension on Hytrin     Hyponatremia- (present on admission)   Assessment & Plan    Treatment usually not indicated unless Na+<130 or pt symptomatic  Continue to monitor electrolytes     Anemia- (present on admission)   Assessment & Plan    S/P PRBC prior to Rehab transfer  Following H/H      Dyslipidemia- (present on admission)   Assessment & Plan    On Lipitor     Full Code

## 2019-07-28 NOTE — CARE PLAN
Problem: Communication  Goal: The ability to communicate needs accurately and effectively will improve  .mtcall    Problem: Safety  Goal: Will remain free from injury  Patient educated to change positions to minimize the potential of skin break down and possible further complications of open wounds due to pressure, when sitting or laying for periods over half of an hour. Patient engaged in education and verbalizes understanding.

## 2019-07-29 LAB
CREAT UR-MCNC: 52.1 MG/DL
GLUCOSE BLD-MCNC: 101 MG/DL (ref 65–99)
GLUCOSE BLD-MCNC: 121 MG/DL (ref 65–99)
GLUCOSE BLD-MCNC: 92 MG/DL (ref 65–99)
OSMOLALITY UR: 246 MOSM/KG H2O (ref 300–900)
SODIUM UR-SCNC: 56 MMOL/L

## 2019-07-29 PROCEDURE — A9270 NON-COVERED ITEM OR SERVICE: HCPCS | Performed by: PHYSICAL MEDICINE & REHABILITATION

## 2019-07-29 PROCEDURE — 82962 GLUCOSE BLOOD TEST: CPT

## 2019-07-29 PROCEDURE — 700102 HCHG RX REV CODE 250 W/ 637 OVERRIDE(OP): Performed by: PHYSICAL MEDICINE & REHABILITATION

## 2019-07-29 PROCEDURE — 770010 HCHG ROOM/CARE - REHAB SEMI PRIVAT*

## 2019-07-29 PROCEDURE — 700102 HCHG RX REV CODE 250 W/ 637 OVERRIDE(OP): Performed by: HOSPITALIST

## 2019-07-29 PROCEDURE — 82570 ASSAY OF URINE CREATININE: CPT

## 2019-07-29 PROCEDURE — 700112 HCHG RX REV CODE 229: Performed by: PHYSICAL MEDICINE & REHABILITATION

## 2019-07-29 PROCEDURE — 83935 ASSAY OF URINE OSMOLALITY: CPT

## 2019-07-29 PROCEDURE — 700101 HCHG RX REV CODE 250: Performed by: PHYSICAL MEDICINE & REHABILITATION

## 2019-07-29 PROCEDURE — 84300 ASSAY OF URINE SODIUM: CPT

## 2019-07-29 PROCEDURE — 97110 THERAPEUTIC EXERCISES: CPT

## 2019-07-29 PROCEDURE — 97535 SELF CARE MNGMENT TRAINING: CPT

## 2019-07-29 PROCEDURE — 99232 SBSQ HOSP IP/OBS MODERATE 35: CPT | Performed by: HOSPITALIST

## 2019-07-29 PROCEDURE — A9270 NON-COVERED ITEM OR SERVICE: HCPCS | Performed by: HOSPITALIST

## 2019-07-29 PROCEDURE — 99233 SBSQ HOSP IP/OBS HIGH 50: CPT | Performed by: PHYSICAL MEDICINE & REHABILITATION

## 2019-07-29 PROCEDURE — 97530 THERAPEUTIC ACTIVITIES: CPT

## 2019-07-29 PROCEDURE — G0515 COGNITIVE SKILLS DEVELOPMENT: HCPCS

## 2019-07-29 RX ORDER — GABAPENTIN 300 MG/1
600 CAPSULE ORAL 3 TIMES DAILY
Status: DISCONTINUED | OUTPATIENT
Start: 2019-07-29 | End: 2019-08-02

## 2019-07-29 RX ORDER — SENNOSIDES A AND B 8.6 MG/1
17.2 TABLET, FILM COATED ORAL
Status: DISCONTINUED | OUTPATIENT
Start: 2019-07-29 | End: 2019-08-06

## 2019-07-29 RX ORDER — BISACODYL 10 MG/1
10 SUPPOSITORY RECTAL DAILY
Status: DISCONTINUED | OUTPATIENT
Start: 2019-07-30 | End: 2019-07-30

## 2019-07-29 RX ORDER — TRAMADOL HYDROCHLORIDE 50 MG/1
50 TABLET ORAL 3 TIMES DAILY
Status: DISCONTINUED | OUTPATIENT
Start: 2019-07-29 | End: 2019-08-12

## 2019-07-29 RX ORDER — DOCUSATE SODIUM 100 MG/1
200 CAPSULE, LIQUID FILLED ORAL 2 TIMES DAILY
Status: DISCONTINUED | OUTPATIENT
Start: 2019-07-29 | End: 2019-08-06

## 2019-07-29 RX ADMIN — INSULIN GLARGINE 10 UNITS: 100 INJECTION, SOLUTION SUBCUTANEOUS at 20:30

## 2019-07-29 RX ADMIN — SIMETHICONE CHEW TAB 80 MG 80 MG: 80 TABLET ORAL at 20:24

## 2019-07-29 RX ADMIN — CALCIUM CARBONATE (ANTACID) CHEW TAB 500 MG 500 MG: 500 CHEW TAB at 20:24

## 2019-07-29 RX ADMIN — METHOCARBAMOL 750 MG: 750 TABLET, FILM COATED ORAL at 17:38

## 2019-07-29 RX ADMIN — ACETAMINOPHEN 1000 MG: 500 TABLET, FILM COATED ORAL at 20:24

## 2019-07-29 RX ADMIN — SIMETHICONE CHEW TAB 80 MG 80 MG: 80 TABLET ORAL at 07:52

## 2019-07-29 RX ADMIN — METHOCARBAMOL 750 MG: 750 TABLET, FILM COATED ORAL at 07:53

## 2019-07-29 RX ADMIN — SENNOSIDES 17.2 MG: 8.6 TABLET, FILM COATED ORAL at 17:38

## 2019-07-29 RX ADMIN — METHOCARBAMOL 750 MG: 750 TABLET, FILM COATED ORAL at 20:24

## 2019-07-29 RX ADMIN — GLIPIZIDE 2.5 MG: 2.5 TABLET, FILM COATED, EXTENDED RELEASE ORAL at 07:54

## 2019-07-29 RX ADMIN — ACETAMINOPHEN 1000 MG: 500 TABLET, FILM COATED ORAL at 07:53

## 2019-07-29 RX ADMIN — METFORMIN HYDROCHLORIDE 1000 MG: 500 TABLET, FILM COATED ORAL at 07:53

## 2019-07-29 RX ADMIN — CALCIUM CARBONATE (ANTACID) CHEW TAB 500 MG 500 MG: 500 CHEW TAB at 07:52

## 2019-07-29 RX ADMIN — GABAPENTIN 300 MG: 300 CAPSULE ORAL at 14:43

## 2019-07-29 RX ADMIN — GABAPENTIN 300 MG: 300 CAPSULE ORAL at 07:54

## 2019-07-29 RX ADMIN — ACETAMINOPHEN 1000 MG: 500 TABLET, FILM COATED ORAL at 14:43

## 2019-07-29 RX ADMIN — VERAPAMIL HYDROCHLORIDE 180 MG: 180 TABLET, FILM COATED, EXTENDED RELEASE ORAL at 08:23

## 2019-07-29 RX ADMIN — SPIRONOLACTONE 25 MG: 25 TABLET ORAL at 07:54

## 2019-07-29 RX ADMIN — SIMETHICONE CHEW TAB 80 MG 80 MG: 80 TABLET ORAL at 12:07

## 2019-07-29 RX ADMIN — DOCUSATE SODIUM 200 MG: 100 CAPSULE, LIQUID FILLED ORAL at 20:24

## 2019-07-29 RX ADMIN — GABAPENTIN 600 MG: 300 CAPSULE ORAL at 20:24

## 2019-07-29 RX ADMIN — METHOCARBAMOL 750 MG: 750 TABLET, FILM COATED ORAL at 12:06

## 2019-07-29 RX ADMIN — ATORVASTATIN CALCIUM 20 MG: 10 TABLET, FILM COATED ORAL at 20:24

## 2019-07-29 RX ADMIN — TRAMADOL HYDROCHLORIDE 50 MG: 50 TABLET, COATED ORAL at 17:38

## 2019-07-29 RX ADMIN — LORATADINE 10 MG: 10 TABLET ORAL at 07:53

## 2019-07-29 RX ADMIN — VERAPAMIL HYDROCHLORIDE 180 MG: 180 TABLET, FILM COATED, EXTENDED RELEASE ORAL at 20:24

## 2019-07-29 RX ADMIN — MULTIPLE VITAMINS W/ MINERALS TAB 1 TABLET: TAB at 12:06

## 2019-07-29 RX ADMIN — LISINOPRIL 40 MG: 20 TABLET ORAL at 07:54

## 2019-07-29 RX ADMIN — TERAZOSIN HYDROCHLORIDE 5 MG: 5 CAPSULE ORAL at 05:35

## 2019-07-29 RX ADMIN — LIDOCAINE 2 PATCH: 50 PATCH CUTANEOUS at 08:24

## 2019-07-29 RX ADMIN — SIMETHICONE CHEW TAB 80 MG 80 MG: 80 TABLET ORAL at 17:38

## 2019-07-29 RX ADMIN — TERAZOSIN HYDROCHLORIDE 5 MG: 5 CAPSULE ORAL at 17:54

## 2019-07-29 RX ADMIN — OMEPRAZOLE 20 MG: 20 CAPSULE, DELAYED RELEASE ORAL at 07:53

## 2019-07-29 RX ADMIN — DULOXETINE HYDROCHLORIDE 30 MG: 30 CAPSULE, DELAYED RELEASE ORAL at 07:54

## 2019-07-29 RX ADMIN — METFORMIN HYDROCHLORIDE 1000 MG: 500 TABLET, FILM COATED ORAL at 17:38

## 2019-07-29 RX ADMIN — AMLODIPINE BESYLATE 10 MG: 5 TABLET ORAL at 07:53

## 2019-07-29 ASSESSMENT — PATIENT HEALTH QUESTIONNAIRE - PHQ9
SUM OF ALL RESPONSES TO PHQ9 QUESTIONS 1 AND 2: 0
1. LITTLE INTEREST OR PLEASURE IN DOING THINGS: NOT AT ALL
2. FEELING DOWN, DEPRESSED, IRRITABLE, OR HOPELESS: NOT AT ALL

## 2019-07-29 ASSESSMENT — ENCOUNTER SYMPTOMS
COUGH: 0
BACK PAIN: 1
PALPITATIONS: 0
EYES NEGATIVE: 1
ABDOMINAL PAIN: 0
NAUSEA: 0
VOMITING: 0
SHORTNESS OF BREATH: 0
FOCAL WEAKNESS: 1
FEVER: 0
CHILLS: 0

## 2019-07-29 NOTE — REHAB-SLP IDT TEAM NOTE
Speech Therapy   Cognitive Linquistic Functions  Comprehension Initial:  4 - Minimal Assistance  Comprehension Current:  5 - Stand-by Prompting/Supervision or Set-up   Comprehension Description:  Verbal cues  Expression Initial:  7 - Independent  Expression Current:  7 - Independent   Expression Description:     Social Interaction Initial:  6 - Modified Independent  Social Interaction Current:  6 - Modified Independent   Social Interaction Description:  Medication  Problem Solving Initial:  5 - Standby Prompting/Supervision or Set-up  Problem Solving Current:  4 - Minimal Assistance   Problem Solving Description:  Bed/chair alarm, Therapy schedule, Increased time, Seat belt, Verbal cueing  Memory Initial:  5 - Standby Prompting/Supervision or Set-up  Memory Current:  5 - Standby Prompting/Supervision or Set-up   Memory Description:  Bed/chair alarm, Therapy schedule, Verbal cueing  Executive Functioning / Organization Initial:     Executive Functioning / Organization Current:      Executive Functioning Desciption:  Verbal cues extra time.  Swallowing  Swallowing Status:  Patient participated in clinical swallow evaluation revealing swallow function is WNL. This patient does not currently receive dysphagia intervention.  Orders Placed This Encounter   Procedures   • Diet Order Diabetic     Standing Status:   Standing     Number of Occurrences:   1     Order Specific Question:   Diet:     Answer:   Diabetic [3]     Behavior Modification Plan  Allow for rest time, Keep instructions simple/concrete, Give clear feedback, Set clear goals, Redirect to task/topic and Other: verbal cues to participate and persist with tx tasks.  Assistive Technology  Low tech: Calendar, planner, schedule, alarms/timers, pill organizer, post-it notes, lists  Family Training/Education:  To be scheduled.  DC Recommendations:   Anticipate patient will benefit from additional ST services upon discharge from this facility.  Strengths:  Alert and  oriented, Making steady progress towards goals, Motivated for self care and independence, Pleasant and cooperative, Supportive family and Willingly participates in therapeutic activities  Barriers:  Other: impaired carry over, impaired speed of processing.  # of short term goals set= 2  # of short term goals met=0  Patient has been seen for and initial evaluation and one ST tx at the time this note was written.       Speech Therapy Problems           Problem: Memory STGs     Dates: Start: 07/26/19       Goal: STG-Within one week, patient will     Dates: Start: 07/26/19       Description: 1) Individualized goal:  perform simple prospective memory/recall tasks with 90% when provided clinician supervision assist  2) Interventions:  SLP Cognitive Skill Development and SLP Group Treatment       Note:     Goal Note filed on 07/29/19 0819 by Marion Hicks MS,CCC-SLP    Goal: STG-Within one week, patient will  Outcome: NOT MET  Patient recalled recent events with supervision for extra time and   coaxing.                  Problem: Problem Solving STGs     Dates: Start: 07/26/19       Goal: STG-Within one week, patient will     Dates: Start: 07/26/19       Description: 1) Individualized goal:  Perform complex attention tasks with 90% accuracy and clinician supervision  2) Interventions: SLP Self Care / ADL Training , SLP Cognitive Skill Development and SLP Group Treatme       Note:     Goal Note filed on 07/29/19 0819 by Marion Hicks MS,CCC-SLP    Goal: STG-Within one week, patient will  Outcome: NOT MET  Mod A needed for simple attention tasks.                  Problem: Speech/Swallowing LTGs     Dates: Start: 07/26/19       Goal: LTG-By discharge, patient will solve complex problem     Dates: Start: 07/26/19       Description: 1) Individualized goal:  For home and community with 90% accuracy and MOD I for meta-cognitive strategies  2) Interventions:  SLP Self Care / ADL Training , SLP Cognitive Skill Development and  SLP Group Treatment             Goal: LTG-By discharge, patient will     Dates: Start: 07/26/19       Description: 1) Individualized goal:  Perform functional prospective memory tasks with mod I to achieve 90% accuracy.  2) Interventions:  SLP Self Care / ADL Training , SLP Cognitive Skill Development and SLP Group Treatme                    Section completed by:  Marion Hicks MS,CCC-SLP

## 2019-07-29 NOTE — CARE PLAN
Problem: Bathing  Goal: STG-Within one week, patient will bathe  1) Individualized Goal:  Supervision with use of AE/DME prn to maintain spinal prec.  2) Interventions:  OT Group Therapy, OT Self Care/ADL, OT Community Reintegration, OT Manual Ther Technique, OT Neuro Re-Ed/Balance, OT Sensory Int Techniques, OT Therapeutic Activity, OT Evaluation and OT Therapeutic Exercise     Outcome: NOT MET  Mod A    Problem: Dressing  Goal: STG-Within one week, patient will dress LB  1) Individualized Goal:  supervision with use of AE prn to maintain spinal prec.  2) Interventions:  OT Group Therapy, OT Self Care/ADL, OT Community Reintegration, OT Manual Ther Technique, OT Neuro Re-Ed/Balance, OT Sensory Int Techniques, OT Therapeutic Activity, OT Evaluation and OT Therapeutic Exercise     Outcome: NOT MET  Max A    Problem: Functional Transfers  Goal: STG-Within one week, patient will  1) Individualized Goal:  Transfer to toilet and shower at supervision level with AE/DME prn.  2) Interventions:  OT Group Therapy, OT Self Care/ADL, OT Community Reintegration, OT Manual Ther Technique, OT Neuro Re-Ed/Balance, OT Sensory Int Techniques, OT Therapeutic Activity, OT Evaluation and OT Therapeutic Exercise     Outcome: NOT MET  Mod A    Problem: IADL's  Goal: STG-Within one week, patient will access kitchen area  1) Individualized Goal:  Supervision with AE/DME prn.  2) Interventions:  OT Group Therapy, OT Self Care/ADL, OT Community Reintegration, OT Manual Ther Technique, OT Neuro Re-Ed/Balance, OT Sensory Int Techniques, OT Therapeutic Activity, OT Evaluation and OT Therapeutic Exercise     Outcome: NOT MET  Not yet attempted

## 2019-07-29 NOTE — THERAPY
"Occupational Therapy  Daily Treatment     Patient Name: Ian Laird  Age:  69 y.o., Sex:  male  Medical Record #: [id number]  Today's Date: 7/29/2019     Precautions  Precautions: (P) Spinal / Back Precautions , Fall Risk  Comments: (P) T3-T7 spinal fusion         Subjective    \" I don't like ice \"  \" I'm always cold \"  When offered ice pack to back for pain mgmt.      Objective     07/29/19 1301   Precautions   Precautions Spinal / Back Precautions ;Fall Risk   Comments T3-T7 spinal fusion   Pain 0 - 10 Group   Location Back   Location Orientation Upper   Pain Rating Scale (NPRS) 4   Therapist Pain Assessment Prior to Activity;During Activity;Post Activity  ( declined interventions ( Ice/ Meds) offered)   Sitting Upper Body Exercises   Shoulder Press 1 set of 10;Right ;Left   Internal Shoulder Rotation 1 set of 10;Right ;Left   External Shoulder Rotation 1 set of 10;Right ;Left   Bicep Curls 1 set of 10;Right ;Left   Pronation / Supination 1 set of 10;Right ;Left   Upper Extremity Bike Level 2 Resistance  (x 2.5 minutes x 2  hydro cycle )   Comments ther ex performed with 3lb weight    Interdisciplinary Plan of Care Collaboration   IDT Collaboration with  Family / Caregiver   Patient Position at End of Therapy Seated  (hand off to ST for tx )   Collaboration Comments spouse Marion present  and observing tx.  providing encouragement as needed    OT Total Time Spent   OT Individual Total Time Spent (Mins) 60   OT Charge Group   OT Self Care / ADL 2   OT Therapeutic Exercise  2       FIM Lower Body Dressing Score:  4 - Minimal Assistance  Lower Body Dressing Description:   ( 7 of 7 tasks.  pants  doff over feet min assist using reacher  pulled up to hips  supervision . doff supervision  doff /don socks using sock aid Supervision and cues. sockaid /d stick   shoes   doff /don supervision and cues with Dressing stick/shoehorn)   discussed pain mgmt strategies.  Educated both regarding benefit and use of prescribed " pain meds in order to maximize ability to participate in tx.  He  Has lidocane patch.  Offered ice  Patient declined . Reports reluctance to take prescribed  Pain med.  Open to taking tylenol at spouses suggestion.   Instructed to speak with Dr Aquino regarding pain mgmt.   Assessment     Decreased strength/endurance  Limit active participation.     Plan     ADL related mobility strength/endurance building incorporating C spine precautions

## 2019-07-29 NOTE — REHAB-ACTIVITY IDT TEAM NOTE
ACT   Recreation/Community     Leisure Competence Measure  Leisure Awareness: Cueing Assist (limited interests in learning new leisure)  Leisure Attitude: Independent  Leisure Skills: Independent  Cultural / Social Behaviors: Independent  Interpersonal Skills: Independent  Community Integration Skills: Independent  Social Contact: Independent  Community Participation: Independent    Strengths:  Effective communication skills, Good insight into deficits/needs, Independent PLOF, Motivated for self care and independence, Pleasant and cooperative and Willingly participates in therapeutic activities  Barriers:  Decreased endurance, Generalized weakness and Home accessibility  # of short term goals set=2  # of short term goals met=0    Was evaluated 7/29 and was pleasant and cooperative. Motivated to return to [facility] and work on his ranch with goats. He spoke an using a four dove to get around the ranch prior to acute stay. He spoke on how he would like to work on standing and walking again. Goals created to reinforce these goals.           Recreation Therapy Problems           Problem: Recreation Therapy     Dates: Start: 07/29/19       Goal: STG-Within one week, patient will demonstrate a standing tolerance     Dates: Start: 07/29/19       Description: By standing while performing an activity at the standing mat 25% of the time taking breaks as needed.            Goal: STG-Within one week, patient will actively engage in planning of community re-integration outing     Dates: Start: 07/29/19             Goal: LTG-By discharge, patient will demonstrate a standing tolerance     Dates: Start: 07/29/19       Description: Will remain standing while performing an activity 50% of the time taking breaks as needed.            Goal: LTG-By discharge, patient will participate in a community re-integration outing     Dates: Start: 07/29/19                   Section completed by:  MELINA AlejandreS

## 2019-07-29 NOTE — REHAB-COLLABORATIVE ONGOING IDT TEAM NOTE
Weekly Interdisciplinary Team Conference Note    Weekly Interdisciplinary Team Conference # 1  Date:  7/29/2019    Clinicians present and reporting at team conference include the following:   MD: Adelfo Aquino DO   RN:  Gloria Brennan, RN    PT:   Luz Maria Jamison, PT, DPT  OT:  Eddie Luque, MS, OTR/L   ST:  Marion Hicks, MS, CCC-SLP  CM:  Marium Naqvi, MAURIZIO, CCM  REC:  Lalo Macdonald, MELINAS  RT:  Not Applicable  RPh:  Zen Alvarez, [facility]  Other:   Not Applicable  All reporting clinicians have a working knowledge of this patient's plan of care.    Targeted DC Date:  8/19/2019     Medical    Patient Active Problem List    Diagnosis Date Noted   • Fall 07/18/2019     Priority: High   • Low vitamin B12 level 07/19/2019     Priority: Medium   • PVD (peripheral vascular disease) (Formerly [facility]) 07/17/2019     Priority: Medium   • Type 2 diabetes mellitus without complication, without long-term current use of insulin (Formerly [facility]) 07/13/2015     Priority: Medium   • Essential hypertension 07/13/2015     Priority: Medium   • Dyslipidemia 07/13/2015     Priority: Low   • Obesity 07/13/2015     Priority: Low   • Abdominal distension 07/27/2019   • BPH (benign prostatic hyperplasia) 07/26/2019   • History of melanoma 07/26/2019   • T5 spinal cord injury (HCC) 07/25/2019   • Neuropathic pain 07/25/2019   • Neurogenic bladder 07/25/2019   • Neurogenic bowel 07/25/2019   • Hyponatremia 07/25/2019   • 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HCC) 07/25/2019   • Acute kidney injury (Formerly [facility]) 07/25/2019   • Vitamin D deficiency 07/25/2019   • Anemia 07/23/2019     Results     Procedure Component Value Ref Range Date/Time    ACCU-CHEK GLUCOSE [[id number]]  (Abnormal) Collected:  07/29/19 0724    Order Status:  Completed Lab Status:  Final result Updated:  07/29/19 0850     Glucose - Accu-Ck 121 (H) 65 - 99 mg/dL     OSMOLALITY URINE [[id number]]  (Abnormal) Collected:  07/29/19 0320    Order Status:  Completed Lab Status:  Final result Updated:  07/29/19 0623     Osmolality  Urine 246 (L) 300 - 900 mOsm/kg H2O     URINE CREATININE RANDOM [[id number]] Collected:  07/29/19 0320    Order Status:  Completed Lab Status:  Final result Updated:  07/29/19 0612     Creatinine, Random Urine 52.10 mg/dL      Comment: Reference ranges have not been established for this specimen type.  Result interpretation should include consideration of patient's  medical condition and clinical presentation.         URINE SODIUM RANDOM [[id number]] Collected:  07/29/19 0320    Order Status:  Completed Lab Status:  Final result Updated:  07/29/19 0612     Sodium, Urine -per volume 56 mmol/L      Comment: Reference ranges have not been established for this specimen type.  Result interpretation should include consideration of patient's  medical condition and clinical presentation.         ACCU-CHEK GLUCOSE [[id number]] Collected:  07/28/19 2125    Order Status:  Completed Lab Status:  Final result Updated:  07/28/19 2202     Glucose - Accu-Ck 88 65 - 99 mg/dL      Comment: Coverage given  Followed orders  Notified provider         ACCU-CHEK GLUCOSE [[id number]] Collected:  07/28/19 1714    Order Status:  Completed Lab Status:  Final result Updated:  07/28/19 1806     Glucose - Accu-Ck 96 65 - 99 mg/dL     ACCU-CHEK GLUCOSE [[id number]]  (Abnormal) Collected:  07/28/19 1131    Order Status:  Completed Lab Status:  Final result Updated:  07/28/19 1152     Glucose - Accu-Ck 133 (H) 65 - 99 mg/dL            Nursing  Diet/Nutrition:  Diabetic and Thin Liquids  Medication Administration:  Whole with Liquid Wash  % consumed at meals in last 24 hours:  Consumed 50-75% of meals per documentation.  Meal/Snack Consumption for the past 24 hrs:   Oral Nutrition Oral Nutrition Supplement    07/28/19 1820 Dinner;Between 50-75% Consumed -   07/28/19 0830 Breakfast;Between 50-75% Consumed -       Snack schedule:  HS  Supplement:  Other: N/A  Appetite:  Fair  Fluid Intake/Output in past 24 hours: In: 720 [P.O.:720]  Out: 2000   Admit  Weight:  Weight: 100.8 kg (222 lb 4.8 oz)  Weight Last 7 Days   [100.8 kg (222 lb 4.8 oz)] 100.8 kg (222 lb 4.8 oz) (07/25 1640)    Pain Issues:    Location:  Back (07/28 0740)  --         Severity:  Moderate   Scheduled pain medications:  gabapentin (NEURONTIN) Lidoderm Patch, Robaxin, Tylenol     PRN pain medications used in last 24 hours:  None   Non Pharmacologic Interventions:  rest  Effectiveness of pain management plan:  good=patient states acceptable comfort after interventions    Bowel:    Bowel Assist Initial Score:  2 - Max Assistance  Bowel Assist Current Score:  2 - Max Assistance  Bowl Accidents in last 7 days:     Last bowel movement: 07/27/19  Stool Description: Small, Formed     Usual bowel pattern:  every other day  Scheduled bowel medications:  None  PRN bowel medications used in last 24 hours:  None  Nursing Interventions:  Increased time, Positioning on commode/toilet, Supervision, Verbal cueing, Set-up  Effectiveness of bowel program:   good=regular, predictable, controlled emptying of bowel  Bladder:    Bladder Assist Initial Score:  5 - Standby Prompting/Supervision or Set-up  Bladder Assist Current Score:  5 - Standby Prompting/Supervision or Set-up  Bladder Accidents in last 7 days:     PVR range for past 24-48 hours:  []  ()  Intermittent Catheterization:  N/A  Medications affecting bladder:  Hytrin    Time void schedule/voiding pattern:  Voiding every 2-4 hours  Interventions:  Increased time, Emptying of device, Urinal, Time void patient initiated, Supervision, Verbal cueing, High PVR  Effectiveness of bladder training:  Fair=sometimes needs prompting to double void to complete bladder emptying    Diabetes:  Blood Sugar Frequency:  Before meals and at bedtime    Range of BS for last 48 hours:   Recent Labs      07/27/19   0718  07/27/19   1112  07/27/19   1708  07/27/19   2009  07/28/19   0741  07/28/19   1131  07/28/19   1714  07/28/19   2125   POCGLUCOSE  126*  124*  112*  121*   144*  133*  96  88     Scheduled diabetic medications:  metformin (GLUCOPHAGE)  and Other Glipizide and Lantus  Sliding scale usage in past 24 hours:  No  Total Short acting insulin in the past 24 hours:  None  Total Long acting insulin in the past 24 hours:  Lantus 18 units.    Wound:         Patient Lines/Drains/Airways Status    Active Current Wounds     7/20/19 Thoracic Fusion, Laminectomy                   Interventions:  Incision with staples LUIGI  Effectiveness of intervention:  wound is improving     Sleep/wake cycle:   Average hours slept:  Sleeps 3-4 hours without waking  Sleep medication usage:  None    Patient/Family Training/Education:  Bladder Management/Training, Diabetes Management, Fall Prevention, General Self Care, Medication Management, Pain Management, Safe Transfers, Safety and Wound Care  Discharge Supply Recommendations:  Glucometer and Strips and Wound Care Supplies  Strengths: Alert and oriented and Effective communication skills   Barriers:   Bladder retention, Generalized weakness and Limited mobility            Nursing Problems           Problem: Bowel/Gastric:     Goal: Normal bowel function is maintained or improved           Goal: Will not experience complications related to bowel motility             Problem: Communication     Goal: The ability to communicate needs accurately and effectively will improve             Problem: Discharge Barriers/Planning     Goal: Patient's continuum of care needs will be met             Problem: Infection     Goal: Will remain free from infection             Problem: Knowledge Deficit     Goal: Knowledge of disease process/condition, treatment plan, diagnostic tests, and medications will improve           Goal: Knowledge of the prescribed therapeutic regimen will improve             Problem: Pain Management     Goal: Pain level will decrease to patient's comfort goal             Problem: Safety     Goal: Will remain free from injury           Goal:  Will remain free from falls             Problem: Venous Thromboembolism (VTW)/Deep Vein Thrombosis (DVT) Prevention:     Goal: Patient will participate in Venous Thrombosis (VTE)/Deep Vein Thrombosis (DVT)Prevention Measures                  Long Term Goals:   At discharge patient will be able to function safely at home and in the community with support.    Section completed by:  Bessie Guzman L.P.N. 2             Mobility  Bed mobility:   2  Bed /Chair/Wheelchair Transfer Initial:  3 - Moderate Assistance  Bed /Chair/Wheelchair Transfer Current:  2 - Max Assistance   Bed/Chair/Wheelchair Transfer Description:   (SPT: maxA; squat pivot with set up: Isabel; bed mob: modA for trunk/ L LE)  Walk Initial:  0 - Not tested,unsafe activity  Walk Current:  0 - Not tested,unsafe activity   Walk Description:     Wheelchair Initial:  2 - Max Assistance  Wheelchair Current:  2 - Max Assistance   Wheelchair Description:   (65ft x1, B LE, VCs for technique)  Stairs Initial:  0 - Not tested,unsafe activity  Stairs Current: 0 - Not tested,unsafe activity   Stairs Description:    Patient/Family Training/Education: passport, educational materials, pressure relief, transfer technqiue   DME/DC Recommendations:  TBD, pt owns wheelchair, will need cushion  Strengths:  Motivated for self care and independence, Supportive family and Willingly participates in therapeutic activities  Barriers:   Other: impaired coordination, impaired standing balance, impaired proprioception at great toe, fatigue/ lightheadedness, limited standing tolerance  # of short term goals set= 3  # of short term goals met= 0       Physical Therapy Problems           Problem: Balance     Dates: Start: 07/26/19       Goal: STG-Within one week, patient will maintain static standing     Dates: Start: 07/26/19       Description: 1) Individualized goal:  2min x 1, SBA with B UE support as needed  2) Interventions:  PT Group Therapy, PT Gait Training, PT Therapeutic  Exercises, PT Neuro Re-Ed/Balance, PT Therapeutic Activity, PT Manual Therapy and PT Evaluation               Problem: Mobility     Dates: Start: 07/26/19       Goal: STG-Within one week, patient will propel wheelchair community     Dates: Start: 07/26/19       Description: 1) Individualized goal:  150ft x 1, B UE, mod I  2) Interventions:  PT Group Therapy, PT Gait Training, PT Therapeutic Exercises, PT Neuro Re-Ed/Balance, PT Therapeutic Activity, PT Manual Therapy and PT Evaluation               Problem: Mobility Transfers     Dates: Start: 07/26/19       Goal: STG-Within one week, patient will perform bed mobility     Dates: Start: 07/26/19       Description: 1) Individualized goal: with bed rail as needed, SPV  2) Interventions:  PT Group Therapy, PT Gait Training, PT Therapeutic Exercises, PT Neuro Re-Ed/Balance, PT Therapeutic Activity, PT Manual Therapy and PT Evaluation           Goal: STG-Within one week, patient will transfer bed to chair     Dates: Start: 07/26/19       Description: 1) Individualized goal: SBA with LRAD  2) Interventions:  PT Group Therapy, PT Gait Training, PT Therapeutic Exercises, PT Neuro Re-Ed/Balance, PT Therapeutic Activity, PT Manual Therapy and PT Evaluation               Problem: PT-Long Term Goals     Dates: Start: 07/26/19       Goal: LTG-By discharge, patient will ambulate     Dates: Start: 07/26/19       Description: 1) Individualized goal:  50ft x1 SPV LRAD  2) Interventions:  PT Group Therapy, PT Gait Training, PT Therapeutic Exercises, PT Neuro Re-Ed/Balance, PT Therapeutic Activity, PT Manual Therapy and PT Evaluation           Goal: LTG-By discharge, patient will perform home exercise program     Dates: Start: 07/26/19       Description: 1) Individualized goal:  For balance and B LE strength/ endurance, mod I  2) Interventions:  PT Group Therapy, PT Gait Training, PT Therapeutic Exercises, PT Neuro Re-Ed/Balance, PT Therapeutic Activity, PT Manual Therapy and PT  Evaluation                    Goal: LTG-By discharge, patient will     Dates: Start: 07/26/19       Description: 1) Individualized goal:  Up/down 2 stairs, Isabel to mimic home environment   2) Interventions:  PT Group Therapy, PT Gait Training, PT Therapeutic Exercises, PT Neuro Re-Ed/Balance, PT Therapeutic Activity, PT Manual Therapy and PT Evaluation             Goal: LTG-By discharge, patient will     Dates: Start: 07/26/19       Description: 1) Individualized goal: perform car transfer with SPV, LRAD  2) Interventions:  PT Group Therapy, PT Gait Training, PT Therapeutic Exercises, PT Neuro Re-Ed/Balance, PT Therapeutic Activity, PT Manual Therapy and PT Evaluation                     Section completed by:  Luz Maria Jamison, PT, DPT        Activities of Daily Living  Eating Initial:  5 - Standby Prompting/Supervision or Set-up  Eating Current:  5 - Standby Prompting/Supervision or Set-up   Eating Description:  Set-up of equipment or meal/tube feeding  Grooming Initial:  5 - Standby Prompting/Supervision or Set-up  Grooming Current:  5 - Standby Prompting/Supervision or Set-up   Grooming Description:  Supervision for safety, Set-up of equipment (set up/supv for oral care and combing hair seated at sink.)  Bathing Initial:  3 - Moderate Assistance  Bathing Current:  3 - Moderate Assistance   Bathing Description:  Grab bar, Tub bench, Hand held shower, Increased time, Verbal cueing, Supervision for safety, Set-up of equipment (assist to wash distal BLEs/feet; CGA/GB for standing balance and assist for thouroughness with washing buttocks.)  Upper Body Dressing Initial:  5 - Standby Prompting/Supervision or Set-up  Upper Body Dressing Current:  5 - Standby Prompting/Supervision or Set-up   Upper Body Dressing Description:  Increased time, Supervision for safety, Set-up of equipment (SBA/set up to don/doff pullover shirt.)  Lower Body Dressing Initial:  2 - Max Assistance  Lower Body Dressing Current:  2 - Max  Assistance   Lower Body Dressing Description:  2 - Max Assistance  Toileting Initial:  3 - Moderate Assistance  Toileting Current:  2 - Max Assistance   Toileting Description:  Grab bar, Increased time, Supervision for safety, Verbal cueing, Set-up of equipment  Toilet Transfer Initial:  3 - Moderate Assistance  Toilet Transfer Current:  3 - Moderate Assistance   Toilet Transfer Description:  3 - Moderate Assistance  Tub / Shower Transfer Initial:  3 - Moderate Assistance  Tub / Shower Transfer Current:  3 - Moderate Assistance   Tub / Shower Transfer Description:  Grab bar, Increased time, Supervision for safety, Verbal cueing, Set-up of equipment, Requires lift (w/c<>tub bench SPT with GB and assist for lift.)  IADL:  Not yet addressed  Family Training/Education:  None  DME/DC Recommendations:   Patient appears to have all needed DME    Strengths:  Alert and oriented, Effective communication skills, Good insight into deficits/needs, Independent PLOF, Motivated for self care and independence, Pleasant and cooperative, Supportive family and Willingly participates in therapeutic activities  Barriers:  Decreased endurance, Generalized weakness, Limited mobility, Poor activity tolerance and Poor balance     # of short term goals set= 4    # of short term goals met= 0          Occupational Therapy Goals           Problem: Bathing     Dates: Start: 07/26/19       Goal: STG-Within one week, patient will bathe     Dates: Start: 07/26/19       Description: 1) Individualized Goal:  Supervision with use of AE/DME prn to maintain spinal prec.  2) Interventions:  OT Group Therapy, OT Self Care/ADL, OT Community Reintegration, OT Manual Ther Technique, OT Neuro Re-Ed/Balance, OT Sensory Int Techniques, OT Therapeutic Activity, OT Evaluation and OT Therapeutic Exercise       Note:     Goal Note filed on 07/29/19 1041 by Eddie Luque MS,OTR/L    Goal: STG-Within one week, patient will bathe  Outcome: NOT MET  Mod A                   Problem: Dressing     Dates: Start: 07/26/19       Goal: STG-Within one week, patient will dress LB     Dates: Start: 07/26/19       Description: 1) Individualized Goal:  supervision with use of AE prn to maintain spinal prec.  2) Interventions:  OT Group Therapy, OT Self Care/ADL, OT Community Reintegration, OT Manual Ther Technique, OT Neuro Re-Ed/Balance, OT Sensory Int Techniques, OT Therapeutic Activity, OT Evaluation and OT Therapeutic Exercise       Note:     Goal Note filed on 07/29/19 1041 by Eddie Luque, MS,OTR/L    Goal: STG-Within one week, patient will dress LB  Outcome: NOT MET  Max A                  Problem: Functional Transfers     Dates: Start: 07/26/19       Goal: STG-Within one week, patient will     Dates: Start: 07/26/19       Description: 1) Individualized Goal:  Transfer to toilet and shower at supervision level with AE/DME prn.  2) Interventions:  OT Group Therapy, OT Self Care/ADL, OT Community Reintegration, OT Manual Ther Technique, OT Neuro Re-Ed/Balance, OT Sensory Int Techniques, OT Therapeutic Activity, OT Evaluation and OT Therapeutic Exercise       Note:     Goal Note filed on 07/29/19 1041 by Eddie Luque, MS,OTR/L    Goal: STG-Within one week, patient will  Outcome: NOT MET  Mod A                  Problem: IADL's     Dates: Start: 07/26/19       Goal: STG-Within one week, patient will access kitchen area     Dates: Start: 07/26/19       Description: 1) Individualized Goal:  Supervision with AE/DME prn.  2) Interventions:  OT Group Therapy, OT Self Care/ADL, OT Community Reintegration, OT Manual Ther Technique, OT Neuro Re-Ed/Balance, OT Sensory Int Techniques, OT Therapeutic Activity, OT Evaluation and OT Therapeutic Exercise       Note:     Goal Note filed on 07/29/19 1041 by Eddie Luque MS,OTR/L    Goal: STG-Within one week, patient will access kitchen area  Outcome: NOT MET  Not yet attempted                  Problem: OT Long Term Goals     Dates:  Start: 07/26/19       Goal: LTG-By discharge, patient will complete basic self care tasks     Dates: Start: 07/26/19       Description: 1) Individualized Goal:  Mod I with use of AE/AD/DME prn.  2) Interventions:  OT Group Therapy, OT Self Care/ADL, OT Community Reintegration, OT Manual Ther Technique, OT Neuro Re-Ed/Balance, OT Sensory Int Techniques, OT Therapeutic Activity, OT Evaluation and OT Therapeutic Exercise             Goal: LTG-By discharge, patient will perform bathroom transfers     Dates: Start: 07/26/19       Description: 1) Individualized Goal:  Mod I with use of AD/DME prn.  2) Interventions:  OT Group Therapy, OT Self Care/ADL, OT Community Reintegration, OT Manual Ther Technique, OT Neuro Re-Ed/Balance, OT Sensory Int Techniques, OT Therapeutic Activity, OT Evaluation and OT Therapeutic Exercise             Goal: LTG-By discharge, patient will complete basic home management     Dates: Start: 07/26/19       Description: 1) Individualized Goal:  Mod I with use of AE/AD/DME prn.  2) Interventions:  OT Group Therapy, OT Self Care/ADL, OT Community Reintegration, OT Manual Ther Technique, OT Neuro Re-Ed/Balance, OT Sensory Int Techniques, OT Therapeutic Activity, OT Evaluation and OT Therapeutic Exercise                   Section completed by:  Eddie Luque MS,OTR/L       Cognitive Linquistic Functions  Comprehension Initial:  4 - Minimal Assistance  Comprehension Current:  5 - Stand-by Prompting/Supervision or Set-up   Comprehension Description:  Verbal cues  Expression Initial:  7 - Independent  Expression Current:  7 - Independent   Expression Description:     Social Interaction Initial:  6 - Modified Independent  Social Interaction Current:  6 - Modified Independent   Social Interaction Description:  Medication  Problem Solving Initial:  5 - Standby Prompting/Supervision or Set-up  Problem Solving Current:  4 - Minimal Assistance   Problem Solving Description:  Bed/chair alarm, Therapy  schedule, Increased time, Seat belt, Verbal cueing  Memory Initial:  5 - Standby Prompting/Supervision or Set-up  Memory Current:  5 - Standby Prompting/Supervision or Set-up   Memory Description:  Bed/chair alarm, Therapy schedule, Verbal cueing  Executive Functioning / Organization Initial:     Executive Functioning / Organization Current:      Executive Functioning Desciption:  Verbal cues extra time.  Swallowing  Swallowing Status:  Patient participated in clinical swallow evaluation revealing swallow function is WNL. This patient does not currently receive dysphagia intervention.  Orders Placed This Encounter   Procedures   • Diet Order Diabetic     Standing Status:   Standing     Number of Occurrences:   1     Order Specific Question:   Diet:     Answer:   Diabetic [3]     Behavior Modification Plan  Allow for rest time, Keep instructions simple/concrete, Give clear feedback, Set clear goals, Redirect to task/topic and Other: verbal cues to participate and persist with tx tasks.  Assistive Technology  Low tech: Calendar, planner, schedule, alarms/timers, pill organizer, post-it notes, lists  Family Training/Education:  To be scheduled.  DC Recommendations:   Anticipate patient will benefit from additional ST services upon discharge from this facility.  Strengths:  Alert and oriented, Making steady progress towards goals, Motivated for self care and independence, Pleasant and cooperative, Supportive family and Willingly participates in therapeutic activities  Barriers:  Other: impaired carry over, impaired speed of processing.  # of short term goals set= 2  # of short term goals met=0  Patient has been seen for and initial evaluation and one ST tx at the time this note was written.       Speech Therapy Problems           Problem: Memory STGs     Dates: Start: 07/26/19       Goal: STG-Within one week, patient will     Dates: Start: 07/26/19       Description: 1) Individualized goal:  perform simple prospective  memory/recall tasks with 90% when provided clinician supervision assist  2) Interventions:  SLP Cognitive Skill Development and SLP Group Treatment       Note:     Goal Note filed on 07/29/19 0819 by Marion Hicks MS,CCC-SLP    Goal: STG-Within one week, patient will  Outcome: NOT MET  Patient recalled recent events with supervision for extra time and   coaxing.                  Problem: Problem Solving STGs     Dates: Start: 07/26/19       Goal: STG-Within one week, patient will     Dates: Start: 07/26/19       Description: 1) Individualized goal:  Perform complex attention tasks with 90% accuracy and clinician supervision  2) Interventions: SLP Self Care / ADL Training , SLP Cognitive Skill Development and SLP Group Treatme       Note:     Goal Note filed on 07/29/19 0819 by Marion Hicks MS,CCC-SLP    Goal: STG-Within one week, patient will  Outcome: NOT MET  Mod A needed for simple attention tasks.                  Problem: Speech/Swallowing LTGs     Dates: Start: 07/26/19       Goal: LTG-By discharge, patient will solve complex problem     Dates: Start: 07/26/19       Description: 1) Individualized goal:  For home and community with 90% accuracy and MOD I for meta-cognitive strategies  2) Interventions:  SLP Self Care / ADL Training , SLP Cognitive Skill Development and SLP Group Treatment             Goal: LTG-By discharge, patient will     Dates: Start: 07/26/19       Description: 1) Individualized goal:  Perform functional prospective memory tasks with mod I to achieve 90% accuracy.  2) Interventions:  SLP Self Care / ADL Training , SLP Cognitive Skill Development and SLP Group Treatme                    Section completed by:  Marion Hicks MS,[facility]SLP       Recreation/Community     Leisure Competence Measure  Leisure Awareness: Cueing Assist (limited interests in learning new leisure)  Leisure Attitude: Independent  Leisure Skills: Independent  Cultural / Social Behaviors: Independent  Interpersonal  Skills: Independent  Community Integration Skills: Independent  Social Contact: Independent  Community Participation: Independent    Strengths:  Effective communication skills, Good insight into deficits/needs, Independent PLOF, Motivated for self care and independence, Pleasant and cooperative and Willingly participates in therapeutic activities  Barriers:  Decreased endurance, Generalized weakness and Home accessibility  # of short term goals set=2  # of short term goals met=0    Was evaluated 7/29 and was pleasant and cooperative. Motivated to return to 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 He spoke an using a four dove to get around the ranch prior to acute stay. He spoke on how he would like to work on standing and walking again. Goals created to reinforce these goals.           Recreation Therapy Problems           Problem: Recreation Therapy     Dates: Start: 07/29/19       Goal: STG-Within one week, patient will demonstrate a standing tolerance     Dates: Start: 07/29/19       Description: By standing while performing an activity at the standing mat 25% of the time taking breaks as needed.            Goal: STG-Within one week, patient will actively engage in planning of community re-integration outing     Dates: Start: 07/29/19             Goal: LTG-By discharge, patient will demonstrate a standing tolerance     Dates: Start: 07/29/19       Description: Will remain standing while performing an activity 50% of the time taking breaks as needed.            Goal: LTG-By discharge, patient will participate in a community re-integration outing     Dates: Start: 07/29/19                   Section completed by:  BOB Alejandre       REHAB-Pharmacy IDT Team Note by Henrietta RamirezD at 7/26/2019  5:34 PM  Version 1 of 1    Author:  Presley Lewis PharmD Service:  (none) Author Type:  Pharmacist    Filed:  7/26/2019  5:37 PM Date of Service:  7/26/2019  5:34 PM Status:  Signed    :   Presley Lewis PharmD (Pharmacist)         Pharmacy   Pharmacy  Antibiotics/Antifungals/Antivirals:  Medication:      Active Orders     None        Route:         NA  Stop Date:  NA  Reason: NA  Antihypertensives/Cardiac:  Medication:    Orders (72h ago through future)    Start     Ordered    07/26/19 0900  amLODIPine (NORVASC) tablet 10 mg  DAILY      07/25/19 1648    07/26/19 0900  lisinopril (PRINIVIL) tablet 40 mg  DAILY      07/25/19 1648    07/26/19 0900  spironolactone (ALDACTONE) tablet 25 mg  DAILY      07/25/19 1648    07/25/19 2100  atorvastatin (LIPITOR) tablet 20 mg  NIGHTLY      07/25/19 1648    07/25/19 2100  prazosin (MINIPRESS) capsule 5 mg  NIGHTLY,   Status:  Discontinued      07/25/19 1648 07/25/19 2100  verapamil ER (CALAN-SR) tablet 180 mg  2 TIMES DAILY      07/25/19 1648 07/25/19 1715  terazosin (HYTRIN) capsule 5 mg  2 TIMES DAILY      07/25/19 1648        Patient Vitals for the past 24 hrs:   BP Pulse   07/26/19 1430 157/74 72   07/26/19 0645 117/58 70   07/26/19 0500 108/58 -   07/25/19 1934 - 81   07/25/19 1829 121/55 67       Anticoagulation:  Medication:  None  INR:    Recent Labs      07/26/19   0555   INR  1.01     Other key medications: Humalog, Duloxetine, Gabapentin, Lantus, Metformin, Methocarbamol, Omeprazole  A review of the medication list reveals no issues at this time. Patient is currently on antihypertensive(s). Recommend home blood pressure monitoring/recording if antihypertensive(s) regimen(s) continue.  Patient is currently on diabetic medication(s) and/or Insulin(s). Recommend monitoring and recording of blood sugars to insure compliance, if these regimen(s) continue.    Section completed by:  Presley Lewis PharmD[[email or web address]]        Attribution Miner     [email or web address] - Presley Lewis PharmD on 7/26/2019  5:34 PM                    DC Planning  DC destination/dispostion:  Patient lives in a single level home with his wife.  He has 2 entry steps.    Referrals: [facility]  placard    DC Needs: Patient has a 4ww, cane, w/c, shower chair and grab bars.  He will need follow up therapy in [place].  He will need follow up with PCP and neurosurgery.    Barriers to discharge:       Strengths: good support for home.    Section completed by:  Marium Naqvi R.N.      Physician Summary  Adelfo Aquino DO participated and led team conference discussion.

## 2019-07-29 NOTE — THERAPY
Physical Therapy   Daily Treatment     Patient Name: Ian Laird  Age:  69 y.o., Sex:  male  Medical Record #: [id number]  Today's Date: 7/29/2019     Precautions  Precautions: (P) Spinal / Back Precautions , Fall Risk  Comments: (P) T3-T7 spinal fusion    Subjective    Pt was sitting in w/c upon arrival and agreeable to tx     Objective       07/29/19 1201   Precautions   Precautions Spinal / Back Precautions ;Fall Risk   Comments T3-T7 spinal fusion   Interdisciplinary Plan of Care Collaboration   IDT Collaboration with  Family / Caregiver   Patient Position at End of Therapy Seated;Other (Comments)  (hand off to OT)   Collaboration Comments Marion present for family training   PT Total Time Spent   PT Individual Total Time Spent (Mins) 60   PT Charge Group   PT Therapeutic Activities 4     Home environment/ goals:   Marion provided pictures and measurements of home environment and vehicle pt will d/c home in. Discussed accessibility, requirements for access/ plan of care specific to patient    Wheelchair efficiency:  40ft x 4 w/c propulsion with VCs/ TCs and demo of proper technique to reduce stress on shoulders/ improve efficiency of propulsion    HEP:  Review of endurance HEP, neurocontrol/ coordination HEP and strength HEP as well as duration/ frequency of each for performance while in room    Bowel and Bladder:  Discussion of SCI educational material from [employer] and impact of injury. Discussed positioning/ transfers in relation to bladder/ bowel program    Transfers:  Review/ demo of transfer technique/ caregiver training    Skin integrity:  Review of pressure relief with practice, prevention of pressure injuries    Assessment    Marion very involved in care and able to assist as needed. Pt limited by endurance, mild insight impairments and neurocontrol in B LE    Plan    Lite gait, neuro control, standing tolerance/ standing balance, coordination exercises, progress through passport, family training  Marion to perform transfers, review education on pressure relief/ skin checks

## 2019-07-29 NOTE — REHAB-OT IDT TEAM NOTE
Occupational Therapy   Activities of Daily Living  Eating Initial:  5 - Standby Prompting/Supervision or Set-up  Eating Current:  5 - Standby Prompting/Supervision or Set-up   Eating Description:  Set-up of equipment or meal/tube feeding  Grooming Initial:  5 - Standby Prompting/Supervision or Set-up  Grooming Current:  5 - Standby Prompting/Supervision or Set-up   Grooming Description:  Supervision for safety, Set-up of equipment (set up/supv for oral care and combing hair seated at sink.)  Bathing Initial:  3 - Moderate Assistance  Bathing Current:  3 - Moderate Assistance   Bathing Description:  Grab bar, Tub bench, Hand held shower, Increased time, Verbal cueing, Supervision for safety, Set-up of equipment (assist to wash distal BLEs/feet; CGA/GB for standing balance and assist for thouroughness with washing buttocks.)  Upper Body Dressing Initial:  5 - Standby Prompting/Supervision or Set-up  Upper Body Dressing Current:  5 - Standby Prompting/Supervision or Set-up   Upper Body Dressing Description:  Increased time, Supervision for safety, Set-up of equipment (SBA/set up to don/doff pullover shirt.)  Lower Body Dressing Initial:  2 - Max Assistance  Lower Body Dressing Current:  2 - Max Assistance   Lower Body Dressing Description:  2 - Max Assistance  Toileting Initial:  3 - Moderate Assistance  Toileting Current:  2 - Max Assistance   Toileting Description:  Grab bar, Increased time, Supervision for safety, Verbal cueing, Set-up of equipment  Toilet Transfer Initial:  3 - Moderate Assistance  Toilet Transfer Current:  3 - Moderate Assistance   Toilet Transfer Description:  3 - Moderate Assistance  Tub / Shower Transfer Initial:  3 - Moderate Assistance  Tub / Shower Transfer Current:  3 - Moderate Assistance   Tub / Shower Transfer Description:  Grab bar, Increased time, Supervision for safety, Verbal cueing, Set-up of equipment, Requires lift (w/c<>tub bench SPT with GB and assist for lift.)  IADL:  Not yet  addressed  Family Training/Education:  None  DME/DC Recommendations:   Patient appears to have all needed DME    Strengths:  Alert and oriented, Effective communication skills, Good insight into deficits/needs, Independent PLOF, Motivated for self care and independence, Pleasant and cooperative, Supportive family and Willingly participates in therapeutic activities  Barriers:  Decreased endurance, Generalized weakness, Limited mobility, Poor activity tolerance and Poor balance     # of short term goals set= 4    # of short term goals met= 0          Occupational Therapy Goals           Problem: Bathing     Dates: Start: 07/26/19       Goal: STG-Within one week, patient will bathe     Dates: Start: 07/26/19       Description: 1) Individualized Goal:  Supervision with use of AE/DME prn to maintain spinal prec.  2) Interventions:  OT Group Therapy, OT Self Care/ADL, OT Community Reintegration, OT Manual Ther Technique, OT Neuro Re-Ed/Balance, OT Sensory Int Techniques, OT Therapeutic Activity, OT Evaluation and OT Therapeutic Exercise       Note:     Goal Note filed on 07/29/19 1041 by Eddie Luque MS,OTR/L    Goal: STG-Within one week, patient will bathe  Outcome: NOT MET  Mod A                  Problem: Dressing     Dates: Start: 07/26/19       Goal: STG-Within one week, patient will dress LB     Dates: Start: 07/26/19       Description: 1) Individualized Goal:  supervision with use of AE prn to maintain spinal prec.  2) Interventions:  OT Group Therapy, OT Self Care/ADL, OT Community Reintegration, OT Manual Ther Technique, OT Neuro Re-Ed/Balance, OT Sensory Int Techniques, OT Therapeutic Activity, OT Evaluation and OT Therapeutic Exercise       Note:     Goal Note filed on 07/29/19 1041 by Eddie Luque MS,OTR/L    Goal: STG-Within one week, patient will dress LB  Outcome: NOT MET  Max A                  Problem: Functional Transfers     Dates: Start: 07/26/19       Goal: STG-Within one week, patient  will     Dates: Start: 07/26/19       Description: 1) Individualized Goal:  Transfer to toilet and shower at supervision level with AE/DME prn.  2) Interventions:  OT Group Therapy, OT Self Care/ADL, OT Community Reintegration, OT Manual Ther Technique, OT Neuro Re-Ed/Balance, OT Sensory Int Techniques, OT Therapeutic Activity, OT Evaluation and OT Therapeutic Exercise       Note:     Goal Note filed on 07/29/19 1041 by Eddie Luque, MS,OTR/L    Goal: STG-Within one week, patient will  Outcome: NOT MET  Mod A                  Problem: IADL's     Dates: Start: 07/26/19       Goal: STG-Within one week, patient will access kitchen area     Dates: Start: 07/26/19       Description: 1) Individualized Goal:  Supervision with AE/DME prn.  2) Interventions:  OT Group Therapy, OT Self Care/ADL, OT Community Reintegration, OT Manual Ther Technique, OT Neuro Re-Ed/Balance, OT Sensory Int Techniques, OT Therapeutic Activity, OT Evaluation and OT Therapeutic Exercise       Note:     Goal Note filed on 07/29/19 1041 by Eddie Luque, MS,OTR/L    Goal: STG-Within one week, patient will access kitchen area  Outcome: NOT MET  Not yet attempted                  Problem: OT Long Term Goals     Dates: Start: 07/26/19       Goal: LTG-By discharge, patient will complete basic self care tasks     Dates: Start: 07/26/19       Description: 1) Individualized Goal:  Mod I with use of AE/AD/DME prn.  2) Interventions:  OT Group Therapy, OT Self Care/ADL, OT Community Reintegration, OT Manual Ther Technique, OT Neuro Re-Ed/Balance, OT Sensory Int Techniques, OT Therapeutic Activity, OT Evaluation and OT Therapeutic Exercise             Goal: LTG-By discharge, patient will perform bathroom transfers     Dates: Start: 07/26/19       Description: 1) Individualized Goal:  Mod I with use of AD/DME prn.  2) Interventions:  OT Group Therapy, OT Self Care/ADL, OT Community Reintegration, OT Manual Ther Technique, OT Neuro Re-Ed/Balance, OT  Sensory Int Techniques, OT Therapeutic Activity, OT Evaluation and OT Therapeutic Exercise             Goal: LTG-By discharge, patient will complete basic home management     Dates: Start: 07/26/19       Description: 1) Individualized Goal:  Mod I with use of AE/AD/DME prn.  2) Interventions:  OT Group Therapy, OT Self Care/ADL, OT Community Reintegration, OT Manual Ther Technique, OT Neuro Re-Ed/Balance, OT Sensory Int Techniques, OT Therapeutic Activity, OT Evaluation and OT Therapeutic Exercise                   Section completed by:  Eddie Luque MS,OTR/L

## 2019-07-29 NOTE — PROGRESS NOTES
"Rehab Progress Note     Encounter Date: 7/29/2019    CC: LE weakness    Interval Events (Subjective)    He is very concerned about not being able to urinate, nurses been working with him on double voiding to manage PVRs  His bladder volumes also significantly elevated    He complains of significant pain in his mid thoracic region, site of surgery, this is holding him back from participating fully with therapy.  He has not been taking his as needed oxycodone.  He reports taking tramadol in the past and this worked well for him prior to surgery.     He is very anxious about intermittent catheterization    Last BM: 07/27/19      ROS:  Gen: No fatigue, confusion, significant weight loss  Eyes: no blurry vision, double vision or pain in eyes  ENT: no changes in hearing, runny nose, nose bleeds, sinus pain  CV: No CP, palpitations,   Lungs: no shortness of breath, changes in secretions, changes in cough, pain with coughing  Abd: No abd pain, nausea, vomiting, pain with eating  : no blood in urine,  suprapubic pain  Ext: No swelling in legs, asymmetric atrophy  Neuro: no changes in strength or sensation in last 24 hours  Skin: no new ulcers/skin breakdown appreciated by patient  Mood: No changes in mood today, increase in depression or anxiety  Heme: no bruising, or bleeding  Rest of 14 point review of systems is negative      Objective:  Vitals: /70   Pulse 73   Temp 37.2 °C (98.9 °F) (Oral)   Resp 16   Ht 1.778 m (5' 10\")   Wt 100.8 kg (222 lb 4.8 oz)   SpO2 98%   Gen: NAD, Body mass index is 31.9 kg/m².  HEENT:  NC/AT, PERRLA, moist mucous membranes  Cardio: RRR, no mumurs  Pulm: CTAB, with normal respiratory effort  Abd: Soft NTND, active bowel sounds,  Ext: No peripheral edema.  +dorsal pedalis pulses bilaterally.  Teds in place    Motor Exam Upper Extremities   ? Myotome R L   Shoulder flexion C5 5 5   Elbow flexion C5 5 5   Wrist extension C6 5 5   Elbow extension C7 5 5   Finger flexion C8 5 5 "   Finger abduction T1 5 5     Motor Exam Lower Extremities    ? Myotome R L   Hip flexion L2 4 4   Knee extension L3 5 4   Ankle dorsiflexion L4 4 4   Toe extension L5 5 4   Ankle plantarflexion S1 4 4     Tone: No past density present, no clonus present    Recent Results (from the past 72 hour(s))   ACCU-CHEK GLUCOSE    Collection Time: 07/26/19 11:18 AM   Result Value Ref Range    Glucose - Accu-Ck 117 (H) 65 - 99 mg/dL   URINE CREATININE RANDOM    Collection Time: 07/26/19  3:35 PM   Result Value Ref Range    Creatinine, Random Urine 70.10 mg/dL   URINE SODIUM RANDOM    Collection Time: 07/26/19  3:35 PM   Result Value Ref Range    Sodium, Urine -per volume 44 mmol/L   OSMOLALITY URINE    Collection Time: 07/26/19  3:35 PM   Result Value Ref Range    Osmolality Urine 299 (L) 300 - 900 mOsm/kg H2O   ACCU-CHEK GLUCOSE    Collection Time: 07/26/19  5:16 PM   Result Value Ref Range    Glucose - Accu-Ck 122 (H) 65 - 99 mg/dL   ACCU-CHEK GLUCOSE    Collection Time: 07/26/19  8:12 PM   Result Value Ref Range    Glucose - Accu-Ck 179 (H) 65 - 99 mg/dL   Basic Metabolic Panel    Collection Time: 07/27/19  5:59 AM   Result Value Ref Range    Sodium 131 (L) 135 - 145 mmol/L    Potassium 3.8 3.6 - 5.5 mmol/L    Chloride 96 96 - 112 mmol/L    Co2 25 20 - 33 mmol/L    Glucose 122 (H) 65 - 99 mg/dL    Bun 13 8 - 22 mg/dL    Creatinine 0.98 0.50 - 1.40 mg/dL    Calcium 8.6 8.5 - 10.5 mg/dL    Anion Gap 10.0 0.0 - 11.9   OSMOLALITY SERUM    Collection Time: 07/27/19  5:59 AM   Result Value Ref Range    Osmolality Serum 276 (L) 278 - 298 mOsm/kg H2O   CBC WITH DIFFERENTIAL    Collection Time: 07/27/19  5:59 AM   Result Value Ref Range    WBC 8.3 4.8 - 10.8 K/uL    RBC 2.59 (L) 4.70 - 6.10 M/uL    Hemoglobin 8.4 (L) 14.0 - 18.0 g/dL    Hematocrit 25.0 (L) 42.0 - 52.0 %    MCV 96.5 81.4 - 97.8 fL    MCH 32.4 27.0 - 33.0 pg    MCHC 33.6 (L) 33.7 - 35.3 g/dL    RDW 46.9 35.9 - 50.0 fL    Platelet Count 221 164 - 446 K/uL    MPV 9.6  9.0 - 12.9 fL    Neutrophils-Polys 68.70 44.00 - 72.00 %    Lymphocytes 16.60 (L) 22.00 - 41.00 %    Monocytes 10.10 0.00 - 13.40 %    Eosinophils 2.90 0.00 - 6.90 %    Basophils 0.10 0.00 - 1.80 %    Immature Granulocytes 1.60 (H) 0.00 - 0.90 %    Nucleated RBC 0.00 /100 WBC    Neutrophils (Absolute) 5.67 1.82 - 7.42 K/uL    Lymphs (Absolute) 1.37 1.00 - 4.80 K/uL    Monos (Absolute) 0.83 0.00 - 0.85 K/uL    Eos (Absolute) 0.24 0.00 - 0.51 K/uL    Baso (Absolute) 0.01 0.00 - 0.12 K/uL    Immature Granulocytes (abs) 0.13 (H) 0.00 - 0.11 K/uL    NRBC (Absolute) 0.00 K/uL   ESTIMATED GFR    Collection Time: 07/27/19  5:59 AM   Result Value Ref Range    GFR If African American >60 >60 mL/min/1.73 m 2    GFR If Non African American >60 >60 mL/min/1.73 m 2   ACCU-CHEK GLUCOSE    Collection Time: 07/27/19  7:18 AM   Result Value Ref Range    Glucose - Accu-Ck 126 (H) 65 - 99 mg/dL   ACCU-CHEK GLUCOSE    Collection Time: 07/27/19 11:12 AM   Result Value Ref Range    Glucose - Accu-Ck 124 (H) 65 - 99 mg/dL   ACCU-CHEK GLUCOSE    Collection Time: 07/27/19  5:08 PM   Result Value Ref Range    Glucose - Accu-Ck 112 (H) 65 - 99 mg/dL   ACCU-CHEK GLUCOSE    Collection Time: 07/27/19  8:09 PM   Result Value Ref Range    Glucose - Accu-Ck 121 (H) 65 - 99 mg/dL   ACCU-CHEK GLUCOSE    Collection Time: 07/28/19  7:41 AM   Result Value Ref Range    Glucose - Accu-Ck 144 (H) 65 - 99 mg/dL   ACCU-CHEK GLUCOSE    Collection Time: 07/28/19 11:31 AM   Result Value Ref Range    Glucose - Accu-Ck 133 (H) 65 - 99 mg/dL   ACCU-CHEK GLUCOSE    Collection Time: 07/28/19  5:14 PM   Result Value Ref Range    Glucose - Accu-Ck 96 65 - 99 mg/dL   ACCU-CHEK GLUCOSE    Collection Time: 07/28/19  9:25 PM   Result Value Ref Range    Glucose - Accu-Ck 88 65 - 99 mg/dL   URINE CREATININE RANDOM    Collection Time: 07/29/19  3:20 AM   Result Value Ref Range    Creatinine, Random Urine 52.10 mg/dL   OSMOLALITY URINE    Collection Time: 07/29/19  3:20 AM    Result Value Ref Range    Osmolality Urine 246 (L) 300 - 900 mOsm/kg H2O   URINE SODIUM RANDOM    Collection Time: 07/29/19  3:20 AM   Result Value Ref Range    Sodium, Urine -per volume 56 mmol/L   ACCU-CHEK GLUCOSE    Collection Time: 07/29/19  7:24 AM   Result Value Ref Range    Glucose - Accu-Ck 121 (H) 65 - 99 mg/dL       Current Facility-Administered Medications   Medication Frequency   • insulin glargine (LANTUS) injection 10 Units Q EVENING   • glipiZIDE SR (GLUCOTROL) tablet 2.5 mg QAM AC   • simethicone (MYLICON) chewable tab 80 mg 4X/DAY WITH MEALS + NIGHTLY   • gabapentin (NEURONTIN) capsule 300 mg TID   • lidocaine (LIDODERM) 5 % 2 Patch Q24HR   • insulin lispro (HUMALOG) injection 2-12 Units 4X/DAY ACHS    And   • glucose 4 g chewable tablet 16 g Q15 MIN PRN    And   • dextrose 50% (D50W) injection 50 mL Q15 MIN PRN   • metFORMIN (GLUCOPHAGE) tablet 1,000 mg BID WITH MEALS   • amLODIPine (NORVASC) tablet 10 mg DAILY   • atorvastatin (LIPITOR) tablet 20 mg Nightly   • glucose 4 g chewable tablet 16 g PRN   • lisinopril (PRINIVIL) tablet 40 mg DAILY   • loratadine (CLARITIN) tablet 10 mg DAILY   • omeprazole (PRILOSEC) capsule 20 mg DAILY   • spironolactone (ALDACTONE) tablet 25 mg DAILY   • terazosin (HYTRIN) capsule 5 mg BID   • verapamil ER (CALAN-SR) tablet 180 mg BID   • Pharmacy Consult Request ...Pain Management Review 1 Each PHARMACY TO DOSE   • Respiratory Care per Protocol Continuous RT   • acetaminophen (TYLENOL) tablet 1,000 mg TID   • oxyCODONE immediate-release (ROXICODONE) tablet 5 mg Q3HRS PRN   • oxyCODONE immediate release (ROXICODONE) tablet 10 mg Q3HRS PRN   • acetaminophen (TYLENOL) tablet 650 mg Q4HRS PRN   • therapeutic multivitamin-minerals (THERAGRAN-M) tablet 1 Tab DAILY WITH LUNCH   • artificial tears 1.4 % ophthalmic solution 1 Drop PRN   • benzocaine-menthol (CEPACOL) lozenge 1 Lozenge Q2HRS PRN   • mag hydrox-al hydrox-simeth (MAALOX PLUS ES or MYLANTA DS) suspension 20 mL  Q2HRS PRN   • ondansetron (ZOFRAN ODT) dispertab 4 mg 4X/DAY PRN    Or   • ondansetron (ZOFRAN) syringe/vial injection 4 mg 4X/DAY PRN   • traZODone (DESYREL) tablet 50 mg QHS PRN   • sodium chloride (OCEAN) 0.65 % nasal spray 2 Spray PRN   • calcium carbonate (TUMS) chewable tab 500 mg BID   • methocarbamol (ROBAXIN) tablet 750 mg 4X/DAY   • DULoxetine (CYMBALTA) capsule 30 mg DAILY       Orders Placed This Encounter   Procedures   • Diet Order Diabetic     Standing Status:   Standing     Number of Occurrences:   1     Order Specific Question:   Diet:     Answer:   Diabetic [3]       Assessment:  Active Hospital Problems    Diagnosis   • *T5 spinal cord injury (HCC)   • PVD (peripheral vascular disease) (HCC)   • Type 2 diabetes mellitus without complication, without long-term current use of insulin (HCC)   • Essential hypertension   • Dyslipidemia   • Obesity   • Neuropathic pain   • Neurogenic bladder   • Neurogenic bowel   • Hyponatremia   • 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HCC)   • Acute kidney injury (HCC)   • Vitamin D deficiency   • Anemia       Medical Decision Making and Plan:    T5 AIS D spinal cord injury: Status post T4-6 laminectomy and T3-7 posterior spinal fusion with resection of mass by Dr. Velasco on 7/20, preliminary pathology suggest plasmablastic plasmacytoma, significant improvement in lower extremity strength since surgical resection.  -No brace at this time  -Continue comprehensive acute inpatient rehabilitation    Plasmablastic plasmacytoma: Primary mass  - Final pathology is pending  - Follow-up with oncology and radiation oncology as outpatient    Neuropathic pain: Lower extremity pins-and-needles on admission  - increase Gabapentin 600 mg 3 times daily  - Schedule tramadol 50 mg 3 times daily    Nociceptive pain: Acute postoperative pain, site of incision  - Tramadol scheduled 50 mg 3 times daily  - Oxycodone 5 mg - 10 mg as needed every 3 hours  -Tylenol 1000 mg 3 times daily  -Robaxin 750 mg 4  times a day  -Lidocaine patch x2 to either side of incision    Acute kidney injury: Creatinine elevated up to 1.26 on 7/22  -Creatinine 1.02 on admission, improved to 0.98    Blood loss anemia: Hemoglobin on 7/23 of 6.7, status post 1 unit packed red blood cell transfusion, hemoglobin of 8.0 on 7/20  - Plan for transfusion if hemoglobin is less than 7.0  -Holding Plavix which was originally started for peripheral vascular disease  -Holding Lovenox secondary to bleeding on 7/22  -CBC has been stable at 0.84 will recheck in a.m.    Hyponatremia: Sodium on admission of 130 improved to 131  -Consult hospitalist    Hypertension: Consulted hospitalist  -Norvasc 10 mg daily  -Lisinopril 40 mg daily  -Spironolactone 25 mg daily  - Terazosin 5 mg twice daily  -Verapamil 80 mg twice daily    Type 2 diabetes:  -Metformin 1000 mg bid  -Lantus 10 units every morning  -Insulin sliding scale, moderate  - Consulted hospitalist    BPH:  -Terazosin 5 mg twice daily    Anxiety/adjustment disorder:  -Cymbalta 30 mg daily, this will also aid and neuropathic pain  -Consult psychologist    Neurogenic bladder:  -Prolonged discussion with patient on CIC, start CIC every 4 hours, will trial another round of voiding prior to discharge    Neurogenic bowel:  -BTP including senna 2 tablets q. noon, Colace 200 mg twice daily, Magic bullet suppository with digital stimulation every afternoon    Vitamin D deficiency: Vitamin D level on admission of 65, toxic level  - Discontinue cholecalciferol    DVT prophylaxis: Lovenox held secondary to bleeding at site of incision.  -SCDs when in bed  -Discussed with neurosurgery on when to restart Lovenox    IDT Team Meeting 7/29/2019    IAdelfo D.O., was present and led the interdisciplinary team conference on 7/29/2019.  I led the IDT conference and agree with the IDT conference documentation and plan of care as noted below.     RN:  Diet Regular diet   % Meal     Pain    Sleep    Bowel Cont, BM  daily   Bladder High PVR's   In's & Out's      Barriers: bladder    PT:  Bed Mobility Mod A   Transfers Max A   Mobility W/C max A   Stairs        Barriers: neuromuscular control, limited endurance    OT:  Eating    Grooming    Bathing    UB Dressing    LB Dressing Max A    Toileting Max A   Shower & Transfer        Barriers: impaired abalance      SLP:  MIn A for problem solving, overwhelmed       CM:  Continues to work on disposition and DME needs.      DC/Disposition:  8/19    Patient was seen for 40 minutes on unit/floor of which > 50% of time was spent on counseling and coordination of care regarding the above, including prognosis, risk reduction, benefits of treatment, and options for next stage of care including nociceptive and neuropathic pain management with initiation of scheduled tramadol, increased dose of gabapentin to 600 mg 3 times a day, prolonged discussion of neurogenic bladder management with transition to CIC every 4 hours, neurogenic bowel management with upper motor neuron bowel training program.      Adelfo Aquino D.O.

## 2019-07-29 NOTE — THERAPY
"Recreational Therapy   Initial Evaluation     Patient Name: Ian Laird  Age:  69 y.o., Sex:  male  Medical Record #: [id number]  Today's Date: 7/29/2019     Subjective    \"Didn't sleep well last night.\" Maintained a bright affect and willing for session and evaluation.     Objective       07/29/19 0901   Leisure History   Leisure Interests Other (Comments)   Leisure Comments working [place], used to use a ATV [place].    Pre-Morbid Leisure Lifestyle Individual;Group;Moderately Active  (Mostly solo leisure)   Prior Living Arrangements   Lives with - Patient's Self Care Capacity Spouse   Steps Into Home 2   Steps In Home 0   Ambulation Independent   Assistive Devices Used Wheelchair  (cane to 4WW to wheelchair in 6 days then went to the ER)   Driving / Transportation Driving Independent   Functional Ability Status - Physical   Endurance Good    Right  Strong   Left  Strong   Right Arm Strong   Left Arm Strong   Right Leg Weak   Left Leg Weak   Upper Extremity Gross Motor Uses Both Arms / Hands   Lower Extremetiy Gross Motor Uses Both Legs  (B LE weakness)   Fine Motor Manipulates Small Objects   Functional Ability Status - Cognitive   Attention Span Remains on Task   Comprehension Follows Three Step Commands   Judgment Able to Make Independent Decisions   Cognitive Comments Spoke about working with SLP realize his \"IQ went down a few points.\"   Functional Ability Status - Emotional    Affect Appropriate;Bright   Mood Appropriate   Behavior Appropriate   Leisure Competence Measure   Leisure Awareness Cueing Assist  (limited interests in learning new leisure)   Leisure Attitude Independent   Leisure Skills Independent   Cultural / Social Behaviors Independent   Interpersonal Skills Independent   Community Integration Skills Independent   Social Contact Independent   Community Participation Independent   Clinical Impression   Clinical Impression Impaired Cognitive Leisure Functioning;Impaired " Gross Motor Leisure Functioning;Impaired Endurance;Impaired Community Skills;Impaired Relaxation and Coping Skills;Impaired Leisure Skills   Current Discharge Plan   Current Discharge Plan Return to Prior Living Situation   Benefit    Benefit Patient would Benefit from Inpatient Recreational Therapy to Maximize Independent Leisure Functioning    Interdisciplinary Plan of Care Collaboration   Patient Position at End of Therapy Seated  (in gym waiting to DPT for next session )   Treatment Time   Total Time Spent (mins) 30   Procedural Tracking   Procedural Tracking Community Re-Integration;Leisure Skills Awareness       Assessment  Patient is 69 y.o. male with a diagnosis of T5 spinal cord injury. Incomplete, parapelegia.  Additional factors influencing patient status / progress (ie: cognitive factors, co-morbidities, social support, etc): 2 diabetes, dyslipidemia, hypertension, melanoma x2 status post resection, family support.      Was evaluated on leisure interests and taken to the family lounge. He reported that he used to hike. He was given a book on trails in the area and if the trail is accessible for wheelchairs. He reported an interests in return to hiking upon discharge.       Plan  Recommend Recreational Therapy 30-60 minutes per day 3-4 days per week for 3 days for the following treatments:  Community Re-Integration, Community Skills Development, Leisure Skills Awareness, Leisure Skills Development, Cognitive Skills Training, Fine Motor Functional Leisure Skills and Group Treatment    Goals:  Long term and short term goals have been discussed with patient and they are in agreement.         Recreation Therapy Problems           Problem: Recreation Therapy     Dates: Start: 07/29/19       Goal: STG-Within one week, patient will demonstrate a standing tolerance     Dates: Start: 07/29/19       Description: By standing while performing an activity at the standing mat 25% of the time taking breaks as needed.             Goal: STG-Within one week, patient will actively engage in planning of community re-integration outing     Dates: Start: 07/29/19             Goal: LTG-By discharge, patient will demonstrate a standing tolerance     Dates: Start: 07/29/19       Description: Will remain standing while performing an activity 50% of the time taking breaks as needed.            Goal: LTG-By discharge, patient will participate in a community re-integration outing     Dates: Start: 07/29/19

## 2019-07-29 NOTE — THERAPY
"Speech Language Pathology  Daily Treatment     Patient Name: Ian Laird  Age:  69 y.o., Sex:  male  Medical Record #: [id number]  Today's Date: 7/29/2019     Subjective    Pt was pleasant and cooperative during this ST session      Objective       07/29/19 1401   Interdisciplinary Plan of Care Collaboration   IDT Collaboration with  Family / Caregiver   Patient Position at End of Therapy Seated;Family / Friend in Room   Collaboration Comments Pt's wife present for this ST session    SLP Total Time Spent   SLP Individual Total Time Spent (Mins) 60   Charge Group   SLP Cognitive Skill Development 4       Assessment    Pt completed an attention task requiring him to follow written directions with 4 components independently to achieve 100% accuracy.  Pt reported having difficulty with numbers since his surgery and completed a worksheet targeting \"making change\".  Pt completed 4 problems independency with 25% accuracy and then asked to take a break d/t 7/10 back pain.  Discussed the importance of keeping his pain level manageable, alerted nursing of pt's pain complaint.  Pt was given medication for pain and then brought back to bed.  Pt was able to demonstrate a transfer from the wheelchair to the bed with min A required for safety.      Plan    Pt would benefit from continuing targeting attention and higher level problem solving     "

## 2019-07-29 NOTE — PROGRESS NOTES
[facility] Medicine Daily Progress Note    Date of Service  7/29/2019    Chief Complaint  HTN, DM, Hyponatremia    Interval Problem Update  Pt seen and examined in dining room, c/o expected post-op back pain.    Review of Systems  Review of Systems   Constitutional: Negative for chills and fever.   HENT: Negative.    Eyes: Negative.    Respiratory: Negative for cough and shortness of breath.    Cardiovascular: Negative for chest pain and palpitations.   Gastrointestinal: Negative for abdominal pain, nausea and vomiting.   Musculoskeletal: Positive for back pain.        Wound pain   Skin: Negative for itching and rash.   Neurological: Positive for focal weakness.        Physical Exam  Temp:  [36.4 °C (97.5 °F)-37.2 °C (98.9 °F)] 37.2 °C (98.9 °F)  Pulse:  [71-74] 73  Resp:  [16-18] 16  BP: (109-152)/(60-70) 152/70  SpO2:  [98 %-99 %] 98 %    Physical Exam   Constitutional: He is oriented to person, place, and time. No distress.   HENT:   Head: Normocephalic and atraumatic.   Right Ear: External ear normal.   Left Ear: External ear normal.   Eyes: Conjunctivae and EOM are normal. Right eye exhibits no discharge. Left eye exhibits no discharge.   Neck: Normal range of motion. Neck supple. No tracheal deviation present.   Cardiovascular: Normal rate and regular rhythm.    Pulmonary/Chest: Effort normal and breath sounds normal. No stridor. No respiratory distress. He has no wheezes.   Abdominal: Soft. Bowel sounds are normal. He exhibits no distension. There is no tenderness. There is no rebound and no guarding.   Musculoskeletal: He exhibits no edema or tenderness.   Neurological: He is alert and oriented to person, place, and time.   Skin: Skin is warm and dry. He is not diaphoretic.   Vitals reviewed.      Fluids    Intake/Output Summary (Last 24 hours) at 07/29/19 1106  Last data filed at 07/29/19 0800   Gross per 24 hour   Intake              720 ml   Output             1075 ml   Net             -355 ml        Laboratory  Recent Labs      07/27/19   0559   WBC  8.3   RBC  2.59*   HEMOGLOBIN  8.4*   HEMATOCRIT  25.0*   MCV  96.5   MCH  32.4   MCHC  33.6*   RDW  46.9   PLATELETCT  221   MPV  9.6     Recent Labs      07/27/19   0559   SODIUM  131*   POTASSIUM  3.8   CHLORIDE  96   CO2  25   GLUCOSE  122*   BUN  13   CREATININE  0.98   CALCIUM  8.6                       Assessment/Plan  * T5 spinal cord injury (HCC)- (present on admission)   Assessment & Plan    S/P T5 extradural mass resection w/ T4-T6 laminectomy and T3-T7 fusion on 7/20/19 by Dr. Velasco  Final Pathology still pending  Wound care     PVD (peripheral vascular disease) (HCC)- (present on admission)   Assessment & Plan    Plavix on hold post-operatively     Essential hypertension- (present on admission)   Assessment & Plan    Echo EF 65% w/ mild AS  On max doses of Norvasc and Lisinopril  Also on Aldactone and Verapamil  Continue to observe blood pressure trends     Type 2 diabetes mellitus without complication, without long-term current use of insulin (HCC)- (present on admission)   Assessment & Plan    HbA1c 6.8  Resumed Metformin and Glipizide  Attempting to taper off Lantus and SSI  Outpt meds include Metformin, Glipizide, and Januvia     Abdominal distension   Assessment & Plan    Symptoms improving on Simethicone     History of melanoma   Assessment & Plan    S/P resection x 2  Outpt F/U     BPH (benign prostatic hyperplasia)   Assessment & Plan    Monitor for orthostatic hypotension on Hytrin     Hyponatremia- (present on admission)   Assessment & Plan    Treatment usually not indicated unless Na+<130 or pt symptomatic  Check F/U labs in am     Anemia- (present on admission)   Assessment & Plan    S/P PRBC prior to Rehab transfer  Following H/H     Dyslipidemia- (present on admission)   Assessment & Plan    On Lipitor     Full Code

## 2019-07-29 NOTE — REHAB-NURSING IDT TEAM NOTE
Nursing   Nursing  Diet/Nutrition:  Diabetic and Thin Liquids  Medication Administration:  Whole with Liquid Wash  % consumed at meals in last 24 hours:  Consumed 50-75% of meals per documentation.  Meal/Snack Consumption for the past 24 hrs:   Oral Nutrition Oral Nutrition Supplement    07/28/19 1820 Dinner;Between 50-75% Consumed -   07/28/19 0830 Breakfast;Between 50-75% Consumed -       Snack schedule:  HS  Supplement:  Other: N/A  Appetite:  Fair  Fluid Intake/Output in past 24 hours: In: 720 [P.O.:720]  Out: 2000   Admit Weight:  Weight: 100.8 kg (222 lb 4.8 oz)  Weight Last 7 Days   [100.8 kg (222 lb 4.8 oz)] 100.8 kg (222 lb 4.8 oz) (07/25 1640)    Pain Issues:    Location:  Back (07/28 0740)  --         Severity:  Moderate   Scheduled pain medications:  gabapentin (NEURONTIN) Lidoderm Patch, Robaxin, Tylenol     PRN pain medications used in last 24 hours:  None   Non Pharmacologic Interventions:  rest  Effectiveness of pain management plan:  good=patient states acceptable comfort after interventions    Bowel:    Bowel Assist Initial Score:  2 - Max Assistance  Bowel Assist Current Score:  2 - Max Assistance  Bowl Accidents in last 7 days:     Last bowel movement: 07/27/19  Stool Description: Small, Formed     Usual bowel pattern:  every other day  Scheduled bowel medications:  None  PRN bowel medications used in last 24 hours:  None  Nursing Interventions:  Increased time, Positioning on commode/toilet, Supervision, Verbal cueing, Set-up  Effectiveness of bowel program:   good=regular, predictable, controlled emptying of bowel  Bladder:    Bladder Assist Initial Score:  5 - Standby Prompting/Supervision or Set-up  Bladder Assist Current Score:  5 - Standby Prompting/Supervision or Set-up  Bladder Accidents in last 7 days:     PVR range for past 24-48 hours:  []  ()  Intermittent Catheterization:  N/A  Medications affecting bladder:  Hytrin    Time void schedule/voiding pattern:  Voiding every 2-4  hours  Interventions:  Increased time, Emptying of device, Urinal, Time void patient initiated, Supervision, Verbal cueing, High PVR  Effectiveness of bladder training:  Fair=sometimes needs prompting to double void to complete bladder emptying    Diabetes:  Blood Sugar Frequency:  Before meals and at bedtime    Range of BS for last 48 hours:   Recent Labs      07/27/19   0718  07/27/19   1112  07/27/19   1708  07/27/19 2009 07/28/19   0741  07/28/19   1131  07/28/19   1714  07/28/19   2125   POCGLUCOSE  126*  124*  112*  121*  144*  133*  96  88     Scheduled diabetic medications:  metformin (GLUCOPHAGE)  and Other Glipizide and Lantus  Sliding scale usage in past 24 hours:  No  Total Short acting insulin in the past 24 hours:  None  Total Long acting insulin in the past 24 hours:  Lantus 18 units.    Wound:         Patient Lines/Drains/Airways Status    Active Current Wounds     7/20/19 Thoracic Fusion, Laminectomy                   Interventions:  Incision with staples LUIGI  Effectiveness of intervention:  wound is improving     Sleep/wake cycle:   Average hours slept:  Sleeps 3-4 hours without waking  Sleep medication usage:  None    Patient/Family Training/Education:  Bladder Management/Training, Diabetes Management, Fall Prevention, General Self Care, Medication Management, Pain Management, Safe Transfers, Safety and Wound Care  Discharge Supply Recommendations:  Glucometer and Strips and Wound Care Supplies  Strengths: Alert and oriented and Effective communication skills   Barriers:   Bladder retention, Generalized weakness and Limited mobility            Nursing Problems           Problem: Bowel/Gastric:     Goal: Normal bowel function is maintained or improved           Goal: Will not experience complications related to bowel motility             Problem: Communication     Goal: The ability to communicate needs accurately and effectively will improve             Problem: Discharge Barriers/Planning      Goal: Patient's continuum of care needs will be met             Problem: Infection     Goal: Will remain free from infection             Problem: Knowledge Deficit     Goal: Knowledge of disease process/condition, treatment plan, diagnostic tests, and medications will improve           Goal: Knowledge of the prescribed therapeutic regimen will improve             Problem: Pain Management     Goal: Pain level will decrease to patient's comfort goal             Problem: Safety     Goal: Will remain free from injury           Goal: Will remain free from falls             Problem: Venous Thromboembolism (VTW)/Deep Vein Thrombosis (DVT) Prevention:     Goal: Patient will participate in Venous Thrombosis (VTE)/Deep Vein Thrombosis (DVT)Prevention Measures                  Long Term Goals:   At discharge patient will be able to function safely at home and in the community with support.    Section completed by:  Bessie Guzman L.P.N. 2

## 2019-07-29 NOTE — CARE PLAN
Problem: Problem Solving STGs  Goal: STG-Within one week, patient will  1) Individualized goal:  Perform complex attention tasks with 90% accuracy and clinician supervision  2) Interventions: SLP Self Care / ADL Training , SLP Cognitive Skill Development and SLP Group Treatme     Outcome: NOT MET  Mod A needed for simple attention tasks.    Problem: Memory STGs  Goal: STG-Within one week, patient will  1) Individualized goal:  perform simple prospective memory/recall tasks with 90% when provided clinician supervision assist  2) Interventions:  SLP Cognitive Skill Development and SLP Group Treatment     Outcome: NOT MET  Patient recalled recent events with supervision for extra time and coaxing.

## 2019-07-29 NOTE — PROGRESS NOTES
Received bedside shift report from Gloria BETANCOURT RN regarding patient and assumed care. Patient awake, calm and stable, currently positioned in bed for comfort and safety; call light within reach. Denies pain or discomfort at this time. Will continue to monitor.

## 2019-07-29 NOTE — PROGRESS NOTES
Pt up to the toilet to void at this time.  PVR results from bladder scan show 211 mls.  Will continue to monitor patient.

## 2019-07-29 NOTE — PROGRESS NOTES
Pt urinated 550 mls via urinal at this time.  PVR shows 709 mls per bladder scan.  Encouraged pt to double void at this time but pt feels no urge to urinate again.  Informed and educated pt about emptying bladder and there are orders to do straight caths for PVR >250 mls.  Refusing to have straight catheter done.  Pt would like to try to urinate on his own, and not have to use the catheter.  Pt stated that he will try to urinate again in an hour.  Will continue to monitor patient.

## 2019-07-29 NOTE — REHAB-PT IDT TEAM NOTE
Physical Therapy   Mobility  Bed mobility:   2  Bed /Chair/Wheelchair Transfer Initial:  3 - Moderate Assistance  Bed /Chair/Wheelchair Transfer Current:  2 - Max Assistance   Bed/Chair/Wheelchair Transfer Description:   (SPT: maxA; squat pivot with set up: Isabel; bed mob: modA for trunk/ L LE)  Walk Initial:  0 - Not tested,unsafe activity  Walk Current:  0 - Not tested,unsafe activity   Walk Description:     Wheelchair Initial:  2 - Max Assistance  Wheelchair Current:  2 - Max Assistance   Wheelchair Description:   (65ft x1, B LE, VCs for technique)  Stairs Initial:  0 - Not tested,unsafe activity  Stairs Current: 0 - Not tested,unsafe activity   Stairs Description:    Patient/Family Training/Education: passport, educational materials, pressure relief, transfer technqiue   DME/DC Recommendations:  TBD, pt owns wheelchair, will need cushion  Strengths:  Motivated for self care and independence, Supportive family and Willingly participates in therapeutic activities  Barriers:   Other: impaired coordination, impaired standing balance, impaired proprioception at great toe, fatigue/ lightheadedness, limited standing tolerance  # of short term goals set= 3  # of short term goals met= 0       Physical Therapy Problems           Problem: Balance     Dates: Start: 07/26/19       Goal: STG-Within one week, patient will maintain static standing     Dates: Start: 07/26/19       Description: 1) Individualized goal:  2min x 1, SBA with B UE support as needed  2) Interventions:  PT Group Therapy, PT Gait Training, PT Therapeutic Exercises, PT Neuro Re-Ed/Balance, PT Therapeutic Activity, PT Manual Therapy and PT Evaluation               Problem: Mobility     Dates: Start: 07/26/19       Goal: STG-Within one week, patient will propel wheelchair community     Dates: Start: 07/26/19       Description: 1) Individualized goal:  150ft x 1, B UE, mod I  2) Interventions:  PT Group Therapy, PT Gait Training, PT Therapeutic Exercises, PT  Neuro Re-Ed/Balance, PT Therapeutic Activity, PT Manual Therapy and PT Evaluation               Problem: Mobility Transfers     Dates: Start: 07/26/19       Goal: STG-Within one week, patient will perform bed mobility     Dates: Start: 07/26/19       Description: 1) Individualized goal: with bed rail as needed, SPV  2) Interventions:  PT Group Therapy, PT Gait Training, PT Therapeutic Exercises, PT Neuro Re-Ed/Balance, PT Therapeutic Activity, PT Manual Therapy and PT Evaluation           Goal: STG-Within one week, patient will transfer bed to chair     Dates: Start: 07/26/19       Description: 1) Individualized goal: SBA with LRAD  2) Interventions:  PT Group Therapy, PT Gait Training, PT Therapeutic Exercises, PT Neuro Re-Ed/Balance, PT Therapeutic Activity, PT Manual Therapy and PT Evaluation               Problem: PT-Long Term Goals     Dates: Start: 07/26/19       Goal: LTG-By discharge, patient will ambulate     Dates: Start: 07/26/19       Description: 1) Individualized goal:  50ft x1 SPV LRAD  2) Interventions:  PT Group Therapy, PT Gait Training, PT Therapeutic Exercises, PT Neuro Re-Ed/Balance, PT Therapeutic Activity, PT Manual Therapy and PT Evaluation           Goal: LTG-By discharge, patient will perform home exercise program     Dates: Start: 07/26/19       Description: 1) Individualized goal:  For balance and B LE strength/ endurance, mod I  2) Interventions:  PT Group Therapy, PT Gait Training, PT Therapeutic Exercises, PT Neuro Re-Ed/Balance, PT Therapeutic Activity, PT Manual Therapy and PT Evaluation                    Goal: LTG-By discharge, patient will     Dates: Start: 07/26/19       Description: 1) Individualized goal:  Up/down 2 stairs, Isabel to mimic home environment   2) Interventions:  PT Group Therapy, PT Gait Training, PT Therapeutic Exercises, PT Neuro Re-Ed/Balance, PT Therapeutic Activity, PT Manual Therapy and PT Evaluation             Goal: LTG-By discharge, patient will     Dates:  Start: 07/26/19       Description: 1) Individualized goal: perform car transfer with SPV, LRAD  2) Interventions:  PT Group Therapy, PT Gait Training, PT Therapeutic Exercises, PT Neuro Re-Ed/Balance, PT Therapeutic Activity, PT Manual Therapy and PT Evaluation                     Section completed by:  Luz Maria Jamison, PT, DPT

## 2019-07-29 NOTE — CARE PLAN
Problem: Mobility  Goal: STG-Within one week, patient will propel wheelchair community  1) Individualized goal:  150ft x 1, B UE, mod I  2) Interventions:  PT Group Therapy, PT Gait Training, PT Therapeutic Exercises, PT Neuro Re-Ed/Balance, PT Therapeutic Activity, PT Manual Therapy and PT Evaluation     Outcome: PROGRESSING AS EXPECTED  Pt limited by endurance    Problem: Mobility Transfers  Goal: STG-Within one week, patient will perform bed mobility  1) Individualized goal: with bed rail as needed, SPV  2) Interventions:  PT Group Therapy, PT Gait Training, PT Therapeutic Exercises, PT Neuro Re-Ed/Balance, PT Therapeutic Activity, PT Manual Therapy and PT Evaluation   Outcome: NOT MET  Pt limited by UE strength  Goal: STG-Within one week, patient will transfer bed to chair  1) Individualized goal: SBA with LRAD  2) Interventions:  PT Group Therapy, PT Gait Training, PT Therapeutic Exercises, PT Neuro Re-Ed/Balance, PT Therapeutic Activity, PT Manual Therapy and PT Evaluation     Outcome: NOT MET  Pt limited by B LE strength

## 2019-07-30 LAB
ANION GAP SERPL CALC-SCNC: 10 MMOL/L (ref 0–11.9)
BASOPHILS # BLD AUTO: 0.2 % (ref 0–1.8)
BASOPHILS # BLD: 0.02 K/UL (ref 0–0.12)
BUN SERPL-MCNC: 14 MG/DL (ref 8–22)
CALCIUM SERPL-MCNC: 8.8 MG/DL (ref 8.5–10.5)
CHLORIDE SERPL-SCNC: 99 MMOL/L (ref 96–112)
CO2 SERPL-SCNC: 24 MMOL/L (ref 20–33)
CREAT SERPL-MCNC: 1 MG/DL (ref 0.5–1.4)
EOSINOPHIL # BLD AUTO: 0.18 K/UL (ref 0–0.51)
EOSINOPHIL NFR BLD: 1.7 % (ref 0–6.9)
ERYTHROCYTE [DISTWIDTH] IN BLOOD BY AUTOMATED COUNT: 47.2 FL (ref 35.9–50)
GLUCOSE BLD-MCNC: 106 MG/DL (ref 65–99)
GLUCOSE BLD-MCNC: 69 MG/DL (ref 65–99)
GLUCOSE BLD-MCNC: 81 MG/DL (ref 65–99)
GLUCOSE BLD-MCNC: 89 MG/DL (ref 65–99)
GLUCOSE BLD-MCNC: 98 MG/DL (ref 65–99)
GLUCOSE BLD-MCNC: 98 MG/DL (ref 65–99)
GLUCOSE SERPL-MCNC: 99 MG/DL (ref 65–99)
HCT VFR BLD AUTO: 25.3 % (ref 42–52)
HGB BLD-MCNC: 8.4 G/DL (ref 14–18)
IMM GRANULOCYTES # BLD AUTO: 0.11 K/UL (ref 0–0.11)
IMM GRANULOCYTES NFR BLD AUTO: 1.1 % (ref 0–0.9)
LYMPHOCYTES # BLD AUTO: 2.01 K/UL (ref 1–4.8)
LYMPHOCYTES NFR BLD: 19.3 % (ref 22–41)
MCH RBC QN AUTO: 32.1 PG (ref 27–33)
MCHC RBC AUTO-ENTMCNC: 33.2 G/DL (ref 33.7–35.3)
MCV RBC AUTO: 96.6 FL (ref 81.4–97.8)
MONOCYTES # BLD AUTO: 0.87 K/UL (ref 0–0.85)
MONOCYTES NFR BLD AUTO: 8.3 % (ref 0–13.4)
NEUTROPHILS # BLD AUTO: 7.23 K/UL (ref 1.82–7.42)
NEUTROPHILS NFR BLD: 69.4 % (ref 44–72)
NRBC # BLD AUTO: 0 K/UL
NRBC BLD-RTO: 0 /100 WBC
PLATELET # BLD AUTO: 287 K/UL (ref 164–446)
PMV BLD AUTO: 8.8 FL (ref 9–12.9)
POTASSIUM SERPL-SCNC: 4 MMOL/L (ref 3.6–5.5)
RBC # BLD AUTO: 2.62 M/UL (ref 4.7–6.1)
SODIUM SERPL-SCNC: 133 MMOL/L (ref 135–145)
WBC # BLD AUTO: 10.4 K/UL (ref 4.8–10.8)

## 2019-07-30 PROCEDURE — 80048 BASIC METABOLIC PNL TOTAL CA: CPT

## 2019-07-30 PROCEDURE — 700102 HCHG RX REV CODE 250 W/ 637 OVERRIDE(OP): Performed by: HOSPITALIST

## 2019-07-30 PROCEDURE — G0515 COGNITIVE SKILLS DEVELOPMENT: HCPCS

## 2019-07-30 PROCEDURE — 97110 THERAPEUTIC EXERCISES: CPT

## 2019-07-30 PROCEDURE — 97530 THERAPEUTIC ACTIVITIES: CPT

## 2019-07-30 PROCEDURE — A9270 NON-COVERED ITEM OR SERVICE: HCPCS | Performed by: PHYSICAL MEDICINE & REHABILITATION

## 2019-07-30 PROCEDURE — 99232 SBSQ HOSP IP/OBS MODERATE 35: CPT | Performed by: HOSPITALIST

## 2019-07-30 PROCEDURE — A9270 NON-COVERED ITEM OR SERVICE: HCPCS | Performed by: HOSPITALIST

## 2019-07-30 PROCEDURE — 99233 SBSQ HOSP IP/OBS HIGH 50: CPT | Performed by: PHYSICAL MEDICINE & REHABILITATION

## 2019-07-30 PROCEDURE — 700111 HCHG RX REV CODE 636 W/ 250 OVERRIDE (IP): Performed by: PHYSICAL MEDICINE & REHABILITATION

## 2019-07-30 PROCEDURE — 700101 HCHG RX REV CODE 250: Performed by: PHYSICAL MEDICINE & REHABILITATION

## 2019-07-30 PROCEDURE — 85025 COMPLETE CBC W/AUTO DIFF WBC: CPT

## 2019-07-30 PROCEDURE — 36415 COLL VENOUS BLD VENIPUNCTURE: CPT

## 2019-07-30 PROCEDURE — 97116 GAIT TRAINING THERAPY: CPT

## 2019-07-30 PROCEDURE — 700102 HCHG RX REV CODE 250 W/ 637 OVERRIDE(OP): Performed by: PHYSICAL MEDICINE & REHABILITATION

## 2019-07-30 PROCEDURE — 770010 HCHG ROOM/CARE - REHAB SEMI PRIVAT*

## 2019-07-30 PROCEDURE — 97535 SELF CARE MNGMENT TRAINING: CPT

## 2019-07-30 PROCEDURE — 700112 HCHG RX REV CODE 229: Performed by: PHYSICAL MEDICINE & REHABILITATION

## 2019-07-30 PROCEDURE — 82962 GLUCOSE BLOOD TEST: CPT

## 2019-07-30 RX ORDER — MIDODRINE HYDROCHLORIDE 2.5 MG/1
5 TABLET ORAL DAILY
Status: DISCONTINUED | OUTPATIENT
Start: 2019-07-30 | End: 2019-08-06

## 2019-07-30 RX ORDER — BISACODYL 10 MG/1
10 SUPPOSITORY RECTAL DAILY
Status: DISCONTINUED | OUTPATIENT
Start: 2019-07-31 | End: 2019-07-31

## 2019-07-30 RX ADMIN — SPIRONOLACTONE 25 MG: 25 TABLET ORAL at 08:10

## 2019-07-30 RX ADMIN — TERAZOSIN HYDROCHLORIDE 5 MG: 5 CAPSULE ORAL at 17:49

## 2019-07-30 RX ADMIN — TRAMADOL HYDROCHLORIDE 50 MG: 50 TABLET, COATED ORAL at 08:09

## 2019-07-30 RX ADMIN — CALCIUM CARBONATE (ANTACID) CHEW TAB 500 MG 500 MG: 500 CHEW TAB at 08:09

## 2019-07-30 RX ADMIN — METHOCARBAMOL 750 MG: 750 TABLET, FILM COATED ORAL at 17:49

## 2019-07-30 RX ADMIN — DOCUSATE SODIUM 200 MG: 100 CAPSULE, LIQUID FILLED ORAL at 08:09

## 2019-07-30 RX ADMIN — ACETAMINOPHEN 1000 MG: 500 TABLET, FILM COATED ORAL at 19:49

## 2019-07-30 RX ADMIN — METHOCARBAMOL 750 MG: 750 TABLET, FILM COATED ORAL at 08:10

## 2019-07-30 RX ADMIN — ATORVASTATIN CALCIUM 20 MG: 10 TABLET, FILM COATED ORAL at 19:49

## 2019-07-30 RX ADMIN — SENNOSIDES 17.2 MG: 8.6 TABLET, FILM COATED ORAL at 11:39

## 2019-07-30 RX ADMIN — TERAZOSIN HYDROCHLORIDE 5 MG: 5 CAPSULE ORAL at 05:38

## 2019-07-30 RX ADMIN — TRAMADOL HYDROCHLORIDE 50 MG: 50 TABLET, COATED ORAL at 11:39

## 2019-07-30 RX ADMIN — LORATADINE 10 MG: 10 TABLET ORAL at 08:09

## 2019-07-30 RX ADMIN — MULTIPLE VITAMINS W/ MINERALS TAB 1 TABLET: TAB at 11:39

## 2019-07-30 RX ADMIN — MIDODRINE HYDROCHLORIDE 5 MG: 2.5 TABLET ORAL at 17:49

## 2019-07-30 RX ADMIN — SIMETHICONE CHEW TAB 80 MG 80 MG: 80 TABLET ORAL at 11:39

## 2019-07-30 RX ADMIN — SIMETHICONE CHEW TAB 80 MG 80 MG: 80 TABLET ORAL at 08:09

## 2019-07-30 RX ADMIN — LISINOPRIL 40 MG: 20 TABLET ORAL at 08:10

## 2019-07-30 RX ADMIN — METFORMIN HYDROCHLORIDE 1000 MG: 500 TABLET, FILM COATED ORAL at 17:49

## 2019-07-30 RX ADMIN — TRAMADOL HYDROCHLORIDE 50 MG: 50 TABLET, COATED ORAL at 15:44

## 2019-07-30 RX ADMIN — ACETAMINOPHEN 1000 MG: 500 TABLET, FILM COATED ORAL at 15:44

## 2019-07-30 RX ADMIN — ACETAMINOPHEN 1000 MG: 500 TABLET, FILM COATED ORAL at 08:10

## 2019-07-30 RX ADMIN — GLIPIZIDE 2.5 MG: 2.5 TABLET, FILM COATED, EXTENDED RELEASE ORAL at 09:00

## 2019-07-30 RX ADMIN — GABAPENTIN 600 MG: 300 CAPSULE ORAL at 15:44

## 2019-07-30 RX ADMIN — CALCIUM CARBONATE (ANTACID) CHEW TAB 500 MG 500 MG: 500 CHEW TAB at 19:49

## 2019-07-30 RX ADMIN — METHOCARBAMOL 750 MG: 750 TABLET, FILM COATED ORAL at 19:49

## 2019-07-30 RX ADMIN — LIDOCAINE 2 PATCH: 50 PATCH CUTANEOUS at 08:15

## 2019-07-30 RX ADMIN — GABAPENTIN 600 MG: 300 CAPSULE ORAL at 08:09

## 2019-07-30 RX ADMIN — OMEPRAZOLE 20 MG: 20 CAPSULE, DELAYED RELEASE ORAL at 08:09

## 2019-07-30 RX ADMIN — VERAPAMIL HYDROCHLORIDE 180 MG: 180 TABLET, FILM COATED, EXTENDED RELEASE ORAL at 08:09

## 2019-07-30 RX ADMIN — METHOCARBAMOL 750 MG: 750 TABLET, FILM COATED ORAL at 12:45

## 2019-07-30 RX ADMIN — AMLODIPINE BESYLATE 10 MG: 5 TABLET ORAL at 08:10

## 2019-07-30 RX ADMIN — BISACODYL 10 MG: 10 SUPPOSITORY RECTAL at 09:49

## 2019-07-30 RX ADMIN — GABAPENTIN 600 MG: 300 CAPSULE ORAL at 19:49

## 2019-07-30 RX ADMIN — METFORMIN HYDROCHLORIDE 1000 MG: 500 TABLET, FILM COATED ORAL at 08:09

## 2019-07-30 RX ADMIN — ENOXAPARIN SODIUM 40 MG: 100 INJECTION SUBCUTANEOUS at 16:09

## 2019-07-30 RX ADMIN — VERAPAMIL HYDROCHLORIDE 180 MG: 180 TABLET, FILM COATED, EXTENDED RELEASE ORAL at 23:03

## 2019-07-30 RX ADMIN — DULOXETINE HYDROCHLORIDE 30 MG: 30 CAPSULE, DELAYED RELEASE ORAL at 08:09

## 2019-07-30 ASSESSMENT — ENCOUNTER SYMPTOMS
DIARRHEA: 0
SHORTNESS OF BREATH: 0
FEVER: 0
VOMITING: 0
ABDOMINAL PAIN: 0
CHILLS: 0
NAUSEA: 0
NERVOUS/ANXIOUS: 0

## 2019-07-30 NOTE — PROGRESS NOTES
Pt with no void, straight cathed using aseptic technique, #14 Chinese catheter.  Had 500 mls of clear yellow urine for output.  Pt tolerated well.  Will continue to monitor patient.

## 2019-07-30 NOTE — THERAPY
"Speech Language Pathology  Daily Treatment     Patient Name: Ian Laird  Age:  69 y.o., Sex:  male  Medical Record #: [id number]  Today's Date: 7/30/2019     Subjective    Pt in bed, reports going to dining room for breakfast this a.m. - became light headed (FSBG - 90) pt returned to bed, agreeable to therapy in room in w/c with tray table. C/o pain in back     Objective     07/30/19 0834   Cognition   Complex Attention Moderate (3)   Interdisciplinary Plan of Care Collaboration   Patient Position at End of Therapy In Bed;Call Light within Reach;Tray Table within Reach;Phone within Reach   SLP Total Time Spent   SLP Individual Total Time Spent (Mins) 60   Charge Group   SLP Cognitive Skill Development 4       FIM Eating Score:  6 - Modified Independent  Eating Description:  Set-up of equipment or meal/tube feeding    FIM Comprehension Score:  5 - Stand-by Prompting/Supervision or Set-up  Comprehension Description:  Glasses, Verbal cues    FIM Expression Score:  7 - Independent  Expression Description:       FIM Social Interaction Score:  6 - Modified Independent  Social Interaction Description:  Medication    FIM Problem Solving Score:  5 - Standby Prompting/Supervision or Set-up  Problem Solving Description:  Verbal cueing, Therapy schedule, Increased time    FIM Memory Score:  4 - Minimal Assistance  Memory Description:  Verbal cueing, Therapy schedule      Assessment    Continued education on cognitive deficits, impacts of pain medication. Pt reports he is not at baseline and only realizes \"when we do challenging stuff like this.\" Selective attention task (find \"the\") - pt located 5/14 on initial attempt. With use of strategies, pt able to locate 4 additional targets. Max cues to locate remaining 5. Alternating attention: 1st attempt: 76% accuracy - pt demonstrated difficulty with tracking sequence (using alphabet), requiring visual cues. 2nd attempt: 85% IND with MIN cues for sequence. "     Plan    Continue to target attention, memory

## 2019-07-30 NOTE — THERAPY
"Occupational Therapy  Daily Treatment     Patient Name: Ian Laird  Age:  69 y.o., Sex:  male  Medical Record #: [id number]  Today's Date: 7/30/2019     Precautions  Precautions: (P) Spinal / Back Precautions , Fall Risk  Comments: (P) T3-T7 spinal fusion         Subjective    \" This is tough .\"  Referring to sit <-> stands      Objective       07/30/19 1031   Precautions   Precautions Spinal / Back Precautions ;Fall Risk   Comments T3-T7 spinal fusion   Sitting Upper Body Exercises   Upper Extremity Bike Level 2 Resistance  (x 5 minutes x 2   motomed )   Interdisciplinary Plan of Care Collaboration   Patient Position at End of Therapy Seated  (in dining room for lunch )   OT Total Time Spent   OT Individual Total Time Spent (Mins) 60   OT Charge Group   OT Self Care / ADL 1   OT Therapy Activity 2   OT Therapeutic Exercise  1       FIM Lower Body Dressing Score:  5 - Standby Prompting/Supervsion or Set-up  Lower Body Dressing Description:   ( setup to don shoes  slipped on with out need for use of AE )     Bed to w/c transfer  SBA.    Sit to stands x 5  W/c to walker with SBA and cues for technique  ( one hand on walker one on w/c arm rest ) required mod cues for reaching back with stand to sit.   Assessment    Improved endurance this session as noted with increased ability to perform UE bike     Plan    Shower In AM    ADLs,  IADL related mobility strength/endurance building  All incorporating Back precautions     "

## 2019-07-30 NOTE — PROGRESS NOTES
Pt able to void 450 mls via urinal, had 269 mls for PVR.  Straight cathed per doctor's order for > 250 mls.  Using aseptic technique, straight cathed using #14 Slovak catheter.  Had 250 mls of clear yellow urine for output.  Pt tolerated well.  Will continue to monitor patient.  Pt aware of doctor's order for bowel program starting this morning at 0900.

## 2019-07-30 NOTE — PROGRESS NOTES
[facility] Medicine Daily Progress Note    Date of Service  7/30/2019    Chief Complaint:  Hypertension  Diabetes  Hyponatremia    Interval History:  No significant events or changes since last visit    Review of Systems  Review of Systems   Constitutional: Negative for chills and fever.   Respiratory: Negative for shortness of breath.    Cardiovascular: Negative for chest pain.   Gastrointestinal: Negative for abdominal pain, diarrhea, nausea and vomiting.   Psychiatric/Behavioral: The patient is not nervous/anxious.         Physical Exam  Temp:  [36.6 °C (97.9 °F)-37.1 °C (98.8 °F)] 36.6 °C (97.9 °F)  Pulse:  [69-72] 70  Resp:  [16-18] 18  BP: (116-148)/(52-74) 130/60  SpO2:  [99 %] 99 %    Physical Exam   Constitutional: He is oriented to person, place, and time. He appears well-nourished.   HENT:   Head: Atraumatic.   Eyes: Pupils are equal, round, and reactive to light. Conjunctivae are normal.   Neck: Normal range of motion. Neck supple.   Cardiovascular: Normal rate, regular rhythm, S1 normal and S2 normal.    No murmur heard.  Pulmonary/Chest: Effort normal. He has no wheezes. He has no rhonchi. He has no rales.   Abdominal: Soft. He exhibits no distension. There is no tenderness.   Musculoskeletal: He exhibits no edema.   Neurological: He is alert and oriented to person, place, and time. No sensory deficit.   Skin: Skin is warm and dry. No rash noted. No cyanosis.   Psychiatric: He has a normal mood and affect. His behavior is normal.   Nursing note and vitals reviewed.      Fluids    Intake/Output Summary (Last 24 hours) at 07/30/19 1257  Last data filed at 07/30/19 1205   Gross per 24 hour   Intake             1140 ml   Output             2650 ml   Net            -1510 ml       Laboratory  Recent Labs      07/30/19   0601   WBC  10.4   RBC  2.62*   HEMOGLOBIN  8.4*   HEMATOCRIT  25.3*   MCV  96.6   MCH  32.1   MCHC  33.2*   RDW  47.2   PLATELETCT  287   MPV  8.8*     Recent Labs      07/30/19   0601    SODIUM  133*   POTASSIUM  4.0   CHLORIDE  99   CO2  24   GLUCOSE  99   BUN  14   CREATININE  1.00   CALCIUM  8.8                       Assessment/Plan  * T5 spinal cord injury (HCC)- (present on admission)   Assessment & Plan    S/P surgical repair  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  Wound care     PVD (peripheral vascular disease) (HCC)- (present on admission)   Assessment & Plan    Plavix on hold post-operatively     Essential hypertension- (present on admission)   Assessment & Plan    BP a little labile but ok  On Norvasc: 10 mg daily  On Lisinopril: 40 mg daily  On Verapamil ER: 180 mg bid  Note: also on Aldactone  Cont to monitor     Type 2 diabetes mellitus without complication, without long-term current use of insulin ([facility])- (present on admission)   Assessment & Plan    Hba1c 6.8 (7/26)  BS   On Lantus: 20 units --> 18 units --> 10 units (7/29) --> will d/c (7/30)  On Metformin: 1000 mg bid  On Glipizide SR: 2.5 mg qam --> will d/c (last dose on 7/30)  Note: home meds include Metformin, Glipizide, and Januvia  Cont to monitor     BPH (benign prostatic hyperplasia)   Assessment & Plan    On Hytrin     Hyponatremia- (present on admission)   Assessment & Plan    Na: 131 (7/27) --> 133 (7/30)  Monitor for now     Anemia- (present on admission)   Assessment & Plan    H&H stable  S/P PRBC prior to Rehab transfer  Monitor     Dyslipidemia- (present on admission)   Assessment & Plan    On Lipitor

## 2019-07-30 NOTE — PROGRESS NOTES
Patient at dining room, c/o light headache, dizziness. BP= 80/44 P72. Patient was taken back to room and put in bed. Manually BP taken= 130/60. P= 70. Patient stated feeling better. Will continue to monitor.

## 2019-07-30 NOTE — PROGRESS NOTES
"Rehab Progress Note     Encounter Date: 7/30/2019    CC: LE weakness    Interval Events (Subjective)    He complained of dizziness when up in the chair, with change in position, nurses report systolic blood pressure in the low 80s.  Resolution with getting back to bed.    He reports pain is significantly improved with scheduling of tramadol.  He denies any confusion denies any other side effects.    Mood is improved  Burning tingling is improved  He slept well last night    Bladder: He reports having 5 catheterizations in the past 24 hours, tolerating well  Bowel: Patient had significant bowel movement this morning, Dulcolax suppository was ordered for every morning      ROS:  Gen: No fatigue, confusion, significant weight loss  Eyes: no blurry vision, double vision or pain in eyes  ENT: no changes in hearing, runny nose, nose bleeds, sinus pain  CV: No CP, palpitations,   Lungs: no shortness of breath, changes in secretions, changes in cough, pain with coughing  Abd: No abd pain, nausea, vomiting, pain with eating  : no blood in urine, pain during storage phase, bladder spasms, suprapubic pain  Ext: No swelling in legs, asymmetric atrophy  Neuro: no changes in strength or sensation in last 24 hours  Skin: no new ulcers/skin breakdown appreciated by patient  Mood: improved  Heme: no bruising, or bleeding  Rest of 14 point review of systems is negative      Objective:  Vitals: /55   Pulse 67   Temp 36.3 °C (97.4 °F) (Temporal)   Resp 17   Ht 1.778 m (5' 10\")   Wt 100.8 kg (222 lb 4.8 oz)   SpO2 99%   Gen: NAD, Body mass index is 31.9 kg/m².  HEENT: NC/AT, PERRLA, moist mucous membranes  Cardio: RRR, no mumurs  Pulm: CTAB, with normal respiratory effort  Abd: Soft NTND, active bowel sounds  Ext: No peripheral edema. +dorsal pedalis pulses bilaterally.    Motor Exam Upper Extremities   ? Myotome R L   Shoulder flexion C5 5 5   Elbow flexion C5 5 5   Wrist extension C6 5 5   Elbow extension C7 5 5   Finger " flexion C8 5 5   Finger abduction T1 5 5     Motor Exam Lower Extremities    ? Myotome R L   Hip flexion L2 4 4   Knee extension L3 5 4   Ankle dorsiflexion L4 4 4   Toe extension L5 5 4   Ankle plantarflexion S1 4 4     Tone: No past density present, no clonus present    Recent Results (from the past 72 hour(s))   ACCU-CHEK GLUCOSE    Collection Time: 07/27/19  5:08 PM   Result Value Ref Range    Glucose - Accu-Ck 112 (H) 65 - 99 mg/dL   ACCU-CHEK GLUCOSE    Collection Time: 07/27/19  8:09 PM   Result Value Ref Range    Glucose - Accu-Ck 121 (H) 65 - 99 mg/dL   ACCU-CHEK GLUCOSE    Collection Time: 07/28/19  7:41 AM   Result Value Ref Range    Glucose - Accu-Ck 144 (H) 65 - 99 mg/dL   ACCU-CHEK GLUCOSE    Collection Time: 07/28/19 11:31 AM   Result Value Ref Range    Glucose - Accu-Ck 133 (H) 65 - 99 mg/dL   ACCU-CHEK GLUCOSE    Collection Time: 07/28/19  5:14 PM   Result Value Ref Range    Glucose - Accu-Ck 96 65 - 99 mg/dL   ACCU-CHEK GLUCOSE    Collection Time: 07/28/19  9:25 PM   Result Value Ref Range    Glucose - Accu-Ck 88 65 - 99 mg/dL   URINE CREATININE RANDOM    Collection Time: 07/29/19  3:20 AM   Result Value Ref Range    Creatinine, Random Urine 52.10 mg/dL   OSMOLALITY URINE    Collection Time: 07/29/19  3:20 AM   Result Value Ref Range    Osmolality Urine 246 (L) 300 - 900 mOsm/kg H2O   URINE SODIUM RANDOM    Collection Time: 07/29/19  3:20 AM   Result Value Ref Range    Sodium, Urine -per volume 56 mmol/L   ACCU-CHEK GLUCOSE    Collection Time: 07/29/19  7:24 AM   Result Value Ref Range    Glucose - Accu-Ck 121 (H) 65 - 99 mg/dL   ACCU-CHEK GLUCOSE    Collection Time: 07/29/19 11:11 AM   Result Value Ref Range    Glucose - Accu-Ck 101 (H) 65 - 99 mg/dL   ACCU-CHEK GLUCOSE    Collection Time: 07/29/19  5:09 PM   Result Value Ref Range    Glucose - Accu-Ck 92 65 - 99 mg/dL   ACCU-CHEK GLUCOSE    Collection Time: 07/29/19  8:27 PM   Result Value Ref Range    Glucose - Accu-Ck 106 (H) 65 - 99 mg/dL    Basic Metabolic Panel    Collection Time: 07/30/19  6:01 AM   Result Value Ref Range    Sodium 133 (L) 135 - 145 mmol/L    Potassium 4.0 3.6 - 5.5 mmol/L    Chloride 99 96 - 112 mmol/L    Co2 24 20 - 33 mmol/L    Glucose 99 65 - 99 mg/dL    Bun 14 8 - 22 mg/dL    Creatinine 1.00 0.50 - 1.40 mg/dL    Calcium 8.8 8.5 - 10.5 mg/dL    Anion Gap 10.0 0.0 - 11.9   CBC WITH DIFFERENTIAL    Collection Time: 07/30/19  6:01 AM   Result Value Ref Range    WBC 10.4 4.8 - 10.8 K/uL    RBC 2.62 (L) 4.70 - 6.10 M/uL    Hemoglobin 8.4 (L) 14.0 - 18.0 g/dL    Hematocrit 25.3 (L) 42.0 - 52.0 %    MCV 96.6 81.4 - 97.8 fL    MCH 32.1 27.0 - 33.0 pg    MCHC 33.2 (L) 33.7 - 35.3 g/dL    RDW 47.2 35.9 - 50.0 fL    Platelet Count 287 164 - 446 K/uL    MPV 8.8 (L) 9.0 - 12.9 fL    Neutrophils-Polys 69.40 44.00 - 72.00 %    Lymphocytes 19.30 (L) 22.00 - 41.00 %    Monocytes 8.30 0.00 - 13.40 %    Eosinophils 1.70 0.00 - 6.90 %    Basophils 0.20 0.00 - 1.80 %    Immature Granulocytes 1.10 (H) 0.00 - 0.90 %    Nucleated RBC 0.00 /100 WBC    Neutrophils (Absolute) 7.23 1.82 - 7.42 K/uL    Lymphs (Absolute) 2.01 1.00 - 4.80 K/uL    Monos (Absolute) 0.87 (H) 0.00 - 0.85 K/uL    Eos (Absolute) 0.18 0.00 - 0.51 K/uL    Baso (Absolute) 0.02 0.00 - 0.12 K/uL    Immature Granulocytes (abs) 0.11 0.00 - 0.11 K/uL    NRBC (Absolute) 0.00 K/uL   ESTIMATED GFR    Collection Time: 07/30/19  6:01 AM   Result Value Ref Range    GFR If African American >60 >60 mL/min/1.73 m 2    GFR If Non African American >60 >60 mL/min/1.73 m 2   ACCU-CHEK GLUCOSE    Collection Time: 07/30/19  7:14 AM   Result Value Ref Range    Glucose - Accu-Ck 98 65 - 99 mg/dL   ACCU-CHEK GLUCOSE    Collection Time: 07/30/19 11:36 AM   Result Value Ref Range    Glucose - Accu-Ck 81 65 - 99 mg/dL       Current Facility-Administered Medications   Medication Frequency   • tramadol (ULTRAM) 50 MG tablet 50 mg TID   • gabapentin (NEURONTIN) capsule 600 mg TID   • sennosides (SENOKOT) 8.6 MG  tablet 17.2 mg DAILY AT NOON   • docusate sodium (COLACE) capsule 200 mg BID   • bisacodyl (THE MAGIC BULLET) suppository 10 mg DAILY   • lidocaine (LIDODERM) 5 % 2 Patch Q24HR   • insulin lispro (HUMALOG) injection 2-12 Units 4X/DAY ACHS    And   • glucose 4 g chewable tablet 16 g Q15 MIN PRN    And   • dextrose 50% (D50W) injection 50 mL Q15 MIN PRN   • metFORMIN (GLUCOPHAGE) tablet 1,000 mg BID WITH MEALS   • amLODIPine (NORVASC) tablet 10 mg DAILY   • atorvastatin (LIPITOR) tablet 20 mg Nightly   • glucose 4 g chewable tablet 16 g PRN   • lisinopril (PRINIVIL) tablet 40 mg DAILY   • loratadine (CLARITIN) tablet 10 mg DAILY   • omeprazole (PRILOSEC) capsule 20 mg DAILY   • spironolactone (ALDACTONE) tablet 25 mg DAILY   • terazosin (HYTRIN) capsule 5 mg BID   • verapamil ER (CALAN-SR) tablet 180 mg BID   • Pharmacy Consult Request ...Pain Management Review 1 Each PHARMACY TO DOSE   • Respiratory Care per Protocol Continuous RT   • acetaminophen (TYLENOL) tablet 1,000 mg TID   • oxyCODONE immediate-release (ROXICODONE) tablet 5 mg Q3HRS PRN   • oxyCODONE immediate release (ROXICODONE) tablet 10 mg Q3HRS PRN   • acetaminophen (TYLENOL) tablet 650 mg Q4HRS PRN   • therapeutic multivitamin-minerals (THERAGRAN-M) tablet 1 Tab DAILY WITH LUNCH   • artificial tears 1.4 % ophthalmic solution 1 Drop PRN   • benzocaine-menthol (CEPACOL) lozenge 1 Lozenge Q2HRS PRN   • mag hydrox-al hydrox-simeth (MAALOX PLUS ES or MYLANTA DS) suspension 20 mL Q2HRS PRN   • ondansetron (ZOFRAN ODT) dispertab 4 mg 4X/DAY PRN    Or   • ondansetron (ZOFRAN) syringe/vial injection 4 mg 4X/DAY PRN   • traZODone (DESYREL) tablet 50 mg QHS PRN   • sodium chloride (OCEAN) 0.65 % nasal spray 2 Spray PRN   • calcium carbonate (TUMS) chewable tab 500 mg BID   • methocarbamol (ROBAXIN) tablet 750 mg 4X/DAY   • DULoxetine (CYMBALTA) capsule 30 mg DAILY       Orders Placed This Encounter   Procedures   • Diet Order Diabetic     Standing Status:    Standing     Number of Occurrences:   1     Order Specific Question:   Diet:     Answer:   Diabetic [3]       Assessment:  Active Hospital Problems    Diagnosis   • *T5 spinal cord injury (HCC)   • PVD (peripheral vascular disease) (HCC)   • Type 2 diabetes mellitus without complication, without long-term current use of insulin (HCC)   • Essential hypertension   • Dyslipidemia   • Obesity   • Neuropathic pain   • Neurogenic bladder   • Neurogenic bowel   • Hyponatremia   • 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HCC)   • Acute kidney injury (HCC)   • Vitamin D deficiency   • Anemia       Medical Decision Making and Plan:    T5 AIS D spinal cord injury: Status post T4-6 laminectomy and T3-7 posterior spinal fusion with resection of mass by Dr. Velasco on 7/20, preliminary pathology suggest plasmablastic plasmacytoma, significant improvement in lower extremity strength since surgical resection.  -No brace at this time  -Continue comprehensive acute inpatient rehabilitation    Plasmablastic plasmacytoma: Primary mass  - Final pathology is pending  - Follow-up with oncology and radiation oncology as outpatient    Neurogenic orthostatic hypotension: Systolic blood pressures into the 80s, symptoms of dizziness, lightheadedness  - Start Midodrine 5mg qam    Neuropathic pain: Lower extremity pins-and-needles on admission  -Gabapentin 600 mg 3 times daily  - Schedule tramadol 50 mg 3 times daily    Nociceptive pain: Acute postoperative pain, site of incision  - Tramadol scheduled 50 mg 3 times daily  - Oxycodone 5 mg - 10 mg as needed every 3 hours  -Tylenol 1000 mg 3 times daily  -Robaxin 750 mg 4 times a day  -Lidocaine patch x2 to either side of incision    Acute kidney injury: Creatinine elevated up to 1.26 on 7/22  -Creatinine 1.02 on admission, improved to 0.98    Blood loss anemia: Hemoglobin on 7/23 of 6.7, status post 1 unit packed red blood cell transfusion, hemoglobin of 8.0 on 7/20  - Plan for transfusion if hemoglobin is less than  7.0  -Holding Plavix which was originally started for peripheral vascular disease  -Holding Lovenox secondary to bleeding on 7/22  -CBC has been stable at 0.84  -Restart Lovenox 40 mg daily    Hyponatremia: Sodium on admission of 130 improved to 131  -Consult hospitalist    Hypertension: Consulted hospitalist  -Norvasc 10 mg daily  -Lisinopril 40 mg daily  -Spironolactone 25 mg daily  - Terazosin 5 mg twice daily  -Verapamil 80 mg twice daily    Type 2 diabetes:  -Metformin 1000 mg bid  -Lantus 10 units every morning  -Insulin sliding scale, moderate  - Consulted hospitalist    BPH:  -Terazosin 5 mg twice daily, can be causing worsening orthostatic hypotension    Anxiety/adjustment disorder:  -Cymbalta 30 mg daily, this will also aid and neuropathic pain  -Consult psychologist    Neurogenic bladder:  -CIC every 4 hours, will trial another round of voiding prior to discharge    Neurogenic bowel:  -BTP including senna 2 tablets q. noon, Colace 200 mg twice daily, Magic bullet suppository with digital stimulation every afternoon  -Change Magic bullet to every morning    Vitamin D deficiency: Vitamin D level on admission of 65, toxic level  - Discontinue cholecalciferol    DVT prophylaxis: Lovenox held secondary to bleeding at site of incision.  -SCDs when in bed  -Start Lovenox 40 mg daily    Patient was seen for 37 minutes on unit/floor of which > 50% of time was spent on counseling and coordination of care regarding the above, including prognosis, risk reduction, benefits of treatment, and options for next stage of care including discussion of orthostatic hypotension, initiation of Midodrine, acute postoperative pain management with tramadol, neurogenic bladder management with plan of CIC every 4 hours, initiation of Lovenox for DVT prophylaxis in the setting of stable hemoglobin.          Adelfo Aquino D.O.

## 2019-07-30 NOTE — DISCHARGE PLANNING
Case Management;  Met with patient and reviewed team conference discussion and d/c target.  He feels he is doing well but expresses that he is tired.  Will continue to follow.

## 2019-07-30 NOTE — PROGRESS NOTES
Pt with no void, straight cathed using aseptic technique, #14 Beninese catheter.  Had 400 mls of clear yellow urine for output.  Pt tolerated well.  Will continue to monitor patient.

## 2019-07-31 LAB
GLUCOSE BLD-MCNC: 114 MG/DL (ref 65–99)
GLUCOSE BLD-MCNC: 149 MG/DL (ref 65–99)
GLUCOSE BLD-MCNC: 85 MG/DL (ref 65–99)
GLUCOSE BLD-MCNC: 93 MG/DL (ref 65–99)

## 2019-07-31 PROCEDURE — 97116 GAIT TRAINING THERAPY: CPT

## 2019-07-31 PROCEDURE — A9270 NON-COVERED ITEM OR SERVICE: HCPCS | Performed by: HOSPITALIST

## 2019-07-31 PROCEDURE — 700102 HCHG RX REV CODE 250 W/ 637 OVERRIDE(OP): Performed by: HOSPITALIST

## 2019-07-31 PROCEDURE — 97530 THERAPEUTIC ACTIVITIES: CPT

## 2019-07-31 PROCEDURE — 99232 SBSQ HOSP IP/OBS MODERATE 35: CPT | Performed by: HOSPITALIST

## 2019-07-31 PROCEDURE — 770010 HCHG ROOM/CARE - REHAB SEMI PRIVAT*

## 2019-07-31 PROCEDURE — 700111 HCHG RX REV CODE 636 W/ 250 OVERRIDE (IP): Performed by: PHYSICAL MEDICINE & REHABILITATION

## 2019-07-31 PROCEDURE — 700112 HCHG RX REV CODE 229: Performed by: PHYSICAL MEDICINE & REHABILITATION

## 2019-07-31 PROCEDURE — 82962 GLUCOSE BLOOD TEST: CPT

## 2019-07-31 PROCEDURE — 97535 SELF CARE MNGMENT TRAINING: CPT

## 2019-07-31 PROCEDURE — A9270 NON-COVERED ITEM OR SERVICE: HCPCS | Performed by: PHYSICAL MEDICINE & REHABILITATION

## 2019-07-31 PROCEDURE — 700102 HCHG RX REV CODE 250 W/ 637 OVERRIDE(OP): Performed by: PHYSICAL MEDICINE & REHABILITATION

## 2019-07-31 PROCEDURE — 700101 HCHG RX REV CODE 250: Performed by: PHYSICAL MEDICINE & REHABILITATION

## 2019-07-31 PROCEDURE — 99232 SBSQ HOSP IP/OBS MODERATE 35: CPT | Performed by: PHYSICAL MEDICINE & REHABILITATION

## 2019-07-31 PROCEDURE — G0515 COGNITIVE SKILLS DEVELOPMENT: HCPCS

## 2019-07-31 RX ORDER — BISACODYL 10 MG/1
10 SUPPOSITORY RECTAL DAILY
Status: DISCONTINUED | OUTPATIENT
Start: 2019-07-31 | End: 2019-07-31

## 2019-07-31 RX ORDER — BISACODYL 10 MG/1
10 SUPPOSITORY RECTAL DAILY
Status: DISCONTINUED | OUTPATIENT
Start: 2019-07-31 | End: 2019-08-06

## 2019-07-31 RX ORDER — BISACODYL 10 MG
10 SUPPOSITORY, RECTAL RECTAL DAILY
Status: DISCONTINUED | OUTPATIENT
Start: 2019-07-31 | End: 2019-07-31

## 2019-07-31 RX ORDER — MIDODRINE HYDROCHLORIDE 2.5 MG/1
5 TABLET ORAL EVERY 4 HOURS PRN
Status: DISCONTINUED | OUTPATIENT
Start: 2019-07-31 | End: 2019-08-16

## 2019-07-31 RX ADMIN — DULOXETINE HYDROCHLORIDE 30 MG: 30 CAPSULE, DELAYED RELEASE ORAL at 08:38

## 2019-07-31 RX ADMIN — VERAPAMIL HYDROCHLORIDE 180 MG: 180 TABLET, FILM COATED, EXTENDED RELEASE ORAL at 21:48

## 2019-07-31 RX ADMIN — LORATADINE 10 MG: 10 TABLET ORAL at 08:35

## 2019-07-31 RX ADMIN — BISACODYL 10 MG: 10 SUPPOSITORY RECTAL at 21:49

## 2019-07-31 RX ADMIN — TRAMADOL HYDROCHLORIDE 50 MG: 50 TABLET, COATED ORAL at 17:39

## 2019-07-31 RX ADMIN — METHOCARBAMOL 750 MG: 750 TABLET, FILM COATED ORAL at 20:19

## 2019-07-31 RX ADMIN — LIDOCAINE 2 PATCH: 50 PATCH CUTANEOUS at 08:47

## 2019-07-31 RX ADMIN — AMLODIPINE BESYLATE 10 MG: 5 TABLET ORAL at 08:38

## 2019-07-31 RX ADMIN — METFORMIN HYDROCHLORIDE 1000 MG: 500 TABLET, FILM COATED ORAL at 08:38

## 2019-07-31 RX ADMIN — CALCIUM CARBONATE (ANTACID) CHEW TAB 500 MG 500 MG: 500 CHEW TAB at 20:19

## 2019-07-31 RX ADMIN — ACETAMINOPHEN 1000 MG: 500 TABLET, FILM COATED ORAL at 20:18

## 2019-07-31 RX ADMIN — VERAPAMIL HYDROCHLORIDE 180 MG: 180 TABLET, FILM COATED, EXTENDED RELEASE ORAL at 08:47

## 2019-07-31 RX ADMIN — DOCUSATE SODIUM 200 MG: 100 CAPSULE, LIQUID FILLED ORAL at 20:18

## 2019-07-31 RX ADMIN — METHOCARBAMOL 750 MG: 750 TABLET, FILM COATED ORAL at 14:22

## 2019-07-31 RX ADMIN — TERAZOSIN HYDROCHLORIDE 5 MG: 5 CAPSULE ORAL at 05:30

## 2019-07-31 RX ADMIN — MIDODRINE HYDROCHLORIDE 5 MG: 2.5 TABLET ORAL at 05:30

## 2019-07-31 RX ADMIN — CALCIUM CARBONATE (ANTACID) CHEW TAB 500 MG 500 MG: 500 CHEW TAB at 08:37

## 2019-07-31 RX ADMIN — ATORVASTATIN CALCIUM 20 MG: 10 TABLET, FILM COATED ORAL at 20:19

## 2019-07-31 RX ADMIN — LISINOPRIL 40 MG: 20 TABLET ORAL at 08:36

## 2019-07-31 RX ADMIN — GABAPENTIN 600 MG: 300 CAPSULE ORAL at 08:36

## 2019-07-31 RX ADMIN — MULTIPLE VITAMINS W/ MINERALS TAB 1 TABLET: TAB at 11:33

## 2019-07-31 RX ADMIN — ENOXAPARIN SODIUM 40 MG: 100 INJECTION SUBCUTANEOUS at 08:35

## 2019-07-31 RX ADMIN — METHOCARBAMOL 750 MG: 750 TABLET, FILM COATED ORAL at 08:36

## 2019-07-31 RX ADMIN — GABAPENTIN 600 MG: 300 CAPSULE ORAL at 14:22

## 2019-07-31 RX ADMIN — SENNOSIDES 17.2 MG: 8.6 TABLET, FILM COATED ORAL at 11:33

## 2019-07-31 RX ADMIN — GABAPENTIN 600 MG: 300 CAPSULE ORAL at 20:19

## 2019-07-31 RX ADMIN — METFORMIN HYDROCHLORIDE 1000 MG: 500 TABLET, FILM COATED ORAL at 17:35

## 2019-07-31 RX ADMIN — TERAZOSIN HYDROCHLORIDE 5 MG: 5 CAPSULE ORAL at 17:39

## 2019-07-31 RX ADMIN — METHOCARBAMOL 750 MG: 750 TABLET, FILM COATED ORAL at 17:35

## 2019-07-31 RX ADMIN — TRAMADOL HYDROCHLORIDE 50 MG: 50 TABLET, COATED ORAL at 11:33

## 2019-07-31 RX ADMIN — SPIRONOLACTONE 25 MG: 25 TABLET ORAL at 08:37

## 2019-07-31 RX ADMIN — TRAMADOL HYDROCHLORIDE 50 MG: 50 TABLET, COATED ORAL at 08:37

## 2019-07-31 RX ADMIN — ACETAMINOPHEN 1000 MG: 500 TABLET, FILM COATED ORAL at 08:38

## 2019-07-31 RX ADMIN — ACETAMINOPHEN 1000 MG: 500 TABLET, FILM COATED ORAL at 14:22

## 2019-07-31 RX ADMIN — OMEPRAZOLE 20 MG: 20 CAPSULE, DELAYED RELEASE ORAL at 08:37

## 2019-07-31 ASSESSMENT — ENCOUNTER SYMPTOMS
HALLUCINATIONS: 0
VOMITING: 0
BLURRED VISION: 0
SHORTNESS OF BREATH: 0
NAUSEA: 0
PALPITATIONS: 0
HEADACHES: 0
DIZZINESS: 0
FEVER: 0

## 2019-07-31 NOTE — CARE PLAN
Problem: IP BLADDER/VOIDING  Goal: LTG - Patient will utilize adaptive techniques/equipment to complete bladder elimination  Outcome: PROGRESSING AS EXPECTED  Note:   Pt is voiding large amounts and has had PVR scans < 250ml all shift.  Goal: STG - PATIENT WILL REMAIN CONTINENT.  Outcome: PROGRESSING AS EXPECTED  Note:   Pt has been continent of bladder through shift.

## 2019-07-31 NOTE — THERAPY
Physical Therapy   Daily Treatment     Patient Name: Ian Laird  Age:  69 y.o., Sex:  male  Medical Record #: [id number]  Today's Date: 7/30/2019     Precautions  Precautions: Spinal / Back Precautions , Fall Risk  Comments: T3-T7 spinal fusion    Subjective    Pt lying in bed upon arrival; agreeable to tx     Objective       07/30/19 1501   Precautions   Precautions Spinal / Back Precautions ;Fall Risk   Comments T3-T7 spinal fusion   Interdisciplinary Plan of Care Collaboration   IDT Collaboration with  Therapy Tech   Patient Position at End of Therapy In Bed;Call Light within Reach;Tray Table within Reach;Phone within Reach   Collaboration Comments re: assistance with tx   PT Total Time Spent   PT Individual Total Time Spent (Mins) 60   PT Charge Group   PT Gait Training 2   PT Therapeutic Activities 2     FIM Bed/Chair/Wheelchair Transfers Score: 5 - Standby Prompting/Supervision or Set-up  Bed/Chair/Wheelchair Transfers Description:   (bed mob: SPV; transfer: SBA squat pivot transfer)    2min x 3 stance with B UE support and SPV    W/c mobility warm up: 60ft x 2 B LE for coordination    Lite gait: set up, education and use at 30% unweighted:  2.5min x 1 , .4mph, B UE support  60sec x 2 .3mph stepping over obstacles  30sec x 1 with 20sec of no UE support at .3mph      Assessment    Pt with improved standing tolerance. Continues to require frequent rest breaks, be limited by endurance and demo impaired neuro control/ step length in AMB    Plan    Lite gait, neuro control, standing tolerance/ standing balance, coordination exercises, progress through passport, family training [place] to perform transfers, review education on pressure relief/ skin checks

## 2019-07-31 NOTE — PROGRESS NOTES
"Rehab Progress Note     Encounter Date: 7/31/2019    CC: LE weakness    Interval Events (Subjective)    He reports dizziness and lightheadedness when standing up, with body weight supported treadmill and in the parallel bars.  He did not receive Midodrine prior to this time.    Bowel: Large loose BM this morning at approximately 8 AM  Bladder: Has been able to void with lower PVR in the past 24 hours.    ROS:  Gen: No fatigue, confusion, significant weight loss  Eyes: no blurry vision, double vision or pain in eyes  ENT: no changes in hearing, runny nose, nose bleeds, sinus pain  CV: No CP, palpitations,   Lungs: no shortness of breath, changes in secretions, changes in cough, pain with coughing  Abd: No abd pain, nausea, vomiting, pain with eating  : no blood in urine, suprapubic pain  Ext: No swelling in legs, asymmetric atrophy  Neuro: no changes in strength or sensation in last 24 hours  Skin: no new ulcers/skin breakdown appreciated by patient  Mood: No changes in mood today, increase in depression or anxiety  Heme: no bruising, or bleeding  Rest of 14 point review of systems is negative      Objective:  Vitals: /73   Pulse 70   Temp 36.7 °C (98 °F) (Temporal)   Resp 18   Ht 1.778 m (5' 10\")   Wt 100.8 kg (222 lb 4.8 oz)   SpO2 96%      Intake/Output Summary (Last 24 hours) at 7/31/2019 1052  Last data filed at 7/31/2019 0840  Gross per 24 hour   Intake 1480 ml   Output 2450 ml   Net -970 ml       Gen: NAD, Body mass index is 31.9 kg/m².  HEENT: NC/AT, PERRLA, moist mucous membranes  Cardio: RRR, no mumurs  Pulm: CTAB, with normal respiratory effort  Abd: Soft NTND, abdominal distention is decreased, active bowel sounds  Ext: No peripheral edema.  +dorsal pedalis pulses bilaterally.      Motor Exam Upper Extremities   ? Myotome R L   Shoulder flexion C5 5 5   Elbow flexion C5 5 5   Wrist extension C6 5 5   Elbow extension C7 5 5   Finger flexion C8 5 5   Finger abduction T1 5 5     Motor Exam " Lower Extremities    ? Myotome R L   Hip flexion L2 4 4   Knee extension L3 5 4   Ankle dorsiflexion L4 4 4   Toe extension L5 5 4   Ankle plantarflexion S1 4 4     Tone: No spasticity present, no clonus present    Recent Results (from the past 72 hour(s))   ACCU-CHEK GLUCOSE    Collection Time: 07/28/19 11:31 AM   Result Value Ref Range    Glucose - Accu-Ck 133 (H) 65 - 99 mg/dL   ACCU-CHEK GLUCOSE    Collection Time: 07/28/19  5:14 PM   Result Value Ref Range    Glucose - Accu-Ck 96 65 - 99 mg/dL   ACCU-CHEK GLUCOSE    Collection Time: 07/28/19  9:25 PM   Result Value Ref Range    Glucose - Accu-Ck 88 65 - 99 mg/dL   URINE CREATININE RANDOM    Collection Time: 07/29/19  3:20 AM   Result Value Ref Range    Creatinine, Random Urine 52.10 mg/dL   OSMOLALITY URINE    Collection Time: 07/29/19  3:20 AM   Result Value Ref Range    Osmolality Urine 246 (L) 300 - 900 mOsm/kg H2O   URINE SODIUM RANDOM    Collection Time: 07/29/19  3:20 AM   Result Value Ref Range    Sodium, Urine -per volume 56 mmol/L   ACCU-CHEK GLUCOSE    Collection Time: 07/29/19  7:24 AM   Result Value Ref Range    Glucose - Accu-Ck 121 (H) 65 - 99 mg/dL   ACCU-CHEK GLUCOSE    Collection Time: 07/29/19 11:11 AM   Result Value Ref Range    Glucose - Accu-Ck 101 (H) 65 - 99 mg/dL   ACCU-CHEK GLUCOSE    Collection Time: 07/29/19  5:09 PM   Result Value Ref Range    Glucose - Accu-Ck 92 65 - 99 mg/dL   ACCU-CHEK GLUCOSE    Collection Time: 07/29/19  8:27 PM   Result Value Ref Range    Glucose - Accu-Ck 106 (H) 65 - 99 mg/dL   Basic Metabolic Panel    Collection Time: 07/30/19  6:01 AM   Result Value Ref Range    Sodium 133 (L) 135 - 145 mmol/L    Potassium 4.0 3.6 - 5.5 mmol/L    Chloride 99 96 - 112 mmol/L    Co2 24 20 - 33 mmol/L    Glucose 99 65 - 99 mg/dL    Bun 14 8 - 22 mg/dL    Creatinine 1.00 0.50 - 1.40 mg/dL    Calcium 8.8 8.5 - 10.5 mg/dL    Anion Gap 10.0 0.0 - 11.9   CBC WITH DIFFERENTIAL    Collection Time: 07/30/19  6:01 AM   Result Value  Ref Range    WBC 10.4 4.8 - 10.8 K/uL    RBC 2.62 (L) 4.70 - 6.10 M/uL    Hemoglobin 8.4 (L) 14.0 - 18.0 g/dL    Hematocrit 25.3 (L) 42.0 - 52.0 %    MCV 96.6 81.4 - 97.8 fL    MCH 32.1 27.0 - 33.0 pg    MCHC 33.2 (L) 33.7 - 35.3 g/dL    RDW 47.2 35.9 - 50.0 fL    Platelet Count 287 164 - 446 K/uL    MPV 8.8 (L) 9.0 - 12.9 fL    Neutrophils-Polys 69.40 44.00 - 72.00 %    Lymphocytes 19.30 (L) 22.00 - 41.00 %    Monocytes 8.30 0.00 - 13.40 %    Eosinophils 1.70 0.00 - 6.90 %    Basophils 0.20 0.00 - 1.80 %    Immature Granulocytes 1.10 (H) 0.00 - 0.90 %    Nucleated RBC 0.00 /100 WBC    Neutrophils (Absolute) 7.23 1.82 - 7.42 K/uL    Lymphs (Absolute) 2.01 1.00 - 4.80 K/uL    Monos (Absolute) 0.87 (H) 0.00 - 0.85 K/uL    Eos (Absolute) 0.18 0.00 - 0.51 K/uL    Baso (Absolute) 0.02 0.00 - 0.12 K/uL    Immature Granulocytes (abs) 0.11 0.00 - 0.11 K/uL    NRBC (Absolute) 0.00 K/uL   ESTIMATED GFR    Collection Time: 07/30/19  6:01 AM   Result Value Ref Range    GFR If African American >60 >60 mL/min/1.73 m 2    GFR If Non African American >60 >60 mL/min/1.73 m 2   ACCU-CHEK GLUCOSE    Collection Time: 07/30/19  7:14 AM   Result Value Ref Range    Glucose - Accu-Ck 98 65 - 99 mg/dL   ACCU-CHEK GLUCOSE    Collection Time: 07/30/19 11:36 AM   Result Value Ref Range    Glucose - Accu-Ck 81 65 - 99 mg/dL   ACCU-CHEK GLUCOSE    Collection Time: 07/30/19  5:20 PM   Result Value Ref Range    Glucose - Accu-Ck 69 65 - 99 mg/dL   ACCU-CHEK GLUCOSE    Collection Time: 07/30/19  5:53 PM   Result Value Ref Range    Glucose - Accu-Ck 98 65 - 99 mg/dL   ACCU-CHEK GLUCOSE    Collection Time: 07/30/19  8:27 PM   Result Value Ref Range    Glucose - Accu-Ck 89 65 - 99 mg/dL   ACCU-CHEK GLUCOSE    Collection Time: 07/31/19  7:54 AM   Result Value Ref Range    Glucose - Accu-Ck 114 (H) 65 - 99 mg/dL       Current Facility-Administered Medications   Medication Frequency   • bisacodyl (THE MAGIC BULLET) suppository 10 mg DAILY   • midodrine  (PROAMATINE) tablet 5 mg DAILY   • enoxaparin (LOVENOX) inj 40 mg DAILY   • tramadol (ULTRAM) 50 MG tablet 50 mg TID   • gabapentin (NEURONTIN) capsule 600 mg TID   • sennosides (SENOKOT) 8.6 MG tablet 17.2 mg DAILY AT NOON   • docusate sodium (COLACE) capsule 200 mg BID   • lidocaine (LIDODERM) 5 % 2 Patch Q24HR   • insulin lispro (HUMALOG) injection 2-12 Units 4X/DAY ACHS    And   • glucose 4 g chewable tablet 16 g Q15 MIN PRN    And   • dextrose 50% (D50W) injection 50 mL Q15 MIN PRN   • metFORMIN (GLUCOPHAGE) tablet 1,000 mg BID WITH MEALS   • amLODIPine (NORVASC) tablet 10 mg DAILY   • atorvastatin (LIPITOR) tablet 20 mg Nightly   • glucose 4 g chewable tablet 16 g PRN   • lisinopril (PRINIVIL) tablet 40 mg DAILY   • loratadine (CLARITIN) tablet 10 mg DAILY   • omeprazole (PRILOSEC) capsule 20 mg DAILY   • spironolactone (ALDACTONE) tablet 25 mg DAILY   • terazosin (HYTRIN) capsule 5 mg BID   • verapamil ER (CALAN-SR) tablet 180 mg BID   • Pharmacy Consult Request ...Pain Management Review 1 Each PHARMACY TO DOSE   • Respiratory Care per Protocol Continuous RT   • acetaminophen (TYLENOL) tablet 1,000 mg TID   • oxyCODONE immediate-release (ROXICODONE) tablet 5 mg Q3HRS PRN   • oxyCODONE immediate release (ROXICODONE) tablet 10 mg Q3HRS PRN   • acetaminophen (TYLENOL) tablet 650 mg Q4HRS PRN   • therapeutic multivitamin-minerals (THERAGRAN-M) tablet 1 Tab DAILY WITH LUNCH   • artificial tears 1.4 % ophthalmic solution 1 Drop PRN   • benzocaine-menthol (CEPACOL) lozenge 1 Lozenge Q2HRS PRN   • mag hydrox-al hydrox-simeth (MAALOX PLUS ES or MYLANTA DS) suspension 20 mL Q2HRS PRN   • ondansetron (ZOFRAN ODT) dispertab 4 mg 4X/DAY PRN    Or   • ondansetron (ZOFRAN) syringe/vial injection 4 mg 4X/DAY PRN   • traZODone (DESYREL) tablet 50 mg QHS PRN   • sodium chloride (OCEAN) 0.65 % nasal spray 2 Spray PRN   • calcium carbonate (TUMS) chewable tab 500 mg BID   • methocarbamol (ROBAXIN) tablet 750 mg 4X/DAY   •  DULoxetine (CYMBALTA) capsule 30 mg DAILY       Orders Placed This Encounter   Procedures   • Diet Order Diabetic     Standing Status:   Standing     Number of Occurrences:   1     Order Specific Question:   Diet:     Answer:   Diabetic [3]       Assessment:  Active Hospital Problems    Diagnosis   • *T5 spinal cord injury (HCC)   • PVD (peripheral vascular disease) (HCC)   • Type 2 diabetes mellitus without complication, without long-term current use of insulin (HCC)   • Essential hypertension   • Dyslipidemia   • Obesity   • Neuropathic pain   • Neurogenic bladder   • Neurogenic bowel   • Hyponatremia   • 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HCC)   • Acute kidney injury (HCC)   • Vitamin D deficiency   • Anemia       Medical Decision Making and Plan:    T5 AIS D spinal cord injury: Status post T4-6 laminectomy and T3-7 posterior spinal fusion with resection of mass by Dr. Velasco on 7/20, preliminary pathology suggest plasmablastic plasmacytoma, significant improvement in lower extremity strength since surgical resection.  -No brace at this time  -Continue comprehensive acute inpatient rehabilitation    Plasmablastic plasmacytoma: Primary mass  - Final pathology is pending  - Follow-up with oncology and radiation oncology as outpatient    Neurogenic orthostatic hypotension: Systolic blood pressures into the 80s, symptoms of dizziness, lightheadedness  - Start Midodrine 5mg qam  - Start Midodrine 5 mg as needed standing    Neuropathic pain: Lower extremity pins-and-needles on admission  -Gabapentin 600 mg 3 times daily  - Scheduled tramadol 50 mg 3 times daily    Nociceptive pain: Acute postoperative pain, site of incision  - Tramadol scheduled 50 mg 3 times daily  - Oxycodone 5 mg - 10 mg as needed every 3 hours  -Tylenol 1000 mg 3 times daily  -Robaxin 750 mg 4 times a day  -Lidocaine patch x2 to either side of incision    Acute kidney injury: Creatinine elevated up to 1.26 on 7/22  -Creatinine 1.02 on admission, improved to  0.98    Blood loss anemia: Hemoglobin on 7/23 of 6.7, status post 1 unit packed red blood cell transfusion, hemoglobin of 8.0 on 7/20  - Plan for transfusion if hemoglobin is less than 7.0  -Holding Plavix which was originally started for peripheral vascular disease  -Holding Lovenox secondary to bleeding on 7/22  -CBC has been stable at 0.84  -Restart Lovenox 40 mg daily  -Check CBC in a.m.    Hyponatremia: Sodium on admission of 130 improved to 131  -Consult hospitalist    Hypertension: Consulted hospitalist  -Norvasc 10 mg daily  -Lisinopril 40 mg daily  -Spironolactone 25 mg daily  - Terazosin 5 mg twice daily  -Verapamil 80 mg twice daily    Type 2 diabetes:  -Metformin 1000 mg bid  -Lantus 10 units every morning  -Insulin sliding scale, moderate  - Consulted hospitalist    BPH:  -Terazosin 5 mg twice daily, can be causing worsening orthostatic hypotension    Anxiety/adjustment disorder:  -Cymbalta 30 mg daily, this will also aid and neuropathic pain, mood appears to be stable will DC Cymbalta at this time  -Consult psychologist    Neurogenic bladder:  -CIC every 4 hours, will trial another round of voiding prior to discharge    Neurogenic bowel:  -BTP including senna 2 tablets q. noon, Colace 200 mg twice daily, Magic bullet suppository with digital stimulation every afternoon  -Change Magic bullet to every morning    Vitamin D deficiency: Vitamin D level on admission of 65, toxic level  - Discontinue cholecalciferol    DVT prophylaxis: Lovenox held secondary to bleeding at site of incision.  -SCDs when in bed  -Start Lovenox 40 mg daily    Patient was seen for 27 minutes on unit/floor of which > 50% of time was spent on counseling and coordination of care regarding the above, including prognosis, risk reduction, benefits of treatment, and options for next stage of care including management of orthostatic hypotension in the setting going from sitting to standing, start PRN Midrin 30 minutes prior to standing,  anemia management will check CBC in a.m. adjustment disorder, DC Cymbalta.      Adelfo Aquino D.O.

## 2019-07-31 NOTE — THERAPY
"Speech Language Pathology  Daily Treatment     Patient Name: Ian Laird  Age:  69 y.o., Sex:  male  Medical Record #: [id number]  Today's Date: 7/31/2019     Subjective    Patient was in room with spouse at time of ST. Was willing to participate.      Objective       07/31/19 1302   Cognition   Complex Attention Moderate (3)   Complex Reasoning  / Problem Solving Moderate (3)   Functional Math / Financial Management Moderate (3)   SLP Total Time Spent   SLP Individual Total Time Spent (Mins) 60   Charge Group   SLP Cognitive Skill Development 4       Assessment    Patient participated in \"who has more $\" task. Required extra time, frequent rest breaks, and mod verbal cues. Errors with addition of money amounts with cues needed to ID errors and self correct.   Patient provided information regarding his process for med management at home. Patient and spouse states they are comfortable with his system at home, but is willing to try a pill box if recommended.     Plan    Continue to target attention and functional problem solving.     "

## 2019-07-31 NOTE — CARE PLAN
Problem: Communication  Goal: The ability to communicate needs accurately and effectively will improve  Outcome: PROGRESSING AS EXPECTED  Pt is able to communicate his needs effectively to staff.    Problem: Safety  Goal: Will remain free from injury  Outcome: PROGRESSING AS EXPECTED  Pt uses call light consistently for staff assistance. Pt has good safety awareness, no impulsivity observed.    Problem: Venous Thromboembolism (VTW)/Deep Vein Thrombosis (DVT) Prevention:  Goal: Patient will participate in Venous Thrombosis (VTE)/Deep Vein Thrombosis (DVT)Prevention Measures  Outcome: PROGRESSING AS EXPECTED  Pt wears TEDs during the daytime and SCDs while sleeping for DVT prophylaxis    Problem: Pain Management  Goal: Pain level will decrease to patient's comfort goal  Outcome: PROGRESSING AS EXPECTED  No reports of pain at this time. Pt has scheduled Tylenol and Ultram. Will continue to monitor through shift with hourly rounds.

## 2019-07-31 NOTE — THERAPY
Recreational Therapy  Daily Treatment     Patient Name: Ian Laird  AGE:  69 y.o., SEX:  male  Medical Record #: [id number]  Today's Date: 7/31/2019       Subjective    He was met in the cafeteria and was ready and willing for session following receiving medications. He spoke up and stated that one gallon of water was too heavy for him to hold while watering.      Objective       07/31/19 1041   Functional Ability Status - Cognitive   Attention Span Remains on Task   Comprehension Follows Three Step Commands   Judgment Able to Make Independent Decisions   Functional Ability Status - Emotional    Affect Appropriate   Mood Appropriate   Behavior Appropriate   Skilled Intervention    Skilled Intervention Gross Motor Leisure;Community Skills;Relaxation / Coping Skills   Skilled Intervention Comments Watering the raised garden beds   Interdisciplinary Plan of Care Collaboration   IDT Collaboration with  Nursing   Patient Position at End of Therapy Seated;Call Light within Reach;Tray Table within Reach   Collaboration Comments Nursing was administering medication in cafeteria.    Treatment Time   Total Time Spent (mins) 30   Procedural Tracking   Procedural Tracking Community Re-Integration;Gross Motor Functional Leisure Skills       Assessment    He was taken outside to participate in the watering of the garden while seated. He spoke about how he felt that the one gallon of water was too heavy for him to hold. He was given a half gallon at a time for the rest of the session. He asked about the uneven surfaces that were around the raised beds that a similar to cobble stone. Verbal education on need for ambulation on uneven surfaces.      Plan    Encourage the sharing on leisure interests he would like to participate in following his discharge.

## 2019-07-31 NOTE — THERAPY
"Physical Therapy   Daily Treatment     Patient Name: Ian Laird  Age:  69 y.o., Sex:  male  Medical Record #: [id number]  Today's Date: 7/31/2019     Precautions  Precautions: (P) Spinal / Back Precautions   Comments: (P) T3-T7 spinal fusion    Subjective    Pt was lying in bed upon arrival; agreeable to tx     Objective       07/31/19 1001   Precautions   Precautions Spinal / Back Precautions    Comments T3-T7 spinal fusion   Interdisciplinary Plan of Care Collaboration   Patient Position at End of Therapy Seated;Call Light within Reach;Tray Table within Reach;Phone within Reach   PT Total Time Spent   PT Individual Total Time Spent (Mins) 60   PT Charge Group   PT Gait Training 2   PT Therapeutic Activities 2       FIM Bed/Chair/Wheelchair Transfers Score: 5 - Standby Prompting/Supervision or Set-up  Bed/Chair/Wheelchair Transfers Description:  (bed mob: SPV; transfer: SPV, squat pivot transfer)    FIM Walking Score:  2 - Max Assistance  Walking Description:  (12ft x1, 21ft x 1, 52ft x 1, FWW, CGA, level terrain)    FIM Wheelchair Score:  2 - Max Assistance  Wheelchair Description:  (35ft x1, 40ft x 4, B UE, level terrain)    FIM Stairs Score:  2 - Max Assistance  Stairs Description:  (4 x 1 6\" steps, B UE, step to, CGA)      Assessment    Pt with significant improvement in overground training; performs better with external cues. Continues to be limited by hip flexion strength B and endurance    Plan    Lite gait, neuro control, standing tolerance/ standing balance, coordination exercises, progress through passport, family training [place] to perform transfers, review education on pressure relief/ skin checks  "

## 2019-07-31 NOTE — THERAPY
"Occupational Therapy  Daily Treatment     Patient Name: aIn Laird  Age:  69 y.o., Sex:  male  Medical Record #: [id number]  Today's Date: 2019     Precautions  Precautions: (P) Spinal / Back Precautions   Comments: (P) T3-T7 spinal fusion    Safety   ADL Safety : (P) Requires Supervision for Safety, Requires Physical Assist for Safety  Bathroom Safety: (P) Requires Supervision for Safety, Requires Physical Assist for Safety    Subjective    \" Thank you ,that was good.\"  Stated at end of session      Objective       19 0701   Precautions   Precautions Spinal / Back Precautions    Comments T3-T7 spinal fusion   Safety    ADL Safety  Requires Supervision for Safety;Requires Physical Assist for Safety   Bathroom Safety Requires Supervision for Safety;Requires Physical Assist for Safety   Interdisciplinary Plan of Care Collaboration   Patient Position at End of Therapy Seated  (in dining room for breakfast )   OT Total Time Spent   OT Individual Total Time Spent (Mins) 60   OT Charge Group   OT Self Care / ADL 4       FIM Eating Score:  7 - Independent  Eating Description:       FIM Grooming Score:  7 - Independent  Grooming Description:  (seated at sink)    FIM Bathing Score:  4 - Minimal Assistance  Bathing Description:       FIM Upper Body Dressin - Standby Prompting/Supervision or Set-up  Upper Body Dressing Description:  (supervision doff/ don pull over shirt  retrieve from closet at w/c level )    FIM Lower Body Dressing Score:  5 - Standby Prompting/Supervsion or Set-up  Lower Body Dressing Description:  Reacher(doff /don underwear and pants with supervision and cues using reacher . don shoes  supervision   therapist donned EVIE hose , patient declined to wear socks )    FIM Toiletin - Max Assistance  Toileting Description:  ( assist with cleanup / wiping after bowel movement )    FIM Toilet Transfer Score:  5 - Standby Prompting/Supervision or Set-up  Toilet Transfer Description:  Grab " bar( SBA using grab bar )    FIM Tub/Shower Transfers Score:  5 - Standby Prompting/Supervision or Set-up  Tub/Shower Transfers Description:  Grab bar( SBA using grab bar )      Bed to w/c transfer    CGA and cues for log roll technique  Supine to sit  Followed by  Stand pivot with SBA   Assessment    Improving independence with ADL tasks using AE.  Continued cues for log roll technique with supine <->    Plan     continue ADL ,  IADL, related mobility strength/endurance building  Incorporating back precautions.

## 2019-07-31 NOTE — PROGRESS NOTES
[facility] Medicine Daily Progress Note    Date of Service  7/31/2019    Chief Complaint:  Hypertension  Diabetes  Hyponatremia    Interval History:  No significant events or changes since last visit    Review of Systems  Review of Systems   Constitutional: Negative for fever.   Eyes: Negative for blurred vision.   Respiratory: Negative for shortness of breath.    Cardiovascular: Negative for palpitations.   Gastrointestinal: Negative for nausea and vomiting.   Neurological: Negative for dizziness and headaches.   Psychiatric/Behavioral: Negative for hallucinations.        Physical Exam  Temp:  [36.3 °C (97.4 °F)-37 °C (98.6 °F)] 36.7 °C (98 °F)  Pulse:  [67-73] 70  Resp:  [17-18] 18  BP: (101-144)/(55-73) 144/73  SpO2:  [96 %] 96 %    Physical Exam   HENT:   Mouth/Throat: Oropharynx is clear and moist.   Eyes: No scleral icterus.   Cardiovascular: Normal rate and regular rhythm.   No murmur heard.  Pulmonary/Chest: Effort normal and breath sounds normal. No stridor.   Abdominal: Soft. He exhibits no distension. There is no tenderness.   Musculoskeletal: He exhibits no edema.   Skin: Skin is warm. No rash noted. He is not diaphoretic.   Psychiatric: His behavior is normal.   Nursing note and vitals reviewed.      Fluids    Intake/Output Summary (Last 24 hours) at 7/31/2019 1024  Last data filed at 7/31/2019 0840  Gross per 24 hour   Intake 1480 ml   Output 2900 ml   Net -1420 ml       Laboratory  Recent Labs     07/30/19  0601   WBC 10.4   RBC 2.62*   HEMOGLOBIN 8.4*   HEMATOCRIT 25.3*   MCV 96.6   MCH 32.1   MCHC 33.2*   RDW 47.2   PLATELETCT 287   MPV 8.8*     Recent Labs     07/30/19  0601   SODIUM 133*   POTASSIUM 4.0   CHLORIDE 99   CO2 24   GLUCOSE 99   BUN 14   CREATININE 1.00   CALCIUM 8.8                       Assessment/Plan  * T5 spinal cord injury (HCC)- (present on admission)  Assessment & Plan  S/P surgical repair  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  Wound care    BPH (benign prostatic  hyperplasia)  Assessment & Plan  On Hytrin    Hyponatremia- (present on admission)  Assessment & Plan  Na: 131 (7/27) --> 133 (7/30)  Monitor for now    Anemia- (present on admission)  Assessment & Plan  H&H stable  S/P PRBC prior to Rehab transfer  Monitor    PVD (peripheral vascular disease) (HCC)- (present on admission)  Assessment & Plan  Plavix on hold post-operatively    Dyslipidemia- (present on admission)  Assessment & Plan  On Lipitor    Essential hypertension- (present on admission)  Assessment & Plan  BP ok  On Norvasc: 10 mg daily  On Lisinopril: 40 mg daily  On Verapamil ER: 180 mg bid  Note: also on Aldactone  Cont to monitor    Type 2 diabetes mellitus without complication, without long-term current use of insulin (HCC)- (present on admission)  Assessment & Plan  Hba1c 6.8 (7/26)  BS this am after med change: 114 (7/31)  Off Lantus: 20 units --> 18 units --> 10 units (7/29) --> d/c'd (7/30)  Off Glipizide SR: 2.5 mg qam (last dose on 7/30)  On Metformin: 1000 mg bid  Note: home meds include Metformin, Glipizide, and Januvia  Cont to monitor

## 2019-07-31 NOTE — CARE PLAN
Problem: Communication  Goal: The ability to communicate needs accurately and effectively will improve  Makes needs known to staff.  Encouraged to use call light for staff assist.     Problem: Safety  Goal: Will remain free from falls  Call light kept within reach and encouraged to use for assistance and with toileting needs. Encouraged to call staff and to wait for staff before transfers.

## 2019-08-01 ENCOUNTER — APPOINTMENT (OUTPATIENT)
Dept: RADIOLOGY | Facility: REHABILITATION | Age: 69
DRG: 052 | End: 2019-08-01
Attending: HOSPITALIST
Payer: MEDICARE

## 2019-08-01 PROBLEM — R07.9 CHEST PAIN: Status: ACTIVE | Noted: 2019-08-01

## 2019-08-01 LAB
ANION GAP SERPL CALC-SCNC: 11 MMOL/L (ref 0–11.9)
APPEARANCE UR: ABNORMAL
BACTERIA #/AREA URNS HPF: ABNORMAL /HPF
BASOPHILS # BLD AUTO: 0.1 % (ref 0–1.8)
BASOPHILS # BLD AUTO: 0.2 % (ref 0–1.8)
BASOPHILS # BLD: 0.02 K/UL (ref 0–0.12)
BASOPHILS # BLD: 0.04 K/UL (ref 0–0.12)
BILIRUB UR QL STRIP.AUTO: NEGATIVE
BUN SERPL-MCNC: 17 MG/DL (ref 8–22)
CALCIUM SERPL-MCNC: 9 MG/DL (ref 8.5–10.5)
CHLORIDE SERPL-SCNC: 96 MMOL/L (ref 96–112)
CO2 SERPL-SCNC: 22 MMOL/L (ref 20–33)
COLOR UR: YELLOW
CREAT SERPL-MCNC: 1.01 MG/DL (ref 0.5–1.4)
EKG IMPRESSION: NORMAL
EKG IMPRESSION: NORMAL
EOSINOPHIL # BLD AUTO: 0.02 K/UL (ref 0–0.51)
EOSINOPHIL # BLD AUTO: 0.06 K/UL (ref 0–0.51)
EOSINOPHIL NFR BLD: 0.1 % (ref 0–6.9)
EOSINOPHIL NFR BLD: 0.3 % (ref 0–6.9)
EPI CELLS #/AREA URNS HPF: NEGATIVE /HPF
ERYTHROCYTE [DISTWIDTH] IN BLOOD BY AUTOMATED COUNT: 48.8 FL (ref 35.9–50)
ERYTHROCYTE [DISTWIDTH] IN BLOOD BY AUTOMATED COUNT: 48.9 FL (ref 35.9–50)
GLUCOSE BLD-MCNC: 119 MG/DL (ref 65–99)
GLUCOSE BLD-MCNC: 132 MG/DL (ref 65–99)
GLUCOSE BLD-MCNC: 142 MG/DL (ref 65–99)
GLUCOSE BLD-MCNC: 152 MG/DL (ref 65–99)
GLUCOSE SERPL-MCNC: 143 MG/DL (ref 65–99)
GLUCOSE UR STRIP.AUTO-MCNC: NEGATIVE MG/DL
HCT VFR BLD AUTO: 24.3 % (ref 42–52)
HCT VFR BLD AUTO: 25.7 % (ref 42–52)
HGB BLD-MCNC: 8.3 G/DL (ref 14–18)
HGB BLD-MCNC: 8.5 G/DL (ref 14–18)
HYALINE CASTS #/AREA URNS LPF: ABNORMAL /LPF
IMM GRANULOCYTES # BLD AUTO: 0.15 K/UL (ref 0–0.11)
IMM GRANULOCYTES # BLD AUTO: 0.17 K/UL (ref 0–0.11)
IMM GRANULOCYTES NFR BLD AUTO: 0.7 % (ref 0–0.9)
IMM GRANULOCYTES NFR BLD AUTO: 0.9 % (ref 0–0.9)
KETONES UR STRIP.AUTO-MCNC: NEGATIVE MG/DL
LEUKOCYTE ESTERASE UR QL STRIP.AUTO: ABNORMAL
LYMPHOCYTES # BLD AUTO: 0.74 K/UL (ref 1–4.8)
LYMPHOCYTES # BLD AUTO: 0.76 K/UL (ref 1–4.8)
LYMPHOCYTES NFR BLD: 3.8 % (ref 22–41)
LYMPHOCYTES NFR BLD: 3.9 % (ref 22–41)
MAGNESIUM SERPL-MCNC: 1.5 MG/DL (ref 1.5–2.5)
MCH RBC QN AUTO: 32.1 PG (ref 27–33)
MCH RBC QN AUTO: 33.2 PG (ref 27–33)
MCHC RBC AUTO-ENTMCNC: 33.1 G/DL (ref 33.7–35.3)
MCHC RBC AUTO-ENTMCNC: 34.2 G/DL (ref 33.7–35.3)
MCV RBC AUTO: 97 FL (ref 81.4–97.8)
MCV RBC AUTO: 97.2 FL (ref 81.4–97.8)
MICRO URNS: ABNORMAL
MONOCYTES # BLD AUTO: 0.77 K/UL (ref 0–0.85)
MONOCYTES # BLD AUTO: 0.91 K/UL (ref 0–0.85)
MONOCYTES NFR BLD AUTO: 4 % (ref 0–13.4)
MONOCYTES NFR BLD AUTO: 4.5 % (ref 0–13.4)
NEUTROPHILS # BLD AUTO: 17.28 K/UL (ref 1.82–7.42)
NEUTROPHILS # BLD AUTO: 18.34 K/UL (ref 1.82–7.42)
NEUTROPHILS NFR BLD: 90.7 % (ref 44–72)
NEUTROPHILS NFR BLD: 90.8 % (ref 44–72)
NITRITE UR QL STRIP.AUTO: NEGATIVE
NRBC # BLD AUTO: 0 K/UL
NRBC # BLD AUTO: 0 K/UL
NRBC BLD-RTO: 0 /100 WBC
NRBC BLD-RTO: 0 /100 WBC
PH UR STRIP.AUTO: 6 [PH] (ref 5–8)
PHOSPHATE SERPL-MCNC: 3.9 MG/DL (ref 2.5–4.5)
PLATELET # BLD AUTO: 286 K/UL (ref 164–446)
PLATELET # BLD AUTO: 303 K/UL (ref 164–446)
PMV BLD AUTO: 8.8 FL (ref 9–12.9)
PMV BLD AUTO: 8.9 FL (ref 9–12.9)
POTASSIUM SERPL-SCNC: 4.9 MMOL/L (ref 3.6–5.5)
PROT UR QL STRIP: 30 MG/DL
RBC # BLD AUTO: 2.5 M/UL (ref 4.7–6.1)
RBC # BLD AUTO: 2.65 M/UL (ref 4.7–6.1)
RBC # URNS HPF: ABNORMAL /HPF
RBC UR QL AUTO: ABNORMAL
SODIUM SERPL-SCNC: 129 MMOL/L (ref 135–145)
SP GR UR STRIP.AUTO: 1.02
TROPONIN T SERPL-MCNC: 31 NG/L (ref 6–19)
TROPONIN T SERPL-MCNC: 32 NG/L (ref 6–19)
TROPONIN T SERPL-MCNC: 33 NG/L (ref 6–19)
UROBILINOGEN UR STRIP.AUTO-MCNC: 1 MG/DL
WBC # BLD AUTO: 19.1 K/UL (ref 4.8–10.8)
WBC # BLD AUTO: 20.2 K/UL (ref 4.8–10.8)
WBC #/AREA URNS HPF: ABNORMAL /HPF

## 2019-08-01 PROCEDURE — 97116 GAIT TRAINING THERAPY: CPT

## 2019-08-01 PROCEDURE — 700111 HCHG RX REV CODE 636 W/ 250 OVERRIDE (IP): Performed by: PHYSICAL MEDICINE & REHABILITATION

## 2019-08-01 PROCEDURE — 87040 BLOOD CULTURE FOR BACTERIA: CPT

## 2019-08-01 PROCEDURE — 99233 SBSQ HOSP IP/OBS HIGH 50: CPT | Performed by: PHYSICAL MEDICINE & REHABILITATION

## 2019-08-01 PROCEDURE — 770010 HCHG ROOM/CARE - REHAB SEMI PRIVAT*

## 2019-08-01 PROCEDURE — 97530 THERAPEUTIC ACTIVITIES: CPT

## 2019-08-01 PROCEDURE — 93005 ELECTROCARDIOGRAM TRACING: CPT | Performed by: PHYSICAL MEDICINE & REHABILITATION

## 2019-08-01 PROCEDURE — 700102 HCHG RX REV CODE 250 W/ 637 OVERRIDE(OP): Performed by: PHYSICAL MEDICINE & REHABILITATION

## 2019-08-01 PROCEDURE — A9270 NON-COVERED ITEM OR SERVICE: HCPCS | Performed by: HOSPITALIST

## 2019-08-01 PROCEDURE — 83735 ASSAY OF MAGNESIUM: CPT

## 2019-08-01 PROCEDURE — 700105 HCHG RX REV CODE 258: Performed by: PHYSICAL MEDICINE & REHABILITATION

## 2019-08-01 PROCEDURE — 93010 ELECTROCARDIOGRAM REPORT: CPT | Mod: 76 | Performed by: INTERNAL MEDICINE

## 2019-08-01 PROCEDURE — G0515 COGNITIVE SKILLS DEVELOPMENT: HCPCS

## 2019-08-01 PROCEDURE — 93010 ELECTROCARDIOGRAM REPORT: CPT | Performed by: INTERNAL MEDICINE

## 2019-08-01 PROCEDURE — 84484 ASSAY OF TROPONIN QUANT: CPT | Mod: 91

## 2019-08-01 PROCEDURE — 93005 ELECTROCARDIOGRAM TRACING: CPT | Performed by: HOSPITALIST

## 2019-08-01 PROCEDURE — 71046 X-RAY EXAM CHEST 2 VIEWS: CPT

## 2019-08-01 PROCEDURE — 700102 HCHG RX REV CODE 250 W/ 637 OVERRIDE(OP): Performed by: HOSPITALIST

## 2019-08-01 PROCEDURE — 81001 URINALYSIS AUTO W/SCOPE: CPT

## 2019-08-01 PROCEDURE — 36415 COLL VENOUS BLD VENIPUNCTURE: CPT

## 2019-08-01 PROCEDURE — 85025 COMPLETE CBC W/AUTO DIFF WBC: CPT

## 2019-08-01 PROCEDURE — 700112 HCHG RX REV CODE 229: Performed by: PHYSICAL MEDICINE & REHABILITATION

## 2019-08-01 PROCEDURE — A9270 NON-COVERED ITEM OR SERVICE: HCPCS | Performed by: PHYSICAL MEDICINE & REHABILITATION

## 2019-08-01 PROCEDURE — 80048 BASIC METABOLIC PNL TOTAL CA: CPT

## 2019-08-01 PROCEDURE — 97110 THERAPEUTIC EXERCISES: CPT

## 2019-08-01 PROCEDURE — 82962 GLUCOSE BLOOD TEST: CPT | Mod: 91

## 2019-08-01 PROCEDURE — 99233 SBSQ HOSP IP/OBS HIGH 50: CPT | Performed by: HOSPITALIST

## 2019-08-01 PROCEDURE — 84100 ASSAY OF PHOSPHORUS: CPT

## 2019-08-01 PROCEDURE — 700101 HCHG RX REV CODE 250: Performed by: PHYSICAL MEDICINE & REHABILITATION

## 2019-08-01 RX ORDER — SODIUM CHLORIDE 9 MG/ML
INJECTION, SOLUTION INTRAVENOUS CONTINUOUS
Status: DISCONTINUED | OUTPATIENT
Start: 2019-08-01 | End: 2019-08-02

## 2019-08-01 RX ADMIN — TERAZOSIN HYDROCHLORIDE 5 MG: 5 CAPSULE ORAL at 05:53

## 2019-08-01 RX ADMIN — VERAPAMIL HYDROCHLORIDE 180 MG: 180 TABLET, FILM COATED, EXTENDED RELEASE ORAL at 09:25

## 2019-08-01 RX ADMIN — SPIRONOLACTONE 25 MG: 25 TABLET ORAL at 09:22

## 2019-08-01 RX ADMIN — METHOCARBAMOL 750 MG: 750 TABLET, FILM COATED ORAL at 17:26

## 2019-08-01 RX ADMIN — TRAMADOL HYDROCHLORIDE 50 MG: 50 TABLET, COATED ORAL at 17:25

## 2019-08-01 RX ADMIN — CALCIUM CARBONATE (ANTACID) CHEW TAB 500 MG 500 MG: 500 CHEW TAB at 20:24

## 2019-08-01 RX ADMIN — LORATADINE 10 MG: 10 TABLET ORAL at 09:21

## 2019-08-01 RX ADMIN — CALCIUM CARBONATE (ANTACID) CHEW TAB 500 MG 500 MG: 500 CHEW TAB at 09:22

## 2019-08-01 RX ADMIN — LIDOCAINE 2 PATCH: 50 PATCH CUTANEOUS at 09:30

## 2019-08-01 RX ADMIN — TRAMADOL HYDROCHLORIDE 50 MG: 50 TABLET, COATED ORAL at 13:02

## 2019-08-01 RX ADMIN — SODIUM CHLORIDE: 9 INJECTION, SOLUTION INTRAVENOUS at 19:56

## 2019-08-01 RX ADMIN — OMEPRAZOLE 20 MG: 20 CAPSULE, DELAYED RELEASE ORAL at 09:21

## 2019-08-01 RX ADMIN — GABAPENTIN 600 MG: 300 CAPSULE ORAL at 20:24

## 2019-08-01 RX ADMIN — DOCUSATE SODIUM 200 MG: 100 CAPSULE, LIQUID FILLED ORAL at 20:25

## 2019-08-01 RX ADMIN — INSULIN LISPRO 2 UNITS: 100 INJECTION, SOLUTION INTRAVENOUS; SUBCUTANEOUS at 11:05

## 2019-08-01 RX ADMIN — VERAPAMIL HYDROCHLORIDE 180 MG: 180 TABLET, FILM COATED, EXTENDED RELEASE ORAL at 20:24

## 2019-08-01 RX ADMIN — ACETAMINOPHEN 1000 MG: 500 TABLET, FILM COATED ORAL at 20:24

## 2019-08-01 RX ADMIN — METHOCARBAMOL 750 MG: 750 TABLET, FILM COATED ORAL at 13:02

## 2019-08-01 RX ADMIN — SENNOSIDES 17.2 MG: 8.6 TABLET, FILM COATED ORAL at 11:01

## 2019-08-01 RX ADMIN — METHOCARBAMOL 750 MG: 750 TABLET, FILM COATED ORAL at 20:23

## 2019-08-01 RX ADMIN — ACETAMINOPHEN 1000 MG: 500 TABLET, FILM COATED ORAL at 16:34

## 2019-08-01 RX ADMIN — BISACODYL 10 MG: 10 SUPPOSITORY RECTAL at 20:24

## 2019-08-01 RX ADMIN — MULTIPLE VITAMINS W/ MINERALS TAB 1 TABLET: TAB at 11:01

## 2019-08-01 RX ADMIN — AMLODIPINE BESYLATE 10 MG: 5 TABLET ORAL at 09:21

## 2019-08-01 RX ADMIN — GABAPENTIN 600 MG: 300 CAPSULE ORAL at 16:34

## 2019-08-01 RX ADMIN — METHOCARBAMOL 750 MG: 750 TABLET, FILM COATED ORAL at 09:22

## 2019-08-01 RX ADMIN — LISINOPRIL 40 MG: 20 TABLET ORAL at 09:21

## 2019-08-01 RX ADMIN — TRAMADOL HYDROCHLORIDE 50 MG: 50 TABLET, COATED ORAL at 09:22

## 2019-08-01 RX ADMIN — METFORMIN HYDROCHLORIDE 1000 MG: 500 TABLET, FILM COATED ORAL at 17:26

## 2019-08-01 RX ADMIN — MIDODRINE HYDROCHLORIDE 5 MG: 2.5 TABLET ORAL at 05:53

## 2019-08-01 RX ADMIN — TERAZOSIN HYDROCHLORIDE 5 MG: 5 CAPSULE ORAL at 17:25

## 2019-08-01 RX ADMIN — ATORVASTATIN CALCIUM 20 MG: 10 TABLET, FILM COATED ORAL at 20:24

## 2019-08-01 RX ADMIN — METFORMIN HYDROCHLORIDE 1000 MG: 500 TABLET, FILM COATED ORAL at 09:21

## 2019-08-01 RX ADMIN — DOCUSATE SODIUM 200 MG: 100 CAPSULE, LIQUID FILLED ORAL at 09:22

## 2019-08-01 RX ADMIN — ACETAMINOPHEN 1000 MG: 500 TABLET, FILM COATED ORAL at 09:21

## 2019-08-01 RX ADMIN — ENOXAPARIN SODIUM 40 MG: 100 INJECTION SUBCUTANEOUS at 09:22

## 2019-08-01 RX ADMIN — GABAPENTIN 600 MG: 300 CAPSULE ORAL at 09:20

## 2019-08-01 ASSESSMENT — ENCOUNTER SYMPTOMS
NERVOUS/ANXIOUS: 0
BLURRED VISION: 0
COUGH: 0
DIZZINESS: 0
FEVER: 0
DIARRHEA: 0

## 2019-08-01 NOTE — THERAPY
"Occupational Therapy  Daily Treatment     Patient Name: Ian Laird  Age:  69 y.o., Sex:  male  Medical Record #: [id number]  Today's Date: 8/1/2019     Precautions  Precautions: Spinal / Back Precautions   Comments: T3-T7 spinal fusion    Safety   ADL Safety : Requires Supervision for Safety, Requires Physical Assist for Safety  Bathroom Safety: Requires Supervision for Safety, Requires Physical Assist for Safety    Subjective    \"I am doing ok, I just want to take it easy\"     Objective       08/01/19 0831   Sitting Upper Body Exercises   Other Exercise UB therex using green theraband - chest pull, biceps, triceps 2 sets x 10 each   Interdisciplinary Plan of Care Collaboration   Patient Position at End of Therapy In Bed;Call Light within Reach;Tray Table within Reach;Other (Comments)  (RN present in room)   Therapy Missed   Missed Therapy (Minutes) 45   Reason For Missed Therapy Medical - Patient with Nursing;Medical - Patient  in Procedure;Medical - Patient on Hold from Therapy  (rapid response this AM d/t chest pain, stat labs)   OT Total Time Spent   OT Individual Total Time Spent (Mins) 15   OT Charge Group   OT Therapeutic Exercise  1       Assessment    Several attempts made to make-up missed therapy minutes, however pt with stat labs/x-rays in morning following rapid response and only able to see pt briefly. Pt refused OOB activities but willing to complete exercises at bedside, pt completed UB therex with no complaints of pain    Plan    Cont overall strength/endurance and standing balance to improve ADL's and functional mobility    "

## 2019-08-01 NOTE — PROGRESS NOTES
Pt reported to this RN that he was having chest pain while up in WC in dining room prior to breakfast. Pt reported the pain was 3-4/10 across his upper chest and just started since getting out of bed. Rapid response was called and pt was returned to his room and helped to bed. Pt was not reporting shortness of breath or difficulty breathing. Vitals were WNL. Pt reports the pain lessened when in bed. Orders for STAT labs received.

## 2019-08-01 NOTE — CARE PLAN
Pt uses call light consistently and appropriately. Waits for assistance does not attempt self transfer this shift. Able to verbalize needs.

## 2019-08-01 NOTE — ASSESSMENT & PLAN NOTE
Resolved  Has CP x 1 on 8/1  Pain: located across the chest, aching in nature, 3/10 in intensity, lasted 10 mintues  No other associated symptoms  Pt feels it is aching from increased PT therapy  EKG: NSR; no ST elevations or depressions; first degree AVB  TropI: 31 --> 32 --> 33 --> 31  CXR: unremarkable except for some small pleural effusions

## 2019-08-01 NOTE — THERAPY
Physical Therapy   Daily Treatment     Patient Name: Ian Laird  Age:  69 y.o., Sex:  male  Medical Record #: [id number]  Today's Date: 8/1/2019     Precautions  Precautions: (P) Spinal / Back Precautions   Comments: (P) T3-T7 spinal fusion    Subjective    Pt was lying in bed upon arrival and agreeable to tx. Stated that when he said he had chest pain this morning he thinks it was really pec mm pain from heavy UE reliance yesterday during AMB with FWW     Objective       08/01/19 1331   Precautions   Precautions Spinal / Back Precautions    Comments T3-T7 spinal fusion   Interdisciplinary Plan of Care Collaboration   IDT Collaboration with  [employer]   Patient Position at End of Therapy Seated;Call Light within Reach;Tray Table within Reach;Phone within Reach   Collaboration Comments re: assistance with tx   PT Total Time Spent   PT Individual Total Time Spent (Mins) 60   PT Charge Group   PT Gait Training 2   PT Therapeutic Activities 2     Lite gait: set up/ education: 12ft x 1, 18ft x1, 22ftx1, 15ft x1, .2mph SBA  AMB in // bars: 6ft x 1 B UE use with maxA for B LE knee buckling episode  Stance in // bars 60sec x 4 SBA B UE use    Bed mobility: SPV  Transfers: SBA reach pivot transfer     Assessment    Pt significant limited by fatigue this session and requiring extended rest breaks    Plan    Lite gait, neuro control, standing tolerance/ standing balance, coordination exercises, progress through passport, family training Marion to perform transfers, review education on pressure relief/ skin checks

## 2019-08-01 NOTE — PROGRESS NOTES
0755 Pt was in the dining room and reported to Kenneth Mc that he was having chest pain. I returned him to his room and he stated that he had an aching chest pain which radiated down his lt arm. Pt placed in bed. /[id number].  Rapid Response called at 0800 with Dr Gray in to eval pt. RT, Jorge in. O2 sat 99% but pt placed on O2 4L nc, which was left on for an hour. Orders received and I placed an IV in rt wrist and amado labs and then converted IV to saline lock. Venipuncture lacf for lab and blood culture. EKG done by RT. CP resolving and pt resting comfortably at 0845.

## 2019-08-01 NOTE — PROGRESS NOTES
[facility] Medicine Daily Progress Note    Date of Service  8/1/2019    Chief Complaint:  Hypertension  Diabetes  Hyponatremia    Interval History:  No significant events or changes since last visit    Review of Systems  Review of Systems   Constitutional: Negative for fever.   Eyes: Negative for blurred vision.   Respiratory: Negative for cough.    Cardiovascular: Positive for chest pain.   Gastrointestinal: Negative for diarrhea.   Musculoskeletal: Negative for joint pain.   Neurological: Negative for dizziness.   Psychiatric/Behavioral: The patient is not nervous/anxious.         Physical Exam  Temp:  [36.6 °C (97.9 °F)-37.1 °C (98.7 °F)] 36.6 °C (97.9 °F)  Pulse:  [71-89] 86  Resp:  [18] 18  BP: (120-136)/(60-62) 121/62  SpO2:  [96 %] 96 %    Physical Exam   Constitutional: No distress.   HENT:   Mouth/Throat: No oropharyngeal exudate.   Eyes: EOM are normal.   Neck: No JVD present. No tracheal deviation present.   Cardiovascular: Normal rate and regular rhythm.   No murmur heard.  Pulmonary/Chest: Effort normal and breath sounds normal.   Abdominal: Soft. Bowel sounds are normal.   Musculoskeletal: He exhibits no edema.   Skin: Skin is warm. No rash noted.   Psychiatric: His behavior is normal.   Nursing note and vitals reviewed.      Fluids    Intake/Output Summary (Last 24 hours) at 8/1/2019 0827  Last data filed at 8/1/2019 0722  Gross per 24 hour   Intake 680 ml   Output 1700 ml   Net -1020 ml       Laboratory  Recent Labs     07/30/19  0601 08/01/19  0541   WBC 10.4 19.1*   RBC 2.62* 2.65*   HEMOGLOBIN 8.4* 8.5*   HEMATOCRIT 25.3* 25.7*   MCV 96.6 97.0   MCH 32.1 32.1   MCHC 33.2* 33.1*   RDW 47.2 48.9   PLATELETCT 287 303   MPV 8.8* 8.8*     Recent Labs     07/30/19  0601   SODIUM 133*   POTASSIUM 4.0   CHLORIDE 99   CO2 24   GLUCOSE 99   BUN 14   CREATININE 1.00   CALCIUM 8.8                       Assessment/Plan  * T5 spinal cord injury (HCC)- (present on admission)  Assessment & Plan  S/P surgical  repair  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  Wound care    Chest pain  Assessment & Plan  Developed acute CP this am (8/1)  Pain: located across the chest, aching in nature, 3/10 in intensity, lasted 10 mintues  No other associated symptoms  Am labs of CBC showed a wbc of 19.1  Pt stated he was in the bathroom just prior and was straining for a BM  Will get EKG, TropI  Will get CXR, U/A, and BC x 2    BPH (benign prostatic hyperplasia)  Assessment & Plan  On Hytrin    Hyponatremia- (present on admission)  Assessment & Plan  Na: 131 (7/27) --> 133 (7/30)  Monitor for now    Anemia- (present on admission)  Assessment & Plan  H&H stable  S/P PRBC prior to Rehab transfer  Monitor    PVD (peripheral vascular disease) (HCC)- (present on admission)  Assessment & Plan  Plavix on hold post-operatively    Dyslipidemia- (present on admission)  Assessment & Plan  On Lipitor    Essential hypertension- (present on admission)  Assessment & Plan  BP ok  On Norvasc: 10 mg daily  On Lisinopril: 40 mg daily  On Verapamil ER: 180 mg bid  Note: also on Aldactone  Note: on Midodrine  Cont to monitor    Type 2 diabetes mellitus without complication, without long-term current use of insulin (HCC)- (present on admission)  Assessment & Plan  Hba1c 6.8 (7/26)  BS recently ok  Off Lantus: 20 units --> 18 units --> 10 units (7/29) --> d/c'd (7/30)  Off Glipizide SR: 2.5 mg qam (last dose on 7/30)  On Metformin: 1000 mg bid  Note: home meds include Metformin, Glipizide, and Januvia  Cont to monitor

## 2019-08-01 NOTE — PROGRESS NOTES
"Rehab Progress Note     Encounter Date: 8/1/2019    CC: LE weakness    Interval Events (Subjective)    Patient had episode of chest pain this morning, describes soreness in bilateral pack, did not feel quite right, no dyspnea, does have a history of significant exercise yesterday.  He reports pain is not reproducible with contraction of pectoralis muscles.  He denies shortness of breath, denies dizziness, denies cough.    He reports no change in urine, has not had a catheterization in the last 48 hours.    He was able to walk 100 feet over ground with walker yesterday.    ROS:  Gen: No fatigue, confusion, significant weight loss  Eyes: no blurry vision, double vision or pain in eyes  ENT: no changes in hearing, runny nose, nose bleeds, sinus pain  CV: No CP, palpitations,   Lungs: no shortness of breath, changes in secretions, changes in cough, pain with coughing  Abd: No abd pain, nausea, vomiting, pain with eating  : no blood in urine,  suprapubic pain  Ext: No swelling in legs, asymmetric atrophy  Neuro: no changes in strength or sensation in the last 24 hours  Skin: no new ulcers/skin breakdown appreciated by patient  Mood: No changes in mood today, increase in depression or anxiety  Heme: no bruising, or bleeding  Rest of 14 point review of systems is negative      Objective:  Vitals: /62   Pulse 86   Temp 36.6 °C (97.9 °F) (Temporal)   Resp 18   Ht 1.778 m (5' 10\")   Wt 100.8 kg (222 lb 4.8 oz)   SpO2 96%   Gen: NAD, Body mass index is 31.9 kg/m².  HEENT: NC/AT, PERRLA, moist mucous membranes  Cardio: RRR, no mumurs  Pulm: CTAB, with normal respiratory effort  Abd: Soft NTND, active bowel sounds  Ext: No peripheral edema. +dorsal pedalis pulses bilaterally.        Motor Exam Upper Extremities   ? Myotome R L   Shoulder flexion C5 5 5   Elbow flexion C5 5 5   Wrist extension C6 5 5   Elbow extension C7 5 5   Finger flexion C8 5 5   Finger abduction T1 5 5     Motor Exam Lower Extremities    ? " Myotome R L   Hip flexion L2 4 4   Knee extension L3 5 4   Ankle dorsiflexion L4 4 4   Toe extension L5 5 4   Ankle plantarflexion S1 4 4     Tone: No spasticity present, no clonus present    Recent Results (from the past 72 hour(s))   ACCU-CHEK GLUCOSE    Collection Time: 07/29/19 11:11 AM   Result Value Ref Range    Glucose - Accu-Ck 101 (H) 65 - 99 mg/dL   ACCU-CHEK GLUCOSE    Collection Time: 07/29/19  5:09 PM   Result Value Ref Range    Glucose - Accu-Ck 92 65 - 99 mg/dL   ACCU-CHEK GLUCOSE    Collection Time: 07/29/19  8:27 PM   Result Value Ref Range    Glucose - Accu-Ck 106 (H) 65 - 99 mg/dL   Basic Metabolic Panel    Collection Time: 07/30/19  6:01 AM   Result Value Ref Range    Sodium 133 (L) 135 - 145 mmol/L    Potassium 4.0 3.6 - 5.5 mmol/L    Chloride 99 96 - 112 mmol/L    Co2 24 20 - 33 mmol/L    Glucose 99 65 - 99 mg/dL    Bun 14 8 - 22 mg/dL    Creatinine 1.00 0.50 - 1.40 mg/dL    Calcium 8.8 8.5 - 10.5 mg/dL    Anion Gap 10.0 0.0 - 11.9   CBC WITH DIFFERENTIAL    Collection Time: 07/30/19  6:01 AM   Result Value Ref Range    WBC 10.4 4.8 - 10.8 K/uL    RBC 2.62 (L) 4.70 - 6.10 M/uL    Hemoglobin 8.4 (L) 14.0 - 18.0 g/dL    Hematocrit 25.3 (L) 42.0 - 52.0 %    MCV 96.6 81.4 - 97.8 fL    MCH 32.1 27.0 - 33.0 pg    MCHC 33.2 (L) 33.7 - 35.3 g/dL    RDW 47.2 35.9 - 50.0 fL    Platelet Count 287 164 - 446 K/uL    MPV 8.8 (L) 9.0 - 12.9 fL    Neutrophils-Polys 69.40 44.00 - 72.00 %    Lymphocytes 19.30 (L) 22.00 - 41.00 %    Monocytes 8.30 0.00 - 13.40 %    Eosinophils 1.70 0.00 - 6.90 %    Basophils 0.20 0.00 - 1.80 %    Immature Granulocytes 1.10 (H) 0.00 - 0.90 %    Nucleated RBC 0.00 /100 WBC    Neutrophils (Absolute) 7.23 1.82 - 7.42 K/uL    Lymphs (Absolute) 2.01 1.00 - 4.80 K/uL    Monos (Absolute) 0.87 (H) 0.00 - 0.85 K/uL    Eos (Absolute) 0.18 0.00 - 0.51 K/uL    Baso (Absolute) 0.02 0.00 - 0.12 K/uL    Immature Granulocytes (abs) 0.11 0.00 - 0.11 K/uL    NRBC (Absolute) 0.00 K/uL   ESTIMATED  GFR    Collection Time: 07/30/19  6:01 AM   Result Value Ref Range    GFR If African American >60 >60 mL/min/1.73 m 2    GFR If Non African American >60 >60 mL/min/1.73 m 2   ACCU-CHEK GLUCOSE    Collection Time: 07/30/19  7:14 AM   Result Value Ref Range    Glucose - Accu-Ck 98 65 - 99 mg/dL   ACCU-CHEK GLUCOSE    Collection Time: 07/30/19 11:36 AM   Result Value Ref Range    Glucose - Accu-Ck 81 65 - 99 mg/dL   ACCU-CHEK GLUCOSE    Collection Time: 07/30/19  5:20 PM   Result Value Ref Range    Glucose - Accu-Ck 69 65 - 99 mg/dL   ACCU-CHEK GLUCOSE    Collection Time: 07/30/19  5:53 PM   Result Value Ref Range    Glucose - Accu-Ck 98 65 - 99 mg/dL   ACCU-CHEK GLUCOSE    Collection Time: 07/30/19  8:27 PM   Result Value Ref Range    Glucose - Accu-Ck 89 65 - 99 mg/dL   ACCU-CHEK GLUCOSE    Collection Time: 07/31/19  7:54 AM   Result Value Ref Range    Glucose - Accu-Ck 114 (H) 65 - 99 mg/dL   ACCU-CHEK GLUCOSE    Collection Time: 07/31/19 11:30 AM   Result Value Ref Range    Glucose - Accu-Ck 85 65 - 99 mg/dL   ACCU-CHEK GLUCOSE    Collection Time: 07/31/19  5:34 PM   Result Value Ref Range    Glucose - Accu-Ck 93 65 - 99 mg/dL   ACCU-CHEK GLUCOSE    Collection Time: 07/31/19  8:16 PM   Result Value Ref Range    Glucose - Accu-Ck 149 (H) 65 - 99 mg/dL   CBC WITH DIFFERENTIAL    Collection Time: 08/01/19  5:41 AM   Result Value Ref Range    WBC 19.1 (H) 4.8 - 10.8 K/uL    RBC 2.65 (L) 4.70 - 6.10 M/uL    Hemoglobin 8.5 (L) 14.0 - 18.0 g/dL    Hematocrit 25.7 (L) 42.0 - 52.0 %    MCV 97.0 81.4 - 97.8 fL    MCH 32.1 27.0 - 33.0 pg    MCHC 33.1 (L) 33.7 - 35.3 g/dL    RDW 48.9 35.9 - 50.0 fL    Platelet Count 303 164 - 446 K/uL    MPV 8.8 (L) 9.0 - 12.9 fL    Neutrophils-Polys 90.70 (H) 44.00 - 72.00 %    Lymphocytes 3.90 (L) 22.00 - 41.00 %    Monocytes 4.00 0.00 - 13.40 %    Eosinophils 0.30 0.00 - 6.90 %    Basophils 0.20 0.00 - 1.80 %    Immature Granulocytes 0.90 0.00 - 0.90 %    Nucleated RBC 0.00 /100 WBC     Neutrophils (Absolute) 17.28 (H) 1.82 - 7.42 K/uL    Lymphs (Absolute) 0.74 (L) 1.00 - 4.80 K/uL    Monos (Absolute) 0.77 0.00 - 0.85 K/uL    Eos (Absolute) 0.06 0.00 - 0.51 K/uL    Baso (Absolute) 0.04 0.00 - 0.12 K/uL    Immature Granulocytes (abs) 0.17 (H) 0.00 - 0.11 K/uL    NRBC (Absolute) 0.00 K/uL   ACCU-CHEK GLUCOSE    Collection Time: 19  7:54 AM   Result Value Ref Range    Glucose - Accu-Ck 142 (H) 65 - 99 mg/dL   EKG    Collection Time: 19  8:08 AM   Result Value Ref Range    Report       [facility]    Test Date:  2019  Pt Name:    ALICE STORY                 Department: [facility]  MRN:        [id number]                      Room:       [facility]  Gender:     Male                         Technician:   :        [id number]                   Requested By:LUBNA ANGEL  Order #:    [id number]                    Reading MD: Katharina Rodriguez MD    Measurements  Intervals                                Axis  Rate:       83                           P:          16  KY:         208                          QRS:        -8  QRSD:       112                          T:          43  QT:         352  QTc:        414    Interpretive Statements  SINUS RHYTHM  PROBABLE LEFT ATRIAL ABNORMALITY  NONSPECIFIC INTRAVENTRICULAR CONDUCTION DELAY  PROBABLE INFERIOR INFARCT, AGE INDETERMINATE  Compared to ECG 2019 11:40:09  Intraventricular conduction delay now present  Left ventricular hypertrophy no longer present  Myocardial infarct finding still present    Electronically Signed On  2019 8:50:20 PDT by Katharina Rodriguez MD         Current Facility-Administered Medications   Medication Frequency   • bisacodyl (THE MAGIC BULLET) suppository 10 mg DAILY   • midodrine (PROAMATINE) tablet 5 mg Q4HRS PRN   • midodrine (PROAMATINE) tablet 5 mg DAILY   • enoxaparin (LOVENOX) inj 40 mg DAILY   • tramadol (ULTRAM) 50 MG tablet 50 mg TID   • gabapentin (NEURONTIN) capsule 600 mg TID   • sennosides (SENOKOT) 8.6  MG tablet 17.2 mg DAILY AT NOON   • docusate sodium (COLACE) capsule 200 mg BID   • lidocaine (LIDODERM) 5 % 2 Patch Q24HR   • insulin lispro (HUMALOG) injection 2-12 Units 4X/DAY ACHS    And   • glucose 4 g chewable tablet 16 g Q15 MIN PRN    And   • dextrose 50% (D50W) injection 50 mL Q15 MIN PRN   • metFORMIN (GLUCOPHAGE) tablet 1,000 mg BID WITH MEALS   • amLODIPine (NORVASC) tablet 10 mg DAILY   • atorvastatin (LIPITOR) tablet 20 mg Nightly   • glucose 4 g chewable tablet 16 g PRN   • lisinopril (PRINIVIL) tablet 40 mg DAILY   • loratadine (CLARITIN) tablet 10 mg DAILY   • omeprazole (PRILOSEC) capsule 20 mg DAILY   • spironolactone (ALDACTONE) tablet 25 mg DAILY   • terazosin (HYTRIN) capsule 5 mg BID   • verapamil ER (CALAN-SR) tablet 180 mg BID   • Pharmacy Consult Request ...Pain Management Review 1 Each PHARMACY TO DOSE   • Respiratory Care per Protocol Continuous RT   • acetaminophen (TYLENOL) tablet 1,000 mg TID   • oxyCODONE immediate-release (ROXICODONE) tablet 5 mg Q3HRS PRN   • oxyCODONE immediate release (ROXICODONE) tablet 10 mg Q3HRS PRN   • acetaminophen (TYLENOL) tablet 650 mg Q4HRS PRN   • therapeutic multivitamin-minerals (THERAGRAN-M) tablet 1 Tab DAILY WITH LUNCH   • artificial tears 1.4 % ophthalmic solution 1 Drop PRN   • benzocaine-menthol (CEPACOL) lozenge 1 Lozenge Q2HRS PRN   • mag hydrox-al hydrox-simeth (MAALOX PLUS ES or MYLANTA DS) suspension 20 mL Q2HRS PRN   • ondansetron (ZOFRAN ODT) dispertab 4 mg 4X/DAY PRN    Or   • ondansetron (ZOFRAN) syringe/vial injection 4 mg 4X/DAY PRN   • traZODone (DESYREL) tablet 50 mg QHS PRN   • sodium chloride (OCEAN) 0.65 % nasal spray 2 Spray PRN   • calcium carbonate (TUMS) chewable tab 500 mg BID   • methocarbamol (ROBAXIN) tablet 750 mg 4X/DAY       Orders Placed This Encounter   Procedures   • Diet Order Diabetic     Standing Status:   Standing     Number of Occurrences:   1     Order Specific Question:   Diet:     Answer:   Diabetic [3]        Assessment:  Active Hospital Problems    Diagnosis   • *T5 spinal cord injury (HCC)   • PVD (peripheral vascular disease) (HCC)   • Type 2 diabetes mellitus without complication, without long-term current use of insulin (HCC)   • Essential hypertension   • Dyslipidemia   • Obesity   • Neuropathic pain   • Neurogenic bladder   • Neurogenic bowel   • Hyponatremia   • 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HCC)   • Acute kidney injury (HCC)   • Vitamin D deficiency   • Anemia   FINAL DIAGNOSIS:    A. Thoracic epidural tumor:         Lambda restricted plasmablastic neoplasm differential is between          plasmablastic plasmacytoma and plasmablastic lymphoma.         See comment.  Comment: In Specimen B there is a single, free lying cluster of  neoplastic cells that is felt to represent contamination of the  specimen as opposed to actual involvement of the skeletal muscle by the tumor. The differential between plasmblastic plasmcytoma and plasmablasticlymphoma is a difficult one.  They have very similar IHC profiles and few areas where they differ, there is still high overlap.  CD79a, ANDREW and EMA are negative which favor plasmablastic plasmacytoma.  Also, plasmablastic lymphoma tends to occur in the oral cavity in  immunocompromised patients and are about 60% ANDREW positive.  Clinical and radiographic correlation is recommended.   This case has been reviewed by Dr. Wilkinson, who concurs with the Diagnosis.    Medical Decision Making and Plan:    T5 AIS D spinal cord injury: Status post T4-6 laminectomy and T3-7 posterior spinal fusion with resection of mass by Dr. Velasco on 7/20, preliminary pathology suggest plasmablastic plasmacytoma, significant improvement in lower extremity strength since surgical resection.  -No brace at this time  -Continue comprehensive acute inpatient rehabilitation    Plasmablastic plasmacytoma: Primary mass  - Follow-up with oncology and radiation oncology as outpatient    Leukocytosis: WBC this morning  shows 19.1, with elevated neutrophils  -Recheck CBC now and in the am  -Check UA C&S, blood culture x2, chest x-ray  -No signs of respiratory infection, if he begins coughing will check sputum culture    Chest pain: Appreciate hospitalist input, rapid response called this morning  -EKG done, no signs of ST elevation  - Check troponins and BMP    Neurogenic orthostatic hypotension: Systolic blood pressures into the 80s, symptoms of dizziness, lightheadedness  - Midodrine 5mg qam  - Midodrine 5 mg as needed standing    Neuropathic pain: Lower extremity pins-and-needles on admission  -Gabapentin 600 mg 3 times daily  - Scheduled tramadol 50 mg 3 times daily    Nociceptive pain: Acute postoperative pain, site of incision  - Tramadol scheduled 50 mg 3 times daily  - Oxycodone 5 mg - 10 mg as needed every 3 hours  -Tylenol 1000 mg 3 times daily  -Robaxin 750 mg 4 times a day  -Lidocaine patch x2 to either side of incision    Acute kidney injury: Creatinine elevated up to 1.26 on 7/22  -Creatinine 1.02 on admission, improved to 0.98  -Given elevation in white count we will recheck BMP    Blood loss anemia: Hemoglobin on 7/23 of 6.7, status post 1 unit packed red blood cell transfusion, hemoglobin of 8.0 on 7/20  - Plan for transfusion if hemoglobin is less than 7.0  -Holding Plavix which was originally started for peripheral vascular disease  -Holding Lovenox secondary to bleeding on 7/22  -Hemoglobin has been stable at 0.85 on 8/1  -Restarted Lovenox 40 mg daily  -Check CBC in a.m.    Hyponatremia: Sodium on admission of 130 improved to 131  -Consult hospitalist  -Check BMP     Hypertension: Consulted hospitalist, under good control with systolic blood pressure in 120s  -Norvasc 10 mg daily  -Lisinopril 40 mg daily  -Spironolactone 25 mg daily  - Terazosin 5 mg twice daily  -Verapamil 80 mg twice daily    Type 2 diabetes: Fingerstick blood glucose 93-1 49  -Metformin 1000 mg bid  -Lantus 10 units every morning  -Insulin  sliding scale, moderate  - Consulted hospitalist    BPH:  -Terazosin 5 mg twice daily, can be causing worsening orthostatic hypotension    Anxiety/adjustment disorder:  -Cymbalta 30 mg daily, this will also aid and neuropathic pain, mood appears to be stable will DC Cymbalta at this time  -Consult psychologist    Neurogenic bladder:  - voiding trial, if can't void in 6 hours or prn check pvr and if >500cc then ICP,if able to void then check PVR, if PVR is >200cc then ICP    Neurogenic bowel:  -BTP including senna 2 tablets q. noon, Colace 200 mg twice daily, Magic bullet suppository with digital stimulation every afternoon  -Change Magic bullet to every morning    Vitamin D deficiency: Vitamin D level on admission of 65, toxic level  - Discontinue cholecalciferol    DVT prophylaxis: Lovenox held secondary to bleeding at site of incision.  -SCDs when in bed  -Start Lovenox 40 mg daily    Patient was seen for 36 minutes on unit/floor of which > 50% of time was spent on counseling and coordination of care regarding the above, including prognosis, risk reduction, benefits of treatment, and options for next stage of care including acute management of chest pain, leukocytosis, hyponatremia and acute kidney injury, discussed chest pain at length with patient and hospitalist.          Adelfo Aquino D.O.

## 2019-08-02 ENCOUNTER — APPOINTMENT (OUTPATIENT)
Dept: RADIOLOGY | Facility: REHABILITATION | Age: 69
DRG: 052 | End: 2019-08-02
Attending: PHYSICAL MEDICINE & REHABILITATION
Payer: MEDICARE

## 2019-08-02 PROBLEM — A41.9 SEPSIS DUE TO UNDETERMINED ORGANISM (HCC): Status: ACTIVE | Noted: 2019-08-02

## 2019-08-02 PROBLEM — D72.829 LEUKOCYTOSIS: Status: ACTIVE | Noted: 2019-08-02

## 2019-08-02 LAB
ANION GAP SERPL CALC-SCNC: 10 MMOL/L (ref 0–11.9)
BASOPHILS # BLD AUTO: 0.1 % (ref 0–1.8)
BASOPHILS # BLD: 0.02 K/UL (ref 0–0.12)
BUN SERPL-MCNC: 16 MG/DL (ref 8–22)
CALCIUM SERPL-MCNC: 8.8 MG/DL (ref 8.5–10.5)
CHLORIDE SERPL-SCNC: 99 MMOL/L (ref 96–112)
CO2 SERPL-SCNC: 21 MMOL/L (ref 20–33)
CREAT SERPL-MCNC: 1.05 MG/DL (ref 0.5–1.4)
EOSINOPHIL # BLD AUTO: 0.06 K/UL (ref 0–0.51)
EOSINOPHIL NFR BLD: 0.3 % (ref 0–6.9)
ERYTHROCYTE [DISTWIDTH] IN BLOOD BY AUTOMATED COUNT: 49.5 FL (ref 35.9–50)
GLUCOSE BLD-MCNC: 143 MG/DL (ref 65–99)
GLUCOSE BLD-MCNC: 145 MG/DL (ref 65–99)
GLUCOSE BLD-MCNC: 153 MG/DL (ref 65–99)
GLUCOSE BLD-MCNC: 162 MG/DL (ref 65–99)
GLUCOSE SERPL-MCNC: 156 MG/DL (ref 65–99)
HCT VFR BLD AUTO: 24.8 % (ref 42–52)
HGB BLD-MCNC: 8.1 G/DL (ref 14–18)
IMM GRANULOCYTES # BLD AUTO: 0.21 K/UL (ref 0–0.11)
IMM GRANULOCYTES NFR BLD AUTO: 1.2 % (ref 0–0.9)
LYMPHOCYTES # BLD AUTO: 0.88 K/UL (ref 1–4.8)
LYMPHOCYTES NFR BLD: 5 % (ref 22–41)
MCH RBC QN AUTO: 32.5 PG (ref 27–33)
MCHC RBC AUTO-ENTMCNC: 32.7 G/DL (ref 33.7–35.3)
MCV RBC AUTO: 99.6 FL (ref 81.4–97.8)
MONOCYTES # BLD AUTO: 0.95 K/UL (ref 0–0.85)
MONOCYTES NFR BLD AUTO: 5.4 % (ref 0–13.4)
NEUTROPHILS # BLD AUTO: 15.54 K/UL (ref 1.82–7.42)
NEUTROPHILS NFR BLD: 88 % (ref 44–72)
NRBC # BLD AUTO: 0 K/UL
NRBC BLD-RTO: 0 /100 WBC
PLATELET # BLD AUTO: 252 K/UL (ref 164–446)
PMV BLD AUTO: 9 FL (ref 9–12.9)
POTASSIUM SERPL-SCNC: 4.3 MMOL/L (ref 3.6–5.5)
RBC # BLD AUTO: 2.49 M/UL (ref 4.7–6.1)
SODIUM SERPL-SCNC: 130 MMOL/L (ref 135–145)
TROPONIN T SERPL-MCNC: 31 NG/L (ref 6–19)
WBC # BLD AUTO: 17.7 K/UL (ref 4.8–10.8)

## 2019-08-02 PROCEDURE — 80048 BASIC METABOLIC PNL TOTAL CA: CPT

## 2019-08-02 PROCEDURE — 97112 NEUROMUSCULAR REEDUCATION: CPT

## 2019-08-02 PROCEDURE — A9270 NON-COVERED ITEM OR SERVICE: HCPCS | Performed by: PHYSICAL MEDICINE & REHABILITATION

## 2019-08-02 PROCEDURE — 700111 HCHG RX REV CODE 636 W/ 250 OVERRIDE (IP): Performed by: HOSPITALIST

## 2019-08-02 PROCEDURE — 700112 HCHG RX REV CODE 229: Performed by: PHYSICAL MEDICINE & REHABILITATION

## 2019-08-02 PROCEDURE — 84484 ASSAY OF TROPONIN QUANT: CPT

## 2019-08-02 PROCEDURE — 700105 HCHG RX REV CODE 258: Performed by: HOSPITALIST

## 2019-08-02 PROCEDURE — 700102 HCHG RX REV CODE 250 W/ 637 OVERRIDE(OP): Performed by: HOSPITALIST

## 2019-08-02 PROCEDURE — A9270 NON-COVERED ITEM OR SERVICE: HCPCS | Performed by: HOSPITALIST

## 2019-08-02 PROCEDURE — 82962 GLUCOSE BLOOD TEST: CPT | Mod: 91

## 2019-08-02 PROCEDURE — 97535 SELF CARE MNGMENT TRAINING: CPT

## 2019-08-02 PROCEDURE — 36415 COLL VENOUS BLD VENIPUNCTURE: CPT

## 2019-08-02 PROCEDURE — 85025 COMPLETE CBC W/AUTO DIFF WBC: CPT

## 2019-08-02 PROCEDURE — 99233 SBSQ HOSP IP/OBS HIGH 50: CPT | Performed by: PHYSICAL MEDICINE & REHABILITATION

## 2019-08-02 PROCEDURE — G0515 COGNITIVE SKILLS DEVELOPMENT: HCPCS

## 2019-08-02 PROCEDURE — 700111 HCHG RX REV CODE 636 W/ 250 OVERRIDE (IP): Performed by: PHYSICAL MEDICINE & REHABILITATION

## 2019-08-02 PROCEDURE — 700101 HCHG RX REV CODE 250: Performed by: PHYSICAL MEDICINE & REHABILITATION

## 2019-08-02 PROCEDURE — 700105 HCHG RX REV CODE 258: Performed by: PHYSICAL MEDICINE & REHABILITATION

## 2019-08-02 PROCEDURE — 97530 THERAPEUTIC ACTIVITIES: CPT

## 2019-08-02 PROCEDURE — 99233 SBSQ HOSP IP/OBS HIGH 50: CPT | Performed by: HOSPITALIST

## 2019-08-02 PROCEDURE — 700102 HCHG RX REV CODE 250 W/ 637 OVERRIDE(OP): Performed by: PHYSICAL MEDICINE & REHABILITATION

## 2019-08-02 PROCEDURE — 770010 HCHG ROOM/CARE - REHAB SEMI PRIVAT*

## 2019-08-02 PROCEDURE — 97110 THERAPEUTIC EXERCISES: CPT

## 2019-08-02 PROCEDURE — 71045 X-RAY EXAM CHEST 1 VIEW: CPT

## 2019-08-02 RX ORDER — METHOCARBAMOL 500 MG/1
500 TABLET, FILM COATED ORAL 4 TIMES DAILY
Status: DISCONTINUED | OUTPATIENT
Start: 2019-08-02 | End: 2019-08-05

## 2019-08-02 RX ORDER — LINEZOLID 600 MG/1
600 TABLET, FILM COATED ORAL EVERY 12 HOURS
Status: DISCONTINUED | OUTPATIENT
Start: 2019-08-02 | End: 2019-08-06

## 2019-08-02 RX ORDER — GABAPENTIN 300 MG/1
300 CAPSULE ORAL 3 TIMES DAILY
Status: DISCONTINUED | OUTPATIENT
Start: 2019-08-02 | End: 2019-08-14

## 2019-08-02 RX ADMIN — BISACODYL 10 MG: 10 SUPPOSITORY RECTAL at 21:34

## 2019-08-02 RX ADMIN — LORATADINE 10 MG: 10 TABLET ORAL at 08:11

## 2019-08-02 RX ADMIN — CALCIUM CARBONATE (ANTACID) CHEW TAB 500 MG 500 MG: 500 CHEW TAB at 08:12

## 2019-08-02 RX ADMIN — GABAPENTIN 300 MG: 300 CAPSULE ORAL at 21:23

## 2019-08-02 RX ADMIN — METFORMIN HYDROCHLORIDE 1000 MG: 500 TABLET, FILM COATED ORAL at 16:51

## 2019-08-02 RX ADMIN — TRAMADOL HYDROCHLORIDE 50 MG: 50 TABLET, COATED ORAL at 11:45

## 2019-08-02 RX ADMIN — GABAPENTIN 600 MG: 300 CAPSULE ORAL at 14:28

## 2019-08-02 RX ADMIN — INSULIN LISPRO 2 UNITS: 100 INJECTION, SOLUTION INTRAVENOUS; SUBCUTANEOUS at 16:54

## 2019-08-02 RX ADMIN — TRAMADOL HYDROCHLORIDE 50 MG: 50 TABLET, COATED ORAL at 08:10

## 2019-08-02 RX ADMIN — OMEPRAZOLE 20 MG: 20 CAPSULE, DELAYED RELEASE ORAL at 08:11

## 2019-08-02 RX ADMIN — MULTIPLE VITAMINS W/ MINERALS TAB 1 TABLET: TAB at 11:45

## 2019-08-02 RX ADMIN — SPIRONOLACTONE 25 MG: 25 TABLET ORAL at 08:11

## 2019-08-02 RX ADMIN — DOCUSATE SODIUM 200 MG: 100 CAPSULE, LIQUID FILLED ORAL at 21:22

## 2019-08-02 RX ADMIN — METHOCARBAMOL 750 MG: 750 TABLET, FILM COATED ORAL at 13:28

## 2019-08-02 RX ADMIN — AMLODIPINE BESYLATE 10 MG: 5 TABLET ORAL at 08:11

## 2019-08-02 RX ADMIN — CEFTRIAXONE SODIUM 1 G: 1 INJECTION, POWDER, FOR SOLUTION INTRAMUSCULAR; INTRAVENOUS at 09:27

## 2019-08-02 RX ADMIN — VERAPAMIL HYDROCHLORIDE 180 MG: 180 TABLET, FILM COATED, EXTENDED RELEASE ORAL at 09:26

## 2019-08-02 RX ADMIN — TERAZOSIN HYDROCHLORIDE 5 MG: 5 CAPSULE ORAL at 16:50

## 2019-08-02 RX ADMIN — CALCIUM CARBONATE (ANTACID) CHEW TAB 500 MG 500 MG: 500 CHEW TAB at 21:21

## 2019-08-02 RX ADMIN — ACETAMINOPHEN 650 MG: 325 TABLET, FILM COATED ORAL at 15:56

## 2019-08-02 RX ADMIN — SODIUM CHLORIDE: 9 INJECTION, SOLUTION INTRAVENOUS at 05:21

## 2019-08-02 RX ADMIN — PIPERACILLIN AND TAZOBACTAM 3.38 G: 3; .375 INJECTION, POWDER, LYOPHILIZED, FOR SOLUTION INTRAVENOUS; PARENTERAL at 22:27

## 2019-08-02 RX ADMIN — METFORMIN HYDROCHLORIDE 1000 MG: 500 TABLET, FILM COATED ORAL at 08:11

## 2019-08-02 RX ADMIN — MIDODRINE HYDROCHLORIDE 5 MG: 2.5 TABLET ORAL at 08:11

## 2019-08-02 RX ADMIN — INSULIN LISPRO 2 UNITS: 100 INJECTION, SOLUTION INTRAVENOUS; SUBCUTANEOUS at 21:40

## 2019-08-02 RX ADMIN — ATORVASTATIN CALCIUM 20 MG: 10 TABLET, FILM COATED ORAL at 21:24

## 2019-08-02 RX ADMIN — ENOXAPARIN SODIUM 40 MG: 100 INJECTION SUBCUTANEOUS at 08:17

## 2019-08-02 RX ADMIN — PIPERACILLIN AND TAZOBACTAM 3.38 G: 3; .375 INJECTION, POWDER, LYOPHILIZED, FOR SOLUTION INTRAVENOUS; PARENTERAL at 16:38

## 2019-08-02 RX ADMIN — LINEZOLID 600 MG: 600 TABLET, FILM COATED ORAL at 21:23

## 2019-08-02 RX ADMIN — METHOCARBAMOL 500 MG: 500 TABLET, FILM COATED ORAL at 21:25

## 2019-08-02 RX ADMIN — TRAMADOL HYDROCHLORIDE 50 MG: 50 TABLET, COATED ORAL at 15:56

## 2019-08-02 RX ADMIN — METHOCARBAMOL 750 MG: 750 TABLET, FILM COATED ORAL at 08:11

## 2019-08-02 RX ADMIN — GABAPENTIN 600 MG: 300 CAPSULE ORAL at 08:11

## 2019-08-02 RX ADMIN — LIDOCAINE 2 PATCH: 50 PATCH CUTANEOUS at 09:38

## 2019-08-02 RX ADMIN — TERAZOSIN HYDROCHLORIDE 5 MG: 5 CAPSULE ORAL at 05:01

## 2019-08-02 RX ADMIN — LISINOPRIL 40 MG: 20 TABLET ORAL at 08:10

## 2019-08-02 RX ADMIN — METHOCARBAMOL 500 MG: 500 TABLET, FILM COATED ORAL at 16:51

## 2019-08-02 ASSESSMENT — ENCOUNTER SYMPTOMS
VOMITING: 0
DIARRHEA: 0
FEVER: 0
NAUSEA: 0
ABDOMINAL PAIN: 0
SHORTNESS OF BREATH: 0
NERVOUS/ANXIOUS: 0
CHILLS: 0

## 2019-08-02 NOTE — THERAPY
Physical Therapy   Daily Treatment     Patient Name: Ian Laird  Age:  69 y.o., Sex:  male  Medical Record #: [id number]  Today's Date: 8/2/2019     Precautions  Precautions: Spinal / Back Precautions , Fall Risk  Comments: T3-T7 spinal fusion    Subjective    Pt was sitting in w/c upon arrival and agreeable to tx     Objective       08/02/19 1431   Precautions   Precautions Spinal / Back Precautions ;Fall Risk   Comments T3-T7 spinal fusion   Interdisciplinary Plan of Care Collaboration   Patient Position at End of Therapy In Bed;Call Light within Reach;Tray Table within Reach;Phone within Reach   PT Total Time Spent   PT Individual Total Time Spent (Mins) 45   PT Charge Group   PT Therapeutic Activities 3     FIM Bed/Chair/Wheelchair Transfers Score: 3 - Moderate Assistance  Bed/Chair/Wheelchair Transfers Description:  (bed mob: modA; transfer: reach pivot, modA)    Car transfer with Marion, wife, with demo/ practice, education on adaptive equipment and modifications.    W/c mobility 12ft x 2, B UE    Assessment    Pt significant limited this session by confusion, poor carryover of learning, fatigue and requiring increase assistance for bed mobility     Plan    Lite gait, neuro control, standing tolerance/ standing balance, coordination exercises, progress through passport, family training Marion to perform transfers, review education on pressure relief/ skin checks

## 2019-08-02 NOTE — ASSESSMENT & PLAN NOTE
S/P leukocytosisi: 10.4 (7/30) --> 19.1 -/ 20.1 (8/1) --> 17.7 (8/2) --> 16.0 (8/3) --> 7.6 (8/5)  Had fever of 102.5 on 8/2  Had a low grade fever on 8/3 of 100.1  Afebrile since 8/3  Back surgical wound healing well  Had 2 bouts of loose stools but otherwise formed  U/A (+): cx not done by lab  BC: negative  CXR (8/1): there is no evidence of focal consolidation or evidence of pulmonary edema                    there are small bilateral pleural effusions  CXR (8/2): unremarkable  S/P IVFs of about 1 1/2 liters (8/2)  Off scheduled Tylenol (8/2)  On Zyvox and Zosyn (8/2)  Will recheck U/A (8/5)  Consider de-escalating abx on 8/6 or 8/7 to oral meds  Monitor closely

## 2019-08-02 NOTE — REHAB-CM IDT TEAM NOTE
Case Management       DC Planning  DC destination/dispostion:  Patient lives in a single level home with his wife.  He has 2 entry steps.     Referrals: [employer] placard     DC Needs: Patient has a 4ww, cane, w/c, shower chair and grab bars.  Referral made to [facility] for PT/OT/RN. Follow up appt  He will need follow up with PCP Dr. Latham and neurosurgery.     Barriers to discharge:        Strengths: good support for home.     Section completed by:  obie Pepe, ccm

## 2019-08-02 NOTE — PROGRESS NOTES
"Rehab Progress Note     Encounter Date: 8/2/2019    CC: LE weakness    Interval Events (Subjective)    He denies any fevers or chills although he is on scheduled Tylenol    Had episode of loose stool incontinence last night.  He has had multiple large urinary [place]oria over the past 24 hours, worse after starting IV fluids    He denies any shortness of breath but has physically more work of breathing.  In discussions with physical therapy they reported increased work of breathing and decreased functional requirements.    Bowel: Large loose BM 1010 this morning, patient is having difficult time having bowel movements at night  Bladder: Patient is having large volumes, hazy, Requiring catheterization, episode of urinary incontinence as above    ROS:  Gen: No fatigue, confusion, significant weight loss  Eyes: no blurry vision, double vision or pain in eyes  ENT: no changes in hearing, runny nose, nose bleeds, sinus pain  CV: No CP, palpitations,   Lungs: Denies shortness of breath, changes in secretions, changes in cough, pain with coughing  Abd: No abd pain, nausea, vomiting, pain with eating  : no blood in urine,  suprapubic pain  Ext: No swelling in legs, asymmetric atrophy  Neuro: Decreased in strength over the last 24-hour  Skin: no new ulcers/skin breakdown appreciated by patient  Mood: No changes in mood today, increase in depression or anxiety  Heme: no bruising, or bleeding  Rest of 14 point review of systems is negative      Objective:  Vitals: /50   Pulse 87   Temp 37.4 °C (99.4 °F) (Oral)   Resp 17   Ht 1.778 m (5' 10\")   Wt 100.8 kg (222 lb 4.8 oz)   SpO2 96%   Gen: Mild distress, increased work of breathing, Body mass index is 31.9 kg/m².  HEENT: NC/AT, PERRLA, moist mucous membranes  Cardio: RRR, no mumurs  Pulm: CTAB, with normal respiratory effort  Abd: Soft NTND, active bowel sounds  Ext: No peripheral edema.  +dorsal pedalis pulses bilaterally.      Motor Exam Upper Extremities   ? Myotome " R L   Shoulder flexion C5 5 5   Elbow flexion C5 5 5   Wrist extension C6 5 5   Elbow extension C7 5 5   Finger flexion C8 5 5   Finger abduction T1 5 5     Motor Exam Lower Extremities    ? Myotome R L   Hip flexion L2 4 4   Knee extension L3 5 4   Ankle dorsiflexion L4 4 4   Toe extension L5 5 4   Ankle plantarflexion S1 4 4     Tone: No spasticity present, no clonus present    Recent Results (from the past 72 hour(s))   ACCU-CHEK GLUCOSE    Collection Time: 07/30/19 11:36 AM   Result Value Ref Range    Glucose - Accu-Ck 81 65 - 99 mg/dL   ACCU-CHEK GLUCOSE    Collection Time: 07/30/19  5:20 PM   Result Value Ref Range    Glucose - Accu-Ck 69 65 - 99 mg/dL   ACCU-CHEK GLUCOSE    Collection Time: 07/30/19  5:53 PM   Result Value Ref Range    Glucose - Accu-Ck 98 65 - 99 mg/dL   ACCU-CHEK GLUCOSE    Collection Time: 07/30/19  8:27 PM   Result Value Ref Range    Glucose - Accu-Ck 89 65 - 99 mg/dL   ACCU-CHEK GLUCOSE    Collection Time: 07/31/19  7:54 AM   Result Value Ref Range    Glucose - Accu-Ck 114 (H) 65 - 99 mg/dL   ACCU-CHEK GLUCOSE    Collection Time: 07/31/19 11:30 AM   Result Value Ref Range    Glucose - Accu-Ck 85 65 - 99 mg/dL   ACCU-CHEK GLUCOSE    Collection Time: 07/31/19  5:34 PM   Result Value Ref Range    Glucose - Accu-Ck 93 65 - 99 mg/dL   ACCU-CHEK GLUCOSE    Collection Time: 07/31/19  8:16 PM   Result Value Ref Range    Glucose - Accu-Ck 149 (H) 65 - 99 mg/dL   CBC WITH DIFFERENTIAL    Collection Time: 08/01/19  5:41 AM   Result Value Ref Range    WBC 19.1 (H) 4.8 - 10.8 K/uL    RBC 2.65 (L) 4.70 - 6.10 M/uL    Hemoglobin 8.5 (L) 14.0 - 18.0 g/dL    Hematocrit 25.7 (L) 42.0 - 52.0 %    MCV 97.0 81.4 - 97.8 fL    MCH 32.1 27.0 - 33.0 pg    MCHC 33.1 (L) 33.7 - 35.3 g/dL    RDW 48.9 35.9 - 50.0 fL    Platelet Count 303 164 - 446 K/uL    MPV 8.8 (L) 9.0 - 12.9 fL    Neutrophils-Polys 90.70 (H) 44.00 - 72.00 %    Lymphocytes 3.90 (L) 22.00 - 41.00 %    Monocytes 4.00 0.00 - 13.40 %    Eosinophils  0.30 0.00 - 6.90 %    Basophils 0.20 0.00 - 1.80 %    Immature Granulocytes 0.90 0.00 - 0.90 %    Nucleated RBC 0.00 /100 WBC    Neutrophils (Absolute) 17.28 (H) 1.82 - 7.42 K/uL    Lymphs (Absolute) 0.74 (L) 1.00 - 4.80 K/uL    Monos (Absolute) 0.77 0.00 - 0.85 K/uL    Eos (Absolute) 0.06 0.00 - 0.51 K/uL    Baso (Absolute) 0.04 0.00 - 0.12 K/uL    Immature Granulocytes (abs) 0.17 (H) 0.00 - 0.11 K/uL    NRBC (Absolute) 0.00 K/uL   ACCU-CHEK GLUCOSE    Collection Time: 19  7:54 AM   Result Value Ref Range    Glucose - Accu-Ck 142 (H) 65 - 99 mg/dL   EKG    Collection Time: 19  8:08 AM   Result Value Ref Range    Report       [facility]    Test Date:  2019  Pt Name:    ALICE STORY                 Department: [facility]  MRN:        [id number]                      Room:       [facility]  Gender:     Male                         Technician:   :        1950                   Requested By:LUBNA ANGEL  Order #:    [id number]                    Reading MD: Katharina Rodriguez MD    Measurements  Intervals                                Axis  Rate:       83                           P:          16  KY:         208                          QRS:        -8  QRSD:       112                          T:          43  QT:         352  QTc:        414    Interpretive Statements  SINUS RHYTHM  PROBABLE LEFT ATRIAL ABNORMALITY  NONSPECIFIC INTRAVENTRICULAR CONDUCTION DELAY  PROBABLE INFERIOR INFARCT, AGE INDETERMINATE  Compared to ECG 2019 11:40:09  Intraventricular conduction delay now present  Left ventricular hypertrophy no longer present  Myocardial infarct finding still present    Electronically Signed On  2019 8:50:20 PDT by Katharina Rodriguez MD     Blood Culture    Collection Time: 19  8:25 AM   Result Value Ref Range    Significant Indicator NEG     Source BLD     Site PERIPHERAL     Culture Result       No Growth  Note: Blood cultures are incubated for 5 days and  are monitored  continuously.Positive blood cultures  are called to the RN and reported as soon as  they are identified.     Blood Culture    Collection Time: 08/01/19  8:45 AM   Result Value Ref Range    Significant Indicator NEG     Source BLD     Site PERIPHERAL     Culture Result       No Growth  Note: Blood cultures are incubated for 5 days and  are monitored continuously.Positive blood cultures  are called to the RN and reported as soon as  they are identified.     Basic Metabolic Panel    Collection Time: 08/01/19  8:45 AM   Result Value Ref Range    Sodium 129 (L) 135 - 145 mmol/L    Potassium 4.9 3.6 - 5.5 mmol/L    Chloride 96 96 - 112 mmol/L    Co2 22 20 - 33 mmol/L    Glucose 143 (H) 65 - 99 mg/dL    Bun 17 8 - 22 mg/dL    Creatinine 1.01 0.50 - 1.40 mg/dL    Calcium 9.0 8.5 - 10.5 mg/dL    Anion Gap 11.0 0.0 - 11.9   MAGNESIUM    Collection Time: 08/01/19  8:45 AM   Result Value Ref Range    Magnesium 1.5 1.5 - 2.5 mg/dL   PHOSPHORUS    Collection Time: 08/01/19  8:45 AM   Result Value Ref Range    Phosphorus 3.9 2.5 - 4.5 mg/dL   CBC WITH DIFFERENTIAL    Collection Time: 08/01/19  8:45 AM   Result Value Ref Range    WBC 20.2 (H) 4.8 - 10.8 K/uL    RBC 2.50 (L) 4.70 - 6.10 M/uL    Hemoglobin 8.3 (L) 14.0 - 18.0 g/dL    Hematocrit 24.3 (L) 42.0 - 52.0 %    MCV 97.2 81.4 - 97.8 fL    MCH 33.2 (H) 27.0 - 33.0 pg    MCHC 34.2 33.7 - 35.3 g/dL    RDW 48.8 35.9 - 50.0 fL    Platelet Count 286 164 - 446 K/uL    MPV 8.9 (L) 9.0 - 12.9 fL    Neutrophils-Polys 90.80 (H) 44.00 - 72.00 %    Lymphocytes 3.80 (L) 22.00 - 41.00 %    Monocytes 4.50 0.00 - 13.40 %    Eosinophils 0.10 0.00 - 6.90 %    Basophils 0.10 0.00 - 1.80 %    Immature Granulocytes 0.70 0.00 - 0.90 %    Nucleated RBC 0.00 /100 WBC    Neutrophils (Absolute) 18.34 (H) 1.82 - 7.42 K/uL    Lymphs (Absolute) 0.76 (L) 1.00 - 4.80 K/uL    Monos (Absolute) 0.91 (H) 0.00 - 0.85 K/uL    Eos (Absolute) 0.02 0.00 - 0.51 K/uL    Baso (Absolute) 0.02 0.00 - 0.12 K/uL     Immature Granulocytes (abs) 0.15 (H) 0.00 - 0.11 K/uL    NRBC (Absolute) 0.00 K/uL   TROPONIN    Collection Time: 19  8:45 AM   Result Value Ref Range    Troponin T 31 (H) 6 - 19 ng/L   ESTIMATED GFR    Collection Time: 19  8:45 AM   Result Value Ref Range    GFR If African American >60 >60 mL/min/1.73 m 2    GFR If Non African American >60 >60 mL/min/1.73 m 2   ACCU-CHEK GLUCOSE    Collection Time: 19 11:00 AM   Result Value Ref Range    Glucose - Accu-Ck 152 (H) 65 - 99 mg/dL   TROPONIN    Collection Time: 19 11:25 AM   Result Value Ref Range    Troponin T 32 (H) 6 - 19 ng/L   EKG    Collection Time: 19 12:56 PM   Result Value Ref Range    Report       [facility]    Test Date:  2019  Pt Name:    ALICE STORY                 Department: [facility]  MRN:        [id number]                      Room:       [facility]  Gender:     Male                         Technician: WT  :        1950                   Requested By:VIMAL ORO  Order #:    [id number]                    Reading MD: Katharina Rodriguez MD    Measurements  Intervals                                Axis  Rate:       75                           P:          -32  VA:         232                          QRS:        -10  QRSD:       110                          T:          47  QT:         384  QTc:        429    Interpretive Statements  SINUS RHYTHM  FIRST DEGREE AV BLOCK  NONSPECIFIC INTRAVENTRICULAR CONDUCTION DELAY  PROBABLE INFERIOR INFARCT, AGE INDETERMINATE  Compared to ECG 2019 08:08:23  First degree AV block now present  Myocardial infarct finding still present    Electronically Signed On 2019 16:43:59 PDT by Katharina Rodriguez MD     URINALYSIS    Collection Time: 19  1:05 PM   Result Value Ref Range    Color Yellow     Character Cloudy (A)     Specific Gravity 1.017 <1.035    Ph 6.0 5.0 - 8.0    Glucose Negative Negative mg/dL    Ketones Negative Negative mg/dL    Protein 30 (A) Negative  mg/dL    Bilirubin Negative Negative    Urobilinogen, Urine 1.0 Negative    Nitrite Negative Negative    Leukocyte Esterase Large (A) Negative    Occult Blood Moderate (A) Negative    Micro Urine Req Microscopic    URINE MICROSCOPIC (W/UA)    Collection Time: 08/01/19  1:05 PM   Result Value Ref Range    WBC Packed (A) /hpf    RBC 5-10 (A) /hpf    Bacteria Many (A) None /hpf    Epithelial Cells Negative /hpf    Hyaline Cast 0-2 /lpf   TROPONIN    Collection Time: 08/01/19  5:15 PM   Result Value Ref Range    Troponin T 33 (H) 6 - 19 ng/L   ACCU-CHEK GLUCOSE    Collection Time: 08/01/19  5:25 PM   Result Value Ref Range    Glucose - Accu-Ck 132 (H) 65 - 99 mg/dL   ACCU-CHEK GLUCOSE    Collection Time: 08/01/19  8:22 PM   Result Value Ref Range    Glucose - Accu-Ck 119 (H) 65 - 99 mg/dL   Basic Metabolic Panel    Collection Time: 08/02/19  6:00 AM   Result Value Ref Range    Sodium 130 (L) 135 - 145 mmol/L    Potassium 4.3 3.6 - 5.5 mmol/L    Chloride 99 96 - 112 mmol/L    Co2 21 20 - 33 mmol/L    Glucose 156 (H) 65 - 99 mg/dL    Bun 16 8 - 22 mg/dL    Creatinine 1.05 0.50 - 1.40 mg/dL    Calcium 8.8 8.5 - 10.5 mg/dL    Anion Gap 10.0 0.0 - 11.9   CBC WITH DIFFERENTIAL    Collection Time: 08/02/19  6:00 AM   Result Value Ref Range    WBC 17.7 (H) 4.8 - 10.8 K/uL    RBC 2.49 (L) 4.70 - 6.10 M/uL    Hemoglobin 8.1 (L) 14.0 - 18.0 g/dL    Hematocrit 24.8 (L) 42.0 - 52.0 %    MCV 99.6 (H) 81.4 - 97.8 fL    MCH 32.5 27.0 - 33.0 pg    MCHC 32.7 (L) 33.7 - 35.3 g/dL    RDW 49.5 35.9 - 50.0 fL    Platelet Count 252 164 - 446 K/uL    MPV 9.0 9.0 - 12.9 fL    Neutrophils-Polys 88.00 (H) 44.00 - 72.00 %    Lymphocytes 5.00 (L) 22.00 - 41.00 %    Monocytes 5.40 0.00 - 13.40 %    Eosinophils 0.30 0.00 - 6.90 %    Basophils 0.10 0.00 - 1.80 %    Immature Granulocytes 1.20 (H) 0.00 - 0.90 %    Nucleated RBC 0.00 /100 WBC    Neutrophils (Absolute) 15.54 (H) 1.82 - 7.42 K/uL    Lymphs (Absolute) 0.88 (L) 1.00 - 4.80 K/uL    Monos  (Absolute) 0.95 (H) 0.00 - 0.85 K/uL    Eos (Absolute) 0.06 0.00 - 0.51 K/uL    Baso (Absolute) 0.02 0.00 - 0.12 K/uL    Immature Granulocytes (abs) 0.21 (H) 0.00 - 0.11 K/uL    NRBC (Absolute) 0.00 K/uL   TROPONIN    Collection Time: 08/02/19  6:00 AM   Result Value Ref Range    Troponin T 31 (H) 6 - 19 ng/L   ESTIMATED GFR    Collection Time: 08/02/19  6:00 AM   Result Value Ref Range    GFR If African American >60 >60 mL/min/1.73 m 2    GFR If Non African American >60 >60 mL/min/1.73 m 2       Current Facility-Administered Medications   Medication Frequency   • cefTRIAXone (ROCEPHIN) 1 g in  mL IVPB Q24HRS   • NS infusion Continuous   • bisacodyl (THE MAGIC BULLET) suppository 10 mg DAILY   • midodrine (PROAMATINE) tablet 5 mg Q4HRS PRN   • midodrine (PROAMATINE) tablet 5 mg DAILY   • enoxaparin (LOVENOX) inj 40 mg DAILY   • tramadol (ULTRAM) 50 MG tablet 50 mg TID   • gabapentin (NEURONTIN) capsule 600 mg TID   • sennosides (SENOKOT) 8.6 MG tablet 17.2 mg DAILY AT NOON   • docusate sodium (COLACE) capsule 200 mg BID   • lidocaine (LIDODERM) 5 % 2 Patch Q24HR   • insulin lispro (HUMALOG) injection 2-12 Units 4X/DAY ACHS    And   • glucose 4 g chewable tablet 16 g Q15 MIN PRN    And   • dextrose 50% (D50W) injection 50 mL Q15 MIN PRN   • metFORMIN (GLUCOPHAGE) tablet 1,000 mg BID WITH MEALS   • amLODIPine (NORVASC) tablet 10 mg DAILY   • atorvastatin (LIPITOR) tablet 20 mg Nightly   • glucose 4 g chewable tablet 16 g PRN   • lisinopril (PRINIVIL) tablet 40 mg DAILY   • loratadine (CLARITIN) tablet 10 mg DAILY   • omeprazole (PRILOSEC) capsule 20 mg DAILY   • spironolactone (ALDACTONE) tablet 25 mg DAILY   • terazosin (HYTRIN) capsule 5 mg BID   • verapamil ER (CALAN-SR) tablet 180 mg BID   • Pharmacy Consult Request ...Pain Management Review 1 Each PHARMACY TO DOSE   • Respiratory Care per Protocol Continuous RT   • oxyCODONE immediate-release (ROXICODONE) tablet 5 mg Q3HRS PRN   • oxyCODONE immediate  release (ROXICODONE) tablet 10 mg Q3HRS PRN   • acetaminophen (TYLENOL) tablet 650 mg Q4HRS PRN   • therapeutic multivitamin-minerals (THERAGRAN-M) tablet 1 Tab DAILY WITH LUNCH   • artificial tears 1.4 % ophthalmic solution 1 Drop PRN   • benzocaine-menthol (CEPACOL) lozenge 1 Lozenge Q2HRS PRN   • mag hydrox-al hydrox-simeth (MAALOX PLUS ES or MYLANTA DS) suspension 20 mL Q2HRS PRN   • ondansetron (ZOFRAN ODT) dispertab 4 mg 4X/DAY PRN    Or   • ondansetron (ZOFRAN) syringe/vial injection 4 mg 4X/DAY PRN   • traZODone (DESYREL) tablet 50 mg QHS PRN   • sodium chloride (OCEAN) 0.65 % nasal spray 2 Spray PRN   • calcium carbonate (TUMS) chewable tab 500 mg BID   • methocarbamol (ROBAXIN) tablet 750 mg 4X/DAY       Orders Placed This Encounter   Procedures   • Diet Order Diabetic     Standing Status:   Standing     Number of Occurrences:   1     Order Specific Question:   Diet:     Answer:   Diabetic [3]       Assessment:  Active Hospital Problems    Diagnosis   • *T5 spinal cord injury (HCC)   • PVD (peripheral vascular disease) (HCC)   • Type 2 diabetes mellitus without complication, without long-term current use of insulin (HCC)   • Essential hypertension   • Dyslipidemia   • Obesity   • Neuropathic pain   • Neurogenic bladder   • Neurogenic bowel   • Hyponatremia   • 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HCC)   • Acute kidney injury (HCC)   • Vitamin D deficiency   • Anemia   FINAL DIAGNOSIS:    A. Thoracic epidural tumor:         Lambda restricted plasmablastic neoplasm differential is between          plasmablastic plasmacytoma and plasmablastic lymphoma.         See comment.  Comment: In Specimen B there is a single, free lying cluster of  neoplastic cells that is felt to represent contamination of the  specimen as opposed to actual involvement of the skeletal muscle by the tumor. The differential between plasmblastic plasmcytoma and plasmablasticlymphoma is a difficult one.  They have very similar IHC profiles and few areas  where they differ, there is still high overlap.  CD79a, ANDREW and EMA are negative which favor plasmablastic plasmacytoma.  Also, plasmablastic lymphoma tends to occur in the oral cavity in  immunocompromised patients and are about 60% ANDREW positive.  Clinical and radiographic correlation is recommended.   This case has been reviewed by Dr. Wilkinson, who concurs with the Diagnosis.    Medical Decision Making and Plan:    T5 AIS D spinal cord injury: Status post T4-6 laminectomy and T3-7 posterior spinal fusion with resection of mass by Dr. Velasco on 7/20, preliminary pathology suggest plasmablastic plasmacytoma, significant improvement in lower extremity strength since surgical resection.  -No brace at this time  -Continue comprehensive acute inpatient rehabilitation    Plasmablastic plasmacytoma: Primary mass  - Follow-up with oncology and radiation oncology as outpatient    Leukocytosis: WBC elevated to 20.2, with elevated neutrophils/bands, improved to 17.7 today after IV fluid dilution  -Recheck CBC in morning  -Check UA C&S, blood culture x2, urine culture and blood cultures pending  -Concern for respiratory infection with increased work of breathing    Shortness of breath: Increased pleural effusions on chest x-ray, concern of increasing pleural effusions with IV fluid over night  -Check chest x-ray    Chest pain: Appreciate hospitalist input, rapid response called this morning  -EKG done, no signs of ST elevation    Neurogenic orthostatic hypotension: Systolic blood pressures into the 80s, symptoms of dizziness, lightheadedness  - Midodrine 5mg qam  - Midodrine 5 mg as needed standing    Neuropathic pain: Lower extremity pins-and-needles on admission  -Gabapentin 600 mg 3 times daily, decrease dose to gabapentin 300 mg 3 times daily  - Scheduled tramadol 50 mg 3 times daily    Nociceptive pain: Acute postoperative pain, site of incision  - Tramadol scheduled 50 mg 3 times daily  - Oxycodone 5 mg - 10 mg as  needed every 3 hours  -Tylenol 1000 mg 3 times daily  -Robaxin 750 mg 4 times a day, decreased dose to Robaxin 500 mg 4 times a day  -Lidocaine patch x2 to either side of incision    Acute kidney injury: Creatinine elevated up to 1.26 on 7/22  -Creatinine 1.02 on admission, improved to 0.98    Blood loss anemia: Hemoglobin on 7/23 of 6.7, status post 1 unit packed red blood cell transfusion, hemoglobin of 8.0 on 7/20  - Plan for transfusion if hemoglobin is less than 7.0  -Holding Plavix which was originally started for peripheral vascular disease  -Holding Lovenox secondary to bleeding on 7/22  -Hemoglobin has been stable at 0.85 on 8/1  -Restarted Lovenox 40 mg daily    Hyponatremia: Sodium on admission of 130 improved to 131  -Consult hospitalist  -Check BMP     Hypertension: Consulted hospitalist, under good control with systolic blood pressure in 120s  -Norvasc 10 mg daily  -Lisinopril 40 mg daily  -Spironolactone 25 mg daily  - Terazosin 5 mg twice daily  -Verapamil 80 mg twice daily    Type 2 diabetes:   -Metformin 1000 mg bid  -Lantus 10 units every morning  -Insulin sliding scale, moderate  - Consulted hospitalist    BPH: Having increased difficulty with urination, likely secondary to neurogenic bladder  -Terazosin 5 mg twice daily, can be causing worsening orthostatic hypotension    Anxiety/adjustment disorder:  -Cymbalta 30 mg daily, this will also aid and neuropathic pain, mood appears to be stable will DC Cymbalta at this time  -Consult psychologist    Neurogenic bladder: Requiring CIC, question if caused by urinary tract infection  - voiding trial, if can't void in 6 hours or prn check pvr and if >500cc then ICP,if able to void then check PVR, if PVR is >200cc then ICP    Neurogenic bowel:  -BTP including senna 2 tablets q. noon, Colace 200 mg twice daily, Magic bullet suppository with digital stimulation every afternoon  -Change Magic bullet to every evening  -If BMs continue in the morning will  consider changing bowel program over 2 every morning with senna at nightly and Dulcolax suppository every morning with digital stimulation     Vitamin D deficiency: Vitamin D level on admission of 65, toxic level  - Discontinue cholecalciferol    DVT prophylaxis: Lovenox held secondary to bleeding at site of incision.  -SCDs when in bed  -Start Lovenox 40 mg daily    Patient was seen for 40 minutes on unit/floor of which > 50% of time was spent on counseling and coordination of care regarding the above, including prognosis, risk reduction, benefits of treatment, and options for next stage of care including urinary tract infection management, patient and CBC in a.m., shortness of breath management with checking of chest x-ray, concern for increasing pleural effusions, neuropathic pain management with decrease in gabapentin to 300 mg 3 times daily,.          Adelfo Aquino D.O.

## 2019-08-02 NOTE — THERAPY
Speech Language Pathology  Daily Treatment     Patient Name: Ian Laird  Age:  69 y.o., Sex:  male  Medical Record #: [id number]  Today's Date: 8/2/2019     Subjective    Pt reporting being very frustrated by urine retention, but was willing to participate in therapy at this time      Objective       08/02/19 0901   SLP Total Time Spent   SLP Individual Total Time Spent (Mins) 30   Charge Group   SLP Cognitive Skill Development 2       Assessment    Pt started an attention task (Keeping in Mind task) requiring him to follow 3 specific directions and adding a column of numbers.  Pt was able to complete the first column of this task with min A for attention and memory to achieve 100% accuracy.      Plan    Continue targeting attention, memory

## 2019-08-02 NOTE — THERAPY
Occupational Therapy  Daily Treatment     Patient Name: Ian Laird  Age:  69 y.o., Sex:  male  Medical Record #: [id number]  Today's Date: 8/2/2019     Precautions  Precautions: (P) Spinal / Back Precautions , Fall Risk  Comments: T3-T7 spinal fusion    Safety   ADL Safety : Requires Supervision for Safety, Requires Physical Assist for Safety  Bathroom Safety: Requires Supervision for Safety, Requires Physical Assist for Safety    Subjective    Patient and spouse in room.  Patient agreeable to work on pants up/down in standing with single UE support.     Objective       08/02/19 1301   Precautions   Precautions Spinal / Back Precautions ;Fall Risk   Balance   Standing Balance (Static) Poor +   Standing Balance (Dynamic) Poor -   Weight Shift Standing Poor   Interdisciplinary Plan of Care Collaboration   IDT Collaboration with  Family / Caregiver   Patient Position at End of Therapy Seated;Self Releasing Lap Belt Applied;Call Light within Reach;Family / Friend in Room;Other (Comments)  (nursing and Dr Gray in room w/ patient)   Collaboration Comments spouse present and observed session   OT Total Time Spent   OT Individual Total Time Spent (Mins) 30   OT Charge Group   OT Self Care / ADL 2     STS from w/c and EOB with extensive education on hand placement and reasons for hand placement at specific locations.  Requires min/CGA for STS.  Practiced pants up/down in standing with single UE support on FWW with min A for balance with cues.  Education provided to do as much work possible while seated in order to have less work to do while standing.    FIM Lower Body Dressing Score:  4 - Minimal Assistance  Lower Body Dressing Description:  Supervision for safety, Verbal cueing, Set-up of equipment, Initial preparation for task(min A for standing balance to pull pants up/down with single UE support on FWW and bed directly behind patient)      Assessment    Patient able to manage pants up/down in standing, but does  still require min A for balance.  Spouse very involved in learning as much as she can.    Plan      Cont overall strength/endurance and standing balance to improve ADL's and functional mobility

## 2019-08-02 NOTE — CARE PLAN
Patient demonstrates good safety technique this shift.  Asks for assistance when needed and does not attempt self transfer.  Able to verbalize needs.     Pt reporting pain across chest this morning. Pt states pain resolved after returning to bed. Pt taking scheduled Tylenol and Tramadol.

## 2019-08-02 NOTE — THERAPY
Physical Therapy   Daily Treatment     Patient Name: Ian Laird  Age:  69 y.o., Sex:  male  Medical Record #: [id number]  Today's Date: 8/2/2019     Precautions  Precautions: (P) Spinal / Back Precautions , Fall Risk  Comments: T3-T7 spinal fusion    Subjective    Pt willing to participate despite reports of bowel  / bladder issues     Objective       08/02/19 1031   Precautions   Precautions Spinal / Back Precautions ;Fall Risk   Sitting Lower Body Exercises   Ankle Pumps 2 sets of 15   Long Arc Quad 2 sets of 15   Neuro-Muscular Treatments   Neuro-Muscular Treatments Facilitation;Joint Approximation;Sequencing;Tactile Cuing;Verbal Cuing;Weight Shift Right;Weight Shift Left   Comments standing tolerance with // bars and min A to maintain terminal hip ext, 3 minutes x 3 with cues for less UE support, alternating arm raises and lateral wt shifting.    PT Total Time Spent   PT Individual Total Time Spent (Mins) 30   PT Charge Group   PT Therapeutic Exercise 1   PT Neuromuscular Re-Education / Balance 1         Assessment    Pt requires cues for increased WB through LE's during standing/ posture re-ed training as noted, pleasant and cooperative, presents with LE weakness/ impaired coordination and sensory deficits at this time    Plan    Lite gait, neuro control, standing tolerance/ standing balance, coordination exercises, progress through passport, family training Marion to perform transfers, review education on pressure relief/ skin checks

## 2019-08-02 NOTE — FLOWSHEET NOTE
08/01/19 0808   Events/Summary/Plan   Events/Summary/Plan EKG Done   EKG Group   EKG Completed Yes

## 2019-08-02 NOTE — PROGRESS NOTES
[facility] Medicine Daily Progress Note    Date of Service  8/2/2019    Chief Complaint:  Hypertension  Diabetes  Hyponatremia    Interval History:  No significant events or changes since last visit    Review of Systems  Review of Systems   Constitutional: Negative for chills and fever.   Respiratory: Negative for shortness of breath.    Cardiovascular: Negative for chest pain.   Gastrointestinal: Negative for abdominal pain, diarrhea, nausea and vomiting.   Psychiatric/Behavioral: The patient is not nervous/anxious.         Physical Exam  Temp:  [36.6 °C (97.9 °F)-37.4 °C (99.4 °F)] 37.4 °C (99.4 °F)  Pulse:  [74-87] 87  Resp:  [17-18] 17  BP: (104-136)/(50-60) 118/50    Physical Exam   Constitutional: He appears well-nourished.   HENT:   Head: Atraumatic.   Eyes: Pupils are equal, round, and reactive to light. Conjunctivae are normal.   Neck: Normal range of motion. Neck supple.   Cardiovascular: Normal rate and regular rhythm.   Pulmonary/Chest: Effort normal and breath sounds normal.   Abdominal: Soft. Bowel sounds are normal.   Skin: Skin is warm.   Psychiatric: His behavior is normal.   Nursing note and vitals reviewed.      Fluids    Intake/Output Summary (Last 24 hours) at 8/2/2019 1316  Last data filed at 8/2/2019 1115  Gross per 24 hour   Intake 680 ml   Output 2450 ml   Net -1770 ml       Laboratory  Recent Labs     08/01/19  0541 08/01/19  0845 08/02/19  0600   WBC 19.1* 20.2* 17.7*   RBC 2.65* 2.50* 2.49*   HEMOGLOBIN 8.5* 8.3* 8.1*   HEMATOCRIT 25.7* 24.3* 24.8*   MCV 97.0 97.2 99.6*   MCH 32.1 33.2* 32.5   MCHC 33.1* 34.2 32.7*   RDW 48.9 48.8 49.5   PLATELETCT 303 286 252   MPV 8.8* 8.9* 9.0     Recent Labs     08/01/19  0845 08/02/19  0600   SODIUM 129* 130*   POTASSIUM 4.9 4.3   CHLORIDE 96 99   CO2 22 21   GLUCOSE 143* 156*   BUN 17 16   CREATININE 1.01 1.05   CALCIUM 9.0 8.8                       Assessment/Plan  * T5 spinal cord injury (HCC)- (present on admission)  Assessment & Plan  S/P surgical  repair  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                    There are small bilateral pleural effusions.  ? 2nd to Plasmacytoma (can cause leukocytosis)  S/P IVFs of about 1 1/2 liters  Started Rocephin 1g daily (8/2) --> will d/c (8/2)  Will start Zyvox and Zosyn (8/2)  Note: scheduled Tylenol stopped this am (8/2)    Chest pain  Assessment & Plan  Resolved  Has CP on 8/1  Pain: located across the chest, aching in nature, 3/10 in intensity, lasted 10 mintues  No other associated symptoms  Pt feels it is aching from increased PT therapy  EKG: NSR; no ST elevations or depressions; first degree AVB  TropI: 31 --> 32 --> 33 --> 31  CXR: unremarkable except for some small pleural effusions    BPH (benign prostatic hyperplasia)  Assessment & Plan  On Hytrin    Hyponatremia- (present on admission)  Assessment & Plan  Na: 129 (8/1) --> 130 (8/2)   Monitor for now    Anemia- (present on admission)  Assessment & Plan  H&H stable (8/2)  S/P PRBC prior to Rehab transfer  ? 2nd to recent surgery  ? 2nd to Plasmacytoma  Will check Fe, B12, and folate levels at the next blood draw  Monitor    PVD (peripheral vascular disease) (HCC)- (present on admission)  Assessment & Plan  Plavix on hold post-operatively    Dyslipidemia- (present on admission)  Assessment & Plan  On Lipitor    Essential hypertension- (present on admission)  Assessment & Plan  BP ok  On Norvasc: 10 mg daily  On Lisinopril: 40 mg daily  On Verapamil ER: 180 mg bid  Note: also on Aldactone  Note: on Midodrine  Cont to monitor    Type 2 diabetes mellitus without complication, without long-term current use of insulin ([facility])- (present on  admission)  Assessment & Plan  Hba1c 6.8 (7/26)  BS recently ok (8/2)  Off Lantus (7/30)  Off Glipizide SR (last dose on 7/30)  On Metformin: 1000 mg bid  Note: home meds include Metformin, Glipizide, and Januvia  Cont to monitor

## 2019-08-02 NOTE — THERAPY
Recreational Therapy  Daily Treatment     Patient Name: Ian Laird  AGE:  69 y.o., SEX:  male  Medical Record #: [id number]  Today's Date: 8/2/2019       Subjective    He was received from SLP and was ready and willing for session.      Objective       08/02/19 1630   Functional Ability Status - Cognitive   Attention Span Remains on Task   Comprehension Follows Two Step Commands   Judgment Needs Assistance   Cognitive Comments Required a lot of cueing and coaching to complete the cognitive activity.    Functional Ability Status - Emotional    Affect Appropriate  (Frusterated)   Mood Appropriate   Behavior Appropriate   Skilled Intervention    Skilled Intervention Cognitive Leisure   Skilled Intervention Comments Gravity maze cognitive activity.    Interdisciplinary Plan of Care Collaboration   IDT Collaboration with  Family / Caregiver;Speech Therapist   Patient Position at End of Therapy Seated   Collaboration Comments recieved Pt from SLP.    Treatment Time   Total Time Spent (mins) 30   Procedural Tracking   Procedural Tracking Cognitive Skills Training       Assessment    He was sharing his frustration with the cognitive came that was working on forward thinking and spatial awareness. He would see where the route of the ball went and was having trouble interpreting this information to know where the next piece should go. When this level of frustration occurred the activity was switched to a counting game. He had trouble following the rules of the game and required many reminders to correctly play.     Plan    Continue to focus on cognitive leisure.

## 2019-08-02 NOTE — FLOWSHEET NOTE
08/01/19 1256   Events/Summary/Plan   Events/Summary/Plan EKG Done   EKG Group   EKG Completed Yes

## 2019-08-02 NOTE — PROGRESS NOTES
Positive urinalysis, sodium and troponin result called to Dr Aquino with order for IV fluids. Dr Aquino requested that if pt has a pulse above 100 or fever that he would like antibiotics started. IV Vancomycin 1 gm in 250 cc NS every 24 hours and IV Zosyn 3.375 mg every 8 hours. [facility] charge nurse, Irene, notified.

## 2019-08-02 NOTE — THERAPY
Speech Language Pathology  Daily Treatment     Patient Name: Ian Laird  Age:  69 y.o., Sex:  male  Medical Record #: [id number]  Today's Date: 8/2/2019     Subjective    Pt's spouse attending session for family training.      Objective     08/02/19 1334   Cognition   Topic Maintenance  Within Functional Limits (6-7)   Verbal Short Term Memory 10 Minutes   Insight into Deficits Supervision (5)   Interdisciplinary Plan of Care Collaboration   IDT Collaboration with  Recreation Therapist;Family / Caregiver   Patient Position at End of Therapy Seated   Collaboration Comments spouse present for family training, transferred care to Rec Therapist   SLP Total Time Spent   SLP Individual Total Time Spent (Mins) 30   Charge Group   SLP Cognitive Skill Development 2         Assessment    Family training completed with pt's spouse to include pt's performance on standardized assessments, CLOF,cognitive deficits associated with anasthesia and pain meds as well as compensatory strategies for home. Spouse asking appropriate follow up questions demonstrating return of information given, pt in agreement with recommendations and demonstrates good insight to deficits vs PLOF.     Plan    Continue to target complex attention, exec function

## 2019-08-02 NOTE — THERAPY
"Recreational Therapy  Daily Treatment     Patient Name: Ian Laird  AGE:  69 y.o., SEX:  male  Medical Record #: [id number]  Today's Date: 8/2/2019       Subjective    \"I am tired today and was having chest pain earlier.\"     Objective       08/01/19 1600   Functional Ability Status - Cognitive   Attention Span Remains on Task   Comprehension Follows Two Step Commands   Judgment Needs Assistance   Cognitive Comments Required some queing to solve the cognitive activity.    Functional Ability Status - Emotional    Affect Flat   Mood Appropriate   Behavior Appropriate   Emotional Comments Reporting that he felt tired from this morning.    Skilled Intervention    Skilled Intervention Cognitive Leisure   Skilled Intervention Comments Remained in bed and played a cognitive maze like game.   Interdisciplinary Plan of Care Collaboration   IDT Collaboration with  Pharmacist   Patient Position at End of Therapy In Bed;Call Light within Reach;Tray Table within Reach   Collaboration Comments Pharmacist came in room to check on Pt following earlier rapid call.    Treatment Time   Total Time Spent (mins) 15   Total Time Missed 15   Reasons for Time Missed Non-Medical-Other (Please Comment)  (Tired and requested to rest)   Procedural Tracking   Procedural Tracking Cognitive Skills Training       Assessment    He was kept in bed and given a cognitive activity to perform while lying down. He required mod queing to complete the task. At times he required this writer walking through each step to complete the task.     Plan    Continue to perform cognitive leisure.   "

## 2019-08-02 NOTE — THERAPY
"Occupational Therapy  Daily Treatment     Patient Name: Ian Laird  Age:  69 y.o., Sex:  male  Medical Record #: [id number]  Today's Date: 8/2/2019     Precautions  Precautions: (P) Fall Risk, Spinal / Back Precautions   Comments: T3-T7 spinal fusion    Safety   ADL Safety : Requires Supervision for Safety, Requires Physical Assist for Safety  Bathroom Safety: Requires Supervision for Safety, Requires Physical Assist for Safety    Subjective    \"I think getting up and going before breakfast is good for me.\"     Objective       08/02/19 0701   Precautions   Precautions Fall Risk;Spinal / Back Precautions    Pain 0 - 10 Group   Location Back   Therapist Pain Assessment During Activity   Bed Mobility    Supine to Sit Moderate Assist  (for trunk)   Interdisciplinary Plan of Care Collaboration   Patient Position at End of Therapy Seated  (in dining room)   OT Total Time Spent   OT Individual Total Time Spent (Mins) 30   OT Charge Group   OT Self Care / ADL 1   OT Therapy Activity 1     SPT practice and training with FWW from w/c <> mat with min A and cues. Min A SPT with hand on w/c without FWW.    FIM Grooming Score:  7 - Independent  Grooming Description:       FIM Lower Body Dressing Score:  4 - Minimal Assistance  Lower Body Dressing Description:  Reacher, Supervision for safety, Verbal cueing, Set-up of equipment, Initial preparation for task(assist w/ 1/5 tasks to don pans and slip on shoes (assist to pull up over hips in standing))      Assessment    Patient requires encouragement to to more for self with LB dressing and transfers.  Needs further practice with pulling pants up with use of FWW or grab bar for support and with transfers.    Plan    Cont overall strength/endurance and standing balance to improve ADL's and functional mobility    "

## 2019-08-03 LAB
BASOPHILS # BLD AUTO: 0.3 % (ref 0–1.8)
BASOPHILS # BLD: 0.04 K/UL (ref 0–0.12)
EOSINOPHIL # BLD AUTO: 0.09 K/UL (ref 0–0.51)
EOSINOPHIL NFR BLD: 0.6 % (ref 0–6.9)
ERYTHROCYTE [DISTWIDTH] IN BLOOD BY AUTOMATED COUNT: 49.9 FL (ref 35.9–50)
FERRITIN SERPL-MCNC: 183.2 NG/ML (ref 22–322)
FOLATE SERPL-MCNC: 8.8 NG/ML
GLUCOSE BLD-MCNC: 116 MG/DL (ref 65–99)
GLUCOSE BLD-MCNC: 138 MG/DL (ref 65–99)
GLUCOSE BLD-MCNC: 142 MG/DL (ref 65–99)
GLUCOSE BLD-MCNC: 144 MG/DL (ref 65–99)
HCT VFR BLD AUTO: 22.8 % (ref 42–52)
HGB BLD-MCNC: 7.4 G/DL (ref 14–18)
IMM GRANULOCYTES # BLD AUTO: 0.28 K/UL (ref 0–0.11)
IMM GRANULOCYTES NFR BLD AUTO: 1.8 % (ref 0–0.9)
IRON SATN MFR SERPL: ABNORMAL % (ref 15–55)
IRON SERPL-MCNC: <10 UG/DL (ref 50–180)
LYMPHOCYTES # BLD AUTO: 1.01 K/UL (ref 1–4.8)
LYMPHOCYTES NFR BLD: 6.3 % (ref 22–41)
MCH RBC QN AUTO: 32 PG (ref 27–33)
MCHC RBC AUTO-ENTMCNC: 32.5 G/DL (ref 33.7–35.3)
MCV RBC AUTO: 98.7 FL (ref 81.4–97.8)
MONOCYTES # BLD AUTO: 1.03 K/UL (ref 0–0.85)
MONOCYTES NFR BLD AUTO: 6.4 % (ref 0–13.4)
NEUTROPHILS # BLD AUTO: 13.53 K/UL (ref 1.82–7.42)
NEUTROPHILS NFR BLD: 84.6 % (ref 44–72)
NRBC # BLD AUTO: 0 K/UL
NRBC BLD-RTO: 0 /100 WBC
PLATELET # BLD AUTO: 250 K/UL (ref 164–446)
PMV BLD AUTO: 8.9 FL (ref 9–12.9)
RBC # BLD AUTO: 2.31 M/UL (ref 4.7–6.1)
TIBC SERPL-MCNC: 202 UG/DL (ref 250–450)
VIT B12 SERPL-MCNC: 263 PG/ML (ref 211–911)
WBC # BLD AUTO: 16 K/UL (ref 4.8–10.8)

## 2019-08-03 PROCEDURE — 82728 ASSAY OF FERRITIN: CPT

## 2019-08-03 PROCEDURE — A9270 NON-COVERED ITEM OR SERVICE: HCPCS | Performed by: PHYSICAL MEDICINE & REHABILITATION

## 2019-08-03 PROCEDURE — 700111 HCHG RX REV CODE 636 W/ 250 OVERRIDE (IP): Performed by: PHYSICAL MEDICINE & REHABILITATION

## 2019-08-03 PROCEDURE — 99232 SBSQ HOSP IP/OBS MODERATE 35: CPT | Performed by: HOSPITALIST

## 2019-08-03 PROCEDURE — G0515 COGNITIVE SKILLS DEVELOPMENT: HCPCS

## 2019-08-03 PROCEDURE — 83550 IRON BINDING TEST: CPT

## 2019-08-03 PROCEDURE — 36415 COLL VENOUS BLD VENIPUNCTURE: CPT

## 2019-08-03 PROCEDURE — 700101 HCHG RX REV CODE 250: Performed by: PHYSICAL MEDICINE & REHABILITATION

## 2019-08-03 PROCEDURE — 770010 HCHG ROOM/CARE - REHAB SEMI PRIVAT*

## 2019-08-03 PROCEDURE — 700105 HCHG RX REV CODE 258: Performed by: HOSPITALIST

## 2019-08-03 PROCEDURE — 82746 ASSAY OF FOLIC ACID SERUM: CPT

## 2019-08-03 PROCEDURE — 700102 HCHG RX REV CODE 250 W/ 637 OVERRIDE(OP): Performed by: HOSPITALIST

## 2019-08-03 PROCEDURE — A9270 NON-COVERED ITEM OR SERVICE: HCPCS | Performed by: HOSPITALIST

## 2019-08-03 PROCEDURE — 85025 COMPLETE CBC W/AUTO DIFF WBC: CPT

## 2019-08-03 PROCEDURE — 82962 GLUCOSE BLOOD TEST: CPT | Mod: 91

## 2019-08-03 PROCEDURE — 83540 ASSAY OF IRON: CPT

## 2019-08-03 PROCEDURE — 700111 HCHG RX REV CODE 636 W/ 250 OVERRIDE (IP): Performed by: HOSPITALIST

## 2019-08-03 PROCEDURE — 700102 HCHG RX REV CODE 250 W/ 637 OVERRIDE(OP): Performed by: PHYSICAL MEDICINE & REHABILITATION

## 2019-08-03 PROCEDURE — 700112 HCHG RX REV CODE 229: Performed by: PHYSICAL MEDICINE & REHABILITATION

## 2019-08-03 PROCEDURE — 82607 VITAMIN B-12: CPT

## 2019-08-03 RX ORDER — FOLIC ACID 1 MG/1
1 TABLET ORAL DAILY
Status: DISCONTINUED | OUTPATIENT
Start: 2019-08-03 | End: 2019-08-19 | Stop reason: HOSPADM

## 2019-08-03 RX ORDER — FERROUS SULFATE 325(65) MG
325 TABLET ORAL
Status: DISCONTINUED | OUTPATIENT
Start: 2019-08-07 | End: 2019-08-06

## 2019-08-03 RX ADMIN — TERAZOSIN HYDROCHLORIDE 5 MG: 5 CAPSULE ORAL at 05:17

## 2019-08-03 RX ADMIN — LINEZOLID 600 MG: 600 TABLET, FILM COATED ORAL at 08:47

## 2019-08-03 RX ADMIN — METFORMIN HYDROCHLORIDE 1000 MG: 500 TABLET, FILM COATED ORAL at 08:48

## 2019-08-03 RX ADMIN — ENOXAPARIN SODIUM 40 MG: 100 INJECTION SUBCUTANEOUS at 08:52

## 2019-08-03 RX ADMIN — LINEZOLID 600 MG: 600 TABLET, FILM COATED ORAL at 20:06

## 2019-08-03 RX ADMIN — METFORMIN HYDROCHLORIDE 1000 MG: 500 TABLET, FILM COATED ORAL at 17:52

## 2019-08-03 RX ADMIN — METHOCARBAMOL 500 MG: 500 TABLET, FILM COATED ORAL at 15:07

## 2019-08-03 RX ADMIN — VERAPAMIL HYDROCHLORIDE 180 MG: 180 TABLET, FILM COATED, EXTENDED RELEASE ORAL at 20:10

## 2019-08-03 RX ADMIN — MULTIPLE VITAMINS W/ MINERALS TAB 1 TABLET: TAB at 12:38

## 2019-08-03 RX ADMIN — VERAPAMIL HYDROCHLORIDE 180 MG: 180 TABLET, FILM COATED, EXTENDED RELEASE ORAL at 08:46

## 2019-08-03 RX ADMIN — BISACODYL 10 MG: 10 SUPPOSITORY RECTAL at 20:08

## 2019-08-03 RX ADMIN — PIPERACILLIN AND TAZOBACTAM 3.38 G: 3; .375 INJECTION, POWDER, LYOPHILIZED, FOR SOLUTION INTRAVENOUS; PARENTERAL at 05:14

## 2019-08-03 RX ADMIN — ATORVASTATIN CALCIUM 20 MG: 10 TABLET, FILM COATED ORAL at 20:06

## 2019-08-03 RX ADMIN — LIDOCAINE 2 PATCH: 50 PATCH CUTANEOUS at 08:57

## 2019-08-03 RX ADMIN — METHOCARBAMOL 500 MG: 500 TABLET, FILM COATED ORAL at 20:06

## 2019-08-03 RX ADMIN — PIPERACILLIN AND TAZOBACTAM 3.38 G: 3; .375 INJECTION, POWDER, LYOPHILIZED, FOR SOLUTION INTRAVENOUS; PARENTERAL at 21:00

## 2019-08-03 RX ADMIN — IRON SUCROSE 200 MG: 20 INJECTION, SOLUTION INTRAVENOUS at 12:21

## 2019-08-03 RX ADMIN — METHOCARBAMOL 500 MG: 500 TABLET, FILM COATED ORAL at 17:52

## 2019-08-03 RX ADMIN — LORATADINE 10 MG: 10 TABLET ORAL at 08:47

## 2019-08-03 RX ADMIN — GABAPENTIN 300 MG: 300 CAPSULE ORAL at 08:57

## 2019-08-03 RX ADMIN — CALCIUM CARBONATE (ANTACID) CHEW TAB 500 MG 500 MG: 500 CHEW TAB at 08:52

## 2019-08-03 RX ADMIN — TRAMADOL HYDROCHLORIDE 50 MG: 50 TABLET, COATED ORAL at 08:48

## 2019-08-03 RX ADMIN — DOCUSATE SODIUM 200 MG: 100 CAPSULE, LIQUID FILLED ORAL at 20:06

## 2019-08-03 RX ADMIN — TRAMADOL HYDROCHLORIDE 50 MG: 50 TABLET, COATED ORAL at 15:09

## 2019-08-03 RX ADMIN — GABAPENTIN 300 MG: 300 CAPSULE ORAL at 20:06

## 2019-08-03 RX ADMIN — MIDODRINE HYDROCHLORIDE 5 MG: 2.5 TABLET ORAL at 06:26

## 2019-08-03 RX ADMIN — PIPERACILLIN AND TAZOBACTAM 3.38 G: 3; .375 INJECTION, POWDER, LYOPHILIZED, FOR SOLUTION INTRAVENOUS; PARENTERAL at 13:39

## 2019-08-03 RX ADMIN — DOCUSATE SODIUM 200 MG: 100 CAPSULE, LIQUID FILLED ORAL at 08:47

## 2019-08-03 RX ADMIN — TRAMADOL HYDROCHLORIDE 50 MG: 50 TABLET, COATED ORAL at 11:42

## 2019-08-03 RX ADMIN — FOLIC ACID 1 MG: 1 TABLET ORAL at 11:43

## 2019-08-03 RX ADMIN — LISINOPRIL 40 MG: 20 TABLET ORAL at 08:47

## 2019-08-03 RX ADMIN — TERAZOSIN HYDROCHLORIDE 5 MG: 5 CAPSULE ORAL at 17:52

## 2019-08-03 RX ADMIN — METHOCARBAMOL 500 MG: 500 TABLET, FILM COATED ORAL at 08:47

## 2019-08-03 RX ADMIN — GABAPENTIN 300 MG: 300 CAPSULE ORAL at 15:09

## 2019-08-03 RX ADMIN — SENNOSIDES 17.2 MG: 8.6 TABLET, FILM COATED ORAL at 11:42

## 2019-08-03 RX ADMIN — CALCIUM CARBONATE (ANTACID) CHEW TAB 500 MG 500 MG: 500 CHEW TAB at 20:08

## 2019-08-03 RX ADMIN — CYANOCOBALAMIN TAB 1000 MCG 1000 MCG: 1000 TAB at 11:44

## 2019-08-03 RX ADMIN — SPIRONOLACTONE 25 MG: 25 TABLET ORAL at 08:46

## 2019-08-03 RX ADMIN — OMEPRAZOLE 20 MG: 20 CAPSULE, DELAYED RELEASE ORAL at 08:47

## 2019-08-03 RX ADMIN — AMLODIPINE BESYLATE 10 MG: 5 TABLET ORAL at 08:48

## 2019-08-03 ASSESSMENT — ENCOUNTER SYMPTOMS
NAUSEA: 0
BLURRED VISION: 0
HALLUCINATIONS: 0
VOMITING: 0
HEADACHES: 0
DIZZINESS: 0
PALPITATIONS: 0
FEVER: 0
SHORTNESS OF BREATH: 0

## 2019-08-03 NOTE — CARE PLAN
Problem: Safety  Goal: Will remain free from injury  Outcome: PROGRESSING AS EXPECTED  Note:   Patient demonstrates good safety technique this shift.  Asks for assistance when needed and does not attempt self transfer.  Able to verbalize needs.

## 2019-08-03 NOTE — PROGRESS NOTES
[facility] Medicine Daily Progress Note    Date of Service  8/3/2019    Chief Complaint:  Hypertension  Diabetes  Hyponatremia    Interval History:  No significant events or changes since last visit    Review of Systems  Review of Systems   Constitutional: Negative for fever.   Eyes: Negative for blurred vision.   Respiratory: Negative for shortness of breath.    Cardiovascular: Negative for palpitations.   Gastrointestinal: Negative for nausea and vomiting.   Neurological: Negative for dizziness and headaches.   Psychiatric/Behavioral: Negative for hallucinations.        Physical Exam  Temp:  [36.8 °C (98.3 °F)-39.2 °C (102.5 °F)] 37.1 °C (98.7 °F)  Pulse:  [] 91  Resp:  [17-20] 18  BP: (104-145)/(51-71) 132/61    Physical Exam   HENT:   Mouth/Throat: Oropharynx is clear and moist.   Eyes: No scleral icterus.   Cardiovascular: Normal rate and regular rhythm.   Pulmonary/Chest: Effort normal. No stridor. He has no wheezes. He has no rales.   Abdominal: Soft. Bowel sounds are normal.   Skin: Skin is warm. He is not diaphoretic.   Psychiatric: His behavior is normal.   Nursing note and vitals reviewed.      Fluids    Intake/Output Summary (Last 24 hours) at 8/3/2019 1057  Last data filed at 8/3/2019 0848  Gross per 24 hour   Intake 660 ml   Output 4075 ml   Net -3415 ml       Laboratory  Recent Labs     08/01/19  0845 08/02/19  0600 08/03/19  0603   WBC 20.2* 17.7* 16.0*   RBC 2.50* 2.49* 2.31*   HEMOGLOBIN 8.3* 8.1* 7.4*   HEMATOCRIT 24.3* 24.8* 22.8*   MCV 97.2 99.6* 98.7*   MCH 33.2* 32.5 32.0   MCHC 34.2 32.7* 32.5*   RDW 48.8 49.5 49.9   PLATELETCT 286 252 250   MPV 8.9* 9.0 8.9*     Recent Labs     08/01/19  0845 08/02/19  0600   SODIUM 129* 130*   POTASSIUM 4.9 4.3   CHLORIDE 96 99   CO2 22 21   GLUCOSE 143* 156*   BUN 17 16   CREATININE 1.01 1.05   CALCIUM 9.0 8.8                       Assessment/Plan  * T5 spinal cord injury (HCC)- (present on admission)  Assessment & Plan  S/P surgical repair  Final  pathology --> plasmacytoma  Wound care    Sepsis due to undetermined organism (HCC)  Assessment & Plan  WBC's: 10.4 (7/30) --> 19.1 (8/1) --> repeat 20.1 (8/1) --> 17.7 (8/2) --> 16.0 (8/3)  Had recent fever of 102.5 on 8/2 -- currently afebrile on 8/3  Back surgical wound healing well  U/A (+): f/u C&S  BC neg to date -- cont to follow (8/3)  CXR (8/1): There is no evidence of focal consolidation or evidence of pulmonary edema.                    There are small bilateral pleural effusions.  CXR (8/2): unremarkable  S/P IVFs of about 1 1/2 liters (8/2)  Off scheduled Tylenol (8/2)  Off Rocephin 1g daily (8/2)  On Zyvox and Zosyn (8/2)  Has some diarrhea recently -- will check stool for C. Diff  Monitor closely    Chest pain  Assessment & Plan  Resolved  Has CP on 8/1  Pain: located across the chest, aching in nature, 3/10 in intensity, lasted 10 mintues  No other associated symptoms  Pt feels it is aching from increased PT therapy  EKG: NSR; no ST elevations or depressions; first degree AVB  TropI: 31 --> 32 --> 33 --> 31  CXR: unremarkable except for some small pleural effusions    BPH (benign prostatic hyperplasia)  Assessment & Plan  On Hytrin    Hyponatremia- (present on admission)  Assessment & Plan  Na: 129 (8/1) --> 130 (8/2)   Monitor for now    Anemia- (present on admission)  Assessment & Plan  H&H: 8.1 (8/2) --> 7.4 (8/3)  Fe: < 10%  B12: 263  Folate: 8.8  S/P PRBC prior to Rehab transfer  2nd to Fe deficiency  2nd to recent IVF hydration  ? Also 2nd to recent surgery and Plasmacytoma  Will give IV Fe daily x 4 doses, then start oral (8/3)  Will start B12 and folate supplements  Monitor    PVD (peripheral vascular disease) (HCC)- (present on admission)  Assessment & Plan  Plavix on hold post-operatively    Dyslipidemia- (present on admission)  Assessment & Plan  On Lipitor    Essential hypertension- (present on admission)  Assessment & Plan  BP ok  On Norvasc: 10 mg daily  On Lisinopril: 40 mg daily  On  Verapamil ER: 180 mg bid  Note: also on Aldactone  Note: on Midodrine  Cont to monitor    Type 2 diabetes mellitus without complication, without long-term current use of insulin (HCC)- (present on admission)  Assessment & Plan  Hba1c 6.8 (7/26)  -162  Off Lantus (7/30)  Off Glipizide SR (last dose on 7/30)  On Metformin: 1000 mg bid  Note: home meds include Metformin, Glipizide, and Januvia  Cont to monitor

## 2019-08-03 NOTE — PROGRESS NOTES
Pt with low grade fever this morning at 99.4 F. Notified LISET COSTELLO And Dr. Gray. New orders for IV Rocephin. Pt unable to void today. ICP performed every 4 hours after time void attempt. Notified Dr. Aquino. Pt with temperature of 102.5 F this afternoon at 1550. Notified Dr. Gray and Dr. Aquino. Gave PRN Tylenol and new orders for IV Zosyn received.

## 2019-08-03 NOTE — CARE PLAN
Problem: Infection  Goal: Will remain free from infection  Outcome: PROGRESSING AS EXPECTED  Intervention: Assess signs and symptoms of infection  Note:   IV & PO ABT administered r/t UTI. VS stable, afebrile. ICP r/t unable to void at this time. Output noted.     Problem: Bowel/Gastric:  Goal: Normal bowel function is maintained or improved  Intervention: Collaborate with Interdisciplinary Team for optimal positioning for bowel evacuation  Note:   Participated in bowel program. Pt passing gas, no bm output noted w/program. No bm present in anal vault w/dig stim. No s/s of distress. Will continue to monitor.

## 2019-08-03 NOTE — PROGRESS NOTES
Received patient during shift change, report rec'd from day shift RN. Resting in bed, VS stable on room air. Per report UTI, pt unable to void, ICP in place. Neurogenic bowel, bowel program in place. Mod assist for transfers. A&O x 4, able to make needs known. Bed in low position, call light within reach.

## 2019-08-03 NOTE — THERAPY
Speech Language Pathology  Daily Treatment     Patient Name: Ian Laird  Age:  69 y.o., Sex:  male  Medical Record #: [id number]  Today's Date: 8/3/2019     Subjective    Assumed care from nursing who was hooking up IV. Pt's spouse present for session, very supportive.      Objective     08/03/19 1334   Cognition   Complex Attention Moderate (3)   Executive Functioning / Organization Moderate (3)   Functional Math / Financial Management Moderate (3)   Medication Management  Moderate (3)   Interdisciplinary Plan of Care Collaboration   IDT Collaboration with  Family / Caregiver;Nursing;Certified Nursing Assistant   Patient Position at End of Therapy Seated;Family / Friend in Room   Collaboration Comments spouse present for session, transfer of care to nursing/CNA   SLP Total Time Spent   SLP Individual Total Time Spent (Mins) 60   Charge Group   SLP Cognitive Skill Development 4       FIM Eating Score:  6 - Modified Independent  Eating Description:       FIM Comprehension Score:  6 - Modified Independent  Comprehension Description:  Glasses, Verbal cues    FIM Expression Score:  7 - Independent  Expression Description:       FIM Social Interaction Score:  7 - Independent  Social Interaction Description:       FIM Problem Solving Score:  5 - Standby Prompting/Supervision or Set-up  Problem Solving Description:  Verbal cueing, Therapy schedule, Increased time    FIM Memory Score:  4 - Minimal Assistance  Memory Description:  Verbal cueing, Therapy schedule      Assessment    Continued review of medications with completion of med list - Pt unable to recall names/uses of medications without written list to reference. Pt's spouse reports she will be assisting with med mngt at home and ordered pill organizer during this session. Pt reluctant to using organizer, however, with education, agreeable. Executive function task (planning trip for holidays) - pt required MOD A for divided attention, errors noted when  attempting to shift from instructions to scratch paper to calculator. Pt unable to identify errors independently. Use of stylus pen helpful for key stroke accuracy with calculator, however, pt continues to require cues to double check prior to completing calculations.     Plan    Continue to target attention, problem solving

## 2019-08-04 LAB
GLUCOSE BLD-MCNC: 112 MG/DL (ref 65–99)
GLUCOSE BLD-MCNC: 114 MG/DL (ref 65–99)
GLUCOSE BLD-MCNC: 146 MG/DL (ref 65–99)
GLUCOSE BLD-MCNC: 152 MG/DL (ref 65–99)

## 2019-08-04 PROCEDURE — A9270 NON-COVERED ITEM OR SERVICE: HCPCS | Performed by: PHYSICAL MEDICINE & REHABILITATION

## 2019-08-04 PROCEDURE — G0515 COGNITIVE SKILLS DEVELOPMENT: HCPCS

## 2019-08-04 PROCEDURE — 700102 HCHG RX REV CODE 250 W/ 637 OVERRIDE(OP): Performed by: PHYSICAL MEDICINE & REHABILITATION

## 2019-08-04 PROCEDURE — 99232 SBSQ HOSP IP/OBS MODERATE 35: CPT | Performed by: HOSPITALIST

## 2019-08-04 PROCEDURE — 700105 HCHG RX REV CODE 258: Performed by: HOSPITALIST

## 2019-08-04 PROCEDURE — 97530 THERAPEUTIC ACTIVITIES: CPT

## 2019-08-04 PROCEDURE — 700101 HCHG RX REV CODE 250: Performed by: PHYSICAL MEDICINE & REHABILITATION

## 2019-08-04 PROCEDURE — 97112 NEUROMUSCULAR REEDUCATION: CPT

## 2019-08-04 PROCEDURE — 700111 HCHG RX REV CODE 636 W/ 250 OVERRIDE (IP): Performed by: HOSPITALIST

## 2019-08-04 PROCEDURE — A9270 NON-COVERED ITEM OR SERVICE: HCPCS | Performed by: HOSPITALIST

## 2019-08-04 PROCEDURE — 700102 HCHG RX REV CODE 250 W/ 637 OVERRIDE(OP): Performed by: HOSPITALIST

## 2019-08-04 PROCEDURE — 97116 GAIT TRAINING THERAPY: CPT

## 2019-08-04 PROCEDURE — 700111 HCHG RX REV CODE 636 W/ 250 OVERRIDE (IP): Performed by: PHYSICAL MEDICINE & REHABILITATION

## 2019-08-04 PROCEDURE — 82962 GLUCOSE BLOOD TEST: CPT | Mod: 91

## 2019-08-04 PROCEDURE — 700112 HCHG RX REV CODE 229: Performed by: PHYSICAL MEDICINE & REHABILITATION

## 2019-08-04 PROCEDURE — 770010 HCHG ROOM/CARE - REHAB SEMI PRIVAT*

## 2019-08-04 RX ADMIN — TRAMADOL HYDROCHLORIDE 50 MG: 50 TABLET, COATED ORAL at 07:59

## 2019-08-04 RX ADMIN — METFORMIN HYDROCHLORIDE 1000 MG: 500 TABLET, FILM COATED ORAL at 17:14

## 2019-08-04 RX ADMIN — LIDOCAINE 2 PATCH: 50 PATCH CUTANEOUS at 08:02

## 2019-08-04 RX ADMIN — GABAPENTIN 300 MG: 300 CAPSULE ORAL at 07:59

## 2019-08-04 RX ADMIN — ATORVASTATIN CALCIUM 20 MG: 10 TABLET, FILM COATED ORAL at 20:25

## 2019-08-04 RX ADMIN — LORATADINE 10 MG: 10 TABLET ORAL at 07:58

## 2019-08-04 RX ADMIN — LINEZOLID 600 MG: 600 TABLET, FILM COATED ORAL at 20:24

## 2019-08-04 RX ADMIN — LINEZOLID 600 MG: 600 TABLET, FILM COATED ORAL at 07:59

## 2019-08-04 RX ADMIN — METHOCARBAMOL 500 MG: 500 TABLET, FILM COATED ORAL at 13:39

## 2019-08-04 RX ADMIN — VERAPAMIL HYDROCHLORIDE 180 MG: 180 TABLET, FILM COATED, EXTENDED RELEASE ORAL at 20:24

## 2019-08-04 RX ADMIN — TRAMADOL HYDROCHLORIDE 50 MG: 50 TABLET, COATED ORAL at 12:03

## 2019-08-04 RX ADMIN — CALCIUM CARBONATE (ANTACID) CHEW TAB 500 MG 500 MG: 500 CHEW TAB at 07:59

## 2019-08-04 RX ADMIN — METHOCARBAMOL 500 MG: 500 TABLET, FILM COATED ORAL at 20:24

## 2019-08-04 RX ADMIN — DOCUSATE SODIUM 200 MG: 100 CAPSULE, LIQUID FILLED ORAL at 20:25

## 2019-08-04 RX ADMIN — PIPERACILLIN AND TAZOBACTAM 3.38 G: 3; .375 INJECTION, POWDER, LYOPHILIZED, FOR SOLUTION INTRAVENOUS; PARENTERAL at 20:28

## 2019-08-04 RX ADMIN — SPIRONOLACTONE 25 MG: 25 TABLET ORAL at 07:58

## 2019-08-04 RX ADMIN — GABAPENTIN 300 MG: 300 CAPSULE ORAL at 20:25

## 2019-08-04 RX ADMIN — PIPERACILLIN AND TAZOBACTAM 3.38 G: 3; .375 INJECTION, POWDER, LYOPHILIZED, FOR SOLUTION INTRAVENOUS; PARENTERAL at 05:28

## 2019-08-04 RX ADMIN — MULTIPLE VITAMINS W/ MINERALS TAB 1 TABLET: TAB at 12:04

## 2019-08-04 RX ADMIN — MIDODRINE HYDROCHLORIDE 5 MG: 2.5 TABLET ORAL at 06:17

## 2019-08-04 RX ADMIN — TERAZOSIN HYDROCHLORIDE 5 MG: 5 CAPSULE ORAL at 05:38

## 2019-08-04 RX ADMIN — TRAMADOL HYDROCHLORIDE 50 MG: 50 TABLET, COATED ORAL at 15:29

## 2019-08-04 RX ADMIN — LISINOPRIL 40 MG: 20 TABLET ORAL at 07:58

## 2019-08-04 RX ADMIN — CYANOCOBALAMIN TAB 1000 MCG 1000 MCG: 1000 TAB at 07:59

## 2019-08-04 RX ADMIN — GABAPENTIN 300 MG: 300 CAPSULE ORAL at 15:29

## 2019-08-04 RX ADMIN — PIPERACILLIN AND TAZOBACTAM 3.38 G: 3; .375 INJECTION, POWDER, LYOPHILIZED, FOR SOLUTION INTRAVENOUS; PARENTERAL at 13:39

## 2019-08-04 RX ADMIN — VERAPAMIL HYDROCHLORIDE 180 MG: 180 TABLET, FILM COATED, EXTENDED RELEASE ORAL at 07:57

## 2019-08-04 RX ADMIN — OMEPRAZOLE 20 MG: 20 CAPSULE, DELAYED RELEASE ORAL at 07:58

## 2019-08-04 RX ADMIN — METHOCARBAMOL 500 MG: 500 TABLET, FILM COATED ORAL at 07:58

## 2019-08-04 RX ADMIN — AMLODIPINE BESYLATE 10 MG: 5 TABLET ORAL at 08:00

## 2019-08-04 RX ADMIN — FOLIC ACID 1 MG: 1 TABLET ORAL at 07:58

## 2019-08-04 RX ADMIN — CALCIUM CARBONATE (ANTACID) CHEW TAB 500 MG 500 MG: 500 CHEW TAB at 20:23

## 2019-08-04 RX ADMIN — BISACODYL 10 MG: 10 SUPPOSITORY RECTAL at 22:24

## 2019-08-04 RX ADMIN — SENNOSIDES 17.2 MG: 8.6 TABLET, FILM COATED ORAL at 12:03

## 2019-08-04 RX ADMIN — INSULIN LISPRO 2 UNITS: 100 INJECTION, SOLUTION INTRAVENOUS; SUBCUTANEOUS at 11:56

## 2019-08-04 RX ADMIN — DOCUSATE SODIUM 200 MG: 100 CAPSULE, LIQUID FILLED ORAL at 09:00

## 2019-08-04 RX ADMIN — TERAZOSIN HYDROCHLORIDE 5 MG: 5 CAPSULE ORAL at 17:14

## 2019-08-04 RX ADMIN — IRON SUCROSE 200 MG: 20 INJECTION, SOLUTION INTRAVENOUS at 08:01

## 2019-08-04 RX ADMIN — ENOXAPARIN SODIUM 40 MG: 100 INJECTION SUBCUTANEOUS at 07:57

## 2019-08-04 RX ADMIN — METHOCARBAMOL 500 MG: 500 TABLET, FILM COATED ORAL at 17:14

## 2019-08-04 RX ADMIN — METFORMIN HYDROCHLORIDE 1000 MG: 500 TABLET, FILM COATED ORAL at 08:00

## 2019-08-04 ASSESSMENT — ENCOUNTER SYMPTOMS
COUGH: 0
DIARRHEA: 0
FEVER: 0
BLURRED VISION: 0
DIZZINESS: 0
NERVOUS/ANXIOUS: 0

## 2019-08-04 NOTE — PROGRESS NOTES
Received patient during shift change, report rec'd from day shift RN. Resting in bed, VS stable on room air. Neurogenic bowel, bowel program in place. UTI w/retention, ICP as needed. Mod assist for transfers. A&O x 4, able to make needs known. Bed in low position, call light within reach.

## 2019-08-04 NOTE — CARE PLAN
Problem: Skin Integrity  Goal: Risk for impaired skin integrity will decrease  Outcome: PROGRESSING AS EXPECTED  Note:   Patient exhibits signs of wound healing.  Surgical incision well approximated; staples, no drainage; open to air; surrounding skin WNL.  Will continue to monitor.      Problem: Urinary Elimination:  Goal: Ability to reestablish a normal urinary elimination pattern will improve  Outcome: PROGRESSING SLOWER THAN EXPECTED  Note:   Pt cath'd every 3hrs. Pt has 10/10 pain if not cath'd, after cath'ing he doesn't have any pain  d

## 2019-08-04 NOTE — CARE PLAN
"  Problem: Infection  Goal: Will remain free from infection  Outcome: PROGRESSING AS EXPECTED  Intervention: Assess signs and symptoms of infection  Note:   Afebrile, VS stable. Continues IV & PO ABT r/t UTI as ordered. Tolerating well. ICP r/t unable to void at this time. Will continue to monitor.     Problem: Bowel/Gastric:  Goal: Will not experience complications related to bowel motility  Outcome: PROGRESSING SLOWER THAN EXPECTED  Intervention: Implement interventions to promote bowel evacuation if inadequate bowel movements in past 48 hours  Note:   No bm noted past 2 nights w/bowel program. Per report \"small, hard\" bm noted during day shift 8/3. Scheduled bowel meds administered.      "

## 2019-08-04 NOTE — THERAPY
"Physical Therapy   Daily Treatment     Patient Name: Ian Laird  Age:  69 y.o., Sex:  male  Medical Record #: [id number]  Today's Date: 8/4/2019     Precautions  Precautions: Fall Risk, Spinal / Back Precautions   Comments: T3-T7 spinal fusion    Subjective    Pt in bed, reports decreased energy and some discomfort in abdomen secondary to UTI. Agreeable to PT.     Objective       08/04/19 1431   Precautions   Precautions Fall Risk;Spinal / Back Precautions    Standing Lower Body Exercises   Mini Squat 1 set of 10  (focus on control of knees (decrease hyperextension))   Interdisciplinary Plan of Care Collaboration   IDT Collaboration with  Nursing   Patient Position at End of Therapy In Bed;Call Light within Reach   Collaboration Comments RN in room with pt after PT session   PT Total Time Spent   PT Individual Total Time Spent (Mins) 60   PT Charge Group   PT Gait Training 2   PT Neuromuscular Re-Education / Balance 1   PT Therapeutic Activities 1     Standing tolerance in // bars while participating in ring toss activity: 1 min and 3 min, limited by fatigue at end of session.     Attempted lite-gait gait training, pt only able to tolerate 1 min at 0.5 speed, verbal cuing for larger step length and posture.     FIM Bed/Chair/Wheelchair Transfers Score: 3 - Moderate Assistance  Bed/Chair/Wheelchair Transfers Description:  Increased time, Set-up of equipment(Mod A supine > sit, SBA sit > supine, min A scooting, CGA squat pivot bed <> WC)    FIM Walking Score:  1 - Total Assistance  Walking Description:  Extra time, Verbal cueing, Requires incidental assist, Requires wheelchair follow(12 ft x3 in // bars with CGA and WC follow for safety)      Assessment    Pt unable to tolerate lite-gait this session secondary to discomfort with the harness and \"feeling like its pulling my back forward\". Limited in standing tolerance as activity was completed at end of session.     Plan       Lite gait, neuro control, standing " tolerance/ standing balance, coordination exercises, progress through passport, family training Marion to perform transfers, review education on pressure relief/ skin checks

## 2019-08-04 NOTE — PROGRESS NOTES
Hospital Medicine Daily Progress Note    Date of Service  8/4/2019    Chief Complaint:  Hypertension  Diabetes  Hyponatremia    Interval History:  No significant events or changes since last visit    Review of Systems  Review of Systems   Constitutional: Negative for fever.   Eyes: Negative for blurred vision.   Respiratory: Negative for cough.    Cardiovascular: Negative for chest pain.   Gastrointestinal: Negative for diarrhea.   Musculoskeletal: Negative for joint pain.   Neurological: Negative for dizziness.   Psychiatric/Behavioral: The patient is not nervous/anxious.         Physical Exam  Temp:  [36.4 °C (97.6 °F)-37.8 °C (100.1 °F)] 37.1 °C (98.8 °F)  Pulse:  [82-89] 84  Resp:  [17-18] 18  BP: (129-149)/(66-72) 134/68  SpO2:  [95 %] 95 %    Physical Exam   Constitutional: No distress.   HENT:   Mouth/Throat: No oropharyngeal exudate.   Eyes: EOM are normal.   Neck: No JVD present. No tracheal deviation present.   Cardiovascular: Normal rate and regular rhythm.   Pulmonary/Chest: Effort normal. He has no wheezes. He has no rales.   Abdominal: Soft. He exhibits no distension. There is no tenderness.   Skin: Skin is warm.   Psychiatric: His behavior is normal.   Nursing note and vitals reviewed.      Fluids    Intake/Output Summary (Last 24 hours) at 8/4/2019 1011  Last data filed at 8/4/2019 0800  Gross per 24 hour   Intake 500 ml   Output 2575 ml   Net -2075 ml       Laboratory  Recent Labs     08/02/19  0600 08/03/19  0603   WBC 17.7* 16.0*   RBC 2.49* 2.31*   HEMOGLOBIN 8.1* 7.4*   HEMATOCRIT 24.8* 22.8*   MCV 99.6* 98.7*   MCH 32.5 32.0   MCHC 32.7* 32.5*   RDW 49.5 49.9   PLATELETCT 252 250   MPV 9.0 8.9*     Recent Labs     08/02/19  0600   SODIUM 130*   POTASSIUM 4.3   CHLORIDE 99   CO2 21   GLUCOSE 156*   BUN 16   CREATININE 1.05   CALCIUM 8.8                       Assessment/Plan  * T5 spinal cord injury (HCC)- (present on admission)  Assessment & Plan  S/P surgical repair  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  Wound care    Sepsis due to undetermined organism (HCC)  Assessment & Plan  WBC's: 10.4 (7/30) --> 19.1 (8/1) --> repeat 20.1 (8/1) --> 17.7 (8/2) --> 16.0 (8/3)  Had fever of 102.5 on 8/2  Had a low grade fever on 8/3 of 100.1  Back surgical wound healing well  U/A (+): cx not done by lab  BC neg to date -- cont to follow (8/4  CXR (8/1): there is no evidence of focal consolidation or evidence of pulmonary edema                    there are small bilateral pleural effusions  CXR (8/2): unremarkable  S/P IVFs of about 1 1/2 liters (8/2)  Off scheduled Tylenol (8/2)  On Zyvox and Zosyn (8/2)  Has some diarrhea recently -- f/u stool for C. Diff  Monitor closely    Chest pain  Assessment & Plan  Resolved  Has CP on 8/1  Pain: located across the chest, aching in nature, 3/10 in intensity, lasted 10 mintues  No other associated symptoms  Pt feels it is aching from increased PT therapy  EKG: NSR; no ST elevations or depressions; first degree AVB  TropI: 31 --> 32 --> 33 --> 31  CXR: unremarkable except for some small pleural effusions    BPH (benign prostatic hyperplasia)  Assessment & Plan  On Hytrin    Hyponatremia- (present on admission)  Assessment & Plan  Na: 129 (8/1) --> 130 (8/2)   Monitor for now    Anemia- (present on admission)  Assessment & Plan  H&H: 8.1 (8/2) --> 7.4 (8/3)  Fe: < 10%  B12: 263  Folate: 8.8  S/P PRBC prior to Rehab transfer  2nd to Fe deficiency  2nd to recent IVF hydration  ? Also 2nd to recent surgery and Plasmacytoma  On IV Fe daily x 4 doses, then will be on oral supplements (8/3)  On B12 and folate supplements  Monitor    PVD (peripheral vascular disease) (HCC)- (present on admission)  Assessment & Plan  Plavix on hold post-operatively    Dyslipidemia- (present on admission)  Assessment & Plan  On Lipitor    Essential hypertension- (present on admission)  Assessment & Plan  BP ok  On Norvasc: 10 mg daily  On Lisinopril: 40 mg daily  On Verapamil ER: 180 mg bid  Note: also  on Aldactone  Note: on Midodrine  Cont to monitor    Type 2 diabetes mellitus without complication, without long-term current use of insulin (HCC)- (present on admission)  Assessment & Plan  Hba1c 6.8 (7/26)  -162  Off Lantus (7/30)  Off Glipizide SR (7/30)  On Metformin: 1000 mg bid  Note: home meds include Metformin, Glipizide, and Januvia  Cont to monitor

## 2019-08-04 NOTE — THERAPY
Speech Language Pathology  Daily Treatment     Patient Name: Ian Laird  Age:  69 y.o., Sex:  male  Medical Record #: [id number]  Today's Date: 8/4/2019     Subjective    Pt pleasant and cooperative during ST session.      Objective       08/04/19 0831   Cognition   Complex Attention Minimal (4)   Complex Reasoning  / Problem Solving Moderate (3)   Insight into Deficits Supervision (5)   Executive Functioning / Organization Moderate (3)   Self Monitoring Minimal (4)   Interdisciplinary Plan of Care Collaboration   IDT Collaboration with  Certified Nursing Assistant   Patient Position at End of Therapy In Bed;Call Light within Reach;Phone within Reach;Tray Table within Reach   Collaboration Comments CLOF   SLP Total Time Spent   SLP Individual Total Time Spent (Mins) 60   Charge Group   SLP Cognitive Skill Development 4         Assessment    Pt completed complex double target divided attn tasks w/ MOD-MIN A and 90-95% accuracy. Pt able to problem solve bladder infection/situation r/t bladder scan results and current medical course with MIN A for what current situation is and next steps, focusing on what pt can control (e.g., requesting A to use restroom to try to void on toilet).     Plan    Continue to address complex attn, executive function and problem solving.

## 2019-08-05 PROBLEM — E83.42 HYPOMAGNESEMIA: Status: ACTIVE | Noted: 2019-08-05

## 2019-08-05 LAB
ALBUMIN SERPL BCP-MCNC: 3.2 G/DL (ref 3.2–4.9)
ALBUMIN/GLOB SERPL: 0.9 G/DL
ALP SERPL-CCNC: 60 U/L (ref 30–99)
ALT SERPL-CCNC: 32 U/L (ref 2–50)
ANION GAP SERPL CALC-SCNC: 10 MMOL/L (ref 0–11.9)
AST SERPL-CCNC: 18 U/L (ref 12–45)
BASOPHILS # BLD AUTO: 0.4 % (ref 0–1.8)
BASOPHILS # BLD: 0.03 K/UL (ref 0–0.12)
BILIRUB SERPL-MCNC: 0.4 MG/DL (ref 0.1–1.5)
BUN SERPL-MCNC: 7 MG/DL (ref 8–22)
CALCIUM SERPL-MCNC: 8.6 MG/DL (ref 8.5–10.5)
CHLORIDE SERPL-SCNC: 99 MMOL/L (ref 96–112)
CO2 SERPL-SCNC: 23 MMOL/L (ref 20–33)
CREAT SERPL-MCNC: 0.92 MG/DL (ref 0.5–1.4)
EOSINOPHIL # BLD AUTO: 0.17 K/UL (ref 0–0.51)
EOSINOPHIL NFR BLD: 2.2 % (ref 0–6.9)
ERYTHROCYTE [DISTWIDTH] IN BLOOD BY AUTOMATED COUNT: 49.1 FL (ref 35.9–50)
GLOBULIN SER CALC-MCNC: 3.7 G/DL (ref 1.9–3.5)
GLUCOSE BLD-MCNC: 109 MG/DL (ref 65–99)
GLUCOSE BLD-MCNC: 149 MG/DL (ref 65–99)
GLUCOSE BLD-MCNC: 171 MG/DL (ref 65–99)
GLUCOSE BLD-MCNC: 99 MG/DL (ref 65–99)
GLUCOSE SERPL-MCNC: 169 MG/DL (ref 65–99)
HCT VFR BLD AUTO: 23.4 % (ref 42–52)
HGB BLD-MCNC: 7.7 G/DL (ref 14–18)
IMM GRANULOCYTES # BLD AUTO: 0.15 K/UL (ref 0–0.11)
IMM GRANULOCYTES NFR BLD AUTO: 2 % (ref 0–0.9)
LYMPHOCYTES # BLD AUTO: 0.76 K/UL (ref 1–4.8)
LYMPHOCYTES NFR BLD: 10 % (ref 22–41)
MAGNESIUM SERPL-MCNC: 1.3 MG/DL (ref 1.5–2.5)
MCH RBC QN AUTO: 32.1 PG (ref 27–33)
MCHC RBC AUTO-ENTMCNC: 32.9 G/DL (ref 33.7–35.3)
MCV RBC AUTO: 97.5 FL (ref 81.4–97.8)
MONOCYTES # BLD AUTO: 0.97 K/UL (ref 0–0.85)
MONOCYTES NFR BLD AUTO: 12.8 % (ref 0–13.4)
NEUTROPHILS # BLD AUTO: 5.51 K/UL (ref 1.82–7.42)
NEUTROPHILS NFR BLD: 72.6 % (ref 44–72)
NRBC # BLD AUTO: 0 K/UL
NRBC BLD-RTO: 0 /100 WBC
PHOSPHATE SERPL-MCNC: 3.9 MG/DL (ref 2.5–4.5)
PLATELET # BLD AUTO: 252 K/UL (ref 164–446)
PMV BLD AUTO: 9.1 FL (ref 9–12.9)
POTASSIUM SERPL-SCNC: 3.9 MMOL/L (ref 3.6–5.5)
PROT SERPL-MCNC: 6.9 G/DL (ref 6–8.2)
RBC # BLD AUTO: 2.4 M/UL (ref 4.7–6.1)
SODIUM SERPL-SCNC: 132 MMOL/L (ref 135–145)
WBC # BLD AUTO: 7.6 K/UL (ref 4.8–10.8)

## 2019-08-05 PROCEDURE — A9270 NON-COVERED ITEM OR SERVICE: HCPCS | Performed by: HOSPITALIST

## 2019-08-05 PROCEDURE — 84100 ASSAY OF PHOSPHORUS: CPT

## 2019-08-05 PROCEDURE — 99233 SBSQ HOSP IP/OBS HIGH 50: CPT | Performed by: PHYSICAL MEDICINE & REHABILITATION

## 2019-08-05 PROCEDURE — 85025 COMPLETE CBC W/AUTO DIFF WBC: CPT

## 2019-08-05 PROCEDURE — A9270 NON-COVERED ITEM OR SERVICE: HCPCS | Performed by: PHYSICAL MEDICINE & REHABILITATION

## 2019-08-05 PROCEDURE — 97535 SELF CARE MNGMENT TRAINING: CPT

## 2019-08-05 PROCEDURE — 700111 HCHG RX REV CODE 636 W/ 250 OVERRIDE (IP): Performed by: HOSPITALIST

## 2019-08-05 PROCEDURE — 97110 THERAPEUTIC EXERCISES: CPT

## 2019-08-05 PROCEDURE — 770010 HCHG ROOM/CARE - REHAB SEMI PRIVAT*

## 2019-08-05 PROCEDURE — 700102 HCHG RX REV CODE 250 W/ 637 OVERRIDE(OP): Performed by: PHYSICAL MEDICINE & REHABILITATION

## 2019-08-05 PROCEDURE — 700111 HCHG RX REV CODE 636 W/ 250 OVERRIDE (IP): Performed by: PHYSICAL MEDICINE & REHABILITATION

## 2019-08-05 PROCEDURE — 97530 THERAPEUTIC ACTIVITIES: CPT

## 2019-08-05 PROCEDURE — 80053 COMPREHEN METABOLIC PANEL: CPT

## 2019-08-05 PROCEDURE — 700101 HCHG RX REV CODE 250: Performed by: PHYSICAL MEDICINE & REHABILITATION

## 2019-08-05 PROCEDURE — 82962 GLUCOSE BLOOD TEST: CPT

## 2019-08-05 PROCEDURE — 700102 HCHG RX REV CODE 250 W/ 637 OVERRIDE(OP): Performed by: HOSPITALIST

## 2019-08-05 PROCEDURE — 36415 COLL VENOUS BLD VENIPUNCTURE: CPT

## 2019-08-05 PROCEDURE — G0515 COGNITIVE SKILLS DEVELOPMENT: HCPCS

## 2019-08-05 PROCEDURE — 83735 ASSAY OF MAGNESIUM: CPT

## 2019-08-05 PROCEDURE — 700105 HCHG RX REV CODE 258: Performed by: HOSPITALIST

## 2019-08-05 PROCEDURE — 97116 GAIT TRAINING THERAPY: CPT

## 2019-08-05 PROCEDURE — 700112 HCHG RX REV CODE 229: Performed by: PHYSICAL MEDICINE & REHABILITATION

## 2019-08-05 PROCEDURE — 99232 SBSQ HOSP IP/OBS MODERATE 35: CPT | Performed by: HOSPITALIST

## 2019-08-05 RX ORDER — LACTOBACILLUS RHAMNOSUS GG 10B CELL
1 CAPSULE ORAL
Status: DISCONTINUED | OUTPATIENT
Start: 2019-08-05 | End: 2019-08-19 | Stop reason: HOSPADM

## 2019-08-05 RX ADMIN — GABAPENTIN 300 MG: 300 CAPSULE ORAL at 15:41

## 2019-08-05 RX ADMIN — INSULIN LISPRO 2 UNITS: 100 INJECTION, SOLUTION INTRAVENOUS; SUBCUTANEOUS at 11:52

## 2019-08-05 RX ADMIN — CALCIUM CARBONATE (ANTACID) CHEW TAB 500 MG 500 MG: 500 CHEW TAB at 08:42

## 2019-08-05 RX ADMIN — GABAPENTIN 300 MG: 300 CAPSULE ORAL at 19:32

## 2019-08-05 RX ADMIN — AMLODIPINE BESYLATE 10 MG: 5 TABLET ORAL at 08:42

## 2019-08-05 RX ADMIN — FOLIC ACID 1 MG: 1 TABLET ORAL at 08:42

## 2019-08-05 RX ADMIN — BISACODYL 10 MG: 10 SUPPOSITORY RECTAL at 20:09

## 2019-08-05 RX ADMIN — METHOCARBAMOL 500 MG: 500 TABLET, FILM COATED ORAL at 08:41

## 2019-08-05 RX ADMIN — LINEZOLID 600 MG: 600 TABLET, FILM COATED ORAL at 19:32

## 2019-08-05 RX ADMIN — TRAMADOL HYDROCHLORIDE 50 MG: 50 TABLET, COATED ORAL at 08:43

## 2019-08-05 RX ADMIN — ENOXAPARIN SODIUM 40 MG: 100 INJECTION SUBCUTANEOUS at 10:09

## 2019-08-05 RX ADMIN — TRAMADOL HYDROCHLORIDE 50 MG: 50 TABLET, COATED ORAL at 17:54

## 2019-08-05 RX ADMIN — LISINOPRIL 40 MG: 20 TABLET ORAL at 08:41

## 2019-08-05 RX ADMIN — Medication 1 CAPSULE: at 11:59

## 2019-08-05 RX ADMIN — MULTIPLE VITAMINS W/ MINERALS TAB 1 TABLET: TAB at 11:59

## 2019-08-05 RX ADMIN — DOCUSATE SODIUM 200 MG: 100 CAPSULE, LIQUID FILLED ORAL at 19:31

## 2019-08-05 RX ADMIN — METFORMIN HYDROCHLORIDE 1000 MG: 500 TABLET, FILM COATED ORAL at 08:42

## 2019-08-05 RX ADMIN — METFORMIN HYDROCHLORIDE 1000 MG: 500 TABLET, FILM COATED ORAL at 17:54

## 2019-08-05 RX ADMIN — GABAPENTIN 300 MG: 300 CAPSULE ORAL at 08:42

## 2019-08-05 RX ADMIN — CYANOCOBALAMIN TAB 1000 MCG 1000 MCG: 1000 TAB at 08:42

## 2019-08-05 RX ADMIN — IRON SUCROSE 200 MG: 20 INJECTION, SOLUTION INTRAVENOUS at 10:09

## 2019-08-05 RX ADMIN — ATORVASTATIN CALCIUM 20 MG: 10 TABLET, FILM COATED ORAL at 19:32

## 2019-08-05 RX ADMIN — PIPERACILLIN AND TAZOBACTAM 3.38 G: 3; .375 INJECTION, POWDER, LYOPHILIZED, FOR SOLUTION INTRAVENOUS; PARENTERAL at 14:37

## 2019-08-05 RX ADMIN — TRAMADOL HYDROCHLORIDE 50 MG: 50 TABLET, COATED ORAL at 11:59

## 2019-08-05 RX ADMIN — LINEZOLID 600 MG: 600 TABLET, FILM COATED ORAL at 08:42

## 2019-08-05 RX ADMIN — PIPERACILLIN AND TAZOBACTAM 3.38 G: 3; .375 INJECTION, POWDER, LYOPHILIZED, FOR SOLUTION INTRAVENOUS; PARENTERAL at 05:09

## 2019-08-05 RX ADMIN — DOCUSATE SODIUM 200 MG: 100 CAPSULE, LIQUID FILLED ORAL at 08:41

## 2019-08-05 RX ADMIN — CALCIUM CARBONATE (ANTACID) CHEW TAB 500 MG 500 MG: 500 CHEW TAB at 19:32

## 2019-08-05 RX ADMIN — MIDODRINE HYDROCHLORIDE 5 MG: 2.5 TABLET ORAL at 06:04

## 2019-08-05 RX ADMIN — SPIRONOLACTONE 25 MG: 25 TABLET ORAL at 08:42

## 2019-08-05 RX ADMIN — METHOCARBAMOL 500 MG: 500 TABLET, FILM COATED ORAL at 11:59

## 2019-08-05 RX ADMIN — VERAPAMIL HYDROCHLORIDE 180 MG: 180 TABLET, FILM COATED, EXTENDED RELEASE ORAL at 19:32

## 2019-08-05 RX ADMIN — LIDOCAINE 2 PATCH: 50 PATCH CUTANEOUS at 10:10

## 2019-08-05 RX ADMIN — LORATADINE 10 MG: 10 TABLET ORAL at 08:41

## 2019-08-05 RX ADMIN — OMEPRAZOLE 20 MG: 20 CAPSULE, DELAYED RELEASE ORAL at 08:41

## 2019-08-05 RX ADMIN — TERAZOSIN HYDROCHLORIDE 5 MG: 5 CAPSULE ORAL at 18:54

## 2019-08-05 RX ADMIN — MAGNESIUM OXIDE TAB 400 MG (241.3 MG ELEMENTAL MG) 400 MG: 400 (241.3 MG) TAB at 19:32

## 2019-08-05 RX ADMIN — PIPERACILLIN AND TAZOBACTAM 3.38 G: 3; .375 INJECTION, POWDER, LYOPHILIZED, FOR SOLUTION INTRAVENOUS; PARENTERAL at 20:54

## 2019-08-05 RX ADMIN — SENNOSIDES 17.2 MG: 8.6 TABLET, FILM COATED ORAL at 11:59

## 2019-08-05 RX ADMIN — TERAZOSIN HYDROCHLORIDE 5 MG: 5 CAPSULE ORAL at 05:19

## 2019-08-05 RX ADMIN — MAGNESIUM OXIDE TAB 400 MG (241.3 MG ELEMENTAL MG) 400 MG: 400 (241.3 MG) TAB at 11:59

## 2019-08-05 RX ADMIN — LIDOCAINE HYDROCHLORIDE 1 APPLICATION: 20 JELLY TOPICAL at 14:38

## 2019-08-05 ASSESSMENT — ENCOUNTER SYMPTOMS
NAUSEA: 0
SHORTNESS OF BREATH: 0
DIARRHEA: 0
VOMITING: 0
CHILLS: 0
NERVOUS/ANXIOUS: 0
ABDOMINAL PAIN: 0
FEVER: 0

## 2019-08-05 NOTE — PROGRESS NOTES
[facility] Medicine Daily Progress Note    Date of Service  8/5/2019    Chief Complaint:  Hypertension  Diabetes  Hyponatremia    Interval History:  No significant events or changes since last visit    Review of Systems  Review of Systems   Constitutional: Negative for chills and fever.   Respiratory: Negative for shortness of breath.    Cardiovascular: Negative for chest pain.   Gastrointestinal: Negative for abdominal pain, diarrhea, nausea and vomiting.   Psychiatric/Behavioral: The patient is not nervous/anxious.         Physical Exam  Temp:  [36.7 °C (98 °F)-37.1 °C (98.7 °F)] 36.7 °C (98 °F)  Pulse:  [73-86] 86  Resp:  [18] 18  BP: (118-148)/(60-73) 118/63  SpO2:  [93 %-96 %] 93 %    Physical Exam   Constitutional: He is oriented to person, place, and time. He appears well-nourished.   HENT:   Head: Atraumatic.   Eyes: Pupils are equal, round, and reactive to light. Conjunctivae are normal.   Neck: Normal range of motion. Neck supple.   Cardiovascular: Normal rate, regular rhythm, S1 normal and S2 normal.   No murmur heard.  Pulmonary/Chest: Effort normal. He has no wheezes. He has no rhonchi. He has no rales.   Abdominal: Soft. He exhibits no distension. There is no tenderness.   Musculoskeletal: He exhibits no edema.   Neurological: He is alert and oriented to person, place, and time. No sensory deficit.   Skin: Skin is warm and dry. No rash noted. No cyanosis.   Psychiatric: He has a normal mood and affect. His behavior is normal.   Nursing note and vitals reviewed.      Fluids    Intake/Output Summary (Last 24 hours) at 8/5/2019 0950  Last data filed at 8/5/2019 0840  Gross per 24 hour   Intake 720 ml   Output 2950 ml   Net -2230 ml       Laboratory  Recent Labs     08/03/19  0603 08/05/19  0543   WBC 16.0* 7.6   RBC 2.31* 2.40*   HEMOGLOBIN 7.4* 7.7*   HEMATOCRIT 22.8* 23.4*   MCV 98.7* 97.5   MCH 32.0 32.1   MCHC 32.5* 32.9*   RDW 49.9 49.1   PLATELETCT 250 252   MPV 8.9* 9.1     Recent Labs      19  0543   SODIUM 132*   POTASSIUM 3.9   CHLORIDE 99   CO2 23   GLUCOSE 169*   BUN 7*   CREATININE 0.92   CALCIUM 8.6                       Assessment/Plan  * T5 spinal cord injury (HCC)- (present on admission)  Assessment & Plan  S/P surgical repair  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  Wound care    Hypomagnesemia  Assessment & Plan  M.3 ()  Will start supplements ()  Monitor    Sepsis due to undetermined organism (HCC)  Assessment & Plan  S/P leukocytosisi: 10.4 () --> 19.1 -/ 20.1 () --> 17.7 () --> 16.0 (8/3) --> 7.6 ()  Had fever of 102.5 on   Had a low grade fever on 8/3 of 100.1  Afebrile since 8/3  Back surgical wound healing well  Had 2 bouts of loose stools but otherwise formed  U/A (+): cx not done by lab  BC: negative  CXR (): there is no evidence of focal consolidation or evidence of pulmonary edema                    there are small bilateral pleural effusions  CXR (): unremarkable  S/P IVFs of about 1 1/2 liters ()  Off scheduled Tylenol ()  On Zyvox and Zosyn ()  Will recheck U/A ()  Consider de-escalating abx on  or  to oral meds  Monitor closely    Chest pain  Assessment & Plan  Resolved  Has CP x 1 on   Pain: located across the chest, aching in nature, 3/10 in intensity, lasted 10 mintues  No other associated symptoms  Pt feels it is aching from increased PT therapy  EKG: NSR; no ST elevations or depressions; first degree AVB  TropI: 31 --> 32 --> 33 --> 31  CXR: unremarkable except for some small pleural effusions    BPH (benign prostatic hyperplasia)  Assessment & Plan  On Hytrin    Hyponatremia- (present on admission)  Assessment & Plan  Na: 129 () --> 130 () --> 132 ()  Monitor for now    Anemia- (present on admission)  Assessment & Plan  H&H: 8.1 () --> 7.4 (8/3) --> 7.7 ()  Fe: < 10%  B12: 263  Folate: 8.8  S/P PRBC prior to Rehab transfer  2nd to Fe deficiency  2nd to recent IVF hydration  ? Also 2nd to recent surgery  and Plasmacytoma  On IV Fe daily x 4 doses, then will be on oral supplements (8/3)  On B12 and folate supplements  Monitor    PVD (peripheral vascular disease) (HCC)- (present on admission)  Assessment & Plan  Plavix on hold post-operatively    Dyslipidemia- (present on admission)  Assessment & Plan  On Lipitor    Essential hypertension- (present on admission)  Assessment & Plan  BP ok  On Norvasc: 10 mg daily  On Lisinopril: 40 mg daily  On Verapamil ER: 180 mg bid  Note: also on Aldactone  Note: on Midodrine  Cont to monitor    Type 2 diabetes mellitus without complication, without long-term current use of insulin (HCC)- (present on admission)  Assessment & Plan  Hba1c 6.8 (7/26)  -152  Off Lantus (7/30)  Off Glipizide SR (7/30)  On Metformin: 1000 mg bid  Note: home meds include Metformin, Glipizide, and Januvia  Cont to monitor

## 2019-08-05 NOTE — THERAPY
Physical Therapy   Daily Treatment     Patient Name: Ian Laird  Age:  69 y.o., Sex:  male  Medical Record #: [id number]  Today's Date: 2019     Precautions  Precautions: Fall Risk, Spinal / Back Precautions   Comments: T3-T7 spinal fusion    Subjective    Pt reports he is up for trying treadmill again this session     Objective       19 0831   Bed Mobility    Sit to Stand Contact Guard Assist  (repeated practice, BUE support)   Interdisciplinary Plan of Care Collaboration   Patient Position at End of Therapy Seated;Call Light within Reach;Tray Table within Reach   PT Total Time Spent   PT Individual Total Time Spent (Mins) 60   PT Charge Group   PT Gait Training 1   PT Therapeutic Activities 3       FIM Toiletin - Total Assistance  Toileting Description:  Grab bar, Increased time, Verbal cueing, Adaptive equipment    FIM Toilet Transfer Score:  4 - Minimal Assistance  Toilet Transfer Description:  Grab bar, Increased time, Verbal cueing(CGA)    Lite gait, 25% unweighted, 4 x 60 - 90 sec bouts, varying between 0.2 and 0.5 mph, extended rest breaks in between bouts. Cues for upright posture, less reliance on UEs. Attempted no UE support, pt unable to continue dt fear of LOB.     Pt edu on difference between pain and fatigue - ok to push into fatigue in order to elicit physical response to exercise.    Assessment    Pt struggles with treadmill training this session dt inability to maintain activity tolerance longer than 90 sec - refusing to continue at this point dt pain or fatigue despite edu on continuing with activity.     Plan    Lite gait, neuro control, standing tolerance/ standing balance, coordination exercises, progress through passport, family training Marion to perform transfers, review education on pressure relief/ skin checks

## 2019-08-05 NOTE — REHAB-PT IDT TEAM NOTE
"Physical Therapy   Mobility  Bed mobility:   CGA - mod A  Bed /Chair/Wheelchair Transfer Initial:  3 - Moderate Assistance  Bed /Chair/Wheelchair Transfer Current:  3 - Moderate Assistance   Bed/Chair/Wheelchair Transfer Description:  Increased time, Set-up of equipment(Mod A supine > sit, SBA sit > supine, min A scooting, CGA squat pivot bed <> WC)  Walk Initial:  0 - Not tested,unsafe activity  Walk Current:  1 - Total Assistance   Walk Description:  Extra time, Verbal cueing, Requires incidental assist, Requires wheelchair follow(12 ft x3 in // bars with CGA and WC follow for safety)  Wheelchair Initial:  2 - Max Assistance  Wheelchair Current:  2 - Max Assistance   Wheelchair Description:  (35ft x1, 40ft x 4, B UE, level terrain)  Stairs Initial:  0 - Not tested,unsafe activity  Stairs Current: 2 - Max Assistance   Stairs Description: (4 x 1 6\" steps, B UE, step to, CGA)  Patient/Family Training/Education:  Ongoing, transfer safety  DME/DC Recommendations:  TBD  Strengths:  Able to follow instructions, Independent PLOF, Motivated for self care and independence, Pleasant and cooperative and Willingly participates in therapeutic activities  Barriers:   Decreased endurance, Generalized weakness, Limited mobility and Other: General c/o pain/fatigue - difficult for pt to accurately express exactly what his barriers are, difficult to motivate to push into exercise zone of fatigue  # of short term goals set= 4  # of short term goals met=1 (3 partially met)  Physical Therapy Problems     Problem: Mobility     Dates: Start: 07/26/19       Goal: STG-Within one week, patient will propel wheelchair community     Dates: Start: 07/26/19       Description: 1) Individualized goal:  150ft x 1, B UE, mod I  2) Interventions:  PT Group Therapy, PT Gait Training, PT Therapeutic Exercises, PT Neuro Re-Ed/Balance, PT Therapeutic Activity, PT Manual Therapy and PT Evaluation       Note:     Goal Note filed on 08/05/19 1047 by Darron" FERNANDA Tapia, PT    65 ft                        Problem: Mobility Transfers     Dates: Start: 07/26/19       Goal: STG-Within one week, patient will perform bed mobility     Dates: Start: 07/26/19       Description: 1) Individualized goal: with bed rail as needed, SPV  2) Interventions:  PT Group Therapy, PT Gait Training, PT Therapeutic Exercises, PT Neuro Re-Ed/Balance, PT Therapeutic Activity, PT Manual Therapy and PT Evaluation     Note:     Goal Note filed on 08/05/19 1047 by Darron Tapia, PT    Min - mod A                  Goal: STG-Within one week, patient will transfer bed to chair     Dates: Start: 07/26/19       Description: 1) Individualized goal: SBA with LRAD  2) Interventions:  PT Group Therapy, PT Gait Training, PT Therapeutic Exercises, PT Neuro Re-Ed/Balance, PT Therapeutic Activity, PT Manual Therapy and PT Evaluation       Note:     Goal Note filed on 08/05/19 1047 by Darron Tapia, PT    Min - mod A                        Problem: PT-Long Term Goals     Dates: Start: 07/26/19       Goal: LTG-By discharge, patient will ambulate     Dates: Start: 07/26/19       Description: 1) Individualized goal:  50ft x1 SPV LRAD  2) Interventions:  PT Group Therapy, PT Gait Training, PT Therapeutic Exercises, PT Neuro Re-Ed/Balance, PT Therapeutic Activity, PT Manual Therapy and PT Evaluation           Goal: LTG-By discharge, patient will perform home exercise program     Dates: Start: 07/26/19       Description: 1) Individualized goal:  For balance and B LE strength/ endurance, mod I  2) Interventions:  PT Group Therapy, PT Gait Training, PT Therapeutic Exercises, PT Neuro Re-Ed/Balance, PT Therapeutic Activity, PT Manual Therapy and PT Evaluation                    Goal: LTG-By discharge, patient will     Dates: Start: 07/26/19       Description: 1) Individualized goal:  Up/down 2 stairs, Isabel to mimic home environment   2) Interventions:  PT Group Therapy, PT Gait Training, PT Therapeutic  Exercises, PT Neuro Re-Ed/Balance, PT Therapeutic Activity, PT Manual Therapy and PT Evaluation             Goal: LTG-By discharge, patient will     Dates: Start: 07/26/19       Description: 1) Individualized goal: perform car transfer with SPV, LRAD  2) Interventions:  PT Group Therapy, PT Gait Training, PT Therapeutic Exercises, PT Neuro Re-Ed/Balance, PT Therapeutic Activity, PT Manual Therapy and PT Evaluation                           Section completed by:  Darron Tapia, PT

## 2019-08-05 NOTE — PROGRESS NOTES
Received patient during shift change, report rec'd from day shift RN. Resting in bed, VS stable on room air. Continent of Bladder, Bowel program in place. Mod assist for transfers. A&O x 4, able to make needs known. Bed in low position, call light within reach.

## 2019-08-05 NOTE — CARE PLAN
Had 1 episode of bladder accident with wet clothing. He was not aware that he was wet. Bladder scan was 500cc and ICP was also 500cc very clear yellow urine.

## 2019-08-05 NOTE — REHAB-NURSING IDT TEAM NOTE
Nursing   Nursing  Diet/Nutrition:  Regular, Diabetic and Thin Liquids  Medication Administration:  Whole with Liquid Wash  % consumed at meals in last 24 hours:  Consumed 25-50  % of meals per documentation.  Meal/Snack Consumption for the past 24 hrs:   Oral Nutrition   08/04/19 2100 Snack;0% Consumed   08/04/19 1800 Dinner;Between 25-50% Consumed   08/04/19 0800 Breakfast;Between 25-50% Consumed     Snack schedule:  Other:Reports, does not like cheese & crackers for HS snack.  Supplement: n/a  Appetite:  Fair  Fluid Intake/Output in past 24 hours: In: 920 [P.O.:920]  Out: 2350   Admit Weight:  Weight: 100.8 kg (222 lb 4.8 oz)  Weight Last 7 Days   [97.5 kg (215 lb)] 97.5 kg (215 lb) (08/04 1100)    Pain Issues:    Location:  Back (08/04 0800)  Mid (08/04 0800)         Severity:  Denies   Scheduled pain medications:  gabapentin (NEURONTIN)      PRN pain medications used in last 24 hours:  None   Non Pharmacologic Interventions:  distraction, environmental changes, repositioned and rest  Effectiveness of pain management plan:  good=patient states acceptable comfort after interventions    Bowel:    Bowel Assist Initial Score:  2 - Max Assistance  Bowel Assist Current Score:  1 - Total Assistance  Bowl Accidents in last 7 days:  0  Last bowel movement: 08/05/19  Stool Description: Large, Soft     Usual bowel pattern:  daily  Scheduled bowel medications:  docusate sodium (COLACE), senna-docusate (PERICOLACE or SENOKOT S)  and bisacodyl (DULCOLAX)  PRN bowel medications used in last 24 hours:  None  Nursing Interventions:  Increased time, Scheduled medication, Supervision, Verbal cueing, Set-up, Positioning on commode/toilet, Brief, Digital stimulation, Suppository  Effectiveness of bowel program:   fair=occasional unpredictable, incontinent emptying of bowel (less than 1 time day)  Bladder:    Bladder Assist Initial Score:  5 - Standby Prompting/Supervision or Set-up  Bladder Assist Current Score:  1 - Total  Assistance  Bladder Accidents in last 7 days:  0  PVR range for past 24-48 hours:  [110-582]  ()  Intermittent Catheterization:  ICP as needed for high PVR  Medications affecting bladder:  Hytrin    Time void schedule/voiding pattern:  Time void every 3 hours during the day and every 4 hours at night and Voiding every 2-4 hours  Interventions:  Increased time, Medication, Supervision, Verbal cueing, Emptying of device, Urinal, PRN intermittent catheterization, Commode, Brief  Effectiveness of bladder training:  Poor=PVR > 200 cc Last shift increased UOP w/voiding, less ICP required.    [place]:    Type:     Patient Lines/Drains/Airways Status    Active Catheter     None              Date placed:          Justification:    Diabetes:  Blood Sugar Frequency:  Before meals and at bedtime    Range of BS for last 48 hours:     Recent Labs     08/03/19  0750 08/03/19  1136 08/03/19  1746 08/03/19 2004 08/04/19  0755 08/04/19  1154 08/04/19  1712 08/04/19 2011   POCGLUCOSE 144* 138* 116* 142* 146* 152* 112* 114*     Scheduled diabetic medications:  metformin (GLUCOPHAGE)   Sliding scale usage in past 24 hours:  Yes  Total Short acting insulin in the past 24 hours:  Humalog 2 units  Total Long acting insulin in the past 24 hours:  None    Wound:         Patient Lines/Drains/Airways Status    Active Current Wounds     None                   Interventions:  Back Incision/Staples LUIGI  Effectiveness of intervention:  wound is improving     Wound Vac Location:  Not applicable    Sleep/wake cycle:   Average hours slept:  Sleeps less than 2 hours without waking  Sleep medication usage:  None    Patient/Family Training/Education:  Bladder Management/Training, Bowel Management/Training, Diabetes Management, General Self Care and Safe Transfers  Discharge Supply Recommendations:  Glucometer and Strips  Strengths: Alert and oriented, Able to follow instructions and Effective communication skills   Barriers:   Bladder retention,  Generalized weakness, Hypertension and Poor sleep pattern       Nursing Problems     Problem: Bowel/Gastric:     Goal: Normal bowel function is maintained or improved           Goal: Will not experience complications related to bowel motility                 Problem: Communication     Goal: The ability to communicate needs accurately and effectively will improve                 Problem: Discharge Barriers/Planning     Goal: Patient's continuum of care needs will be met                 Problem: IP BLADDER/VOIDING     Description:     Goal: LTG - Patient will utilize adaptive techniques/equipment to complete bladder elimination     Description:           Goal: STG - Patient demonstrates no accidents     Description:           Goal: STG - PATIENT WILL REMAIN CONTINENT.     Description:                 Problem: Infection     Goal: Will remain free from infection                 Problem: Knowledge Deficit     Goal: Knowledge of disease process/condition, treatment plan, diagnostic tests, and medications will improve           Goal: Knowledge of the prescribed therapeutic regimen will improve                 Problem: Pain Management     Goal: Pain level will decrease to patient's comfort goal                 Problem: Safety     Goal: Will remain free from injury           Goal: Will remain free from falls                 Problem: Skin Integrity     Goal: Risk for impaired skin integrity will decrease                 Problem: Urinary Elimination:     Description:     Goal: Ability to reestablish a normal urinary elimination pattern will improve     Description:                 Problem: Venous Thromboembolism (VTW)/Deep Vein Thrombosis (DVT) Prevention:     Goal: Patient will participate in Venous Thrombosis (VTE)/Deep Vein Thrombosis (DVT)Prevention Measures                        Long Term Goals:   At discharge patient will be able to function safely at home and in the community with support.    Section completed by:   Aleksandr Rose R.N.

## 2019-08-05 NOTE — CARE PLAN
Problem: Bowel/Gastric:  Goal: Normal bowel function is maintained or improved  Outcome: PROGRESSING AS EXPECTED  Intervention: Educate patient and significant other/support system about diet, fluid intake, medications and activity to promote bowel function  Note:   Med, soft bm w/bowel program prior to hs.  Tolerated well.     Problem: Urinary Elimination:  Goal: Ability to reestablish a normal urinary elimination pattern will improve  Outcome: PROGRESSING AS EXPECTED  Intervention: Encourage scheduled voiding  Note:   Pt using urinal, able to void on demand. PVR noted, occasional need for ICP based on parameters.

## 2019-08-05 NOTE — THERAPY
"Occupational Therapy  Daily Treatment     Patient Name: Ian Laird  Age:  69 y.o., Sex:  male  Medical Record #: [id number]  Today's Date: 8/5/2019     Precautions  Precautions: Fall Risk, Spinal / Back Precautions   Comments: T3-T7 spinal fusion    Safety   ADL Safety : Requires Supervision for Safety, Requires Physical Assist for Safety  Bathroom Safety: Requires Supervision for Safety, Requires Physical Assist for Safety    Subjective    \"I can tell I need to sit down already\"     Objective    W/c mobility - self-propel with BUE/BLE x 10 ft at a time before requiring a RB    Pt with R lats spasm and reports of pain - STM and pin/stretch provided which pt reported full relief and able to continue activities with no difficulties following manual therapy     08/05/19 1031   Balance   Standing Balance (Static) Fair -   Skilled Intervention Verbal Cuing   Comments standing at mat x3 (~1-2 min stands) to improve standing tolerance. Standing at mat supervision - rolling large therapyball back/forth for standing tolerance x 2 min. Sit<>stands supervision   Interdisciplinary Plan of Care Collaboration   Patient Position at End of Therapy Seated;Self Releasing Lap Belt Applied  (with RN student)   OT Total Time Spent   OT Individual Total Time Spent (Mins) 30   OT Charge Group   OT Therapy Activity 1   OT Therapeutic Exercise  1       Assessment    Pt completed standing and activity tolerance tasks to the best of his abilities but cont to fatigue quickly.     Plan    Cont overall strength/endurance and standing balance to improve ADL's and functional mobility    "

## 2019-08-05 NOTE — REHAB-COLLABORATIVE ONGOING IDT TEAM NOTE
Weekly Interdisciplinary Team Conference Note    Weekly Interdisciplinary Team Conference # 2  Date:  8/5/2019    Clinicians present and reporting at team conference include the following:   MD: Adelfo Aquino DO   RN:  Tashia Brown RN   PT:   Isak Tapia, PT, DPT  OT:  Eddie Luque, MS, OTR/L   ST:  Jenifer Llanes, MS, CCC-SLP  CM:  Darlene Lugo, FRANCIS  REC:  Lalo Macdonald, MELINAS  RT:  None  RPh:  Zen Alvarez yue  Other:   None  All reporting clinicians have a working knowledge of this patient's plan of care.    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[facility]) 07/25/2019   • Acute kidney injury ([facility]) 07/25/2019   • Vitamin D deficiency 07/25/2019   • Anemia 07/23/2019   • Low vitamin B12 level 07/19/2019   • Fall 07/18/2019   • PVD (peripheral vascular disease) ([facility]) 07/17/2019   • Type 2 diabetes mellitus without complication, without long-term current use of insulin ([facility]) 07/13/2015   • Essential hypertension 07/13/2015   • Dyslipidemia 07/13/2015   • Obesity 07/13/2015     Results     Procedure Component Value Ref Range Date/Time    ACCU-CHEK GLUCOSE [[id number]]  (Abnormal) Collected:  08/05/19 0750    Order Status:  Completed Lab Status:  Final result Updated:  08/05/19 0812     Glucose - Accu-Ck 149 65 - 99 mg/dL     ESTIMATED GFR [[id number]] Collected:  08/05/19 0543    Order Status:  Completed Lab Status:  Final result Updated:  08/05/19 0637     GFR If African American >60 >60 mL/min/1.73 m 2      GFR If Non African American  >60 >60 mL/min/1.73 m 2     Comp Metabolic Panel [[id number]]  (Abnormal) Collected:  08/05/19 0543    Order Status:  Completed Lab Status:  Final result Updated:  08/05/19 0637    Specimen:  Blood      Sodium 132 135 - 145 mmol/L      Potassium 3.9 3.6 - 5.5 mmol/L      Chloride 99 96 - 112 mmol/L      Co2 23 20 - 33 mmol/L      Anion Gap 10.0 0.0 - 11.9      Glucose 169 65 - 99 mg/dL      Bun 7 8 - 22 mg/dL      Creatinine 0.92 0.50 - 1.40 mg/dL      Calcium 8.6 8.5 - 10.5 mg/dL      AST(SGOT) 18 12 - 45 U/L      ALT(SGPT) 32 2 - 50 U/L      Alkaline Phosphatase 60 30 - 99 U/L      Total Bilirubin 0.4 0.1 - 1.5 mg/dL      Albumin 3.2 3.2 - 4.9 g/dL      Total Protein 6.9 6.0 - 8.2 g/dL      Globulin 3.7 1.9 - 3.5 g/dL      A-G Ratio 0.9 g/dL     MAGNESIUM [[id number]]  (Abnormal) Collected:  08/05/19 0543    Order Status:  Completed Lab Status:  Final result Updated:  08/05/19 0637    Specimen:  Blood      Magnesium 1.3 1.5 - 2.5 mg/dL     PHOSPHORUS [[id number]] Collected:  08/05/19 0543    Order Status:  Completed Lab Status:  Final result Updated:  08/05/19 0637    Specimen:  Blood      Phosphorus 3.9 2.5 - 4.5 mg/dL     CBC WITH DIFFERENTIAL [[id number]]  (Abnormal) Collected:  08/05/19 0543    Order Status:  Completed Lab Status:  Final result Updated:  08/05/19 0611    Specimen:  Blood      WBC 7.6 4.8 - 10.8 K/uL      RBC 2.40 4.70 - 6.10 M/uL      Hemoglobin 7.7 14.0 - 18.0 g/dL      Hematocrit 23.4 42.0 - 52.0 %      MCV 97.5 81.4 - 97.8 fL      MCH 32.1 27.0 - 33.0 pg      MCHC 32.9 33.7 - 35.3 g/dL      RDW 49.1 35.9 - 50.0 fL      Platelet Count 252 164 - 446 K/uL      MPV 9.1 9.0 - 12.9 fL      Neutrophils-Polys 72.60 44.00 - 72.00 %      Lymphocytes 10.00 22.00 - 41.00 %      Monocytes 12.80 0.00 - 13.40 %      Eosinophils 2.20 0.00 - 6.90 %      Basophils 0.40 0.00 - 1.80 %      Immature Granulocytes 2.00 0.00 - 0.90 %      Nucleated RBC 0.00 /100 WBC      Neutrophils (Absolute) 5.51 1.82 - 7.42 K/uL       Comment: Includes immature neutrophils, if present.        Lymphs (Absolute) 0.76 1.00 - 4.80 K/uL      Monos (Absolute) 0.97 0.00 - 0.85 K/uL      Eos (Absolute) 0.17 0.00 - 0.51 K/uL      Baso (Absolute) 0.03 0.00 - 0.12 K/uL      Immature Granulocytes (abs) 0.15 0.00 - 0.11 K/uL      NRBC (Absolute) 0.00 K/uL     ACCU-CHEK GLUCOSE [[id number]]  (Abnormal) Collected:  08/04/19 2011    Order Status:  Completed Lab Status:  Final result Updated:  08/04/19 2024     Glucose - Accu-Ck 114 65 - 99 mg/dL     ACCU-CHEK GLUCOSE [[id number]]  (Abnormal) Collected:  08/04/19 1712    Order Status:  Completed Lab Status:  Final result Updated:  08/04/19 1757     Glucose - Accu-Ck 112 65 - 99 mg/dL     ACCU-CHEK GLUCOSE [[id number]]  (Abnormal) Collected:  08/04/19 1154    Order Status:  Completed Lab Status:  Final result Updated:  08/04/19 1245     Glucose - Accu-Ck 152 65 - 99 mg/dL         Nursing  Diet/Nutrition:  Regular, Diabetic and Thin Liquids  Medication Administration:  Whole with Liquid Wash  % consumed at meals in last 24 hours:  Consumed 25-50  % of meals per documentation.  Meal/Snack Consumption for the past 24 hrs:   Oral Nutrition   08/04/19 2100 Snack;0% Consumed   08/04/19 1800 Dinner;Between 25-50% Consumed   08/04/19 0800 Breakfast;Between 25-50% Consumed     Snack schedule:  Other:Reports, does not like cheese & crackers for HS snack.  Supplement: n/a  Appetite:  Fair  Fluid Intake/Output in past 24 hours: In: 920 [P.O.:920]  Out: 2350   Admit Weight:  Weight: 100.8 kg (222 lb 4.8 oz)  Weight Last 7 Days   [97.5 kg (215 lb)] 97.5 kg (215 lb) (08/04 1100)    Pain Issues:    Location:  Back (08/04 0800)  Mid (08/04 0800)         Severity:  Denies   Scheduled pain medications:  gabapentin (NEURONTIN)      PRN pain medications used in last 24 hours:  None   Non Pharmacologic Interventions:  distraction, environmental changes, repositioned and rest  Effectiveness of pain management plan:  good=patient states  acceptable comfort after interventions    Bowel:    Bowel Assist Initial Score:  2 - Max Assistance  Bowel Assist Current Score:  1 - Total Assistance  Bowl Accidents in last 7 days:  0  Last bowel movement: 08/05/19  Stool Description: Large, Soft     Usual bowel pattern:  daily  Scheduled bowel medications:  docusate sodium (COLACE), senna-docusate (PERICOLACE or SENOKOT S)  and bisacodyl (DULCOLAX)  PRN bowel medications used in last 24 hours:  None  Nursing Interventions:  Increased time, Scheduled medication, Supervision, Verbal cueing, Set-up, Positioning on commode/toilet, Brief, Digital stimulation, Suppository  Effectiveness of bowel program:   fair=occasional unpredictable, incontinent emptying of bowel (less than 1 time day)  Bladder:    Bladder Assist Initial Score:  5 - Standby Prompting/Supervision or Set-up  Bladder Assist Current Score:  1 - Total Assistance  Bladder Accidents in last 7 days:  0  PVR range for past 24-48 hours:  [110-582]  ()  Intermittent Catheterization:  ICP as needed for high PVR  Medications affecting bladder:  Hytrin    Time void schedule/voiding pattern:  Time void every 3 hours during the day and every 4 hours at night and Voiding every 2-4 hours  Interventions:  Increased time, Medication, Supervision, Verbal cueing, Emptying of device, Urinal, PRN intermittent catheterization, Commode, Brief  Effectiveness of bladder training:  Poor=PVR > 200 cc Last shift increased UOP w/voiding, less ICP required.    [place]:    Type:     Patient Lines/Drains/Airways Status    Active Catheter     None              Date placed:          Justification:    Diabetes:  Blood Sugar Frequency:  Before meals and at bedtime    Range of BS for last 48 hours:     Recent Labs     08/03/19  0750 08/03/19  1136 08/03/19  1746 08/03/19  2004 08/04/19  0755 08/04/19  1154 08/04/19  1712 08/04/19 2011   POCGLUCOSE 144* 138* 116* 142* 146* 152* 112* 114*     Scheduled diabetic medications:  metformin  (GLUCOPHAGE)   Sliding scale usage in past 24 hours:  Yes  Total Short acting insulin in the past 24 hours:  Humalog 2 units  Total Long acting insulin in the past 24 hours:  None    Wound:         Patient Lines/Drains/Airways Status    Active Current Wounds     None                   Interventions:  Back Incision/Staples LUIGI  Effectiveness of intervention:  wound is improving     Wound Vac Location:  Not applicable    Sleep/wake cycle:   Average hours slept:  Sleeps less than 2 hours without waking  Sleep medication usage:  None    Patient/Family Training/Education:  Bladder Management/Training, Bowel Management/Training, Diabetes Management, General Self Care and Safe Transfers  Discharge Supply Recommendations:  Glucometer and Strips  Strengths: Alert and oriented, Able to follow instructions and Effective communication skills   Barriers:   Bladder retention, Generalized weakness, Hypertension and Poor sleep pattern       Nursing Problems     Problem: Bowel/Gastric:     Goal: Normal bowel function is maintained or improved           Goal: Will not experience complications related to bowel motility                 Problem: Communication     Goal: The ability to communicate needs accurately and effectively will improve                 Problem: Discharge Barriers/Planning     Goal: Patient's continuum of care needs will be met                 Problem: IP BLADDER/VOIDING     Description:     Goal: LTG - Patient will utilize adaptive techniques/equipment to complete bladder elimination     Description:           Goal: STG - Patient demonstrates no accidents     Description:           Goal: STG - PATIENT WILL REMAIN CONTINENT.     Description:                 Problem: Infection     Goal: Will remain free from infection                 Problem: Knowledge Deficit     Goal: Knowledge of disease process/condition, treatment plan, diagnostic tests, and medications will improve           Goal: Knowledge of the prescribed  "therapeutic regimen will improve                 Problem: Pain Management     Goal: Pain level will decrease to patient's comfort goal                 Problem: Safety     Goal: Will remain free from injury           Goal: Will remain free from falls                 Problem: Skin Integrity     Goal: Risk for impaired skin integrity will decrease                 Problem: Urinary Elimination:     Description:     Goal: Ability to reestablish a normal urinary elimination pattern will improve     Description:                 Problem: Venous Thromboembolism (VTW)/Deep Vein Thrombosis (DVT) Prevention:     Goal: Patient will participate in Venous Thrombosis (VTE)/Deep Vein Thrombosis (DVT)Prevention Measures                        Long Term Goals:   At discharge patient will be able to function safely at home and in the community with support.    Section completed by:  Aleksandr Rose R.N.           Mobility  Bed mobility:   CGA - mod A  Bed /Chair/Wheelchair Transfer Initial:  3 - Moderate Assistance  Bed /Chair/Wheelchair Transfer Current:  3 - Moderate Assistance   Bed/Chair/Wheelchair Transfer Description:  Increased time, Set-up of equipment(Mod A supine > sit, SBA sit > supine, min A scooting, CGA squat pivot bed <> WC)  Walk Initial:  0 - Not tested,unsafe activity  Walk Current:  1 - Total Assistance   Walk Description:  Extra time, Verbal cueing, Requires incidental assist, Requires wheelchair follow(12 ft x3 in // bars with CGA and WC follow for safety)  Wheelchair Initial:  2 - Max Assistance  Wheelchair Current:  2 - Max Assistance   Wheelchair Description:  (35ft x1, 40ft x 4, B UE, level terrain)  Stairs Initial:  0 - Not tested,unsafe activity  Stairs Current: 2 - Max Assistance   Stairs Description: (4 x 1 6\" steps, B UE, step to, CGA)  Patient/Family Training/Education:  Ongoing, transfer safety  DME/DC Recommendations:  TBD  Strengths:  Able to follow instructions, Independent PLOF, Motivated for self " care and independence, Pleasant and cooperative and Willingly participates in therapeutic activities  Barriers:   Decreased endurance, Generalized weakness, Limited mobility and Other: General c/o pain/fatigue - difficult for pt to accurately express exactly what his barriers are, difficult to motivate to push into exercise zone of fatigue  # of short term goals set= 4  # of short term goals met=1 (3 partially met)  Physical Therapy Problems     Problem: Mobility     Dates: Start: 07/26/19       Goal: STG-Within one week, patient will propel wheelchair community     Dates: Start: 07/26/19       Description: 1) Individualized goal:  150ft x 1, B UE, mod I  2) Interventions:  PT Group Therapy, PT Gait Training, PT Therapeutic Exercises, PT Neuro Re-Ed/Balance, PT Therapeutic Activity, PT Manual Therapy and PT Evaluation       Note:     Goal Note filed on 08/05/19 1047 by Darron Tapia, PT    65 ft                        Problem: Mobility Transfers     Dates: Start: 07/26/19       Goal: STG-Within one week, patient will perform bed mobility     Dates: Start: 07/26/19       Description: 1) Individualized goal: with bed rail as needed, SPV  2) Interventions:  PT Group Therapy, PT Gait Training, PT Therapeutic Exercises, PT Neuro Re-Ed/Balance, PT Therapeutic Activity, PT Manual Therapy and PT Evaluation     Note:     Goal Note filed on 08/05/19 1047 by Darron Tapia, PT    Min - mod A                  Goal: STG-Within one week, patient will transfer bed to chair     Dates: Start: 07/26/19       Description: 1) Individualized goal: SBA with LRAD  2) Interventions:  PT Group Therapy, PT Gait Training, PT Therapeutic Exercises, PT Neuro Re-Ed/Balance, PT Therapeutic Activity, PT Manual Therapy and PT Evaluation       Note:     Goal Note filed on 08/05/19 1047 by Darron Tapia, PT    Min - mod A                        Problem: PT-Long Term Goals     Dates: Start: 07/26/19       Goal: LTG-By discharge,  patient will ambulate     Dates: Start: 07/26/19       Description: 1) Individualized goal:  50ft x1 SPV LRAD  2) Interventions:  PT Group Therapy, PT Gait Training, PT Therapeutic Exercises, PT Neuro Re-Ed/Balance, PT Therapeutic Activity, PT Manual Therapy and PT Evaluation           Goal: LTG-By discharge, patient will perform home exercise program     Dates: Start: 07/26/19       Description: 1) Individualized goal:  For balance and B LE strength/ endurance, mod I  2) Interventions:  PT Group Therapy, PT Gait Training, PT Therapeutic Exercises, PT Neuro Re-Ed/Balance, PT Therapeutic Activity, PT Manual Therapy and PT Evaluation                    Goal: LTG-By discharge, patient will     Dates: Start: 07/26/19       Description: 1) Individualized goal:  Up/down 2 stairs, Isabel to mimic home environment   2) Interventions:  PT Group Therapy, PT Gait Training, PT Therapeutic Exercises, PT Neuro Re-Ed/Balance, PT Therapeutic Activity, PT Manual Therapy and PT Evaluation             Goal: LTG-By discharge, patient will     Dates: Start: 07/26/19       Description: 1) Individualized goal: perform car transfer with SPV, LRAD  2) Interventions:  PT Group Therapy, PT Gait Training, PT Therapeutic Exercises, PT Neuro Re-Ed/Balance, PT Therapeutic Activity, PT Manual Therapy and PT Evaluation                           Section completed by:  Darron Tapia, PT    Activities of Daily Living  Eating Initial:  5 - Standby Prompting/Supervision or Set-up  Eating Current:  7 - Independent   Eating Description:  Set-up of equipment or meal/tube feeding  Grooming Initial:  5 - Standby Prompting/Supervision or Set-up  Grooming Current:  7 - Independent   Grooming Description:  (seated at sink)  Bathing Initial:  3 - Moderate Assistance  Bathing Current:  4 - Minimal Assistance   Bathing Description:  Grab bar, Tub bench, Long handled bath tool, Hand held shower(assist to wash / dry buttocks all other bathing tasks  setup/ SBA  )  Upper Body Dressing Initial:  5 - Standby Prompting/Supervision or Set-up  Upper Body Dressing Current:  5 - Standby Prompting/Supervision or Set-up   Upper Body Dressing Description:  (supervision doff/ don pull over shirt  retrieve from closet at w/c level )  Lower Body Dressing Initial:  2 - Max Assistance  Lower Body Dressing Current:  4 - Minimal Assistance   Lower Body Dressing Description:  4 - Minimal Assistance  Toileting Initial:  3 - Moderate Assistance  Toileting Current:  2 - Max Assistance   Toileting Description:  Adaptive equipment, Grab bar, Increased time, Supervision for safety, Verbal cueing, Set-up of equipment, Initial preparation for task  Toilet Transfer Initial:  3 - Moderate Assistance  Toilet Transfer Current:  5 - Standby Prompting/Supervision or Set-up   Toilet Transfer Description:  5 - Standby Prompting/Supervision or Set-up  Tub / Shower Transfer Initial:  3 - Moderate Assistance  Tub / Shower Transfer Current:  5 - Standby Prompting/Supervision or Set-up   Tub / Shower Transfer Description:  Grab bar( SBA using grab bar )  IADL:  Not yet attempted   Family Training/Education:  Ongoing with spouse   DME/DC Recommendations:  Patient appears to have all needed DME     Strengths:  Effective communication skills, Manages pain appropriately, Motivated for self care and independence, Pleasant and cooperative, Supportive family and Willingly participates in therapeutic activities  Barriers:  Decreased endurance, Generalized weakness, Limited mobility, Poor activity tolerance and Poor balance     # of short term goals set= 4    # of short term goals met= 0   Occupational Therapy Goals     Problem: Bathing     Dates: Start: 07/26/19       Goal: STG-Within one week, patient will bathe     Dates: Start: 07/26/19       Description: 1) Individualized Goal:  Supervision with use of AE/DME prn to maintain spinal prec.  2) Interventions:  OT Group Therapy, OT Self Care/ADL, OT Community  Reintegration, OT Manual Ther Technique, OT Neuro Re-Ed/Balance, OT Sensory Int Techniques, OT Therapeutic Activity, OT Evaluation and OT Therapeutic Exercise       Note:     Goal Note filed on 08/05/19 0821 by Eddie Luque, MS,OTR/L    Min A                        Problem: Dressing     Dates: Start: 07/26/19       Goal: STG-Within one week, patient will dress LB     Dates: Start: 07/26/19       Description: 1) Individualized Goal:  supervision with use of AE prn to maintain spinal prec.  2) Interventions:  OT Group Therapy, OT Self Care/ADL, OT Community Reintegration, OT Manual Ther Technique, OT Neuro Re-Ed/Balance, OT Sensory Int Techniques, OT Therapeutic Activity, OT Evaluation and OT Therapeutic Exercise       Note:     Goal Note filed on 08/05/19 0821 by Eddie Luque, MS,OTR/L    Min A with AE, cues and increased time                        Problem: Functional Transfers     Dates: Start: 07/26/19       Goal: STG-Within one week, patient will     Dates: Start: 07/26/19       Description: 1) Individualized Goal:  Transfer to toilet and shower at supervision level with AE/DME prn.  2) Interventions:  OT Group Therapy, OT Self Care/ADL, OT Community Reintegration, OT Manual Ther Technique, OT Neuro Re-Ed/Balance, OT Sensory Int Techniques, OT Therapeutic Activity, OT Evaluation and OT Therapeutic Exercise       Note:     Goal Note filed on 08/05/19 0821 by Eddie Luque, MS,OTR/L    SBA with grab bar                        Problem: IADL's     Dates: Start: 07/26/19       Goal: STG-Within one week, patient will access kitchen area     Dates: Start: 07/26/19       Description: 1) Individualized Goal:  Supervision with AE/DME prn.  2) Interventions:  OT Group Therapy, OT Self Care/ADL, OT Community Reintegration, OT Manual Ther Technique, OT Neuro Re-Ed/Balance, OT Sensory Int Techniques, OT Therapeutic Activity, OT Evaluation and OT Therapeutic Exercise       Note:     Goal Note filed on 08/05/19  0821 by Eddie Luque, MS,OTR/L      Not attempted yet in therapy                          Problem: OT Long Term Goals     Dates: Start: 07/26/19       Goal: LTG-By discharge, patient will complete basic self care tasks     Dates: Start: 07/26/19       Description: 1) Individualized Goal:  Mod I with use of AE/AD/DME prn.  2) Interventions:  OT Group Therapy, OT Self Care/ADL, OT Community Reintegration, OT Manual Ther Technique, OT Neuro Re-Ed/Balance, OT Sensory Int Techniques, OT Therapeutic Activity, OT Evaluation and OT Therapeutic Exercise             Goal: LTG-By discharge, patient will perform bathroom transfers     Dates: Start: 07/26/19       Description: 1) Individualized Goal:  Mod I with use of AD/DME prn.  2) Interventions:  OT Group Therapy, OT Self Care/ADL, OT Community Reintegration, OT Manual Ther Technique, OT Neuro Re-Ed/Balance, OT Sensory Int Techniques, OT Therapeutic Activity, OT Evaluation and OT Therapeutic Exercise             Goal: LTG-By discharge, patient will complete basic home management     Dates: Start: 07/26/19       Description: 1) Individualized Goal:  Mod I with use of AE/AD/DME prn.  2) Interventions:  OT Group Therapy, OT Self Care/ADL, OT Community Reintegration, OT Manual Ther Technique, OT Neuro Re-Ed/Balance, OT Sensory Int Techniques, OT Therapeutic Activity, OT Evaluation and OT Therapeutic Exercise                         Section completed by:  Eddie Luque, MS,OTR/L    Cognitive Linquistic Functions  Comprehension Initial:  4 - Minimal Assistance  Comprehension Current:  6 - Modified Independent   Comprehension Description:  Glasses, Verbal cues  Expression Initial:  7 - Independent  Expression Current:  7 - Independent   Expression Description:     Social Interaction Initial:  6 - Modified Independent  Social Interaction Current:  7 - Independent   Social Interaction Description:  Medication  Problem Solving Initial:  5 - Standby Prompting/Supervision or  Set-up  Problem Solving Current:  5 - Standby Prompting/Supervision or Set-up   Problem Solving Description:  Verbal cueing, Therapy schedule, Increased time  Memory Initial:  5 - Standby Prompting/Supervision or Set-up  Memory Current:  4 - Minimal Assistance   Memory Description:  Verbal cueing, Therapy schedule  Executive Functioning / Organization Initial:  Moderate (3)  Executive Functioning / Organization Current:  Moderate (3)   Executive Functioning Desciption: Pt requires extra time and cues for attention to details.   Swallowing  Swallowing Status: SLP not following for dysphagia.   Orders Placed This Encounter   Procedures   • Diet Order Diabetic     Standing Status:   Standing     Number of Occurrences:   1     Order Specific Question:   Diet:     Answer:   Diabetic [3]     Behavior Modification Plan  Allow for rest time, Keep instructions simple/concrete, Give clear feedback, Set clear goals, Provide reasonable choices, Decrease the chance of failure by offering activities that are within the patient's abilities and Analyze tasks (break down into smaller steps)  Assistive Technology  Low/Impaired vision equipment and Low tech: Calendar, planner, schedule, alarms/timers, pill organizer, post-it notes, lists  Family Training/Education:  Ongoing with spouse  DC Recommendations: Pt would benefit from ongoing ST services upon d/c.   Strengths:  Alert and oriented, Effective communication skills, Independent PLOF, Making steady progress towards goals, Motivated for self care and independence, Pleasant and cooperative, Supportive family and Willingly participates in therapeutic activities  Barriers:  Decreased endurance, Generalized weakness, Orthostatic hypotension and Other: complex attention, STM  # of short term goals set=2  # of short term goals met=1  Speech Therapy Problems     Problem: Problem Solving STGs     Dates: Start: 07/26/19       Goal: STG-Within one week, patient will     Dates: Start:  07/26/19       Description: 1) Individualized goal:  Perform complex attention tasks with 90% accuracy and clinician supervision  2) Interventions: SLP Self Care / ADL Training , SLP Cognitive Skill Development and SLP Group Treatme       Note:     Goal Note filed on 07/29/19 0819 by Marion Hicks MS,CCC-SLP    Goal: STG-Within one week, patient will  Outcome: NOT MET  Mod A needed for simple attention tasks.                        Problem: Speech/Swallowing LTGs     Dates: Start: 07/26/19       Goal: LTG-By discharge, patient will solve complex problem     Dates: Start: 07/26/19       Description: 1) Individualized goal:  For home and community with 90% accuracy and MOD I for meta-cognitive strategies  2) Interventions:  SLP Self Care / ADL Training , SLP Cognitive Skill Development and SLP Group Treatment             Goal: LTG-By discharge, patient will     Dates: Start: 07/26/19       Description: 1) Individualized goal:  Perform functional prospective memory tasks with mod I to achieve 90% accuracy.  2) Interventions:  SLP Self Care / ADL Training , SLP Cognitive Skill Development and SLP Group Treatme                          Section completed by:  Jenifer Llanes MS,[facility]-SLP    Recreation/Community     Leisure Competence Measure  Leisure Awareness: Cueing Assist(limited interests in learning new leisure)  Leisure Attitude: Independent  Leisure Skills: Independent  Cultural / Social Behaviors: Independent  Interpersonal Skills: Independent  Community Integration Skills: Independent  Social Contact: Independent  Community Participation: Independent    Strengths:  Able to follow instructions, Motivated for self care and independence, Pleasant and cooperative, Supportive family and Willingly participates in therapeutic activities  Barriers:  Confused, cognitively impaired  # of short term goals set=2  # of short term goals met=2    He will work on the planning of an outing for the future as well as his standing  balance and endurance. He became frustrated during a cognitive activity to the point where we wanted to give up on the activity. He was able to be redirected with encouragement.      Recreation Therapy Problems     Problem: Recreation Therapy     Dates: Start: 07/29/19       Goal: STG-Within one week, patient will demonstrate a standing tolerance     Dates: Start: 07/29/19       Description: By standing while performing an activity at the standing mat 25% of the time taking breaks as needed.            Goal: STG-Within one week, patient will actively engage in planning of community re-integration outing     Dates: Start: 07/29/19             Goal: LTG-By discharge, patient will demonstrate a standing tolerance     Dates: Start: 07/29/19       Description: Will remain standing while performing an activity 50% of the time taking breaks as needed.            Goal: LTG-By discharge, patient will participate in a community re-integration outing     Dates: Start: 07/29/19                         Section completed by:  MELINA AlejandreS       REHAB-Pharmacy IDT Team Note by Zne Alvarez RPH at 8/2/2019 11:29 AM  Version 1 of 1    Author:  Zen Alvarez RPH Service:  -- Author Type:  Pharmacist    Filed:  8/2/2019 11:31 AM Date of Service:  8/2/2019 11:29 AM Status:  Signed    :  Zen Alvarez RPH (Pharmacist)         Pharmacy   Pharmacy  Antibiotics/Antifungals/Antivirals:  Medication:      Active Orders (From admission, onward)    Ordered     Ordering Provider       Fri Aug 2, 2019  7:39 AM    08/02/19 0739  cefTRIAXone (ROCEPHIN) 1 g in  mL IVPB  EVERY 24 HOURS      Torres Gray M.D.        Route:         IV  Stop Date: 8/7/2019  Reason: Not noted  Antihypertensives/Cardiac:  Medication:    Orders (72h ago, onward)     Start     Ordered    07/31/19 1358  midodrine (PROAMATINE) tablet 5 mg  EVERY 4 HOURS PRN      07/31/19 1358    07/30/19 1600  midodrine (PROAMATINE) tablet 5 mg  DAILY       07/30/19 1533    07/26/19 0900  amLODIPine (NORVASC) tablet 10 mg  DAILY      07/25/19 1648    07/26/19 0900  lisinopril (PRINIVIL) tablet 40 mg  DAILY      07/25/19 1648    07/26/19 0900  spironolactone (ALDACTONE) tablet 25 mg  DAILY      07/25/19 1648    07/25/19 2100  atorvastatin (LIPITOR) tablet 20 mg  NIGHTLY      07/25/19 1648    07/25/19 2100  verapamil ER (CALAN-SR) tablet 180 mg  2 TIMES DAILY      07/25/19 1648    07/25/19 1715  terazosin (HYTRIN) capsule 5 mg  2 TIMES DAILY      07/25/19 1648              Patient Vitals for the past 24 hrs:   BP Pulse   08/02/19 0926 118/50 --   08/02/19 0810 136/50 --   08/02/19 0645 130/50 87   08/02/19 0400 131/60 74   08/01/19 1900 104/56 75   08/01/19 1305 116/64 76     Anticoagulation:  Medication: Lovenox                              Other key medications: A review of the medication list reveals no issues at this time. Patient is currently on antihypertensive(s). Recommend home blood pressure monitoring/recording if antihypertensive(s) regimen(s) continue. Patient is currently on diabetic medication(s) and/or Insulin(s). Recommend home blood glucose monitoring/recording if these regimen(s) continue.    Section completed by: Zen Alvarez [facility][[email or web address]]     Attribution Key     AW.1 - Zen Alvarez [facility] on 8/2/2019 11:29 AM                     DC Planning  DC destination/dispostion:  Patient lives in a single level home with his wife.  He has 2 entry steps.     Referrals: DMV placard     DC Needs: Patient has a 4ww, cane, w/c, shower chair and grab bars.  Referral made to [facility] for PT/OT/RN. Follow up appt  He will need follow up with PCP Dr. Latham and neurosurgery.     Barriers to discharge:        Strengths: good support for home.     Section completed by:  Kina León lsw, [facility]     Team discussed HH vs outpatient therapies at discharge. Will depend on pt's progression. Pt will also require ST at discharge.     Physician Summary  Adelfo LUCIO  DO Kenia participated and led team conference discussion.

## 2019-08-05 NOTE — THERAPY
Occupational Therapy  Daily Treatment     Patient Name: Ian Laird  Age:  69 y.o., Sex:  male  Medical Record #: [id number]  Today's Date: 2019     Precautions  Precautions: (P) Fall Risk, Spinal / Back Precautions   Comments: T3-T7 spinal fusion    Safety   ADL Safety : Requires Supervision for Safety, Requires Physical Assist for Safety  Bathroom Safety: Requires Supervision for Safety, Requires Physical Assist for Safety    Subjective    Patient agreeable to shower. Spouse present and happy to receive more training.       Objective       19 1231   Precautions   Precautions Fall Risk;Spinal / Back Precautions    Interdisciplinary Plan of Care Collaboration   IDT Collaboration with  Family / Caregiver   Patient Position at End of Therapy Seated;Self Releasing Lap Belt Applied;Call Light within Reach;Family / Friend in Room   Collaboration Comments spouse present and participated in pt's ADL routine for family training   OT Total Time Spent   OT Individual Total Time Spent (Mins) 60   OT Charge Group   OT Self Care / ADL 4       FIM Grooming Score:  7 - Independent  Grooming Description:       FIM Bathing Score:  5 - Standby Prompting/Supervision or Set-up  Bathing Description:       FIM Upper Body Dressin - Minimal Assistance  Upper Body Dressing Description:  Set-up of equipment(assist w/ 1/8 tasks to don/doff pullover shirt (assist to pull down))    FIM Lower Body Dressing Score:  5 - Standby Prompting/Supervsion or Set-up  Lower Body Dressing Description:  Reacher, Dressing stick, Supervision for safety, Verbal cueing, Set-up of equipment, Initial preparation for task, Application of orthotic or brace(setup/sba with cues and AE to don/doff pullups, pants, slip on shoes; assist w/ TEDs)    FIM Tub/Shower Transfers Score:  5 - Standby Prompting/Supervision or Set-up  Tub/Shower Transfers Description:  Adaptive equipment, Grab bar, Supervision for safety, Verbal cueing, Set-up of equipment,  Initial preparation for task(setup/sba with grab bar and bench)      Assessment    Patient demonstrated improvement in ADL FIMs during routine this afternoon.  Required minimal verbal cues.  Spouse happy to be participating in further training.    Plan    Cont overall strength/endurance and standing balance to improve ADL's and functional mobility

## 2019-08-05 NOTE — REHAB-OT IDT TEAM NOTE
Occupational Therapy   Activities of Daily Living  Eating Initial:  5 - Standby Prompting/Supervision or Set-up  Eating Current:  7 - Independent   Eating Description:  Set-up of equipment or meal/tube feeding  Grooming Initial:  5 - Standby Prompting/Supervision or Set-up  Grooming Current:  7 - Independent   Grooming Description:  (seated at sink)  Bathing Initial:  3 - Moderate Assistance  Bathing Current:  4 - Minimal Assistance   Bathing Description:  Grab bar, Tub bench, Long handled bath tool, Hand held shower(assist to wash / dry buttocks all other bathing tasks  setup/ SBA )  Upper Body Dressing Initial:  5 - Standby Prompting/Supervision or Set-up  Upper Body Dressing Current:  5 - Standby Prompting/Supervision or Set-up   Upper Body Dressing Description:  (supervision doff/ don pull over shirt  retrieve from closet at w/c level )  Lower Body Dressing Initial:  2 - Max Assistance  Lower Body Dressing Current:  4 - Minimal Assistance   Lower Body Dressing Description:  4 - Minimal Assistance  Toileting Initial:  3 - Moderate Assistance  Toileting Current:  2 - Max Assistance   Toileting Description:  Adaptive equipment, Grab bar, Increased time, Supervision for safety, Verbal cueing, Set-up of equipment, Initial preparation for task  Toilet Transfer Initial:  3 - Moderate Assistance  Toilet Transfer Current:  5 - Standby Prompting/Supervision or Set-up   Toilet Transfer Description:  5 - Standby Prompting/Supervision or Set-up  Tub / Shower Transfer Initial:  3 - Moderate Assistance  Tub / Shower Transfer Current:  5 - Standby Prompting/Supervision or Set-up   Tub / Shower Transfer Description:  Grab bar( SBA using grab bar )  IADL:  Not yet attempted   Family Training/Education:  Ongoing with spouse   DME/DC Recommendations:  Patient appears to have all needed DME     Strengths:  Effective communication skills, Manages pain appropriately, Motivated for self care and independence, Pleasant and cooperative,  Supportive family and Willingly participates in therapeutic activities  Barriers:  Decreased endurance, Generalized weakness, Limited mobility, Poor activity tolerance and Poor balance     # of short term goals set= 4    # of short term goals met= 0   Occupational Therapy Goals     Problem: Bathing     Dates: Start: 07/26/19       Goal: STG-Within one week, patient will bathe     Dates: Start: 07/26/19       Description: 1) Individualized Goal:  Supervision with use of AE/DME prn to maintain spinal prec.  2) Interventions:  OT Group Therapy, OT Self Care/ADL, OT Community Reintegration, OT Manual Ther Technique, OT Neuro Re-Ed/Balance, OT Sensory Int Techniques, OT Therapeutic Activity, OT Evaluation and OT Therapeutic Exercise       Note:     Goal Note filed on 08/05/19 0821 by Eddie Luque MS,OTR/L    Min A                        Problem: Dressing     Dates: Start: 07/26/19       Goal: STG-Within one week, patient will dress LB     Dates: Start: 07/26/19       Description: 1) Individualized Goal:  supervision with use of AE prn to maintain spinal prec.  2) Interventions:  OT Group Therapy, OT Self Care/ADL, OT Community Reintegration, OT Manual Ther Technique, OT Neuro Re-Ed/Balance, OT Sensory Int Techniques, OT Therapeutic Activity, OT Evaluation and OT Therapeutic Exercise       Note:     Goal Note filed on 08/05/19 0821 by Eddie Luque MS,OTR/L    Min A with AE, cues and increased time                        Problem: Functional Transfers     Dates: Start: 07/26/19       Goal: STG-Within one week, patient will     Dates: Start: 07/26/19       Description: 1) Individualized Goal:  Transfer to toilet and shower at supervision level with AE/DME prn.  2) Interventions:  OT Group Therapy, OT Self Care/ADL, OT Community Reintegration, OT Manual Ther Technique, OT Neuro Re-Ed/Balance, OT Sensory Int Techniques, OT Therapeutic Activity, OT Evaluation and OT Therapeutic Exercise       Note:     Goal Note  filed on 08/05/19 0821 by Eddie Luque, MS,OTR/L    SBA with grab bar                        Problem: IADL's     Dates: Start: 07/26/19       Goal: STG-Within one week, patient will access kitchen area     Dates: Start: 07/26/19       Description: 1) Individualized Goal:  Supervision with AE/DME prn.  2) Interventions:  OT Group Therapy, OT Self Care/ADL, OT Community Reintegration, OT Manual Ther Technique, OT Neuro Re-Ed/Balance, OT Sensory Int Techniques, OT Therapeutic Activity, OT Evaluation and OT Therapeutic Exercise       Note:     Goal Note filed on 08/05/19 0821 by Eddie Luque, MS,OTR/L      Not attempted yet in therapy                          Problem: OT Long Term Goals     Dates: Start: 07/26/19       Goal: LTG-By discharge, patient will complete basic self care tasks     Dates: Start: 07/26/19       Description: 1) Individualized Goal:  Mod I with use of AE/AD/DME prn.  2) Interventions:  OT Group Therapy, OT Self Care/ADL, OT Community Reintegration, OT Manual Ther Technique, OT Neuro Re-Ed/Balance, OT Sensory Int Techniques, OT Therapeutic Activity, OT Evaluation and OT Therapeutic Exercise             Goal: LTG-By discharge, patient will perform bathroom transfers     Dates: Start: 07/26/19       Description: 1) Individualized Goal:  Mod I with use of AD/DME prn.  2) Interventions:  OT Group Therapy, OT Self Care/ADL, OT Community Reintegration, OT Manual Ther Technique, OT Neuro Re-Ed/Balance, OT Sensory Int Techniques, OT Therapeutic Activity, OT Evaluation and OT Therapeutic Exercise             Goal: LTG-By discharge, patient will complete basic home management     Dates: Start: 07/26/19       Description: 1) Individualized Goal:  Mod I with use of AE/AD/DME prn.  2) Interventions:  OT Group Therapy, OT Self Care/ADL, OT Community Reintegration, OT Manual Ther Technique, OT Neuro Re-Ed/Balance, OT Sensory Int Techniques, OT Therapeutic Activity, OT Evaluation and OT Therapeutic  Exercise                         Section completed by:  Eddie Luque MS,OTR/L

## 2019-08-05 NOTE — PROGRESS NOTES
"Rehab Progress Note     Encounter Date: 8/5/2019    CC: LE weakness    Interval Events (Subjective)    He is having difficulty with catheterization, resulting pain, having episodes of urinary incontinence.  He has not tried to catheterize himself yet.  He does note intact sensation      Bowel: Large soft BM this morning, incontinence, medium soft with BTP  Bladder: 100- 500 cc    ROS:  Gen: No fatigue, confusion, significant weight loss  Eyes: no blurry vision, double vision or pain in eyes  ENT: no changes in hearing, runny nose, nose bleeds, sinus pain  CV: No CP, palpitations,   Lungs: no shortness of breath, changes in secretions, changes in cough, pain with coughing  Abd: No abd pain, nausea, vomiting, pain with eating  : no blood in urine, suprapubic pain  Ext: No swelling in legs, asymmetric atrophy  Neuro: no changes in strength or sensation in last 24 hours  Skin: no new ulcers/skin breakdown appreciated by patient  Mood: No changes in mood today, increase in depression or anxiety  Heme: no bruising, or bleeding  Rest of 14 point review of systems is negative      Objective:  Vitals: /63   Pulse 86   Temp 36.7 °C (98 °F)   Resp 18   Ht 1.778 m (5' 10\")   Wt 97.5 kg (215 lb)   SpO2 93%   Gen: NAD, Body mass index is 30.85 kg/m².  HEENT: NC/AT, PERRLA, moist mucous membranes  Cardio: RRR, no mumurs  Pulm: CTAB, with normal respiratory effort  Abd: Soft NTND, active bowel sounds,  Ext: No peripheral edema. No calf tenderness. No clubbing/cyanosis. +dorsal pedalis pulses bilaterally.        Motor Exam Upper Extremities   ? Myotome R L   Shoulder flexion C5 5 5   Elbow flexion C5 5 5   Wrist extension C6 5 5   Elbow extension C7 5 5   Finger flexion C8 5 5   Finger abduction T1 5 5     Motor Exam Lower Extremities    ? Myotome R L   Hip flexion L2 4 4   Knee extension L3 5 4   Ankle dorsiflexion L4 4 4   Toe extension L5 5 4   Ankle plantarflexion S1 4 4     Tone: No spasticity present, no clonus " present    Recent Results (from the past 72 hour(s))   ACCU-CHEK GLUCOSE    Collection Time: 08/02/19 11:45 AM   Result Value Ref Range    Glucose - Accu-Ck 143 (H) 65 - 99 mg/dL   ACCU-CHEK GLUCOSE    Collection Time: 08/02/19  4:50 PM   Result Value Ref Range    Glucose - Accu-Ck 162 (H) 65 - 99 mg/dL   ACCU-CHEK GLUCOSE    Collection Time: 08/02/19  9:38 PM   Result Value Ref Range    Glucose - Accu-Ck 153 (H) 65 - 99 mg/dL   CBC WITH DIFFERENTIAL    Collection Time: 08/03/19  6:03 AM   Result Value Ref Range    WBC 16.0 (H) 4.8 - 10.8 K/uL    RBC 2.31 (L) 4.70 - 6.10 M/uL    Hemoglobin 7.4 (L) 14.0 - 18.0 g/dL    Hematocrit 22.8 (L) 42.0 - 52.0 %    MCV 98.7 (H) 81.4 - 97.8 fL    MCH 32.0 27.0 - 33.0 pg    MCHC 32.5 (L) 33.7 - 35.3 g/dL    RDW 49.9 35.9 - 50.0 fL    Platelet Count 250 164 - 446 K/uL    MPV 8.9 (L) 9.0 - 12.9 fL    Neutrophils-Polys 84.60 (H) 44.00 - 72.00 %    Lymphocytes 6.30 (L) 22.00 - 41.00 %    Monocytes 6.40 0.00 - 13.40 %    Eosinophils 0.60 0.00 - 6.90 %    Basophils 0.30 0.00 - 1.80 %    Immature Granulocytes 1.80 (H) 0.00 - 0.90 %    Nucleated RBC 0.00 /100 WBC    Neutrophils (Absolute) 13.53 (H) 1.82 - 7.42 K/uL    Lymphs (Absolute) 1.01 1.00 - 4.80 K/uL    Monos (Absolute) 1.03 (H) 0.00 - 0.85 K/uL    Eos (Absolute) 0.09 0.00 - 0.51 K/uL    Baso (Absolute) 0.04 0.00 - 0.12 K/uL    Immature Granulocytes (abs) 0.28 (H) 0.00 - 0.11 K/uL    NRBC (Absolute) 0.00 K/uL   FERRITIN    Collection Time: 08/03/19  6:03 AM   Result Value Ref Range    Ferritin 183.2 22.0 - 322.0 ng/mL   FOLATE    Collection Time: 08/03/19  6:03 AM   Result Value Ref Range    Folate -Folic Acid 8.8 >4.0 ng/mL   VITAMIN B12    Collection Time: 08/03/19  6:03 AM   Result Value Ref Range    Vitamin B12 -True Cobalamin 263 211 - 911 pg/mL   IRON/TOTAL IRON BIND    Collection Time: 08/03/19  6:03 AM   Result Value Ref Range    Iron <10 (L) 50 - 180 ug/dL    Total Iron Binding 202 (L) 250 - 450 ug/dL    % Saturation see  below 15 - 55 %   ACCU-CHEK GLUCOSE    Collection Time: 08/03/19  7:50 AM   Result Value Ref Range    Glucose - Accu-Ck 144 (H) 65 - 99 mg/dL   ACCU-CHEK GLUCOSE    Collection Time: 08/03/19 11:36 AM   Result Value Ref Range    Glucose - Accu-Ck 138 (H) 65 - 99 mg/dL   ACCU-CHEK GLUCOSE    Collection Time: 08/03/19  5:46 PM   Result Value Ref Range    Glucose - Accu-Ck 116 (H) 65 - 99 mg/dL   ACCU-CHEK GLUCOSE    Collection Time: 08/03/19  8:04 PM   Result Value Ref Range    Glucose - Accu-Ck 142 (H) 65 - 99 mg/dL   ACCU-CHEK GLUCOSE    Collection Time: 08/04/19  7:55 AM   Result Value Ref Range    Glucose - Accu-Ck 146 (H) 65 - 99 mg/dL   ACCU-CHEK GLUCOSE    Collection Time: 08/04/19 11:54 AM   Result Value Ref Range    Glucose - Accu-Ck 152 (H) 65 - 99 mg/dL   ACCU-CHEK GLUCOSE    Collection Time: 08/04/19  5:12 PM   Result Value Ref Range    Glucose - Accu-Ck 112 (H) 65 - 99 mg/dL   ACCU-CHEK GLUCOSE    Collection Time: 08/04/19  8:11 PM   Result Value Ref Range    Glucose - Accu-Ck 114 (H) 65 - 99 mg/dL   CBC WITH DIFFERENTIAL    Collection Time: 08/05/19  5:43 AM   Result Value Ref Range    WBC 7.6 4.8 - 10.8 K/uL    RBC 2.40 (L) 4.70 - 6.10 M/uL    Hemoglobin 7.7 (L) 14.0 - 18.0 g/dL    Hematocrit 23.4 (L) 42.0 - 52.0 %    MCV 97.5 81.4 - 97.8 fL    MCH 32.1 27.0 - 33.0 pg    MCHC 32.9 (L) 33.7 - 35.3 g/dL    RDW 49.1 35.9 - 50.0 fL    Platelet Count 252 164 - 446 K/uL    MPV 9.1 9.0 - 12.9 fL    Neutrophils-Polys 72.60 (H) 44.00 - 72.00 %    Lymphocytes 10.00 (L) 22.00 - 41.00 %    Monocytes 12.80 0.00 - 13.40 %    Eosinophils 2.20 0.00 - 6.90 %    Basophils 0.40 0.00 - 1.80 %    Immature Granulocytes 2.00 (H) 0.00 - 0.90 %    Nucleated RBC 0.00 /100 WBC    Neutrophils (Absolute) 5.51 1.82 - 7.42 K/uL    Lymphs (Absolute) 0.76 (L) 1.00 - 4.80 K/uL    Monos (Absolute) 0.97 (H) 0.00 - 0.85 K/uL    Eos (Absolute) 0.17 0.00 - 0.51 K/uL    Baso (Absolute) 0.03 0.00 - 0.12 K/uL    Immature Granulocytes (abs) 0.15  (H) 0.00 - 0.11 K/uL    NRBC (Absolute) 0.00 K/uL   Comp Metabolic Panel    Collection Time: 08/05/19  5:43 AM   Result Value Ref Range    Sodium 132 (L) 135 - 145 mmol/L    Potassium 3.9 3.6 - 5.5 mmol/L    Chloride 99 96 - 112 mmol/L    Co2 23 20 - 33 mmol/L    Anion Gap 10.0 0.0 - 11.9    Glucose 169 (H) 65 - 99 mg/dL    Bun 7 (L) 8 - 22 mg/dL    Creatinine 0.92 0.50 - 1.40 mg/dL    Calcium 8.6 8.5 - 10.5 mg/dL    AST(SGOT) 18 12 - 45 U/L    ALT(SGPT) 32 2 - 50 U/L    Alkaline Phosphatase 60 30 - 99 U/L    Total Bilirubin 0.4 0.1 - 1.5 mg/dL    Albumin 3.2 3.2 - 4.9 g/dL    Total Protein 6.9 6.0 - 8.2 g/dL    Globulin 3.7 (H) 1.9 - 3.5 g/dL    A-G Ratio 0.9 g/dL   MAGNESIUM    Collection Time: 08/05/19  5:43 AM   Result Value Ref Range    Magnesium 1.3 (L) 1.5 - 2.5 mg/dL   PHOSPHORUS    Collection Time: 08/05/19  5:43 AM   Result Value Ref Range    Phosphorus 3.9 2.5 - 4.5 mg/dL   ESTIMATED GFR    Collection Time: 08/05/19  5:43 AM   Result Value Ref Range    GFR If African American >60 >60 mL/min/1.73 m 2    GFR If Non African American >60 >60 mL/min/1.73 m 2   ACCU-CHEK GLUCOSE    Collection Time: 08/05/19  7:50 AM   Result Value Ref Range    Glucose - Accu-Ck 149 (H) 65 - 99 mg/dL       Current Facility-Administered Medications   Medication Frequency   • magnesium oxide (MAG-OX) tablet 400 mg BID   • iron sucrose (VENOFER) injection 200 mg DAILY   • cyanocobalamin (VITAMIN B12) tablet 1,000 mcg DAILY   • folic acid (FOLVITE) tablet 1 mg DAILY   • [START ON 8/7/2019] ferrous sulfate tablet 325 mg TID WITH MEALS   • gabapentin (NEURONTIN) capsule 300 mg TID   • methocarbamol (ROBAXIN) tablet 500 mg 4X/DAY   • linezolid (ZYVOX) tablet 600 mg Q12HRS   • piperacillin-tazobactam (ZOSYN) 3.375 g in  mL IVPB Q8HRS   • bisacodyl (THE MAGIC BULLET) suppository 10 mg DAILY   • midodrine (PROAMATINE) tablet 5 mg Q4HRS PRN   • midodrine (PROAMATINE) tablet 5 mg DAILY   • enoxaparin (LOVENOX) inj 40 mg DAILY   •  tramadol (ULTRAM) 50 MG tablet 50 mg TID   • sennosides (SENOKOT) 8.6 MG tablet 17.2 mg DAILY AT NOON   • docusate sodium (COLACE) capsule 200 mg BID   • lidocaine (LIDODERM) 5 % 2 Patch Q24HR   • insulin lispro (HUMALOG) injection 2-12 Units 4X/DAY ACHS    And   • glucose 4 g chewable tablet 16 g Q15 MIN PRN    And   • dextrose 50% (D50W) injection 50 mL Q15 MIN PRN   • metFORMIN (GLUCOPHAGE) tablet 1,000 mg BID WITH MEALS   • amLODIPine (NORVASC) tablet 10 mg DAILY   • atorvastatin (LIPITOR) tablet 20 mg Nightly   • glucose 4 g chewable tablet 16 g PRN   • lisinopril (PRINIVIL) tablet 40 mg DAILY   • loratadine (CLARITIN) tablet 10 mg DAILY   • omeprazole (PRILOSEC) capsule 20 mg DAILY   • spironolactone (ALDACTONE) tablet 25 mg DAILY   • terazosin (HYTRIN) capsule 5 mg BID   • verapamil ER (CALAN-SR) tablet 180 mg BID   • Pharmacy Consult Request ...Pain Management Review 1 Each PHARMACY TO DOSE   • Respiratory Care per Protocol Continuous RT   • oxyCODONE immediate-release (ROXICODONE) tablet 5 mg Q3HRS PRN   • oxyCODONE immediate release (ROXICODONE) tablet 10 mg Q3HRS PRN   • acetaminophen (TYLENOL) tablet 650 mg Q4HRS PRN   • therapeutic multivitamin-minerals (THERAGRAN-M) tablet 1 Tab DAILY WITH LUNCH   • artificial tears 1.4 % ophthalmic solution 1 Drop PRN   • benzocaine-menthol (CEPACOL) lozenge 1 Lozenge Q2HRS PRN   • mag hydrox-al hydrox-simeth (MAALOX PLUS ES or MYLANTA DS) suspension 20 mL Q2HRS PRN   • ondansetron (ZOFRAN ODT) dispertab 4 mg 4X/DAY PRN    Or   • ondansetron (ZOFRAN) syringe/vial injection 4 mg 4X/DAY PRN   • traZODone (DESYREL) tablet 50 mg QHS PRN   • sodium chloride (OCEAN) 0.65 % nasal spray 2 Spray PRN   • calcium carbonate (TUMS) chewable tab 500 mg BID       Orders Placed This Encounter   Procedures   • Diet Order Diabetic     Standing Status:   Standing     Number of Occurrences:   1     Order Specific Question:   Diet:     Answer:   Diabetic [3]       Assessment:  Active  Hospital Problems    Diagnosis   • *T5 spinal cord injury (HCC)   • PVD (peripheral vascular disease) (HCC)   • Type 2 diabetes mellitus without complication, without long-term current use of insulin (HCC)   • Essential hypertension   • Dyslipidemia   • Obesity   • Neuropathic pain   • Neurogenic bladder   • Neurogenic bowel   • Hyponatremia   • 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HCC)   • Acute kidney injury (HCC)   • Vitamin D deficiency   • Anemia   FINAL DIAGNOSIS:    A. Thoracic epidural tumor:         Lambda restricted plasmablastic neoplasm differential is between          plasmablastic plasmacytoma and plasmablastic lymphoma.         See comment.  Comment: In Specimen B there is a single, free lying cluster of  neoplastic cells that is felt to represent contamination of the  specimen as opposed to actual involvement of the skeletal muscle by the tumor. The differential between plasmblastic plasmcytoma and plasmablasticlymphoma is a difficult one.  They have very similar IHC profiles and few areas where they differ, there is still high overlap.  CD79a, ANDREW and EMA are negative which favor plasmablastic plasmacytoma.  Also, plasmablastic lymphoma tends to occur in the oral cavity in  immunocompromised patients and are about 60% ANDREW positive.  Clinical and radiographic correlation is recommended.   This case has been reviewed by Dr. Wilkinson, who concurs with the Diagnosis.    Medical Decision Making and Plan:    T5 AIS D spinal cord injury: Status post T4-6 laminectomy and T3-7 posterior spinal fusion with resection of mass by Dr. Velasco on 7/20, preliminary pathology suggest plasmablastic plasmacytoma, significant improvement in lower extremity strength since surgical resection.  -No brace at this time  -Continue comprehensive acute inpatient rehabilitation    Plasmablastic plasmacytoma: Primary mass  - Follow-up with oncology and radiation oncology as outpatient    Leukocytosis: Likely from pyelonephritis, WBC elevated to  20.2, with elevated neutrophils/bands, improved to 7.6 today after 3/7 days of antibiotics  -Check UA C&S, blood culture x2, blood cultures are negative, urine culture was not performed    Chest pain: Appreciate hospitalist input, rapid response called this morning  -EKG done, no signs of ST elevation    Neurogenic orthostatic hypotension: Systolic blood pressures into the 80s, symptoms of dizziness, lightheadedness  - Midodrine 5mg qam  - Midodrine 5 mg as needed standing    Neuropathic pain: Lower extremity pins-and-needles on admission  -Gabapentin 600 mg 3 times daily, decrease dose to gabapentin 300 mg 3 times daily  - Scheduled tramadol 50 mg 3 times daily    Nociceptive pain: Acute postoperative pain, site of incision  - Tramadol scheduled 50 mg 3 times daily  - Oxycodone 5 mg - 10 mg as needed every 3 hours  -Tylenol 1000 mg 3 times daily  -Robaxin 750 mg 4 times a day, discontinue Robaxin 8/5  -Lidocaine patch x2 to either side of incision    Acute kidney injury: Creatinine elevated up to 1.26 on 7/22  -Creatinine 1.02 on admission, improved to 0.92    Blood loss anemia: Hemoglobin on 7/23 of 6.7, status post 1 unit packed red blood cell transfusion, hemoglobin of 8.0 on 7/20  - Plan for transfusion if hemoglobin is less than 7.0  -Holding Plavix which was originally started for peripheral vascular disease  -Holding Lovenox secondary to bleeding on 7/22  -Hemoglobin has been stable at 0.85 on 8/1  -Restarted Lovenox 40 mg daily    Hyponatremia: Sodium on admission of 130 improved to 132  -Consult hospitalist    Hypertension: Consulted hospitalist, under good control with systolic blood pressure in 120s  -Norvasc 10 mg daily  -Lisinopril 40 mg daily  -Spironolactone 25 mg daily  - Terazosin 5 mg twice daily  -Verapamil 80 mg twice daily    Type 2 diabetes:   -Metformin 1000 mg bid  -Lantus 10 units every morning  -Insulin sliding scale, moderate  - Consulted hospitalist    BPH: Having increased difficulty  with urination, likely secondary to neurogenic bladder  -Terazosin 5 mg twice daily, can be causing worsening orthostatic hypotension    Anxiety/adjustment disorder:  -Cymbalta 30 mg daily, this will also aid and neuropathic pain, mood appears to be stable will DC Cymbalta at this time  -Consult psychologist    Neurogenic bladder: Requiring CIC, question if caused by urinary tract infection  - CIC q4hr  - lidocaine 2%, urojet    Neurogenic bowel:  -BTP including senna 2 tablets q. noon, Colace 200 mg twice daily, Magic bullet suppository with digital stimulation every afternoon  -Change Magic bullet to every evening  -If BMs continue in the morning will consider changing bowel program over 2 every morning with senna at nightly and Dulcolax suppository every morning with digital stimulation     Vitamin D deficiency: Vitamin D level on admission of 65, toxic level  - Discontinue cholecalciferol    DVT prophylaxis: Lovenox held secondary to bleeding at site of incision.  -SCDs when in bed  -Start Lovenox 40 mg daily    IDT Team Meeting 8/5/2019    IAdelfo D.O., was present and led the interdisciplinary team conference on 8/5/2019.  I led the IDT conference and agree with the IDT conference documentation and plan of care as noted below.     RN:  Diet Regular diet   % Meal     Pain    Sleep    Bowel    Bladder Bladder accident   In's & Out's    Barriers: UTI    PT:  Bed Mobility    Transfers    Mobility Total A   Stairs      Barriers: really poor endurance, will progress with cardio on neustep    OT:  Eating    Grooming    Bathing Min A   UB Dressing    LB Dressing    Toileting    Shower & Transfer    Wife is present for family training  Barriers: poor endurance    SLP:  Lots of verbal Cues  Will need outpt SLP      Rec:  Limited by Back pain    CM:  Continues to work on disposition and DME needs.      DC/Disposition:  8/19    Patient was seen for 41 minutes on unit/floor of which > 50% of time was spent on  counseling and coordination of care regarding the above, including prognosis, risk reduction, benefits of treatment, and options for next stage of care including management of pyelonephritis, including discussions of de-escalation of antibiotics, neurogenic bladder management with initiation of Urojet, discussion of need for CIC every 4 hours.      Adelfo Aquino D.O.

## 2019-08-05 NOTE — REHAB-ACTIVITY IDT TEAM NOTE
ACT   Recreation/Community     Leisure Competence Measure  Leisure Awareness: Cueing Assist(limited interests in learning new leisure)  Leisure Attitude: Independent  Leisure Skills: Independent  Cultural / Social Behaviors: Independent  Interpersonal Skills: Independent  Community Integration Skills: Independent  Social Contact: Independent  Community Participation: Independent    Strengths:  Able to follow instructions, Motivated for self care and independence, Pleasant and cooperative, Supportive family and Willingly participates in therapeutic activities  Barriers:  Confused, cognitively impaired  # of short term goals set=2  # of short term goals met=2    He will work on the planning of an outing for the future as well as his standing balance and endurance. He became frustrated during a cognitive activity to the point where we wanted to give up on the activity. He was able to be redirected with encouragement.      Recreation Therapy Problems     Problem: Recreation Therapy     Dates: Start: 07/29/19       Goal: STG-Within one week, patient will demonstrate a standing tolerance     Dates: Start: 07/29/19       Description: By standing while performing an activity at the standing mat 25% of the time taking breaks as needed.            Goal: STG-Within one week, patient will actively engage in planning of community re-integration outing     Dates: Start: 07/29/19             Goal: LTG-By discharge, patient will demonstrate a standing tolerance     Dates: Start: 07/29/19       Description: Will remain standing while performing an activity 50% of the time taking breaks as needed.            Goal: LTG-By discharge, patient will participate in a community re-integration outing     Dates: Start: 07/29/19                         Section completed by:  Lalo Macdonald, MELINAS

## 2019-08-05 NOTE — THERAPY
"Speech Language Pathology  Daily Treatment     Patient Name: Ian Laird  Age:  69 y.o., Sex:  male  Medical Record #: [id number]  Today's Date: 8/5/2019     Subjective    Pt pleasant and cooperative. Spouse present for session.      Objective     08/05/19 1334   Cognition   Complex Attention Minimal (4)   Insight into Deficits Minimal (4)   Interdisciplinary Plan of Care Collaboration   IDT Collaboration with  Family / Caregiver;Nursing   Patient Position at End of Therapy Seated;Family / Friend in Room   Collaboration Comments spouse present for session, informed nursing pt in room to be cathed   SLP Total Time Spent   SLP Individual Total Time Spent (Mins) 60   Charge Group   SLP Cognitive Skill Development 4       FIM Eating Score:  6 - Modified Independent  Eating Description:       FIM Comprehension Score:  6 - Modified Independent  Comprehension Description:  Glasses, Verbal cues    FIM Expression Score:  7 - Independent  Expression Description:       FIM Social Interaction Score:  7 - Independent  Social Interaction Description:       FIM Problem Solving Score:  5 - Standby Prompting/Supervision or Set-up  Problem Solving Description:  Verbal cueing, Therapy schedule, Increased time    FIM Memory Score:  4 - Minimal Assistance  Memory Description:  Verbal cueing, Therapy schedule      Assessment    Pt given BP and blood sugar logs to complete for remainder of stay at [facility]. Pt and spouse reports pt takes \"meticulous data\" at home and feels this is a good system to implement at rehab in order to carryover at home. SLP dictated the prior weeks readings to pt with extra time and minimal repetitions needed. Divided attention task completed with 87% accuracy IND, pt required use of paper to narrow visual field and aid in placekeeping.     Plan    Continue to target attention, memory, problem solving    "

## 2019-08-05 NOTE — DISCHARGE PLANNING
BABAR spoke with pt and pt's wife Marion to provide updates from team conference. Marion reports she is looking into having grab bars and railing installed this week. Discussed recommendation for outpatient versus  services. Pt has attended outpatient PT at [facility] previously and is agreeable to returning there if outpatient services are recommended.

## 2019-08-05 NOTE — CARE PLAN
Problem: Mobility  Goal: STG-Within one week, patient will propel wheelchair community  Description  1) Individualized goal:  150ft x 1, B UE, mod I  2) Interventions:  PT Group Therapy, PT Gait Training, PT Therapeutic Exercises, PT Neuro Re-Ed/Balance, PT Therapeutic Activity, PT Manual Therapy and PT Evaluation     Outcome: PROGRESSING AS EXPECTED  Note:   65 ft     Problem: Mobility Transfers  Goal: STG-Within one week, patient will perform bed mobility  Description  1) Individualized goal: with bed rail as needed, SPV  2) Interventions:  PT Group Therapy, PT Gait Training, PT Therapeutic Exercises, PT Neuro Re-Ed/Balance, PT Therapeutic Activity, PT Manual Therapy and PT Evaluation   Outcome: PROGRESSING AS EXPECTED  Note:   Min - mod A  Goal: STG-Within one week, patient will transfer bed to chair  Description  1) Individualized goal: SBA with LRAD  2) Interventions:  PT Group Therapy, PT Gait Training, PT Therapeutic Exercises, PT Neuro Re-Ed/Balance, PT Therapeutic Activity, PT Manual Therapy and PT Evaluation     Outcome: PROGRESSING AS EXPECTED  Note:   Min - mod A     Problem: Balance  Goal: STG-Within one week, patient will maintain static standing  Description  1) Individualized goal:  2min x 1, SBA with B UE support as needed  2) Interventions:  PT Group Therapy, PT Gait Training, PT Therapeutic Exercises, PT Neuro Re-Ed/Balance, PT Therapeutic Activity, PT Manual Therapy and PT Evaluation     Outcome: MET

## 2019-08-05 NOTE — REHAB-SLP IDT TEAM NOTE
Speech Therapy   Cognitive Linquistic Functions  Comprehension Initial:  4 - Minimal Assistance  Comprehension Current:  6 - Modified Independent   Comprehension Description:  Glasses, Verbal cues  Expression Initial:  7 - Independent  Expression Current:  7 - Independent   Expression Description:     Social Interaction Initial:  6 - Modified Independent  Social Interaction Current:  7 - Independent   Social Interaction Description:  Medication  Problem Solving Initial:  5 - Standby Prompting/Supervision or Set-up  Problem Solving Current:  5 - Standby Prompting/Supervision or Set-up   Problem Solving Description:  Verbal cueing, Therapy schedule, Increased time  Memory Initial:  5 - Standby Prompting/Supervision or Set-up  Memory Current:  4 - Minimal Assistance   Memory Description:  Verbal cueing, Therapy schedule  Executive Functioning / Organization Initial:  Moderate (3)  Executive Functioning / Organization Current:  Moderate (3)   Executive Functioning Desciption: Pt requires extra time and cues for attention to details.   Swallowing  Swallowing Status: SLP not following for dysphagia.   Orders Placed This Encounter   Procedures   • Diet Order Diabetic     Standing Status:   Standing     Number of Occurrences:   1     Order Specific Question:   Diet:     Answer:   Diabetic [3]     Behavior Modification Plan  Allow for rest time, Keep instructions simple/concrete, Give clear feedback, Set clear goals, Provide reasonable choices, Decrease the chance of failure by offering activities that are within the patient's abilities and Analyze tasks (break down into smaller steps)  Assistive Technology  Low/Impaired vision equipment and Low tech: Calendar, planner, schedule, alarms/timers, pill organizer, post-it notes, lists  Family Training/Education:  Ongoing with spouse  DC Recommendations: Pt would benefit from ongoing ST services upon d/c.   Strengths:  Alert and oriented, Effective communication skills,  Independent PLOF, Making steady progress towards goals, Motivated for self care and independence, Pleasant and cooperative, Supportive family and Willingly participates in therapeutic activities  Barriers:  Decreased endurance, Generalized weakness, Orthostatic hypotension and Other: complex attention, STM  # of short term goals set=2  # of short term goals met=1  Speech Therapy Problems     Problem: Problem Solving STGs     Dates: Start: 07/26/19       Goal: STG-Within one week, patient will     Dates: Start: 07/26/19       Description: 1) Individualized goal:  Perform complex attention tasks with 90% accuracy and clinician supervision  2) Interventions: SLP Self Care / ADL Training , SLP Cognitive Skill Development and SLP Group Treatme       Note:     Goal Note filed on 07/29/19 0819 by Marion Hicks MS,CCC-SLP    Goal: STG-Within one week, patient will  Outcome: NOT MET  Mod A needed for simple attention tasks.                        Problem: Speech/Swallowing LTGs     Dates: Start: 07/26/19       Goal: LTG-By discharge, patient will solve complex problem     Dates: Start: 07/26/19       Description: 1) Individualized goal:  For home and community with 90% accuracy and MOD I for meta-cognitive strategies  2) Interventions:  SLP Self Care / ADL Training , SLP Cognitive Skill Development and SLP Group Treatment             Goal: LTG-By discharge, patient will     Dates: Start: 07/26/19       Description: 1) Individualized goal:  Perform functional prospective memory tasks with mod I to achieve 90% accuracy.  2) Interventions:  SLP Self Care / ADL Training , SLP Cognitive Skill Development and SLP Group Treatme                          Section completed by:  Jenifer Llanes MS,CCC-SLP

## 2019-08-05 NOTE — CARE PLAN
Problem: Pain Management  Goal: Pain level will decrease to patient's comfort goal  Note:   Scheduled pain medication given per MAR with good results.     Problem: IP BLADDER/VOIDING  Goal: LTG - Patient will utilize adaptive techniques/equipment to complete bladder elimination  Note:   Pt was able to void x1 this morning, unable to void the rest of the day.  Cath amts = 500 and 450

## 2019-08-05 NOTE — CARE PLAN
Problem: Bathing  Goal: STG-Within one week, patient will bathe  Description  1) Individualized Goal:  Supervision with use of AE/DME prn to maintain spinal prec.  2) Interventions:  OT Group Therapy, OT Self Care/ADL, OT Community Reintegration, OT Manual Ther Technique, OT Neuro Re-Ed/Balance, OT Sensory Int Techniques, OT Therapeutic Activity, OT Evaluation and OT Therapeutic Exercise     Outcome: NOT MET  Note:   Min A     Problem: Dressing  Goal: STG-Within one week, patient will dress LB  Description  1) Individualized Goal:  supervision with use of AE prn to maintain spinal prec.  2) Interventions:  OT Group Therapy, OT Self Care/ADL, OT Community Reintegration, OT Manual Ther Technique, OT Neuro Re-Ed/Balance, OT Sensory Int Techniques, OT Therapeutic Activity, OT Evaluation and OT Therapeutic Exercise     Outcome: NOT MET  Note:   Min A with AE, cues and increased time     Problem: Functional Transfers  Goal: STG-Within one week, patient will  Description  1) Individualized Goal:  Transfer to toilet and shower at supervision level with AE/DME prn.  2) Interventions:  OT Group Therapy, OT Self Care/ADL, OT Community Reintegration, OT Manual Ther Technique, OT Neuro Re-Ed/Balance, OT Sensory Int Techniques, OT Therapeutic Activity, OT Evaluation and OT Therapeutic Exercise     Outcome: NOT MET  Note:   SBA with grab bar     Problem: IADL's  Goal: STG-Within one week, patient will access kitchen area  Description  1) Individualized Goal:  Supervision with AE/DME prn.  2) Interventions:  OT Group Therapy, OT Self Care/ADL, OT Community Reintegration, OT Manual Ther Technique, OT Neuro Re-Ed/Balance, OT Sensory Int Techniques, OT Therapeutic Activity, OT Evaluation and OT Therapeutic Exercise     Outcome: NOT MET  Note:   Not attempted yet in therapy

## 2019-08-06 PROBLEM — N39.0 SEPSIS SECONDARY TO UTI (HCC): Status: ACTIVE | Noted: 2019-08-02

## 2019-08-06 LAB
APPEARANCE UR: CLEAR
BACTERIA #/AREA URNS HPF: NEGATIVE /HPF
BACTERIA BLD CULT: NORMAL
BACTERIA BLD CULT: NORMAL
BILIRUB UR QL STRIP.AUTO: NEGATIVE
COLOR UR: YELLOW
EPI CELLS #/AREA URNS HPF: NEGATIVE /HPF
GLUCOSE BLD-MCNC: 109 MG/DL (ref 65–99)
GLUCOSE BLD-MCNC: 129 MG/DL (ref 65–99)
GLUCOSE BLD-MCNC: 130 MG/DL (ref 65–99)
GLUCOSE BLD-MCNC: 143 MG/DL (ref 65–99)
GLUCOSE UR STRIP.AUTO-MCNC: NEGATIVE MG/DL
HYALINE CASTS #/AREA URNS LPF: ABNORMAL /LPF
KETONES UR STRIP.AUTO-MCNC: NEGATIVE MG/DL
LEUKOCYTE ESTERASE UR QL STRIP.AUTO: NEGATIVE
MICRO URNS: ABNORMAL
NITRITE UR QL STRIP.AUTO: NEGATIVE
PH UR STRIP.AUTO: 6 [PH] (ref 5–8)
PROT UR QL STRIP: NEGATIVE MG/DL
RBC # URNS HPF: ABNORMAL /HPF
RBC UR QL AUTO: ABNORMAL
SIGNIFICANT IND 70042: NORMAL
SIGNIFICANT IND 70042: NORMAL
SITE SITE: NORMAL
SITE SITE: NORMAL
SOURCE SOURCE: NORMAL
SOURCE SOURCE: NORMAL
SP GR UR STRIP.AUTO: <=1.005
UROBILINOGEN UR STRIP.AUTO-MCNC: 0.2 MG/DL
WBC #/AREA URNS HPF: ABNORMAL /HPF

## 2019-08-06 PROCEDURE — 97110 THERAPEUTIC EXERCISES: CPT

## 2019-08-06 PROCEDURE — 700101 HCHG RX REV CODE 250: Performed by: PHYSICAL MEDICINE & REHABILITATION

## 2019-08-06 PROCEDURE — A9270 NON-COVERED ITEM OR SERVICE: HCPCS | Performed by: HOSPITALIST

## 2019-08-06 PROCEDURE — A9270 NON-COVERED ITEM OR SERVICE: HCPCS | Performed by: PHYSICAL MEDICINE & REHABILITATION

## 2019-08-06 PROCEDURE — 700111 HCHG RX REV CODE 636 W/ 250 OVERRIDE (IP): Performed by: HOSPITALIST

## 2019-08-06 PROCEDURE — 700111 HCHG RX REV CODE 636 W/ 250 OVERRIDE (IP): Performed by: PHYSICAL MEDICINE & REHABILITATION

## 2019-08-06 PROCEDURE — 97116 GAIT TRAINING THERAPY: CPT

## 2019-08-06 PROCEDURE — 700102 HCHG RX REV CODE 250 W/ 637 OVERRIDE(OP): Performed by: HOSPITALIST

## 2019-08-06 PROCEDURE — 99232 SBSQ HOSP IP/OBS MODERATE 35: CPT | Performed by: PHYSICAL MEDICINE & REHABILITATION

## 2019-08-06 PROCEDURE — 99232 SBSQ HOSP IP/OBS MODERATE 35: CPT | Performed by: HOSPITALIST

## 2019-08-06 PROCEDURE — 700105 HCHG RX REV CODE 258: Performed by: HOSPITALIST

## 2019-08-06 PROCEDURE — 82962 GLUCOSE BLOOD TEST: CPT | Mod: 91

## 2019-08-06 PROCEDURE — 700102 HCHG RX REV CODE 250 W/ 637 OVERRIDE(OP): Performed by: PHYSICAL MEDICINE & REHABILITATION

## 2019-08-06 PROCEDURE — 97530 THERAPEUTIC ACTIVITIES: CPT

## 2019-08-06 PROCEDURE — 81001 URINALYSIS AUTO W/SCOPE: CPT

## 2019-08-06 PROCEDURE — 97112 NEUROMUSCULAR REEDUCATION: CPT

## 2019-08-06 PROCEDURE — G0515 COGNITIVE SKILLS DEVELOPMENT: HCPCS

## 2019-08-06 PROCEDURE — 97535 SELF CARE MNGMENT TRAINING: CPT

## 2019-08-06 PROCEDURE — 97150 GROUP THERAPEUTIC PROCEDURES: CPT

## 2019-08-06 PROCEDURE — 700112 HCHG RX REV CODE 229: Performed by: PHYSICAL MEDICINE & REHABILITATION

## 2019-08-06 PROCEDURE — 770010 HCHG ROOM/CARE - REHAB SEMI PRIVAT*

## 2019-08-06 RX ORDER — DOCUSATE SODIUM 100 MG/1
100 CAPSULE, LIQUID FILLED ORAL 2 TIMES DAILY
Status: DISCONTINUED | OUTPATIENT
Start: 2019-08-06 | End: 2019-08-15

## 2019-08-06 RX ORDER — ASCORBIC ACID 500 MG
500 TABLET ORAL 2 TIMES DAILY WITH MEALS
Status: DISCONTINUED | OUTPATIENT
Start: 2019-08-06 | End: 2019-08-16

## 2019-08-06 RX ORDER — MIDODRINE HYDROCHLORIDE 2.5 MG/1
2.5 TABLET ORAL
Status: DISCONTINUED | OUTPATIENT
Start: 2019-08-07 | End: 2019-08-07

## 2019-08-06 RX ORDER — FERROUS SULFATE 325(65) MG
325 TABLET ORAL 2 TIMES DAILY WITH MEALS
Status: DISCONTINUED | OUTPATIENT
Start: 2019-08-06 | End: 2019-08-15

## 2019-08-06 RX ORDER — BISACODYL 10 MG/1
10 SUPPOSITORY RECTAL DAILY
Status: DISCONTINUED | OUTPATIENT
Start: 2019-08-07 | End: 2019-08-08

## 2019-08-06 RX ORDER — SENNOSIDES A AND B 8.6 MG/1
17.2 TABLET, FILM COATED ORAL
Status: DISCONTINUED | OUTPATIENT
Start: 2019-08-06 | End: 2019-08-19 | Stop reason: HOSPADM

## 2019-08-06 RX ADMIN — SENNOSIDES 17.2 MG: 8.6 TABLET, FILM COATED ORAL at 11:43

## 2019-08-06 RX ADMIN — CALCIUM CARBONATE (ANTACID) CHEW TAB 500 MG 500 MG: 500 CHEW TAB at 20:54

## 2019-08-06 RX ADMIN — METFORMIN HYDROCHLORIDE 1000 MG: 500 TABLET, FILM COATED ORAL at 17:16

## 2019-08-06 RX ADMIN — VERAPAMIL HYDROCHLORIDE 180 MG: 180 TABLET, FILM COATED, EXTENDED RELEASE ORAL at 08:09

## 2019-08-06 RX ADMIN — OXYCODONE HYDROCHLORIDE AND ACETAMINOPHEN 500 MG: 500 TABLET ORAL at 11:38

## 2019-08-06 RX ADMIN — TRAMADOL HYDROCHLORIDE 50 MG: 50 TABLET, COATED ORAL at 17:16

## 2019-08-06 RX ADMIN — PIPERACILLIN AND TAZOBACTAM 3.38 G: 3; .375 INJECTION, POWDER, LYOPHILIZED, FOR SOLUTION INTRAVENOUS; PARENTERAL at 04:57

## 2019-08-06 RX ADMIN — FERROUS SULFATE TAB 325 MG (65 MG ELEMENTAL FE) 325 MG: 325 (65 FE) TAB at 17:16

## 2019-08-06 RX ADMIN — MAGNESIUM OXIDE TAB 400 MG (241.3 MG ELEMENTAL MG) 400 MG: 400 (241.3 MG) TAB at 17:16

## 2019-08-06 RX ADMIN — IRON SUCROSE 200 MG: 20 INJECTION, SOLUTION INTRAVENOUS at 10:12

## 2019-08-06 RX ADMIN — VERAPAMIL HYDROCHLORIDE 180 MG: 180 TABLET, FILM COATED, EXTENDED RELEASE ORAL at 20:53

## 2019-08-06 RX ADMIN — GABAPENTIN 300 MG: 300 CAPSULE ORAL at 16:13

## 2019-08-06 RX ADMIN — PIPERACILLIN AND TAZOBACTAM 3.38 G: 3; .375 INJECTION, POWDER, LYOPHILIZED, FOR SOLUTION INTRAVENOUS; PARENTERAL at 20:47

## 2019-08-06 RX ADMIN — OMEPRAZOLE 20 MG: 20 CAPSULE, DELAYED RELEASE ORAL at 08:06

## 2019-08-06 RX ADMIN — ATORVASTATIN CALCIUM 20 MG: 10 TABLET, FILM COATED ORAL at 20:53

## 2019-08-06 RX ADMIN — LINEZOLID 600 MG: 600 TABLET, FILM COATED ORAL at 08:07

## 2019-08-06 RX ADMIN — ENOXAPARIN SODIUM 40 MG: 100 INJECTION SUBCUTANEOUS at 08:09

## 2019-08-06 RX ADMIN — TRAMADOL HYDROCHLORIDE 50 MG: 50 TABLET, COATED ORAL at 11:36

## 2019-08-06 RX ADMIN — CALCIUM CARBONATE (ANTACID) CHEW TAB 500 MG 500 MG: 500 CHEW TAB at 08:07

## 2019-08-06 RX ADMIN — LISINOPRIL 40 MG: 20 TABLET ORAL at 08:07

## 2019-08-06 RX ADMIN — TERAZOSIN HYDROCHLORIDE 5 MG: 5 CAPSULE ORAL at 17:16

## 2019-08-06 RX ADMIN — CYANOCOBALAMIN TAB 1000 MCG 1000 MCG: 1000 TAB at 08:07

## 2019-08-06 RX ADMIN — Medication 1 CAPSULE: at 08:07

## 2019-08-06 RX ADMIN — GABAPENTIN 300 MG: 300 CAPSULE ORAL at 08:06

## 2019-08-06 RX ADMIN — FOLIC ACID 1 MG: 1 TABLET ORAL at 08:07

## 2019-08-06 RX ADMIN — SENNOSIDES 17.2 MG: 8.6 TABLET, FILM COATED ORAL at 20:53

## 2019-08-06 RX ADMIN — LIDOCAINE 2 PATCH: 50 PATCH CUTANEOUS at 10:12

## 2019-08-06 RX ADMIN — MAGNESIUM OXIDE TAB 400 MG (241.3 MG ELEMENTAL MG) 400 MG: 400 (241.3 MG) TAB at 08:07

## 2019-08-06 RX ADMIN — TERAZOSIN HYDROCHLORIDE 5 MG: 5 CAPSULE ORAL at 04:55

## 2019-08-06 RX ADMIN — SPIRONOLACTONE 25 MG: 25 TABLET ORAL at 08:07

## 2019-08-06 RX ADMIN — METFORMIN HYDROCHLORIDE 1000 MG: 500 TABLET, FILM COATED ORAL at 08:06

## 2019-08-06 RX ADMIN — AMLODIPINE BESYLATE 10 MG: 5 TABLET ORAL at 08:06

## 2019-08-06 RX ADMIN — MULTIPLE VITAMINS W/ MINERALS TAB 1 TABLET: TAB at 11:43

## 2019-08-06 RX ADMIN — TRAMADOL HYDROCHLORIDE 50 MG: 50 TABLET, COATED ORAL at 08:07

## 2019-08-06 RX ADMIN — GABAPENTIN 300 MG: 300 CAPSULE ORAL at 20:54

## 2019-08-06 RX ADMIN — DOCUSATE SODIUM 200 MG: 100 CAPSULE, LIQUID FILLED ORAL at 08:06

## 2019-08-06 RX ADMIN — FERROUS SULFATE TAB 325 MG (65 MG ELEMENTAL FE) 325 MG: 325 (65 FE) TAB at 11:43

## 2019-08-06 RX ADMIN — MIDODRINE HYDROCHLORIDE 5 MG: 2.5 TABLET ORAL at 07:49

## 2019-08-06 RX ADMIN — LORATADINE 10 MG: 10 TABLET ORAL at 08:07

## 2019-08-06 RX ADMIN — OXYCODONE HYDROCHLORIDE AND ACETAMINOPHEN 500 MG: 500 TABLET ORAL at 17:17

## 2019-08-06 RX ADMIN — PIPERACILLIN AND TAZOBACTAM 3.38 G: 3; .375 INJECTION, POWDER, LYOPHILIZED, FOR SOLUTION INTRAVENOUS; PARENTERAL at 13:32

## 2019-08-06 ASSESSMENT — ENCOUNTER SYMPTOMS
VOMITING: 0
FOCAL WEAKNESS: 1
EYES NEGATIVE: 1
BACK PAIN: 1
SHORTNESS OF BREATH: 0
COUGH: 0
CHILLS: 0
FEVER: 0
PALPITATIONS: 0
ABDOMINAL PAIN: 0
NAUSEA: 0

## 2019-08-06 NOTE — THERAPY
Subjective:     Pt attended a 60 minute Safe Skin education group targeting prevention and treatment of pressure injuries. Pt was alert during group training session.      Reason for group therapy: To Increase Active Participation through Peer Pressure; To Enhance Motivation; To Increase Patient Interaction during Physical Recovery; To Facilitate Goal Discussion    Other treatments provided: Group therapy topic: Post session, pt indicated good level of knowledge of SCI risk factors and was able questions about skin care risks or prevention. Pt was educated on pressure injury risk factors via auditory and visual modalities (printed handout reviewed and provided) in addition to demonstration, practice and teach back methods. Topics reviewed included risk factors for pressure injuries, staging of pressure injuries, staging pressure injuries and prevention of pressure injuries. Patient was encouraged to ask questions, did so, and all questions were answered to patient’s satisfaction. Patient was counseled on the importance of active communication with interdisciplinary team to address risk factors and prevent pressure injuries. Pt benefited from participating in this skin education class as evidenced by verbalizing improved understanding of prevention of pressure injuries and modifications to mitigate risk factors.       08/06/19 1401   Precautions   Precautions Spinal / Back Precautions ;Fall Risk   Comments T3-T7 spinal fusion   Interdisciplinary Plan of Care Collaboration   Patient Position at End of Therapy Seated;Other (Comments)  (with nursing)   PT Total Time Spent   PT Group Total Time Spent (Mins) 60   PT Charge Group   PT Group Therapy Group Activities       Assessment:   Pt benefited from participating in the Safe Skin education class and demonstrated increased understanding of controllable risk factors as evidenced by participation in group Safe Skin class and ability to demonstrate adequate pressure relief  techniques.      Plan:   Continue ongoing education to prevent pressure injuries including assessment of areas at high risk and strategies to mitigate this risk. Assess for carryover of education provided in Safe Skin class.

## 2019-08-06 NOTE — CARE PLAN
Problem: Safety  Goal: Will remain free from injury  Note:   Patient demonstrates good safety technique this shift.  Asks for assistance when needed and does not attempt self transfer.  Able to verbalize needs.  Will continue to monitor.      Problem: Infection  Goal: Will remain free from infection  Note:   Pt on zosyn q every 8 hrs for bladder infection.

## 2019-08-06 NOTE — CARE PLAN
Problem: Communication  Goal: The ability to communicate needs accurately and effectively will improve  Outcome: PROGRESSING SLOWER THAN EXPECTED  Note:   Pt hollering for help twice this shift when call light available to use on his lap. Pt also transferred himself from bed to chair without using call light. Call light is working and within reach of pt at all times. Use of call light reinforced with each encounter.      Problem: Safety  Goal: Will remain free from falls  Outcome: PROGRESSING SLOWER THAN EXPECTED  Note:   Pt is high fall risk and does not remember to use call light. Alarms in place for safety and use of call light reinforced with each encounter.      Problem: Infection  Goal: Will remain free from infection  Outcome: NOT MET  Note:   Pt taking PO Zyvox and IV Zosyn for UTI. Repeat UA collected this shift per active order.      Problem: Venous Thromboembolism (VTW)/Deep Vein Thrombosis (DVT) Prevention:  Goal: Patient will participate in Venous Thrombosis (VTE)/Deep Vein Thrombosis (DVT)Prevention Measures  Outcome: PROGRESSING AS EXPECTED  Note:   Pt receives Lovenox SC injections for DVT prophylaxis      Problem: Bowel/Gastric:  Goal: Normal bowel function is maintained or improved  Outcome: PROGRESSING SLOWER THAN EXPECTED  Note:   Medium loose BM with HS bowel program. Pt also was continent of large loose BM in the AM. Pt says that his normal bowel routine is in the AM and he would like bowel program to reflect his normal routine. Pt also questioning necessity of bowel program stating that he has a good feel of when he has to go. Pt has been continent of bowel this shift.     Problem: Pain Management  Goal: Pain level will decrease to patient's comfort goal  Outcome: PROGRESSING AS EXPECTED  Note:   No reports of pain at this time. Will continue to monitor through shift with hourly rounds.      Problem: Urinary Elimination:  Goal: Ability to reestablish a normal urinary elimination pattern will  improve  Outcome: PROGRESSING SLOWER THAN EXPECTED  Note:   Straight cathed twice per active order (retention >250ml). Please refer to doc flowsheets for data trends. Pt taking Hytrin.     Problem: Respiratory:  Goal: Respiratory status will improve  Outcome: PROGRESSING AS EXPECTED  Note:   Pt is on room air and respirations are WNL.

## 2019-08-06 NOTE — THERAPY
Speech Language Pathology  Daily Treatment     Patient Name: Ian Laird  Age:  69 y.o., Sex:  male  Medical Record #: [id number]  Today's Date: 8/6/2019     Subjective    Pt reports not sleeping well last night, secondary to urge to urinate. Pt reports being able to void bowel and bladder on his own last night and this morning.      Objective     08/06/19 0904   Cognition   Complex Attention Moderate (3)   Simple Reasoning / Problem Solving Minimal (4)   Interdisciplinary Plan of Care Collaboration   IDT Collaboration with  Physician;Nursing   Patient Position at End of Therapy Seated   Collaboration Comments transfer of care to hospitalist, informed nursing of pt location in room   SLP Total Time Spent   SLP Individual Total Time Spent (Mins) 60   Charge Group   SLP Cognitive Skill Development 4           Assessment    MOD A to complete simple organization, attention task. Pt demonstrated difficulty completing questions fully, requiring cues to attend to specific details of each question. Extra time needed as well as visual cues (using various colors) to aid in organization and placekeeping. Pt frequently skipping around questions, benefits from cues to work in sequential order.     Plan    Continue to target simple attention, reasoning, problem solving.

## 2019-08-06 NOTE — PROGRESS NOTES
[facility] Medicine Daily Progress Note    Date of Service  8/6/2019    Chief Complaint  HTN, DM, Hyponatremia    Interval Problem Update  Blood pressures trending up; pt asymptomatic.    Review of Systems  Review of Systems   Constitutional: Negative for chills and fever.   HENT: Negative.    Eyes: Negative.    Respiratory: Negative for cough and shortness of breath.    Cardiovascular: Negative for chest pain and palpitations.   Gastrointestinal: Negative for abdominal pain, nausea and vomiting.   Musculoskeletal: Positive for back pain.        Wound pain   Skin: Negative for itching and rash.   Neurological: Positive for focal weakness.        Physical Exam  Temp:  [36.9 °C (98.5 °F)-37.2 °C (98.9 °F)] 37.1 °C (98.8 °F)  Pulse:  [76-85] 76  Resp:  [16-20] 18  BP: (122-158)/(66-72) 142/66  SpO2:  [96 %] 96 %    Physical Exam   Constitutional: He is oriented to person, place, and time. No distress.   HENT:   Head: Normocephalic and atraumatic.   Right Ear: External ear normal.   Left Ear: External ear normal.   Eyes: Conjunctivae and EOM are normal. Right eye exhibits no discharge. Left eye exhibits no discharge.   Neck: Normal range of motion. Neck supple. No tracheal deviation present.   Cardiovascular: Normal rate and regular rhythm.   Pulmonary/Chest: Effort normal and breath sounds normal. No stridor. No respiratory distress. He has no wheezes.   Abdominal: Soft. Bowel sounds are normal. He exhibits no distension. There is no tenderness.   Musculoskeletal: He exhibits no edema or tenderness.   Upper back incision C/D/I   Neurological: He is alert and oriented to person, place, and time.   Skin: Skin is warm and dry. He is not diaphoretic.   Vitals reviewed.      Fluids    Intake/Output Summary (Last 24 hours) at 8/6/2019 1119  Last data filed at 8/6/2019 0853  Gross per 24 hour   Intake 1500 ml   Output 1700 ml   Net -200 ml       Laboratory  Recent Labs     08/05/19  0543   WBC 7.6   RBC 2.40*   HEMOGLOBIN 7.7*    HEMATOCRIT 23.4*   MCV 97.5   MCH 32.1   MCHC 32.9*   RDW 49.1   PLATELETCT 252   MPV 9.1     Recent Labs     08/05/19  0543   SODIUM 132*   POTASSIUM 3.9   CHLORIDE 99   CO2 23   GLUCOSE 169*   BUN 7*   CREATININE 0.92   CALCIUM 8.6                   Assessment/Plan  * T5 spinal cord injury (HCC)- (present on admission)  Assessment & Plan  S/P T5 extradural mass resection w/ T4-T6 laminectomy and T3-T7 fusion on 7/20/19 by Dr. Velasco  Pathology  +plasmablastic plasmacytoma  Continue wound care    Hypomagnesemia  Assessment & Plan  Will increase MgOx    Sepsis secondary to UTI (HCC)  Assessment & Plan  UA w/ packed WBC and many bacteria  Unfortunately no UCx done  Clinically resolving on empiric Zosyn and Zyvox  Will discontinue Zyvox and de-escalate Zosyn soon    History of melanoma  Assessment & Plan  S/P resection x 2  Outpt F/U    BPH (benign prostatic hyperplasia)  Assessment & Plan  Monitor for orthostatic hypotension on Hytrin bid    Anemia- (present on admission)  Assessment & Plan  S/P PRBC prior to Rehab transfer  Add Vit C to Fe to aid absorption  Also on Vit B12 and Folate supplements     PVD (peripheral vascular disease) (HCC)- (present on admission)  Assessment & Plan  Plavix on hold post-operatively    Dyslipidemia- (present on admission)  Assessment & Plan  On Lipitor    Essential hypertension- (present on admission)  Assessment & Plan  Echo EF 65% w/ mild AS  On Norvasc, Lisinopril, Aldactone, and Verapamil  Consider decrease Midodrine for elevated blood pressures    Type 2 diabetes mellitus without complication, without long-term current use of insulin (HCC)- (present on admission)  Assessment & Plan  HbA1c 6.8  Continue Metformin  Off Lantus and  Glipizide  Outpt meds include Metformin, Glipizide, and Januvia    Full Code

## 2019-08-06 NOTE — THERAPY
Occupational Therapy  Daily Treatment     Patient Name: Ian Laird  Age:  69 y.o., Sex:  male  Medical Record #: [id number]  Today's Date: 8/6/2019     Precautions  Precautions: (P) Fall Risk, Spinal / Back Precautions   Comments: T3-T7 spinal fusion    Safety   ADL Safety : Requires Supervision for Safety, Requires Physical Assist for Safety  Bathroom Safety: Requires Supervision for Safety, Requires Physical Assist for Safety    Subjective    Patient agreeable to work on endurance/activity tolerance.     Objective       08/06/19 0831   Precautions   Precautions Fall Risk;Spinal / Back Precautions    Sitting Lower Body Exercises   Sitting Lower Body Exercises Yes   Nustep Time (See Comments)  (gear 3 x 15 minutes with one rest break w/ B UE/LE)   Interdisciplinary Plan of Care Collaboration   Patient Position at End of Therapy Seated;Self Releasing Lap Belt Applied  (left at speech therapy office)   OT Total Time Spent   OT Individual Total Time Spent (Mins) 30   OT Charge Group   OT Therapy Activity 1   OT Therapeutic Exercise  1       SPT w/c <> nustep machine with FWW and CGA with verbal cues.    Assessment    Patient required one rest break for two minutes at the six minute bren while using nustep.  No complaints other than feeling fatigued.    Plan    Cont overall strength/endurance and standing balance to improve ADL's and functional mobility

## 2019-08-06 NOTE — THERAPY
"Recreational Therapy  Daily Treatment     Patient Name: Ian Laird  AGE:  69 y.o., SEX:  male  Medical Record #: [id number]  Today's Date: 8/6/2019       Subjective    \"I liked this game\"     Objective       08/05/19 1610   Functional Ability Status - Cognitive   Attention Span Remains on Task   Comprehension Follows Two Step Commands   Judgment Able to Make Independent Decisions   Functional Ability Status - Emotional    Affect Appropriate   Mood Appropriate   Behavior Appropriate   Skilled Intervention    Skilled Intervention Cognitive Leisure   Skilled Intervention Comments Cognitive activity.    Interdisciplinary Plan of Care Collaboration   IDT Collaboration with  Nursing   Patient Position at End of Therapy Seated;Call Light within Reach;Tray Table within Reach   Collaboration Comments Nursing in room following session   Treatment Time   Total Time Spent (mins) 30   Procedural Tracking   Procedural Tracking Cognitive Skills Training       Assessment    He was given a cognitive activity that was a cross between connect four and checkers to work on cognition and forward thinking. He did not show any signs of frustration  during the activity. Was able to think in two steps ahead during the activity as judged by his actions in the game.     Plan    Continue to work on cognition and forward thinking.   "

## 2019-08-06 NOTE — PROGRESS NOTES
DATE OF SERVICE:  08/02/2019    The patient seemed somewhat compromised cognitively.  He had difficulty   expressing himself.  He seemed more easily distracted.  The patient said the   staff were concerned that he might have a UTI.  The patient also complained of   feeling fatigue with a very little energy.  Apparently, he had excused   himself from therapies that morning.       ____________________________________     THEODORE GUEVARA, PHD    SABAS / ROMANA    DD:  08/05/2019 12:56:24  DT:  08/06/2019 04:55:10    D#:  [id number]  Job#:  [id number]

## 2019-08-06 NOTE — PROGRESS NOTES
"Rehab Progress Note     Encounter Date: 8/6/2019    CC: LE weakness    Interval Events (Subjective)      He reports pain is well controlled while using tramadol.  He reports continued poor endurance.    Bowel: Had BM this morning, normally has BMs in the morning prior to injury. He would like to go back to having B<'s in the morning. Last BM: 08/06/19, lg loose    Bladder: voided 4 X with minimal PVR's    ROS:  Gen: No fatigue, confusion, significant weight loss  Eyes: no blurry vision, double vision or pain in eyes  ENT: no changes in hearing, runny nose, nose bleeds, sinus pain  CV: No CP, palpitations,   Lungs: no shortness of breath, changes in secretions, changes in cough, pain with coughing  Abd: No abd pain, nausea, vomiting, pain with eating  : no blood in urine, suprapubic pain  Ext: No swelling in legs, asymmetric atrophy  Neuro: no changes in strength or sensation in last 24 hours  Skin: no new ulcers/skin breakdown appreciated by patient  Mood: No changes in mood today, increase in depression or anxiety  Heme: no bruising, or bleeding  Rest of 14 point review of systems is negative        Objective:  Vitals: /66   Pulse 76   Temp 37.1 °C (98.8 °F) (Temporal)   Resp 18   Ht 1.778 m (5' 10\")   Wt 97.5 kg (215 lb)   SpO2 96%   Gen: NAD, Body mass index is 30.85 kg/m².  HEENT: NC/AT, PERRLA, moist mucous membranes  Cardio: RRR, no mumurs  Pulm: CTAB, with normal respiratory effort  Abd: Soft NTND, active bowel sounds  Ext: No peripheral edema.  +dorsal pedalis pulses bilaterally.      Motor Exam Upper Extremities   ? Myotome R L   Shoulder flexion C5 5 5   Elbow flexion C5 5 5   Wrist extension C6 5 5   Elbow extension C7 5 5   Finger flexion C8 5 5   Finger abduction T1 5 5     Motor Exam Lower Extremities    ? Myotome R L   Hip flexion L2 4 4   Knee extension L3 5 4   Ankle dorsiflexion L4 4 4   Toe extension L5 5 4   Ankle plantarflexion S1 4 4     Tone: No spasticity present, no clonus " present    Recent Results (from the past 72 hour(s))   ACCU-CHEK GLUCOSE    Collection Time: 08/03/19  5:46 PM   Result Value Ref Range    Glucose - Accu-Ck 116 (H) 65 - 99 mg/dL   ACCU-CHEK GLUCOSE    Collection Time: 08/03/19  8:04 PM   Result Value Ref Range    Glucose - Accu-Ck 142 (H) 65 - 99 mg/dL   ACCU-CHEK GLUCOSE    Collection Time: 08/04/19  7:55 AM   Result Value Ref Range    Glucose - Accu-Ck 146 (H) 65 - 99 mg/dL   ACCU-CHEK GLUCOSE    Collection Time: 08/04/19 11:54 AM   Result Value Ref Range    Glucose - Accu-Ck 152 (H) 65 - 99 mg/dL   ACCU-CHEK GLUCOSE    Collection Time: 08/04/19  5:12 PM   Result Value Ref Range    Glucose - Accu-Ck 112 (H) 65 - 99 mg/dL   ACCU-CHEK GLUCOSE    Collection Time: 08/04/19  8:11 PM   Result Value Ref Range    Glucose - Accu-Ck 114 (H) 65 - 99 mg/dL   CBC WITH DIFFERENTIAL    Collection Time: 08/05/19  5:43 AM   Result Value Ref Range    WBC 7.6 4.8 - 10.8 K/uL    RBC 2.40 (L) 4.70 - 6.10 M/uL    Hemoglobin 7.7 (L) 14.0 - 18.0 g/dL    Hematocrit 23.4 (L) 42.0 - 52.0 %    MCV 97.5 81.4 - 97.8 fL    MCH 32.1 27.0 - 33.0 pg    MCHC 32.9 (L) 33.7 - 35.3 g/dL    RDW 49.1 35.9 - 50.0 fL    Platelet Count 252 164 - 446 K/uL    MPV 9.1 9.0 - 12.9 fL    Neutrophils-Polys 72.60 (H) 44.00 - 72.00 %    Lymphocytes 10.00 (L) 22.00 - 41.00 %    Monocytes 12.80 0.00 - 13.40 %    Eosinophils 2.20 0.00 - 6.90 %    Basophils 0.40 0.00 - 1.80 %    Immature Granulocytes 2.00 (H) 0.00 - 0.90 %    Nucleated RBC 0.00 /100 WBC    Neutrophils (Absolute) 5.51 1.82 - 7.42 K/uL    Lymphs (Absolute) 0.76 (L) 1.00 - 4.80 K/uL    Monos (Absolute) 0.97 (H) 0.00 - 0.85 K/uL    Eos (Absolute) 0.17 0.00 - 0.51 K/uL    Baso (Absolute) 0.03 0.00 - 0.12 K/uL    Immature Granulocytes (abs) 0.15 (H) 0.00 - 0.11 K/uL    NRBC (Absolute) 0.00 K/uL   Comp Metabolic Panel    Collection Time: 08/05/19  5:43 AM   Result Value Ref Range    Sodium 132 (L) 135 - 145 mmol/L    Potassium 3.9 3.6 - 5.5 mmol/L     Chloride 99 96 - 112 mmol/L    Co2 23 20 - 33 mmol/L    Anion Gap 10.0 0.0 - 11.9    Glucose 169 (H) 65 - 99 mg/dL    Bun 7 (L) 8 - 22 mg/dL    Creatinine 0.92 0.50 - 1.40 mg/dL    Calcium 8.6 8.5 - 10.5 mg/dL    AST(SGOT) 18 12 - 45 U/L    ALT(SGPT) 32 2 - 50 U/L    Alkaline Phosphatase 60 30 - 99 U/L    Total Bilirubin 0.4 0.1 - 1.5 mg/dL    Albumin 3.2 3.2 - 4.9 g/dL    Total Protein 6.9 6.0 - 8.2 g/dL    Globulin 3.7 (H) 1.9 - 3.5 g/dL    A-G Ratio 0.9 g/dL   MAGNESIUM    Collection Time: 08/05/19  5:43 AM   Result Value Ref Range    Magnesium 1.3 (L) 1.5 - 2.5 mg/dL   PHOSPHORUS    Collection Time: 08/05/19  5:43 AM   Result Value Ref Range    Phosphorus 3.9 2.5 - 4.5 mg/dL   ESTIMATED GFR    Collection Time: 08/05/19  5:43 AM   Result Value Ref Range    GFR If African American >60 >60 mL/min/1.73 m 2    GFR If Non African American >60 >60 mL/min/1.73 m 2   ACCU-CHEK GLUCOSE    Collection Time: 08/05/19  7:50 AM   Result Value Ref Range    Glucose - Accu-Ck 149 (H) 65 - 99 mg/dL   ACCU-CHEK GLUCOSE    Collection Time: 08/05/19 11:02 AM   Result Value Ref Range    Glucose - Accu-Ck 171 (H) 65 - 99 mg/dL   ACCU-CHEK GLUCOSE    Collection Time: 08/05/19  5:27 PM   Result Value Ref Range    Glucose - Accu-Ck 99 65 - 99 mg/dL   ACCU-CHEK GLUCOSE    Collection Time: 08/05/19  8:08 PM   Result Value Ref Range    Glucose - Accu-Ck 109 (H) 65 - 99 mg/dL   URINALYSIS    Collection Time: 08/06/19 12:00 AM   Result Value Ref Range    Color Yellow     Character Clear     Specific Gravity <=1.005 <1.035    Ph 6.0 5.0 - 8.0    Glucose Negative Negative mg/dL    Ketones Negative Negative mg/dL    Protein Negative Negative mg/dL    Bilirubin Negative Negative    Urobilinogen, Urine 0.2 Negative    Nitrite Negative Negative    Leukocyte Esterase Negative Negative    Occult Blood Trace (A) Negative    Micro Urine Req Microscopic    URINE MICROSCOPIC (W/UA)    Collection Time: 08/06/19 12:00 AM   Result Value Ref Range    WBC 0-2  (A) /hpf    RBC 0-2 (A) /hpf    Bacteria Negative None /hpf    Epithelial Cells Negative /hpf    Hyaline Cast 0-2 /lpf   ACCU-CHEK GLUCOSE    Collection Time: 08/06/19  7:29 AM   Result Value Ref Range    Glucose - Accu-Ck 143 (H) 65 - 99 mg/dL   ACCU-CHEK GLUCOSE    Collection Time: 08/06/19 11:22 AM   Result Value Ref Range    Glucose - Accu-Ck 129 (H) 65 - 99 mg/dL       Current Facility-Administered Medications   Medication Frequency   • magnesium oxide (MAG-OX) tablet 400 mg TID WITH MEALS   • ferrous sulfate tablet 325 mg BID WITH MEALS   • ascorbic acid tablet 500 mg BID WITH MEALS   • lidocaine (XYLOCAINE) 2 % jelly PRN   • lactobacillus rhamnosus (CULTURELLE) capsule 1 Cap QDAY with Breakfast   • cyanocobalamin (VITAMIN B12) tablet 1,000 mcg DAILY   • folic acid (FOLVITE) tablet 1 mg DAILY   • gabapentin (NEURONTIN) capsule 300 mg TID   • piperacillin-tazobactam (ZOSYN) 3.375 g in  mL IVPB Q8HRS   • bisacodyl (THE MAGIC BULLET) suppository 10 mg DAILY   • midodrine (PROAMATINE) tablet 5 mg Q4HRS PRN   • midodrine (PROAMATINE) tablet 5 mg DAILY   • enoxaparin (LOVENOX) inj 40 mg DAILY   • tramadol (ULTRAM) 50 MG tablet 50 mg TID   • sennosides (SENOKOT) 8.6 MG tablet 17.2 mg DAILY AT NOON   • docusate sodium (COLACE) capsule 200 mg BID   • lidocaine (LIDODERM) 5 % 2 Patch Q24HR   • insulin lispro (HUMALOG) injection 2-12 Units 4X/DAY ACHS    And   • glucose 4 g chewable tablet 16 g Q15 MIN PRN    And   • dextrose 50% (D50W) injection 50 mL Q15 MIN PRN   • metFORMIN (GLUCOPHAGE) tablet 1,000 mg BID WITH MEALS   • amLODIPine (NORVASC) tablet 10 mg DAILY   • atorvastatin (LIPITOR) tablet 20 mg Nightly   • glucose 4 g chewable tablet 16 g PRN   • lisinopril (PRINIVIL) tablet 40 mg DAILY   • loratadine (CLARITIN) tablet 10 mg DAILY   • omeprazole (PRILOSEC) capsule 20 mg DAILY   • spironolactone (ALDACTONE) tablet 25 mg DAILY   • terazosin (HYTRIN) capsule 5 mg BID   • verapamil ER (CALAN-SR) tablet 180 mg  BID   • Pharmacy Consult Request ...Pain Management Review 1 Each PHARMACY TO DOSE   • Respiratory Care per Protocol Continuous RT   • oxyCODONE immediate-release (ROXICODONE) tablet 5 mg Q3HRS PRN   • oxyCODONE immediate release (ROXICODONE) tablet 10 mg Q3HRS PRN   • acetaminophen (TYLENOL) tablet 650 mg Q4HRS PRN   • therapeutic multivitamin-minerals (THERAGRAN-M) tablet 1 Tab DAILY WITH LUNCH   • artificial tears 1.4 % ophthalmic solution 1 Drop PRN   • benzocaine-menthol (CEPACOL) lozenge 1 Lozenge Q2HRS PRN   • mag hydrox-al hydrox-simeth (MAALOX PLUS ES or MYLANTA DS) suspension 20 mL Q2HRS PRN   • ondansetron (ZOFRAN ODT) dispertab 4 mg 4X/DAY PRN    Or   • ondansetron (ZOFRAN) syringe/vial injection 4 mg 4X/DAY PRN   • traZODone (DESYREL) tablet 50 mg QHS PRN   • sodium chloride (OCEAN) 0.65 % nasal spray 2 Spray PRN   • calcium carbonate (TUMS) chewable tab 500 mg BID       Orders Placed This Encounter   Procedures   • Diet Order Diabetic     Standing Status:   Standing     Number of Occurrences:   1     Order Specific Question:   Diet:     Answer:   Diabetic [3]       Assessment:  Active Hospital Problems    Diagnosis   • *T5 spinal cord injury (HCC)   • PVD (peripheral vascular disease) (HCC)   • Type 2 diabetes mellitus without complication, without long-term current use of insulin (HCC)   • Essential hypertension   • Dyslipidemia   • Obesity   • Neuropathic pain   • Neurogenic bladder   • Neurogenic bowel   • Hyponatremia   • 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HCC)   • Acute kidney injury (HCC)   • Vitamin D deficiency   • Anemia   FINAL DIAGNOSIS:    A. Thoracic epidural tumor:         Lambda restricted plasmablastic neoplasm differential is between          plasmablastic plasmacytoma and plasmablastic lymphoma.         See comment.  Comment: In Specimen B there is a single, free lying cluster of  neoplastic cells that is felt to represent contamination of the  specimen as opposed to actual involvement of the  skeletal muscle by the tumor. The differential between plasmblastic plasmcytoma and plasmablasticlymphoma is a difficult one.  They have very similar IHC profiles and few areas where they differ, there is still high overlap.  CD79a, ANDREW and EMA are negative which favor plasmablastic plasmacytoma.  Also, plasmablastic lymphoma tends to occur in the oral cavity in  immunocompromised patients and are about 60% ANDREW positive.  Clinical and radiographic correlation is recommended.   This case has been reviewed by Dr. Wilkinson, who concurs with the Diagnosis.    Medical Decision Making and Plan:    T5 AIS D spinal cord injury: Status post T4-6 laminectomy and T3-7 posterior spinal fusion with resection of mass by Dr. Velasco on 7/20, preliminary pathology suggest plasmablastic plasmacytoma, significant improvement in lower extremity strength since surgical resection.  -No brace at this time  -Continue competence of acute inpatient rehabilitation    Plasmablastic plasmacytoma: Primary mass  - Follow-up with oncology and radiation oncology as outpatient    Leukocytosis: Likely from pyelonephritis, WBC elevated to 20.2, with elevated neutrophils/bands, improved to 7.6 today after 3/7 days of antibiotics  -Check UA C&S, blood culture x2, blood cultures are negative, urine culture was not performed    Chest pain: Appreciate hospitalist input, rapid response called this morning  -EKG done, no signs of ST elevation    Neurogenic orthostatic hypotension: Systolic blood pressures into the 80s, symptoms of dizziness, lightheadedness  - Midodrine 5mg qam  - Midodrine 5 mg as needed standing    Neuropathic pain: Lower extremity pins-and-needles on admission  -Gabapentin 600 mg 3 times daily, decrease dose to gabapentin 300 mg 3 times daily  - Scheduled tramadol 50 mg 3 times daily    Nociceptive pain: Acute postoperative pain, site of incision  - Tramadol scheduled 50 mg 3 times daily  - Oxycodone 5 mg - 10 mg as needed every 3  hours  -Tylenol 1000 mg 3 times daily  -Robaxin 750 mg 4 times a day, discontinue Robaxin 8/5  -Lidocaine patch x2 to either side of incision    Acute kidney injury: Creatinine elevated up to 1.26 on 7/22  -Creatinine 1.02 on admission, improved to 0.92    Blood loss anemia: Hemoglobin on 7/23 of 6.7, status post 1 unit packed red blood cell transfusion, hemoglobin of 8.0 on 7/20  - Plan for transfusion if hemoglobin is less than 7.0  -Holding Plavix which was originally started for peripheral vascular disease  -Holding Lovenox secondary to bleeding on 7/22  -Hemoglobin has been stable at 0.85 on 8/1  -Restarted Lovenox 40 mg daily  -Check CBC in a.m.    Hyponatremia: Sodium on admission of 130 improved to 132  -Consult hospitalist    Hypertension: Consulted hospitalist, under good control with systolic blood pressure in 120s  -Norvasc 10 mg daily  -Lisinopril 40 mg daily  -Spironolactone 25 mg daily  - Terazosin 5 mg twice daily  -Verapamil 80 mg twice daily    Type 2 diabetes:   -Metformin 1000 mg bid  -Lantus 10 units every morning  -Insulin sliding scale, moderate  - Consulted hospitalist    BPH: Having increased difficulty with urination, likely secondary to neurogenic bladder  -Terazosin 5 mg twice daily, can be causing worsening orthostatic hypotension    Anxiety/adjustment disorder:  -Cymbalta 30 mg daily, this will also aid and neuropathic pain, mood appears to be stable will DC Cymbalta at this time  -Consult psychologist    Neurogenic bladder: Requiring CIC, question if caused by urinary tract infection  - CIC q4hr  - lidocaine 2%, urojet q4 hours with CIC    Neurogenic bowel: Changed bowel program to every morning  -BTP including senna 2 tablets q. noon, Colace 100 mg twice daily, Magic bullet suppository with digital stimulation every morning    Vitamin D deficiency: Vitamin D level on admission of 65, toxic level  - Discontinue cholecalciferol    DVT prophylaxis: Lovenox held secondary to bleeding at  site of incision.  -SCDs when in bed, discontinue SCDs  -Start Lovenox 40 mg daily    Patient was seen for 25 minutes on unit/floor of which > 50% of time was spent on counseling and coordination of care regarding the above, including prognosis, risk reduction, benefits of treatment, and options for next stage of care including neurogenic bowel with change of bowel program to every morning, change senna to nightly, change Dulcolax suppository to every morning with digital stimulation, discussion on neurogenic bladder with continued voiding trial, check CBC in a.m.      Adelfo Aquino D.O.

## 2019-08-06 NOTE — PROGRESS NOTES
DATE OF SERVICE:  07/31/2019    The patient continues to do well.  His mood is upbeat and positive.  He is   meeting all of his goals.  The patient talked about his home program and hopes   to accomplish prior to his discharge.       ____________________________________     THEODORE GUEVARA, PHD    [facility] / [facility]    DD:  08/05/2019 12:34:46  DT:  08/06/2019 04:15:34    D#:  [id number]  Job#:  [id number]

## 2019-08-06 NOTE — PROGRESS NOTES
DATE OF SERVICE:  07/30/2019    The patient reported that he is doing well in all of his therapies.  He said   he is meeting all of his goals.  He said he is looking forward to his   discharge date.  He said he is confident he can transfer all the skills that   he has learned to his home environment.  Patient reported no significant mood   disturbance, problems with his appetite or any sleep difficulties.       ____________________________________     THEODORE GUEVARA, PHD    [facility] / ROMANA    DD:  08/05/2019 08:57:23  DT:  08/06/2019 01:59:44    D#:  [id number]  Job#:  [id number]

## 2019-08-06 NOTE — THERAPY
"Occupational Therapy  Daily Treatment     Patient Name: Ian Laird  Age:  69 y.o., Sex:  male  Medical Record #: [id number]  Today's Date: 2019     Precautions  Precautions: (P) Fall Risk, Spinal / Back Precautions   Comments: T3-T7 spinal fusion    Safety   ADL Safety : Requires Supervision for Safety, Requires Physical Assist for Safety  Bathroom Safety: Requires Supervision for Safety, Requires Physical Assist for Safety    Subjective    \"I've been trying to hold it until you got here.\" re: needing to void     Objective       19 0731   Precautions   Precautions Fall Risk;Spinal / Back Precautions    Interdisciplinary Plan of Care Collaboration   IDT Collaboration with  Nursing;Certified Nursing Assistant   Patient Position at End of Therapy Seated  (in dining room)   Collaboration Comments CNA got pt out of bed and OT took pt to bathroom; RN checked bladder scan   OT Total Time Spent   OT Individual Total Time Spent (Mins) 30   OT Charge Group   OT Self Care / ADL 2       FIM Grooming Score:  7 - Independent  Grooming Description:  (indep to wash/dry hands/face, brush teeth and hair)    FIM Lower Body Dressing Score:  5 - Standby Prompting/Supervsion or Set-up  Lower Body Dressing Description:  Reacher, Application of orthotic or brace, Supervision for safety, Verbal cueing, Set-up of equipment, Initial preparation for task(setup/sba w/ cues and reacher to don pants and slip on shoes and doff socks)    FIM Toiletin - Standby Prompting/Supervision or Set-up  Toileting Description:  Grab bar, Supervision for safety, Verbal cueing, Set-up of equipment, Initial preparation for task(setup/sba with grab bar)    FIM Toilet Transfer Score:  4 - Minimal Assistance  Toilet Transfer Description:  Grab bar, Supervision for safety, Verbal cueing, Set-up of equipment, Initial preparation for task(CGA with grab bar and cues for safety)      Assessment    Patient voided in toilet while standing with SBA and " then had loose bowel movement seated on toilet.  Was unable to complete hygiene after BM in standing position, but able to do in sitting position, which patient stated was normal for him.      Plan    Cont overall strength/endurance and standing balance to improve ADL's and functional mobility

## 2019-08-06 NOTE — THERAPY
Physical Therapy   Daily Treatment     Patient Name: Ian Laird  Age:  69 y.o., Sex:  male  Medical Record #: [id number]  Today's Date: 8/6/2019     Precautions  Precautions: (P) Spinal / Back Precautions , Fall Risk  Comments: T3-T7 spinal fusion    Subjective    Pt up in wc, reports feeling really fatigued this pm     Objective       08/06/19 1501   Precautions   Precautions Spinal / Back Precautions ;Fall Risk   Supine Lower Body Exercise   Supine Lower Body Exercises Yes   Other Exercises shuttle for closed chain strength / coordination training 3 x 10 with 4,5,6 bands. Manual cues for concentric/ eccentric control throughout   Sitting Lower Body Exercises   Nustep Resistance Level 1  (10 minutes with 1 rest)   Neuro-Muscular Treatments   Neuro-Muscular Treatments Facilitation;Joint Approximation;Sequencing;Tactile Cuing;Verbal Cuing   Comments neuro re-ed strength/ coordination training at shuttle as noted   PT Total Time Spent   PT Individual Total Time Spent (Mins) 60   PT Charge Group   PT Gait Training 2   PT Therapeutic Exercise 1   PT Neuromuscular Re-Education / Balance 1       FIM Bed/Chair/Wheelchair Transfers Score: 3 - Moderate Assistance  Bed/Chair/Wheelchair Transfers Description:  Adaptive equipment, Increased time, Verbal cueing, Set-up of equipment, Assist with two limbs(CGA sit<>stand/ transfer with FWW/ mod A for BLE's sit<>supine)    FIM Walking Score:  1 - Total Assistance  Walking Description:  (min A with FWW 25 ft x 3/ wc follow for safety, ambulation forward/ backward x 3 with // bars)      Assessment    Pt completed gait/ mobility and strength training as noted but required frequent rests and encouragement to complete full PT session at end of day.    Plan    Lite gait, neuro control, standing tolerance/ standing balance, coordination exercises, progress through passport, family training [place] to perform transfers, review education on pressure relief/ skin checks

## 2019-08-07 LAB
BASOPHILS # BLD AUTO: 0.4 % (ref 0–1.8)
BASOPHILS # BLD: 0.03 K/UL (ref 0–0.12)
EOSINOPHIL # BLD AUTO: 0.2 K/UL (ref 0–0.51)
EOSINOPHIL NFR BLD: 2.4 % (ref 0–6.9)
ERYTHROCYTE [DISTWIDTH] IN BLOOD BY AUTOMATED COUNT: 48.6 FL (ref 35.9–50)
GLUCOSE BLD-MCNC: 100 MG/DL (ref 65–99)
GLUCOSE BLD-MCNC: 117 MG/DL (ref 65–99)
GLUCOSE BLD-MCNC: 146 MG/DL (ref 65–99)
HCT VFR BLD AUTO: 23.5 % (ref 42–52)
HGB BLD-MCNC: 7.6 G/DL (ref 14–18)
IMM GRANULOCYTES # BLD AUTO: 0.28 K/UL (ref 0–0.11)
IMM GRANULOCYTES NFR BLD AUTO: 3.3 % (ref 0–0.9)
LYMPHOCYTES # BLD AUTO: 0.86 K/UL (ref 1–4.8)
LYMPHOCYTES NFR BLD: 10.3 % (ref 22–41)
MCH RBC QN AUTO: 31.4 PG (ref 27–33)
MCHC RBC AUTO-ENTMCNC: 32.3 G/DL (ref 33.7–35.3)
MCV RBC AUTO: 97.1 FL (ref 81.4–97.8)
MONOCYTES # BLD AUTO: 0.98 K/UL (ref 0–0.85)
MONOCYTES NFR BLD AUTO: 11.7 % (ref 0–13.4)
NEUTROPHILS # BLD AUTO: 6.04 K/UL (ref 1.82–7.42)
NEUTROPHILS NFR BLD: 71.9 % (ref 44–72)
NRBC # BLD AUTO: 0 K/UL
NRBC BLD-RTO: 0 /100 WBC
PLATELET # BLD AUTO: 242 K/UL (ref 164–446)
PMV BLD AUTO: 9 FL (ref 9–12.9)
RBC # BLD AUTO: 2.42 M/UL (ref 4.7–6.1)
WBC # BLD AUTO: 8.4 K/UL (ref 4.8–10.8)

## 2019-08-07 PROCEDURE — 700102 HCHG RX REV CODE 250 W/ 637 OVERRIDE(OP): Performed by: PHYSICAL MEDICINE & REHABILITATION

## 2019-08-07 PROCEDURE — 700112 HCHG RX REV CODE 229: Performed by: PHYSICAL MEDICINE & REHABILITATION

## 2019-08-07 PROCEDURE — G0515 COGNITIVE SKILLS DEVELOPMENT: HCPCS

## 2019-08-07 PROCEDURE — A9270 NON-COVERED ITEM OR SERVICE: HCPCS | Performed by: PHYSICAL MEDICINE & REHABILITATION

## 2019-08-07 PROCEDURE — 700111 HCHG RX REV CODE 636 W/ 250 OVERRIDE (IP): Performed by: HOSPITALIST

## 2019-08-07 PROCEDURE — 700111 HCHG RX REV CODE 636 W/ 250 OVERRIDE (IP): Performed by: PHYSICAL MEDICINE & REHABILITATION

## 2019-08-07 PROCEDURE — 36415 COLL VENOUS BLD VENIPUNCTURE: CPT

## 2019-08-07 PROCEDURE — 99232 SBSQ HOSP IP/OBS MODERATE 35: CPT | Performed by: HOSPITALIST

## 2019-08-07 PROCEDURE — 97530 THERAPEUTIC ACTIVITIES: CPT

## 2019-08-07 PROCEDURE — 97110 THERAPEUTIC EXERCISES: CPT

## 2019-08-07 PROCEDURE — 82962 GLUCOSE BLOOD TEST: CPT

## 2019-08-07 PROCEDURE — 700102 HCHG RX REV CODE 250 W/ 637 OVERRIDE(OP): Performed by: HOSPITALIST

## 2019-08-07 PROCEDURE — 99233 SBSQ HOSP IP/OBS HIGH 50: CPT | Performed by: PHYSICAL MEDICINE & REHABILITATION

## 2019-08-07 PROCEDURE — 700105 HCHG RX REV CODE 258: Performed by: HOSPITALIST

## 2019-08-07 PROCEDURE — 85025 COMPLETE CBC W/AUTO DIFF WBC: CPT

## 2019-08-07 PROCEDURE — 97535 SELF CARE MNGMENT TRAINING: CPT

## 2019-08-07 PROCEDURE — A9270 NON-COVERED ITEM OR SERVICE: HCPCS | Performed by: HOSPITALIST

## 2019-08-07 PROCEDURE — 97116 GAIT TRAINING THERAPY: CPT

## 2019-08-07 PROCEDURE — 700101 HCHG RX REV CODE 250: Performed by: PHYSICAL MEDICINE & REHABILITATION

## 2019-08-07 PROCEDURE — 770010 HCHG ROOM/CARE - REHAB SEMI PRIVAT*

## 2019-08-07 RX ORDER — DEXTROSE MONOHYDRATE 25 G/50ML
50 INJECTION, SOLUTION INTRAVENOUS
Status: DISCONTINUED | OUTPATIENT
Start: 2019-08-07 | End: 2019-08-19 | Stop reason: HOSPADM

## 2019-08-07 RX ORDER — CIPROFLOXACIN 500 MG/1
500 TABLET, FILM COATED ORAL EVERY 12 HOURS
Status: DISCONTINUED | OUTPATIENT
Start: 2019-08-07 | End: 2019-08-08

## 2019-08-07 RX ADMIN — LISINOPRIL 40 MG: 20 TABLET ORAL at 08:36

## 2019-08-07 RX ADMIN — TRAMADOL HYDROCHLORIDE 50 MG: 50 TABLET, COATED ORAL at 08:35

## 2019-08-07 RX ADMIN — GABAPENTIN 300 MG: 300 CAPSULE ORAL at 08:35

## 2019-08-07 RX ADMIN — DOCUSATE SODIUM 100 MG: 100 CAPSULE, LIQUID FILLED ORAL at 08:36

## 2019-08-07 RX ADMIN — MIDODRINE HYDROCHLORIDE 2.5 MG: 2.5 TABLET ORAL at 04:19

## 2019-08-07 RX ADMIN — LORATADINE 10 MG: 10 TABLET ORAL at 08:37

## 2019-08-07 RX ADMIN — DOCUSATE SODIUM 100 MG: 100 CAPSULE, LIQUID FILLED ORAL at 19:55

## 2019-08-07 RX ADMIN — PIPERACILLIN AND TAZOBACTAM 3.38 G: 3; .375 INJECTION, POWDER, LYOPHILIZED, FOR SOLUTION INTRAVENOUS; PARENTERAL at 05:03

## 2019-08-07 RX ADMIN — MULTIPLE VITAMINS W/ MINERALS TAB 1 TABLET: TAB at 11:32

## 2019-08-07 RX ADMIN — BISACODYL 10 MG: 10 SUPPOSITORY RECTAL at 04:19

## 2019-08-07 RX ADMIN — CALCIUM CARBONATE (ANTACID) CHEW TAB 500 MG 500 MG: 500 CHEW TAB at 08:35

## 2019-08-07 RX ADMIN — SENNOSIDES 17.2 MG: 8.6 TABLET, FILM COATED ORAL at 19:54

## 2019-08-07 RX ADMIN — FERROUS SULFATE TAB 325 MG (65 MG ELEMENTAL FE) 325 MG: 325 (65 FE) TAB at 17:22

## 2019-08-07 RX ADMIN — OXYCODONE HYDROCHLORIDE AND ACETAMINOPHEN 500 MG: 500 TABLET ORAL at 08:36

## 2019-08-07 RX ADMIN — FERROUS SULFATE TAB 325 MG (65 MG ELEMENTAL FE) 325 MG: 325 (65 FE) TAB at 08:36

## 2019-08-07 RX ADMIN — CYANOCOBALAMIN TAB 1000 MCG 1000 MCG: 1000 TAB at 08:36

## 2019-08-07 RX ADMIN — LIDOCAINE 2 PATCH: 50 PATCH CUTANEOUS at 08:40

## 2019-08-07 RX ADMIN — TERAZOSIN HYDROCHLORIDE 5 MG: 5 CAPSULE ORAL at 04:19

## 2019-08-07 RX ADMIN — SPIRONOLACTONE 25 MG: 25 TABLET ORAL at 08:36

## 2019-08-07 RX ADMIN — AMLODIPINE BESYLATE 10 MG: 5 TABLET ORAL at 08:36

## 2019-08-07 RX ADMIN — OXYCODONE HYDROCHLORIDE AND ACETAMINOPHEN 500 MG: 500 TABLET ORAL at 17:22

## 2019-08-07 RX ADMIN — TRAMADOL HYDROCHLORIDE 50 MG: 50 TABLET, COATED ORAL at 11:32

## 2019-08-07 RX ADMIN — MAGNESIUM OXIDE TAB 400 MG (241.3 MG ELEMENTAL MG) 400 MG: 400 (241.3 MG) TAB at 11:32

## 2019-08-07 RX ADMIN — MAGNESIUM OXIDE TAB 400 MG (241.3 MG ELEMENTAL MG) 400 MG: 400 (241.3 MG) TAB at 08:36

## 2019-08-07 RX ADMIN — VERAPAMIL HYDROCHLORIDE 180 MG: 180 TABLET, FILM COATED, EXTENDED RELEASE ORAL at 08:40

## 2019-08-07 RX ADMIN — GABAPENTIN 300 MG: 300 CAPSULE ORAL at 19:56

## 2019-08-07 RX ADMIN — MAGNESIUM OXIDE TAB 400 MG (241.3 MG ELEMENTAL MG) 400 MG: 400 (241.3 MG) TAB at 17:22

## 2019-08-07 RX ADMIN — FOLIC ACID 1 MG: 1 TABLET ORAL at 08:36

## 2019-08-07 RX ADMIN — METFORMIN HYDROCHLORIDE 1000 MG: 500 TABLET, FILM COATED ORAL at 17:22

## 2019-08-07 RX ADMIN — OMEPRAZOLE 20 MG: 20 CAPSULE, DELAYED RELEASE ORAL at 08:36

## 2019-08-07 RX ADMIN — CIPROFLOXACIN HYDROCHLORIDE 500 MG: 500 TABLET, FILM COATED ORAL at 15:44

## 2019-08-07 RX ADMIN — ENOXAPARIN SODIUM 40 MG: 100 INJECTION SUBCUTANEOUS at 08:37

## 2019-08-07 RX ADMIN — VERAPAMIL HYDROCHLORIDE 180 MG: 180 TABLET, FILM COATED, EXTENDED RELEASE ORAL at 19:55

## 2019-08-07 RX ADMIN — CALCIUM CARBONATE (ANTACID) CHEW TAB 500 MG 500 MG: 500 CHEW TAB at 19:54

## 2019-08-07 RX ADMIN — METFORMIN HYDROCHLORIDE 1000 MG: 500 TABLET, FILM COATED ORAL at 08:35

## 2019-08-07 RX ADMIN — TRAMADOL HYDROCHLORIDE 50 MG: 50 TABLET, COATED ORAL at 15:44

## 2019-08-07 RX ADMIN — PIPERACILLIN AND TAZOBACTAM 3.38 G: 3; .375 INJECTION, POWDER, LYOPHILIZED, FOR SOLUTION INTRAVENOUS; PARENTERAL at 14:07

## 2019-08-07 RX ADMIN — TERAZOSIN HYDROCHLORIDE 5 MG: 5 CAPSULE ORAL at 17:22

## 2019-08-07 RX ADMIN — Medication 1 CAPSULE: at 08:36

## 2019-08-07 RX ADMIN — GABAPENTIN 300 MG: 300 CAPSULE ORAL at 15:44

## 2019-08-07 RX ADMIN — ATORVASTATIN CALCIUM 20 MG: 10 TABLET, FILM COATED ORAL at 19:54

## 2019-08-07 ASSESSMENT — ENCOUNTER SYMPTOMS
EYES NEGATIVE: 1
CHILLS: 0
PALPITATIONS: 0
BACK PAIN: 1
COUGH: 0
FEVER: 0
ABDOMINAL PAIN: 0
NAUSEA: 0
SHORTNESS OF BREATH: 0
VOMITING: 0
FOCAL WEAKNESS: 1

## 2019-08-07 ASSESSMENT — 10 METER WALK TEST (10METWT): TRIAL 1: TIME TO WALK 10 METERS: 19.7

## 2019-08-07 NOTE — PROGRESS NOTES
Hospital Medicine Daily Progress Note      Chief Complaint  HTN, DM, Hyponatremia    Interval Problem Update  Pt seen and examined in dining room.  Doing well.    Review of Systems  Review of Systems   Constitutional: Negative for chills and fever.   HENT: Negative.    Eyes: Negative.    Respiratory: Negative for cough and shortness of breath.    Cardiovascular: Negative for chest pain and palpitations.   Gastrointestinal: Negative for abdominal pain, nausea and vomiting.   Musculoskeletal: Positive for back pain.        Wound pain   Skin: Negative for itching and rash.   Neurological: Positive for focal weakness.        Physical Exam  Temp:  [36.3 °C (97.4 °F)-37.3 °C (99.1 °F)] 36.3 °C (97.4 °F)  Pulse:  [76-80] 77  Resp:  [18-20] 18  BP: (140-155)/(56-70) 155/69  SpO2:  [97 %] 97 %    Physical Exam   Constitutional: He is oriented to person, place, and time. No distress.   HENT:   Head: Normocephalic and atraumatic.   Right Ear: External ear normal.   Left Ear: External ear normal.   Eyes: Conjunctivae and EOM are normal. Right eye exhibits no discharge. Left eye exhibits no discharge.   Neck: Normal range of motion. Neck supple. No tracheal deviation present.   Cardiovascular: Normal rate and regular rhythm.   Pulmonary/Chest: Effort normal and breath sounds normal. No stridor. No respiratory distress. He has no wheezes.   Abdominal: Soft. Bowel sounds are normal. He exhibits no distension. There is no tenderness.   Musculoskeletal: He exhibits no edema or tenderness.   Neurological: He is alert and oriented to person, place, and time.   Skin: Skin is warm and dry. He is not diaphoretic.   Vitals reviewed.      Fluids    Intake/Output Summary (Last 24 hours) at 8/7/2019 1417  Last data filed at 8/7/2019 1156  Gross per 24 hour   Intake 1710 ml   Output 2100 ml   Net -390 ml       Laboratory  Recent Labs     08/05/19  0543 08/07/19  0550   WBC 7.6 8.4   RBC 2.40* 2.42*   HEMOGLOBIN 7.7* 7.6*   HEMATOCRIT 23.4*  23.5*   MCV 97.5 97.1   MCH 32.1 31.4   MCHC 32.9* 32.3*   RDW 49.1 48.6   PLATELETCT 252 242   MPV 9.1 9.0     Recent Labs     08/05/19  0543   SODIUM 132*   POTASSIUM 3.9   CHLORIDE 99   CO2 23   GLUCOSE 169*   BUN 7*   CREATININE 0.92   CALCIUM 8.6                   Assessment/Plan  * T5 spinal cord injury (HCC)- (present on admission)  Assessment & Plan  S/P T5 extradural mass resection w/ T4-T6 laminectomy and T3-T7 fusion on 7/20/19 by Dr. Velasco  Pathology  +plasmablastic plasmacytoma  Continue wound care    Hypomagnesemia  Assessment & Plan  Continue MgOx    Sepsis secondary to UTI (HCC)  Assessment & Plan  UA w/ packed WBC and many bacteria  Unfortunately no UCx done  Clinically resolving on empiric Zosyn and Zyvox  Discontinued Zyvox yesterday  De-escalate Zosyn to Cipro    History of melanoma  Assessment & Plan  S/P resection x 2  Outpt F/U    BPH (benign prostatic hyperplasia)  Assessment & Plan  Monitor for orthostatic hypotension on Hytrin bid    Anemia- (present on admission)  Assessment & Plan  S/P PRBC prior to Rehab transfer  Continue Fe and Vit C  Also on Vit B12 and Folate supplements     PVD (peripheral vascular disease) ([facility])- (present on admission)  Assessment & Plan  Plavix on hold post-operatively    Dyslipidemia- (present on admission)  Assessment & Plan  On Lipitor    Essential hypertension- (present on admission)  Assessment & Plan  Echo EF 65% w/ mild AS  On Norvasc, Lisinopril, Aldactone, and Verapamil  Attempt to taper off Midodrine    Type 2 diabetes mellitus without complication, without long-term current use of insulin ([facility])- (present on admission)  Assessment & Plan  HbA1c 6.8  Continue Metformin  Off Lantus and  Glipizide  Outpt meds include Metformin, Glipizide, and Januvia    Full Code

## 2019-08-07 NOTE — PROGRESS NOTES
"Rehab Progress Note     Date of Service: 8/7/2019  Chief Complaint: follow up SCI    Interval Events (Subjective)    Patient seen and examined in the therapy gym today. He is playing a game with recreational therapy. He reports his is moving his bowels with his morning bowel program. He is urinating without issues. He denies any pain. He has no complaints.     Objective:  VITAL SIGNS: /69   Pulse 77   Temp 36.3 °C (97.4 °F) (Temporal)   Resp 18   Ht 1.778 m (5' 10\")   Wt 97.5 kg (215 lb)   SpO2 97%   BMI 30.85 kg/m²   Gen: alert, no apparent distress  CV: regular rate and rhythm, no murmurs, no peripheral edema  Resp: clear to ascultation bilaterally, normal respiratory effort  GI: soft, non-tender abdomen, bowel sounds present    Recent Results (from the past 72 hour(s))   ACCU-CHEK GLUCOSE    Collection Time: 08/04/19  5:12 PM   Result Value Ref Range    Glucose - Accu-Ck 112 (H) 65 - 99 mg/dL   ACCU-CHEK GLUCOSE    Collection Time: 08/04/19  8:11 PM   Result Value Ref Range    Glucose - Accu-Ck 114 (H) 65 - 99 mg/dL   CBC WITH DIFFERENTIAL    Collection Time: 08/05/19  5:43 AM   Result Value Ref Range    WBC 7.6 4.8 - 10.8 K/uL    RBC 2.40 (L) 4.70 - 6.10 M/uL    Hemoglobin 7.7 (L) 14.0 - 18.0 g/dL    Hematocrit 23.4 (L) 42.0 - 52.0 %    MCV 97.5 81.4 - 97.8 fL    MCH 32.1 27.0 - 33.0 pg    MCHC 32.9 (L) 33.7 - 35.3 g/dL    RDW 49.1 35.9 - 50.0 fL    Platelet Count 252 164 - 446 K/uL    MPV 9.1 9.0 - 12.9 fL    Neutrophils-Polys 72.60 (H) 44.00 - 72.00 %    Lymphocytes 10.00 (L) 22.00 - 41.00 %    Monocytes 12.80 0.00 - 13.40 %    Eosinophils 2.20 0.00 - 6.90 %    Basophils 0.40 0.00 - 1.80 %    Immature Granulocytes 2.00 (H) 0.00 - 0.90 %    Nucleated RBC 0.00 /100 WBC    Neutrophils (Absolute) 5.51 1.82 - 7.42 K/uL    Lymphs (Absolute) 0.76 (L) 1.00 - 4.80 K/uL    Monos (Absolute) 0.97 (H) 0.00 - 0.85 K/uL    Eos (Absolute) 0.17 0.00 - 0.51 K/uL    Baso (Absolute) 0.03 0.00 - 0.12 K/uL    Immature " Granulocytes (abs) 0.15 (H) 0.00 - 0.11 K/uL    NRBC (Absolute) 0.00 K/uL   Comp Metabolic Panel    Collection Time: 08/05/19  5:43 AM   Result Value Ref Range    Sodium 132 (L) 135 - 145 mmol/L    Potassium 3.9 3.6 - 5.5 mmol/L    Chloride 99 96 - 112 mmol/L    Co2 23 20 - 33 mmol/L    Anion Gap 10.0 0.0 - 11.9    Glucose 169 (H) 65 - 99 mg/dL    Bun 7 (L) 8 - 22 mg/dL    Creatinine 0.92 0.50 - 1.40 mg/dL    Calcium 8.6 8.5 - 10.5 mg/dL    AST(SGOT) 18 12 - 45 U/L    ALT(SGPT) 32 2 - 50 U/L    Alkaline Phosphatase 60 30 - 99 U/L    Total Bilirubin 0.4 0.1 - 1.5 mg/dL    Albumin 3.2 3.2 - 4.9 g/dL    Total Protein 6.9 6.0 - 8.2 g/dL    Globulin 3.7 (H) 1.9 - 3.5 g/dL    A-G Ratio 0.9 g/dL   MAGNESIUM    Collection Time: 08/05/19  5:43 AM   Result Value Ref Range    Magnesium 1.3 (L) 1.5 - 2.5 mg/dL   PHOSPHORUS    Collection Time: 08/05/19  5:43 AM   Result Value Ref Range    Phosphorus 3.9 2.5 - 4.5 mg/dL   ESTIMATED GFR    Collection Time: 08/05/19  5:43 AM   Result Value Ref Range    GFR If African American >60 >60 mL/min/1.73 m 2    GFR If Non African American >60 >60 mL/min/1.73 m 2   ACCU-CHEK GLUCOSE    Collection Time: 08/05/19  7:50 AM   Result Value Ref Range    Glucose - Accu-Ck 149 (H) 65 - 99 mg/dL   ACCU-CHEK GLUCOSE    Collection Time: 08/05/19 11:02 AM   Result Value Ref Range    Glucose - Accu-Ck 171 (H) 65 - 99 mg/dL   ACCU-CHEK GLUCOSE    Collection Time: 08/05/19  5:27 PM   Result Value Ref Range    Glucose - Accu-Ck 99 65 - 99 mg/dL   ACCU-CHEK GLUCOSE    Collection Time: 08/05/19  8:08 PM   Result Value Ref Range    Glucose - Accu-Ck 109 (H) 65 - 99 mg/dL   URINALYSIS    Collection Time: 08/06/19 12:00 AM   Result Value Ref Range    Color Yellow     Character Clear     Specific Gravity <=1.005 <1.035    Ph 6.0 5.0 - 8.0    Glucose Negative Negative mg/dL    Ketones Negative Negative mg/dL    Protein Negative Negative mg/dL    Bilirubin Negative Negative    Urobilinogen, Urine 0.2 Negative     Nitrite Negative Negative    Leukocyte Esterase Negative Negative    Occult Blood Trace (A) Negative    Micro Urine Req Microscopic    URINE MICROSCOPIC (W/UA)    Collection Time: 08/06/19 12:00 AM   Result Value Ref Range    WBC 0-2 (A) /hpf    RBC 0-2 (A) /hpf    Bacteria Negative None /hpf    Epithelial Cells Negative /hpf    Hyaline Cast 0-2 /lpf   ACCU-CHEK GLUCOSE    Collection Time: 08/06/19  7:29 AM   Result Value Ref Range    Glucose - Accu-Ck 143 (H) 65 - 99 mg/dL   ACCU-CHEK GLUCOSE    Collection Time: 08/06/19 11:22 AM   Result Value Ref Range    Glucose - Accu-Ck 129 (H) 65 - 99 mg/dL   ACCU-CHEK GLUCOSE    Collection Time: 08/06/19  5:16 PM   Result Value Ref Range    Glucose - Accu-Ck 109 (H) 65 - 99 mg/dL   ACCU-CHEK GLUCOSE    Collection Time: 08/06/19  8:44 PM   Result Value Ref Range    Glucose - Accu-Ck 130 (H) 65 - 99 mg/dL   CBC WITH DIFFERENTIAL    Collection Time: 08/07/19  5:50 AM   Result Value Ref Range    WBC 8.4 4.8 - 10.8 K/uL    RBC 2.42 (L) 4.70 - 6.10 M/uL    Hemoglobin 7.6 (L) 14.0 - 18.0 g/dL    Hematocrit 23.5 (L) 42.0 - 52.0 %    MCV 97.1 81.4 - 97.8 fL    MCH 31.4 27.0 - 33.0 pg    MCHC 32.3 (L) 33.7 - 35.3 g/dL    RDW 48.6 35.9 - 50.0 fL    Platelet Count 242 164 - 446 K/uL    MPV 9.0 9.0 - 12.9 fL    Neutrophils-Polys 71.90 44.00 - 72.00 %    Lymphocytes 10.30 (L) 22.00 - 41.00 %    Monocytes 11.70 0.00 - 13.40 %    Eosinophils 2.40 0.00 - 6.90 %    Basophils 0.40 0.00 - 1.80 %    Immature Granulocytes 3.30 (H) 0.00 - 0.90 %    Nucleated RBC 0.00 /100 WBC    Neutrophils (Absolute) 6.04 1.82 - 7.42 K/uL    Lymphs (Absolute) 0.86 (L) 1.00 - 4.80 K/uL    Monos (Absolute) 0.98 (H) 0.00 - 0.85 K/uL    Eos (Absolute) 0.20 0.00 - 0.51 K/uL    Baso (Absolute) 0.03 0.00 - 0.12 K/uL    Immature Granulocytes (abs) 0.28 (H) 0.00 - 0.11 K/uL    NRBC (Absolute) 0.00 K/uL   ACCU-CHEK GLUCOSE    Collection Time: 08/07/19  8:08 AM   Result Value Ref Range    Glucose - Accu-Ck 146 (H) 65 - 99  mg/dL   ACCU-CHEK GLUCOSE    Collection Time: 08/07/19 11:29 AM   Result Value Ref Range    Glucose - Accu-Ck 117 (H) 65 - 99 mg/dL       Current Facility-Administered Medications   Medication Frequency   • magnesium oxide (MAG-OX) tablet 400 mg TID WITH MEALS   • ferrous sulfate tablet 325 mg BID WITH MEALS   • ascorbic acid tablet 500 mg BID WITH MEALS   • bisacodyl (THE MAGIC BULLET) suppository 10 mg DAILY   • docusate sodium (COLACE) capsule 100 mg BID   • sennosides (SENOKOT) 8.6 MG tablet 17.2 mg QHS   • midodrine (PROAMATINE) tablet 2.5 mg Daily-0600   • lidocaine (XYLOCAINE) 2 % jelly PRN   • lactobacillus rhamnosus (CULTURELLE) capsule 1 Cap QDAY with Breakfast   • cyanocobalamin (VITAMIN B12) tablet 1,000 mcg DAILY   • folic acid (FOLVITE) tablet 1 mg DAILY   • gabapentin (NEURONTIN) capsule 300 mg TID   • piperacillin-tazobactam (ZOSYN) 3.375 g in  mL IVPB Q8HRS   • midodrine (PROAMATINE) tablet 5 mg Q4HRS PRN   • enoxaparin (LOVENOX) inj 40 mg DAILY   • tramadol (ULTRAM) 50 MG tablet 50 mg TID   • lidocaine (LIDODERM) 5 % 2 Patch Q24HR   • insulin lispro (HUMALOG) injection 2-12 Units 4X/DAY ACHS    And   • glucose 4 g chewable tablet 16 g Q15 MIN PRN    And   • dextrose 50% (D50W) injection 50 mL Q15 MIN PRN   • metFORMIN (GLUCOPHAGE) tablet 1,000 mg BID WITH MEALS   • amLODIPine (NORVASC) tablet 10 mg DAILY   • atorvastatin (LIPITOR) tablet 20 mg Nightly   • glucose 4 g chewable tablet 16 g PRN   • lisinopril (PRINIVIL) tablet 40 mg DAILY   • loratadine (CLARITIN) tablet 10 mg DAILY   • omeprazole (PRILOSEC) capsule 20 mg DAILY   • spironolactone (ALDACTONE) tablet 25 mg DAILY   • terazosin (HYTRIN) capsule 5 mg BID   • verapamil ER (CALAN-SR) tablet 180 mg BID   • Pharmacy Consult Request ...Pain Management Review 1 Each PHARMACY TO DOSE   • Respiratory Care per Protocol Continuous RT   • oxyCODONE immediate-release (ROXICODONE) tablet 5 mg Q3HRS PRN   • oxyCODONE immediate release (ROXICODONE)  tablet 10 mg Q3HRS PRN   • acetaminophen (TYLENOL) tablet 650 mg Q4HRS PRN   • therapeutic multivitamin-minerals (THERAGRAN-M) tablet 1 Tab DAILY WITH LUNCH   • artificial tears 1.4 % ophthalmic solution 1 Drop PRN   • benzocaine-menthol (CEPACOL) lozenge 1 Lozenge Q2HRS PRN   • mag hydrox-al hydrox-simeth (MAALOX PLUS ES or MYLANTA DS) suspension 20 mL Q2HRS PRN   • ondansetron (ZOFRAN ODT) dispertab 4 mg 4X/DAY PRN    Or   • ondansetron (ZOFRAN) syringe/vial injection 4 mg 4X/DAY PRN   • traZODone (DESYREL) tablet 50 mg QHS PRN   • sodium chloride (OCEAN) 0.65 % nasal spray 2 Spray PRN   • calcium carbonate (TUMS) chewable tab 500 mg BID       Orders Placed This Encounter   Procedures   • Diet Order Diabetic     Standing Status:   Standing     Number of Occurrences:   1     Order Specific Question:   Diet:     Answer:   Diabetic [3]       Assessment:  Active Hospital Problems    Diagnosis   • *T5 spinal cord injury (HCC)   • PVD (peripheral vascular disease) (HCC)   • Type 2 diabetes mellitus without complication, without long-term current use of insulin (HCC)   • Essential hypertension   • Dyslipidemia   • Obesity   • BPH (benign prostatic hyperplasia)   • History of melanoma   • Neuropathic pain   • Neurogenic bladder   • Neurogenic bowel   • Hyponatremia   • 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HCC)   • Acute kidney injury (HCC)   • Vitamin D deficiency   • Anemia       Medical Decision Making and Plan:    T5 AIS D spinal cord injury  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 Velasco  Continue full rehab program  PT/OT, 1.5 hr each discipline, 5 days per week  Outpatient follow up with oncology regarding tissue diagnosis    Neurogenic orthostasis  Continue midodrine    Neuropathic pain management  Continue gabapentin    Nociceptive pain management  Continue scheduled Tramadol  Continue PRN oxycodone  Continue scheduled tylenol  Continue lidocaine patch    Iron deficiency  anemia  Continue ferrous sulfate and Vit C  Also on B12 and folate    Hypertension  Continue amlodipine  Continue lisinopril  Continue Verapamil  Continue spironolactone  Continue terazosin    Hypomagnesemia  Continue supplementation    Dyslipidemia  Continue statin    Sepsis due to UTI  Continue antibiotics  Appreciate hospitalist assistance    Diabetes  Continue metformin  Continue Lantus  Appreciate hospitalist assistance    Anxiety/adjustment disorder  Continue Cymbalta  Dr. Yoo consulted    Neurogenic bladder  BPH  Continue terazosin     Neurogenic bowel  Continue bowel program  Last BM 8/7    DVT prophylaxis  Lovenox    Total time:  35 minutes.  I spent greater than 50% of the time for patient care, counseling, and coordination on this date, including patient face-to face time, unit/floor time with review of records/pertinent lab data and studies, as well as discussing diagnostic evaluation/work up, planned therapeutic interventions, and future disposition of care, as per the interval events/subjective and the assessment and plan as noted above.    Mini Rodriguez M.D.   Physical Medicine and Rehabilitation

## 2019-08-07 NOTE — CARE PLAN
Problem: Communication  Goal: The ability to communicate needs accurately and effectively will improve  Outcome: MET  Note:   Pt is able to communicate his needs effectively to staff.      Problem: Safety  Goal: Will remain free from injury  Outcome: PROGRESSING SLOWER THAN EXPECTED  Note:   Pt is high fall risk with alarms in place for safety.     Problem: Infection  Goal: Will remain free from infection  Outcome: PROGRESSING SLOWER THAN EXPECTED  Note:   IV zosyn for UTI     Problem: Bowel/Gastric:  Goal: Normal bowel function is maintained or improved  Outcome: PROGRESSING SLOWER THAN EXPECTED  Note:   Pt continent of large loose BM this morning. Staff inserted magic bullet suppository. Pt did his own dig stim with staff instruction. Colace held at HS for previous loose stools.      Problem: Pain Management  Goal: Pain level will decrease to patient's comfort goal  Outcome: PROGRESSING AS EXPECTED  Note:   No reports of pain at this time. Will continue to monitor through shift with hourly rounds.      Problem: Urinary Elimination:  Goal: Ability to reestablish a normal urinary elimination pattern will improve  Outcome: MET  Note:   All PVR bladder scans have been < 250ml this shift.     Problem: Respiratory:  Goal: Respiratory status will improve  Outcome: MET  Note:   Pt is on room air and respirations are WNL.

## 2019-08-07 NOTE — THERAPY
Recreational Therapy  Daily Treatment     Patient Name: Ian Laird  AGE:  69 y.o., SEX:  male  Medical Record #: [id number]  Today's Date: 8/7/2019       Subjective    Ready and willing to do session. He said that he was happy he attended even though this was added on his schedule late.      Objective       08/07/19 1119   Functional Ability Status - Cognitive   Attention Span Remains on Task   Comprehension Follows Three Step Commands   Judgment Able to Make Independent Decisions   Functional Ability Status - Emotional    Affect Appropriate   Mood Appropriate   Behavior Appropriate   Skilled Intervention    Skilled Intervention Cognitive Leisure   Skilled Intervention Comments Played a new version of [facility]   Interdisciplinary Plan of Care Collaboration   IDT Collaboration with  Other (See Comments)  (Recreation Therapy Student)   Patient Position at End of Therapy In Bed;Call Light within Reach;Tray Table within Reach;Phone within Reach   Collaboration Comments Student assisted in teaching the new activity   Treatment Time   Total Time Spent (mins) 30   Procedural Tracking   Procedural Tracking Cognitive Skills Training       Assessment    He was taught a new cognitive activity that was a different version from a game he had played in the past. He didn't show signs of frustration during the session even when he made mistakes. Was able to share on the rules when asked.      Plan    Have Pt share on the rules from this game and session in the next session. Teach a peer or this writer the game next time.

## 2019-08-07 NOTE — THERAPY
"Speech Language Pathology  Daily Treatment     Patient Name: Ian Laird  Age:  69 y.o., Sex:  male  Medical Record #: [id number]  Today's Date: 8/7/2019     Subjective    Pt and spouse attending session. Spouse reports pt \"looks so much better and his coloring has improved.\"      Objective       08/07/19 1304   Cognition   Moderate Attention Minimal (4)   Insight into Deficits Minimal (4)   Medication Management  Moderate (3)   Interdisciplinary Plan of Care Collaboration   IDT Collaboration with  Family / Caregiver;Nursing;Physician   Patient Position at End of Therapy Seated;Family / Friend in Room   Collaboration Comments POC for bowel program with nursing, physician, pt and spouse   SLP Total Time Spent   SLP Individual Total Time Spent (Mins) 60   Charge Group   SLP Cognitive Skill Development 4       FIM Eating Score:  6 - Modified Independent  Eating Description:       FIM Comprehension Score:  6 - Modified Independent  Comprehension Description:  Glasses, Verbal cues    FIM Expression Score:  7 - Independent  Expression Description:       FIM Social Interaction Score:  7 - Independent  Social Interaction Description:       FIM Problem Solving Score:  5 - Standby Prompting/Supervision or Set-up  Problem Solving Description:  Verbal cueing, Therapy schedule, Increased time    FIM Memory Score:  4 - Minimal Assistance  Memory Description:  Verbal cueing, Therapy schedule      Assessment    Pt did not recall to complete FSBG or BP logs x2 days. Pt encouraged to keep in visual place in order to remember to complete at time of nursing contact. Alternating attention task using simple calendar entries. Pt easily distracted during task when SLP and spouse discussing POC, requiring redirection. Benefits from low stim environment in order to maintain attention to task. Pt may benefit from therapy in gym or dining room to target selective attention and ability to self redirect.     Plan    Continue to target " attention, memory

## 2019-08-07 NOTE — THERAPY
Physical Therapy   Daily Treatment     Patient Name: Ian Laird  Age:  69 y.o., Sex:  male  Medical Record #: [id number]  Today's Date: 8/7/2019     Precautions  Precautions: Fall Risk, Spinal / Back Precautions   Comments: T3-T7 spinal fusion    Subjective    Pt was lying in bed upon arrival and agreeable to tx     Objective       08/07/19 1431   Precautions   Precautions Fall Risk;Spinal / Back Precautions    Comments T3-T7 spinal fusion   10 Meter Walk Test   Normal - Trial 1 19.7 seconds   Interdisciplinary Plan of Care Collaboration   Patient Position at End of Therapy Seated;Tray Table within Reach;Family / Friend in Room;Phone within Reach   PT Total Time Spent   PT Individual Total Time Spent (Mins) 60   PT Charge Group   PT Gait Training 2   PT Therapeutic Activities 2     FIM Walking Score:  2 - Max Assistance  Walking Description:  (68ft x1, 34ft x1 FWW SBA, level terrain)    Family training completed for bed mobility/ transfer, toilet transfer with Marion, wife.    Education equipment needed for d/c and purchasing options (padded commode, car cane).  Review of passport, POC/ goals of rehab    Assessment    Pt with within session improvements in step length/ width and posture during session. Marion and Jaiden demonstrated safe technique when performing transfers.     Plan    Issue Standing therex HEP, standing balance, trial AMB with SPC in solostep, trial AMB with 4ww in solostep, progress through passport

## 2019-08-07 NOTE — THERAPY
Occupational Therapy  Daily Treatment     Patient Name: Ian Laird  Age:  69 y.o., Sex:  male  Medical Record #: [id number]  Today's Date: 2019     Precautions  Precautions: (P) Fall Risk, Spinal / Back Precautions   Comments: T3-T7 spinal fusion    Safety   ADL Safety : Requires Supervision for Safety, Requires Physical Assist for Safety  Bathroom Safety: Requires Supervision for Safety, Requires Physical Assist for Safety    Subjective    Patient lying in bed awake and agreeable to OT.     Objective       19 0701   Precautions   Precautions Fall Risk;Spinal / Back Precautions    Sitting Lower Body Exercises   Nustep Time (See Comments)  (gear 4 x 12 with multiple rest breaks w/ B UE/LE)   Bed Mobility    Supine to Sit Supervised   Scooting Supervised   Interdisciplinary Plan of Care Collaboration   Patient Position at End of Therapy Seated  (in dining room)   OT Total Time Spent   OT Individual Total Time Spent (Mins) 60   OT Charge Group   OT Self Care / ADL 2   OT Therapeutic Exercise  2     Lat pulldowns w/ B UE with 30 lbs for 1 x 10, 1 x 20, 1 x 25    FIM Grooming Score:  7 - Independent  Grooming Description:       FIM Lower Body Dressing Score:  5 - Standby Prompting/Supervsion or Set-up  Lower Body Dressing Description:  Sock aid, Set-up of equipment, Supervision for safety    FIM Toiletin - Standby Prompting/Supervision or Set-up  Toileting Description:  Grab bar, Supervision for safety, Set-up of equipment, Initial preparation for task, Verbal cueing(setup/sba 3/3 tasks w/ grab bar)      Assessment    Patient continues to do well with adl routine, but still limited by low activity tolerance/endurance.  Required 5-6 rest breaks to complete 12 minutes on nustep, level 4.    Plan  Cont overall strength/endurance and standing balance to improve ADL's and functional mobility

## 2019-08-08 LAB
GLUCOSE BLD-MCNC: 101 MG/DL (ref 65–99)
GLUCOSE BLD-MCNC: 154 MG/DL (ref 65–99)

## 2019-08-08 PROCEDURE — 97535 SELF CARE MNGMENT TRAINING: CPT

## 2019-08-08 PROCEDURE — 97116 GAIT TRAINING THERAPY: CPT

## 2019-08-08 PROCEDURE — A9270 NON-COVERED ITEM OR SERVICE: HCPCS | Performed by: HOSPITALIST

## 2019-08-08 PROCEDURE — 97530 THERAPEUTIC ACTIVITIES: CPT

## 2019-08-08 PROCEDURE — 700112 HCHG RX REV CODE 229: Performed by: PHYSICAL MEDICINE & REHABILITATION

## 2019-08-08 PROCEDURE — 700111 HCHG RX REV CODE 636 W/ 250 OVERRIDE (IP): Performed by: PHYSICAL MEDICINE & REHABILITATION

## 2019-08-08 PROCEDURE — A9270 NON-COVERED ITEM OR SERVICE: HCPCS | Performed by: PHYSICAL MEDICINE & REHABILITATION

## 2019-08-08 PROCEDURE — 97110 THERAPEUTIC EXERCISES: CPT

## 2019-08-08 PROCEDURE — 700102 HCHG RX REV CODE 250 W/ 637 OVERRIDE(OP): Performed by: HOSPITALIST

## 2019-08-08 PROCEDURE — G0515 COGNITIVE SKILLS DEVELOPMENT: HCPCS

## 2019-08-08 PROCEDURE — 770010 HCHG ROOM/CARE - REHAB SEMI PRIVAT*

## 2019-08-08 PROCEDURE — 99233 SBSQ HOSP IP/OBS HIGH 50: CPT | Performed by: PHYSICAL MEDICINE & REHABILITATION

## 2019-08-08 PROCEDURE — 99232 SBSQ HOSP IP/OBS MODERATE 35: CPT | Performed by: HOSPITALIST

## 2019-08-08 PROCEDURE — 700101 HCHG RX REV CODE 250: Performed by: PHYSICAL MEDICINE & REHABILITATION

## 2019-08-08 PROCEDURE — 700102 HCHG RX REV CODE 250 W/ 637 OVERRIDE(OP): Performed by: PHYSICAL MEDICINE & REHABILITATION

## 2019-08-08 PROCEDURE — 82962 GLUCOSE BLOOD TEST: CPT

## 2019-08-08 RX ORDER — BISACODYL 10 MG/1
10 SUPPOSITORY RECTAL DAILY
Status: DISCONTINUED | OUTPATIENT
Start: 2019-08-09 | End: 2019-08-14

## 2019-08-08 RX ORDER — CIPROFLOXACIN 500 MG/1
500 TABLET, FILM COATED ORAL EVERY 12 HOURS
Status: COMPLETED | OUTPATIENT
Start: 2019-08-08 | End: 2019-08-10

## 2019-08-08 RX ADMIN — CALCIUM CARBONATE (ANTACID) CHEW TAB 500 MG 500 MG: 500 CHEW TAB at 19:47

## 2019-08-08 RX ADMIN — OMEPRAZOLE 20 MG: 20 CAPSULE, DELAYED RELEASE ORAL at 08:19

## 2019-08-08 RX ADMIN — TERAZOSIN HYDROCHLORIDE 5 MG: 5 CAPSULE ORAL at 04:58

## 2019-08-08 RX ADMIN — LIDOCAINE 2 PATCH: 50 PATCH CUTANEOUS at 10:39

## 2019-08-08 RX ADMIN — CIPROFLOXACIN HYDROCHLORIDE 500 MG: 500 TABLET, FILM COATED ORAL at 05:43

## 2019-08-08 RX ADMIN — OXYCODONE HYDROCHLORIDE AND ACETAMINOPHEN 500 MG: 500 TABLET ORAL at 08:19

## 2019-08-08 RX ADMIN — LORATADINE 10 MG: 10 TABLET ORAL at 08:19

## 2019-08-08 RX ADMIN — GABAPENTIN 300 MG: 300 CAPSULE ORAL at 08:19

## 2019-08-08 RX ADMIN — CYANOCOBALAMIN TAB 1000 MCG 1000 MCG: 1000 TAB at 08:19

## 2019-08-08 RX ADMIN — MAGNESIUM OXIDE TAB 400 MG (241.3 MG ELEMENTAL MG) 400 MG: 400 (241.3 MG) TAB at 11:22

## 2019-08-08 RX ADMIN — AMLODIPINE BESYLATE 10 MG: 5 TABLET ORAL at 08:19

## 2019-08-08 RX ADMIN — DOCUSATE SODIUM 100 MG: 100 CAPSULE, LIQUID FILLED ORAL at 19:47

## 2019-08-08 RX ADMIN — OXYCODONE HYDROCHLORIDE AND ACETAMINOPHEN 500 MG: 500 TABLET ORAL at 17:26

## 2019-08-08 RX ADMIN — Medication 1 CAPSULE: at 08:19

## 2019-08-08 RX ADMIN — METFORMIN HYDROCHLORIDE 1000 MG: 500 TABLET, FILM COATED ORAL at 17:26

## 2019-08-08 RX ADMIN — TRAMADOL HYDROCHLORIDE 50 MG: 50 TABLET, COATED ORAL at 08:20

## 2019-08-08 RX ADMIN — METFORMIN HYDROCHLORIDE 1000 MG: 500 TABLET, FILM COATED ORAL at 08:19

## 2019-08-08 RX ADMIN — VERAPAMIL HYDROCHLORIDE 180 MG: 180 TABLET, FILM COATED, EXTENDED RELEASE ORAL at 19:47

## 2019-08-08 RX ADMIN — ATORVASTATIN CALCIUM 20 MG: 10 TABLET, FILM COATED ORAL at 19:47

## 2019-08-08 RX ADMIN — CIPROFLOXACIN HYDROCHLORIDE 500 MG: 500 TABLET, FILM COATED ORAL at 17:26

## 2019-08-08 RX ADMIN — INSULIN LISPRO 2 UNITS: 100 INJECTION, SOLUTION INTRAVENOUS; SUBCUTANEOUS at 08:00

## 2019-08-08 RX ADMIN — LISINOPRIL 40 MG: 20 TABLET ORAL at 08:19

## 2019-08-08 RX ADMIN — FERROUS SULFATE TAB 325 MG (65 MG ELEMENTAL FE) 325 MG: 325 (65 FE) TAB at 17:26

## 2019-08-08 RX ADMIN — TRAMADOL HYDROCHLORIDE 50 MG: 50 TABLET, COATED ORAL at 11:22

## 2019-08-08 RX ADMIN — MULTIPLE VITAMINS W/ MINERALS TAB 1 TABLET: TAB at 11:22

## 2019-08-08 RX ADMIN — SPIRONOLACTONE 25 MG: 25 TABLET ORAL at 08:19

## 2019-08-08 RX ADMIN — SENNOSIDES 17.2 MG: 8.6 TABLET, FILM COATED ORAL at 19:47

## 2019-08-08 RX ADMIN — ENOXAPARIN SODIUM 40 MG: 100 INJECTION SUBCUTANEOUS at 08:20

## 2019-08-08 RX ADMIN — MAGNESIUM OXIDE TAB 400 MG (241.3 MG ELEMENTAL MG) 400 MG: 400 (241.3 MG) TAB at 17:26

## 2019-08-08 RX ADMIN — TRAMADOL HYDROCHLORIDE 50 MG: 50 TABLET, COATED ORAL at 16:32

## 2019-08-08 RX ADMIN — MAGNESIUM OXIDE TAB 400 MG (241.3 MG ELEMENTAL MG) 400 MG: 400 (241.3 MG) TAB at 08:19

## 2019-08-08 RX ADMIN — CALCIUM CARBONATE (ANTACID) CHEW TAB 500 MG 500 MG: 500 CHEW TAB at 08:19

## 2019-08-08 RX ADMIN — FOLIC ACID 1 MG: 1 TABLET ORAL at 08:19

## 2019-08-08 RX ADMIN — GABAPENTIN 300 MG: 300 CAPSULE ORAL at 16:32

## 2019-08-08 RX ADMIN — TERAZOSIN HYDROCHLORIDE 5 MG: 5 CAPSULE ORAL at 16:32

## 2019-08-08 RX ADMIN — GABAPENTIN 300 MG: 300 CAPSULE ORAL at 19:47

## 2019-08-08 RX ADMIN — VERAPAMIL HYDROCHLORIDE 180 MG: 180 TABLET, FILM COATED, EXTENDED RELEASE ORAL at 08:19

## 2019-08-08 RX ADMIN — FERROUS SULFATE TAB 325 MG (65 MG ELEMENTAL FE) 325 MG: 325 (65 FE) TAB at 08:19

## 2019-08-08 RX ADMIN — DOCUSATE SODIUM 100 MG: 100 CAPSULE, LIQUID FILLED ORAL at 08:19

## 2019-08-08 ASSESSMENT — ENCOUNTER SYMPTOMS
BACK PAIN: 1
CONSTIPATION: 1
VOMITING: 0
SHORTNESS OF BREATH: 0
EYES NEGATIVE: 1
ABDOMINAL PAIN: 0
NAUSEA: 0
FEVER: 0
PALPITATIONS: 0
CHILLS: 0
FOCAL WEAKNESS: 1
COUGH: 0

## 2019-08-08 NOTE — THERAPY
Occupational Therapy  Daily Treatment     Patient Name: Ian Laird  Age:  69 y.o., Sex:  male  Medical Record #: [id number]  Today's Date: 2019     Precautions  Precautions: (P) Fall Risk, Spinal / Back Precautions   Comments: T3-T7 spinal fusion    Safety   ADL Safety : Requires Supervision for Safety, Requires Physical Assist for Safety  Bathroom Safety: Requires Supervision for Safety, Requires Physical Assist for Safety    Subjective    Patient agreeable to shower.     Objective       19 0701   Precautions   Precautions Fall Risk;Spinal / Back Precautions    Balance   Sitting Balance (Dynamic) Poor +   Comments lost balance posterior in standing when attempting to wash buttocks in shower   Bed Mobility    Supine to Sit Stand by Assist   Scooting Supervised   Skilled Intervention Verbal Cuing   Interdisciplinary Plan of Care Collaboration   Patient Position at End of Therapy Seated;Self Releasing Lap Belt Applied   OT Total Time Spent   OT Individual Total Time Spent (Mins) 60   OT Charge Group   OT Self Care / ADL 4     Patient completed dry stall shower transfer with min A using grab bars and shower chair.  Requires cues for safety.    FIM Grooming Score:  7 - Independent  Grooming Description:       FIM Bathing Score:  4 - Minimal Assistance  Bathing Description:       FIM Upper Body Dressin - Standby Prompting/Supervision or Set-up  Upper Body Dressing Description:  Set-up of equipment    FIM Lower Body Dressing Score:  5 - Standby Prompting/Supervsion or Set-up  Lower Body Dressing Description:  Application of orthotic or brace, Supervision for safety, Set-up of equipment, Reacher, Dressing stick(setup/sba)    FIM Tub/Shower Transfers Score:  5 - Standby Prompting/Supervision or Set-up  Tub/Shower Transfers Description:  Grab bar, Supervision for safety, Set-up of equipment, Adaptive equipment, Initial preparation for task(setup/sba with grab bar and bench)      Assessment    Required  brief min A for standing balance as patient slightly lost balance posterior when washing buttocks in shower.  Impaired safety with stand to sit transfers requiring cues.    Plan    Cont overall strength/endurance and standing balance to improve ADL's and functional mobility

## 2019-08-08 NOTE — PROGRESS NOTES
Hospital Medicine Daily Progress Note      Chief Complaint  HTN, DM, Hyponatremia    Interval Problem Update  Pt c/o having trouble moving his bowels.    Review of Systems  Review of Systems   Constitutional: Negative for chills and fever.   HENT: Negative.    Eyes: Negative.    Respiratory: Negative for cough and shortness of breath.    Cardiovascular: Negative for chest pain and palpitations.   Gastrointestinal: Positive for constipation. Negative for abdominal pain, nausea and vomiting.   Musculoskeletal: Positive for back pain.        Wound pain   Skin: Negative for itching and rash.   Neurological: Positive for focal weakness.        Physical Exam  Temp:  [36.6 °C (97.8 °F)-37.2 °C (98.9 °F)] 36.6 °C (97.8 °F)  Pulse:  [77-85] 77  Resp:  [17-20] 17  BP: (132-150)/(60-70) 132/70  SpO2:  [93 %] 93 %    Physical Exam   Constitutional: He is oriented to person, place, and time. No distress.   HENT:   Head: Normocephalic and atraumatic.   Right Ear: External ear normal.   Left Ear: External ear normal.   Eyes: Conjunctivae and EOM are normal. Right eye exhibits no discharge. Left eye exhibits no discharge.   Neck: Normal range of motion. Neck supple. No tracheal deviation present.   Cardiovascular: Normal rate and regular rhythm.   Pulmonary/Chest: Effort normal and breath sounds normal. No stridor. No respiratory distress. He has no wheezes.   Abdominal: Soft. Bowel sounds are normal. He exhibits no distension. There is no tenderness.   Musculoskeletal: He exhibits no edema or tenderness.   Neurological: He is alert and oriented to person, place, and time.   Skin: Skin is warm and dry. He is not diaphoretic.   Vitals reviewed.      Fluids    Intake/Output Summary (Last 24 hours) at 8/8/2019 1111  Last data filed at 8/8/2019 0800  Gross per 24 hour   Intake 1660 ml   Output 500 ml   Net 1160 ml       Laboratory  Recent Labs     08/07/19  0550   WBC 8.4   RBC 2.42*   HEMOGLOBIN 7.6*   HEMATOCRIT 23.5*   MCV 97.1    MCH 31.4   MCHC 32.3*   RDW 48.6   PLATELETCT 242   MPV 9.0                       Assessment/Plan  * T5 spinal cord injury (HCC)- (present on admission)  Assessment & Plan  S/P T5 extradural mass resection w/ T4-T6 laminectomy and T3-T7 fusion on 7/20/19 by Dr. Velasco  Pathology  +plasmablastic plasmacytoma  Continue wound care    Hypomagnesemia  Assessment & Plan  Continue MgOx    Sepsis secondary to UTI (HCC)  Assessment & Plan  UA w/ packed WBC and many bacteria  Unfortunately no UCx done  Clinically resolving on empiric Zosyn and Zyvox  Abx de-escalated to Cipro    History of melanoma  Assessment & Plan  S/P resection x 2  Outpt F/U    BPH (benign prostatic hyperplasia)  Assessment & Plan  Monitor for orthostatic hypotension on Hytrin bid    Anemia- (present on admission)  Assessment & Plan  S/P PRBC prior to Rehab transfer  Continue Fe and Vit C  Also on Vit B12 and Folate supplements     PVD (peripheral vascular disease) ([facility])- (present on admission)  Assessment & Plan  Plavix on hold post-operatively    Dyslipidemia- (present on admission)  Assessment & Plan  On Lipitor    Essential hypertension- (present on admission)  Assessment & Plan  Echo EF 65% w/ mild AS  On Norvasc, Lisinopril, Aldactone, and Verapamil  Now off Midodrine    Type 2 diabetes mellitus without complication, without long-term current use of insulin ([facility])- (present on admission)  Assessment & Plan  HbA1c 6.8  Continue Metformin  Off Lantus and  Glipizide  Outpt meds include Metformin, Glipizide, and Januvia    Full Code

## 2019-08-08 NOTE — CARE PLAN
Problem: Safety  Goal: Will remain free from injury  Outcome: PROGRESSING AS EXPECTED  Note:   Pt can be impulsive at times. Bed alarm in place and belongings within reach. Pt educated to use call light and wait for staff before getting out of bed.      Problem: Pain Management  Goal: Pain level will decrease to patient's comfort goal  Outcome: PROGRESSING AS EXPECTED  Note:   Pt denied pain when initially assessed. Pt educated to call for changes in pain or other needs. Lidocaine patched removed from back.

## 2019-08-08 NOTE — PROGRESS NOTES
"Rehab Progress Note     Date of Service: 8/8/2019  Chief Complaint: follow up SCI    Interval Events (Subjective)    Patient seen and examined today during his speech therapy session.  We have a long conversation discussing his bowel program.  He reports he would like to do this between 10 and 1130.  He would like to try and see if he can move his bowels without a suppository today as he does feel some return of sensation to his rectum.  He reports that he is urinating just fine without any issues in hopes that his bowel will also return to normal function.  He currently denies any significant pain.  He does report that his cognition is off a little bit since his surgery though it is improving.  Reports a history of peripheral vascular disease requiring a procedure to open up the arteries in his left leg and he was told that he may need another procedure down the road.  He does report that his left leg function has improved since admission here in terms of his strength.    Objective:  VITAL SIGNS: /70   Pulse 77   Temp 36.6 °C (97.8 °F) (Oral)   Resp 17   Ht 1.778 m (5' 10\")   Wt 97.5 kg (215 lb)   SpO2 93%   BMI 30.85 kg/m²   Gen: alert, no apparent distress  CV: regular rate and rhythm, no murmurs, no peripheral edema  Resp: clear to ascultation bilaterally, normal respiratory effort  GI: soft, non-tender abdomen, bowel sounds present    Recent Results (from the past 72 hour(s))   ACCU-CHEK GLUCOSE    Collection Time: 08/05/19  5:27 PM   Result Value Ref Range    Glucose - Accu-Ck 99 65 - 99 mg/dL   ACCU-CHEK GLUCOSE    Collection Time: 08/05/19  8:08 PM   Result Value Ref Range    Glucose - Accu-Ck 109 (H) 65 - 99 mg/dL   URINALYSIS    Collection Time: 08/06/19 12:00 AM   Result Value Ref Range    Color Yellow     Character Clear     Specific Gravity <=1.005 <1.035    Ph 6.0 5.0 - 8.0    Glucose Negative Negative mg/dL    Ketones Negative Negative mg/dL    Protein Negative Negative mg/dL    Bilirubin " Negative Negative    Urobilinogen, Urine 0.2 Negative    Nitrite Negative Negative    Leukocyte Esterase Negative Negative    Occult Blood Trace (A) Negative    Micro Urine Req Microscopic    URINE MICROSCOPIC (W/UA)    Collection Time: 08/06/19 12:00 AM   Result Value Ref Range    WBC 0-2 (A) /hpf    RBC 0-2 (A) /hpf    Bacteria Negative None /hpf    Epithelial Cells Negative /hpf    Hyaline Cast 0-2 /lpf   ACCU-CHEK GLUCOSE    Collection Time: 08/06/19  7:29 AM   Result Value Ref Range    Glucose - Accu-Ck 143 (H) 65 - 99 mg/dL   ACCU-CHEK GLUCOSE    Collection Time: 08/06/19 11:22 AM   Result Value Ref Range    Glucose - Accu-Ck 129 (H) 65 - 99 mg/dL   ACCU-CHEK GLUCOSE    Collection Time: 08/06/19  5:16 PM   Result Value Ref Range    Glucose - Accu-Ck 109 (H) 65 - 99 mg/dL   ACCU-CHEK GLUCOSE    Collection Time: 08/06/19  8:44 PM   Result Value Ref Range    Glucose - Accu-Ck 130 (H) 65 - 99 mg/dL   CBC WITH DIFFERENTIAL    Collection Time: 08/07/19  5:50 AM   Result Value Ref Range    WBC 8.4 4.8 - 10.8 K/uL    RBC 2.42 (L) 4.70 - 6.10 M/uL    Hemoglobin 7.6 (L) 14.0 - 18.0 g/dL    Hematocrit 23.5 (L) 42.0 - 52.0 %    MCV 97.1 81.4 - 97.8 fL    MCH 31.4 27.0 - 33.0 pg    MCHC 32.3 (L) 33.7 - 35.3 g/dL    RDW 48.6 35.9 - 50.0 fL    Platelet Count 242 164 - 446 K/uL    MPV 9.0 9.0 - 12.9 fL    Neutrophils-Polys 71.90 44.00 - 72.00 %    Lymphocytes 10.30 (L) 22.00 - 41.00 %    Monocytes 11.70 0.00 - 13.40 %    Eosinophils 2.40 0.00 - 6.90 %    Basophils 0.40 0.00 - 1.80 %    Immature Granulocytes 3.30 (H) 0.00 - 0.90 %    Nucleated RBC 0.00 /100 WBC    Neutrophils (Absolute) 6.04 1.82 - 7.42 K/uL    Lymphs (Absolute) 0.86 (L) 1.00 - 4.80 K/uL    Monos (Absolute) 0.98 (H) 0.00 - 0.85 K/uL    Eos (Absolute) 0.20 0.00 - 0.51 K/uL    Baso (Absolute) 0.03 0.00 - 0.12 K/uL    Immature Granulocytes (abs) 0.28 (H) 0.00 - 0.11 K/uL    NRBC (Absolute) 0.00 K/uL   ACCU-CHEK GLUCOSE    Collection Time: 08/07/19  8:08 AM    Result Value Ref Range    Glucose - Accu-Ck 146 (H) 65 - 99 mg/dL   ACCU-CHEK GLUCOSE    Collection Time: 08/07/19 11:29 AM   Result Value Ref Range    Glucose - Accu-Ck 117 (H) 65 - 99 mg/dL   ACCU-CHEK GLUCOSE    Collection Time: 08/07/19  5:17 PM   Result Value Ref Range    Glucose - Accu-Ck 100 (H) 65 - 99 mg/dL   ACCU-CHEK GLUCOSE    Collection Time: 08/08/19  7:57 AM   Result Value Ref Range    Glucose - Accu-Ck 154 (H) 65 - 99 mg/dL       Current Facility-Administered Medications   Medication Frequency   • ciprofloxacin (CIPRO) tablet 500 mg Q12HRS   • insulin lispro (HUMALOG) injection 2-12 Units BID WITH MEALS    And   • glucose 4 g chewable tablet 16 g Q15 MIN PRN    And   • dextrose 50% (D50W) injection 50 mL Q15 MIN PRN   • magnesium oxide (MAG-OX) tablet 400 mg TID WITH MEALS   • ferrous sulfate tablet 325 mg BID WITH MEALS   • ascorbic acid tablet 500 mg BID WITH MEALS   • bisacodyl (THE MAGIC BULLET) suppository 10 mg DAILY   • docusate sodium (COLACE) capsule 100 mg BID   • sennosides (SENOKOT) 8.6 MG tablet 17.2 mg QHS   • lidocaine (XYLOCAINE) 2 % jelly PRN   • lactobacillus rhamnosus (CULTURELLE) capsule 1 Cap QDAY with Breakfast   • cyanocobalamin (VITAMIN B12) tablet 1,000 mcg DAILY   • folic acid (FOLVITE) tablet 1 mg DAILY   • gabapentin (NEURONTIN) capsule 300 mg TID   • midodrine (PROAMATINE) tablet 5 mg Q4HRS PRN   • enoxaparin (LOVENOX) inj 40 mg DAILY   • tramadol (ULTRAM) 50 MG tablet 50 mg TID   • lidocaine (LIDODERM) 5 % 2 Patch Q24HR   • metFORMIN (GLUCOPHAGE) tablet 1,000 mg BID WITH MEALS   • amLODIPine (NORVASC) tablet 10 mg DAILY   • atorvastatin (LIPITOR) tablet 20 mg Nightly   • glucose 4 g chewable tablet 16 g PRN   • lisinopril (PRINIVIL) tablet 40 mg DAILY   • loratadine (CLARITIN) tablet 10 mg DAILY   • omeprazole (PRILOSEC) capsule 20 mg DAILY   • spironolactone (ALDACTONE) tablet 25 mg DAILY   • terazosin (HYTRIN) capsule 5 mg BID   • verapamil ER (CALAN-SR) tablet 180  mg BID   • Pharmacy Consult Request ...Pain Management Review 1 Each PHARMACY TO DOSE   • Respiratory Care per Protocol Continuous RT   • oxyCODONE immediate-release (ROXICODONE) tablet 5 mg Q3HRS PRN   • oxyCODONE immediate release (ROXICODONE) tablet 10 mg Q3HRS PRN   • acetaminophen (TYLENOL) tablet 650 mg Q4HRS PRN   • therapeutic multivitamin-minerals (THERAGRAN-M) tablet 1 Tab DAILY WITH LUNCH   • artificial tears 1.4 % ophthalmic solution 1 Drop PRN   • benzocaine-menthol (CEPACOL) lozenge 1 Lozenge Q2HRS PRN   • mag hydrox-al hydrox-simeth (MAALOX PLUS ES or MYLANTA DS) suspension 20 mL Q2HRS PRN   • ondansetron (ZOFRAN ODT) dispertab 4 mg 4X/DAY PRN    Or   • ondansetron (ZOFRAN) syringe/vial injection 4 mg 4X/DAY PRN   • traZODone (DESYREL) tablet 50 mg QHS PRN   • sodium chloride (OCEAN) 0.65 % nasal spray 2 Spray PRN   • calcium carbonate (TUMS) chewable tab 500 mg BID       Orders Placed This Encounter   Procedures   • Diet Order Diabetic     Standing Status:   Standing     Number of Occurrences:   1     Order Specific Question:   Diet:     Answer:   Diabetic [3]       Assessment:  Active Hospital Problems    Diagnosis   • *T5 spinal cord injury (HCC)   • PVD (peripheral vascular disease) (HCC)   • Type 2 diabetes mellitus without complication, without long-term current use of insulin (HCC)   • Essential hypertension   • Dyslipidemia   • Obesity   • BPH (benign prostatic hyperplasia)   • History of melanoma   • Neuropathic pain   • Neurogenic bladder   • Neurogenic bowel   • Hyponatremia   • 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HCC)   • Acute kidney injury (HCC)   • Vitamin D deficiency   • Anemia       Medical Decision Making and Plan:    T5 AIS D spinal cord injury  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 Velasco  Continue full rehab program  PT/OT, 1.5 hr each discipline, 5 days per week  Outpatient follow up with oncology regarding tissue diagnosis    Neurogenic  orthostasis  Continue midodrine    Neuropathic pain management  Continue gabapentin  Pain currently well controlled    Nociceptive pain management  Continue scheduled Tramadol  Continue PRN oxycodone  Continue scheduled tylenol  Continue lidocaine patch    Iron deficiency anemia  Continue ferrous sulfate and Vit C  Also on B12 and folate    Hypertension  Continue amlodipine  Continue lisinopril  Continue Verapamil  Continue spironolactone  Continue terazosin  Appreciate hospitalist assistance    Hypomagnesemia  Continue supplementation    Dyslipidemia  Continue statin    Peripheral vascular disease  Continue statin  Outpatient follow up with his vascular surgeon    Sepsis due to UTI  Continue antibiotics  Appreciate hospitalist assistance    Diabetes  Continue metformin  Continue Lantus  Appreciate hospitalist assistance    Anxiety/adjustment disorder  Continue Cymbalta  Dr. Yoo consulted    Neurogenic bladder  BPH  Continue terazosin  PVRs -  12, 123, 109, 137, 128    Neurogenic bowel  Continue bowel program  Changed to 10-11:30 am as this is when he moves his bowels at home, hold therapy at that time  Ok to try without suppository as function returns  Last BM 8/7  Continue TUMs, lactobacillis     DVT prophylaxis  Lovenox    Total time:  36 minutes.  I spent greater than 50% of the time for patient care, counseling, and coordination on this date, including patient face-to face time, unit/floor time with review of records/pertinent lab data and studies, as well as discussing diagnostic evaluation/work up, planned therapeutic interventions, and future disposition of care, as per the interval events/subjective and the assessment and plan as noted above.    I have performed a physical exam, reviewed and updated ROS, as well as the assessment and plan today 8/8/2019. In review of note from 8/7/2019 there are no new changes except as documented above.    Mini Rodriguez M.D.   Physical Medicine and  Rehabilitation

## 2019-08-08 NOTE — THERAPY
"Speech Language Pathology  Daily Treatment     Patient Name: Ian Laird  Age:  69 y.o., Sex:  male  Medical Record #: [id number]  Today's Date: 8/8/2019     Subjective    Pt pelasant and cooperative. Pt reports difficulty \"getting the words out\" when discussing medical history/questions with physicians (Dr. Plata, and Dr. Rodriguez)     Objective     08/08/19 0904   Cognition   Complex Attention Moderate (3)   Executive Functioning / Organization Moderate (3)   Interdisciplinary Plan of Care Collaboration   IDT Collaboration with  Physician;Physical Therapist   Patient Position at End of Therapy Seated   Collaboration Comments transfer of care to PT, Dr Rodriguez assessing pt during session   SLP Total Time Spent   SLP Individual Total Time Spent (Mins) 60   Charge Group   SLP Cognitive Skill Development 4           Assessment    Complex organization/exec function task. Pt able to identify 4/9 cost items IND, pt required cues for placekeeping, encouraged to highlight pertinent details to aid in organization and ensuring all costs accounted for. MIN to MOD A for set up to determine overall cost.     Plan    Complete exec function task, continue to target complex attention, recall and exec func.    "

## 2019-08-08 NOTE — THERAPY
"Recreational Therapy  Daily Treatment     Patient Name: Ian Laird  AGE:  69 y.o., SEX:  male  Medical Record #: [id number]  Today's Date: 8/8/2019       Subjective    \"This is more of what I like.\" He was referring to the puzzle rather than the [place] game.      Objective       08/08/19 1051   Functional Ability Status - Cognitive   Attention Span Remains on Task   Comprehension Follows Two Step Commands   Judgment Confused   Cognitive Comments Had trouble recalling the rules from the prior day's activity   Functional Ability Status - Emotional    Affect Appropriate   Mood Appropriate   Behavior Appropriate   Skilled Intervention    Skilled Intervention Cognitive Leisure   Skilled Intervention Comments [place] and a 36 piece puzzle   Interdisciplinary Plan of Care Collaboration   Patient Position at End of Therapy Seated;Other (Comments)  (Taken to SLP office for next session)   Treatment Time   Total Time Spent (mins) 30   Procedural Tracking   Procedural Tracking Cognitive Skills Training       Assessment    He was given the same game from the prior days session. He required Max cues both gesturing and verbal to play the game he was playing yesterday. He moved on to a putting together a puzzle. He did well matching the puzzle pieces with min assist with verbal cueing. Was shown recreational opportunities in the community he could participate in following discharge.     Plan    Continue to work on cognitive leisure and recall in the following session.   "

## 2019-08-08 NOTE — THERAPY
Physical Therapy   Daily Treatment     Patient Name: Ian Laird  Age:  69 y.o., Sex:  male  Medical Record #: [id number]  Today's Date: 8/8/2019     Precautions  Precautions: Fall Risk, Spinal / Back Precautions   Comments: T3-T7 spinal fusion    Subjective    Patient agreeable to PT.     Objective       08/08/19 1431   Bed Mobility    Supine to Sit Stand by Assist   Sit to Supine Stand by Assist   Sit to Stand Contact Guard Assist  (setup, cueing, gait belt.  4WW x3 reps and bed rail x1 rep.)   Scooting Supervised   Rolling   (not observed)   Interdisciplinary Plan of Care Collaboration   IDT Collaboration with  Therapy Tech   Collaboration Comments 2nd person A (w/c follow) for ambulation.   PT Total Time Spent   PT Individual Total Time Spent (Mins) 30   PT Charge Group   PT Gait Training 2       FIM Bed/Chair/Wheelchair Transfers Score: 4 - Minimal Assistance  Bed/Chair/Wheelchair Transfers Description:  (CGA-Min A during standing portion; setup, bed rail, elevated HOB; pt able to manage BLE but then requested LE assistance after transfer was complete, even though it wasn't necessary.)    FIM Walking Score:  1 - Total Assistance  Walking Description:  (1x 15 ft, standing break, and 10 feet.  1x 45 feet.  and 1x 35 feet.  Introduced use of 4WW but with Min A at gait belt throughout, increased time, setup, cueing, and 2nd person for w/c follow.  Narrow AILEEN, ataxia.)    FIM Wheelchair Score:  1 - Total Assistance  Wheelchair Description:         Assessment    Patient is most limited by BLE ataxia, narrow AILEEN, and BLE facilitation during ambulation; good motivation; cueing for new task learning and increased safety; improving BLE management with bed transfer.    Plan    Issue Standing therex HEP, standing balance, trial AMB with 4ww in solostep, bed mobility with decreased LE management, progress through passport, update Stairs FIM

## 2019-08-08 NOTE — CARE PLAN
Problem: Safety  Goal: Will remain free from falls  Note:   Patient uses call light consistently and appropriately this shift.  Waits for assistance when needed and does not attempt self transfer.  Able to verbalize needs.  Will continue to monitor.      Problem: IP BLADDER/VOIDING  Goal: STG - PATIENT WILL REMAIN CONTINENT.  Note:   Patient voiding adequate amounts of clear yellow urine.  Denies flank pain or dysuria; afebrile. Bladder scan < 100 ml this shift x 4. Will continue to monitor.

## 2019-08-08 NOTE — THERAPY
"Physical Therapy   Daily Treatment     Patient Name: Ian Laird  Age:  69 y.o., Sex:  male  Medical Record #: [id number]  Today's Date: 8/8/2019     Precautions  Precautions: Fall Risk, Spinal / Back Precautions   Comments: T3-T7 spinal fusion    Subjective    Pt seated in therapy gym, agreeable to PT      Objective       08/08/19 1001   Precautions   Precautions Fall Risk;Spinal / Back Precautions    Comments T3-T7 spinal fusion   Standing Lower Body Exercises   Hip Abduction 1 set of 10;Bilateral  (with CGA and UE support on // bars)   Heel Rise 2 sets of 10   Toe Rise 2 sets of 10   Interdisciplinary Plan of Care Collaboration   Patient Position at End of Therapy In Bed;Call Light within Reach;Tray Table within Reach   PT Total Time Spent   PT Individual Total Time Spent (Mins) 30   PT Charge Group   PT Therapeutic Exercise 1   PT Therapeutic Activities 1     Standing tolerance in // bars while participating in ring toss activity, using RUE for throwing and maintaining LUE support on bar throughout: Able to tolerate standing for 1:20, 1:45 x2, and 1:35 with seated rest breaks between.     FIM Bed/Chair/Wheelchair Transfers Score: 3 - Moderate Assistance  Bed/Chair/Wheelchair Transfers Description:  Increased time, Set-up of equipment, Assist with two limbs(WC > bed, CGA transfer with use of bed rail, mod A for LE management during sit > supine)      Assessment    Pt continues with good participation and motivation. Limited standing tolerance and reporting \"feeling wobbly\" during activity.     Plan    Issue Standing therex HEP, standing balance, trial AMB with SPC in solostep, trial AMB with 4ww in solostep, progress through passport     "

## 2019-08-09 LAB
GLUCOSE BLD-MCNC: 106 MG/DL (ref 65–99)
GLUCOSE BLD-MCNC: 130 MG/DL (ref 65–99)

## 2019-08-09 PROCEDURE — 99232 SBSQ HOSP IP/OBS MODERATE 35: CPT | Performed by: HOSPITALIST

## 2019-08-09 PROCEDURE — 97535 SELF CARE MNGMENT TRAINING: CPT

## 2019-08-09 PROCEDURE — A9270 NON-COVERED ITEM OR SERVICE: HCPCS | Performed by: PHYSICAL MEDICINE & REHABILITATION

## 2019-08-09 PROCEDURE — A9270 NON-COVERED ITEM OR SERVICE: HCPCS | Performed by: HOSPITALIST

## 2019-08-09 PROCEDURE — 97110 THERAPEUTIC EXERCISES: CPT

## 2019-08-09 PROCEDURE — 97530 THERAPEUTIC ACTIVITIES: CPT

## 2019-08-09 PROCEDURE — 700102 HCHG RX REV CODE 250 W/ 637 OVERRIDE(OP): Performed by: PHYSICAL MEDICINE & REHABILITATION

## 2019-08-09 PROCEDURE — 770010 HCHG ROOM/CARE - REHAB SEMI PRIVAT*

## 2019-08-09 PROCEDURE — G0515 COGNITIVE SKILLS DEVELOPMENT: HCPCS

## 2019-08-09 PROCEDURE — 700102 HCHG RX REV CODE 250 W/ 637 OVERRIDE(OP): Performed by: HOSPITALIST

## 2019-08-09 PROCEDURE — 82962 GLUCOSE BLOOD TEST: CPT | Mod: 91

## 2019-08-09 PROCEDURE — 700112 HCHG RX REV CODE 229: Performed by: PHYSICAL MEDICINE & REHABILITATION

## 2019-08-09 PROCEDURE — 97116 GAIT TRAINING THERAPY: CPT

## 2019-08-09 PROCEDURE — 700101 HCHG RX REV CODE 250: Performed by: PHYSICAL MEDICINE & REHABILITATION

## 2019-08-09 PROCEDURE — 700111 HCHG RX REV CODE 636 W/ 250 OVERRIDE (IP): Performed by: PHYSICAL MEDICINE & REHABILITATION

## 2019-08-09 PROCEDURE — 99231 SBSQ HOSP IP/OBS SF/LOW 25: CPT | Performed by: PHYSICAL MEDICINE & REHABILITATION

## 2019-08-09 RX ADMIN — TRAMADOL HYDROCHLORIDE 50 MG: 50 TABLET, COATED ORAL at 08:42

## 2019-08-09 RX ADMIN — CALCIUM CARBONATE (ANTACID) CHEW TAB 500 MG 500 MG: 500 CHEW TAB at 19:57

## 2019-08-09 RX ADMIN — MULTIPLE VITAMINS W/ MINERALS TAB 1 TABLET: TAB at 10:58

## 2019-08-09 RX ADMIN — METFORMIN HYDROCHLORIDE 1000 MG: 500 TABLET, FILM COATED ORAL at 17:03

## 2019-08-09 RX ADMIN — TERAZOSIN HYDROCHLORIDE 5 MG: 5 CAPSULE ORAL at 05:24

## 2019-08-09 RX ADMIN — SPIRONOLACTONE 25 MG: 25 TABLET ORAL at 08:46

## 2019-08-09 RX ADMIN — LORATADINE 10 MG: 10 TABLET ORAL at 08:45

## 2019-08-09 RX ADMIN — LIDOCAINE 2 PATCH: 50 PATCH CUTANEOUS at 08:47

## 2019-08-09 RX ADMIN — Medication 1 CAPSULE: at 08:45

## 2019-08-09 RX ADMIN — AMLODIPINE BESYLATE 10 MG: 5 TABLET ORAL at 08:43

## 2019-08-09 RX ADMIN — CIPROFLOXACIN HYDROCHLORIDE 500 MG: 500 TABLET, FILM COATED ORAL at 05:24

## 2019-08-09 RX ADMIN — OMEPRAZOLE 20 MG: 20 CAPSULE, DELAYED RELEASE ORAL at 08:44

## 2019-08-09 RX ADMIN — TRAMADOL HYDROCHLORIDE 50 MG: 50 TABLET, COATED ORAL at 11:07

## 2019-08-09 RX ADMIN — VERAPAMIL HYDROCHLORIDE 180 MG: 180 TABLET, FILM COATED, EXTENDED RELEASE ORAL at 19:57

## 2019-08-09 RX ADMIN — VERAPAMIL HYDROCHLORIDE 180 MG: 180 TABLET, FILM COATED, EXTENDED RELEASE ORAL at 08:45

## 2019-08-09 RX ADMIN — GABAPENTIN 300 MG: 300 CAPSULE ORAL at 08:46

## 2019-08-09 RX ADMIN — BISACODYL 10 MG: 10 SUPPOSITORY RECTAL at 10:58

## 2019-08-09 RX ADMIN — TERAZOSIN HYDROCHLORIDE 5 MG: 5 CAPSULE ORAL at 17:02

## 2019-08-09 RX ADMIN — MAGNESIUM OXIDE TAB 400 MG (241.3 MG ELEMENTAL MG) 400 MG: 400 (241.3 MG) TAB at 08:44

## 2019-08-09 RX ADMIN — ATORVASTATIN CALCIUM 20 MG: 10 TABLET, FILM COATED ORAL at 19:57

## 2019-08-09 RX ADMIN — LISINOPRIL 40 MG: 20 TABLET ORAL at 08:45

## 2019-08-09 RX ADMIN — METFORMIN HYDROCHLORIDE 1000 MG: 500 TABLET, FILM COATED ORAL at 08:44

## 2019-08-09 RX ADMIN — DOCUSATE SODIUM 100 MG: 100 CAPSULE, LIQUID FILLED ORAL at 19:57

## 2019-08-09 RX ADMIN — FOLIC ACID 1 MG: 1 TABLET ORAL at 08:46

## 2019-08-09 RX ADMIN — FERROUS SULFATE TAB 325 MG (65 MG ELEMENTAL FE) 325 MG: 325 (65 FE) TAB at 08:42

## 2019-08-09 RX ADMIN — TRAMADOL HYDROCHLORIDE 50 MG: 50 TABLET, COATED ORAL at 16:56

## 2019-08-09 RX ADMIN — GABAPENTIN 300 MG: 300 CAPSULE ORAL at 16:54

## 2019-08-09 RX ADMIN — SENNOSIDES 17.2 MG: 8.6 TABLET, FILM COATED ORAL at 19:57

## 2019-08-09 RX ADMIN — CIPROFLOXACIN HYDROCHLORIDE 500 MG: 500 TABLET, FILM COATED ORAL at 17:03

## 2019-08-09 RX ADMIN — GABAPENTIN 300 MG: 300 CAPSULE ORAL at 19:57

## 2019-08-09 RX ADMIN — OXYCODONE HYDROCHLORIDE AND ACETAMINOPHEN 500 MG: 500 TABLET ORAL at 08:44

## 2019-08-09 RX ADMIN — CYANOCOBALAMIN TAB 1000 MCG 1000 MCG: 1000 TAB at 08:45

## 2019-08-09 RX ADMIN — MAGNESIUM OXIDE TAB 400 MG (241.3 MG ELEMENTAL MG) 400 MG: 400 (241.3 MG) TAB at 17:03

## 2019-08-09 RX ADMIN — MAGNESIUM OXIDE TAB 400 MG (241.3 MG ELEMENTAL MG) 400 MG: 400 (241.3 MG) TAB at 10:58

## 2019-08-09 RX ADMIN — DOCUSATE SODIUM 100 MG: 100 CAPSULE, LIQUID FILLED ORAL at 08:46

## 2019-08-09 RX ADMIN — FERROUS SULFATE TAB 325 MG (65 MG ELEMENTAL FE) 325 MG: 325 (65 FE) TAB at 17:02

## 2019-08-09 RX ADMIN — OXYCODONE HYDROCHLORIDE AND ACETAMINOPHEN 500 MG: 500 TABLET ORAL at 17:03

## 2019-08-09 RX ADMIN — ENOXAPARIN SODIUM 40 MG: 100 INJECTION SUBCUTANEOUS at 08:41

## 2019-08-09 RX ADMIN — CALCIUM CARBONATE (ANTACID) CHEW TAB 500 MG 500 MG: 500 CHEW TAB at 08:42

## 2019-08-09 ASSESSMENT — ENCOUNTER SYMPTOMS
FEVER: 0
ABDOMINAL PAIN: 0
PALPITATIONS: 0
VOMITING: 0
FOCAL WEAKNESS: 1
SHORTNESS OF BREATH: 0
NAUSEA: 0
COUGH: 0
CONSTIPATION: 1
EYES NEGATIVE: 1
CHILLS: 0
BACK PAIN: 1

## 2019-08-09 NOTE — CARE PLAN
Problem: Safety  Goal: Will remain free from injury  Outcome: PROGRESSING AS EXPECTED  Goal: Will remain free from falls  Outcome: PROGRESSING AS EXPECTED

## 2019-08-09 NOTE — THERAPY
"Physical Therapy   Daily Treatment     Patient Name: Ian Laird  Age:  69 y.o., Sex:  male  Medical Record #: [id number]  Today's Date: 2019     Precautions  Precautions: Fall Risk, Spinal / Back Precautions   Comments: T3-T7 spinal fusion    Subjective    \"Eddie just finished wearing out my legs\".      Objective       19 0831   Precautions   Precautions Fall Risk;Spinal / Back Precautions    Sitting Lower Body Exercises   Nustep Resistance Level 1  (5 min and 2 min with frequent rest breaks throughout)   Interdisciplinary Plan of Care Collaboration   IDT Collaboration with  Nursing;Recreation Therapist   Patient Position at End of Therapy Seated  (in therapy gym)   Collaboration Comments RN administered medications during PT session. Pt care passed to rec therapist   PT Total Time Spent   PT Individual Total Time Spent (Mins) 60   PT Charge Group   PT Gait Training 1   PT Therapeutic Exercise 2   PT Therapeutic Activities 1     Rickshaw for UE strengthenin lbs 2x50 forward facing and 2x40 retro facing.     FIM Walking Score:  1 - Total Assistance  Walking Description:  Walker, Extra time, Requires wheelchair follow, Requires incidental assist(28 ft and 47 ft with FWW, CGA, and WC follow )      Assessment    Pt limited this session by fatigue from earlier therapy session. Motivated for participation despite limitations.     Plan    Issue Standing therex HEP, standing balance, trial AMB with 4ww in solostep, bed mobility with decreased LE management, progress through passport, update Stairs FIM       "

## 2019-08-09 NOTE — PROGRESS NOTES
Hospital Medicine Daily Progress Note      Chief Complaint  HTN, DM, Hyponatremia    Interval Problem Update  Pt seen and examined in dining room, doing well.    Review of Systems  Review of Systems   Constitutional: Negative for chills and fever.   HENT: Negative.    Eyes: Negative.    Respiratory: Negative for cough and shortness of breath.    Cardiovascular: Negative for chest pain and palpitations.   Gastrointestinal: Positive for constipation. Negative for abdominal pain, nausea and vomiting.   Musculoskeletal: Positive for back pain.        Wound pain   Skin: Negative for itching and rash.   Neurological: Positive for focal weakness.        Physical Exam  Temp:  [36.9 °C (98.4 °F)-37.2 °C (98.9 °F)] 37.2 °C (98.9 °F)  Pulse:  [71-77] 75  Resp:  [16-18] 16  BP: (144-156)/(72-82) 156/72    Physical Exam   Constitutional: He is oriented to person, place, and time. No distress.   HENT:   Head: Normocephalic and atraumatic.   Right Ear: External ear normal.   Left Ear: External ear normal.   Eyes: Conjunctivae and EOM are normal. Right eye exhibits no discharge. Left eye exhibits no discharge.   Neck: Normal range of motion. Neck supple. No tracheal deviation present.   Cardiovascular: Normal rate and regular rhythm.   Pulmonary/Chest: Effort normal and breath sounds normal. No stridor. No respiratory distress. He has no wheezes.   Abdominal: Soft. Bowel sounds are normal. He exhibits no distension. There is no tenderness.   Musculoskeletal: He exhibits no edema or tenderness.   Neurological: He is alert and oriented to person, place, and time.   Skin: Skin is warm and dry. He is not diaphoretic.   Vitals reviewed.      Fluids    Intake/Output Summary (Last 24 hours) at 8/9/2019 1143  Last data filed at 8/9/2019 0837  Gross per 24 hour   Intake 1040 ml   Output --   Net 1040 ml       Laboratory  Recent Labs     08/07/19  0550   WBC 8.4   RBC 2.42*   HEMOGLOBIN 7.6*   HEMATOCRIT 23.5*   MCV 97.1   MCH 31.4   MCHC  32.3*   RDW 48.6   PLATELETCT 242   MPV 9.0                       Assessment/Plan  * T5 spinal cord injury (HCC)- (present on admission)  Assessment & Plan  S/P T5 extradural mass resection w/ T4-T6 laminectomy and T3-T7 fusion on 7/20/19 by Dr. Velasco  Pathology  +plasmablastic plasmacytoma  Continue wound care    PVD (peripheral vascular disease) (HCC)- (present on admission)  Assessment & Plan  Plavix on hold post-operatively    Essential hypertension- (present on admission)  Assessment & Plan  Echo EF 65% w/ mild AS  On Norvasc, Lisinopril, Aldactone, and Verapamil  Now off Midodrine  Observe blood pressure trends    Type 2 diabetes mellitus without complication, without long-term current use of insulin (HCC)- (present on admission)  Assessment & Plan  HbA1c 6.8  Continue Metformin  Off Lantus and  Glipizide  Outpt meds include Metformin, Glipizide, and Januvia    Hypomagnesemia  Assessment & Plan  Continue MgOx  Check F/U labs in am    Sepsis secondary to UTI (HCC)  Assessment & Plan  UA w/ packed WBC and many bacteria  Unfortunately no UCx done  Clinically resolving on empiric Zosyn and Zyvox  Abx de-escalated to Cipro, complete course soon    History of melanoma  Assessment & Plan  S/P resection x 2  Outpt F/U    BPH (benign prostatic hyperplasia)  Assessment & Plan  Monitor for orthostatic hypotension on Hytrin bid    Anemia- (present on admission)  Assessment & Plan  S/P PRBC prior to Rehab transfer  Continue Fe and Vit C  Also on Vit B12 and Folate supplements     Dyslipidemia- (present on admission)  Assessment & Plan  On Lipitor    Full Code

## 2019-08-09 NOTE — THERAPY
"Recreational Therapy  Daily Treatment     Patient Name: Ian Laird  AGE:  69 y.o., SEX:  male  Medical Record #: [id number]  Today's Date: 8/9/2019       Subjective    \"It felt nice to be outside.\"     Objective       08/09/19 1140   Functional Ability Status - Cognitive   Attention Span Remains on Task   Comprehension Follows Three Step Commands   Judgment Able to Make Independent Decisions   Functional Ability Status - Emotional    Affect Appropriate   Mood Appropriate   Behavior Appropriate   Skilled Intervention    Skilled Intervention Relaxation / Coping Skills;Gross Motor Leisure   Skilled Intervention Comments Watering the raised garden beds while sitting.    Interdisciplinary Plan of Care Collaboration   IDT Collaboration with  Physical Therapist   Patient Position at End of Therapy In Bed;Tray Table within Reach;Call Light within Reach   Collaboration Comments PT gave update on CLOF   Treatment Time   Total Time Spent (mins) 30   Procedural Tracking   Procedural Tracking Community Skills Development;Leisure Skills Development       Assessment    He was met in the gym and was taken outside to water the raised gardening beds. He had some trouble holding the half gallon of water and ended up spilling some water on his lap. The half gallon bucket was filled 3/4 of the way each following time. No spilling occurred following this event.     Plan    Work on his cognition and recollection of rules of prior activities.   "

## 2019-08-09 NOTE — THERAPY
"Speech Language Pathology  Daily Treatment     Patient Name: Ian Laird  Age:  69 y.o., Sex:  male  Medical Record #: [id number]  Today's Date: 8/9/2019     Subjective    Pt pleasant and cooperative, reports bowel program was successful at this time and is pleased with schedule change. Physician aware.      Objective     08/09/19 1334   Cognition   Complex Attention Moderate (3)   Executive Functioning / Organization Minimal (4)   Interdisciplinary Plan of Care Collaboration   IDT Collaboration with  Family / Caregiver;Physician;Nursing   Patient Position at End of Therapy Seated;Family / Friend in Room   Collaboration Comments spouse present, Dr. Jennifer IRWIN and POC, nursing to request removal of IV   SLP Total Time Spent   SLP Individual Total Time Spent (Mins) 60   Charge Group   SLP Cognitive Skill Development 4       FIM Eating Score:  6 - Modified Independent  Eating Description:       FIM Comprehension Score:  6 - Modified Independent  Comprehension Description:  Glasses, Verbal cues    FIM Expression Score:  7 - Independent  Expression Description:       FIM Social Interaction Score:  7 - Independent  Social Interaction Description:       FIM Problem Solving Score:  5 - Standby Prompting/Supervision or Set-up  Problem Solving Description:  Verbal cueing, Therapy schedule, Increased time    FIM Memory Score:  5 - Standby Prompting/Supervision or Set-up  Memory Description:  Verbal cueing, Therapy schedule      Assessment    Pt completed remaining calculations for exec function task initiated in session prior. With use of calculator, pt able to calculate overall and per person cost with 100% with distraction of SLP and spouse talking. Alternating attention adhering to 3 rules while completing basic math. Pt required assistance for placekeeping when alternating from \"rules\" to task. Pt completed with 75% IND with cues increased to 100%. Pt also benefit from \"chunking\" info into smaller units. "     Plan    Continue to target attention

## 2019-08-09 NOTE — THERAPY
Occupational Therapy  Daily Treatment     Patient Name: Ian Laird  Age:  69 y.o., Sex:  male  Medical Record #: [id number]  Today's Date: 2019     Precautions  Precautions: Fall Risk, Spinal / Back Precautions   Comments: T3-T7 spinal fusion    Safety   ADL Safety : Requires Supervision for Safety, Requires Physical Assist for Safety  Bathroom Safety: Requires Supervision for Safety, Requires Physical Assist for Safety    Subjective    Patient in bed, but awake.  Agreeable to OT.     Objective       19 0701   Precautions   Precautions Fall Risk;Spinal / Back Precautions    Sitting Lower Body Exercises   Other Exercises LB motomed gear 4 x 12 minutes (.90 miles w/ .76 active)   Bed Mobility    Supine to Sit Supervised   Scooting Supervised   Interdisciplinary Plan of Care Collaboration   Patient Position at End of Therapy Seated  (in dining room; no seatbelt on as this is pt's personal w/c)   OT Total Time Spent   OT Individual Total Time Spent (Mins) 60   OT Charge Group   OT Self Care / ADL 3   OT Therapeutic Exercise  1       FIM Grooming Score:  7 - Independent  Grooming Description:       FIM Lower Body Dressing Score:  5 - Standby Prompting/Supervsion or Set-up  Lower Body Dressing Description:  Supervision for safety, Set-up of equipment(setup/SBA to don pants and slip on shoes, doff socks, no AD used)    FIM Toiletin - Standby Prompting/Supervision or Set-up  Toileting Description:  Grab bar, Supervision for safety, Verbal cueing, Set-up of equipment, Initial preparation for task(setup/SBA with grab bar)    FIM Toilet Transfer Score:  5 - Standby Prompting/Supervision or Set-up  Toilet Transfer Description:  Grab bar, Supervision for safety, Set-up of equipment, Initial preparation for task(setup/SBA with grab bar)      Assessment    Patient able to doff socks and don pants without using any AE this morning.  Felt some lightheadedness in standing (informed RN).  Fatigued with use of LB  motomed, but able to complete 12 minutes with a couple passive rests.    Plan    Cont overall strength/endurance and standing balance to improve ADL's and functional mobility

## 2019-08-09 NOTE — CARE PLAN
Problem: Safety  Goal: Will remain free from injury  Outcome: PROGRESSING AS EXPECTED  Note:   Pt has been compliant with using his call light so far this shift. Alarms in place for additional precaution.      Problem: Pain Management  Goal: Pain level will decrease to patient's comfort goal  Outcome: PROGRESSING AS EXPECTED  Note:   Pt denied pain so far this shift. Pt educated to notify staff of any changes in pain levels or other needs.

## 2019-08-09 NOTE — CARE PLAN
Problem: Safety  Goal: Will remain free from falls  Note:   Patient uses call light consistently and appropriately this shift.  Waits for assistance when needed and does not attempt self transfer.  Able to verbalize needs.  Will continue to monitor.      Problem: IP BLADDER/VOIDING  Goal: STG - PATIENT WILL REMAIN CONTINENT.  Note:   Patient voiding adequate amounts of clear yellow urine.  Denies flank pain or dysuria; afebrile. Bladder scan < 100 ml this shift. Will continue to monitor.

## 2019-08-10 LAB
ANION GAP SERPL CALC-SCNC: 11 MMOL/L (ref 0–11.9)
BUN SERPL-MCNC: 19 MG/DL (ref 8–22)
CALCIUM SERPL-MCNC: 9.4 MG/DL (ref 8.5–10.5)
CHLORIDE SERPL-SCNC: 102 MMOL/L (ref 96–112)
CO2 SERPL-SCNC: 25 MMOL/L (ref 20–33)
CREAT SERPL-MCNC: 1.34 MG/DL (ref 0.5–1.4)
ERYTHROCYTE [DISTWIDTH] IN BLOOD BY AUTOMATED COUNT: 49.1 FL (ref 35.9–50)
GLUCOSE BLD-MCNC: 105 MG/DL (ref 65–99)
GLUCOSE BLD-MCNC: 116 MG/DL (ref 65–99)
GLUCOSE BLD-MCNC: 91 MG/DL (ref 65–99)
GLUCOSE SERPL-MCNC: 144 MG/DL (ref 65–99)
HCT VFR BLD AUTO: 24.8 % (ref 42–52)
HGB BLD-MCNC: 8.2 G/DL (ref 14–18)
MAGNESIUM SERPL-MCNC: 1.6 MG/DL (ref 1.5–2.5)
MCH RBC QN AUTO: 32.4 PG (ref 27–33)
MCHC RBC AUTO-ENTMCNC: 33.1 G/DL (ref 33.7–35.3)
MCV RBC AUTO: 98 FL (ref 81.4–97.8)
PLATELET # BLD AUTO: 212 K/UL (ref 164–446)
PMV BLD AUTO: 9.1 FL (ref 9–12.9)
POTASSIUM SERPL-SCNC: 4.2 MMOL/L (ref 3.6–5.5)
RBC # BLD AUTO: 2.53 M/UL (ref 4.7–6.1)
SODIUM SERPL-SCNC: 138 MMOL/L (ref 135–145)
WBC # BLD AUTO: 9.2 K/UL (ref 4.8–10.8)

## 2019-08-10 PROCEDURE — 83735 ASSAY OF MAGNESIUM: CPT

## 2019-08-10 PROCEDURE — 700102 HCHG RX REV CODE 250 W/ 637 OVERRIDE(OP): Performed by: PHYSICAL MEDICINE & REHABILITATION

## 2019-08-10 PROCEDURE — 99232 SBSQ HOSP IP/OBS MODERATE 35: CPT | Performed by: HOSPITALIST

## 2019-08-10 PROCEDURE — A9270 NON-COVERED ITEM OR SERVICE: HCPCS | Performed by: HOSPITALIST

## 2019-08-10 PROCEDURE — 700111 HCHG RX REV CODE 636 W/ 250 OVERRIDE (IP): Performed by: PHYSICAL MEDICINE & REHABILITATION

## 2019-08-10 PROCEDURE — 700102 HCHG RX REV CODE 250 W/ 637 OVERRIDE(OP): Performed by: HOSPITALIST

## 2019-08-10 PROCEDURE — G0515 COGNITIVE SKILLS DEVELOPMENT: HCPCS

## 2019-08-10 PROCEDURE — 700101 HCHG RX REV CODE 250: Performed by: PHYSICAL MEDICINE & REHABILITATION

## 2019-08-10 PROCEDURE — 770010 HCHG ROOM/CARE - REHAB SEMI PRIVAT*

## 2019-08-10 PROCEDURE — 80048 BASIC METABOLIC PNL TOTAL CA: CPT

## 2019-08-10 PROCEDURE — 700112 HCHG RX REV CODE 229: Performed by: PHYSICAL MEDICINE & REHABILITATION

## 2019-08-10 PROCEDURE — A9270 NON-COVERED ITEM OR SERVICE: HCPCS | Performed by: PHYSICAL MEDICINE & REHABILITATION

## 2019-08-10 PROCEDURE — 85027 COMPLETE CBC AUTOMATED: CPT

## 2019-08-10 PROCEDURE — 82962 GLUCOSE BLOOD TEST: CPT | Mod: 91

## 2019-08-10 PROCEDURE — 36415 COLL VENOUS BLD VENIPUNCTURE: CPT

## 2019-08-10 RX ADMIN — ENOXAPARIN SODIUM 40 MG: 100 INJECTION SUBCUTANEOUS at 08:25

## 2019-08-10 RX ADMIN — LORATADINE 10 MG: 10 TABLET ORAL at 08:27

## 2019-08-10 RX ADMIN — CALCIUM CARBONATE (ANTACID) CHEW TAB 500 MG 500 MG: 500 CHEW TAB at 20:00

## 2019-08-10 RX ADMIN — MAGNESIUM OXIDE TAB 400 MG (241.3 MG ELEMENTAL MG) 400 MG: 400 (241.3 MG) TAB at 08:28

## 2019-08-10 RX ADMIN — GABAPENTIN 300 MG: 300 CAPSULE ORAL at 08:26

## 2019-08-10 RX ADMIN — FERROUS SULFATE TAB 325 MG (65 MG ELEMENTAL FE) 325 MG: 325 (65 FE) TAB at 17:22

## 2019-08-10 RX ADMIN — Medication 1 CAPSULE: at 08:27

## 2019-08-10 RX ADMIN — OXYCODONE HYDROCHLORIDE AND ACETAMINOPHEN 500 MG: 500 TABLET ORAL at 08:28

## 2019-08-10 RX ADMIN — METFORMIN HYDROCHLORIDE 1000 MG: 500 TABLET, FILM COATED ORAL at 08:27

## 2019-08-10 RX ADMIN — MAGNESIUM OXIDE TAB 400 MG (241.3 MG ELEMENTAL MG) 400 MG: 400 (241.3 MG) TAB at 11:07

## 2019-08-10 RX ADMIN — FERROUS SULFATE TAB 325 MG (65 MG ELEMENTAL FE) 325 MG: 325 (65 FE) TAB at 08:27

## 2019-08-10 RX ADMIN — LISINOPRIL 40 MG: 20 TABLET ORAL at 08:26

## 2019-08-10 RX ADMIN — CIPROFLOXACIN HYDROCHLORIDE 500 MG: 500 TABLET, FILM COATED ORAL at 06:12

## 2019-08-10 RX ADMIN — CYANOCOBALAMIN TAB 1000 MCG 1000 MCG: 1000 TAB at 08:27

## 2019-08-10 RX ADMIN — SENNOSIDES 17.2 MG: 8.6 TABLET, FILM COATED ORAL at 19:59

## 2019-08-10 RX ADMIN — MULTIPLE VITAMINS W/ MINERALS TAB 1 TABLET: TAB at 11:07

## 2019-08-10 RX ADMIN — AMLODIPINE BESYLATE 10 MG: 5 TABLET ORAL at 08:27

## 2019-08-10 RX ADMIN — BISACODYL 10 MG: 10 SUPPOSITORY RECTAL at 10:40

## 2019-08-10 RX ADMIN — VERAPAMIL HYDROCHLORIDE 180 MG: 180 TABLET, FILM COATED, EXTENDED RELEASE ORAL at 08:28

## 2019-08-10 RX ADMIN — GABAPENTIN 300 MG: 300 CAPSULE ORAL at 15:59

## 2019-08-10 RX ADMIN — TRAMADOL HYDROCHLORIDE 50 MG: 50 TABLET, COATED ORAL at 11:06

## 2019-08-10 RX ADMIN — TRAMADOL HYDROCHLORIDE 50 MG: 50 TABLET, COATED ORAL at 15:59

## 2019-08-10 RX ADMIN — OMEPRAZOLE 20 MG: 20 CAPSULE, DELAYED RELEASE ORAL at 08:27

## 2019-08-10 RX ADMIN — METFORMIN HYDROCHLORIDE 1000 MG: 500 TABLET, FILM COATED ORAL at 17:23

## 2019-08-10 RX ADMIN — ATORVASTATIN CALCIUM 20 MG: 10 TABLET, FILM COATED ORAL at 19:59

## 2019-08-10 RX ADMIN — TERAZOSIN HYDROCHLORIDE 5 MG: 5 CAPSULE ORAL at 06:12

## 2019-08-10 RX ADMIN — GABAPENTIN 300 MG: 300 CAPSULE ORAL at 20:00

## 2019-08-10 RX ADMIN — CALCIUM CARBONATE (ANTACID) CHEW TAB 500 MG 500 MG: 500 CHEW TAB at 08:25

## 2019-08-10 RX ADMIN — OXYCODONE HYDROCHLORIDE AND ACETAMINOPHEN 500 MG: 500 TABLET ORAL at 17:23

## 2019-08-10 RX ADMIN — TRAMADOL HYDROCHLORIDE 50 MG: 50 TABLET, COATED ORAL at 08:28

## 2019-08-10 RX ADMIN — MAGNESIUM OXIDE TAB 400 MG (241.3 MG ELEMENTAL MG) 400 MG: 400 (241.3 MG) TAB at 17:23

## 2019-08-10 RX ADMIN — VERAPAMIL HYDROCHLORIDE 180 MG: 180 TABLET, FILM COATED, EXTENDED RELEASE ORAL at 20:00

## 2019-08-10 RX ADMIN — SPIRONOLACTONE 25 MG: 25 TABLET ORAL at 08:27

## 2019-08-10 RX ADMIN — TERAZOSIN HYDROCHLORIDE 5 MG: 5 CAPSULE ORAL at 18:16

## 2019-08-10 RX ADMIN — DOCUSATE SODIUM 100 MG: 100 CAPSULE, LIQUID FILLED ORAL at 08:28

## 2019-08-10 RX ADMIN — DOCUSATE SODIUM 100 MG: 100 CAPSULE, LIQUID FILLED ORAL at 20:00

## 2019-08-10 RX ADMIN — FOLIC ACID 1 MG: 1 TABLET ORAL at 08:29

## 2019-08-10 RX ADMIN — LIDOCAINE 2 PATCH: 50 PATCH CUTANEOUS at 08:24

## 2019-08-10 ASSESSMENT — ENCOUNTER SYMPTOMS
ABDOMINAL PAIN: 0
BACK PAIN: 1
SHORTNESS OF BREATH: 0
VOMITING: 0
FOCAL WEAKNESS: 1
FEVER: 0
PALPITATIONS: 0
COUGH: 0
NAUSEA: 0
CHILLS: 0
EYES NEGATIVE: 1

## 2019-08-10 NOTE — CARE PLAN
Problem: Safety  Goal: Will remain free from injury  Outcome: PROGRESSING AS EXPECTED  Note:   Pt has been compliant with using his call light and waiting for staff before getting out of bed. Pt demonstrates good transfer technique from bed to wheelchair.

## 2019-08-10 NOTE — PROGRESS NOTES
Hospital Medicine Daily Progress Note      Chief Complaint  HTN, DM, Hyponatremia    Interval Problem Update  No new problems.    Review of Systems  Review of Systems   Constitutional: Negative for chills and fever.   HENT: Negative.    Eyes: Negative.    Respiratory: Negative for cough and shortness of breath.    Cardiovascular: Negative for chest pain and palpitations.   Gastrointestinal: Negative for abdominal pain, nausea and vomiting.   Musculoskeletal: Positive for back pain.        Wound pain   Skin: Negative for itching and rash.   Neurological: Positive for focal weakness.        Physical Exam  Temp:  [36.6 °C (97.8 °F)-37.1 °C (98.7 °F)] 36.6 °C (97.8 °F)  Pulse:  [70-75] 72  Resp:  [16-18] 16  BP: (114-140)/(61-72) 140/72  SpO2:  [93 %] 93 %    Physical Exam   Constitutional: He is oriented to person, place, and time. No distress.   HENT:   Head: Normocephalic and atraumatic.   Right Ear: External ear normal.   Left Ear: External ear normal.   Eyes: Conjunctivae and EOM are normal. Right eye exhibits no discharge. Left eye exhibits no discharge.   Neck: Normal range of motion. Neck supple. No tracheal deviation present.   Cardiovascular: Normal rate and regular rhythm.   Pulmonary/Chest: Effort normal and breath sounds normal. No stridor. No respiratory distress. He has no wheezes.   Abdominal: Soft. Bowel sounds are normal. He exhibits no distension. There is no tenderness.   Musculoskeletal: He exhibits no edema or tenderness.   Neurological: He is alert and oriented to person, place, and time.   Skin: Skin is warm and dry. He is not diaphoretic.   Vitals reviewed.      Fluids    Intake/Output Summary (Last 24 hours) at 8/10/2019 1633  Last data filed at 8/10/2019 1200  Gross per 24 hour   Intake 1260 ml   Output --   Net 1260 ml       Laboratory  Recent Labs     08/10/19  0540   WBC 9.2   RBC 2.53*   HEMOGLOBIN 8.2*   HEMATOCRIT 24.8*   MCV 98.0*   MCH 32.4   MCHC 33.1*   RDW 49.1   PLATELETCT 212    MPV 9.1     Recent Labs     08/10/19  0540   SODIUM 138   POTASSIUM 4.2   CHLORIDE 102   CO2 25   GLUCOSE 144*   BUN 19   CREATININE 1.34   CALCIUM 9.4                   Assessment/Plan  * T5 spinal cord injury (HCC)- (present on admission)  Assessment & Plan  S/P T5 extradural mass resection w/ T4-T6 laminectomy and T3-T7 fusion on 7/20/19 by Dr. Velasco  Pathology  +plasmablastic plasmacytoma  Continue wound care    PVD (peripheral vascular disease) (HCC)- (present on admission)  Assessment & Plan  Plavix on hold post-operatively    Essential hypertension- (present on admission)  Assessment & Plan  Echo EF 65% w/ mild AS  On Norvasc, Lisinopril, Aldactone, and Verapamil  Blood pressure controlled off Midodrine    Type 2 diabetes mellitus without complication, without long-term current use of insulin (HCC)- (present on admission)  Assessment & Plan  HbA1c 6.8  Continue Metformin  Off Lantus and  Glipizide  Outpt meds include Metformin, Glipizide, and Januvia    Hypomagnesemia  Assessment & Plan  Continue MgOx    Sepsis secondary to UTI (HCC)  Assessment & Plan  UA w/ packed WBC and many bacteria  Unfortunately no UCx done  Sepsis syndrome resolved on abx    History of melanoma  Assessment & Plan  S/P resection x 2  Outpt F/U    BPH (benign prostatic hyperplasia)  Assessment & Plan  Monitor for orthostatic hypotension on Hytrin bid    Anemia- (present on admission)  Assessment & Plan  S/P PRBC prior to Rehab transfer  Continue Fe and Vit C  Also on Vit B12 and Folate supplements     Dyslipidemia- (present on admission)  Assessment & Plan  On Lipitor    Full Code

## 2019-08-10 NOTE — PROGRESS NOTES
"Rehab Progress Note     Date of Service: 8/9/2019  Chief Complaint: follow up SCI    Interval Events (Subjective)    Patient seen again today during his speech therapy session.  His wife Marion is present.  He is working on some cognitive math skills.  Patient reports he is very happy he had a successful bowel program today in the morning using the suppository.  He has no new complaints.    Objective:  VITAL SIGNS: /60   Pulse 75   Temp 36.6 °C (97.8 °F) (Oral)   Resp 16   Ht 1.778 m (5' 10\")   Wt 97.5 kg (215 lb)   SpO2 93%   BMI 30.85 kg/m²   Gen: alert, no apparent distress  CV: regular rate and rhythm, no murmurs, no peripheral edema  Resp: clear to ascultation bilaterally, normal respiratory effort  GI: soft, non-tender abdomen, bowel sounds present    Recent Results (from the past 72 hour(s))   ACCU-CHEK GLUCOSE    Collection Time: 08/06/19  8:44 PM   Result Value Ref Range    Glucose - Accu-Ck 130 (H) 65 - 99 mg/dL   CBC WITH DIFFERENTIAL    Collection Time: 08/07/19  5:50 AM   Result Value Ref Range    WBC 8.4 4.8 - 10.8 K/uL    RBC 2.42 (L) 4.70 - 6.10 M/uL    Hemoglobin 7.6 (L) 14.0 - 18.0 g/dL    Hematocrit 23.5 (L) 42.0 - 52.0 %    MCV 97.1 81.4 - 97.8 fL    MCH 31.4 27.0 - 33.0 pg    MCHC 32.3 (L) 33.7 - 35.3 g/dL    RDW 48.6 35.9 - 50.0 fL    Platelet Count 242 164 - 446 K/uL    MPV 9.0 9.0 - 12.9 fL    Neutrophils-Polys 71.90 44.00 - 72.00 %    Lymphocytes 10.30 (L) 22.00 - 41.00 %    Monocytes 11.70 0.00 - 13.40 %    Eosinophils 2.40 0.00 - 6.90 %    Basophils 0.40 0.00 - 1.80 %    Immature Granulocytes 3.30 (H) 0.00 - 0.90 %    Nucleated RBC 0.00 /100 WBC    Neutrophils (Absolute) 6.04 1.82 - 7.42 K/uL    Lymphs (Absolute) 0.86 (L) 1.00 - 4.80 K/uL    Monos (Absolute) 0.98 (H) 0.00 - 0.85 K/uL    Eos (Absolute) 0.20 0.00 - 0.51 K/uL    Baso (Absolute) 0.03 0.00 - 0.12 K/uL    Immature Granulocytes (abs) 0.28 (H) 0.00 - 0.11 K/uL    NRBC (Absolute) 0.00 K/uL   ACCU-CHEK GLUCOSE    " Collection Time: 08/07/19  8:08 AM   Result Value Ref Range    Glucose - Accu-Ck 146 (H) 65 - 99 mg/dL   ACCU-CHEK GLUCOSE    Collection Time: 08/07/19 11:29 AM   Result Value Ref Range    Glucose - Accu-Ck 117 (H) 65 - 99 mg/dL   ACCU-CHEK GLUCOSE    Collection Time: 08/07/19  5:17 PM   Result Value Ref Range    Glucose - Accu-Ck 100 (H) 65 - 99 mg/dL   ACCU-CHEK GLUCOSE    Collection Time: 08/08/19  7:57 AM   Result Value Ref Range    Glucose - Accu-Ck 154 (H) 65 - 99 mg/dL   ACCU-CHEK GLUCOSE    Collection Time: 08/08/19  5:25 PM   Result Value Ref Range    Glucose - Accu-Ck 101 (H) 65 - 99 mg/dL   ACCU-CHEK GLUCOSE    Collection Time: 08/09/19  7:55 AM   Result Value Ref Range    Glucose - Accu-Ck 130 (H) 65 - 99 mg/dL   ACCU-CHEK GLUCOSE    Collection Time: 08/09/19  5:00 PM   Result Value Ref Range    Glucose - Accu-Ck 106 (H) 65 - 99 mg/dL       Current Facility-Administered Medications   Medication Frequency   • ciprofloxacin (CIPRO) tablet 500 mg Q12HRS   • bisacodyl (THE MAGIC BULLET) suppository 10 mg DAILY   • insulin lispro (HUMALOG) injection 2-12 Units BID WITH MEALS    And   • glucose 4 g chewable tablet 16 g Q15 MIN PRN    And   • dextrose 50% (D50W) injection 50 mL Q15 MIN PRN   • magnesium oxide (MAG-OX) tablet 400 mg TID WITH MEALS   • ferrous sulfate tablet 325 mg BID WITH MEALS   • ascorbic acid tablet 500 mg BID WITH MEALS   • docusate sodium (COLACE) capsule 100 mg BID   • sennosides (SENOKOT) 8.6 MG tablet 17.2 mg QHS   • lidocaine (XYLOCAINE) 2 % jelly PRN   • lactobacillus rhamnosus (CULTURELLE) capsule 1 Cap QDAY with Breakfast   • cyanocobalamin (VITAMIN B12) tablet 1,000 mcg DAILY   • folic acid (FOLVITE) tablet 1 mg DAILY   • gabapentin (NEURONTIN) capsule 300 mg TID   • midodrine (PROAMATINE) tablet 5 mg Q4HRS PRN   • enoxaparin (LOVENOX) inj 40 mg DAILY   • tramadol (ULTRAM) 50 MG tablet 50 mg TID   • lidocaine (LIDODERM) 5 % 2 Patch Q24HR   • metFORMIN (GLUCOPHAGE) tablet 1,000 mg  BID WITH MEALS   • amLODIPine (NORVASC) tablet 10 mg DAILY   • atorvastatin (LIPITOR) tablet 20 mg Nightly   • glucose 4 g chewable tablet 16 g PRN   • lisinopril (PRINIVIL) tablet 40 mg DAILY   • loratadine (CLARITIN) tablet 10 mg DAILY   • omeprazole (PRILOSEC) capsule 20 mg DAILY   • spironolactone (ALDACTONE) tablet 25 mg DAILY   • terazosin (HYTRIN) capsule 5 mg BID   • verapamil ER (CALAN-SR) tablet 180 mg BID   • Pharmacy Consult Request ...Pain Management Review 1 Each PHARMACY TO DOSE   • Respiratory Care per Protocol Continuous RT   • oxyCODONE immediate-release (ROXICODONE) tablet 5 mg Q3HRS PRN   • oxyCODONE immediate release (ROXICODONE) tablet 10 mg Q3HRS PRN   • acetaminophen (TYLENOL) tablet 650 mg Q4HRS PRN   • therapeutic multivitamin-minerals (THERAGRAN-M) tablet 1 Tab DAILY WITH LUNCH   • artificial tears 1.4 % ophthalmic solution 1 Drop PRN   • benzocaine-menthol (CEPACOL) lozenge 1 Lozenge Q2HRS PRN   • mag hydrox-al hydrox-simeth (MAALOX PLUS ES or MYLANTA DS) suspension 20 mL Q2HRS PRN   • ondansetron (ZOFRAN ODT) dispertab 4 mg 4X/DAY PRN    Or   • ondansetron (ZOFRAN) syringe/vial injection 4 mg 4X/DAY PRN   • traZODone (DESYREL) tablet 50 mg QHS PRN   • sodium chloride (OCEAN) 0.65 % nasal spray 2 Spray PRN   • calcium carbonate (TUMS) chewable tab 500 mg BID       Orders Placed This Encounter   Procedures   • Diet Order Diabetic     Standing Status:   Standing     Number of Occurrences:   1     Order Specific Question:   Diet:     Answer:   Diabetic [3]       Assessment:  Active Hospital Problems    Diagnosis   • *T5 spinal cord injury (HCC)   • PVD (peripheral vascular disease) (HCC)   • Type 2 diabetes mellitus without complication, without long-term current use of insulin (HCC)   • Essential hypertension   • Dyslipidemia   • Obesity   • BPH (benign prostatic hyperplasia)   • History of melanoma   • Neuropathic pain   • Neurogenic bladder   • Neurogenic bowel   • Hyponatremia   •  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HCC)   • Acute kidney injury (HCC)   • Vitamin D deficiency   • Anemia       Medical Decision Making and Plan:    T5 AIS D spinal cord injury  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 Velasco  Continue full rehab program  PT/OT, 1.5 hr each discipline, 5 days per week  Outpatient follow up with oncology regarding tissue diagnosis    Neurogenic orthostasis  Continue midodrine    Neuropathic pain management  Continue gabapentin  Pain currently well controlled    Nociceptive pain management  Continue scheduled Tramadol  Continue PRN oxycodone  Continue scheduled tylenol  Continue lidocaine patch    Iron deficiency anemia  Continue ferrous sulfate and Vit C  Also on B12 and folate    Hypertension  Continue amlodipine  Continue lisinopril  Continue Verapamil  Continue spironolactone  Continue terazosin  Appreciate hospitalist assistance    Hypomagnesemia  Continue supplementation    Dyslipidemia  Continue statin    Peripheral vascular disease  Continue statin  Outpatient follow up with his vascular surgeon    Sepsis due to UTI  Continue antibiotics  Appreciate hospitalist assistance    Diabetes  Continue metformin  Continue Lantus  Appreciate hospitalist assistance    Anxiety/adjustment disorder  Continue Cymbalta  Dr. Yoo consulted    Neurogenic bladder  BPH  Continue terazosin  PVRs -  12, 123, 109, 137, 128    Neurogenic bowel  Continue bowel program  Changed to 10-11:30 am as this is when he moves his bowels at home, hold therapy at that time  Ok to try without suppository as function returns  Last BM 8/9  Continue TUMs, lactobacillis     DVT prophylaxis  Lovenox    Total time:  15 minutes.  I spent greater than 50% of the time for patient care, counseling, and coordination on this date, including patient face-to face time, unit/floor time with review of records/pertinent lab data and studies, as well as discussing diagnostic evaluation/work up,  planned therapeutic interventions, and future disposition of care, as per the interval events/subjective and the assessment and plan as noted above.    I have performed a physical exam, reviewed and updated ROS, as well as the assessment and plan today 8/9/2019. In review of note from 8/8/2019 there are no new changes except as documented above.        Mini Rodriguez M.D.   Physical Medicine and Rehabilitation

## 2019-08-10 NOTE — THERAPY
Speech Language Pathology  Daily Treatment     Patient Name: Ian Laird  Age:  69 y.o., Sex:  male  Medical Record #: [id number]  Today's Date: 8/10/2019     Subjective    Pt pleasant and cooperative during tx.  Spouse present and supportive.      Objective       08/10/19 1331   Cognition   Complex Attention Moderate (3)   Complex Reasoning  / Problem Solving Moderate (3)   Interdisciplinary Plan of Care Collaboration   IDT Collaboration with  Family / Caregiver   Collaboration Comments spouse present during session.   SLP Total Time Spent   SLP Individual Total Time Spent (Mins) 60   Charge Group   SLP Cognitive Skill Development 4       FIM Eating Score:     Eating Description:       FIM Comprehension Score:  6 - Modified Independent  Comprehension Description:  Glasses, Verbal cues    FIM Expression Score:  7 - Independent  Expression Description:       FIM Social Interaction Score:  7 - Independent  Social Interaction Description:  Medication    FIM Problem Solving Score:  5 - Standby Prompting/Supervision or Set-up  Problem Solving Description:  Verbal cueing, Therapy schedule, Increased time    FIM Memory Score:  5 - Standby Prompting/Supervision or Set-up  Memory Description:  Verbal cueing, Therapy schedule    FIM Comprehension Score:  6 - Modified Independent  Comprehension Description:  Glasses, Verbal cues    FIM Expression Score:  7 - Independent  Expression Description:       FIM Social Interaction Score:  7 - Independent  Social Interaction Description:  Medication    FIM Problem Solving Score:  5 - Standby Prompting/Supervision or Set-up  Problem Solving Description:  Verbal cueing, Therapy schedule, Increased time    FIM Memory Score:  5 - Standby Prompting/Supervision or Set-up  Memory Description:  Verbal cueing, Therapy schedule      Assessment    Deductive reasoning (3x4 puzzle): 33% independent; 100% given mod verbal and gestural cues.  Alternating attn (card ID): color: 80% Isabel,   Number/color- 60% modA.      Plan    Continue to target attn and reasoning.

## 2019-08-11 LAB
GLUCOSE BLD-MCNC: 130 MG/DL (ref 65–99)
GLUCOSE BLD-MCNC: 148 MG/DL (ref 65–99)

## 2019-08-11 PROCEDURE — 99232 SBSQ HOSP IP/OBS MODERATE 35: CPT | Performed by: HOSPITALIST

## 2019-08-11 PROCEDURE — 97112 NEUROMUSCULAR REEDUCATION: CPT

## 2019-08-11 PROCEDURE — A9270 NON-COVERED ITEM OR SERVICE: HCPCS | Performed by: HOSPITALIST

## 2019-08-11 PROCEDURE — A9270 NON-COVERED ITEM OR SERVICE: HCPCS | Performed by: PHYSICAL MEDICINE & REHABILITATION

## 2019-08-11 PROCEDURE — 97530 THERAPEUTIC ACTIVITIES: CPT

## 2019-08-11 PROCEDURE — 700111 HCHG RX REV CODE 636 W/ 250 OVERRIDE (IP): Performed by: PHYSICAL MEDICINE & REHABILITATION

## 2019-08-11 PROCEDURE — 700112 HCHG RX REV CODE 229: Performed by: PHYSICAL MEDICINE & REHABILITATION

## 2019-08-11 PROCEDURE — G0515 COGNITIVE SKILLS DEVELOPMENT: HCPCS

## 2019-08-11 PROCEDURE — 700102 HCHG RX REV CODE 250 W/ 637 OVERRIDE(OP): Performed by: PHYSICAL MEDICINE & REHABILITATION

## 2019-08-11 PROCEDURE — 700102 HCHG RX REV CODE 250 W/ 637 OVERRIDE(OP): Performed by: HOSPITALIST

## 2019-08-11 PROCEDURE — 770010 HCHG ROOM/CARE - REHAB SEMI PRIVAT*

## 2019-08-11 PROCEDURE — 82962 GLUCOSE BLOOD TEST: CPT | Mod: 91

## 2019-08-11 PROCEDURE — 97116 GAIT TRAINING THERAPY: CPT

## 2019-08-11 PROCEDURE — 700101 HCHG RX REV CODE 250: Performed by: PHYSICAL MEDICINE & REHABILITATION

## 2019-08-11 RX ADMIN — GABAPENTIN 300 MG: 300 CAPSULE ORAL at 08:55

## 2019-08-11 RX ADMIN — FERROUS SULFATE TAB 325 MG (65 MG ELEMENTAL FE) 325 MG: 325 (65 FE) TAB at 17:23

## 2019-08-11 RX ADMIN — OMEPRAZOLE 20 MG: 20 CAPSULE, DELAYED RELEASE ORAL at 08:55

## 2019-08-11 RX ADMIN — CALCIUM CARBONATE (ANTACID) CHEW TAB 500 MG 500 MG: 500 CHEW TAB at 08:53

## 2019-08-11 RX ADMIN — MAGNESIUM OXIDE TAB 400 MG (241.3 MG ELEMENTAL MG) 400 MG: 400 (241.3 MG) TAB at 08:54

## 2019-08-11 RX ADMIN — METFORMIN HYDROCHLORIDE 1000 MG: 500 TABLET, FILM COATED ORAL at 08:54

## 2019-08-11 RX ADMIN — FERROUS SULFATE TAB 325 MG (65 MG ELEMENTAL FE) 325 MG: 325 (65 FE) TAB at 08:55

## 2019-08-11 RX ADMIN — METFORMIN HYDROCHLORIDE 1000 MG: 500 TABLET, FILM COATED ORAL at 17:23

## 2019-08-11 RX ADMIN — OXYCODONE HYDROCHLORIDE AND ACETAMINOPHEN 500 MG: 500 TABLET ORAL at 08:55

## 2019-08-11 RX ADMIN — Medication 1 CAPSULE: at 08:55

## 2019-08-11 RX ADMIN — AMLODIPINE BESYLATE 10 MG: 5 TABLET ORAL at 08:53

## 2019-08-11 RX ADMIN — DOCUSATE SODIUM 100 MG: 100 CAPSULE, LIQUID FILLED ORAL at 08:55

## 2019-08-11 RX ADMIN — SPIRONOLACTONE 25 MG: 25 TABLET ORAL at 08:55

## 2019-08-11 RX ADMIN — SENNOSIDES 17.2 MG: 8.6 TABLET, FILM COATED ORAL at 20:26

## 2019-08-11 RX ADMIN — TERAZOSIN HYDROCHLORIDE 5 MG: 5 CAPSULE ORAL at 06:16

## 2019-08-11 RX ADMIN — GABAPENTIN 300 MG: 300 CAPSULE ORAL at 14:37

## 2019-08-11 RX ADMIN — MAGNESIUM OXIDE TAB 400 MG (241.3 MG ELEMENTAL MG) 400 MG: 400 (241.3 MG) TAB at 11:46

## 2019-08-11 RX ADMIN — DOCUSATE SODIUM 100 MG: 100 CAPSULE, LIQUID FILLED ORAL at 20:26

## 2019-08-11 RX ADMIN — ATORVASTATIN CALCIUM 20 MG: 10 TABLET, FILM COATED ORAL at 20:26

## 2019-08-11 RX ADMIN — CALCIUM CARBONATE (ANTACID) CHEW TAB 500 MG 500 MG: 500 CHEW TAB at 20:26

## 2019-08-11 RX ADMIN — TRAMADOL HYDROCHLORIDE 50 MG: 50 TABLET, COATED ORAL at 11:46

## 2019-08-11 RX ADMIN — LORATADINE 10 MG: 10 TABLET ORAL at 08:55

## 2019-08-11 RX ADMIN — CYANOCOBALAMIN TAB 1000 MCG 1000 MCG: 1000 TAB at 08:55

## 2019-08-11 RX ADMIN — LISINOPRIL 40 MG: 20 TABLET ORAL at 08:54

## 2019-08-11 RX ADMIN — VERAPAMIL HYDROCHLORIDE 180 MG: 180 TABLET, FILM COATED, EXTENDED RELEASE ORAL at 20:26

## 2019-08-11 RX ADMIN — MAGNESIUM OXIDE TAB 400 MG (241.3 MG ELEMENTAL MG) 400 MG: 400 (241.3 MG) TAB at 17:23

## 2019-08-11 RX ADMIN — LIDOCAINE 2 PATCH: 50 PATCH CUTANEOUS at 08:52

## 2019-08-11 RX ADMIN — OXYCODONE HYDROCHLORIDE AND ACETAMINOPHEN 500 MG: 500 TABLET ORAL at 17:23

## 2019-08-11 RX ADMIN — MULTIPLE VITAMINS W/ MINERALS TAB 1 TABLET: TAB at 11:46

## 2019-08-11 RX ADMIN — GABAPENTIN 300 MG: 300 CAPSULE ORAL at 20:26

## 2019-08-11 RX ADMIN — VERAPAMIL HYDROCHLORIDE 180 MG: 180 TABLET, FILM COATED, EXTENDED RELEASE ORAL at 08:54

## 2019-08-11 RX ADMIN — TRAMADOL HYDROCHLORIDE 50 MG: 50 TABLET, COATED ORAL at 08:55

## 2019-08-11 RX ADMIN — FOLIC ACID 1 MG: 1 TABLET ORAL at 08:55

## 2019-08-11 RX ADMIN — TRAMADOL HYDROCHLORIDE 50 MG: 50 TABLET, COATED ORAL at 17:23

## 2019-08-11 RX ADMIN — ENOXAPARIN SODIUM 40 MG: 100 INJECTION SUBCUTANEOUS at 08:52

## 2019-08-11 RX ADMIN — TERAZOSIN HYDROCHLORIDE 5 MG: 5 CAPSULE ORAL at 17:25

## 2019-08-11 RX ADMIN — BISACODYL 10 MG: 10 SUPPOSITORY RECTAL at 11:03

## 2019-08-11 ASSESSMENT — ENCOUNTER SYMPTOMS
NAUSEA: 0
VOMITING: 0
FOCAL WEAKNESS: 1
BACK PAIN: 1
PALPITATIONS: 0
EYES NEGATIVE: 1
FEVER: 0
SHORTNESS OF BREATH: 0
CHILLS: 0
ABDOMINAL PAIN: 0
COUGH: 0

## 2019-08-11 NOTE — PROGRESS NOTES
DATE OF SERVICE:  08/09/2019    The patient continues to do very well at followup.  His affect is variable.    He reported his mood is upbeat and positive.  The patient is recovering fully   from his UTI.  He is participating actively, making good gains by his account.    Session talked again about the patient's home program and what he hopes to   accomplish, both short term and long term.       ____________________________________     THEODORE GUEVARA, PHD    SABAS / ROMANA    DD:  08/11/2019 11:40:10  DT:  08/11/2019 13:01:57    D#:  [id number]  Job#:  [id number]

## 2019-08-11 NOTE — PROGRESS NOTES
DATE OF SERVICE:  08/06/2019    The patient is doing much better.  He continues to be treated for his UTI.    His cognitive status is improving.  He seems more focused and alert.    This session was devoted to talking about the patient's home program and what   he expects upon his discharge date.       ____________________________________     THEODORE GUEVARA, PHD    SABAS / ROMANA    DD:  08/11/2019 07:29:23  DT:  08/11/2019 11:07:34    D#:  [id number]  Job#:  [id number]

## 2019-08-11 NOTE — THERAPY
Speech Language Pathology  Daily Treatment     Patient Name: Ian Laird  Age:  69 y.o., Sex:  male  Medical Record #: [id number]  Today's Date: 8/11/2019     Subjective    Pt pleasant and cooperative during tx.       Objective       08/11/19 1531   Cognition   Moderate Attention Minimal (4)   Executive Functioning / Organization Minimal (4)   Interdisciplinary Plan of Care Collaboration   IDT Collaboration with  Family / Caregiver   Collaboration Comments spouse present during session.    SLP Total Time Spent   SLP Individual Total Time Spent (Mins) 60   Charge Group   SLP Cognitive Skill Development 4       FIM Comprehension Score:  6 - Modified Independent  Comprehension Description:  Glasses, Verbal cues    FIM Expression Score:  7 - Independent  Expression Description:       FIM Social Interaction Score:  7 - Independent  Social Interaction Description:       FIM Problem Solving Score:  5 - Standby Prompting/Supervision or Set-up  Problem Solving Description:  Verbal cueing, Therapy schedule, Increased time    FIM Memory Score:  5 - Standby Prompting/Supervision or Set-up  Memory Description:  Verbal cueing, Therapy schedule      Assessment    Alternating attn (card ID): color- 95% (improved from 80%).  Number/suit: 70% (improved from 60%).  Deductive reasoning (3x6 puzzle): 50% Isabel, 100% given mod verbal and gestural cues.      Plan    Continue to target complex reasoning and scheduling.

## 2019-08-11 NOTE — CARE PLAN
Problem: Safety  Goal: Will remain free from injury  Outcome: PROGRESSING AS EXPECTED  Note:   Pt uses call light consistently for staff assistance. Pt has good safety awareness, no impulsivity observed.      Problem: Infection  Goal: Will remain free from infection  Outcome: PROGRESSING AS EXPECTED  Note:   No s/s of infection present      Problem: Pain Management  Goal: Pain level will decrease to patient's comfort goal  Outcome: PROGRESSING AS EXPECTED  Note:   No reports of pain at this time. Will continue to monitor through shift with hourly rounds.      Problem: IP BLADDER/VOIDING  Goal: LTG - Patient will utilize adaptive techniques/equipment to complete bladder elimination  Outcome: MET  Note:   PVR bladder scans have been < 100ml this shift

## 2019-08-11 NOTE — PROGRESS NOTES
Hospital Medicine Daily Progress Note      Chief Complaint  HTN, DM, Hyponatremia    Interval Problem Update  Doing well, denies complaints.    Review of Systems  Review of Systems   Constitutional: Negative for chills and fever.   HENT: Negative.    Eyes: Negative.    Respiratory: Negative for cough and shortness of breath.    Cardiovascular: Negative for chest pain and palpitations.   Gastrointestinal: Negative for abdominal pain, nausea and vomiting.   Musculoskeletal: Positive for back pain.        Wound pain   Skin: Negative for itching and rash.   Neurological: Positive for focal weakness.        Physical Exam  Temp:  [36.6 °C (97.8 °F)-37.1 °C (98.8 °F)] 36.7 °C (98.1 °F)  Pulse:  [68-89] 76  Resp:  [16-18] 16  BP: (122-147)/(60-72) 122/60  SpO2:  [94 %] 94 %    Physical Exam   Constitutional: He is oriented to person, place, and time. No distress.   HENT:   Head: Normocephalic and atraumatic.   Right Ear: External ear normal.   Left Ear: External ear normal.   Eyes: Conjunctivae and EOM are normal. Right eye exhibits no discharge. Left eye exhibits no discharge.   Neck: Normal range of motion. Neck supple. No tracheal deviation present.   Cardiovascular: Normal rate and regular rhythm.   Pulmonary/Chest: Effort normal and breath sounds normal. No stridor. No respiratory distress. He has no wheezes.   Abdominal: Soft. Bowel sounds are normal. He exhibits no distension. There is no tenderness.   Musculoskeletal: He exhibits no edema or tenderness.   Upper back incision healing well   Neurological: He is alert and oriented to person, place, and time.   Skin: Skin is warm and dry. He is not diaphoretic.   Vitals reviewed.      Fluids    Intake/Output Summary (Last 24 hours) at 8/11/2019 1429  Last data filed at 8/11/2019 1200  Gross per 24 hour   Intake 1380 ml   Output 300 ml   Net 1080 ml       Laboratory  Recent Labs     08/10/19  0540   WBC 9.2   RBC 2.53*   HEMOGLOBIN 8.2*   HEMATOCRIT 24.8*   MCV 98.0*    MCH 32.4   MCHC 33.1*   RDW 49.1   PLATELETCT 212   MPV 9.1     Recent Labs     08/10/19  0540   SODIUM 138   POTASSIUM 4.2   CHLORIDE 102   CO2 25   GLUCOSE 144*   BUN 19   CREATININE 1.34   CALCIUM 9.4                   Assessment/Plan  * T5 spinal cord injury (HCC)- (present on admission)  Assessment & Plan  S/P T5 extradural mass resection w/ T4-T6 laminectomy and T3-T7 fusion on 7/20/19 by Dr. Velasco  Pathology  +plasmablastic plasmacytoma  Continue wound care    PVD (peripheral vascular disease) (HCC)- (present on admission)  Assessment & Plan  Plavix on hold post-operatively    Essential hypertension- (present on admission)  Assessment & Plan  Echo EF 65% w/ mild AS  On Norvasc, Lisinopril, Aldactone, and Verapamil  Blood pressure controlled off Midodrine    Type 2 diabetes mellitus without complication, without long-term current use of insulin (HCC)- (present on admission)  Assessment & Plan  HbA1c 6.8  Continue Metformin  Off Lantus and  Glipizide  Outpt meds include Metformin, Glipizide, and Januvia    Hypomagnesemia  Assessment & Plan  Continue MgOx    Sepsis secondary to UTI (HCC)  Assessment & Plan  UA w/ packed WBC and many bacteria  Unfortunately no UCx done  Sepsis syndrome resolved on abx    History of melanoma  Assessment & Plan  S/P resection x 2  Outpt F/U    BPH (benign prostatic hyperplasia)  Assessment & Plan  Monitor for orthostatic hypotension on Hytrin bid    Anemia- (present on admission)  Assessment & Plan  S/P PRBC prior to Rehab transfer  Continue Fe and Vit C  Also on Vit B12 and Folate supplements     Dyslipidemia- (present on admission)  Assessment & Plan  On Lipitor    Full Code    Patient seen and examined.  Chart reviewed.  Assessment and Plan as above.  No changes today.

## 2019-08-11 NOTE — THERAPY
"Physical Therapy   Daily Treatment     Patient Name: Ian Laird  Age:  69 y.o., Sex:  male  Medical Record #: [id number]  Today's Date: 8/11/2019     Precautions  Precautions: Fall Risk, Spinal / Back Precautions   Comments: T3-T7 spinal fusion    Subjective    Pt was lying in bed upon arrival and agreeable to tx.      Objective       08/11/19 1331   Precautions   Precautions Fall Risk;Spinal / Back Precautions    Comments T3-T7 spinal fusion   Interdisciplinary Plan of Care Collaboration   Patient Position at End of Therapy In Bed;Call Light within Reach;Tray Table within Reach;Phone within Reach   PT Total Time Spent   PT Individual Total Time Spent (Mins) 60   PT Charge Group   PT Gait Training 2   PT Neuromuscular Re-Education / Balance 1   PT Therapeutic Activities 1     Standing balance:   1 x 10 L LE stance on 2\" cone with unilateral UE on FWW, reaching x10 to limits of stability (sequence repeated with R LE on 2\" cone)    FIM Bed/Chair/Wheelchair Transfers Score: 5 - Standby Prompting/Supervision or Set-up  Bed/Chair/Wheelchair Transfers Description:  (bed mob: SPV with bed rail; transfer: SBA with FWW)    FIM Walking Score:  2 - Max Assistance  Walking Description:  (45ft x1, 55ftx1 with 4ww and CGA; 60ft x1 FWW, CGA all level terrain)    FIM Wheelchair Score:  2 - Max Assistance  Wheelchair Description:   70ft x 1, 65ft x1, 58ft x1 mod I    FIM Stairs Score:  0 - Not tested,unsafe activity  Stairs Description:         Assessment    Pt continues to be limited by endurance; increased safety with FWW. Impaired standing balance.     Plan    Car transfer to pt's car, stair training, issue standing therex HEP, AMB with FWW for endurance    "

## 2019-08-11 NOTE — CARE PLAN
Problem: Safety  Goal: Will remain free from injury  Outcome: PROGRESSING AS EXPECTED  Goal: Will remain free from falls  Outcome: PROGRESSING AS EXPECTED     Problem: Infection  Goal: Will remain free from infection  Outcome: PROGRESSING AS EXPECTED     Problem: Bowel/Gastric:  Goal: Normal bowel function is maintained or improved  Outcome: PROGRESSING AS EXPECTED  Goal: Will not experience complications related to bowel motility  Outcome: PROGRESSING AS EXPECTED

## 2019-08-11 NOTE — PROGRESS NOTES
DATE OF SERVICE:  08/07/2019    Patient continues to do well.  As reported in his last progress note, he is   making good gains in his strength and endurance.  His alertness is much more   improved.  Patient is being treated for UTI.  He said he feels much less   fatigued than he did last week, especially on Friday.  Patient talked about   his goals again and what he expects at home as he anticipates his discharge   date.       ____________________________________     THEODORE GUEVARA, PHD    SABAS / ROMANA    DD:  08/11/2019 09:07:05  DT:  08/11/2019 11:50:35    D#:  [id number]  Job#:  [id number]

## 2019-08-12 LAB
GLUCOSE BLD-MCNC: 101 MG/DL (ref 65–99)
GLUCOSE BLD-MCNC: 135 MG/DL (ref 65–99)

## 2019-08-12 PROCEDURE — 99233 SBSQ HOSP IP/OBS HIGH 50: CPT | Performed by: PHYSICAL MEDICINE & REHABILITATION

## 2019-08-12 PROCEDURE — 700101 HCHG RX REV CODE 250: Performed by: PHYSICAL MEDICINE & REHABILITATION

## 2019-08-12 PROCEDURE — A9270 NON-COVERED ITEM OR SERVICE: HCPCS | Performed by: HOSPITALIST

## 2019-08-12 PROCEDURE — 700112 HCHG RX REV CODE 229: Performed by: PHYSICAL MEDICINE & REHABILITATION

## 2019-08-12 PROCEDURE — A9270 NON-COVERED ITEM OR SERVICE: HCPCS | Performed by: PHYSICAL MEDICINE & REHABILITATION

## 2019-08-12 PROCEDURE — 97530 THERAPEUTIC ACTIVITIES: CPT

## 2019-08-12 PROCEDURE — 97535 SELF CARE MNGMENT TRAINING: CPT

## 2019-08-12 PROCEDURE — 700102 HCHG RX REV CODE 250 W/ 637 OVERRIDE(OP): Performed by: PHYSICAL MEDICINE & REHABILITATION

## 2019-08-12 PROCEDURE — G0515 COGNITIVE SKILLS DEVELOPMENT: HCPCS

## 2019-08-12 PROCEDURE — 700102 HCHG RX REV CODE 250 W/ 637 OVERRIDE(OP): Performed by: HOSPITALIST

## 2019-08-12 PROCEDURE — 700111 HCHG RX REV CODE 636 W/ 250 OVERRIDE (IP): Performed by: PHYSICAL MEDICINE & REHABILITATION

## 2019-08-12 PROCEDURE — 770010 HCHG ROOM/CARE - REHAB SEMI PRIVAT*

## 2019-08-12 PROCEDURE — 99231 SBSQ HOSP IP/OBS SF/LOW 25: CPT | Performed by: HOSPITALIST

## 2019-08-12 PROCEDURE — 82962 GLUCOSE BLOOD TEST: CPT | Mod: 91

## 2019-08-12 PROCEDURE — 97112 NEUROMUSCULAR REEDUCATION: CPT

## 2019-08-12 PROCEDURE — 97116 GAIT TRAINING THERAPY: CPT

## 2019-08-12 RX ORDER — TRAMADOL HYDROCHLORIDE 50 MG/1
50 TABLET ORAL 3 TIMES DAILY PRN
Status: DISCONTINUED | OUTPATIENT
Start: 2019-08-12 | End: 2019-08-16

## 2019-08-12 RX ADMIN — GABAPENTIN 300 MG: 300 CAPSULE ORAL at 20:17

## 2019-08-12 RX ADMIN — DOCUSATE SODIUM 100 MG: 100 CAPSULE, LIQUID FILLED ORAL at 20:17

## 2019-08-12 RX ADMIN — AMLODIPINE BESYLATE 10 MG: 5 TABLET ORAL at 08:21

## 2019-08-12 RX ADMIN — TRAMADOL HYDROCHLORIDE 50 MG: 50 TABLET, COATED ORAL at 08:21

## 2019-08-12 RX ADMIN — SPIRONOLACTONE 25 MG: 25 TABLET ORAL at 08:21

## 2019-08-12 RX ADMIN — FERROUS SULFATE TAB 325 MG (65 MG ELEMENTAL FE) 325 MG: 325 (65 FE) TAB at 16:47

## 2019-08-12 RX ADMIN — GABAPENTIN 300 MG: 300 CAPSULE ORAL at 08:21

## 2019-08-12 RX ADMIN — CALCIUM CARBONATE (ANTACID) CHEW TAB 500 MG 500 MG: 500 CHEW TAB at 20:16

## 2019-08-12 RX ADMIN — METFORMIN HYDROCHLORIDE 1000 MG: 500 TABLET, FILM COATED ORAL at 08:21

## 2019-08-12 RX ADMIN — OXYCODONE HYDROCHLORIDE AND ACETAMINOPHEN 500 MG: 500 TABLET ORAL at 16:47

## 2019-08-12 RX ADMIN — ENOXAPARIN SODIUM 40 MG: 100 INJECTION SUBCUTANEOUS at 08:20

## 2019-08-12 RX ADMIN — LISINOPRIL 40 MG: 20 TABLET ORAL at 08:20

## 2019-08-12 RX ADMIN — MAGNESIUM OXIDE TAB 400 MG (241.3 MG ELEMENTAL MG) 400 MG: 400 (241.3 MG) TAB at 08:22

## 2019-08-12 RX ADMIN — SENNOSIDES 17.2 MG: 8.6 TABLET, FILM COATED ORAL at 20:17

## 2019-08-12 RX ADMIN — VERAPAMIL HYDROCHLORIDE 180 MG: 180 TABLET, FILM COATED, EXTENDED RELEASE ORAL at 20:17

## 2019-08-12 RX ADMIN — TERAZOSIN HYDROCHLORIDE 5 MG: 5 CAPSULE ORAL at 05:34

## 2019-08-12 RX ADMIN — OMEPRAZOLE 20 MG: 20 CAPSULE, DELAYED RELEASE ORAL at 08:20

## 2019-08-12 RX ADMIN — Medication 1 CAPSULE: at 08:22

## 2019-08-12 RX ADMIN — ATORVASTATIN CALCIUM 20 MG: 10 TABLET, FILM COATED ORAL at 20:17

## 2019-08-12 RX ADMIN — GABAPENTIN 300 MG: 300 CAPSULE ORAL at 16:47

## 2019-08-12 RX ADMIN — DOCUSATE SODIUM 100 MG: 100 CAPSULE, LIQUID FILLED ORAL at 08:22

## 2019-08-12 RX ADMIN — TERAZOSIN HYDROCHLORIDE 5 MG: 5 CAPSULE ORAL at 16:50

## 2019-08-12 RX ADMIN — VERAPAMIL HYDROCHLORIDE 180 MG: 180 TABLET, FILM COATED, EXTENDED RELEASE ORAL at 08:25

## 2019-08-12 RX ADMIN — FERROUS SULFATE TAB 325 MG (65 MG ELEMENTAL FE) 325 MG: 325 (65 FE) TAB at 08:22

## 2019-08-12 RX ADMIN — LORATADINE 10 MG: 10 TABLET ORAL at 08:22

## 2019-08-12 RX ADMIN — LIDOCAINE 2 PATCH: 50 PATCH CUTANEOUS at 08:22

## 2019-08-12 RX ADMIN — FOLIC ACID 1 MG: 1 TABLET ORAL at 08:22

## 2019-08-12 RX ADMIN — CALCIUM CARBONATE (ANTACID) CHEW TAB 500 MG 500 MG: 500 CHEW TAB at 08:22

## 2019-08-12 RX ADMIN — METFORMIN HYDROCHLORIDE 1000 MG: 500 TABLET, FILM COATED ORAL at 16:47

## 2019-08-12 RX ADMIN — CYANOCOBALAMIN TAB 1000 MCG 1000 MCG: 1000 TAB at 08:22

## 2019-08-12 RX ADMIN — MULTIPLE VITAMINS W/ MINERALS TAB 1 TABLET: TAB at 11:47

## 2019-08-12 RX ADMIN — OXYCODONE HYDROCHLORIDE AND ACETAMINOPHEN 500 MG: 500 TABLET ORAL at 08:22

## 2019-08-12 RX ADMIN — MAGNESIUM OXIDE TAB 400 MG (241.3 MG ELEMENTAL MG) 400 MG: 400 (241.3 MG) TAB at 16:47

## 2019-08-12 ASSESSMENT — ENCOUNTER SYMPTOMS
VOMITING: 0
FOCAL WEAKNESS: 1
COUGH: 0
NAUSEA: 0
ABDOMINAL PAIN: 0
SHORTNESS OF BREATH: 0
PALPITATIONS: 0
FEVER: 0
EYES NEGATIVE: 1
CHILLS: 0
BACK PAIN: 1

## 2019-08-12 NOTE — REHAB-PT IDT TEAM NOTE
"Physical Therapy   Mobility  Bed mobility:   5  Bed /Chair/Wheelchair Transfer Initial:  3 - Moderate Assistance  Bed /Chair/Wheelchair Transfer Current:  5 - Standby Prompting/Supervision or Set-up   Bed/Chair/Wheelchair Transfer Description:  (bed mob: SPV with bed rail; transfer: SBA with FWW)  Walk Initial:  0 - Not tested,unsafe activity  Walk Current:  2 - Max Assistance   Walk Description:  (45ft x1, 55ftx1 with 4ww and CGA; 60ft x1 FWW, CGA all level terrain)  Wheelchair Initial:  2 - Max Assistance  Wheelchair Current:  1 - Total Assistance   Wheelchair Description:  (35ft x1, 40ft x 4, B UE, level terrain)  Stairs Initial:  0 - Not tested,unsafe activity  Stairs Current: 2 - Max Assistance   Stairs Description: (4 x 4 6\" steps B UE support on unilateral HR, step to to mimic home enviornment, Isabel for stability , rest break between each set, 1x L LE knee buckle with modA for correction)  Patient/Family Training/Education:  Car transfer, LE HEP for strength and endurance, stair training, home modifications to improve safety (rail on stairs)  DME/DC Recommendations:  Pressure relief cushion, FWW, outpatient PT  Strengths:  Able to follow instructions, Making steady progress towards goals, Manages pain appropriately, Motivated for self care and independence, Pleasant and cooperative and Willingly participates in therapeutic activities supportive spouse,   Barriers:   Other: poor activity tolerance, ataxic, impaired standing balance, impaired motor problem solving for safety  # of short term goals set= 3   # of short term goals met=2  Physical Therapy Problems     Problem: Mobility     Dates: Start: 07/26/19       Goal: STG-Within one week, patient will propel wheelchair community     Dates: Start: 07/26/19       Description: 1) Individualized goal:  150ft x 1, B UE, mod I  2) Interventions:  PT Group Therapy, PT Gait Training, PT Therapeutic Exercises, PT Neuro Re-Ed/Balance, PT Therapeutic Activity, PT Manual " Therapy and PT Evaluation       Note:     Goal Note filed on 08/05/19 1047 by Darron Tapia, PT    65 ft                        Problem: Mobility Transfers     Dates: Start: 07/26/19       Goal: STG-Within one week, patient will perform bed mobility     Dates: Start: 07/26/19       Description: 1) Individualized goal: with bed rail as needed, SPV  2) Interventions:  PT Group Therapy, PT Gait Training, PT Therapeutic Exercises, PT Neuro Re-Ed/Balance, PT Therapeutic Activity, PT Manual Therapy and PT Evaluation     Note:     Goal Note filed on 08/05/19 1047 by Darron Tapia, PT    Min - mod A                  Goal: STG-Within one week, patient will transfer bed to chair     Dates: Start: 07/26/19       Description: 1) Individualized goal: SBA with LRAD  2) Interventions:  PT Group Therapy, PT Gait Training, PT Therapeutic Exercises, PT Neuro Re-Ed/Balance, PT Therapeutic Activity, PT Manual Therapy and PT Evaluation       Note:     Goal Note filed on 08/05/19 1047 by Darron Tapia, PT    Min - mod A                        Problem: PT-Long Term Goals     Dates: Start: 07/26/19       Goal: LTG-By discharge, patient will ambulate     Dates: Start: 07/26/19       Description: 1) Individualized goal:  50ft x1 SPV LRAD  2) Interventions:  PT Group Therapy, PT Gait Training, PT Therapeutic Exercises, PT Neuro Re-Ed/Balance, PT Therapeutic Activity, PT Manual Therapy and PT Evaluation           Goal: LTG-By discharge, patient will perform home exercise program     Dates: Start: 07/26/19       Description: 1) Individualized goal:  For balance and B LE strength/ endurance, mod I  2) Interventions:  PT Group Therapy, PT Gait Training, PT Therapeutic Exercises, PT Neuro Re-Ed/Balance, PT Therapeutic Activity, PT Manual Therapy and PT Evaluation                    Goal: LTG-By discharge, patient will     Dates: Start: 07/26/19       Description: 1) Individualized goal:  Up/down 2 stairs, Isabel to mimic home  environment   2) Interventions:  PT Group Therapy, PT Gait Training, PT Therapeutic Exercises, PT Neuro Re-Ed/Balance, PT Therapeutic Activity, PT Manual Therapy and PT Evaluation             Goal: LTG-By discharge, patient will     Dates: Start: 07/26/19       Description: 1) Individualized goal: perform car transfer with SPV, LRAD  2) Interventions:  PT Group Therapy, PT Gait Training, PT Therapeutic Exercises, PT Neuro Re-Ed/Balance, PT Therapeutic Activity, PT Manual Therapy and PT Evaluation                           Section completed by:  Luz Maria Jamison, PT, DPT, NCS

## 2019-08-12 NOTE — THERAPY
"Occupational Therapy  Daily Treatment     Patient Name: Ian Laird  Age:  69 y.o., Sex:  male  Medical Record #: [id number]  Today's Date: 8/12/2019     Precautions  Precautions: (P) Fall Risk, Spinal / Back Precautions   Comments: (P) T3-T7 spinal fusion    Safety   ADL Safety : Requires Supervision for Safety, Requires Physical Assist for Safety  Bathroom Safety: (P) Requires Supervision for Safety    Subjective    \"That was quite a workout\" - pt. While taking a break from standing and retreiving cones from kitchen cupboards     Objective       08/12/19 0701   Precautions   Precautions Fall Risk;Spinal / Back Precautions    Comments T3-T7 spinal fusion   Safety    Bathroom Safety Requires Supervision for Safety   Standing Upper Body Exercises   Standing Upper Body Exercises Yes   Other Exercises   (functional reach and walking in kitchen grabbing 10 cones )   Interdisciplinary Plan of Care Collaboration   Patient Position at End of Therapy Seated;Other (Comments)  (cafeteria for breakfast)   Collaboration Comments RN for medications     See FIMS      Assessment    Patient seen in room for morning routine. Pt had good speed in morning routine, showering on commode chair. Patient required 3 rest breaks in kitchen mobility task due to lower extremity weakness and limited endurance. Patient educated on safe use of countertops for UE support during functional reach task in kitchen. Pt discussed discharge in one week and primary goals of improving walking and standing endurance prior to d/c.     Plan    Educate pt and spouse on digital stimulation device and suppository ; Continue to improve strength, endurance, functional mobility and safety to maximize ADL/IADLs prior to d/c.     "

## 2019-08-12 NOTE — REHAB-COLLABORATIVE ONGOING IDT TEAM NOTE
Weekly Interdisciplinary Team Conference Note    Weekly Interdisciplinary Team Conference # 3  Date:  8/12/2019    Clinicians present and reporting at team conference include the following:   MD: Mini Rodriguez MD   RN:  Mario Moe RN    PT:   Luz Maria Jamison, PT, DPT  OT:  Eddie Luque, MS, OTR/L   ST:  Jenifer Llanes, MS, CCC-SLP  CM:  Marium Naqvi RN, CCM  REC:  Lalo Macdonald, MELINAS  RT:  None  RPh:  Zen Alvarez [facility]  Other:   None  All reporting clinicians have a working knowledge of this patient's plan of care.    Targeted DC Date:  8/19     Medical    Patient Active Problem List    Diagnosis Date Noted   • Fall 07/18/2019     Priority: High   • Low vitamin B12 level 07/19/2019     Priority: Medium   • PVD (peripheral vascular disease) ([facility]) 07/17/2019     Priority: Medium   • Type 2 diabetes mellitus without complication, without long-term current use of insulin ([facility]) 07/13/2015     Priority: Medium   • Essential hypertension 07/13/2015     Priority: Medium   • Dyslipidemia 07/13/2015     Priority: Low   • Obesity 07/13/2015     Priority: Low   • Hypomagnesemia 08/05/2019   • Sepsis secondary to UTI ([facility]) 08/02/2019   • Chest pain 08/01/2019   • Abdominal distension 07/27/2019   • BPH (benign prostatic hyperplasia) 07/26/2019   • History of melanoma 07/26/2019   • T5 spinal cord injury ([facility]) 07/25/2019   • Neuropathic pain 07/25/2019   • Neurogenic bladder 07/25/2019   • Neurogenic bowel 07/25/2019   • Hyponatremia 07/25/2019   • 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[facility]) 07/25/2019   • Acute kidney injury ([facility]) 07/25/2019   • Vitamin D deficiency 07/25/2019   • Anemia 07/23/2019     Results     Procedure Component Value Ref Range Date/Time    ACCU-CHEK GLUCOSE [[id number]]  (Abnormal) Collected:  08/12/19 0735    Order Status:  Completed Lab Status:  Final result Updated:  08/12/19 0754     Glucose - Accu-Ck 135 65 - 99 mg/dL     ACCU-CHEK GLUCOSE [[id number]]  (Abnormal) Collected:  08/11/19 1733    Order Status:   Completed Lab Status:  Final result Updated:  08/11/19 1847     Glucose - Accu-Ck 148 65 - 99 mg/dL         Nursing  Diet/Nutrition:  Diabetic and Thin Liquids  Medication Administration:  Whole with Liquid Wash  % consumed at meals in last 24 hours:  Consumed % of meals per documentation.  Meal/Snack Consumption for the past 24 hrs:   Oral Nutrition   08/11/19 1800 Dinner;Between 25-50% Consumed   08/11/19 1200 Lunch;Between % Consumed   08/11/19 0800 Breakfast;Between 50-75% Consumed       Appetite:  Fair  Fluid Intake/Output in past 24 hours: In: 1620 [P.O.:1620]  Out: -   Admit Weight:  Weight: 100.8 kg (222 lb 4.8 oz)  Weight Last 7 Days   [93.4 kg (205 lb 12.8 oz)] 93.4 kg (205 lb 12.8 oz) (08/11 1642)    Pain Issues:    Location:  --  --         Severity:  Denies   Scheduled pain medications:  gabapentin (NEURONTIN)  and tramadol (Ultram)      PRN pain medications used in last 24 hours:  None   Non Pharmacologic Interventions:  rest  Effectiveness of pain management plan:  good=patient states acceptable comfort after interventions    Bowel:    Bowel Assist Initial Score:  2 - Max Assistance  Bowel Assist Current Score:  3 - Moderate Assistance  Bowl Accidents in last 7 days:  0  Last bowel movement: 08/12/19  Stool Description: Large, Black, Brown, Formed     Usual bowel pattern:  daily  Scheduled bowel medications:  docusate sodium (COLACE), senna-docusate (PERICOLACE or SENOKOT S)  and bisacodyl (DULCOLAX)  PRN bowel medications used in last 24 hours:  None  Nursing Interventions:  Increased time, Suppository, Set-up, Verbal cueing, Supervision, Scheduled medication  Effectiveness of bowel program:   good=regular, predictable, controlled emptying of bowel  Bladder:    Bladder Assist Initial Score:  5 - Standby Prompting/Supervision or Set-up  Bladder Assist Current Score:  5 - Standby Prompting/Supervision or Set-up  Bladder Accidents in last 7 days:  1  PVR range for past 24-48 hours:  []   ()  Intermittent Catheterization:  n/a  Medications affecting bladder:  Hytrin    Time void schedule/voiding pattern:  Voiding every 2-4 hours  Interventions:  Increased time, Medication, Supervision, Verbal cueing, Time void patient initiated  Effectiveness of bladder training:  Good=regular, predictable, emptying of bladder, patient initiates time voiding    [place]:    Type:     Patient Lines/Drains/Airways Status    Active Catheter     None              Date placed:          Justification:    Diabetes:  Blood Sugar Frequency:  Before meals and at bedtime    Range of BS for last 48 hours:     Recent Labs     08/10/19  0740 08/10/19  1102 08/10/19  1717 08/11/19  0708 08/11/19  1733   POCGLUCOSE 116* 91 105* 130* 148*     Scheduled diabetic medications:  insulin lispro (HUMALOG) injection   Sliding scale usage in past 24 hours:  Yes and No  Total Short acting insulin in the past 24 hours:  None  Total Long acting insulin in the past 24 hours:  None    Wound:         Patient Lines/Drains/Airways Status    Active Current Wounds     None                     Wound Vac Location:  Not applicable  Dressing last changed:  Not applicable  Pump Mode Pressure Setting       Description of drainage:  Not applicable    Sleep/wake cycle:   Average hours slept:  Sleeps 3-4 hours without waking  Sleep medication usage:  None        Nursing Problems     Problem: Bowel/Gastric:     Goal: Normal bowel function is maintained or improved           Goal: Will not experience complications related to bowel motility                 Problem: Discharge Barriers/Planning     Goal: Patient's continuum of care needs will be met                 Problem: IP BLADDER/VOIDING     Description:     Goal: LTG - Patient will utilize adaptive techniques/equipment to complete bladder elimination (Resolved)     Description:           Goal: STG - Patient demonstrates no accidents     Description:           Goal: STG - PATIENT WILL REMAIN CONTINENT.      "Description:                 Problem: Infection     Goal: Will remain free from infection                 Problem: Knowledge Deficit     Goal: Knowledge of disease process/condition, treatment plan, diagnostic tests, and medications will improve           Goal: Knowledge of the prescribed therapeutic regimen will improve                 Problem: Pain Management     Goal: Pain level will decrease to patient's comfort goal                 Problem: Safety     Goal: Will remain free from injury           Goal: Will remain free from falls                 Problem: Skin Integrity     Goal: Risk for impaired skin integrity will decrease                 Problem: Venous Thromboembolism (VTW)/Deep Vein Thrombosis (DVT) Prevention:     Goal: Patient will participate in Venous Thrombosis (VTE)/Deep Vein Thrombosis (DVT)Prevention Measures                        Long Term Goals:   At discharge patient will be able to functon safely and independently at home and in the community.    Section completed by:  Jessenia Christina R.N.           Mobility  Bed mobility:   5  Bed /Chair/Wheelchair Transfer Initial:  3 - Moderate Assistance  Bed /Chair/Wheelchair Transfer Current:  5 - Standby Prompting/Supervision or Set-up   Bed/Chair/Wheelchair Transfer Description:  (bed mob: SPV with bed rail; transfer: SBA with FWW)  Walk Initial:  0 - Not tested,unsafe activity  Walk Current:  2 - Max Assistance   Walk Description:  (45ft x1, 55ftx1 with 4ww and CGA; 60ft x1 FWW, CGA all level terrain)  Wheelchair Initial:  2 - Max Assistance  Wheelchair Current:  1 - Total Assistance   Wheelchair Description:  (35ft x1, 40ft x 4, B UE, level terrain)  Stairs Initial:  0 - Not tested,unsafe activity  Stairs Current: 2 - Max Assistance   Stairs Description: (4 x 4 6\" steps B UE support on unilateral HR, step to to mimic home enviornment, Isabel for stability , rest break between each set, 1x L LE knee buckle with modA for " correction)  Patient/Family Training/Education:  Car transfer, LE HEP for strength and endurance, stair training, home modifications to improve safety (rail on stairs)  DME/DC Recommendations:  Pressure relief cushion, FWW, outpatient PT  Strengths:  Able to follow instructions, Making steady progress towards goals, Manages pain appropriately, Motivated for self care and independence, Pleasant and cooperative and Willingly participates in therapeutic activities supportive spouse,   Barriers:   Other: poor activity tolerance, ataxic, impaired standing balance, impaired motor problem solving for safety  # of short term goals set= 3   # of short term goals met=2  Physical Therapy Problems     Problem: Mobility     Dates: Start: 07/26/19       Goal: STG-Within one week, patient will propel wheelchair community     Dates: Start: 07/26/19       Description: 1) Individualized goal:  150ft x 1, B UE, mod I  2) Interventions:  PT Group Therapy, PT Gait Training, PT Therapeutic Exercises, PT Neuro Re-Ed/Balance, PT Therapeutic Activity, PT Manual Therapy and PT Evaluation       Note:     Goal Note filed on 08/05/19 1047 by Darron Tapia, PT    65 ft                        Problem: Mobility Transfers     Dates: Start: 07/26/19       Goal: STG-Within one week, patient will perform bed mobility     Dates: Start: 07/26/19       Description: 1) Individualized goal: with bed rail as needed, SPV  2) Interventions:  PT Group Therapy, PT Gait Training, PT Therapeutic Exercises, PT Neuro Re-Ed/Balance, PT Therapeutic Activity, PT Manual Therapy and PT Evaluation     Note:     Goal Note filed on 08/05/19 1047 by Darron Tapia, PT    Min - mod A                  Goal: STG-Within one week, patient will transfer bed to chair     Dates: Start: 07/26/19       Description: 1) Individualized goal: SBA with LRAD  2) Interventions:  PT Group Therapy, PT Gait Training, PT Therapeutic Exercises, PT Neuro Re-Ed/Balance, PT Therapeutic  Activity, PT Manual Therapy and PT Evaluation       Note:     Goal Note filed on 08/05/19 1047 by Darron Tapia, PT    Min - mod A                        Problem: PT-Long Term Goals     Dates: Start: 07/26/19       Goal: LTG-By discharge, patient will ambulate     Dates: Start: 07/26/19       Description: 1) Individualized goal:  50ft x1 SPV LRAD  2) Interventions:  PT Group Therapy, PT Gait Training, PT Therapeutic Exercises, PT Neuro Re-Ed/Balance, PT Therapeutic Activity, PT Manual Therapy and PT Evaluation           Goal: LTG-By discharge, patient will perform home exercise program     Dates: Start: 07/26/19       Description: 1) Individualized goal:  For balance and B LE strength/ endurance, mod I  2) Interventions:  PT Group Therapy, PT Gait Training, PT Therapeutic Exercises, PT Neuro Re-Ed/Balance, PT Therapeutic Activity, PT Manual Therapy and PT Evaluation                    Goal: LTG-By discharge, patient will     Dates: Start: 07/26/19       Description: 1) Individualized goal:  Up/down 2 stairs, Isabel to mimic home environment   2) Interventions:  PT Group Therapy, PT Gait Training, PT Therapeutic Exercises, PT Neuro Re-Ed/Balance, PT Therapeutic Activity, PT Manual Therapy and PT Evaluation             Goal: LTG-By discharge, patient will     Dates: Start: 07/26/19       Description: 1) Individualized goal: perform car transfer with SPV, LRAD  2) Interventions:  PT Group Therapy, PT Gait Training, PT Therapeutic Exercises, PT Neuro Re-Ed/Balance, PT Therapeutic Activity, PT Manual Therapy and PT Evaluation                           Section completed by:  Luz Maria Jamison, PT, DPT, NCS    Activities of Daily Living  Eating Initial:  5 - Standby Prompting/Supervision or Set-up  Eating Current:  7 - Independent   Eating Description:  Set-up of equipment or meal/tube feeding  Grooming Initial:  5 - Standby Prompting/Supervision or Set-up  Grooming Current:  7 - Independent   Grooming Description:   (indep to wash/dry hands/face, brush teeth and hair)  Bathing Initial:  3 - Moderate Assistance  Bathing Current:  5 - Standby Prompting/Supervision or Set-up   Bathing Description:  Long handled bath tool, Hand held shower, Grab bar, Supervision for safety, Set-up of equipment, Initial preparation for task, Adaptive equipment(setup and supervised seated )  Upper Body Dressing Initial:  5 - Standby Prompting/Supervision or Set-up  Upper Body Dressing Current:  5 - Standby Prompting/Supervision or Set-up   Upper Body Dressing Description:  Set-up of equipment  Lower Body Dressing Initial:  2 - Max Assistance  Lower Body Dressing Current:  5 - Standby Prompting/Supervsion or Set-up   Lower Body Dressing Description:  5 - Standby Prompting/Supervsion or Set-up  Toileting Initial:  3 - Moderate Assistance  Toileting Current:  5 - Standby Prompting/Supervision or Set-up   Toileting Description:  Grab bar, Increased time, Supervision for safety, Verbal cueing, Set-up of equipment, Initial preparation for task  Toilet Transfer Initial:  3 - Moderate Assistance  Toilet Transfer Current:  5 - Standby Prompting/Supervision or Set-up   Toilet Transfer Description:  5 - Standby Prompting/Supervision or Set-up  Tub / Shower Transfer Initial:  3 - Moderate Assistance  Tub / Shower Transfer Current:  5 - Standby Prompting/Supervision or Set-up   Tub / Shower Transfer Description:  Grab bar, Adaptive equipment, Supervision for safety, Set-up of equipment, Initial preparation for task(setup/sba with grab bar and commode in shower)  IADL:  Kitchen mobility CGA w/FWW and verbal cues with frequent seated rest breaks.  Family Training/Education:  Family training completed with spouse   DME/DC Recommendations:  FWW, long handled sponge, manual w/c, padded commode, reacher    Strengths:  Effective communication skills, Good carryover of learning, Good insight into deficits/needs, Independent PLOF, Making steady progress towards goals,  Manages pain appropriately, Motivated for self care and independence, Pleasant and cooperative, Supportive family and Willingly participates in therapeutic activities  Barriers:  Generalized weakness, Limited mobility, Poor activity tolerance and Poor balance     # of short term goals set= 3    # of short term goals met= 3     Occupational Therapy Goals     Problem: OT Long Term Goals     Dates: Start: 07/26/19       Goal: LTG-By discharge, patient will complete basic self care tasks     Dates: Start: 07/26/19       Description: 1) Individualized Goal:  Mod I with use of AE/AD/DME prn.  2) Interventions:  OT Group Therapy, OT Self Care/ADL, OT Community Reintegration, OT Manual Ther Technique, OT Neuro Re-Ed/Balance, OT Sensory Int Techniques, OT Therapeutic Activity, OT Evaluation and OT Therapeutic Exercise             Goal: LTG-By discharge, patient will perform bathroom transfers     Dates: Start: 07/26/19       Description: 1) Individualized Goal:  Mod I with use of AD/DME prn.  2) Interventions:  OT Group Therapy, OT Self Care/ADL, OT Community Reintegration, OT Manual Ther Technique, OT Neuro Re-Ed/Balance, OT Sensory Int Techniques, OT Therapeutic Activity, OT Evaluation and OT Therapeutic Exercise             Goal: LTG-By discharge, patient will complete basic home management     Dates: Start: 07/26/19       Description: 1) Individualized Goal:  Mod I with use of AE/AD/DME prn.  2) Interventions:  OT Group Therapy, OT Self Care/ADL, OT Community Reintegration, OT Manual Ther Technique, OT Neuro Re-Ed/Balance, OT Sensory Int Techniques, OT Therapeutic Activity, OT Evaluation and OT Therapeutic Exercise                         Section completed by:  Vincent Guerrero, Student    Cognitive Linquistic Functions  Comprehension Initial:  4 - Minimal Assistance  Comprehension Current:  6 - Modified Independent   Comprehension Description:  Glasses, Verbal cues  Expression Initial:  7 - Independent  Expression Current:  7  - Independent   Expression Description:     Social Interaction Initial:  6 - Modified Independent  Social Interaction Current:  7 - Independent   Social Interaction Description:  Medication  Problem Solving Initial:  5 - Standby Prompting/Supervision or Set-up  Problem Solving Current:  5 - Standby Prompting/Supervision or Set-up   Problem Solving Description:  Verbal cueing, Therapy schedule, Increased time  Memory Initial:  5 - Standby Prompting/Supervision or Set-up  Memory Current:  5 - Standby Prompting/Supervision or Set-up   Memory Description:  Verbal cueing, Therapy schedule  Executive Functioning / Organization Initial:  Moderate (3)  Executive Functioning / Organization Current:  Minimal (4)   Executive Functioning Desciption: MIN A for attention  Swallowing  Swallowing Status: SLP not following for dysphagia  Orders Placed This Encounter   Procedures   • Diet Order Diabetic     Standing Status:   Standing     Number of Occurrences:   1     Order Specific Question:   Diet:     Answer:   Diabetic [3]     Behavior Modification Plan  Allow for rest time, Give clear feedback, Set clear goals, Provide reasonable choices, Decrease the chance of failure by offering activities that are within the patient's abilities and Analyze tasks (break down into smaller steps)  Assistive Technology  Low/Impaired vision equipment and Low tech: Calendar, planner, schedule, alarms/timers, pill organizer, post-it notes, lists  Family Training/Education:  Ongoing with spouse  DC Recommendations: Pt would benefit from ongoing ST services ?home health vs out patient.   Strengths:  Independent PLOF, Manages pain appropriately, Motivated for self care and independence, Pleasant and cooperative, Supportive family and Willingly participates in therapeutic activities  Barriers:  Decreased endurance, Generalized weakness and Other: attention, memory, executive function  # of short term goals set=1  # of short term goals met=0  Speech  Therapy Problems     Problem: Problem Solving STGs     Dates: Start: 07/26/19       Goal: STG-Within one week, patient will     Dates: Start: 07/26/19       Description: 1) Individualized goal:  Perform complex attention tasks with 90% accuracy and clinician supervision  2) Interventions: SLP Self Care / ADL Training , SLP Cognitive Skill Development and SLP Group Treatme       Note:     Goal Note filed on 07/29/19 0819 by Marion Hicks MS,CCC-SLP    Goal: STG-Within one week, patient will  Outcome: NOT MET  Mod A needed for simple attention tasks.                        Problem: Speech/Swallowing LTGs     Dates: Start: 07/26/19       Goal: LTG-By discharge, patient will solve complex problem     Dates: Start: 07/26/19       Description: 1) Individualized goal:  For home and community with 90% accuracy and MOD I for meta-cognitive strategies  2) Interventions:  SLP Self Care / ADL Training , SLP Cognitive Skill Development and SLP Group Treatment             Goal: LTG-By discharge, patient will     Dates: Start: 07/26/19       Description: 1) Individualized goal:  Perform functional prospective memory tasks with mod I to achieve 90% accuracy.  2) Interventions:  SLP Self Care / ADL Training , SLP Cognitive Skill Development and SLP Group Treatme                          Section completed by:  Jenifer Llanes MS,[facility]-SLP    Recreation/Community     Leisure Competence Measure  Leisure Awareness: Cueing Assist(limited interests in learning new leisure)  Leisure Attitude: Independent  Leisure Skills: Independent  Cultural / Social Behaviors: Independent  Interpersonal Skills: Independent  Community Integration Skills: Independent  Social Contact: Independent  Community Participation: Independent    Strengths:  Able to follow instructions, Motivated for self care and independence, Pleasant and cooperative, Supportive family and Willingly participates in therapeutic activities  Barriers:  Confused, cognitively  impaired  # of short term goals set=2  # of short term goals met=2    He will work on the planning of an outing for the future as well as his standing balance and endurance. He became frustrated during a cognitive activity to the point where we wanted to give up on the activity. He was able to be redirected with encouragement.      Recreation Therapy Problems     Problem: Recreation Therapy     Dates: Start: 07/29/19       Goal: STG-Within one week, patient will demonstrate a standing tolerance     Dates: Start: 07/29/19       Description: By standing while performing an activity at the standing mat 25% of the time taking breaks as needed.            Goal: STG-Within one week, patient will actively engage in planning of community re-integration outing     Dates: Start: 07/29/19             Goal: LTG-By discharge, patient will demonstrate a standing tolerance     Dates: Start: 07/29/19       Description: Will remain standing while performing an activity 50% of the time taking breaks as needed.            Goal: LTG-By discharge, patient will participate in a community re-integration outing     Dates: Start: 07/29/19                         Section completed by:  [facility]onald [facility]       REHAB-Pharmacy IDT Team Note by Zen Alvarez RPH at 8/9/2019  4:05 PM  Version 1 of 1    Author:  Zen Alvarez RPH Service:  -- Author Type:  Pharmacist    Filed:  8/9/2019  4:08 PM Date of Service:  8/9/2019  4:05 PM Status:  Signed    :  Zen Alvarez RPH (Pharmacist)         Pharmacy   Pharmacy  Antibiotics/Antifungals/Antivirals:  Medication:      Active Orders (From admission, onward)    Ordered     Ordering Provider       Thu Aug 8, 2019  8:36 AM    08/08/19 0836  ciprofloxacin (CIPRO) tablet 500 mg  EVERY 12 HOURS      Mini Rodriguez M.D.        Route:         po  Stop Date:  8/10/19  Reason: Sepsis secondary to UTI   Antihypertensives/Cardiac:  Medication:    Orders (72h ago, onward)     Start      Ordered    08/07/19 0600  midodrine (PROAMATINE) tablet 2.5 mg  DAILY AT 0600,   Status:  Discontinued      08/06/19 1535    07/31/19 1358  midodrine (PROAMATINE) tablet 5 mg  EVERY 4 HOURS PRN      07/31/19 1358    07/26/19 0900  amLODIPine (NORVASC) tablet 10 mg  DAILY      07/25/19 1648    07/26/19 0900  lisinopril (PRINIVIL) tablet 40 mg  DAILY      07/25/19 1648    07/26/19 0900  spironolactone (ALDACTONE) tablet 25 mg  DAILY      07/25/19 1648    07/25/19 2100  atorvastatin (LIPITOR) tablet 20 mg  NIGHTLY      07/25/19 1648    07/25/19 2100  verapamil ER (CALAN-SR) tablet 180 mg  2 TIMES DAILY      07/25/19 1648    07/25/19 1715  terazosin (HYTRIN) capsule 5 mg  2 TIMES DAILY      07/25/19 1648              Patient Vitals for the past 24 hrs:   BP Pulse   08/09/19 0630 156/72 75   08/09/19 0524 148/82 --   08/08/19 1827 151/72 77   08/08/19 1632 144/72 71     Anticoagulation:  Medication: Lovenox                              Other key medications: A review of the medication list reveals no issues at this time. Patient is currently on antihypertensive(s). Recommend home blood pressure monitoring/recording if antihypertensive(s) regimen(s) continue. Patient is currently on diabetic medication(s) and/or Insulin(s). Recommend home blood glucose monitoring/recording if these regimen(s) continue.    Section completed by: Zen Alvarez [facility][[email or web address]]     Attribution Key     AW.1 - Zen Alvarez [facility] ED on 8/9/2019  4:05 PM                  DC Planning  DC destination/dispostion:  Patient lives in a single level home with his wife.  He has 2 entry steps.     Referrals: DMV placard     DC Needs: Patient has a 4ww, cane, w/c, shower chair and grab bars.  Referral made to [facility] for PT/OT/RN. Follow up appt  He will need follow up with PCP Dr. Latham and neurosurgery.     Barriers to discharge:        Strengths: good support for home.     Section completed by: Marium Naqvi RN [facility]           Physician  Summary  Mini Rodriguez MD participated and led team conference discussion.

## 2019-08-12 NOTE — PROGRESS NOTES
Hospital Medicine Daily Progress Note      Chief Complaint  HTN, DM, Hyponatremia    Interval Problem Updates  Pt requesting to decrease spinal cord injury bowel regimen--will defer to Primary Team.    Review of Systems  Review of Systems   Constitutional: Negative for chills and fever.   HENT: Negative.    Eyes: Negative.    Respiratory: Negative for cough and shortness of breath.    Cardiovascular: Negative for chest pain and palpitations.   Gastrointestinal: Negative for abdominal pain, nausea and vomiting.   Musculoskeletal: Positive for back pain.        Wound pain   Skin: Negative for itching and rash.   Neurological: Positive for focal weakness.        Physical Exam  Temp:  [36.9 °C (98.4 °F)-37.2 °C (99 °F)] 37.2 °C (99 °F)  Pulse:  [73-80] 73  Resp:  [16-18] 18  BP: (126-159)/(58-70) 159/70  SpO2:  [94 %] 94 %    Physical Exam   Constitutional: He is oriented to person, place, and time. No distress.   HENT:   Head: Normocephalic and atraumatic.   Right Ear: External ear normal.   Left Ear: External ear normal.   Eyes: Conjunctivae and EOM are normal. Right eye exhibits no discharge. Left eye exhibits no discharge.   Neck: Normal range of motion. Neck supple. No tracheal deviation present.   Cardiovascular: Normal rate and regular rhythm.   Pulmonary/Chest: Effort normal and breath sounds normal. No stridor. No respiratory distress. He has no wheezes.   Abdominal: Soft. Bowel sounds are normal. He exhibits no distension. There is no tenderness.   Musculoskeletal: He exhibits no edema or tenderness.   Upper back incision healing well   Neurological: He is alert and oriented to person, place, and time.   Skin: Skin is warm and dry. He is not diaphoretic.   Vitals reviewed.      Fluids    Intake/Output Summary (Last 24 hours) at 8/13/2019 0046  Last data filed at 8/12/2019 1830  Gross per 24 hour   Intake 960 ml   Output --   Net 960 ml       Laboratory  Recent Labs     08/10/19  0540   WBC 9.2   RBC 2.53*    HEMOGLOBIN 8.2*   HEMATOCRIT 24.8*   MCV 98.0*   MCH 32.4   MCHC 33.1*   RDW 49.1   PLATELETCT 212   MPV 9.1     Recent Labs     08/10/19  0540   SODIUM 138   POTASSIUM 4.2   CHLORIDE 102   CO2 25   GLUCOSE 144*   BUN 19   CREATININE 1.34   CALCIUM 9.4                   Assessment/Plan  * T5 spinal cord injury (HCC)- (present on admission)  Assessment & Plan  S/P T5 extradural mass resection w/ T4-T6 laminectomy and T3-T7 fusion on 7/20/19 by Dr. Velasco  Pathology  +plasmablastic plasmacytoma  Continue wound care    PVD (peripheral vascular disease) ([facility])- (present on admission)  Assessment & Plan  Plavix on hold post-operatively    Essential hypertension- (present on admission)  Assessment & Plan  Echo EF 65% w/ mild AS  On Norvasc, Lisinopril, Aldactone, and Verapamil  Blood pressure controlled off Midodrine    Type 2 diabetes mellitus without complication, without long-term current use of insulin ([facility])- (present on admission)  Assessment & Plan  HbA1c 6.8  Continue Metformin  Off Lantus and  Glipizide  Outpt meds include Metformin, Glipizide, and Januvia    Hypomagnesemia  Assessment & Plan  Continue MgOx  Check F/U labs in am    Sepsis secondary to UTI (HCC)  Assessment & Plan  UA w/ packed WBC and many bacteria  Unfortunately no UCx done  Sepsis syndrome resolved on abx    History of melanoma  Assessment & Plan  S/P resection x 2  Outpt F/U    BPH (benign prostatic hyperplasia)  Assessment & Plan  Monitor for orthostatic hypotension on Hytrin bid    Anemia- (present on admission)  Assessment & Plan  S/P PRBC prior to Rehab transfer  Continue Fe and Vit C  Also on Vit B12 and Folate supplements     Dyslipidemia- (present on admission)  Assessment & Plan  On Lipitor    Full Code

## 2019-08-12 NOTE — THERAPY
Speech Language Pathology  Daily Treatment     Patient Name: Ian Laird  Age:  69 y.o., Sex:  male  Medical Record #: [id number]  Today's Date: 8/12/2019     Subjective    Pt pleasant and cooperative. Spouse present for session.      Objective       08/12/19 1304   Cognition   Medication Management  Moderate (3)   Interdisciplinary Plan of Care Collaboration   IDT Collaboration with  Family / Caregiver   Patient Position at End of Therapy Seated;Family / Friend in Room   Collaboration Comments spouse present for session   SLP Total Time Spent   SLP Individual Total Time Spent (Mins) 75   Charge Group   SLP Cognitive Skill Development 5           Assessment    Fxl med sorting completed this date with pt's current med list (updated as of today). Pt sorted with 67% IND, pt required cues to attend to admin times (3x per day vs AM/PM). Pt also introduced to Pill Pack services and spouse states she will be able to assist with med mngt at home. Pill organizer already purchased for home use. Pt also introduced to apps on phone for med alarms, med lists as well as BP and blood sugar trackers. Pt would benefit from ongoing training.     Plan    Continue to target fxl problem solving, med sorting to ensure 100%.

## 2019-08-12 NOTE — THERAPY
"Physical Therapy   Daily Treatment     Patient Name: Ian Laird  Age:  69 y.o., Sex:  male  Medical Record #: [id number]  Today's Date: 8/12/2019     Precautions  Precautions: Fall Risk, Spinal / Back Precautions   Comments: T3-T7 spinal fusion    Subjective    Pt was sitting in w/c upon arrival and agreeable to tx.  States wife is installing unilateral HR on 2 steps to enter     Objective       08/12/19 0831   Precautions   Precautions Fall Risk;Spinal / Back Precautions    Comments T3-T7 spinal fusion   Standing Lower Body Exercises   Hamstring Curl 2 sets of 10;Bilateral    Hip Flexion 1 set of 10;Bilateral   Hip Extension 1 set of 10;Bilateral    Hip Abduction 1 set of 10;Bilateral   Marching 1 set of 10   Heel Rise 1 set of 10;Bilateral   Toe Rise 1 set of 10;Bilateral   Mini Squat 1 set of 15;Partial   Interdisciplinary Plan of Care Collaboration   Patient Position at End of Therapy Seated;Call Light within Reach;Tray Table within Reach;Phone within Reach   PT Total Time Spent   PT Individual Total Time Spent (Mins) 60   PT Charge Group   PT Gait Training 2   PT Therapeutic Activities 2       FIM Stairs Score:  2 - Max Assistance  Stairs Description:  (4 x 4 6\" steps B UE support on unilateral HR, step to to mimic home enviornment, Isabel for stability , rest break between each set, 1x L LE knee buckle with modA for correction)      Assessment    Pt continues to be limited by impaired balance, coordination, ataxia, activity tolerance and impaired safety motor problem solving. Good motivation.     Plan    Car transfer to pt's car, floor recovery/ improving safety in the home, standing balance training, stair training to mimic home environment, outdoor wheelchair propulsion, caregiver training on curbs in wheelchair, trial small curb step with Fww      "

## 2019-08-12 NOTE — REHAB-NURSING IDT TEAM NOTE
Nursing   Nursing  Diet/Nutrition:  Diabetic and Thin Liquids  Medication Administration:  Whole with Liquid Wash  % consumed at meals in last 24 hours:  Consumed % of meals per documentation.  Meal/Snack Consumption for the past 24 hrs:   Oral Nutrition   08/11/19 1800 Dinner;Between 25-50% Consumed   08/11/19 1200 Lunch;Between % Consumed   08/11/19 0800 Breakfast;Between 50-75% Consumed       Appetite:  Fair  Fluid Intake/Output in past 24 hours: In: 1620 [P.O.:1620]  Out: -   Admit Weight:  Weight: 100.8 kg (222 lb 4.8 oz)  Weight Last 7 Days   [93.4 kg (205 lb 12.8 oz)] 93.4 kg (205 lb 12.8 oz) (08/11 1642)    Pain Issues:    Location:  --  --         Severity:  Denies   Scheduled pain medications:  gabapentin (NEURONTIN)  and tramadol (Ultram)      PRN pain medications used in last 24 hours:  None   Non Pharmacologic Interventions:  rest  Effectiveness of pain management plan:  good=patient states acceptable comfort after interventions    Bowel:    Bowel Assist Initial Score:  2 - Max Assistance  Bowel Assist Current Score:  3 - Moderate Assistance  Bowl Accidents in last 7 days:  0  Last bowel movement: 08/12/19  Stool Description: Large, Black, Brown, Formed     Usual bowel pattern:  daily  Scheduled bowel medications:  docusate sodium (COLACE), senna-docusate (PERICOLACE or SENOKOT S)  and bisacodyl (DULCOLAX)  PRN bowel medications used in last 24 hours:  None  Nursing Interventions:  Increased time, Suppository, Set-up, Verbal cueing, Supervision, Scheduled medication  Effectiveness of bowel program:   good=regular, predictable, controlled emptying of bowel  Bladder:    Bladder Assist Initial Score:  5 - Standby Prompting/Supervision or Set-up  Bladder Assist Current Score:  5 - Standby Prompting/Supervision or Set-up  Bladder Accidents in last 7 days:  1  PVR range for past 24-48 hours:  []  ()  Intermittent Catheterization:  n/a  Medications affecting bladder:  Hytrin    Time void  schedule/voiding pattern:  Voiding every 2-4 hours  Interventions:  Increased time, Medication, Supervision, Verbal cueing, Time void patient initiated  Effectiveness of bladder training:  Good=regular, predictable, emptying of bladder, patient initiates time voiding    [place]:    Type:     Patient Lines/Drains/Airways Status    Active Catheter     None              Date placed:          Justification:    Diabetes:  Blood Sugar Frequency:  Before meals and at bedtime    Range of BS for last 48 hours:     Recent Labs     08/10/19  0740 08/10/19  1102 08/10/19  1717 08/11/19  0708 08/11/19  1733   POCGLUCOSE 116* 91 105* 130* 148*     Scheduled diabetic medications:  insulin lispro (HUMALOG) injection   Sliding scale usage in past 24 hours:  Yes and No  Total Short acting insulin in the past 24 hours:  None  Total Long acting insulin in the past 24 hours:  None    Wound:         Patient Lines/Drains/Airways Status    Active Current Wounds     None                     Wound Vac Location:  Not applicable  Dressing last changed:  Not applicable  Pump Mode Pressure Setting       Description of drainage:  Not applicable    Sleep/wake cycle:   Average hours slept:  Sleeps 3-4 hours without waking  Sleep medication usage:  None        Nursing Problems     Problem: Bowel/Gastric:     Goal: Normal bowel function is maintained or improved           Goal: Will not experience complications related to bowel motility                 Problem: Discharge Barriers/Planning     Goal: Patient's continuum of care needs will be met                 Problem: IP BLADDER/VOIDING     Description:     Goal: LTG - Patient will utilize adaptive techniques/equipment to complete bladder elimination (Resolved)     Description:           Goal: STG - Patient demonstrates no accidents     Description:           Goal: STG - PATIENT WILL REMAIN CONTINENT.     Description:                 Problem: Infection     Goal: Will remain free from infection                  Problem: Knowledge Deficit     Goal: Knowledge of disease process/condition, treatment plan, diagnostic tests, and medications will improve           Goal: Knowledge of the prescribed therapeutic regimen will improve                 Problem: Pain Management     Goal: Pain level will decrease to patient's comfort goal                 Problem: Safety     Goal: Will remain free from injury           Goal: Will remain free from falls                 Problem: Skin Integrity     Goal: Risk for impaired skin integrity will decrease                 Problem: Venous Thromboembolism (VTW)/Deep Vein Thrombosis (DVT) Prevention:     Goal: Patient will participate in Venous Thrombosis (VTE)/Deep Vein Thrombosis (DVT)Prevention Measures                        Long Term Goals:   At discharge patient will be able to functon safely and independently at home and in the community.    Section completed by:  Jessenia Christina R.N.

## 2019-08-12 NOTE — PROGRESS NOTES
"Rehab Progress Note     Date of Service: 8/12/2019  Chief Complaint: follow up SCI    Interval Events (Subjective)    Patient seen and examined today in the lobby.  He and his wife are doing paperwork to get a new primary care doctor.  Patient has been able to do his bowel program without any suppositories which he is happy about.  Denies any issues emptying his bladder.  He has no new complaints today.    Objective:  VITAL SIGNS: /68   Pulse 73   Temp 36.9 °C (98.4 °F) (Oral)   Resp 16   Ht 1.778 m (5' 10\")   Wt 93.4 kg (205 lb 12.8 oz)   SpO2 97%   BMI 29.53 kg/m²   Gen: alert, no apparent distress  CV: regular rate and rhythm, no murmurs, no peripheral edema  Resp: clear to ascultation bilaterally, normal respiratory effort  GI: soft, non-tender abdomen, bowel sounds present    Recent Results (from the past 72 hour(s))   ACCU-CHEK GLUCOSE    Collection Time: 08/09/19  5:00 PM   Result Value Ref Range    Glucose - Accu-Ck 106 (H) 65 - 99 mg/dL   CBC WITHOUT DIFFERENTIAL    Collection Time: 08/10/19  5:40 AM   Result Value Ref Range    WBC 9.2 4.8 - 10.8 K/uL    RBC 2.53 (L) 4.70 - 6.10 M/uL    Hemoglobin 8.2 (L) 14.0 - 18.0 g/dL    Hematocrit 24.8 (L) 42.0 - 52.0 %    MCV 98.0 (H) 81.4 - 97.8 fL    MCH 32.4 27.0 - 33.0 pg    MCHC 33.1 (L) 33.7 - 35.3 g/dL    RDW 49.1 35.9 - 50.0 fL    Platelet Count 212 164 - 446 K/uL    MPV 9.1 9.0 - 12.9 fL   Basic Metabolic Panel    Collection Time: 08/10/19  5:40 AM   Result Value Ref Range    Sodium 138 135 - 145 mmol/L    Potassium 4.2 3.6 - 5.5 mmol/L    Chloride 102 96 - 112 mmol/L    Co2 25 20 - 33 mmol/L    Glucose 144 (H) 65 - 99 mg/dL    Bun 19 8 - 22 mg/dL    Creatinine 1.34 0.50 - 1.40 mg/dL    Calcium 9.4 8.5 - 10.5 mg/dL    Anion Gap 11.0 0.0 - 11.9   MAGNESIUM    Collection Time: 08/10/19  5:40 AM   Result Value Ref Range    Magnesium 1.6 1.5 - 2.5 mg/dL   ESTIMATED GFR    Collection Time: 08/10/19  5:40 AM   Result Value Ref Range    GFR If  " American >60 >60 mL/min/1.73 m 2    GFR If Non African American 52 (A) >60 mL/min/1.73 m 2   ACCU-CHEK GLUCOSE    Collection Time: 08/10/19  7:40 AM   Result Value Ref Range    Glucose - Accu-Ck 116 (H) 65 - 99 mg/dL   ACCU-CHEK GLUCOSE    Collection Time: 08/10/19 11:02 AM   Result Value Ref Range    Glucose - Accu-Ck 91 65 - 99 mg/dL   ACCU-CHEK GLUCOSE    Collection Time: 08/10/19  5:17 PM   Result Value Ref Range    Glucose - Accu-Ck 105 (H) 65 - 99 mg/dL   ACCU-CHEK GLUCOSE    Collection Time: 08/11/19  7:08 AM   Result Value Ref Range    Glucose - Accu-Ck 130 (H) 65 - 99 mg/dL   ACCU-CHEK GLUCOSE    Collection Time: 08/11/19  5:33 PM   Result Value Ref Range    Glucose - Accu-Ck 148 (H) 65 - 99 mg/dL   ACCU-CHEK GLUCOSE    Collection Time: 08/12/19  7:35 AM   Result Value Ref Range    Glucose - Accu-Ck 135 (H) 65 - 99 mg/dL       Current Facility-Administered Medications   Medication Frequency   • bisacodyl (THE MAGIC BULLET) suppository 10 mg DAILY   • insulin lispro (HUMALOG) injection 2-12 Units BID WITH MEALS    And   • glucose 4 g chewable tablet 16 g Q15 MIN PRN    And   • dextrose 50% (D50W) injection 50 mL Q15 MIN PRN   • magnesium oxide (MAG-OX) tablet 400 mg TID WITH MEALS   • ferrous sulfate tablet 325 mg BID WITH MEALS   • ascorbic acid tablet 500 mg BID WITH MEALS   • docusate sodium (COLACE) capsule 100 mg BID   • sennosides (SENOKOT) 8.6 MG tablet 17.2 mg QHS   • lidocaine (XYLOCAINE) 2 % jelly PRN   • lactobacillus rhamnosus (CULTURELLE) capsule 1 Cap QDAY with Breakfast   • cyanocobalamin (VITAMIN B12) tablet 1,000 mcg DAILY   • folic acid (FOLVITE) tablet 1 mg DAILY   • gabapentin (NEURONTIN) capsule 300 mg TID   • midodrine (PROAMATINE) tablet 5 mg Q4HRS PRN   • enoxaparin (LOVENOX) inj 40 mg DAILY   • tramadol (ULTRAM) 50 MG tablet 50 mg TID   • lidocaine (LIDODERM) 5 % 2 Patch Q24HR   • metFORMIN (GLUCOPHAGE) tablet 1,000 mg BID WITH MEALS   • amLODIPine (NORVASC) tablet 10 mg DAILY   •  atorvastatin (LIPITOR) tablet 20 mg Nightly   • glucose 4 g chewable tablet 16 g PRN   • lisinopril (PRINIVIL) tablet 40 mg DAILY   • loratadine (CLARITIN) tablet 10 mg DAILY   • omeprazole (PRILOSEC) capsule 20 mg DAILY   • spironolactone (ALDACTONE) tablet 25 mg DAILY   • terazosin (HYTRIN) capsule 5 mg BID   • verapamil ER (CALAN-SR) tablet 180 mg BID   • Pharmacy Consult Request ...Pain Management Review 1 Each PHARMACY TO DOSE   • Respiratory Care per Protocol Continuous RT   • oxyCODONE immediate-release (ROXICODONE) tablet 5 mg Q3HRS PRN   • oxyCODONE immediate release (ROXICODONE) tablet 10 mg Q3HRS PRN   • acetaminophen (TYLENOL) tablet 650 mg Q4HRS PRN   • therapeutic multivitamin-minerals (THERAGRAN-M) tablet 1 Tab DAILY WITH LUNCH   • artificial tears 1.4 % ophthalmic solution 1 Drop PRN   • benzocaine-menthol (CEPACOL) lozenge 1 Lozenge Q2HRS PRN   • mag hydrox-al hydrox-simeth (MAALOX PLUS ES or MYLANTA DS) suspension 20 mL Q2HRS PRN   • ondansetron (ZOFRAN ODT) dispertab 4 mg 4X/DAY PRN    Or   • ondansetron (ZOFRAN) syringe/vial injection 4 mg 4X/DAY PRN   • traZODone (DESYREL) tablet 50 mg QHS PRN   • sodium chloride (OCEAN) 0.65 % nasal spray 2 Spray PRN   • calcium carbonate (TUMS) chewable tab 500 mg BID       Orders Placed This Encounter   Procedures   • Diet Order Diabetic     Standing Status:   Standing     Number of Occurrences:   1     Order Specific Question:   Diet:     Answer:   Diabetic [3]       Assessment:  Active Hospital Problems    Diagnosis   • *T5 spinal cord injury (HCC)   • PVD (peripheral vascular disease) (HCC)   • Type 2 diabetes mellitus without complication, without long-term current use of insulin (HCC)   • Essential hypertension   • Dyslipidemia   • Obesity   • BPH (benign prostatic hyperplasia)   • History of melanoma   • Neuropathic pain   • Neurogenic bladder   • Neurogenic bowel   • Hyponatremia   • 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HCC)   • Acute kidney injury (HCC)   • Vitamin  D deficiency   • Anemia     I led and attended the weekly conference today, and agree with the IDT conference documentation and plan of care as noted below.    Date of conference: 8/12/2019    Goals and barriers: See IDT note.    Biggest barriers: poor activity tolerance, impaired balance, poor motor planning, slow processing speed, impaired complex attention    Goals: TLA night prior to discharge    Therapy update 8/12/2019  Independent eating  Independent grooming  Supervision bathing  Supervision upper body dressing  Supervision lower body dressing  Supervision toileting  Supervisionbladder  Mod assist bowel  Supervision bed/chair transfer  Supervision toilet transfer  Supervision tub/shower transfer  Max assist ambulation  Total assist wheelchair propulsion  Max assist stairs  Modified Independent comprehension  Independent expression  Independent social interaction  Supervision problem solving  Supervision memory    CM/social support: home with wife in [facility] date: 8/19/2019    Outpatient: PT/OT/SLP    Equip: pressure relieving cushion, FWW    Follow up: PCP. Dr. 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 Velasco  Continue full rehab program  PT/OT, 1.5 hr each discipline, 5 days per week  Outpatient follow up with oncology regarding tissue diagnosis    Neurogenic orthostasis  Continue midodrine    Neuropathic pain management  Continue gabapentin  Pain currently well controlled    Nociceptive pain management  PRN Tramadol  Continue PRN tylenol  Continue lidocaine patch    Iron deficiency anemia  Continue ferrous sulfate and Vit C  Also on B12 and folate    Hypertension  Continue amlodipine  Continue lisinopril  Continue Verapamil  Continue spironolactone  Continue terazosin  Appreciate hospitalist assistance    Hypomagnesemia  Continue  supplementation    Dyslipidemia  Continue statin    Peripheral vascular disease  Continue statin  Outpatient follow up with his vascular surgeon    Sepsis due to UTI, resolved  S/p antibiotics  Appreciate hospitalist assistance    Diabetes  Continue metformin  Continue Lantus  Appreciate hospitalist assistance    Anxiety/adjustment disorder  Continue Cymbalta  Dr. Yoo consulted    Neurogenic bladder  BPH  Continue terazosin  PVRs -  12, 123, 109, 137, 128    Neurogenic bowel  Continue bowel program  Changed to 10-11:30 am as this is when he moves his bowels at home, hold therapy at that time  Ok to try without suppository as function returns  Last BM 8/12  Continue TUMs, lactobacillis     DVT prophylaxis  Lovenox    Total time:  40 minutes.  I spent greater than 50% of the time for patient care, counseling, and coordination on this date, including patient face-to face time, unit/floor time with review of records/pertinent lab data and studies, as well as discussing diagnostic evaluation/work up, planned therapeutic interventions, and future disposition of care, as per the interval events/subjective and the assessment and plan as noted above.          Mini Rodriguez M.D.   Physical Medicine and Rehabilitation

## 2019-08-12 NOTE — REHAB-ACTIVITY IDT TEAM NOTE
ACT   Recreation/Community     Leisure Competence Measure  Leisure Awareness: Cueing Assist(limited interests in learning new leisure)  Leisure Attitude: Independent  Leisure Skills: Independent  Cultural / Social Behaviors: Independent  Interpersonal Skills: Independent  Community Integration Skills: Independent  Social Contact: Independent  Community Participation: Independent    Strengths:  Able to follow instructions, Pleasant and cooperative, Supportive family and Willingly participates in therapeutic activities  Barriers:  Decreased endurance, Generalized weakness and Poor carryover of learning  # of short term goals set=2  # of short term goals met=1    Will continue to work on cognitive leisure and his carryover of learning. Showing poor retention on rules of activities.     Recreation Therapy Problems     Problem: Recreation Therapy     Dates: Start: 07/29/19       Goal: STG-Within one week, patient will demonstrate a standing tolerance     Dates: Start: 07/29/19       Description: By standing while performing an activity at the standing mat 25% of the time taking breaks as needed.            Goal: LTG-By discharge, patient will demonstrate a standing tolerance     Dates: Start: 07/29/19       Description: Will remain standing while performing an activity 50% of the time taking breaks as needed.            Goal: LTG-By discharge, patient will participate in a community re-integration outing     Dates: Start: 07/29/19                         Section completed by:  MELINA AlejandreS

## 2019-08-12 NOTE — REHAB-OT IDT TEAM NOTE
Occupational Therapy   Activities of Daily Living  Eating Initial:  5 - Standby Prompting/Supervision or Set-up  Eating Current:  7 - Independent   Eating Description:  Set-up of equipment or meal/tube feeding  Grooming Initial:  5 - Standby Prompting/Supervision or Set-up  Grooming Current:  7 - Independent   Grooming Description:  (indep to wash/dry hands/face, brush teeth and hair)  Bathing Initial:  3 - Moderate Assistance  Bathing Current:  5 - Standby Prompting/Supervision or Set-up   Bathing Description:  Long handled bath tool, Hand held shower, Grab bar, Supervision for safety, Set-up of equipment, Initial preparation for task, Adaptive equipment(setup and supervised seated )  Upper Body Dressing Initial:  5 - Standby Prompting/Supervision or Set-up  Upper Body Dressing Current:  5 - Standby Prompting/Supervision or Set-up   Upper Body Dressing Description:  Set-up of equipment  Lower Body Dressing Initial:  2 - Max Assistance  Lower Body Dressing Current:  5 - Standby Prompting/Supervsion or Set-up   Lower Body Dressing Description:  5 - Standby Prompting/Supervsion or Set-up  Toileting Initial:  3 - Moderate Assistance  Toileting Current:  5 - Standby Prompting/Supervision or Set-up   Toileting Description:  Grab bar, Increased time, Supervision for safety, Verbal cueing, Set-up of equipment, Initial preparation for task  Toilet Transfer Initial:  3 - Moderate Assistance  Toilet Transfer Current:  5 - Standby Prompting/Supervision or Set-up   Toilet Transfer Description:  5 - Standby Prompting/Supervision or Set-up  Tub / Shower Transfer Initial:  3 - Moderate Assistance  Tub / Shower Transfer Current:  5 - Standby Prompting/Supervision or Set-up   Tub / Shower Transfer Description:  Grab bar, Adaptive equipment, Supervision for safety, Set-up of equipment, Initial preparation for task(setup/sba with grab bar and commode in shower)  IADL:  Kitchen mobility CGA w/FWW and verbal cues with frequent seated  rest breaks.  Family Training/Education:  Family training completed with spouse   DME/DC Recommendations:  FWW, long handled sponge, manual w/c, padded commode, reacher    Strengths:  Effective communication skills, Good carryover of learning, Good insight into deficits/needs, Independent PLOF, Making steady progress towards goals, Manages pain appropriately, Motivated for self care and independence, Pleasant and cooperative, Supportive family and Willingly participates in therapeutic activities  Barriers:  Generalized weakness, Limited mobility, Poor activity tolerance and Poor balance     # of short term goals set= 3    # of short term goals met= 3     Occupational Therapy Goals     Problem: OT Long Term Goals     Dates: Start: 07/26/19       Goal: LTG-By discharge, patient will complete basic self care tasks     Dates: Start: 07/26/19       Description: 1) Individualized Goal:  Mod I with use of AE/AD/DME prn.  2) Interventions:  OT Group Therapy, OT Self Care/ADL, OT Community Reintegration, OT Manual Ther Technique, OT Neuro Re-Ed/Balance, OT Sensory Int Techniques, OT Therapeutic Activity, OT Evaluation and OT Therapeutic Exercise             Goal: LTG-By discharge, patient will perform bathroom transfers     Dates: Start: 07/26/19       Description: 1) Individualized Goal:  Mod I with use of AD/DME prn.  2) Interventions:  OT Group Therapy, OT Self Care/ADL, OT Community Reintegration, OT Manual Ther Technique, OT Neuro Re-Ed/Balance, OT Sensory Int Techniques, OT Therapeutic Activity, OT Evaluation and OT Therapeutic Exercise             Goal: LTG-By discharge, patient will complete basic home management     Dates: Start: 07/26/19       Description: 1) Individualized Goal:  Mod I with use of AE/AD/DME prn.  2) Interventions:  OT Group Therapy, OT Self Care/ADL, OT Community Reintegration, OT Manual Ther Technique, OT Neuro Re-Ed/Balance, OT Sensory Int Techniques, OT Therapeutic Activity, OT Evaluation and  OT Therapeutic Exercise                         Section completed by:  Vincent Guerrero, Student

## 2019-08-12 NOTE — THERAPY
Physical Therapy   Daily Treatment     Patient Name: Ian Laird  Age:  69 y.o., Sex:  male  Medical Record #: [id number]  Today's Date: 8/12/2019     Precautions  Precautions: (P) Spinal / Back Precautions , Fall Risk  Comments: T3-T7 spinal fusion    Subjective    Pt resting in bed, willing to participate     Objective       08/12/19 1531   Precautions   Precautions Spinal / Back Precautions ;Fall Risk   Neuro-Muscular Treatments   Neuro-Muscular Treatments Facilitation;Sequencing;Tactile Cuing;Verbal Cuing   Comments pre-gait stepping/ coordination training with // bars and agility ladder for step-through pattern and imprvoed foot placement/ coordination up/back 3 x 3   PT Total Time Spent   PT Individual Total Time Spent (Mins) 30   PT Charge Group   PT Gait Training 1   PT Neuromuscular Re-Education / Balance 1     Sit<>stand / transfers with FWW and close SBA/ cues for sequencing/ attention to LE coordination and DME management    Assessment    Pt with occassional knee buckling during gait/ coordination training as noted but with self recovery at // bars, remains with impaired LE sensation    Plan    Car transfer to pt's car, floor recovery/ improving safety in the home, standing balance training, stair training to mimic home environment, outdoor wheelchair propulsion, caregiver training on curbs in wheelchair, trial small curb step with Fww

## 2019-08-12 NOTE — REHAB-CM IDT TEAM NOTE
Case Management    DC Planning  DC destination/dispostion:  Patient lives in a single level home with his wife.  He has 2 entry steps.     Referrals: [employer] placard     DC Needs: Patient has a 4ww, cane, w/c, shower chair and grab bars.  Referral made to [facility] for PT/OT/RN. Follow up appt  He will need follow up with PCP Dr. Latham and neurosurgery.     Barriers to discharge:        Strengths: good support for home.     Section completed by: Marium Naqvi RN CCM

## 2019-08-12 NOTE — CARE PLAN
Problem: Safety  Goal: Will remain free from injury  Note:   Patient uses call light appropriately for assistance as needed/wanted. Bed locked, in lowest position.      Problem: Bowel/Gastric:  Goal: Normal bowel function is maintained or improved  Note:   Patient taking scheduled bowel medication; bowel sounds WNL x4 quadrants and abdomin soft and non-tender.

## 2019-08-12 NOTE — REHAB-SLP IDT TEAM NOTE
Speech Therapy   Cognitive Linquistic Functions  Comprehension Initial:  4 - Minimal Assistance  Comprehension Current:  6 - Modified Independent   Comprehension Description:  Glasses, Verbal cues  Expression Initial:  7 - Independent  Expression Current:  7 - Independent   Expression Description:     Social Interaction Initial:  6 - Modified Independent  Social Interaction Current:  7 - Independent   Social Interaction Description:  Medication  Problem Solving Initial:  5 - Standby Prompting/Supervision or Set-up  Problem Solving Current:  5 - Standby Prompting/Supervision or Set-up   Problem Solving Description:  Verbal cueing, Therapy schedule, Increased time  Memory Initial:  5 - Standby Prompting/Supervision or Set-up  Memory Current:  5 - Standby Prompting/Supervision or Set-up   Memory Description:  Verbal cueing, Therapy schedule  Executive Functioning / Organization Initial:  Moderate (3)  Executive Functioning / Organization Current:  Minimal (4)   Executive Functioning Desciption: MIN A for attention  Swallowing  Swallowing Status: SLP not following for dysphagia  Orders Placed This Encounter   Procedures   • Diet Order Diabetic     Standing Status:   Standing     Number of Occurrences:   1     Order Specific Question:   Diet:     Answer:   Diabetic [3]     Behavior Modification Plan  Allow for rest time, Give clear feedback, Set clear goals, Provide reasonable choices, Decrease the chance of failure by offering activities that are within the patient's abilities and Analyze tasks (break down into smaller steps)  Assistive Technology  Low/Impaired vision equipment and Low tech: Calendar, planner, schedule, alarms/timers, pill organizer, post-it notes, lists  Family Training/Education:  Ongoing with spouse  DC Recommendations: Pt would benefit from ongoing ST services ?home health vs out patient.   Strengths:  Independent PLOF, Manages pain appropriately, Motivated for self care and independence, Pleasant  and cooperative, Supportive family and Willingly participates in therapeutic activities  Barriers:  Decreased endurance, Generalized weakness and Other: attention, memory, executive function  # of short term goals set=1  # of short term goals met=0  Speech Therapy Problems     Problem: Problem Solving STGs     Dates: Start: 07/26/19       Goal: STG-Within one week, patient will     Dates: Start: 07/26/19       Description: 1) Individualized goal:  Perform complex attention tasks with 90% accuracy and clinician supervision  2) Interventions: SLP Self Care / ADL Training , SLP Cognitive Skill Development and SLP Group Treatme       Note:     Goal Note filed on 07/29/19 0819 by Marion Hicks MS,CCC-SLP    Goal: STG-Within one week, patient will  Outcome: NOT MET  Mod A needed for simple attention tasks.                        Problem: Speech/Swallowing LTGs     Dates: Start: 07/26/19       Goal: LTG-By discharge, patient will solve complex problem     Dates: Start: 07/26/19       Description: 1) Individualized goal:  For home and community with 90% accuracy and MOD I for meta-cognitive strategies  2) Interventions:  SLP Self Care / ADL Training , SLP Cognitive Skill Development and SLP Group Treatment             Goal: LTG-By discharge, patient will     Dates: Start: 07/26/19       Description: 1) Individualized goal:  Perform functional prospective memory tasks with mod I to achieve 90% accuracy.  2) Interventions:  SLP Self Care / ADL Training , SLP Cognitive Skill Development and SLP Group Treatme                          Section completed by:  Jenifer Llanes MS,CCC-SLP

## 2019-08-13 LAB
ANION GAP SERPL CALC-SCNC: 11 MMOL/L (ref 0–11.9)
BUN SERPL-MCNC: 18 MG/DL (ref 8–22)
CALCIUM SERPL-MCNC: 9.6 MG/DL (ref 8.5–10.5)
CHLORIDE SERPL-SCNC: 102 MMOL/L (ref 96–112)
CO2 SERPL-SCNC: 25 MMOL/L (ref 20–33)
CREAT SERPL-MCNC: 1.23 MG/DL (ref 0.5–1.4)
GLUCOSE BLD-MCNC: 150 MG/DL (ref 65–99)
GLUCOSE BLD-MCNC: 94 MG/DL (ref 65–99)
GLUCOSE SERPL-MCNC: 155 MG/DL (ref 65–99)
MAGNESIUM SERPL-MCNC: 1.7 MG/DL (ref 1.5–2.5)
POTASSIUM SERPL-SCNC: 4.2 MMOL/L (ref 3.6–5.5)
SODIUM SERPL-SCNC: 138 MMOL/L (ref 135–145)

## 2019-08-13 PROCEDURE — 97530 THERAPEUTIC ACTIVITIES: CPT

## 2019-08-13 PROCEDURE — 700102 HCHG RX REV CODE 250 W/ 637 OVERRIDE(OP): Performed by: HOSPITALIST

## 2019-08-13 PROCEDURE — 97110 THERAPEUTIC EXERCISES: CPT

## 2019-08-13 PROCEDURE — A9270 NON-COVERED ITEM OR SERVICE: HCPCS | Performed by: HOSPITALIST

## 2019-08-13 PROCEDURE — A9270 NON-COVERED ITEM OR SERVICE: HCPCS | Performed by: PHYSICAL MEDICINE & REHABILITATION

## 2019-08-13 PROCEDURE — 99232 SBSQ HOSP IP/OBS MODERATE 35: CPT | Performed by: HOSPITALIST

## 2019-08-13 PROCEDURE — 770010 HCHG ROOM/CARE - REHAB SEMI PRIVAT*

## 2019-08-13 PROCEDURE — 36415 COLL VENOUS BLD VENIPUNCTURE: CPT

## 2019-08-13 PROCEDURE — 80048 BASIC METABOLIC PNL TOTAL CA: CPT

## 2019-08-13 PROCEDURE — 97535 SELF CARE MNGMENT TRAINING: CPT

## 2019-08-13 PROCEDURE — 99231 SBSQ HOSP IP/OBS SF/LOW 25: CPT | Performed by: PHYSICAL MEDICINE & REHABILITATION

## 2019-08-13 PROCEDURE — 83735 ASSAY OF MAGNESIUM: CPT

## 2019-08-13 PROCEDURE — 700112 HCHG RX REV CODE 229: Performed by: PHYSICAL MEDICINE & REHABILITATION

## 2019-08-13 PROCEDURE — 700102 HCHG RX REV CODE 250 W/ 637 OVERRIDE(OP): Performed by: PHYSICAL MEDICINE & REHABILITATION

## 2019-08-13 PROCEDURE — 82962 GLUCOSE BLOOD TEST: CPT

## 2019-08-13 PROCEDURE — 700111 HCHG RX REV CODE 636 W/ 250 OVERRIDE (IP): Performed by: PHYSICAL MEDICINE & REHABILITATION

## 2019-08-13 PROCEDURE — 700101 HCHG RX REV CODE 250: Performed by: PHYSICAL MEDICINE & REHABILITATION

## 2019-08-13 PROCEDURE — G0515 COGNITIVE SKILLS DEVELOPMENT: HCPCS

## 2019-08-13 RX ADMIN — GABAPENTIN 300 MG: 300 CAPSULE ORAL at 15:32

## 2019-08-13 RX ADMIN — Medication 1 CAPSULE: at 09:51

## 2019-08-13 RX ADMIN — CALCIUM CARBONATE (ANTACID) CHEW TAB 500 MG 500 MG: 500 CHEW TAB at 20:05

## 2019-08-13 RX ADMIN — TERAZOSIN HYDROCHLORIDE 5 MG: 5 CAPSULE ORAL at 17:45

## 2019-08-13 RX ADMIN — GABAPENTIN 300 MG: 300 CAPSULE ORAL at 09:51

## 2019-08-13 RX ADMIN — ATORVASTATIN CALCIUM 20 MG: 10 TABLET, FILM COATED ORAL at 20:06

## 2019-08-13 RX ADMIN — MAGNESIUM OXIDE TAB 400 MG (241.3 MG ELEMENTAL MG) 400 MG: 400 (241.3 MG) TAB at 09:47

## 2019-08-13 RX ADMIN — MAGNESIUM OXIDE TAB 400 MG (241.3 MG ELEMENTAL MG) 400 MG: 400 (241.3 MG) TAB at 11:44

## 2019-08-13 RX ADMIN — OMEPRAZOLE 20 MG: 20 CAPSULE, DELAYED RELEASE ORAL at 09:46

## 2019-08-13 RX ADMIN — CYANOCOBALAMIN TAB 1000 MCG 1000 MCG: 1000 TAB at 09:51

## 2019-08-13 RX ADMIN — METFORMIN HYDROCHLORIDE 1000 MG: 500 TABLET, FILM COATED ORAL at 17:41

## 2019-08-13 RX ADMIN — DOCUSATE SODIUM 100 MG: 100 CAPSULE, LIQUID FILLED ORAL at 09:50

## 2019-08-13 RX ADMIN — VERAPAMIL HYDROCHLORIDE 180 MG: 180 TABLET, FILM COATED, EXTENDED RELEASE ORAL at 09:50

## 2019-08-13 RX ADMIN — TERAZOSIN HYDROCHLORIDE 5 MG: 5 CAPSULE ORAL at 05:03

## 2019-08-13 RX ADMIN — FOLIC ACID 1 MG: 1 TABLET ORAL at 09:50

## 2019-08-13 RX ADMIN — METFORMIN HYDROCHLORIDE 1000 MG: 500 TABLET, FILM COATED ORAL at 09:47

## 2019-08-13 RX ADMIN — LISINOPRIL 40 MG: 20 TABLET ORAL at 09:47

## 2019-08-13 RX ADMIN — SPIRONOLACTONE 25 MG: 25 TABLET ORAL at 09:52

## 2019-08-13 RX ADMIN — LORATADINE 10 MG: 10 TABLET ORAL at 09:50

## 2019-08-13 RX ADMIN — AMLODIPINE BESYLATE 10 MG: 5 TABLET ORAL at 09:47

## 2019-08-13 RX ADMIN — OXYCODONE HYDROCHLORIDE AND ACETAMINOPHEN 500 MG: 500 TABLET ORAL at 09:47

## 2019-08-13 RX ADMIN — MAGNESIUM OXIDE TAB 400 MG (241.3 MG ELEMENTAL MG) 400 MG: 400 (241.3 MG) TAB at 17:42

## 2019-08-13 RX ADMIN — MULTIPLE VITAMINS W/ MINERALS TAB 1 TABLET: TAB at 11:44

## 2019-08-13 RX ADMIN — SENNOSIDES 17.2 MG: 8.6 TABLET, FILM COATED ORAL at 20:06

## 2019-08-13 RX ADMIN — FERROUS SULFATE TAB 325 MG (65 MG ELEMENTAL FE) 325 MG: 325 (65 FE) TAB at 17:42

## 2019-08-13 RX ADMIN — DOCUSATE SODIUM 100 MG: 100 CAPSULE, LIQUID FILLED ORAL at 20:06

## 2019-08-13 RX ADMIN — FERROUS SULFATE TAB 325 MG (65 MG ELEMENTAL FE) 325 MG: 325 (65 FE) TAB at 09:50

## 2019-08-13 RX ADMIN — CALCIUM CARBONATE (ANTACID) CHEW TAB 500 MG 500 MG: 500 CHEW TAB at 09:46

## 2019-08-13 RX ADMIN — VERAPAMIL HYDROCHLORIDE 180 MG: 180 TABLET, FILM COATED, EXTENDED RELEASE ORAL at 20:06

## 2019-08-13 RX ADMIN — OXYCODONE HYDROCHLORIDE AND ACETAMINOPHEN 500 MG: 500 TABLET ORAL at 17:42

## 2019-08-13 RX ADMIN — ENOXAPARIN SODIUM 40 MG: 100 INJECTION SUBCUTANEOUS at 09:46

## 2019-08-13 RX ADMIN — LIDOCAINE 2 PATCH: 50 PATCH CUTANEOUS at 09:55

## 2019-08-13 RX ADMIN — GABAPENTIN 300 MG: 300 CAPSULE ORAL at 20:06

## 2019-08-13 ASSESSMENT — ENCOUNTER SYMPTOMS
VOMITING: 0
CHILLS: 0
NERVOUS/ANXIOUS: 0
NAUSEA: 0
SHORTNESS OF BREATH: 0
DIARRHEA: 0
FEVER: 0
ABDOMINAL PAIN: 0

## 2019-08-13 NOTE — CARE PLAN
Problem: Safety  Goal: Will remain free from injury  Outcome: PROGRESSING AS EXPECTED  Goal: Will remain free from falls  Outcome: PROGRESSING AS EXPECTED     Problem: Infection  Goal: Will remain free from infection  Outcome: PROGRESSING AS EXPECTED     Problem: Bowel/Gastric:  Goal: Normal bowel function is maintained or improved  Outcome: PROGRESSING AS EXPECTED     Problem: Pain Management  Goal: Pain level will decrease to patient's comfort goal  Outcome: PROGRESSING AS EXPECTED

## 2019-08-13 NOTE — THERAPY
Speech Language Pathology  Daily Treatment     Patient Name: Ian Laird  Age:  69 y.o., Sex:  male  Medical Record #: [id number]  Today's Date: 8/13/2019     Subjective    Pt pleasant and cooperative.      Objective  The Scales of Cognitive and Communicative Ability for Neurorehabilitation (SCCAN) assesses cognitive-communicative deficits and functional ability in patients in rehabilitation hospitals, clinics, and skilled nursing facilities. The SCCAN is appropriate for a broad range of neurological patients, provides a measure of both impairment and functional ability.     SCCAN (Scales of Cognitive and Communicative Ability for Neurorehabilitation)  Oral Expression - Raw Score: 17  Oral Expression - Scale Performance Score: 89  Orientation - Raw Score: 12  Orientation - Scale Performance Score: 100  Memory - Raw Score: 10  Memory - Scale Performance Score: 53  Speech Comprehension - Raw Score: 12  Speech Comprehension - Scale Performance Score: 92  Reading Comprehension - Raw Score: 9  Reading Comprehension - Scale Performance Score: 75  Writing - Raw Score: 7  Writing - Scale Performance Score: 100  Attention - Raw Score: 9  Attention - Scale Performance Score: 56  Problem Solving - Raw Score: 15  Problem Solving - Scale Performance Score: 65  SCCAN Total Raw Score: 77  SCCAN Degree of Severity: Mild Impairment     08/13/19 0834   SCCAN (Scales of Cognitive and Communicative Ability for Neurorehabilitation)   Oral Expression - Raw Score 17   Oral Expression - Scale Performance Score 89   Orientation - Raw Score 12   Orientation - Scale Performance Score 100   Memory - Raw Score 10   Memory - Scale Performance Score 53   Speech Comprehension - Raw Score 12   Speech Comprehension - Scale Performance Score 92   Reading Comprehension - Raw Score 9   Reading Comprehension - Scale Performance Score 75   Writing - Raw Score 7   Writing - Scale Performance Score 100   Attention - Raw Score 9   Attention - Scale  Performance Score 56   Problem Solving - Raw Score 15   Problem Solving - Scale Performance Score 65   SCCAN Total Raw Score 77   SCCAN Degree of Severity Mild Impairment   Interdisciplinary Plan of Care Collaboration   IDT Collaboration with  Nursing   Patient Position at End of Therapy Seated   Collaboration Comments informed nursing of pt location for med pass   SLP Total Time Spent   SLP Individual Total Time Spent (Mins) 60   Charge Group   SLP Cognitive Skill Development 4       FIM Eating Score:  6 - Modified Independent  Eating Description:       FIM Comprehension Score:  6 - Modified Independent  Comprehension Description:  Glasses, Verbal cues    FIM Expression Score:  7 - Independent  Expression Description:       FIM Social Interaction Score:  7 - Independent  Social Interaction Description:       FIM Problem Solving Score:  5 - Standby Prompting/Supervision or Set-up  Problem Solving Description:  Verbal cueing, Therapy schedule    FIM Memory Score:  5 - Standby Prompting/Supervision or Set-up  Memory Description:  Verbal cueing, Therapy schedule      Assessment    Follow up outcome assessment completed - pt demonstrated slight improvement across all domains (it should be noted memory subtest score calculated incorrectly on initial assessment - pt's score should be reflected as 47% on initial assessment, follow up 53%). Overall score increased from 72 (corrected initial score) to 77, pt continues to fall within mild impairment.     Plan    Continue to target attention, memory - compensatory strategies and fxl problem solving.

## 2019-08-13 NOTE — THERAPY
Occupational Therapy  Daily Treatment     Patient Name: Ian Laird  Age:  69 y.o., Sex:  male  Medical Record #: [id number]  Today's Date: 8/13/2019     Precautions  Precautions: (P) Fall Risk, Spinal / Back Precautions   Comments: T3-T7 spinal fusion    Safety   ADL Safety : Requires Supervision for Safety, Requires Physical Assist for Safety  Bathroom Safety: Requires Supervision for Safety    Subjective    Patient lying in bed asleep upon OT arrival to room.  Easily awoken, though stated he had a bad night of sleep.     Objective       08/13/19 0701   Precautions   Precautions Fall Risk;Spinal / Back Precautions    Sitting Lower Body Exercises   Nustep Time (See Comments)  (Nustep machine level 4 x 12 minutes with LEs only)   Bed Mobility    Supine to Sit Supervised   Scooting Supervised   Interdisciplinary Plan of Care Collaboration   IDT Collaboration with  Nursing   Patient Position at End of Therapy Seated  (in dining room)   Collaboration Comments checked blood sugar and assessed heart and lungs   OT Total Time Spent   OT Individual Total Time Spent (Mins) 60   OT Charge Group   OT Self Care / ADL 1   OT Therapy Activity 2   OT Therapeutic Exercise  1     Walked with FWW and CGA from bed to toilet.  W/C mobility room to front therapy gym mod I.  SPT with FWW w/c < >nustep with CGA and verbal cues for safety.  Educated patient on purpose of and how to use suppository applicator and digital stimulation device for bowel program if he continues to need to complete at home and if he is unable to do these tasks due to inability to reach.  Printed out pictures of these devices and left on patient's bedside table for reference.    FIM Eating Score:  7 - Independent  Eating Description:  (to drink coffee)    FIM Grooming Score:  7 - Independent  Grooming Description:  (to brush hair/teeth, wash/dry hands and face)    FIM Lower Body Dressing Score:  5 - Standby Prompting/Supervsion or Set-up  Lower Body  Dressing Description:  Set-up of equipment(setup/supervised to doff socks and don slip on shoes)    FIM Toiletin - Standby Prompting/Supervision or Set-up  Toileting Description:  Supervision for safety, Grab bar, Initial preparation for task(setup/sba with grab bar)    FIM Toilet Transfer Score:  5 - Standby Prompting/Supervision or Set-up  Toilet Transfer Description:  Grab bar, Supervision for safety, Set-up of equipment, Initial preparation for task(setup/sba with grab bar)      Assessment    Patient open to learning about bowel program adaptive equipment.  Patient a little more lethargic today compared to normal, which is possibly due to poor night of sleep.  Requires encouragement to push self harder when using nustep.    Plan    Educate pt's spouse on digital stimulation device and suppository ; Continue to improve strength, endurance, functional mobility and safety to maximize ADL/IADLs prior to d/c.

## 2019-08-13 NOTE — PROGRESS NOTES
"Rehab Progress Note     Date of Service: 8/13/2019  Chief Complaint: follow up SCI    Interval Events (Subjective)    Patient seen and examined in his room today.  He reports poor sleep last night as he woke up at 4 AM and could not get back to sleep.  He denies any pain or excessive worrying.  He did have one small bowel incontinence or smear yesterday for which his bowel meds were held.  He is going to try his bowel program again this morning without the use of a suppository.  Being his bladder without any issues.  He did have a sharp pain in his upper back yesterday but this resolved.  He has no other complaints.    Objective:  VITAL SIGNS: /74   Pulse 73   Temp 36.8 °C (98.2 °F) (Oral)   Resp 18   Ht 1.778 m (5' 10\")   Wt 93.4 kg (205 lb 12.8 oz)   SpO2 95%   BMI 29.53 kg/m²   Gen: alert, no apparent distress  CV: regular rate and rhythm, no murmurs, no peripheral edema  Resp: clear to ascultation bilaterally, normal respiratory effort  GI: soft, non-tender abdomen, bowel sounds present  Msl: well healed thoracic incision    Recent Results (from the past 72 hour(s))   ACCU-CHEK GLUCOSE    Collection Time: 08/10/19  5:17 PM   Result Value Ref Range    Glucose - Accu-Ck 105 (H) 65 - 99 mg/dL   ACCU-CHEK GLUCOSE    Collection Time: 08/11/19  7:08 AM   Result Value Ref Range    Glucose - Accu-Ck 130 (H) 65 - 99 mg/dL   ACCU-CHEK GLUCOSE    Collection Time: 08/11/19  5:33 PM   Result Value Ref Range    Glucose - Accu-Ck 148 (H) 65 - 99 mg/dL   ACCU-CHEK GLUCOSE    Collection Time: 08/12/19  7:35 AM   Result Value Ref Range    Glucose - Accu-Ck 135 (H) 65 - 99 mg/dL   ACCU-CHEK GLUCOSE    Collection Time: 08/12/19  4:58 PM   Result Value Ref Range    Glucose - Accu-Ck 101 (H) 65 - 99 mg/dL   Basic Metabolic Panel    Collection Time: 08/13/19  6:17 AM   Result Value Ref Range    Sodium 138 135 - 145 mmol/L    Potassium 4.2 3.6 - 5.5 mmol/L    Chloride 102 96 - 112 mmol/L    Co2 25 20 - 33 mmol/L    Glucose " 155 (H) 65 - 99 mg/dL    Bun 18 8 - 22 mg/dL    Creatinine 1.23 0.50 - 1.40 mg/dL    Calcium 9.6 8.5 - 10.5 mg/dL    Anion Gap 11.0 0.0 - 11.9   MAGNESIUM    Collection Time: 08/13/19  6:17 AM   Result Value Ref Range    Magnesium 1.7 1.5 - 2.5 mg/dL   ESTIMATED GFR    Collection Time: 08/13/19  6:17 AM   Result Value Ref Range    GFR If African American >60 >60 mL/min/1.73 m 2    GFR If Non African American 58 (A) >60 mL/min/1.73 m 2   ACCU-CHEK GLUCOSE    Collection Time: 08/13/19  7:40 AM   Result Value Ref Range    Glucose - Accu-Ck 150 (H) 65 - 99 mg/dL       Current Facility-Administered Medications   Medication Frequency   • tramadol (ULTRAM) 50 MG tablet 50 mg TID PRN   • bisacodyl (THE MAGIC BULLET) suppository 10 mg DAILY   • insulin lispro (HUMALOG) injection 2-12 Units BID WITH MEALS    And   • glucose 4 g chewable tablet 16 g Q15 MIN PRN    And   • dextrose 50% (D50W) injection 50 mL Q15 MIN PRN   • magnesium oxide (MAG-OX) tablet 400 mg TID WITH MEALS   • ferrous sulfate tablet 325 mg BID WITH MEALS   • ascorbic acid tablet 500 mg BID WITH MEALS   • docusate sodium (COLACE) capsule 100 mg BID   • sennosides (SENOKOT) 8.6 MG tablet 17.2 mg QHS   • lidocaine (XYLOCAINE) 2 % jelly PRN   • lactobacillus rhamnosus (CULTURELLE) capsule 1 Cap QDAY with Breakfast   • cyanocobalamin (VITAMIN B12) tablet 1,000 mcg DAILY   • folic acid (FOLVITE) tablet 1 mg DAILY   • gabapentin (NEURONTIN) capsule 300 mg TID   • midodrine (PROAMATINE) tablet 5 mg Q4HRS PRN   • enoxaparin (LOVENOX) inj 40 mg DAILY   • lidocaine (LIDODERM) 5 % 2 Patch Q24HR   • metFORMIN (GLUCOPHAGE) tablet 1,000 mg BID WITH MEALS   • amLODIPine (NORVASC) tablet 10 mg DAILY   • atorvastatin (LIPITOR) tablet 20 mg Nightly   • glucose 4 g chewable tablet 16 g PRN   • lisinopril (PRINIVIL) tablet 40 mg DAILY   • loratadine (CLARITIN) tablet 10 mg DAILY   • omeprazole (PRILOSEC) capsule 20 mg DAILY   • spironolactone (ALDACTONE) tablet 25 mg DAILY   •  terazosin (HYTRIN) capsule 5 mg BID   • verapamil ER (CALAN-SR) tablet 180 mg BID   • Pharmacy Consult Request ...Pain Management Review 1 Each PHARMACY TO DOSE   • Respiratory Care per Protocol Continuous RT   • acetaminophen (TYLENOL) tablet 650 mg Q4HRS PRN   • therapeutic multivitamin-minerals (THERAGRAN-M) tablet 1 Tab DAILY WITH LUNCH   • artificial tears 1.4 % ophthalmic solution 1 Drop PRN   • benzocaine-menthol (CEPACOL) lozenge 1 Lozenge Q2HRS PRN   • mag hydrox-al hydrox-simeth (MAALOX PLUS ES or MYLANTA DS) suspension 20 mL Q2HRS PRN   • ondansetron (ZOFRAN ODT) dispertab 4 mg 4X/DAY PRN    Or   • ondansetron (ZOFRAN) syringe/vial injection 4 mg 4X/DAY PRN   • traZODone (DESYREL) tablet 50 mg QHS PRN   • sodium chloride (OCEAN) 0.65 % nasal spray 2 Spray PRN   • calcium carbonate (TUMS) chewable tab 500 mg BID       Orders Placed This Encounter   Procedures   • Diet Order Diabetic     Standing Status:   Standing     Number of Occurrences:   1     Order Specific Question:   Diet:     Answer:   Diabetic [3]       Assessment:  Active Hospital Problems    Diagnosis   • *T5 spinal cord injury (HCC)   • PVD (peripheral vascular disease) (HCC)   • Type 2 diabetes mellitus without complication, without long-term current use of insulin (HCC)   • Essential hypertension   • Dyslipidemia   • Obesity   • BPH (benign prostatic hyperplasia)   • History of melanoma   • Neuropathic pain   • Neurogenic bladder   • Neurogenic bowel   • Hyponatremia   • 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HCC)   • Acute kidney injury (HCC)   • Vitamin D deficiency   • Anemia     I led and attended the weekly conference today, and agree with the IDT conference documentation and plan of care as noted below.    Date of conference: 8/12/2019    Goals and barriers: See IDT note.    Biggest barriers: poor activity tolerance, impaired balance, poor motor planning, slow processing speed, impaired complex attention    Goals: TLA night prior to  discharge    Admission FIM 65 --> 87 (8/12)    CM[facility]social support[facility] [place] [facility]: [facility]    Outpatient: PT/OT/SLP    Equip: pressure relieving cushion, FWW    Follow up: PCP. Dr. 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 Velasco  Continue full rehab program  PT/OT, 1.5 hr each discipline, 5 days per week  Outpatient follow up with oncology regarding tissue diagnosis    Neurogenic orthostasis, resolved  s/p midodrine    Neuropathic pain management  Continue gabapentin    Nociceptive pain management  PRN Tramadol  Continue PRN tylenol  Continue lidocaine patch    Iron deficiency anemia  Continue ferrous sulfate and Vit C  Also on B12 and folate    Hypertension  Continue amlodipine  Continue lisinopril  Continue Verapamil  Continue spironolactone  Continue terazosin  Appreciate hospitalist assistance    Hypomagnesemia  Continue supplementation    Dyslipidemia  Continue statin    Peripheral vascular disease  Continue statin  Outpatient follow up with his vascular surgeon    Sepsis due to UTI, resolved  S/p antibiotics    Diabetes  Continue metformin  Continue Lantus  Appreciate hospitalist assistance    Anxiety/adjustment disorder  Continue Cymbalta  Dr. Yoo consulted    Neurogenic bladder, improved  BPH  Continue terazosin  PVRs -  138 highest in last 24 hrs    Neurogenic bowel, improved  Continue bowel program  Changed to 10-11:30 am as this is when he moves his bowels at home, hold therapy at that time  Ok to try without suppository as function returns  Last BM 8/13  Continue TUMs, lactobacillis     DVT prophylaxis  Lovenox    Total time:  16 minutes.  I spent greater than 50% of the time for patient care, counseling, and coordination on this date, including patient face-to face time, unit/floor time with review of records/pertinent lab data and studies, as  well as discussing diagnostic evaluation/work up, planned therapeutic interventions, and future disposition of care, as per the interval events/subjective and the assessment and plan as noted above.    I have performed a physical exam, reviewed and updated ROS, as well as the assessment and plan today 8/13/2019. In review of note from 8/12/2019 there are no new changes except as documented above.        Mini Rodriguez M.D.   Physical Medicine and Rehabilitation

## 2019-08-13 NOTE — PROGRESS NOTES
[facility] Medicine Daily Progress Note    Date of Service  8/13/2019    Chief Complaint:  Hypertension  Diabetes  Hyponatremia    Interval History:  No significant events or changes since last visit    Review of Systems  Review of Systems   Constitutional: Negative for chills and fever.   Respiratory: Negative for shortness of breath.    Cardiovascular: Negative for chest pain.   Gastrointestinal: Negative for abdominal pain, diarrhea, nausea and vomiting.   Psychiatric/Behavioral: The patient is not nervous/anxious.         Physical Exam  Temp:  [36.8 °C (98.2 °F)-37.2 °C (99 °F)] 36.8 °C (98.2 °F)  Pulse:  [72-80] 73  Resp:  [16-18] 18  BP: (126-159)/(58-74) 150/74  SpO2:  [94 %-95 %] 95 %    Physical Exam   Constitutional: He is oriented to person, place, and time. He appears well-nourished.   HENT:   Head: Atraumatic.   Eyes: Pupils are equal, round, and reactive to light. Conjunctivae are normal.   Neck: Normal range of motion. Neck supple.   Cardiovascular: Normal rate, regular rhythm, S1 normal and S2 normal.   No murmur heard.  Pulmonary/Chest: Effort normal. He has no wheezes. He has no rhonchi. He has no rales.   Abdominal: Soft. He exhibits no distension. There is no tenderness.   Musculoskeletal: He exhibits no edema.   Neurological: He is alert and oriented to person, place, and time. No sensory deficit.   Skin: Skin is warm and dry. No rash noted. No cyanosis.   Psychiatric: He has a normal mood and affect. His behavior is normal.   Nursing note and vitals reviewed.      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  Wound care    PVD (peripheral vascular disease) (HCC)- (present on admission)  Assessment & Plan  Plavix on hold post-operatively    Essential hypertension- (present on admission)  Assessment & Plan  BP ok  On Norvasc: 10 mg daily  On Lisinopril: 40 mg daily  On Verapamil ER: 180 mg bid  Note: also on Aldactone  Note: off Midodrine  Cont to monitor    Type 2 diabetes mellitus without complication, without long-term current use of insulin (HCC)- (present on admission)  Assessment & Plan  Hba1c 6.8 ()  BS   On Metformin: 1000 mg bid  Note: home meds include Metformin, Glipizide, and Januvia  Cont to monitor    Hypomagnesemia  Assessment & Plan  Currently resolved  M.7 ()  On supplements    BPH (benign prostatic hyperplasia)  Assessment & Plan  Monitor for orthostatic hypotension on Hytrin bid    Anemia- (present on admission)  Assessment & Plan  H&H: 8.2 ()  Fe: < 10%  B12: 263  Folate: 8.8  S/P PRBC prior to Rehab transfer  2nd to Fe deficiency  ? Also 2nd to recent surgery and Plasmacytoma  S/P IV Fe daily x 4 doses  On oral Fe, B12, folate supplements  Monitor    Dyslipidemia- (present on admission)  Assessment & Plan  On Lipitor

## 2019-08-13 NOTE — CARE PLAN
Problem: Bowel/Gastric:  Goal: Normal bowel function is maintained or improved  Note:   Patient taking scheduled bowel medication; bowel sounds WNL x4 quadrants and abdomin soft and non-tender.       Problem: Skin Integrity  Goal: Risk for impaired skin integrity will decrease  Note:   Patient's skin remains intact and free from new or accidental injury this shift.

## 2019-08-13 NOTE — THERAPY
Physical Therapy   Daily Treatment     Patient Name: Ian Laird  Age:  69 y.o., Sex:  male  Medical Record #: [id number]  Today's Date: 8/13/2019     Precautions  Precautions: (P) Fall Risk, Spinal / Back Precautions   Comments: (P) T3-T7 spinal fusion    Subjective    Pt was sitting in w/c upon arrival and agreeable to tx     Objective       08/13/19 1401   Precautions   Precautions Fall Risk;Spinal / Back Precautions    Comments T3-T7 spinal fusion   PT Total Time Spent   PT Individual Total Time Spent (Mins) 60   PT Charge Group   PT Therapeutic Activities 4     Family training with wife:      Car transfer to pt's car with FWW x4  Curb transfer x 3  Uneven surface training (ramp, curb cut outs, thresholds)  Education on fall recovery with demo and handouts  Education on improving safety in the home  Checked appropriate items off passport       Assessment    Wife and pt able to complete all items listed above by end of session with no VCs for safety     Plan    Community outing, standing balance training, stair training to mimic home environment,trial small curb step with Fww

## 2019-08-14 LAB
GLUCOSE BLD-MCNC: 105 MG/DL (ref 65–99)
GLUCOSE BLD-MCNC: 158 MG/DL (ref 65–99)

## 2019-08-14 PROCEDURE — 770010 HCHG ROOM/CARE - REHAB SEMI PRIVAT*

## 2019-08-14 PROCEDURE — G0515 COGNITIVE SKILLS DEVELOPMENT: HCPCS

## 2019-08-14 PROCEDURE — A9270 NON-COVERED ITEM OR SERVICE: HCPCS | Performed by: HOSPITALIST

## 2019-08-14 PROCEDURE — 97112 NEUROMUSCULAR REEDUCATION: CPT

## 2019-08-14 PROCEDURE — 700111 HCHG RX REV CODE 636 W/ 250 OVERRIDE (IP): Performed by: PHYSICAL MEDICINE & REHABILITATION

## 2019-08-14 PROCEDURE — 99232 SBSQ HOSP IP/OBS MODERATE 35: CPT | Performed by: PHYSICAL MEDICINE & REHABILITATION

## 2019-08-14 PROCEDURE — 82962 GLUCOSE BLOOD TEST: CPT

## 2019-08-14 PROCEDURE — A9270 NON-COVERED ITEM OR SERVICE: HCPCS | Performed by: PHYSICAL MEDICINE & REHABILITATION

## 2019-08-14 PROCEDURE — 700102 HCHG RX REV CODE 250 W/ 637 OVERRIDE(OP): Performed by: PHYSICAL MEDICINE & REHABILITATION

## 2019-08-14 PROCEDURE — 700101 HCHG RX REV CODE 250: Performed by: PHYSICAL MEDICINE & REHABILITATION

## 2019-08-14 PROCEDURE — 700102 HCHG RX REV CODE 250 W/ 637 OVERRIDE(OP): Performed by: HOSPITALIST

## 2019-08-14 PROCEDURE — 97116 GAIT TRAINING THERAPY: CPT

## 2019-08-14 PROCEDURE — 700112 HCHG RX REV CODE 229: Performed by: PHYSICAL MEDICINE & REHABILITATION

## 2019-08-14 PROCEDURE — 99232 SBSQ HOSP IP/OBS MODERATE 35: CPT | Performed by: HOSPITALIST

## 2019-08-14 PROCEDURE — 97530 THERAPEUTIC ACTIVITIES: CPT

## 2019-08-14 PROCEDURE — 97110 THERAPEUTIC EXERCISES: CPT

## 2019-08-14 RX ADMIN — LORATADINE 10 MG: 10 TABLET ORAL at 08:14

## 2019-08-14 RX ADMIN — LIDOCAINE 2 PATCH: 50 PATCH CUTANEOUS at 08:14

## 2019-08-14 RX ADMIN — GABAPENTIN 300 MG: 300 CAPSULE ORAL at 08:11

## 2019-08-14 RX ADMIN — CALCIUM CARBONATE (ANTACID) CHEW TAB 500 MG 500 MG: 500 CHEW TAB at 21:01

## 2019-08-14 RX ADMIN — MAGNESIUM OXIDE TAB 400 MG (241.3 MG ELEMENTAL MG) 400 MG: 400 (241.3 MG) TAB at 08:13

## 2019-08-14 RX ADMIN — FERROUS SULFATE TAB 325 MG (65 MG ELEMENTAL FE) 325 MG: 325 (65 FE) TAB at 08:11

## 2019-08-14 RX ADMIN — LISINOPRIL 40 MG: 20 TABLET ORAL at 08:13

## 2019-08-14 RX ADMIN — TERAZOSIN HYDROCHLORIDE 5 MG: 5 CAPSULE ORAL at 05:11

## 2019-08-14 RX ADMIN — METFORMIN HYDROCHLORIDE 1000 MG: 500 TABLET, FILM COATED ORAL at 17:06

## 2019-08-14 RX ADMIN — VERAPAMIL HYDROCHLORIDE 180 MG: 180 TABLET, FILM COATED, EXTENDED RELEASE ORAL at 21:00

## 2019-08-14 RX ADMIN — OXYCODONE HYDROCHLORIDE AND ACETAMINOPHEN 500 MG: 500 TABLET ORAL at 17:06

## 2019-08-14 RX ADMIN — FOLIC ACID 1 MG: 1 TABLET ORAL at 08:14

## 2019-08-14 RX ADMIN — TERAZOSIN HYDROCHLORIDE 5 MG: 5 CAPSULE ORAL at 17:08

## 2019-08-14 RX ADMIN — CALCIUM CARBONATE (ANTACID) CHEW TAB 500 MG 500 MG: 500 CHEW TAB at 08:11

## 2019-08-14 RX ADMIN — MAGNESIUM OXIDE TAB 400 MG (241.3 MG ELEMENTAL MG) 400 MG: 400 (241.3 MG) TAB at 12:13

## 2019-08-14 RX ADMIN — AMLODIPINE BESYLATE 10 MG: 5 TABLET ORAL at 08:11

## 2019-08-14 RX ADMIN — INSULIN LISPRO 2 UNITS: 100 INJECTION, SOLUTION INTRAVENOUS; SUBCUTANEOUS at 08:16

## 2019-08-14 RX ADMIN — OXYCODONE HYDROCHLORIDE AND ACETAMINOPHEN 500 MG: 500 TABLET ORAL at 08:13

## 2019-08-14 RX ADMIN — VERAPAMIL HYDROCHLORIDE 180 MG: 180 TABLET, FILM COATED, EXTENDED RELEASE ORAL at 08:16

## 2019-08-14 RX ADMIN — MAGNESIUM OXIDE TAB 400 MG (241.3 MG ELEMENTAL MG) 400 MG: 400 (241.3 MG) TAB at 17:06

## 2019-08-14 RX ADMIN — CYANOCOBALAMIN TAB 1000 MCG 1000 MCG: 1000 TAB at 08:11

## 2019-08-14 RX ADMIN — FERROUS SULFATE TAB 325 MG (65 MG ELEMENTAL FE) 325 MG: 325 (65 FE) TAB at 17:06

## 2019-08-14 RX ADMIN — DOCUSATE SODIUM 100 MG: 100 CAPSULE, LIQUID FILLED ORAL at 08:11

## 2019-08-14 RX ADMIN — METFORMIN HYDROCHLORIDE 1000 MG: 500 TABLET, FILM COATED ORAL at 08:13

## 2019-08-14 RX ADMIN — ATORVASTATIN CALCIUM 20 MG: 10 TABLET, FILM COATED ORAL at 21:01

## 2019-08-14 RX ADMIN — Medication 1 CAPSULE: at 08:14

## 2019-08-14 RX ADMIN — SPIRONOLACTONE 25 MG: 25 TABLET ORAL at 08:13

## 2019-08-14 RX ADMIN — MULTIPLE VITAMINS W/ MINERALS TAB 1 TABLET: TAB at 12:13

## 2019-08-14 RX ADMIN — DOCUSATE SODIUM 100 MG: 100 CAPSULE, LIQUID FILLED ORAL at 20:59

## 2019-08-14 RX ADMIN — ENOXAPARIN SODIUM 40 MG: 100 INJECTION SUBCUTANEOUS at 08:10

## 2019-08-14 RX ADMIN — OMEPRAZOLE 20 MG: 20 CAPSULE, DELAYED RELEASE ORAL at 08:13

## 2019-08-14 RX ADMIN — SENNOSIDES 17.2 MG: 8.6 TABLET, FILM COATED ORAL at 20:59

## 2019-08-14 ASSESSMENT — ENCOUNTER SYMPTOMS
VOMITING: 0
PALPITATIONS: 0
SHORTNESS OF BREATH: 0
FEVER: 0
DIZZINESS: 0
NAUSEA: 0
HALLUCINATIONS: 0
BLURRED VISION: 0
HEADACHES: 0

## 2019-08-14 NOTE — THERAPY
Speech Language Pathology  Daily Treatment     Patient Name: Ian Laird  Age:  69 y.o., Sex:  male  Medical Record #: [id number]  Today's Date: 8/14/2019     Subjective    Pt was pleasant and cooperative during this ST session      Objective       08/14/19 1431   SLP Total Time Spent   SLP Individual Total Time Spent (Mins) 60   Charge Group   SLP Cognitive Skill Development 4         Assessment    Pt completed a medication sorting task independently and sorted 17/18 with 100% accuracy.  Pt made 1 error (double sorted on one day and missed another) with 1 medication, but was unable to locate this error without assistance.  Pt also initiated an attention task (Calendar organization) but was unable to complete this d/t time constraints.        Plan    Continue targeting attention and executive functions

## 2019-08-14 NOTE — CARE PLAN
Problem: Safety  Goal: Will remain free from injury  Outcome: PROGRESSING AS EXPECTED  Note:   Call light with in reach. Redirection to use call light for assistance. Non skid socks. Upper siderails up x2 while in bed. No complains of pain. No respiratory distress. Able to make needs known. Assisted as needed. Will continue to monitor.

## 2019-08-14 NOTE — THERAPY
"Physical Therapy   Daily Treatment     Patient Name: Ian Laird  Age:  69 y.o., Sex:  male  Medical Record #: [id number]  Today's Date: 2019     Precautions  Precautions: Fall Risk, Spinal / Back Precautions   Comments: T3-T7 spinal fusion    Subjective    Pt was sitting in w/c upon arrival and agreeable to tx     Objective       19 0901   Precautions   Precautions Fall Risk;Spinal / Back Precautions    Comments T3-T7 spinal fusion   Interdisciplinary Plan of Care Collaboration   Patient Position at End of Therapy Seated;Call Light within Reach;Tray Table within Reach;Phone within Reach   PT Total Time Spent   PT Individual Total Time Spent (Mins) 60   PT Charge Group   PT Gait Training 2   PT Neuromuscular Re-Education / Balance 2       FIM Stairs Score:  2 - Max Assistance  Stairs Description:  (4 x 1 6\" steps, B HR, step through ascend, step to descend, CGA)    Hip width stance EO: 30sec x 1 no UE support SBA  NBOS EO: 30sec x 1 no UE support SBA  Modified tandem : 30sec x 1 no UE support SBA  Tandem stance R LE lead: 30sec x 1 no UE support SBA  Tandem stance L LE lead: 30sec x 1 no UE support SBA  Unilateral UE support, tap on 3\" block 3 x 10 each LE, CGA    AMB 5ft x 2 no UE support CGA in // bars  AMB 5ft x 2 retro SBA with B UE support     EO: eyes open    Curb trainin\" step x 1 FWW , Isabel for stability    Assessment    Pt with within session improvement in standing balance; continues to be limited by ataxia and activity tolerance. Improved safety awareness this session     Plan    4\" curb training with FWW, quad cane in solostep, standing balance/ AMB in // bars with no AD, FWW for endurance, stair training with B HR  "

## 2019-08-14 NOTE — DISCHARGE PLANNING
Case Management;  I have met with patient and his wife and reviewed team conference discussion.  I will order custom cushion for patient.  Will refer for outpatient therapy at [facility] per our discussion.  Will follow.

## 2019-08-14 NOTE — PROGRESS NOTES
"Rehab Progress Note     Encounter Date: 8/14/2019    CC: LE weakness    Interval Events (Subjective)  Pain is much improved, he denies pins-and-needles, no change with the decrease in gabapentin.     He has been able to have decent bowel movements without suppository or digital stimulation, he is still requiring his senna and Colace.  He has had multiple black BMs, no episodes of fatigue or lightheadedness    He has been voiding well with low PVRs, no requirements to CIC      ROS:  Gen: No fatigue, confusion, significant weight loss  Eyes: no blurry vision, double vision or pain in eyes  ENT: no changes in hearing, runny nose, nose bleeds, sinus pain  CV: No CP, palpitations,   Lungs: no shortness of breath, changes in secretions, changes in cough, pain with coughing  Abd: No abd pain, nausea, vomiting, pain with eating  : no blood in urine,  suprapubic pain  Ext: No swelling in legs, asymmetric atrophy  Neuro: no changes in strength or sensation in last 24 hours  Skin: no new ulcers/skin breakdown appreciated by patient  Mood: No changes in mood today, increase in depression or anxiety  Heme: no bruising, or bleeding  Rest of 14 point review of systems is negative      Objective:  Vitals: /60   Pulse 76   Temp 37.1 °C (98.8 °F) (Oral)   Resp 18   Ht 1.778 m (5' 10\")   Wt 93.4 kg (205 lb 12.8 oz)   SpO2 96%   Gen: NAD, Body mass index is 29.53 kg/m².  HEENT: NC/AT, PERRLA, moist mucous membranes  Cardio: RRR, no mumurs  Pulm: CTAB, with normal respiratory effort  Abd: Soft NTND, active bowel sounds  Ext: No peripheral edema. No calf tenderness. No clubbing/cyanosis. +dorsal pedalis pulses bilaterally.      Recent Results (from the past 72 hour(s))   ACCU-CHEK GLUCOSE    Collection Time: 08/11/19  5:33 PM   Result Value Ref Range    Glucose - Accu-Ck 148 (H) 65 - 99 mg/dL   ACCU-CHEK GLUCOSE    Collection Time: 08/12/19  7:35 AM   Result Value Ref Range    Glucose - Accu-Ck 135 (H) 65 - 99 mg/dL "   ACCU-CHEK GLUCOSE    Collection Time: 08/12/19  4:58 PM   Result Value Ref Range    Glucose - Accu-Ck 101 (H) 65 - 99 mg/dL   Basic Metabolic Panel    Collection Time: 08/13/19  6:17 AM   Result Value Ref Range    Sodium 138 135 - 145 mmol/L    Potassium 4.2 3.6 - 5.5 mmol/L    Chloride 102 96 - 112 mmol/L    Co2 25 20 - 33 mmol/L    Glucose 155 (H) 65 - 99 mg/dL    Bun 18 8 - 22 mg/dL    Creatinine 1.23 0.50 - 1.40 mg/dL    Calcium 9.6 8.5 - 10.5 mg/dL    Anion Gap 11.0 0.0 - 11.9   MAGNESIUM    Collection Time: 08/13/19  6:17 AM   Result Value Ref Range    Magnesium 1.7 1.5 - 2.5 mg/dL   ESTIMATED GFR    Collection Time: 08/13/19  6:17 AM   Result Value Ref Range    GFR If African American >60 >60 mL/min/1.73 m 2    GFR If Non African American 58 (A) >60 mL/min/1.73 m 2   ACCU-CHEK GLUCOSE    Collection Time: 08/13/19  7:40 AM   Result Value Ref Range    Glucose - Accu-Ck 150 (H) 65 - 99 mg/dL   ACCU-CHEK GLUCOSE    Collection Time: 08/13/19  5:39 PM   Result Value Ref Range    Glucose - Accu-Ck 94 65 - 99 mg/dL   ACCU-CHEK GLUCOSE    Collection Time: 08/14/19  7:22 AM   Result Value Ref Range    Glucose - Accu-Ck 158 (H) 65 - 99 mg/dL       Current Facility-Administered Medications   Medication Frequency   • tramadol (ULTRAM) 50 MG tablet 50 mg TID PRN   • bisacodyl (THE MAGIC BULLET) suppository 10 mg DAILY   • insulin lispro (HUMALOG) injection 2-12 Units BID WITH MEALS    And   • glucose 4 g chewable tablet 16 g Q15 MIN PRN    And   • dextrose 50% (D50W) injection 50 mL Q15 MIN PRN   • magnesium oxide (MAG-OX) tablet 400 mg TID WITH MEALS   • ferrous sulfate tablet 325 mg BID WITH MEALS   • ascorbic acid tablet 500 mg BID WITH MEALS   • docusate sodium (COLACE) capsule 100 mg BID   • sennosides (SENOKOT) 8.6 MG tablet 17.2 mg QHS   • lidocaine (XYLOCAINE) 2 % jelly PRN   • lactobacillus rhamnosus (CULTURELLE) capsule 1 Cap QDAY with Breakfast   • cyanocobalamin (VITAMIN B12) tablet 1,000 mcg DAILY   • folic  acid (FOLVITE) tablet 1 mg DAILY   • gabapentin (NEURONTIN) capsule 300 mg TID   • midodrine (PROAMATINE) tablet 5 mg Q4HRS PRN   • enoxaparin (LOVENOX) inj 40 mg DAILY   • lidocaine (LIDODERM) 5 % 2 Patch Q24HR   • metFORMIN (GLUCOPHAGE) tablet 1,000 mg BID WITH MEALS   • amLODIPine (NORVASC) tablet 10 mg DAILY   • atorvastatin (LIPITOR) tablet 20 mg Nightly   • glucose 4 g chewable tablet 16 g PRN   • lisinopril (PRINIVIL) tablet 40 mg DAILY   • loratadine (CLARITIN) tablet 10 mg DAILY   • omeprazole (PRILOSEC) capsule 20 mg DAILY   • spironolactone (ALDACTONE) tablet 25 mg DAILY   • terazosin (HYTRIN) capsule 5 mg BID   • verapamil ER (CALAN-SR) tablet 180 mg BID   • Pharmacy Consult Request ...Pain Management Review 1 Each PHARMACY TO DOSE   • Respiratory Care per Protocol Continuous RT   • acetaminophen (TYLENOL) tablet 650 mg Q4HRS PRN   • therapeutic multivitamin-minerals (THERAGRAN-M) tablet 1 Tab DAILY WITH LUNCH   • artificial tears 1.4 % ophthalmic solution 1 Drop PRN   • benzocaine-menthol (CEPACOL) lozenge 1 Lozenge Q2HRS PRN   • mag hydrox-al hydrox-simeth (MAALOX PLUS ES or MYLANTA DS) suspension 20 mL Q2HRS PRN   • ondansetron (ZOFRAN ODT) dispertab 4 mg 4X/DAY PRN    Or   • ondansetron (ZOFRAN) syringe/vial injection 4 mg 4X/DAY PRN   • traZODone (DESYREL) tablet 50 mg QHS PRN   • sodium chloride (OCEAN) 0.65 % nasal spray 2 Spray PRN   • calcium carbonate (TUMS) chewable tab 500 mg BID       Orders Placed This Encounter   Procedures   • Diet Order Diabetic     Standing Status:   Standing     Number of Occurrences:   1     Order Specific Question:   Diet:     Answer:   Diabetic [3]       Assessment:  Active Hospital Problems    Diagnosis   • *T5 spinal cord injury (HCC)   • PVD (peripheral vascular disease) (HCC)   • Type 2 diabetes mellitus without complication, without long-term current use of insulin (HCC)   • Essential hypertension   • Dyslipidemia   • Obesity   • Hypomagnesemia   • Sepsis  secondary to UTI (HCC)   • Chest pain   • Abdominal distension   • BPH (benign prostatic hyperplasia)   • History of melanoma   • Neuropathic pain   • Neurogenic bladder   • Neurogenic bowel   • Hyponatremia   • 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HCC)   • Acute kidney injury (HCC)   • Anemia       Medical Decision Making and Plan:  T5 AIS D spinal cord injury: Status post T4-6 laminectomy and T3-7 posterior spinal fusion with resection of mass by Dr. Velasco on 7/20, preliminary pathology suggest plasmablastic plasmacytoma, significant improvement in lower extremity strength since surgical resection.  -No brace at this time  -Continue comprehensive acute inpatient rehabilitation     Plasmablastic plasmacytoma: Primary mass  - Follow-up with oncology and radiation oncology as outpatient     Leukocytosis: Resolved, Likely from pyelonephritis, WBC elevated to 20.2, with elevated neutrophils/bands, improved to 7.6 today after 3/7 days of antibiotics     Chest pain: Appreciate hospitalist input, rapid response called this morning  -EKG done, no signs of ST elevation     Neurogenic orthostatic hypotension: Systolic blood pressures into the 80s, symptoms of dizziness, lightheadedness  - Scheduled midodrine was DC'd without symptoms of dizziness when changing position  -Continue as needed prior to standing    Neuropathic pain: Lower extremity pins-and-needles on admission  -Gabapentin 600 mg 3 times daily, decrease dose to gabapentin 300 mg 3 times daily, DC gabapentin on 8/14  - He originally required tramadol 50 mg 3 times daily to participate fully with therapy, this was DC'd without difficulty during acute rehabilitation hospitalization.     Nociceptive pain: Acute postoperative pain, site of incision  - changed to prn Tramadol 50 mg 3 times daily  - Oxycodone 5 mg - 10 mg as needed every 3 hours  -Tylenol 1000 mg 3 times daily  -Robaxin 750 mg 4 times a day, discontinue Robaxin 8/5  -Lidocaine patch x2 to either side of  incision     Acute kidney injury: Creatinine elevated up to 1.26 on 7/22  -Creatinine 1.02 on admission, improved to 0.92     Blood loss anemia: Hemoglobin on 7/23 of 6.7, status post 1 unit packed red blood cell transfusion, hemoglobin of 8.0 on 7/20  - Plan for transfusion if hemoglobin is less than 7.0  -Holding Plavix which was originally started for peripheral vascular disease  -Holding Lovenox secondary to bleeding on 7/22  -Restarted Lovenox 40 mg daily  -Given multiple black stools we will check CBC in a.m.     Hyponatremia: Sodium on admission of 130 improved to 132  -Consult hospitalist     Hypertension: Consulted hospitalist, under good control with systolic blood pressure in 120s  -Norvasc 10 mg daily  -Lisinopril 40 mg daily  -Spironolactone 25 mg daily  - Terazosin 5 mg twice daily  -Verapamil 80 mg twice daily     Type 2 diabetes:   -Metformin 1000 mg bid  -Lantus 10 units every morning  -Insulin sliding scale, moderate  - Consulted hospitalist     BPH: Having increased difficulty with urination, likely secondary to neurogenic bladder  -Terazosin 5 mg twice daily, can be causing worsening orthostatic hypotension     Anxiety/adjustment disorder:  -Cymbalta 30 mg daily, this will also aid and neuropathic pain, mood appears to be stable, DC Cymbalta at this time  -Consult psychologist     Neurogenic bladder: Requiring CIC, question if caused by urinary tract infection  -  voiding trial  - lidocaine 2%, urojet q4 hours with CIC     Neurogenic bowel: Changed bowel program to every morning  -Was able to have consistent BMs without suppository or digital stimulation, continue senna night and Colace 100 mg twice daily     Vitamin D deficiency: Vitamin D level on admission of 65, toxic level  - Discontinue cholecalciferol     DVT prophylaxis: Lovenox held secondary to bleeding at site of incision.  -SCDs when in bed, discontinue SCDs  -Started Lovenox 40 mg daily    Total time:  28 minutes.  I spent greater  than 50% of the time for patient care and coordination on this date, including unit/floor time, and face-to-face time with the patient as per assessment and plan above including neurogenic bowel management with DC of Magic bullet, neuropathic pain management with DC of gabapentin.    Adelfo Aquino D.O.

## 2019-08-14 NOTE — PROGRESS NOTES
Hospital Medicine Daily Progress Note    Date of Service  8/14/2019    Chief Complaint:  Hypertension  Diabetes  Hyponatremia    Interval History:  No significant events or changes since last visit    Review of Systems  Review of Systems   Constitutional: Negative for fever.   Eyes: Negative for blurred vision.   Respiratory: Negative for shortness of breath.    Cardiovascular: Negative for palpitations.   Gastrointestinal: Negative for nausea and vomiting.   Neurological: Negative for dizziness and headaches.   Psychiatric/Behavioral: Negative for hallucinations.        Physical Exam  Temp:  [36.7 °C (98 °F)-37.1 °C (98.8 °F)] 37.1 °C (98.8 °F)  Pulse:  [70-80] 76  Resp:  [17-18] 18  BP: (112-164)/(60-79) 112/60  SpO2:  [95 %-96 %] 96 %    Physical Exam   HENT:   Mouth/Throat: Oropharynx is clear and moist.   Eyes: No scleral icterus.   Cardiovascular: Normal rate and regular rhythm.   No murmur heard.  Pulmonary/Chest: Effort normal and breath sounds normal. No stridor.   Abdominal: Soft. He exhibits no distension. There is no tenderness.   Musculoskeletal: He exhibits no edema.   Skin: Skin is warm. No rash noted. He is not diaphoretic.   Psychiatric: His behavior is normal.   Nursing note and vitals reviewed.      Fluids    Intake/Output Summary (Last 24 hours) at 8/14/2019 1048  Last data filed at 8/14/2019 1000  Gross per 24 hour   Intake 1720 ml   Output 500 ml   Net 1220 ml       Laboratory      Recent Labs     08/13/19  0617   SODIUM 138   POTASSIUM 4.2   CHLORIDE 102   CO2 25   GLUCOSE 155*   BUN 18   CREATININE 1.23   CALCIUM 9.6                       Assessment/Plan  * T5 spinal cord injury (HCC)- (present on admission)  Assessment & Plan  S/P surgical repair  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  Wound care    PVD (peripheral vascular disease) (HCC)- (present on admission)  Assessment & Plan  Plavix on hold post-operatively    Essential hypertension- (present on admission)  Assessment & Plan  BP ok  On  Norvasc: 10 mg daily  On Lisinopril: 40 mg daily  On Verapamil ER: 180 mg bid  Note: also on Aldactone  Note: off Midodrine  Cont to monitor    Type 2 diabetes mellitus without complication, without long-term current use of insulin (HCC)- (present on admission)  Assessment & Plan  Hba1c 6.8 ()  BS   On Metformin: 1000 mg bid  Note: home meds include Metformin, Glipizide, and Januvia  Cont to monitor    Hypomagnesemia  Assessment & Plan  Currently resolved  M.7 ()  On supplements    BPH (benign prostatic hyperplasia)  Assessment & Plan  Monitor for orthostatic hypotension on Hytrin bid    Anemia- (present on admission)  Assessment & Plan  H&H: 8.2 ()  Fe: < 10%  B12: 263  Folate: 8.8  S/P PRBC prior to Rehab transfer  2nd to Fe deficiency  ? Also 2nd to recent surgery and Plasmacytoma  S/P IV Fe daily x 4 doses  On oral Fe, B12, folate supplements  Monitor    Dyslipidemia- (present on admission)  Assessment & Plan  On Lipitor

## 2019-08-15 LAB
BASOPHILS # BLD AUTO: 0.2 % (ref 0–1.8)
BASOPHILS # BLD: 0.02 K/UL (ref 0–0.12)
EOSINOPHIL # BLD AUTO: 0.31 K/UL (ref 0–0.51)
EOSINOPHIL NFR BLD: 3.7 % (ref 0–6.9)
ERYTHROCYTE [DISTWIDTH] IN BLOOD BY AUTOMATED COUNT: 53.9 FL (ref 35.9–50)
GLUCOSE BLD-MCNC: 112 MG/DL (ref 65–99)
GLUCOSE BLD-MCNC: 147 MG/DL (ref 65–99)
HCT VFR BLD AUTO: 26.7 % (ref 42–52)
HGB BLD-MCNC: 8.6 G/DL (ref 14–18)
IMM GRANULOCYTES # BLD AUTO: 0.19 K/UL (ref 0–0.11)
IMM GRANULOCYTES NFR BLD AUTO: 2.2 % (ref 0–0.9)
LYMPHOCYTES # BLD AUTO: 1.7 K/UL (ref 1–4.8)
LYMPHOCYTES NFR BLD: 20.1 % (ref 22–41)
MCH RBC QN AUTO: 32.2 PG (ref 27–33)
MCHC RBC AUTO-ENTMCNC: 32.2 G/DL (ref 33.7–35.3)
MCV RBC AUTO: 100 FL (ref 81.4–97.8)
MONOCYTES # BLD AUTO: 0.87 K/UL (ref 0–0.85)
MONOCYTES NFR BLD AUTO: 10.3 % (ref 0–13.4)
NEUTROPHILS # BLD AUTO: 5.37 K/UL (ref 1.82–7.42)
NEUTROPHILS NFR BLD: 63.5 % (ref 44–72)
NRBC # BLD AUTO: 0 K/UL
NRBC BLD-RTO: 0 /100 WBC
PLATELET # BLD AUTO: 237 K/UL (ref 164–446)
PMV BLD AUTO: 9.4 FL (ref 9–12.9)
RBC # BLD AUTO: 2.67 M/UL (ref 4.7–6.1)
WBC # BLD AUTO: 8.5 K/UL (ref 4.8–10.8)

## 2019-08-15 PROCEDURE — A9270 NON-COVERED ITEM OR SERVICE: HCPCS | Performed by: PHYSICAL MEDICINE & REHABILITATION

## 2019-08-15 PROCEDURE — 99232 SBSQ HOSP IP/OBS MODERATE 35: CPT | Performed by: PHYSICAL MEDICINE & REHABILITATION

## 2019-08-15 PROCEDURE — 82962 GLUCOSE BLOOD TEST: CPT | Mod: 91

## 2019-08-15 PROCEDURE — 700102 HCHG RX REV CODE 250 W/ 637 OVERRIDE(OP): Performed by: HOSPITALIST

## 2019-08-15 PROCEDURE — G0515 COGNITIVE SKILLS DEVELOPMENT: HCPCS

## 2019-08-15 PROCEDURE — 85025 COMPLETE CBC W/AUTO DIFF WBC: CPT

## 2019-08-15 PROCEDURE — 700102 HCHG RX REV CODE 250 W/ 637 OVERRIDE(OP): Performed by: PHYSICAL MEDICINE & REHABILITATION

## 2019-08-15 PROCEDURE — 700101 HCHG RX REV CODE 250: Performed by: PHYSICAL MEDICINE & REHABILITATION

## 2019-08-15 PROCEDURE — 97116 GAIT TRAINING THERAPY: CPT

## 2019-08-15 PROCEDURE — 97535 SELF CARE MNGMENT TRAINING: CPT

## 2019-08-15 PROCEDURE — 700112 HCHG RX REV CODE 229: Performed by: PHYSICAL MEDICINE & REHABILITATION

## 2019-08-15 PROCEDURE — A9270 NON-COVERED ITEM OR SERVICE: HCPCS | Performed by: HOSPITALIST

## 2019-08-15 PROCEDURE — 36415 COLL VENOUS BLD VENIPUNCTURE: CPT

## 2019-08-15 PROCEDURE — 99232 SBSQ HOSP IP/OBS MODERATE 35: CPT | Performed by: HOSPITALIST

## 2019-08-15 PROCEDURE — 97112 NEUROMUSCULAR REEDUCATION: CPT

## 2019-08-15 PROCEDURE — 700111 HCHG RX REV CODE 636 W/ 250 OVERRIDE (IP): Performed by: PHYSICAL MEDICINE & REHABILITATION

## 2019-08-15 PROCEDURE — 770010 HCHG ROOM/CARE - REHAB SEMI PRIVAT*

## 2019-08-15 PROCEDURE — 97110 THERAPEUTIC EXERCISES: CPT

## 2019-08-15 RX ADMIN — TERAZOSIN HYDROCHLORIDE 5 MG: 5 CAPSULE ORAL at 05:37

## 2019-08-15 RX ADMIN — SPIRONOLACTONE 25 MG: 25 TABLET ORAL at 08:29

## 2019-08-15 RX ADMIN — MAGNESIUM OXIDE TAB 400 MG (241.3 MG ELEMENTAL MG) 400 MG: 400 (241.3 MG) TAB at 08:29

## 2019-08-15 RX ADMIN — MAGNESIUM OXIDE TAB 400 MG (241.3 MG ELEMENTAL MG) 400 MG: 400 (241.3 MG) TAB at 11:39

## 2019-08-15 RX ADMIN — OXYCODONE HYDROCHLORIDE AND ACETAMINOPHEN 500 MG: 500 TABLET ORAL at 17:32

## 2019-08-15 RX ADMIN — LIDOCAINE 2 PATCH: 50 PATCH CUTANEOUS at 08:31

## 2019-08-15 RX ADMIN — FERROUS SULFATE TAB 325 MG (65 MG ELEMENTAL FE) 325 MG: 325 (65 FE) TAB at 08:29

## 2019-08-15 RX ADMIN — CYANOCOBALAMIN TAB 1000 MCG 1000 MCG: 1000 TAB at 08:28

## 2019-08-15 RX ADMIN — CALCIUM CARBONATE (ANTACID) CHEW TAB 500 MG 500 MG: 500 CHEW TAB at 08:28

## 2019-08-15 RX ADMIN — VERAPAMIL HYDROCHLORIDE 180 MG: 180 TABLET, FILM COATED, EXTENDED RELEASE ORAL at 08:27

## 2019-08-15 RX ADMIN — LISINOPRIL 40 MG: 20 TABLET ORAL at 08:29

## 2019-08-15 RX ADMIN — METFORMIN HYDROCHLORIDE 1000 MG: 500 TABLET, FILM COATED ORAL at 17:32

## 2019-08-15 RX ADMIN — TERAZOSIN HYDROCHLORIDE 5 MG: 5 CAPSULE ORAL at 17:31

## 2019-08-15 RX ADMIN — ATORVASTATIN CALCIUM 20 MG: 10 TABLET, FILM COATED ORAL at 20:00

## 2019-08-15 RX ADMIN — CALCIUM CARBONATE (ANTACID) CHEW TAB 500 MG 500 MG: 500 CHEW TAB at 20:00

## 2019-08-15 RX ADMIN — DOCUSATE SODIUM 100 MG: 100 CAPSULE, LIQUID FILLED ORAL at 08:28

## 2019-08-15 RX ADMIN — VERAPAMIL HYDROCHLORIDE 180 MG: 180 TABLET, FILM COATED, EXTENDED RELEASE ORAL at 20:00

## 2019-08-15 RX ADMIN — OXYCODONE HYDROCHLORIDE AND ACETAMINOPHEN 500 MG: 500 TABLET ORAL at 08:30

## 2019-08-15 RX ADMIN — METFORMIN HYDROCHLORIDE 1000 MG: 500 TABLET, FILM COATED ORAL at 08:29

## 2019-08-15 RX ADMIN — AMLODIPINE BESYLATE 10 MG: 5 TABLET ORAL at 08:28

## 2019-08-15 RX ADMIN — LORATADINE 10 MG: 10 TABLET ORAL at 08:29

## 2019-08-15 RX ADMIN — MULTIPLE VITAMINS W/ MINERALS TAB 1 TABLET: TAB at 11:39

## 2019-08-15 RX ADMIN — Medication 1 CAPSULE: at 08:28

## 2019-08-15 RX ADMIN — OMEPRAZOLE 20 MG: 20 CAPSULE, DELAYED RELEASE ORAL at 08:29

## 2019-08-15 RX ADMIN — FOLIC ACID 1 MG: 1 TABLET ORAL at 08:28

## 2019-08-15 RX ADMIN — ENOXAPARIN SODIUM 40 MG: 100 INJECTION SUBCUTANEOUS at 08:30

## 2019-08-15 RX ADMIN — SENNOSIDES 17.2 MG: 8.6 TABLET, FILM COATED ORAL at 20:00

## 2019-08-15 ASSESSMENT — ENCOUNTER SYMPTOMS
NERVOUS/ANXIOUS: 0
DIARRHEA: 0
BLURRED VISION: 0
DIZZINESS: 0
FEVER: 0
COUGH: 0

## 2019-08-15 NOTE — DISCHARGE PLANNING
DME referral sent to [facility].  Outpatient therapy referral sent to [facility] per choice form.  Awaiting responses.

## 2019-08-15 NOTE — PROGRESS NOTES
Received patient during shift change, report rec'd from day shift RN. Resting in bed, VS stable on room air. Continent of B&B, contact guard for transfers. A&O x 4, able to make needs known. Bed in low position, call light within reach.

## 2019-08-15 NOTE — THERAPY
"Recreational Therapy  Daily Treatment     Patient Name: Ian Laird  AGE:  69 y.o., SEX:  male  Medical Record #: [id number]  Today's Date: 8/15/2019       Subjective    \"I'm tired. I walked 20 feet.\"     Objective       08/15/19 1501   Functional Ability Status - Cognitive   Attention Span Remains on Task   Comprehension Follows Three Step Commands   Judgment Able to Make Independent Decisions   Functional Ability Status - Emotional    Affect Appropriate   Mood Appropriate   Behavior Appropriate   Skilled Intervention    Skilled Intervention Community Skills;Fine Motor Leisure   Skilled Intervention Comments Planned the outing. 35 piece puzzle.    Interdisciplinary Plan of Care Collaboration   IDT Collaboration with  Physical Therapist   Patient Position at End of Therapy In Bed;Tray Table within Reach;Call Light within Reach   Collaboration Comments Transfer of care from PT.    Treatment Time   Total Time Spent (mins) 30   Procedural Tracking   Procedural Tracking Cognitive Skills Training;Community Re-Integration       Assessment    He was able to put together a 35 piece puzzle without assistance in 20 minutes. Planned the outing for the following day and shared excitement for the outing.     Plan    Outing 8/16.    "

## 2019-08-15 NOTE — PROGRESS NOTES
Hospital Medicine Daily Progress Note    Date of Service  8/15/2019    Chief Complaint:  Hypertension  Diabetes  Hyponatremia    Interval History:  No significant events or changes since last visit    Review of Systems  Review of Systems   Constitutional: Negative for fever.   Eyes: Negative for blurred vision.   Respiratory: Negative for cough.    Cardiovascular: Negative for chest pain.   Gastrointestinal: Negative for diarrhea.   Musculoskeletal: Negative for joint pain.   Neurological: Negative for dizziness.   Psychiatric/Behavioral: The patient is not nervous/anxious.         Physical Exam  Temp:  [36.6 °C (97.9 °F)-37.1 °C (98.8 °F)] 37 °C (98.6 °F)  Pulse:  [72-80] 77  Resp:  [18] 18  BP: (111-152)/(50-74) 138/74  SpO2:  [93 %] 93 %    Physical Exam   Constitutional: No distress.   HENT:   Mouth/Throat: No oropharyngeal exudate.   Eyes: EOM are normal.   Neck: No JVD present. No tracheal deviation present.   Cardiovascular: Normal rate and regular rhythm.   No murmur heard.  Pulmonary/Chest: Effort normal and breath sounds normal.   Abdominal: Soft. Bowel sounds are normal.   Musculoskeletal: He exhibits no edema.   Skin: Skin is warm. No rash noted.   Psychiatric: His behavior is normal.   Nursing note and vitals reviewed.      Fluids    Intake/Output Summary (Last 24 hours) at 8/15/2019 1004  Last data filed at 8/14/2019 1900  Gross per 24 hour   Intake 450 ml   Output --   Net 450 ml       Laboratory  Recent Labs     08/15/19  0549   WBC 8.5   RBC 2.67*   HEMOGLOBIN 8.6*   HEMATOCRIT 26.7*   .0*   MCH 32.2   MCHC 32.2*   RDW 53.9*   PLATELETCT 237   MPV 9.4     Recent Labs     08/13/19  0617   SODIUM 138   POTASSIUM 4.2   CHLORIDE 102   CO2 25   GLUCOSE 155*   BUN 18   CREATININE 1.23   CALCIUM 9.6                       Assessment/Plan  * T5 spinal cord injury (HCC)- (present on admission)  Assessment & Plan  S/P surgical repair  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  Wound care    PVD (peripheral  vascular disease) (HCC)- (present on admission)  Assessment & Plan  Plavix on hold post-operatively    Essential hypertension- (present on admission)  Assessment & Plan  BP ok and better recently  On Norvasc: 10 mg daily  On Lisinopril: 40 mg daily  On Verapamil ER: 180 mg bid  Note: also on Aldactone  Note: off Midodrine  Cont to monitor    Type 2 diabetes mellitus without complication, without long-term current use of insulin (HCC)- (present on admission)  Assessment & Plan  Hba1c 6.8 ()  BS   On Metformin: 1000 mg bid  On accuchecks bid  Note: home meds include Metformin, Glipizide, and Januvia  Cont to monitor    Hypomagnesemia  Assessment & Plan  Currently resolved  M.7 ()  On supplements    BPH (benign prostatic hyperplasia)  Assessment & Plan  Monitor for orthostatic hypotension on Hytrin bid    Anemia- (present on admission)  Assessment & Plan  H&H: 8.2 () --> 8.6 (8/15)  Fe: < 10%  B12: 263  Folate: 8.8  S/P PRBC prior to Rehab transfer  2nd to Fe deficiency  ? Also 2nd to recent surgery and Plasmacytoma  S/P IV Fe daily x 4 doses  On oral Fe, B12, folate supplements  Monitor    Dyslipidemia- (present on admission)  Assessment & Plan  On Lipitor

## 2019-08-15 NOTE — THERAPY
"Physical Therapy   Daily Treatment     Patient Name: Ian Laird  Age:  69 y.o., Sex:  male  Medical Record #: [id number]  Today's Date: 8/15/2019     Precautions  Precautions: Fall Risk, Spinal / Back Precautions   Comments: T3-T7 spinal fusion    Subjective    Pt was sitting in w/c upon arrival and agreeable to tx. Pt stated he only slept one hour last night d/t loud roommate and very fatigued. Mildly limited by rib pain this session.     Objective       08/15/19 1401   Precautions   Precautions Fall Risk;Spinal / Back Precautions    Comments T3-T7 spinal fusion   Interdisciplinary Plan of Care Collaboration   Patient Position at End of Therapy Call Light within Reach;Tray Table within Reach;Phone within Reach;In Bed   PT Total Time Spent   PT Individual Total Time Spent (Mins) 60   PT Charge Group   PT Gait Training 2   PT Neuromuscular Re-Education / Balance 2     AMB in solostep with CGA   20ft x1 solostep, CGA, no AD  20ft x 1 quad cane CGA  40ft x 2 SPC CGA    Standing balance:  Eyes open on airex: 30sec x 3 stance with no UE support and CGA   3 x 10 each on airex with B UE support: B heel raises, B toe raises, marching  1 x5 STS with no AD  5 x 60sec stance with SPC    UE strengthening:  3 x 10 bicep curls R/L UE 3#  1 x 10 triceps R/ L UE      Assessment    Pt fatigued this session and with increased ataxia but improved standing balance and confidence in AMB with SPC this session.    Plan    4\" curb training with FWW, SPC in solostep vs no AD, standing balance, FWW for endurance, stair training with B HR    "

## 2019-08-15 NOTE — CARE PLAN
Problem: Safety  Goal: Will remain free from injury  Intervention: Educate patient and significant other/support system about adaptive mobility strategies and safe transfers  Note:   Using call light as needed for assist. Bed in low position, call light within reach.     Problem: Pain Management  Goal: Pain level will decrease to patient's comfort goal  Intervention: Follow pain managment plan developed in collaboration with patient and Interdisciplinary Team  Note:   No c/o pain, no request for PRN pain med at hs. Resting in bed, eyes closed, resp even, unlabored.

## 2019-08-15 NOTE — THERAPY
Speech Language Pathology  Daily Treatment     Patient Name: Ian Laird  Age:  69 y.o., Sex:  male  Medical Record #: [id number]  Today's Date: 8/15/2019     Subjective    Pt pleasant and cooperative.      Objective     08/15/19 0904   Cognition   Executive Functioning / Organization Minimal (4)   Interdisciplinary Plan of Care Collaboration   Patient Position at End of Therapy Seated   SLP Total Time Spent   SLP Individual Total Time Spent (Mins) 60   Charge Group   SLP Cognitive Skill Development 4           Assessment    Executive function tasks - pt required MIN A for set up, anticipating challenges and identifying solutions. Receipts activity - pt completed 4/8 questions (unable to complete due to time constraints) with 100% accuracy, requiring cues to chunk info into smaller units aid in ease of organization and processing.     Plan    Continue receipts activity

## 2019-08-15 NOTE — CARE PLAN
Problem: Safety  Goal: Will remain free from injury  Note:   Pt uses call light when assistance is needed or for transfers.      Problem: Bowel/Gastric:  Goal: Normal bowel function is maintained or improved  Note:   Patient is continent of bowels. Last BM was 8/15     Problem: Pain Management  Goal: Pain level will decrease to patient's comfort goal  Note:   Patient denies pain this shift.

## 2019-08-15 NOTE — PROGRESS NOTES
"Rehab Progress Note     Encounter Date: 8/15/2019    CC: LE weakness    Interval Events (Subjective)    Pain around the chest, ribs, band like sensation.  He reports this is intermittent, no radiation, no pins-and-needles down lower extremities, no alleviating or aggravating factors, started worsening approximately 3 days ago, not limiting any of his participation with therapy, he is not requesting any further medication.       Bowel: Medium black stool on 8/15, multiple black stools, he has had loose BM when weight lifting, no sig fatigue  Bladder: volitional voiding    ROS:  Gen: No fatigue, confusion, significant weight loss  Eyes: no blurry vision, double vision or pain in eyes  ENT: no changes in hearing, runny nose, nose bleeds, sinus pain  CV: No CP, palpitations,   Lungs: no shortness of breath, changes in secretions, changes in cough, pain with coughing  Abd: No abd pain, nausea, vomiting, pain with eating  : no blood in urine, pain during storage phase, bladder spasms, suprapubic pain  Ext: No swelling in legs, asymmetric atrophy  Neuro: no changes in strength or sensation in last 24 hours, was able to walk down the parallel bars with PT  Skin: no new ulcers/skin breakdown appreciated by patient  Mood: No changes in mood today, increase in depression or anxiety  Heme: no bruising, or bleeding  Rest of 14 point review of systems is negative      Objective:  Vitals: /74   Pulse 77   Temp 37 °C (98.6 °F) (Oral)   Resp 18   Ht 1.778 m (5' 10\")   Wt 93.4 kg (205 lb 12.8 oz)   SpO2 93%   Gen: NAD, Body mass index is 29.53 kg/m².  HEENT: NC/AT, PERRLA, moist mucous membranes  Cardio: RRR, no mumurs  Pulm: CTAB, with normal respiratory effort  Abd: Soft NTND, active bowel sounds  Ext: No peripheral edema. +dorsal pedalis pulses bilaterally.  TTP over the T5 dermatome       Recent Results (from the past 72 hour(s))   ACCU-CHEK GLUCOSE    Collection Time: 08/12/19  4:58 PM   Result Value Ref Range    " Glucose - Accu-Ck 101 (H) 65 - 99 mg/dL   Basic Metabolic Panel    Collection Time: 08/13/19  6:17 AM   Result Value Ref Range    Sodium 138 135 - 145 mmol/L    Potassium 4.2 3.6 - 5.5 mmol/L    Chloride 102 96 - 112 mmol/L    Co2 25 20 - 33 mmol/L    Glucose 155 (H) 65 - 99 mg/dL    Bun 18 8 - 22 mg/dL    Creatinine 1.23 0.50 - 1.40 mg/dL    Calcium 9.6 8.5 - 10.5 mg/dL    Anion Gap 11.0 0.0 - 11.9   MAGNESIUM    Collection Time: 08/13/19  6:17 AM   Result Value Ref Range    Magnesium 1.7 1.5 - 2.5 mg/dL   ESTIMATED GFR    Collection Time: 08/13/19  6:17 AM   Result Value Ref Range    GFR If African American >60 >60 mL/min/1.73 m 2    GFR If Non African American 58 (A) >60 mL/min/1.73 m 2   ACCU-CHEK GLUCOSE    Collection Time: 08/13/19  7:40 AM   Result Value Ref Range    Glucose - Accu-Ck 150 (H) 65 - 99 mg/dL   ACCU-CHEK GLUCOSE    Collection Time: 08/13/19  5:39 PM   Result Value Ref Range    Glucose - Accu-Ck 94 65 - 99 mg/dL   ACCU-CHEK GLUCOSE    Collection Time: 08/14/19  7:22 AM   Result Value Ref Range    Glucose - Accu-Ck 158 (H) 65 - 99 mg/dL   ACCU-CHEK GLUCOSE    Collection Time: 08/14/19  5:03 PM   Result Value Ref Range    Glucose - Accu-Ck 105 (H) 65 - 99 mg/dL   CBC WITH DIFFERENTIAL    Collection Time: 08/15/19  5:49 AM   Result Value Ref Range    WBC 8.5 4.8 - 10.8 K/uL    RBC 2.67 (L) 4.70 - 6.10 M/uL    Hemoglobin 8.6 (L) 14.0 - 18.0 g/dL    Hematocrit 26.7 (L) 42.0 - 52.0 %    .0 (H) 81.4 - 97.8 fL    MCH 32.2 27.0 - 33.0 pg    MCHC 32.2 (L) 33.7 - 35.3 g/dL    RDW 53.9 (H) 35.9 - 50.0 fL    Platelet Count 237 164 - 446 K/uL    MPV 9.4 9.0 - 12.9 fL    Neutrophils-Polys 63.50 44.00 - 72.00 %    Lymphocytes 20.10 (L) 22.00 - 41.00 %    Monocytes 10.30 0.00 - 13.40 %    Eosinophils 3.70 0.00 - 6.90 %    Basophils 0.20 0.00 - 1.80 %    Immature Granulocytes 2.20 (H) 0.00 - 0.90 %    Nucleated RBC 0.00 /100 WBC    Neutrophils (Absolute) 5.37 1.82 - 7.42 K/uL    Lymphs (Absolute) 1.70 1.00  - 4.80 K/uL    Monos (Absolute) 0.87 (H) 0.00 - 0.85 K/uL    Eos (Absolute) 0.31 0.00 - 0.51 K/uL    Baso (Absolute) 0.02 0.00 - 0.12 K/uL    Immature Granulocytes (abs) 0.19 (H) 0.00 - 0.11 K/uL    NRBC (Absolute) 0.00 K/uL   ACCU-CHEK GLUCOSE    Collection Time: 08/15/19  7:43 AM   Result Value Ref Range    Glucose - Accu-Ck 147 (H) 65 - 99 mg/dL       Current Facility-Administered Medications   Medication Frequency   • tramadol (ULTRAM) 50 MG tablet 50 mg TID PRN   • insulin lispro (HUMALOG) injection 2-12 Units BID WITH MEALS    And   • glucose 4 g chewable tablet 16 g Q15 MIN PRN    And   • dextrose 50% (D50W) injection 50 mL Q15 MIN PRN   • magnesium oxide (MAG-OX) tablet 400 mg TID WITH MEALS   • ferrous sulfate tablet 325 mg BID WITH MEALS   • ascorbic acid tablet 500 mg BID WITH MEALS   • docusate sodium (COLACE) capsule 100 mg BID   • sennosides (SENOKOT) 8.6 MG tablet 17.2 mg QHS   • lidocaine (XYLOCAINE) 2 % jelly PRN   • lactobacillus rhamnosus (CULTURELLE) capsule 1 Cap QDAY with Breakfast   • cyanocobalamin (VITAMIN B12) tablet 1,000 mcg DAILY   • folic acid (FOLVITE) tablet 1 mg DAILY   • midodrine (PROAMATINE) tablet 5 mg Q4HRS PRN   • enoxaparin (LOVENOX) inj 40 mg DAILY   • lidocaine (LIDODERM) 5 % 2 Patch Q24HR   • metFORMIN (GLUCOPHAGE) tablet 1,000 mg BID WITH MEALS   • amLODIPine (NORVASC) tablet 10 mg DAILY   • atorvastatin (LIPITOR) tablet 20 mg Nightly   • glucose 4 g chewable tablet 16 g PRN   • lisinopril (PRINIVIL) tablet 40 mg DAILY   • loratadine (CLARITIN) tablet 10 mg DAILY   • omeprazole (PRILOSEC) capsule 20 mg DAILY   • spironolactone (ALDACTONE) tablet 25 mg DAILY   • terazosin (HYTRIN) capsule 5 mg BID   • verapamil ER (CALAN-SR) tablet 180 mg BID   • Pharmacy Consult Request ...Pain Management Review 1 Each PHARMACY TO DOSE   • Respiratory Care per Protocol Continuous RT   • acetaminophen (TYLENOL) tablet 650 mg Q4HRS PRN   • therapeutic multivitamin-minerals (THERAGRAN-M)  tablet 1 Tab DAILY WITH LUNCH   • artificial tears 1.4 % ophthalmic solution 1 Drop PRN   • benzocaine-menthol (CEPACOL) lozenge 1 Lozenge Q2HRS PRN   • mag hydrox-al hydrox-simeth (MAALOX PLUS ES or MYLANTA DS) suspension 20 mL Q2HRS PRN   • ondansetron (ZOFRAN ODT) dispertab 4 mg 4X/DAY PRN    Or   • ondansetron (ZOFRAN) syringe/vial injection 4 mg 4X/DAY PRN   • traZODone (DESYREL) tablet 50 mg QHS PRN   • sodium chloride (OCEAN) 0.65 % nasal spray 2 Spray PRN   • calcium carbonate (TUMS) chewable tab 500 mg BID       Orders Placed This Encounter   Procedures   • Diet Order Diabetic     Standing Status:   Standing     Number of Occurrences:   1     Order Specific Question:   Diet:     Answer:   Diabetic [3]       Assessment:  Active Hospital Problems    Diagnosis   • *T5 spinal cord injury (HCC)   • PVD (peripheral vascular disease) (HCC)   • Type 2 diabetes mellitus without complication, without long-term current use of insulin (HCC)   • Essential hypertension   • Dyslipidemia   • Obesity   • Hypomagnesemia   • Sepsis secondary to UTI (HCC)   • Chest pain   • Abdominal distension   • BPH (benign prostatic hyperplasia)   • History of melanoma   • Neuropathic pain   • Neurogenic bladder   • Neurogenic bowel   • Hyponatremia   • 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HCC)   • Acute kidney injury (HCC)   • Anemia       Medical Decision Making and Plan:  T5 AIS D spinal cord injury: Status post T4-6 laminectomy and T3-7 posterior spinal fusion with resection of mass by Dr. Velasco on 7/20, preliminary pathology suggest plasmablastic plasmacytoma, significant improvement in lower extremity strength since surgical resection.  -No brace at this time  -Continue comprehensive acute inpatient rehabilitation     Plasmablastic plasmacytoma: Primary mass  - Follow-up with oncology and radiation oncology as outpatient     Leukocytosis: Resolved, Likely from pyelonephritis, WBC elevated to 20.2, with elevated neutrophils/bands, improved to 7.6  today after 3/7 days of antibiotics     Chest pain: Appreciate hospitalist input, rapid response called this morning  -EKG done, no signs of ST elevation     Neurogenic orthostatic hypotension: Systolic blood pressures into the 80s, symptoms of dizziness, lightheadedness  - Scheduled midodrine was DC'd without symptoms of dizziness when changing position  -Continue as needed prior to standing    Neuropathic pain: Lower extremity pins-and-needles on admission  -Gabapentin 600 mg 3 times daily, decrease dose to gabapentin 300 mg 3 times daily, DC gabapentin on 8/14, patient is not interested in restarting gabapentin at this time  - He originally required tramadol 50 mg 3 times daily to participate fully with therapy, this was DC'd without difficulty during acute rehabilitation hospitalization.     Nociceptive pain: Acute postoperative pain, site of incision  - changed to prn Tramadol 50 mg 3 times daily  - Oxycodone 5 mg - 10 mg as needed every 3 hours  -Robaxin 750 mg 4 times a day, discontinue Robaxin 8/5  -Lidocaine patch x2 to either side of incision     Acute kidney injury: Creatinine elevated up to 1.26 on 7/22  -Creatinine 1.02 on admission, improved to 0.92     Blood loss anemia: Hemoglobin on 7/23 of 6.7, status post 1 unit packed red blood cell transfusion, hemoglobin of 8.0 on 7/20  - Plan for transfusion if hemoglobin is less than 7.0  -Holding Plavix which was originally started for peripheral vascular disease  -Holding Lovenox secondary to bleeding on 7/22  -Restarted Lovenox 40 mg daily  - Hemoglobin on 815 8.6     Hyponatremia: Sodium on admission of 130 improved to 132  -Consulted hospitalist     Hypertension: Consulted hospitalist, under good control with systolic blood pressure in 120s  -Norvasc 10 mg daily  -Lisinopril 40 mg daily  -Spironolactone 25 mg daily  - Terazosin 5 mg twice daily  -Verapamil 80 mg twice daily     Type 2 diabetes:   -Metformin 1000 mg bid  -Lantus 10 units every  morning  -Insulin sliding scale, moderate  - Consulted hospitalist     BPH: Having increased difficulty with urination, likely secondary to neurogenic bladder  -Terazosin 5 mg twice daily, can be causing worsening orthostatic hypotension     Anxiety/adjustment disorder:  -Cymbalta 30 mg daily, this will also aid and neuropathic pain, mood appears to be stable, DC Cymbalta at this time  -Consult psychologist     Neurogenic bladder: Requiring CIC, question if caused by urinary tract infection  -  voiding trial  - lidocaine 2%, urojet q4 hours with CIC     Neurogenic bowel: Changed bowel program to every morning  -Was able to have consistent BMs without suppository or digital stimulation, continue senna night and Colace 100 mg twice daily  - DC Colace, DC magnesium oxide    Vitamin D deficiency: Vitamin D level on admission of 65, toxic level  - Discontinue cholecalciferol     DVT prophylaxis: Lovenox held secondary to bleeding at site of incision.  -SCDs when in bed, discontinue SCDs  -Started Lovenox 40 mg daily    Patient was seen for 28 minutes on unit/floor of which > 50% of time was spent on counseling and coordination of care regarding the above, including prognosis, risk reduction, benefits of treatment, and options for next stage of care including neuropathic pain management, presence of level neuropathic pain after DC of gabapentin, management of bowel incontinence, neurogenic bowel with DC of Colace and magnesium oxide, blood loss anemia with DC of iron.      Adelfo Aquino D.O.

## 2019-08-15 NOTE — THERAPY
"Occupational Therapy  Daily Treatment     Patient Name: Ian Laird  Age:  69 y.o., Sex:  male  Medical Record #: [id number]  Today's Date: 8/15/2019     Precautions  Precautions: (P) Fall Risk, Spinal / Back Precautions   Comments: T3-T7 spinal fusion    Safety   ADL Safety : Requires Supervision for Safety, Requires Physical Assist for Safety  Bathroom Safety: (P) Modified Independent    Subjective    \"I really appreciate what you've done for me\" - pt expressing gratitude during last session with main OT     Objective       08/15/19 1301   Precautions   Precautions Fall Risk;Spinal / Back Precautions    Safety    Bathroom Safety Modified Independent   Sitting Lower Body Exercises   Nustep Resistance Level 4  (10 mins with 2 rest breaks)   Interdisciplinary Plan of Care Collaboration   IDT Collaboration with  Nursing  (lidocaine back patches applied)   Patient Position at End of Therapy Seated  (in gym for PT session)       See FIMs for shower transfer and dressing during session.    Assessment    Pt completed showering with good speed, and v.c.'s only to remind pt. to lock w/c. Pt discussed safety ambulating around home property on unstable surfaces and pt. Priority areas in outpatient pt/ot after discharge from [facility].     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     "

## 2019-08-16 LAB
GLUCOSE BLD-MCNC: 158 MG/DL (ref 65–99)
GLUCOSE BLD-MCNC: 99 MG/DL (ref 65–99)

## 2019-08-16 PROCEDURE — A9270 NON-COVERED ITEM OR SERVICE: HCPCS | Performed by: HOSPITALIST

## 2019-08-16 PROCEDURE — 82962 GLUCOSE BLOOD TEST: CPT | Mod: 91

## 2019-08-16 PROCEDURE — 99232 SBSQ HOSP IP/OBS MODERATE 35: CPT | Performed by: HOSPITALIST

## 2019-08-16 PROCEDURE — 700111 HCHG RX REV CODE 636 W/ 250 OVERRIDE (IP): Performed by: PHYSICAL MEDICINE & REHABILITATION

## 2019-08-16 PROCEDURE — 97116 GAIT TRAINING THERAPY: CPT

## 2019-08-16 PROCEDURE — A9270 NON-COVERED ITEM OR SERVICE: HCPCS | Performed by: PHYSICAL MEDICINE & REHABILITATION

## 2019-08-16 PROCEDURE — G0515 COGNITIVE SKILLS DEVELOPMENT: HCPCS

## 2019-08-16 PROCEDURE — 700102 HCHG RX REV CODE 250 W/ 637 OVERRIDE(OP): Performed by: PHYSICAL MEDICINE & REHABILITATION

## 2019-08-16 PROCEDURE — 700102 HCHG RX REV CODE 250 W/ 637 OVERRIDE(OP): Performed by: HOSPITALIST

## 2019-08-16 PROCEDURE — 97530 THERAPEUTIC ACTIVITIES: CPT

## 2019-08-16 PROCEDURE — 770010 HCHG ROOM/CARE - REHAB SEMI PRIVAT*

## 2019-08-16 PROCEDURE — 700101 HCHG RX REV CODE 250: Performed by: PHYSICAL MEDICINE & REHABILITATION

## 2019-08-16 PROCEDURE — 99232 SBSQ HOSP IP/OBS MODERATE 35: CPT | Performed by: PHYSICAL MEDICINE & REHABILITATION

## 2019-08-16 RX ADMIN — FOLIC ACID 1 MG: 1 TABLET ORAL at 08:13

## 2019-08-16 RX ADMIN — CYANOCOBALAMIN TAB 1000 MCG 1000 MCG: 1000 TAB at 08:12

## 2019-08-16 RX ADMIN — LORATADINE 10 MG: 10 TABLET ORAL at 08:12

## 2019-08-16 RX ADMIN — VERAPAMIL HYDROCHLORIDE 180 MG: 180 TABLET, FILM COATED, EXTENDED RELEASE ORAL at 08:13

## 2019-08-16 RX ADMIN — ENOXAPARIN SODIUM 40 MG: 100 INJECTION SUBCUTANEOUS at 08:17

## 2019-08-16 RX ADMIN — LIDOCAINE 2 PATCH: 50 PATCH CUTANEOUS at 09:00

## 2019-08-16 RX ADMIN — SPIRONOLACTONE 25 MG: 25 TABLET ORAL at 08:13

## 2019-08-16 RX ADMIN — TERAZOSIN HYDROCHLORIDE 5 MG: 5 CAPSULE ORAL at 05:02

## 2019-08-16 RX ADMIN — OMEPRAZOLE 20 MG: 20 CAPSULE, DELAYED RELEASE ORAL at 08:13

## 2019-08-16 RX ADMIN — METFORMIN HYDROCHLORIDE 1000 MG: 500 TABLET, FILM COATED ORAL at 08:12

## 2019-08-16 RX ADMIN — Medication 1 CAPSULE: at 08:12

## 2019-08-16 RX ADMIN — INSULIN LISPRO 2 UNITS: 100 INJECTION, SOLUTION INTRAVENOUS; SUBCUTANEOUS at 08:18

## 2019-08-16 RX ADMIN — TRAZODONE HYDROCHLORIDE 50 MG: 50 TABLET ORAL at 20:04

## 2019-08-16 RX ADMIN — OXYCODONE HYDROCHLORIDE AND ACETAMINOPHEN 500 MG: 500 TABLET ORAL at 08:12

## 2019-08-16 RX ADMIN — TERAZOSIN HYDROCHLORIDE 5 MG: 5 CAPSULE ORAL at 17:30

## 2019-08-16 RX ADMIN — MULTIPLE VITAMINS W/ MINERALS TAB 1 TABLET: TAB at 11:48

## 2019-08-16 RX ADMIN — LISINOPRIL 40 MG: 20 TABLET ORAL at 08:13

## 2019-08-16 RX ADMIN — CALCIUM CARBONATE (ANTACID) CHEW TAB 500 MG 500 MG: 500 CHEW TAB at 08:12

## 2019-08-16 RX ADMIN — ATORVASTATIN CALCIUM 20 MG: 10 TABLET, FILM COATED ORAL at 20:04

## 2019-08-16 RX ADMIN — METFORMIN HYDROCHLORIDE 1000 MG: 500 TABLET, FILM COATED ORAL at 17:30

## 2019-08-16 RX ADMIN — AMLODIPINE BESYLATE 10 MG: 5 TABLET ORAL at 08:12

## 2019-08-16 RX ADMIN — VERAPAMIL HYDROCHLORIDE 180 MG: 180 TABLET, FILM COATED, EXTENDED RELEASE ORAL at 20:04

## 2019-08-16 RX ADMIN — SENNOSIDES 17.2 MG: 8.6 TABLET, FILM COATED ORAL at 20:04

## 2019-08-16 ASSESSMENT — ENCOUNTER SYMPTOMS
DIARRHEA: 0
FEVER: 0
VOMITING: 0
CHILLS: 0
ABDOMINAL PAIN: 0
NERVOUS/ANXIOUS: 0
SHORTNESS OF BREATH: 0
NAUSEA: 0

## 2019-08-16 NOTE — CARE PLAN
Problem: Safety  Goal: Will remain free from injury  Note:   Patient educated on importance of utilizing call light to minimize the potential of injury from falls. Patient verbalizes understanding.       Problem: Knowledge Deficit  Goal: Knowledge of disease process/condition, treatment plan, diagnostic tests, and medications will improve  Note:   Patient encouraged to deep breathe and cough periodically to minimize the potential for secretions to collect in the lungs. Patient verbalized understanding and demonstrates while this writer is present.

## 2019-08-16 NOTE — THERAPY
Speech Language Pathology  Daily Treatment     Patient Name: Ian Laird  Age:  69 y.o., Sex:  male  Medical Record #: [id number]  Today's Date: 8/16/2019     Subjective    Pt pleasant and cooperative.      Objective     08/16/19 0834   Cognition   Executive Functioning / Organization Supervision (5)   Interdisciplinary Plan of Care Collaboration   Patient Position at End of Therapy Seated;Call Light within Reach   SLP Total Time Spent   SLP Individual Total Time Spent (Mins) 30   Charge Group   SLP Cognitive Skill Development 2       Assessment    Continued receipts activity - pt completed 2/4 questions with 100% accuracy given SPV. Pt independently implementing strategies from prior session to break info into smaller units. Pt continues to require extra time and was unable to complete remaining 2 questions.     Plan    Continue receipts activity for remaining 2 questions, complete fxl checkbook math task

## 2019-08-16 NOTE — PROGRESS NOTES
"Rehab Progress Note     Encounter Date: 8/16/2019    CC: LE weakness    Interval Events (Subjective)    He reports he was able to walk approximately 60 feet without assistive device yesterday, no episodes of falling    He is looking forward to discharging home    He reports continued bandlike sensation around to the ribs and abdomen, not preventing him from participating therapy, he is not interested in increasing dose of gabapentin    Bowel: Large formed BM this morning no digital stimulation no incontinence, no episodes of black stool since 8/15  Bladder: PVR of 11, no incontinence    ROS:  Gen: No fatigue, confusion, significant weight loss  Eyes: no blurry vision, double vision or pain in eyes  ENT: no changes in hearing, runny nose, nose bleeds, sinus pain  CV: No CP, palpitations,  Lungs: no shortness of breath, changes in secretions, changes in cough, pain with coughing  Abd: No abd pain, nausea, vomiting, pain with eating  : no blood in urine,  suprapubic pain  Ext: No swelling in legs, asymmetric atrophy  Neuro: Improved strength and coordination  Skin: no new ulcers/skin breakdown appreciated by patient  Mood: No changes in mood today, increase in depression or anxiety  Heme: no bruising, or bleeding  Rest of 14 point review of systems is negative      Objective:  Vitals: /70   Pulse 73   Temp 36.7 °C (98.1 °F) (Temporal)   Resp 18   Ht 1.778 m (5' 10\")   Wt 93.4 kg (205 lb 12.8 oz)   SpO2 93%   Gen: NAD, Body mass index is 29.53 kg/m².  HEENT: NC/AT, PERRLA, moist mucous membranes  Cardio: RRR, no mumurs  Pulm: CTAB, with normal respiratory effort  Abd: Soft NTND, active bowel sounds,   Ext: No peripheral edema. No calf tenderness. No clubbing/cyanosis. +dorsal pedalis pulses bilaterally.    MMT: UE 5/5 in all upper extremity myotomes    Motor Exam Lower Extremities    ? Myotome R L   Hip flexion L2 4 4   Knee extension L3 4 4   Ankle dorsiflexion L4 4 4   Toe extension L5 4 4   Ankle " plantarflexion S1 4 4           Recent Results (from the past 72 hour(s))   ACCU-CHEK GLUCOSE    Collection Time: 08/13/19  5:39 PM   Result Value Ref Range    Glucose - Accu-Ck 94 65 - 99 mg/dL   ACCU-CHEK GLUCOSE    Collection Time: 08/14/19  7:22 AM   Result Value Ref Range    Glucose - Accu-Ck 158 (H) 65 - 99 mg/dL   ACCU-CHEK GLUCOSE    Collection Time: 08/14/19  5:03 PM   Result Value Ref Range    Glucose - Accu-Ck 105 (H) 65 - 99 mg/dL   CBC WITH DIFFERENTIAL    Collection Time: 08/15/19  5:49 AM   Result Value Ref Range    WBC 8.5 4.8 - 10.8 K/uL    RBC 2.67 (L) 4.70 - 6.10 M/uL    Hemoglobin 8.6 (L) 14.0 - 18.0 g/dL    Hematocrit 26.7 (L) 42.0 - 52.0 %    .0 (H) 81.4 - 97.8 fL    MCH 32.2 27.0 - 33.0 pg    MCHC 32.2 (L) 33.7 - 35.3 g/dL    RDW 53.9 (H) 35.9 - 50.0 fL    Platelet Count 237 164 - 446 K/uL    MPV 9.4 9.0 - 12.9 fL    Neutrophils-Polys 63.50 44.00 - 72.00 %    Lymphocytes 20.10 (L) 22.00 - 41.00 %    Monocytes 10.30 0.00 - 13.40 %    Eosinophils 3.70 0.00 - 6.90 %    Basophils 0.20 0.00 - 1.80 %    Immature Granulocytes 2.20 (H) 0.00 - 0.90 %    Nucleated RBC 0.00 /100 WBC    Neutrophils (Absolute) 5.37 1.82 - 7.42 K/uL    Lymphs (Absolute) 1.70 1.00 - 4.80 K/uL    Monos (Absolute) 0.87 (H) 0.00 - 0.85 K/uL    Eos (Absolute) 0.31 0.00 - 0.51 K/uL    Baso (Absolute) 0.02 0.00 - 0.12 K/uL    Immature Granulocytes (abs) 0.19 (H) 0.00 - 0.11 K/uL    NRBC (Absolute) 0.00 K/uL   ACCU-CHEK GLUCOSE    Collection Time: 08/15/19  7:43 AM   Result Value Ref Range    Glucose - Accu-Ck 147 (H) 65 - 99 mg/dL   ACCU-CHEK GLUCOSE    Collection Time: 08/15/19  5:02 PM   Result Value Ref Range    Glucose - Accu-Ck 112 (H) 65 - 99 mg/dL   ACCU-CHEK GLUCOSE    Collection Time: 08/16/19  8:11 AM   Result Value Ref Range    Glucose - Accu-Ck 158 (H) 65 - 99 mg/dL       Current Facility-Administered Medications   Medication Frequency   • tramadol (ULTRAM) 50 MG tablet 50 mg TID PRN   • insulin lispro (HUMALOG)  injection 2-12 Units BID WITH MEALS    And   • glucose 4 g chewable tablet 16 g Q15 MIN PRN    And   • dextrose 50% (D50W) injection 50 mL Q15 MIN PRN   • ascorbic acid tablet 500 mg BID WITH MEALS   • sennosides (SENOKOT) 8.6 MG tablet 17.2 mg QHS   • lidocaine (XYLOCAINE) 2 % jelly PRN   • lactobacillus rhamnosus (CULTURELLE) capsule 1 Cap QDAY with Breakfast   • cyanocobalamin (VITAMIN B12) tablet 1,000 mcg DAILY   • folic acid (FOLVITE) tablet 1 mg DAILY   • midodrine (PROAMATINE) tablet 5 mg Q4HRS PRN   • enoxaparin (LOVENOX) inj 40 mg DAILY   • lidocaine (LIDODERM) 5 % 2 Patch Q24HR   • metFORMIN (GLUCOPHAGE) tablet 1,000 mg BID WITH MEALS   • amLODIPine (NORVASC) tablet 10 mg DAILY   • atorvastatin (LIPITOR) tablet 20 mg Nightly   • glucose 4 g chewable tablet 16 g PRN   • lisinopril (PRINIVIL) tablet 40 mg DAILY   • loratadine (CLARITIN) tablet 10 mg DAILY   • omeprazole (PRILOSEC) capsule 20 mg DAILY   • spironolactone (ALDACTONE) tablet 25 mg DAILY   • terazosin (HYTRIN) capsule 5 mg BID   • verapamil ER (CALAN-SR) tablet 180 mg BID   • Pharmacy Consult Request ...Pain Management Review 1 Each PHARMACY TO DOSE   • Respiratory Care per Protocol Continuous RT   • acetaminophen (TYLENOL) tablet 650 mg Q4HRS PRN   • therapeutic multivitamin-minerals (THERAGRAN-M) tablet 1 Tab DAILY WITH LUNCH   • artificial tears 1.4 % ophthalmic solution 1 Drop PRN   • benzocaine-menthol (CEPACOL) lozenge 1 Lozenge Q2HRS PRN   • mag hydrox-al hydrox-simeth (MAALOX PLUS ES or MYLANTA DS) suspension 20 mL Q2HRS PRN   • ondansetron (ZOFRAN ODT) dispertab 4 mg 4X/DAY PRN    Or   • ondansetron (ZOFRAN) syringe/vial injection 4 mg 4X/DAY PRN   • traZODone (DESYREL) tablet 50 mg QHS PRN   • sodium chloride (OCEAN) 0.65 % nasal spray 2 Spray PRN   • calcium carbonate (TUMS) chewable tab 500 mg BID       Orders Placed This Encounter   Procedures   • Diet Order Diabetic     Standing Status:   Standing     Number of Occurrences:   1      Order Specific Question:   Diet:     Answer:   Diabetic [3]       Assessment:  Active Hospital Problems    Diagnosis   • *T5 spinal cord injury (HCC)   • PVD (peripheral vascular disease) (HCC)   • Type 2 diabetes mellitus without complication, without long-term current use of insulin (HCC)   • Essential hypertension   • Dyslipidemia   • Obesity   • Hypomagnesemia   • Sepsis secondary to UTI (HCC)   • Chest pain   • Abdominal distension   • BPH (benign prostatic hyperplasia)   • History of melanoma   • Neuropathic pain   • Neurogenic bladder   • Neurogenic bowel   • Hyponatremia   • 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HCC)   • Acute kidney injury (HCC)   • Anemia       Medical Decision Making and Plan:  T5 AIS D spinal cord injury: Status post T4-6 laminectomy and T3-7 posterior spinal fusion with resection of mass by Dr. Velasco on 7/20, preliminary pathology suggest plasmablastic plasmacytoma, significant improvement in lower extremity strength since surgical resection.  -No brace at this time  -Continue comprehensive acute inpatient rehabilitation     Plasmablastic plasmacytoma: Primary mass  - Follow-up with oncology and radiation oncology as outpatient     Leukocytosis: Resolved, Likely from pyelonephritis, WBC elevated to 20.2, with elevated neutrophils/bands, improved to 7.6 today after 3/7 days of antibiotics     Chest pain: Appreciate hospitalist input, rapid response called this morning  -EKG done, no signs of ST elevation     Neurogenic orthostatic hypotension: Systolic blood pressures into the 80s, symptoms of dizziness, lightheadedness  - Scheduled midodrine was DC'd without symptoms of dizziness when changing position, has not been using Midodrine prior to standing  -DC Midodrine    Neuropathic pain: Lower extremity pins-and-needles on admission  -Gabapentin 600 mg 3 times daily, decrease dose to gabapentin 300 mg 3 times daily, DC gabapentin on 8/14, patient is not interested in restarting gabapentin at this time  -  He originally required tramadol 50 mg 3 times daily to participate fully with therapy, this was DC'd without difficulty during acute rehabilitation hospitalization.     Nociceptive pain: Acute postoperative pain, site of incision  - DC Tramadol 50 mg 3 times daily  - Oxycodone 5 mg - 10 mg as needed every 3 hours  -Robaxin 750 mg 4 times a day, discontinue Robaxin 8/5  -Lidocaine patch x2 to either side of incision     Acute kidney injury: Creatinine elevated up to 1.26 on 7/22  -Creatinine 1.02 on admission, improved to 0.92     Blood loss anemia: Hemoglobin on 7/23 of 6.7, status post 1 unit packed red blood cell transfusion, hemoglobin of 8.0 on 7/20  - Plan for transfusion if hemoglobin is less than 7.0  -Holding Plavix which was originally started for peripheral vascular disease  -Holding Lovenox secondary to bleeding on 7/22  -Restarted Lovenox 40 mg daily  - Hemoglobin on 8/15- 8.6     Hyponatremia: Sodium on admission of 130 improved to 132  -Consulted hospitalist     Hypertension: Consulted hospitalist, under good control with systolic blood pressure in 120s  -Norvasc 10 mg daily  -Lisinopril 40 mg daily  -Spironolactone 25 mg daily  - Terazosin 5 mg twice daily  -Verapamil 80 mg twice daily     Type 2 diabetes: Concerned about his current diet receiving full plate of fruit in the morning  -Metformin 1000 mg bid  -Lantus 10 units every morning  -Insulin sliding scale, moderate  - Consulted hospitalist     BPH: Having increased difficulty with urination, likely secondary to neurogenic bladder  -Terazosin 5 mg twice daily, can be causing worsening orthostatic hypotension     Anxiety/adjustment disorder:  -Cymbalta 30 mg daily, this will also aid and neuropathic pain, mood appears to be stable, DC Cymbalta at this time  -Consult psychologist     Neurogenic bladder: Requiring CIC, question if caused by urinary tract infection  -Successful voiding trial, continue volitional voiding  - lidocaine 2%, urojet q4  hours with CIC     Neurogenic bowel: Changed bowel program to every morning  -Was able to have consistent BMs without suppository or digital stimulation, continue senna night and Colace 100 mg twice daily  - DC Colace, DC magnesium oxide    Vitamin D deficiency: Vitamin D level on admission of 65, toxic level  - Discontinue cholecalciferol     DVT prophylaxis: Lovenox held secondary to bleeding at site of incision.  -SCDs when in bed, discontinue SCDs  -Lovenox 40 mg daily, continue for total of 8 weeks    Patient was seen for 26 minutes on unit/floor of which > 50% of time was spent on counseling and coordination of care regarding the above, including prognosis, risk reduction, benefits of treatment, and options for next stage of care including neuropathic pain management with discussion of gabapentin in the future, DC of ascorbic acid, DC of Midodrine,.      Adelfo Aquino D.O.

## 2019-08-16 NOTE — THERAPY
Physical Therapy   Daily Treatment     Patient Name: Ian Laird  Age:  69 y.o., Sex:  male  Medical Record #: [id number]  Today's Date: 8/16/2019     Precautions  Precautions: Fall Risk, Spinal / Back Precautions   Comments: T3-T7 spinal fusion    Subjective    Pt was sitting in w/c upon arrival and agreeable to community outing; nursing notified.      Objective       08/16/19 1001   Precautions   Precautions Fall Risk;Spinal / Back Precautions    Comments T3-T7 spinal fusion   Interdisciplinary Plan of Care Collaboration   Patient Position at End of Therapy Seated;Call Light within Reach;Tray Table within Reach;Phone within Reach   PT Total Time Spent   PT Individual Total Time Spent (Mins) 90   PT Charge Group   PT Gait Training 2   PT Therapeutic Activities 4       Pt seen for community outing to practice w/c mobility and AMB in community reintegration including self identifying RPE / pacing/ appropriate places for rest break, uneven terrain, curb cut outs, threshold holds, curbs, transfer into w/c accessible van, w/c safety, advocating for assistance in public setting.  AMB 40ft x 1, 55ft x 1, 30ft x4 with FWW on uneven terrain with CGA    3 x 2-3min x 8 during standing corn hold activity to FWW in grass.     Discussion of home accessibility using pictures and measurements for home; review of appropriate technique to access be in bedroom, self pacing at home, strategies to reduce risk for fall and bed/ bathroom transfers    Assessment    Pt with improved insight into self pacing strategies but continues to be significantly limited by endurance     Plan    Collect d/c FIMs, IRF ZEUS and complete d/c on Sunday to prepare for d/c home on Monday. (pt has complete a car transfer with wife with [facility] and they have been trained on standing therex program, passport has been completed, pressure relief cushion has been ordered)

## 2019-08-16 NOTE — THERAPY
Speech Language Pathology  Daily Treatment     Patient Name: Ian Laird  Age:  69 y.o., Sex:  male  Medical Record #: [id number]  Today's Date: 8/16/2019     Subjective    Pt was pleasant and cooperative during this ST session      Objective       08/16/19 1431   SLP Total Time Spent   SLP Individual Total Time Spent (Mins) 30   Charge Group   SLP Cognitive Skill Development 2       Assessment    Pt continued a problem solving task from his earlier ST session and required mod A for problem solving to answer 1/2 questions correctly.      Plan    Continue targeting attention and executive functions

## 2019-08-16 NOTE — PROGRESS NOTES
Hospital Medicine Daily Progress Note    Date of Service  8/16/2019    Chief Complaint:  Hypertension  Diabetes  Hyponatremia    Interval History:  No significant events or changes since last visit    Review of Systems  Review of Systems   Constitutional: Negative for chills and fever.   Respiratory: Negative for shortness of breath.    Cardiovascular: Negative for chest pain.   Gastrointestinal: Negative for abdominal pain, diarrhea, nausea and vomiting.   Psychiatric/Behavioral: The patient is not nervous/anxious.         Physical Exam  Temp:  [36.7 °C (98.1 °F)] 36.7 °C (98.1 °F)  Pulse:  [71-73] 73  Resp:  [18] 18  BP: (140-151)/(70-72) 140/70    Physical Exam   Constitutional: He appears well-nourished.   HENT:   Head: Atraumatic.   Eyes: Pupils are equal, round, and reactive to light. Conjunctivae are normal.   Neck: Normal range of motion. Neck supple.   Cardiovascular: Normal rate and regular rhythm.   Pulmonary/Chest: Effort normal and breath sounds normal.   Abdominal: Soft. Bowel sounds are normal.   Skin: Skin is warm.   Psychiatric: His behavior is normal.   Nursing note and vitals reviewed.      Fluids    Intake/Output Summary (Last 24 hours) at 8/16/2019 1045  Last data filed at 8/16/2019 0833  Gross per 24 hour   Intake 1180 ml   Output --   Net 1180 ml       Laboratory  Recent Labs     08/15/19  0549   WBC 8.5   RBC 2.67*   HEMOGLOBIN 8.6*   HEMATOCRIT 26.7*   .0*   MCH 32.2   MCHC 32.2*   RDW 53.9*   PLATELETCT 237   MPV 9.4                           Assessment/Plan  * T5 spinal cord injury (HCC)- (present on admission)  Assessment & Plan  S/P surgical repair  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  Wound care    PVD (peripheral vascular disease) (HCC)- (present on admission)  Assessment & Plan  Plavix on hold post-operatively    Essential hypertension- (present on admission)  Assessment & Plan  BP a little labile and occ rises up  On Norvasc: 10 mg daily  On Lisinopril: 40 mg daily  On Verapamil  ER: 180 mg bid  Note: also on Aldactone  Note: off Midodrine  Cont to monitor for now    Type 2 diabetes mellitus without complication, without long-term current use of insulin (HCC)- (present on admission)  Assessment & Plan  Hba1c 6.8 ()  BS   On Metformin: 1000 mg bid  On accuchecks bid  Note: home meds include Metformin, Glipizide, and Januvia  Cont to monitor    Hypomagnesemia  Assessment & Plan  Currently resolved  M.7 ()  On supplements    BPH (benign prostatic hyperplasia)  Assessment & Plan  Monitor for orthostatic hypotension on Hytrin bid    Anemia- (present on admission)  Assessment & Plan  H&H: 8.2 () --> 8.6 (8/15)  Fe: < 10%  B12: 263  Folate: 8.8  S/P PRBC prior to Rehab transfer  2nd to Fe deficiency  ? Also 2nd to recent surgery and Plasmacytoma  S/P IV Fe daily x 4 doses  On oral Fe, B12, folate supplements  Monitor    Dyslipidemia- (present on admission)  Assessment & Plan  On Lipitor

## 2019-08-16 NOTE — THERAPY
"Occupational Therapy  Daily Treatment     Patient Name: Ian Laird  Age:  69 y.o., Sex:  male  Medical Record #: [id number]  Today's Date: 8/16/2019     Precautions  Precautions: (P) Fall Risk, Spinal / Back Precautions   Comments: (P) T3-T7 spinal fusion    Safety   ADL Safety : Requires Supervision for Safety, Requires Physical Assist for Safety  Bathroom Safety: Modified Independent    Subjective    \"Next time I have back surgery if I'm back here ill show you how to cook eggs\" - Pt. Using humor and enjoying cooking task     Objective       08/16/19 0831   Precautions   Precautions Fall Risk;Spinal / Back Precautions    Comments T3-T7 spinal fusion   IADL Treatments   Kitchen Mobility Education safe w/c mobility in kitchen, use of grabber, safety around hot oven   Meal Preparation 1 tray brownies requiring measuring mixing using timer and safe handling   Interdisciplinary Plan of Care Collaboration   Patient Position at End of Therapy Seated;Other (Comments)  (cafeteria for breakfast)     FIM Grooming Score:  7 - Independent  Grooming Description:  (brushed teeth seated in w/c at sink side)    Assessment    Pt still requires v.c.s to lock w/c at beginning of session, demos good carryover and safe w/c and STS mechanics after initial forgetting. Pt requires CGA during STS and standing at counter top. Pt completed cooking task with good speed, complained of rib pain and displayed limited standing endurance.     Plan    Go over safety and mechanics with w/c mobility, squat pivot transfer, endurance and strength building, and preparation for d/c to home in [place] where pt. Will use FWW on unstable outdoor surfaces with spouse support.     "

## 2019-08-17 LAB
GLUCOSE BLD-MCNC: 108 MG/DL (ref 65–99)
GLUCOSE BLD-MCNC: 142 MG/DL (ref 65–99)

## 2019-08-17 PROCEDURE — A9270 NON-COVERED ITEM OR SERVICE: HCPCS | Performed by: HOSPITALIST

## 2019-08-17 PROCEDURE — 770010 HCHG ROOM/CARE - REHAB SEMI PRIVAT*

## 2019-08-17 PROCEDURE — 700102 HCHG RX REV CODE 250 W/ 637 OVERRIDE(OP): Performed by: PHYSICAL MEDICINE & REHABILITATION

## 2019-08-17 PROCEDURE — 700111 HCHG RX REV CODE 636 W/ 250 OVERRIDE (IP): Performed by: PHYSICAL MEDICINE & REHABILITATION

## 2019-08-17 PROCEDURE — 700102 HCHG RX REV CODE 250 W/ 637 OVERRIDE(OP): Performed by: HOSPITALIST

## 2019-08-17 PROCEDURE — A9270 NON-COVERED ITEM OR SERVICE: HCPCS | Performed by: PHYSICAL MEDICINE & REHABILITATION

## 2019-08-17 PROCEDURE — 99232 SBSQ HOSP IP/OBS MODERATE 35: CPT | Performed by: HOSPITALIST

## 2019-08-17 PROCEDURE — 82962 GLUCOSE BLOOD TEST: CPT | Mod: 91

## 2019-08-17 PROCEDURE — 700101 HCHG RX REV CODE 250: Performed by: PHYSICAL MEDICINE & REHABILITATION

## 2019-08-17 RX ADMIN — VERAPAMIL HYDROCHLORIDE 180 MG: 180 TABLET, FILM COATED, EXTENDED RELEASE ORAL at 21:23

## 2019-08-17 RX ADMIN — ATORVASTATIN CALCIUM 20 MG: 10 TABLET, FILM COATED ORAL at 21:25

## 2019-08-17 RX ADMIN — OMEPRAZOLE 20 MG: 20 CAPSULE, DELAYED RELEASE ORAL at 08:00

## 2019-08-17 RX ADMIN — CYANOCOBALAMIN TAB 1000 MCG 1000 MCG: 1000 TAB at 08:00

## 2019-08-17 RX ADMIN — METFORMIN HYDROCHLORIDE 1000 MG: 500 TABLET, FILM COATED ORAL at 08:00

## 2019-08-17 RX ADMIN — LORATADINE 10 MG: 10 TABLET ORAL at 08:00

## 2019-08-17 RX ADMIN — MULTIPLE VITAMINS W/ MINERALS TAB 1 TABLET: TAB at 11:25

## 2019-08-17 RX ADMIN — AMLODIPINE BESYLATE 10 MG: 5 TABLET ORAL at 08:00

## 2019-08-17 RX ADMIN — METFORMIN HYDROCHLORIDE 1000 MG: 500 TABLET, FILM COATED ORAL at 17:29

## 2019-08-17 RX ADMIN — Medication 1 CAPSULE: at 08:00

## 2019-08-17 RX ADMIN — VERAPAMIL HYDROCHLORIDE 180 MG: 180 TABLET, FILM COATED, EXTENDED RELEASE ORAL at 08:00

## 2019-08-17 RX ADMIN — SPIRONOLACTONE 25 MG: 25 TABLET ORAL at 08:00

## 2019-08-17 RX ADMIN — LISINOPRIL 40 MG: 20 TABLET ORAL at 08:00

## 2019-08-17 RX ADMIN — TERAZOSIN HYDROCHLORIDE 5 MG: 5 CAPSULE ORAL at 17:29

## 2019-08-17 RX ADMIN — SENNOSIDES 17.2 MG: 8.6 TABLET, FILM COATED ORAL at 21:24

## 2019-08-17 RX ADMIN — TERAZOSIN HYDROCHLORIDE 5 MG: 5 CAPSULE ORAL at 05:00

## 2019-08-17 RX ADMIN — ENOXAPARIN SODIUM 40 MG: 100 INJECTION SUBCUTANEOUS at 08:00

## 2019-08-17 RX ADMIN — LIDOCAINE 2 PATCH: 50 PATCH CUTANEOUS at 07:58

## 2019-08-17 RX ADMIN — FOLIC ACID 1 MG: 1 TABLET ORAL at 08:00

## 2019-08-17 RX ADMIN — ACETAMINOPHEN 650 MG: 325 TABLET, FILM COATED ORAL at 01:55

## 2019-08-17 ASSESSMENT — ENCOUNTER SYMPTOMS
BLURRED VISION: 0
PALPITATIONS: 0
HALLUCINATIONS: 0
SHORTNESS OF BREATH: 0
FEVER: 0
DIZZINESS: 0
NAUSEA: 0
VOMITING: 0
HEADACHES: 0

## 2019-08-17 NOTE — CARE PLAN
Problem: Safety  Goal: Will remain free from falls  Outcome: PROGRESSING AS EXPECTED  Note:   Patient demonstrates good safety technique this shift.  Asks for assistance when needed and does not attempt self transfer.  Able to verbalize needs.  Will continue to monitor.      Problem: Pain Management  Goal: Pain level will decrease to patient's comfort goal  Outcome: PROGRESSING AS EXPECTED  Note:   Patient able to verbalize needs.  Denies significant pain or discomfort this shift; able to participate in all therapies and activities.  Will continue to monitor.

## 2019-08-17 NOTE — PROGRESS NOTES
Hospital Medicine Daily Progress Note    Date of Service  8/17/2019    Chief Complaint:  Hypertension  Diabetes  Hyponatremia    Interval History:  No significant events or changes since last visit    Review of Systems  Review of Systems   Constitutional: Negative for fever.   Eyes: Negative for blurred vision.   Respiratory: Negative for shortness of breath.    Cardiovascular: Negative for palpitations.   Gastrointestinal: Negative for nausea and vomiting.   Neurological: Negative for dizziness and headaches.   Psychiatric/Behavioral: Negative for hallucinations.        Physical Exam  Temp:  [36.5 °C (97.7 °F)-37.1 °C (98.7 °F)] 37.1 °C (98.7 °F)  Pulse:  [70-80] 70  Resp:  [18-20] 18  BP: (133-151)/(62-75) 144/62    Physical Exam   HENT:   Mouth/Throat: Oropharynx is clear and moist.   Eyes: No scleral icterus.   Cardiovascular: Normal rate and regular rhythm.   Pulmonary/Chest: Effort normal. No stridor. He has no wheezes. He has no rales.   Abdominal: Soft. Bowel sounds are normal.   Skin: Skin is warm. He is not diaphoretic.   Psychiatric: His behavior is normal.   Nursing note and vitals reviewed.      Fluids    Intake/Output Summary (Last 24 hours) at 8/17/2019 1033  Last data filed at 8/17/2019 0840  Gross per 24 hour   Intake 1400 ml   Output --   Net 1400 ml       Laboratory  Recent Labs     08/15/19  0549   WBC 8.5   RBC 2.67*   HEMOGLOBIN 8.6*   HEMATOCRIT 26.7*   .0*   MCH 32.2   MCHC 32.2*   RDW 53.9*   PLATELETCT 237   MPV 9.4                           Assessment/Plan  * T5 spinal cord injury (HCC)- (present on admission)  Assessment & Plan  S/P surgical repair  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  Wound care    PVD (peripheral vascular disease) (HCC)- (present on admission)  Assessment & Plan  Plavix on hold post-operatively    Essential hypertension- (present on admission)  Assessment & Plan  BP a little labile and occ rises up  On Norvasc: 10 mg daily  On Lisinopril: 40 mg daily  On Verapamil  ER: 180 mg bid  Consider increasing Verapmil dose  Note: also on Aldactone  Cont to monitor    Type 2 diabetes mellitus without complication, without long-term current use of insulin (HCC)- (present on admission)  Assessment & Plan  Hba1c 6.8 ()  BS   On Metformin: 1000 mg bid  On accuchecks bid  Note: home meds include Metformin, Glipizide, and Januvia  Cont to monitor    Hypomagnesemia  Assessment & Plan  Currently resolved  M.7 ()  On supplements    BPH (benign prostatic hyperplasia)  Assessment & Plan  Monitor for orthostatic hypotension on Hytrin bid    Anemia- (present on admission)  Assessment & Plan  H&H: 8.2 () --> 8.6 (8/15)  Fe: < 10%  B12: 263  Folate: 8.8  S/P PRBC prior to Rehab transfer  2nd to Fe deficiency  ? Also 2nd to recent surgery and Plasmacytoma  S/P IV Fe daily x 4 doses  On oral Fe, B12, folate supplements  Monitor    Dyslipidemia- (present on admission)  Assessment & Plan  On Lipitor

## 2019-08-17 NOTE — CARE PLAN
Problem: Safety  Goal: Will remain free from injury  Note:   Patient educated on importance of utilizing call light to minimize the potential of injury from falls. Patient verbalizes understanding.  Patient does not use call light at night. Alarms in place.       Problem: Knowledge Deficit  Goal: Knowledge of disease process/condition, treatment plan, diagnostic tests, and medications will improve  Note:   Patient encouraged to deep breathe and cough periodically to minimize the potential for secretions to collect in the lungs. Patient verbalized understanding and demonstrates while this writer is present.

## 2019-08-18 LAB
GLUCOSE BLD-MCNC: 102 MG/DL (ref 65–99)
GLUCOSE BLD-MCNC: 113 MG/DL (ref 65–99)
GLUCOSE BLD-MCNC: 146 MG/DL (ref 65–99)

## 2019-08-18 PROCEDURE — 97530 THERAPEUTIC ACTIVITIES: CPT

## 2019-08-18 PROCEDURE — A9270 NON-COVERED ITEM OR SERVICE: HCPCS | Performed by: HOSPITALIST

## 2019-08-18 PROCEDURE — 97535 SELF CARE MNGMENT TRAINING: CPT

## 2019-08-18 PROCEDURE — 99232 SBSQ HOSP IP/OBS MODERATE 35: CPT | Performed by: HOSPITALIST

## 2019-08-18 PROCEDURE — 700102 HCHG RX REV CODE 250 W/ 637 OVERRIDE(OP): Performed by: HOSPITALIST

## 2019-08-18 PROCEDURE — 97116 GAIT TRAINING THERAPY: CPT

## 2019-08-18 PROCEDURE — 99232 SBSQ HOSP IP/OBS MODERATE 35: CPT | Performed by: PHYSICAL MEDICINE & REHABILITATION

## 2019-08-18 PROCEDURE — 700101 HCHG RX REV CODE 250: Performed by: PHYSICAL MEDICINE & REHABILITATION

## 2019-08-18 PROCEDURE — 770010 HCHG ROOM/CARE - REHAB SEMI PRIVAT*

## 2019-08-18 PROCEDURE — 82962 GLUCOSE BLOOD TEST: CPT | Mod: 91

## 2019-08-18 PROCEDURE — A9270 NON-COVERED ITEM OR SERVICE: HCPCS | Performed by: PHYSICAL MEDICINE & REHABILITATION

## 2019-08-18 PROCEDURE — 700102 HCHG RX REV CODE 250 W/ 637 OVERRIDE(OP): Performed by: PHYSICAL MEDICINE & REHABILITATION

## 2019-08-18 PROCEDURE — 700111 HCHG RX REV CODE 636 W/ 250 OVERRIDE (IP): Performed by: PHYSICAL MEDICINE & REHABILITATION

## 2019-08-18 PROCEDURE — G0515 COGNITIVE SKILLS DEVELOPMENT: HCPCS

## 2019-08-18 RX ADMIN — ATORVASTATIN CALCIUM 20 MG: 10 TABLET, FILM COATED ORAL at 20:14

## 2019-08-18 RX ADMIN — VERAPAMIL HYDROCHLORIDE 180 MG: 180 TABLET, FILM COATED, EXTENDED RELEASE ORAL at 08:03

## 2019-08-18 RX ADMIN — LISINOPRIL 40 MG: 20 TABLET ORAL at 08:04

## 2019-08-18 RX ADMIN — ENOXAPARIN SODIUM 40 MG: 100 INJECTION SUBCUTANEOUS at 08:11

## 2019-08-18 RX ADMIN — FOLIC ACID 1 MG: 1 TABLET ORAL at 08:04

## 2019-08-18 RX ADMIN — LIDOCAINE 2 PATCH: 50 PATCH CUTANEOUS at 08:12

## 2019-08-18 RX ADMIN — METFORMIN HYDROCHLORIDE 1000 MG: 500 TABLET, FILM COATED ORAL at 08:03

## 2019-08-18 RX ADMIN — TERAZOSIN HYDROCHLORIDE 5 MG: 5 CAPSULE ORAL at 06:10

## 2019-08-18 RX ADMIN — SENNOSIDES 17.2 MG: 8.6 TABLET, FILM COATED ORAL at 20:13

## 2019-08-18 RX ADMIN — VERAPAMIL HYDROCHLORIDE 180 MG: 180 TABLET, FILM COATED, EXTENDED RELEASE ORAL at 20:13

## 2019-08-18 RX ADMIN — TERAZOSIN HYDROCHLORIDE 5 MG: 5 CAPSULE ORAL at 17:18

## 2019-08-18 RX ADMIN — Medication 1 CAPSULE: at 08:03

## 2019-08-18 RX ADMIN — OMEPRAZOLE 20 MG: 20 CAPSULE, DELAYED RELEASE ORAL at 08:04

## 2019-08-18 RX ADMIN — MULTIPLE VITAMINS W/ MINERALS TAB 1 TABLET: TAB at 11:19

## 2019-08-18 RX ADMIN — METFORMIN HYDROCHLORIDE 1000 MG: 500 TABLET, FILM COATED ORAL at 17:18

## 2019-08-18 RX ADMIN — SPIRONOLACTONE 25 MG: 25 TABLET ORAL at 08:04

## 2019-08-18 RX ADMIN — LORATADINE 10 MG: 10 TABLET ORAL at 08:03

## 2019-08-18 RX ADMIN — CYANOCOBALAMIN TAB 1000 MCG 1000 MCG: 1000 TAB at 08:03

## 2019-08-18 RX ADMIN — AMLODIPINE BESYLATE 10 MG: 5 TABLET ORAL at 08:03

## 2019-08-18 ASSESSMENT — ENCOUNTER SYMPTOMS
DIZZINESS: 0
COUGH: 0
DIARRHEA: 0
NERVOUS/ANXIOUS: 0
FEVER: 0
BLURRED VISION: 0

## 2019-08-18 ASSESSMENT — ACTIVITIES OF DAILY LIVING (ADL)
TOILETING_LEVEL_OF_ASSIST: REQUIRES SUPERVISION WITH TOILETING
TOILET_TRANSFER_LEVEL_OF_ASSIST: REQUIRES SUPERVISION WITH TOILET TRANSFER
SHOWER_TRANSFER_LEVEL_OF_ASSIST: REQUIRES SUPERVISION WITH SHOWER TRANSFER

## 2019-08-18 NOTE — THERAPY
"Recreational Therapy  Daily Treatment     Patient Name: Ian Laird  AGE:  69 y.o., SEX:  male  Medical Record #: [id number]  Today's Date: 8/18/2019       Subjective    \"It would be nice to get some sun.\"     Objective       08/18/19 1501   Functional Ability Status - Cognitive   Attention Span Remains on Task   Comprehension Follows Three Step Commands   Judgment Able to Make Independent Decisions   Functional Ability Status - Emotional    Affect Appropriate   Mood Appropriate   Behavior Appropriate   Skilled Intervention    Skilled Intervention Relaxation / Coping Skills;Fine Motor Leisure;Cognitive Leisure   Skilled Intervention Comments 5 minutes outside being pushed in hts chair. Puzzle   Interdisciplinary Plan of Care Collaboration   Patient Position at End of Therapy In Bed;Tray Table within Reach;Call Light within Reach   Treatment Time   Total Time Spent (mins) 30   Procedural Tracking   Procedural Tracking Cognitive Skills Training       Assessment    He was outside being pushed for 5 minutes. The other 25 minutes he worked on a 45 piece puzzle. He struggled with this puzzle due to there beign no picture to match the puzzle pieces being put together. Shared some frustration however continued with the activity. He completed the puzzle with 50% help from this writer.     Plan    Discharge Pt.   "

## 2019-08-18 NOTE — PROGRESS NOTES
DATE OF SERVICE:  08/16/2019    The patient continues to do well at followup.  He is preparing to be   discharged next Monday.  The patient said he is making preparations for   returning home and he said everything is falling into place.  The patient's   mood is upbeat and positive.  The patient will be participating in outpatient   physical therapy with his wife giving him a ride to a facility near his home.       ____________________________________     THEODORE GUEVARA, PHD    [facility] / ROMANA    DD:  08/18/2019 12:13:59  DT:  08/18/2019 12:22:45    D#:  [id number]  Job#:  [id number]

## 2019-08-18 NOTE — THERAPY
"Occupational Therapy  Daily Treatment     Patient Name: Ian Laird  Age:  69 y.o., Sex:  male  Medical Record #: [id number]  Today's Date: 2019     Precautions  Precautions: (P) Spinal / Back Precautions , Fall Risk  Comments: (P) T3-T7 spinal fusion    Safety   ADL Safety : Requires Supervision for Safety, Requires Physical Assist for Safety  Bathroom Safety: Modified Independent    Subjective    \"You guys have all been wonderful.\" pt reported about his care here at [facility].      Objective       19 1301   Precautions   Precautions Spinal / Back Precautions ;Fall Risk   Comments T3-T7 spinal fusion   Cognition    Level of Consciousness Alert   Interdisciplinary Plan of Care Collaboration   IDT Collaboration with  Family / Caregiver   Patient Position at End of Therapy Seated;Other (Comments)  (handoff to SLP)   Collaboration Comments d/c planning   OT Total Time Spent   OT Individual Total Time Spent (Mins) 60   OT Charge Group   OT Self Care / ADL 4       FIM Grooming Score:  7 - Independent  Grooming Description:  (brushed teeth, and combed hair seated in w/c at sink side)    FIM Bathing Score:  6 - Modified Independent  Bathing Description:       FIM Upper Body Dressin - Modified Independent  Upper Body Dressing Description:  Increased time(able to doff/don pull over shirt seated in w/c )    FIM Lower Body Dressing Score:  5 - Standby Prompting/Supervsion or Set-up  Lower Body Dressing Description:  Increased time, Supervision for safety, Assist device/equipment(able to doff/don pants/underwear shoes and socks seated/standing next to w/c  with use of grab bar)    FIM Toileting Body Dressin - Standby Prompting/Supervision or Set-up  Toileting Description:  Supervision for safety, Grab bar, Adaptive equipment(BSC over toilet )    FIM Toilet Transfer Score:  5 - Standby Prompting/Supervision or Set-up  Toilet Transfer Description:  Grab bar, Supervision for safety, Set-up of equipment(SPT " from w/c <> toilet)    FIM Tub/Shower Transfers Score:  5 - Standby Prompting/Supervision or Set-up  Tub/Shower Transfers Description:  Supervision for safety, Grab bar, Set-up of equipment, Adaptive equipment(SPT from w/c <> drop arm commode chair in shower)      Assessment    Pt tolerated session well. Pt is mod I-SBA with all ADL tasks and transfers. Pt still noted to need cues for safety during session. Increased time taken to discuss pt's d/c plan and home set-up/equipement. Pt has all needed equipment and family support for safe d/c home tomorrow.     Plan    D/c pt- pt to d/c home tomorrow.

## 2019-08-18 NOTE — PROGRESS NOTES
Hospital Medicine Daily Progress Note    Date of Service  8/18/2019    Chief Complaint:  Hypertension  Diabetes  Hyponatremia    Interval History:  No significant events or changes since last visit    Review of Systems  Review of Systems   Constitutional: Negative for fever.   Eyes: Negative for blurred vision.   Respiratory: Negative for cough.    Cardiovascular: Negative for chest pain.   Gastrointestinal: Negative for diarrhea.   Musculoskeletal: Negative for joint pain.   Neurological: Negative for dizziness.   Psychiatric/Behavioral: The patient is not nervous/anxious.         Physical Exam  Temp:  [36.2 °C (97.1 °F)-36.7 °C (98 °F)] 36.7 °C (98 °F)  Pulse:  [70-81] 73  Resp:  [17-20] 17  BP: (121-153)/(61-80) 144/80  SpO2:  [94 %-97 %] 97 %    Physical Exam   Constitutional: No distress.   HENT:   Mouth/Throat: No oropharyngeal exudate.   Eyes: EOM are normal.   Neck: No JVD present. No tracheal deviation present.   Cardiovascular: Normal rate and regular rhythm.   Pulmonary/Chest: Effort normal. He has no wheezes. He has no rales.   Abdominal: Soft. He exhibits no distension. There is no tenderness.   Skin: Skin is warm.   Psychiatric: His behavior is normal.   Nursing note and vitals reviewed.      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  Wound care    PVD (peripheral vascular disease) (HCC)- (present on admission)  Assessment & Plan  Plavix on hold post-operatively    Essential hypertension- (present on admission)  Assessment & Plan  BP a little labile and occ rises up  On Norvasc: 10 mg daily  On Lisinopril: 40 mg daily  On Verapamil ER: 180 mg bid  Note: also on Aldactone  Will monitor another day  before adjusting meds    Type 2 diabetes mellitus without complication, without long-term current use of insulin (HCC)- (present on admission)  Assessment & Plan  Hba1c 6.8 ()  BS   On Metformin: 1000 mg bid  On accuchecks bid  Note: home meds include Metformin, Glipizide, and Januvia  Cont to monitor    Hypomagnesemia  Assessment & Plan  Currently resolved  M.7 ()  On supplements    BPH (benign prostatic hyperplasia)  Assessment & Plan  Monitor for orthostatic hypotension on Hytrin bid    Anemia- (present on admission)  Assessment & Plan  H&H: 8.2 () --> 8.6 (8/15)  Fe: < 10%  B12: 263  Folate: 8.8  S/P PRBC prior to Rehab transfer  2nd to Fe deficiency  ? Also 2nd to recent surgery and Plasmacytoma  S/P IV Fe daily x 4 doses  On oral Fe, B12, folate supplements  Monitor    Dyslipidemia- (present on admission)  Assessment & Plan  On Lipitor

## 2019-08-18 NOTE — PROGRESS NOTES
DATE OF SERVICE:  08/13/2019    The patient was doing well at followup.  He is gaining strength and improving   in his overall endurance.  The patient will be discharged next Monday.  He   talked about his home program and what he hopes to accomplish both short term   and long term.       ____________________________________     THEODORE GUEVARA, PHD    SABAS / ROMANA    DD:  08/18/2019 11:38:58  DT:  08/18/2019 11:41:30    D#:  [id number]  Job#:  [id number]

## 2019-08-18 NOTE — THERAPY
"Physical Therapy   Daily Treatment     Patient Name: Ian Laird  Age:  69 y.o., Sex:  male  Medical Record #: [id number]  Today's Date: 8/18/2019     Precautions  Precautions: (P) Spinal / Back Precautions , Fall Risk, Other (See Comments)  Comments: (P) T3-T7 spinal fusion    Subjective    Pt received in room in wc ready to begin PT session. Extensive discussion re: pt's DC plan, DME and entry steps, bed height. Pt had pictures of home setup with measurements.     Objective       08/18/19 0901   Precautions   Precautions Spinal / Back Precautions ;Fall Risk;Other (See Comments)   Comments T3-T7 spinal fusion   Cognition    Level of Consciousness Alert   Bed Mobility    Supine to Sit Supervised   Sit to Supine Supervised   Sit to Stand Stand by Assist   Interdisciplinary Plan of Care Collaboration   Patient Position at End of Therapy Seated;Other (Comments)   Collaboration Comments Mod I in manual wc   PT Total Time Spent   PT Individual Total Time Spent (Mins) 60   PT Charge Group   PT Gait Training 1   PT Therapeutic Activities 3       FIM Bed/Chair/Wheelchair Transfers Score: 5 - Standby Prompting/Supervision or Set-up  Bed/Chair/Wheelchair Transfers Description:  (SPT with FWW SBA mat<>wc, supine<>sit on mat table (24\" high) SBA)    FIM Walking Score:  2 - Max Assistance  Walking Description:  (pt amb x60' with FWW and SBA, decreased jimi)    FIM Wheelchair Score:  6 - Modified Independent  Wheelchair Description:  (Mod I in manual wc inside surfaces)    FIM Stairs Score:  2 - Max Assistance  Stairs Description:  (pt ascended/descended 4 steps with B HRs, reciprocal pattern, CGA to close SBA)    PT introduced pt to bedrail and suggested he purchase one if he finds that he has difficulty getting in/out of a regular bed safely. PT also advised that pt use the lower of the 2 available beds at home as the 37\" high bed would be unsafe.    Assessment    Pt verbalizes readiness for DC to home " tomorrow.    Plan    DC to home tomorrow with manual wc, FWW. Continued therapy recommended.

## 2019-08-18 NOTE — CARE PLAN
Problem: Mobility  Goal: STG-Within one week, patient will propel wheelchair community  Description  1) Individualized goal:  150ft x 1, B UE, mod I  2) Interventions:  PT Group Therapy, PT Gait Training, PT Therapeutic Exercises, PT Neuro Re-Ed/Balance, PT Therapeutic Activity, PT Manual Therapy and PT Evaluation     Outcome: MET     Problem: PT-Long Term Goals  Goal: LTG-By discharge, patient will ambulate  Description  1) Individualized goal:  50ft x1 SBA LRAD  2) Interventions:  PT Group Therapy, PT Gait Training, PT Therapeutic Exercises, PT Neuro Re-Ed/Balance, PT Therapeutic Activity, PT Manual Therapy and PT Evaluation    Outcome: MET  Goal: LTG-By discharge, patient will perform home exercise program  Description  1) Individualized goal:  For balance and B LE strength/ endurance, SPV   2) Interventions:  PT Group Therapy, PT Gait Training, PT Therapeutic Exercises, PT Neuro Re-Ed/Balance, PT Therapeutic Activity, PT Manual Therapy and PT Evaluation             Outcome: MET  Goal: LTG-By discharge, patient will  Description  1) Individualized goal:  Up/down 2 stairs, Isabel to mimic home environment   2) Interventions:  PT Group Therapy, PT Gait Training, PT Therapeutic Exercises, PT Neuro Re-Ed/Balance, PT Therapeutic Activity, PT Manual Therapy and PT Evaluation     Outcome: MET  Goal: LTG-By discharge, patient will  Description  1) Individualized goal: perform car transfer with SPV, LRAD  2) Interventions:  PT Group Therapy, PT Gait Training, PT Therapeutic Exercises, PT Neuro Re-Ed/Balance, PT Therapeutic Activity, PT Manual Therapy and PT Evaluation     Outcome: MET

## 2019-08-18 NOTE — CARE PLAN
Problem: Infection  Goal: Will remain free from infection  Outcome: PROGRESSING AS EXPECTED  Note:   Patient remains free from s/s infection; afebrile. Back incision is healing, approximated, and open to air. Will continue to monitor.      Problem: Skin Integrity  Goal: Risk for impaired skin integrity will decrease  Outcome: PROGRESSING AS EXPECTED  Note:   Patient's skin remains intact and free from new or accidental injury this shift. Knee scabs healed, sacrum clear, and back incision approximated and open to air. Will continue to monitor.

## 2019-08-18 NOTE — PROGRESS NOTES
"Rehab Progress Note     Encounter Date: 8/18/2019    CC: LE weakness, decreased balance    Interval Events (Subjective)  Patient sitting up in room visiting with wife. They report he is doing very well, they are working through the transition home but they think he can do it. He has more therapy this afternoon and he is looking forward to the extra therapy. He reports no issues overnight and looking forward to discharge tomorrow. Denies NVD. Denies SOB.    Objective:  VITAL SIGNS: /80   Pulse 73   Temp 36.7 °C (98 °F) (Temporal)   Resp 17   Ht 1.778 m (5' 10\")   Wt 91 kg (200 lb 9.9 oz)   SpO2 97%   BMI 28.79 kg/m²   Gen: NAD  Psych: Mood and affect appropriate  CV: RRR, no edema  Resp: CTAB, no upper airway sounds  Abd: NTND  Neuro: AOx4, 5/5 BUE    Recent Results (from the past 72 hour(s))   ACCU-CHEK GLUCOSE    Collection Time: 08/15/19  5:02 PM   Result Value Ref Range    Glucose - Accu-Ck 112 (H) 65 - 99 mg/dL   ACCU-CHEK GLUCOSE    Collection Time: 08/16/19  8:11 AM   Result Value Ref Range    Glucose - Accu-Ck 158 (H) 65 - 99 mg/dL   ACCU-CHEK GLUCOSE    Collection Time: 08/16/19  5:29 PM   Result Value Ref Range    Glucose - Accu-Ck 99 65 - 99 mg/dL   ACCU-CHEK GLUCOSE    Collection Time: 08/17/19  7:51 AM   Result Value Ref Range    Glucose - Accu-Ck 142 (H) 65 - 99 mg/dL   ACCU-CHEK GLUCOSE    Collection Time: 08/17/19  5:28 PM   Result Value Ref Range    Glucose - Accu-Ck 108 (H) 65 - 99 mg/dL   ACCU-CHEK GLUCOSE    Collection Time: 08/18/19  8:02 AM   Result Value Ref Range    Glucose - Accu-Ck 146 (H) 65 - 99 mg/dL   ACCU-CHEK GLUCOSE    Collection Time: 08/18/19 11:17 AM   Result Value Ref Range    Glucose - Accu-Ck 113 (H) 65 - 99 mg/dL       Current Facility-Administered Medications   Medication Frequency   • insulin lispro (HUMALOG) injection 2-12 Units BID WITH MEALS    And   • glucose 4 g chewable tablet 16 g Q15 MIN PRN    And   • dextrose 50% (D50W) injection 50 mL Q15 MIN PRN   • " sennosides (SENOKOT) 8.6 MG tablet 17.2 mg QHS   • lidocaine (XYLOCAINE) 2 % jelly PRN   • lactobacillus rhamnosus (CULTURELLE) capsule 1 Cap QDAY with Breakfast   • cyanocobalamin (VITAMIN B12) tablet 1,000 mcg DAILY   • folic acid (FOLVITE) tablet 1 mg DAILY   • enoxaparin (LOVENOX) inj 40 mg DAILY   • lidocaine (LIDODERM) 5 % 2 Patch Q24HR   • metFORMIN (GLUCOPHAGE) tablet 1,000 mg BID WITH MEALS   • amLODIPine (NORVASC) tablet 10 mg DAILY   • atorvastatin (LIPITOR) tablet 20 mg Nightly   • glucose 4 g chewable tablet 16 g PRN   • lisinopril (PRINIVIL) tablet 40 mg DAILY   • loratadine (CLARITIN) tablet 10 mg DAILY   • omeprazole (PRILOSEC) capsule 20 mg DAILY   • spironolactone (ALDACTONE) tablet 25 mg DAILY   • terazosin (HYTRIN) capsule 5 mg BID   • verapamil ER (CALAN-SR) tablet 180 mg BID   • Pharmacy Consult Request ...Pain Management Review 1 Each PHARMACY TO DOSE   • Respiratory Care per Protocol Continuous RT   • acetaminophen (TYLENOL) tablet 650 mg Q4HRS PRN   • therapeutic multivitamin-minerals (THERAGRAN-M) tablet 1 Tab DAILY WITH LUNCH   • artificial tears 1.4 % ophthalmic solution 1 Drop PRN   • benzocaine-menthol (CEPACOL) lozenge 1 Lozenge Q2HRS PRN   • mag hydrox-al hydrox-simeth (MAALOX PLUS ES or MYLANTA DS) suspension 20 mL Q2HRS PRN   • ondansetron (ZOFRAN ODT) dispertab 4 mg 4X/DAY PRN    Or   • ondansetron (ZOFRAN) syringe/vial injection 4 mg 4X/DAY PRN   • traZODone (DESYREL) tablet 50 mg QHS PRN   • sodium chloride (OCEAN) 0.65 % nasal spray 2 Spray PRN       Orders Placed This Encounter   Procedures   • Diet Order Diabetic     Standing Status:   Standing     Number of Occurrences:   1     Order Specific Question:   Diet:     Answer:   Diabetic [3]       Assessment:  Active Hospital Problems    Diagnosis   • *T5 spinal cord injury (HCC)   • PVD (peripheral vascular disease) ([facility])   • Type 2 diabetes mellitus without complication, without long-term current use of insulin ([facility])   •  Essential hypertension   • Dyslipidemia   • Obesity   • Hypomagnesemia   • Sepsis secondary to UTI (HCC)   • Chest pain   • Abdominal distension   • BPH (benign prostatic hyperplasia)   • History of melanoma   • Neuropathic pain   • Neurogenic bladder   • Neurogenic bowel   • Hyponatremia   • 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HCC)   • Acute kidney injury (HCC)   • Anemia       Medical Decision Making and Plan:  T5 AIS D spinal cord injury: Status post T4-6 laminectomy and T3-7 posterior spinal fusion with resection of mass by Dr. Velasco on 7/20, preliminary pathology suggest plasmablastic plasmacytoma, significant improvement in lower extremity strength since surgical resection.  -No brace at this time  -Continue comprehensive acute inpatient rehabilitation    Hypertension: Consulted hospitalist, under good control with systolic blood pressure in 120s  -Norvasc 10 mg daily  -Lisinopril 40 mg daily  -Spironolactone 25 mg daily  - Terazosin 5 mg twice daily  -Verapamil 180 mg twice daily  - Discussed case with hospitalist as will be going home tomorrow. Verapamil increased on 17th, would like to watch it another day and discuss with primary prior to DC home.      Total time:  25 minutes.  I spent greater than 50% of the time for patient care and coordination on this date, including unit/floor time, and face-to-face time with the patient as per assessment and plan above.    Sugey Landa M.D.

## 2019-08-18 NOTE — PROGRESS NOTES
DATE OF SERVICE:  08/14/2019    The patient is doing very well at followup.  He is scheduled to be discharged   this weekend or next Monday.  The patient's mood is upbeat and positive.  He   is making preparations for returning home.  The patient said he is committed   to his long-term rehab.       ____________________________________     THEODORE GUEVARA, PHD    SABAS / ROMANA    DD:  08/18/2019 11:54:03  DT:  08/18/2019 11:57:31    D#:  [id number]  Job#:  [id number]

## 2019-08-18 NOTE — THERAPY
Speech Language Pathology  Daily Treatment     Patient Name: Ian Laird  Age:  69 y.o., Sex:  male  Medical Record #: [id number]  Today's Date: 8/18/2019     Subjective    Patient arrived on time to  with assistance.      Objective       08/18/19 1402   Cognition   Functional Math / Financial Management Minimal (4)   SLP Total Time Spent   SLP Individual Total Time Spent (Mins) 60   Charge Group   SLP Cognitive Skill Development 4       FIM Eating Score:  7 - Independent  Eating Description:       FIM Comprehension Score:  6 - Modified Independent  Comprehension Description:       FIM Expression Score:  7 - Independent  Expression Description:       FIM Social Interaction Score:  7 - Independent  Social Interaction Description:       FIM Problem Solving Score:  5 - Standby Prompting/Supervision or Set-up  Problem Solving Description:       FIM Memory Score:  5 - Standby Prompting/Supervision or Set-up  Memory Description:         Assessment    Patient completed checkbook activity. Was able to organize into sequential order by date and check number. One error was noted when identifying deposit vs. withdrawal. After cued, was able to correct.     Plan    Prepare for d/c home with spouse.

## 2019-08-19 VITALS
DIASTOLIC BLOOD PRESSURE: 73 MMHG | SYSTOLIC BLOOD PRESSURE: 146 MMHG | BODY MASS INDEX: 28.72 KG/M2 | OXYGEN SATURATION: 97 % | HEIGHT: 70 IN | RESPIRATION RATE: 20 BRPM | HEART RATE: 74 BPM | TEMPERATURE: 98.5 F | WEIGHT: 200.62 LBS

## 2019-08-19 LAB
APPEARANCE UR: ABNORMAL
BACTERIA #/AREA URNS HPF: ABNORMAL /HPF
BILIRUB UR QL STRIP.AUTO: ABNORMAL
COLOR UR: YELLOW
EPI CELLS #/AREA URNS HPF: ABNORMAL /HPF
GLUCOSE BLD-MCNC: 147 MG/DL (ref 65–99)
GLUCOSE UR STRIP.AUTO-MCNC: NEGATIVE MG/DL
HYALINE CASTS #/AREA URNS LPF: ABNORMAL /LPF
KETONES UR STRIP.AUTO-MCNC: ABNORMAL MG/DL
LEUKOCYTE ESTERASE UR QL STRIP.AUTO: ABNORMAL
MICRO URNS: ABNORMAL
MUCOUS THREADS #/AREA URNS HPF: ABNORMAL /HPF
NITRITE UR QL STRIP.AUTO: POSITIVE
PH UR STRIP.AUTO: 6 [PH] (ref 5–8)
PROT UR QL STRIP: >=300 MG/DL
RBC # URNS HPF: ABNORMAL /HPF
RBC UR QL AUTO: ABNORMAL
SP GR UR STRIP.AUTO: 1.02
UROBILINOGEN UR STRIP.AUTO-MCNC: 0.2 MG/DL
WBC #/AREA URNS HPF: ABNORMAL /HPF

## 2019-08-19 PROCEDURE — 700102 HCHG RX REV CODE 250 W/ 637 OVERRIDE(OP): Performed by: HOSPITALIST

## 2019-08-19 PROCEDURE — 99232 SBSQ HOSP IP/OBS MODERATE 35: CPT | Performed by: HOSPITALIST

## 2019-08-19 PROCEDURE — A9270 NON-COVERED ITEM OR SERVICE: HCPCS | Performed by: PHYSICAL MEDICINE & REHABILITATION

## 2019-08-19 PROCEDURE — 700101 HCHG RX REV CODE 250: Performed by: PHYSICAL MEDICINE & REHABILITATION

## 2019-08-19 PROCEDURE — 81001 URINALYSIS AUTO W/SCOPE: CPT

## 2019-08-19 PROCEDURE — 700111 HCHG RX REV CODE 636 W/ 250 OVERRIDE (IP): Performed by: PHYSICAL MEDICINE & REHABILITATION

## 2019-08-19 PROCEDURE — 82962 GLUCOSE BLOOD TEST: CPT

## 2019-08-19 PROCEDURE — A9270 NON-COVERED ITEM OR SERVICE: HCPCS | Performed by: HOSPITALIST

## 2019-08-19 PROCEDURE — 99239 HOSP IP/OBS DSCHRG MGMT >30: CPT | Performed by: PHYSICAL MEDICINE & REHABILITATION

## 2019-08-19 PROCEDURE — 700102 HCHG RX REV CODE 250 W/ 637 OVERRIDE(OP): Performed by: PHYSICAL MEDICINE & REHABILITATION

## 2019-08-19 RX ORDER — SULFAMETHOXAZOLE AND TRIMETHOPRIM 800; 160 MG/1; MG/1
1 TABLET ORAL 2 TIMES DAILY
Qty: 10 TAB | Refills: 0 | Status: SHIPPED | OUTPATIENT
Start: 2019-08-19 | End: 2019-09-17

## 2019-08-19 RX ORDER — FOLIC ACID 1 MG/1
1 TABLET ORAL DAILY
Qty: 30 TAB | Refills: 3 | Status: SHIPPED | OUTPATIENT
Start: 2019-08-20 | End: 2020-03-04

## 2019-08-19 RX ADMIN — SPIRONOLACTONE 25 MG: 25 TABLET ORAL at 08:53

## 2019-08-19 RX ADMIN — METFORMIN HYDROCHLORIDE 1000 MG: 500 TABLET, FILM COATED ORAL at 08:52

## 2019-08-19 RX ADMIN — OMEPRAZOLE 20 MG: 20 CAPSULE, DELAYED RELEASE ORAL at 08:53

## 2019-08-19 RX ADMIN — AMLODIPINE BESYLATE 10 MG: 5 TABLET ORAL at 08:53

## 2019-08-19 RX ADMIN — Medication 1 CAPSULE: at 08:53

## 2019-08-19 RX ADMIN — ENOXAPARIN SODIUM 40 MG: 100 INJECTION SUBCUTANEOUS at 08:57

## 2019-08-19 RX ADMIN — FOLIC ACID 1 MG: 1 TABLET ORAL at 08:53

## 2019-08-19 RX ADMIN — LISINOPRIL 40 MG: 20 TABLET ORAL at 08:53

## 2019-08-19 RX ADMIN — LORATADINE 10 MG: 10 TABLET ORAL at 08:53

## 2019-08-19 RX ADMIN — LIDOCAINE 2 PATCH: 50 PATCH CUTANEOUS at 08:57

## 2019-08-19 RX ADMIN — VERAPAMIL HYDROCHLORIDE 180 MG: 180 TABLET, FILM COATED, EXTENDED RELEASE ORAL at 08:53

## 2019-08-19 RX ADMIN — TERAZOSIN HYDROCHLORIDE 5 MG: 5 CAPSULE ORAL at 05:09

## 2019-08-19 RX ADMIN — CYANOCOBALAMIN TAB 1000 MCG 1000 MCG: 1000 TAB at 08:52

## 2019-08-19 ASSESSMENT — ENCOUNTER SYMPTOMS
DIARRHEA: 0
VOMITING: 0
CHILLS: 0
NERVOUS/ANXIOUS: 0
NAUSEA: 0
FEVER: 0
SHORTNESS OF BREATH: 0
ABDOMINAL PAIN: 0

## 2019-08-19 NOTE — CARE PLAN
Problem: Pain Management  Goal: Pain level will decrease to patient's comfort goal  Outcome: MET  Flowsheets (Taken 8/18/2019 0822)  Comfort Goal: Comfort at Rest;Comfort with Movement;Perform Activity;Stay Alert  Pain Rating Scale (NPRS): 0  Note:   Pt denies pain; able to participate in therapies and activities this shift. Will continue to monitor.     Problem: IP BLADDER/VOIDING  Goal: STG - Patient demonstrates no accidents  Outcome: MET  Note:   Pt is continent of bladder and has had 0 accidents in the past week. Will continue to monitor.

## 2019-08-19 NOTE — CARE PLAN
Problem: Discharge Barriers/Planning  Goal: Patient's continuum of care needs will be met  Outcome: PROGRESSING AS EXPECTED  Note:   Patient discharged to home per order.  Discharge instructions reviewed with patient and wife; they verbalize understanding and signed copies placed in chart.  Patient has all belongings; signed copy of form in chart.  Patient left facility at 1040 via wheelchair accompanied by rehab staff and wife.  Have enjoyed working with this pleasant patient.

## 2019-08-19 NOTE — DISCHARGE SUMMARY
Rehab Discharge Summary    Admission Date: 7/25/2019    Discharge Date: 8/19/2019    Attending Provider: Dr Adelfo Aquino    Admission Diagnosis:   Active Hospital Problems    Diagnosis   • *T5 spinal cord injury (HCC)   • PVD (peripheral vascular disease) (HCC)   • Type 2 diabetes mellitus without complication, without long-term current use of insulin (HCC)   • Essential hypertension   • Dyslipidemia   • Obesity   • Hypomagnesemia   • Sepsis secondary to UTI (HCC)   • Chest pain   • Abdominal distension   • BPH (benign prostatic hyperplasia)   • History of melanoma   • Neuropathic pain   • Neurogenic bladder   • Neurogenic bowel   • Hyponatremia   • 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HCC)   • Acute kidney injury (HCC)   • Anemia       Discharge Diagnosis:  Active Hospital Problems    Diagnosis   • *T5 spinal cord injury (HCC)   • PVD (peripheral vascular disease) (HCC)   • Type 2 diabetes mellitus without complication, without long-term current use of insulin (HCC)   • Essential hypertension   • Dyslipidemia   • Obesity   • Hypomagnesemia   • Sepsis secondary to UTI (HCC)   • Chest pain   • Abdominal distension   • BPH (benign prostatic hyperplasia)   • History of melanoma   • Neuropathic pain   • Neurogenic bladder   • Neurogenic bowel   • Hyponatremia   • 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HCC)   • Acute kidney injury (HCC)   • Anemia       HPI per H&P:  Patient is a 69-year-old male with past medical history significant for type 2 diabetes, dyslipidemia, hypertension, melanoma x2 status post resection, admitted to [facility] on 7/15 with worsening mid back pain for the past 6 weeks, bilateral lower extremity weakness and numbness.  He was found to have a T5 mass with posterior epidural extension, severely narrowing of the canal at T5.  He underwent T4-T6 laminectomy and T3-T7 posterior spinal fusion with resection of epidural mass by Dr. Velasco on 7/20.  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  Sample is still undergoing studies to evaluate for lymphoma.  His hospitalization was complicated by blood loss anemia with hemoglobin down to 6.7, required 1 unit of packed red blood cell transfusion on 7/23.  His Plavix and Lovenox were held.  Reinitiation of these agents will need to be cleared through neurosurgery.  Prior to surgery patient had decreased his functional status to mobilization of short distances in the household with front wheel walker.  Spouse was currently assisting him in the community and up and down steps.      Patient was evaluated by physiatry and Physical Therapy, Occupational Therapy and determined to be appropriate for acute inpatient rehab and was admitted to [facility] on 7/25     Upon admission, the patient reports significant bloating and gas, and frequent bowel movements, difficulty with a sensation of bowel movements, has had multiple incontinence.     He reports significant improving strength in bilateral lower extremities right is still stronger than left he reports improvement in sensation in bilateral lower extremities since surgery.  He still feels weak when going to stand up or utilize his lower extremities.  He reports difficulty with motor planning.    Patient was admitted to [facility] on 7/25/2019.     Hospital Course by Problem List:  T5 AIS D spinal cord injury: Status post T4-6 laminectomy and T3-7 posterior spinal fusion with resection of mass by Dr. Velasco on 7/20, preliminary pathology suggest plasmablastic plasmacytoma, significant improvement in lower extremity strength since surgical resection.  -No brace at this time     Plasmablastic plasmacytoma: Primary mass  - Follow-up with oncology and radiation oncology as outpatient     Leukocytosis: Resolved, Likely from pyelonephritis, WBC elevated to 20.2, with elevated neutrophils/bands, improved to 7.6  today after 3/7 days of antibiotics     Chest pain: Appreciate hospitalist input, rapid response called   -EKG done, no signs of ST elevation, work-up was negative     Neurogenic orthostatic hypotension: Systolic blood pressures into the 80s, symptoms of dizziness, lightheadedness  - Scheduled midodrine was DC'd without symptoms of dizziness when changing position, has not been using Midodrine prior to standing  -DC'd Midodrine prior to discharge from acute rehabilitation     Neuropathic pain: Lower extremity pins-and-needles on admission  -Gabapentin 600 mg 3 times daily, decrease dose to gabapentin 300 mg 3 times daily, DC gabapentin on 8/14, patient is not interested in restarting gabapentin at this time  - He originally required tramadol 50 mg 3 times daily to participate fully with therapy, this was DC'd without difficulty during acute rehabilitation hospitalization.     Nociceptive pain: Acute postoperative pain, site of incision  - DC Tramadol 50 mg 3 times daily  - Oxycodone 5 mg - 10 mg as needed every 3 hours  -Robaxin 750 mg 4 times a day, discontinue Robaxin 8/5  -Lidocaine patch x2 to either side of incision, discussed using over-the-counter 4% lidocaine patches     Acute kidney injury: Creatinine elevated up to 1.26 on 7/22  -Creatinine 1.02 on admission, improved to 0.92     Blood loss anemia: Hemoglobin on 7/23 of 6.7, status post 1 unit packed red blood cell transfusion, hemoglobin of 8.0 on 7/20  - Plan for transfusion if hemoglobin is less than 7.0  -Holding Plavix which was originally started for peripheral vascular disease, will have patient follow-up with primary care physician and neurosurgery for restarting Plavix  -Held Lovenox secondary to bleeding on 7/22  -Restarted Lovenox 40 mg daily  - Hemoglobin on 8/15- 8.6, stable     Hyponatremia: Sodium on admission of 130 improved to 132  -Consulted hospitalist, stabilized during acute rehabilitation     Hypertension: Consulted hospitalist, under  good control with systolic blood pressure in 120s  -Norvasc 10 mg daily  -Lisinopril 40 mg daily  -Spironolactone 25 mg daily  - Terazosin 5 mg twice daily  -Verapamil 80 mg twice daily  -Follow-up with primary care physician    Type 2 diabetes: Concerned about his current diet receiving full plate of fruit in the morning  -Metformin 1000 mg bid  -Lantus 10 units every morning  -Insulin sliding scale, moderate  - Consulted hospitalist, follow-up with primary care physician as outpatient     BPH: Having increased difficulty with urination, likely secondary to neurogenic bladder  -Terazosin 5 mg twice daily, can be causing worsening orthostatic hypotension     Anxiety/adjustment disorder:  -Cymbalta 30 mg daily, this will also aid and neuropathic pain, mood appears to be stable, DC Cymbalta at this time  -Consulted psychologist during acute rehabilitation, no need for outpatient follow-up     Neurogenic bladder: Requiring CIC, question if caused by urinary tract infection  -Successful voiding trial, continue volitional voiding as outpatient     Neurogenic bowel: Changed bowel program to every morning  -Was able to have consistent BMs without suppository or digital stimulation, continue senna q night  - DC'd Colace, DC magnesium oxide     Vitamin D deficiency: Vitamin D level on admission of 65, toxic level  - Discontinue cholecalciferol     DVT prophylaxis: Lovenox held secondary to bleeding at site of incision.  -SCDs when in bed, discontinue SCDs  -Lovenox 40 mg daily, continue for total of 8 weeks, end date of     Functional Status at Discharge  Eatin - Independent  Eating Description:  (to drink coffee)  Groomin - Independent  Grooming Description:  (brushed teeth, and combed hair seated in w/c at sink side)  Bathin - Modified Independent  Bathing Description:  Adaptive equipment, Grab bar, Long handled bath tool, Hand held shower  Upper Body Dressin - Modified Independent  Upper Body  Dressing Description:  Increased time(able to doff/don pull over shirt seated in w/c )  Lower Body Dressin - Standby Prompting/Supervsion or Set-up  Lower Body Dressing Description:  5 - Standby Prompting/Supervsion or Set-up  Discharge Location : Home  Patient Discharging with Assist of: Family ;Spouse / Significant Other  Level of Supervision Required: Intermittent Supervision  Recommended Equipment for Discharge: Front-Wheeled Walker;3 in 1 Commode;Dressing Stick;Reacher;Hand Held Shower Head;Grab Bars in Tub / Shower;Grab Bars by Toilet  Long Term Goals Met: 0  Long Term Goals Not Met: 3   Reason(s) for Goals Not Met: pt still requires SBA for transfers and some ADL/IADL tasks   Criteria for Termination of Services: Maximum Function Achieved for Inpatient Rehabilitation  Walk:  2 - Max Assistance  Distance Walked:  Walks  feet  Walk Description:  (pt amb x60' with FWW and SBA, decreased jimi)  Wheelchair:  6 - Modified Independent  Distance Propelled:  Propels a minimum of 150 feet   Wheelchair Description:  (Mod I in manual wc inside surfaces)  Stairs 2 - Max Assistance  Stairs Description(pt ascended/descended 4 steps with B HRs, reciprocal pattern, CGA to close SBA)  Discharge Location: Home  Patient Discharging with Assist of: Spouse / Significant Other  Level of Supervision Required Upon Discharge: Intermittent Supervision  Recommended Equipment for Discharge: Front-Wheeled Walker;Shower Chair;Grab Bars in Tub / Shower;Hand Held Shower Head;Manual Wheelchair;Grab Bars by Toilet  Recommeded Services Upon Discharge: Outpatient Physical Therapy;Home Health Physical Therapy  Long Term Goals Met: 4  Long Term Goals Not Met: 0  Reason(s) for Goals Not Met: n/a  Criteria for Termination of Services: Maximum Function Achieved for Inpatient Rehabilitation  Comprehension Mode:  Auditory  Comprehension:  6 - Modified Independent  Comprehension Description:  Glasses, Verbal cues  Expression Mode:   Vocal  Expression:  7 - Independent  Expression Description:     Social Interaction:  7 - Independent  Social Interaction Description:  Medication  Problem Solvin - Standby Prompting/Supervision or Set-up  Problem Solving Description:  Verbal cueing, Therapy schedule  Memory:  5 - Standby Prompting/Supervision or Set-up  Memory Description:  Verbal cueing, Therapy schedule  Progress since Admit: Pt has made consistent progress since admission. Continues to demonstrate deficits in attention, memory and problem solving, although improving. Pt benefits from extra time, low stim environment to reduce external distractions, cues for planning and anticipating challenges. Recommend pt d/c home with support of spouse, recommend ongoing home health ST services upon d/c.   Discharge Location : Home  Level of Supervision Required: 24 Hour Supervision  Recommended Services Upon Discharge: Home Health Speech Therapy  Long Term Goals Met:   Long Term Goals Not Met: 0  Reason(s) for Goals Not Met: NA  Criteria for Termination of Services: Maximum Function Achieved for Inpatient Rehabilitation    Adelfo DE LA ROSA D.O., personally performed a complete drug regimen review and no potential clinically significant medication issues were identified.   Discharge Medication:     Medication List      ASK your doctor about these medications      Instructions   acetaminophen 325 MG Tabs  Commonly known as:  TYLENOL   Take 2 Tabs by mouth every 6 hours as needed (Mild Pain; (Pain scale 1-3); Temp greater than 100.5 F).  Dose:  650 mg     ALPRAZolam 0.5 MG Tabs  Commonly known as:  XANAX  Ask about: Should I take this medication?   Take 1 Tab by mouth 3 times a day as needed for Anxiety or Sleep for up to 3 days.  Dose:  0.5 mg     benzocaine-menthol 15-3.6 MG Lozg  Commonly known as:  CEPACOL   Spray 1 Lozenge in mouth/throat every 2 hours as needed (for sore throat).  Dose:  1 Lozenge     GLUCOPHAGE 1000 MG tablet  Generic drug:   metformin   Take 500-1,000 mg by mouth 2 times a day, with meals. 1000 mg with breakfast    500 mg with dinner  Dose:  500-1,000 mg     glucose 4 g chewable tablet   Take 4 Tabs by mouth as needed for Low Blood Sugar (If FSBG is less than or equal to 70 mg/dL and patient able to eat or drink).  Dose:  16 g     insulin glargine 100 UNIT/ML Soln  Commonly known as:  LANTUS   Inject 20 Units as instructed every morning.  Dose:  20 Units     insulin regular 100 Unit/mL Soln  Commonly known as:  HUMULIN R   Inject 2-10 Units as instructed 4 Times a Day,Before Meals and at Bedtime.  Dose:  2-10 Units     LIPITOR 20 MG Tabs  Generic drug:  atorvastatin   Take 20 mg by mouth every evening.  Dose:  20 mg     lisinopril 40 MG tablet  Commonly known as:  PRINIVIL, ZESTRIL   Take 40 mg by mouth every day.  Dose:  40 mg     loratadine 10 MG Tabs  Commonly known as:  CLARITIN   Take 10 mg by mouth every day.  Dose:  10 mg     NORVASC 10 MG Tabs  Generic drug:  amLODIPine   Take 10 mg by mouth every day.  Dose:  10 mg     omeprazole 20 MG delayed-release capsule  Commonly known as:  PRILOSEC   Take 20 mg by mouth every day.  Dose:  20 mg     prazosin 5 MG Caps  Commonly known as:  MINIPRESS   Take 5 mg by mouth every evening.  Dose:  5 mg     spironolactone 25 MG Tabs  Commonly known as:  ALDACTONE   Take 25 mg by mouth every day.  Dose:  25 mg     terazosin 5 MG Caps  Commonly known as:  HYTRIN   Take 5 mg by mouth 2 times a day.  Dose:  5 mg     verapamil  MG Tbcr  Commonly known as:  CALAN-SR   Take 180 mg by mouth 2 Times a Day.  Dose:  180 mg     Vitamin D-3 5000 units Tabs   Take 5,000 Units by mouth every day.  Dose:  5,000 Units            Discharge Diet:  Cardiac diet with regular texture and thin liquids    Discharge Activity:  From W/C level as tolerated, working on ambulation with outpt therapy    Disposition:  Patient to discharge home with family support and community resources.     Equipment:  Manual  wheelchair    Follow-up & Discharge Instructions:  Follow up with your primary care provider (PCP) within 7-10 days of discharge to review your medications and take over your care.     If you develop chest pain, fever, chills, change in neurologic function (weakness, sensation changes, vision changes), or other concerning sxs, seek immediate medical attention or call 911.      Condition on Discharge:  Good    More than 35 minutes was spent on discharging this patient, including face-to-face time, prescription management, and the dictation of this note.    Adelfo Aquino D.O.    Date of Service: 8/19/2019

## 2019-08-19 NOTE — DISCHARGE INSTRUCTIONS
[facility] NURSING DISCHARGE INSTRUCTIONS    Blood Pressure : 150/74  Weight: 91 kg (200 lb 9.9 oz)  Nursing recommendations for Ian Ballardn at time of discharge are as follows:  Client and Family Member verbalized understanding of all discharge instructions and prescriptions.     Review all your home medications and newly ordered medications with your doctor and/or pharmacist. Follow medication instructions as directed by your doctor and/or pharmacist.    Pain Management: Over the counter Tylenol if needed  Discharge Pain Medication Instructions: Follow directions on bottle/package  Comfort Goal: Comfort at Rest, Comfort with Movement, Perform Activity, Stay Alert  Notify your primary care provider if pain is unrelieved with these measures, if the pain is new, or increased in intensity.    Discharge Skin Characteristics: Warm, Dry  Discharge Skin Exam: Other (Comments)(upper mid back incison healing well)     Skin / Wound Care Instructions: Please contact your primary care physician for any change in skin integrity.     If You Have Surgical Incisions / Wounds:  Monitor surgical site(s) for signs of increased swelling, redness or symptoms of drainage from the site or fever as this could indicate signs and symptoms of infection. If these symptoms are noted, notifiy your primary care provider.    Discharge Safety Instructions: Should Not Be Left Alone In The House     Discharge Safety Concerns: Impaired Judgement, Unsteady Gait, Weakness  The interdisciplinary team has made recommendation that you should not be left alone  in the house due to balance problem, weakness and unsteady gait  Anti-embolic stockings are required during the day and off at night to increase circulation to the lower extremities.    Discharge Diet: Diabetic diet     Discharge Liquids: Thin liquids  Discharge Bowel Function: Continent  Please contact your primary care physician for any changes in bowel  habits.  Discharge Bowel Program:  None  Discharge Bladder Function: Continent  Discharge Urinary Devices: None    Nursing Discharge Plan:   Influenza Vaccine Indication: Patient Refuses, Not indicated: Previously immunized this influenza season and > 8 years of age        Occupational Therapy Discharge Instructions for Ian Laird    8/18/2019    Level of Assist Required for Eating: Able to Complete Eating without Assist  Level of Assist Required for Grooming: Able to Complete Grooming without Assist  Level of Assist Required for Dressing: Requires Supervision with Dressing  Equipment for Dressing: Reacher, Long Handled Shoe Horn  Level of Assist Required for Toileting: Requires Supervision with Toileting  Level of Assist Required for Toilet Transfer: Requires Supervision with Toilet Transfer  Equipment for Toilet Transfer: Commode over Toilet, Grab Bars by Toilet  Level of Assist Required for Bathing: Requires Supervision with Bathing  Equipment for Bathing: Grab Bars in Tub / Shower, Hand Held Shower Head, Long Handled Sponge, Shower Chair  Level of Assist Required for Shower Transfer: Requires Supervision with Shower Transfer  Equipment for Shower Transfer: Grab Bars in Tub / Shower, Shower Chair  Level of Assist Required for Home Mgmt: Requires Supervision with Home Management  Level of Assist Required for Meal Prep: Requires Supervision with Meal Preparation  Driving: May not Drive, Please Contact Physician for Further Information  Home Exercise Program: Refer to Home Exercise Program Handout for Details    Ian, it's been a pleasure working with you in therapy! Good luck with your further recovery at home! You will be missed!   -Eddie/Vincent/KEVIN Craig       Physical Therapy Discharge Instructions for Ian Laird    8/18/2019    Weight Bearing Status - Patient Should: (use walker)  Level of Assist Required for Ambulation: Assist for Balance on Flat Surfaces, Should Not Attempt Curbs at This  Time, Assist for Balance on Stairs(use handrails or doorframe on stairs, contact-guard assist for balance on stairs by able-bodied adult)  Device Recommended for Ambulation: Front-Wheeled Walker  Level of Assist Required to Propel Wheelchair: Requires No Assist  Level of Assist Required for Transfers: Supervision  Device Recommended for Transfers: Front-Wheeled Walker  Home Exercise Program: Refer to Home Exercise Program Handout for Details      Speech Therapy Discharge Instructions for Ian Laird    8/19/2019    Diet: Regular - all foods allowed, Thin Liquids - any liquid like water, coffee, tea, juices, or clear fluids like Gatorade, etc.  Swallow Strategies: NA  Other Diet Instructions: NA  Supervision: 24-7 supervision is recommended for safety at this time.   Cognition / Communication: Dr. Hwang, it has been such a pleasure working with you! Great job on all your hardwork at rehab and keep it up at home.   Executive function is a set of mental skills that help you get things done. These skills are controlled by an area of the brain called the frontal lobe.    Executive function helps you:  • Manage time  • Pay attention  • Switch focus  • Plan and organize  • Remember details  • Avoid saying or doing the wrong thing  • Do things based on your experience  • Multitask    When executive function isn’t working as it should, your behavior is less controlled. This can affect your ability to:  • Work or go to school  • Do things independently  • Maintain relationships    How to Manage Executive Function Problems  • Take a step-by-step approach to work.  • Rely on visual organizational aids.  • Use tools like time organizers, computers, or watches with alarms.  • Make schedules and look at them several times a day.  • Ask for written and oral instructions whenever possible.  • Plan for transition times and shifts in activities.  • Allow and budget extra time to get tasks done.    • When you are about to try a  task that you haven’t done in a while (or struggled with the last time), think about the steps involved, try to anticipate possible challenges and identify what might give you trouble, come up with a plan to compensate of those challenges, and evaluate after the fact - to determine if your plan worked or if it needs adjustment.  •  If you experience an outcome that you didn’t expect, think back to what may have contributed to the problem.   • Break complex problems down into smaller parts.   Sometimes a complicated task can be overwhelming, but if you break it down into components, you will be able to find a place where you can cope with the information.    •  Be patient and persistent.    • Develop tools and strategies that will help organize, filter and narrow down information so that you can deal with it effectively.    Jaiden would benefit from assistance managing their medications.  It is recommended that you purchase a pill organizer to sort medications in on a weekly basis.  Implementing a system to help remind you to take your medications will also be helpful (Ex: setting alarms, having a friend/family member call as a reminder).       It is recommended that Jaiden has assistance when managing any financial tasks.     Keep up the great work, Jaiden! ~ Jenifer Llanes MS, CCC-SLP       How and Where to Give Subcutaneous Enoxaparin Injections       Enoxaparin is an injectable medicine. It is used to help prevent blood clots from developing in your veins. Health care providers often use anticoagulants like enoxaparin to prevent clots following surgery. Enoxaparin is also used in combination with other medicines to treat blood clots and heart attacks. If blood clots are left untreated, they can be life threatening.       Enoxaparin comes in single-use syringes. You inject enoxaparin through a syringe into your belly (abdomen). You should change the injection site each time you give yourself a shot. Continue the  enoxaparin injections as directed by your health care provider. Your health care provider will use blood clotting test results to decide when you can safely stop using enoxaparin injections. If your health care provider prescribes any additional medicines, use the medicines exactly as directed.  How do I inject enoxaparin?  1. Wash your hands with soap and water.  2. Clean the selected injection site as directed by your health care provider.  3. Remove the needle cap by pulling it straight off the syringe.  4. When using a prefilled syringe, do not push the air bubble out of the syringe before the injection. The air bubble will help you get all of the medicine out of the syringe.  5. Hold the syringe like a pencil using your writing hand.  6. Use your other hand to pinch and hold an inch of the cleansed skin.  7. Insert the entire needle straight down into the fold of skin.  8. Push the plunger with your thumb until the syringe is empty.  9. Pull the needle straight out of your skin.  10. Enoxaparin injection prefilled syringes and graduated prefilled syringes are available with a system that shields the needle after injection. After you have completed your injection and removed the needle from your skin, firmly push down on the plunger. The protective sleeve will automatically cover the needle and you will hear a click. The click means the needle is safely covered.  11. Place the syringe in the nearest needle box, also called a sharps container. If you do not have a sharps container, you can use a hard-sided plastic container with a secure lid, such as an empty laundry detergent bottle.  What else do I need to know?  · Do not use enoxaparin if:  ¨ You have allergies to heparin or pork products.  ¨ You have been diagnosed with a condition called thrombocytopenia.  · Do not use the syringe or needle more than one time.  · Use medicines only as directed by your health care provider.  · Changes in medicines,  supplements, diet, and illness can affect your anticoagulation therapy. Be sure to inform your health care provider of any of these changes.  · It is important that you tell all of your health care providers and your dentist that you are taking an anticoagulant, especially if you are injured or plan to have any type of procedure.  · While on anticoagulants, you will need to have blood tests done routinely as directed by your health care provider.  · While using this medicine, avoid physical activities or sports that could result in a fall or cause injury.  · Follow up with your laboratory test and health care provider appointments as directed. It is very important to keep your appointments. Not keeping appointments could result in a chronic or permanent injury, pain, or disability.  · Before giving your medicine, you should make sure the injection is a clear and colorless or pale yellow solution. If your medicine becomes discolored or if there are particles in the syringe, do not use it and notify your health care provider.  · Keep your medicine safely stored at room temperature.  Contact a health care provider if:  · You develop any rashes on your skin.  · You have large areas of bruising on your skin.  · You have any worsening of the condition for which you take Enoxaparin.  · You develop a fever.  Get help right away if:  · You develop bleeding problems such as:  ¨ Bleeding from the gums or nose that does not stop quickly.  ¨ Vomiting blood or coughing up blood.  ¨ Blood in your urine.  ¨ Blood in your stool, or stool that has a dark, tarry, or coffee grounds appearance.  ¨ A cut that does not stop bleeding within 10 minutes.  These symptoms may represent a serious problem that is an emergency. Do not wait to see if the symptoms will go away. Get medical help right away. Call your local emergency services (911 in the [place] Do not drive yourself to the hospital.   This information is not intended to replace  advice given to you by your health care provider. Make sure you discuss any questions you have with your health care provider.  Document Released: 10/19/2005 Document Revised: 08/24/2017 Document Reviewed: 06/04/2015  [employer] Interactive Patient Education © 2017 [employer]      Fall Prevention in the Home  Falls can cause injuries and can affect people from all age groups. There are many simple things that you can do to make your home safe and to help prevent falls.  What can I do on the outside of my home?  · Regularly repair the edges of walkways and driveways and fix any cracks.  · Remove high doorway thresholds.  · Trim any shrubbery on the main path into your home.  · Use bright outdoor lighting.  · Clear walkways of debris and clutter, including tools and rocks.  · Regularly check that handrails are securely fastened and in good repair. Both sides of any steps should have handrails.  · Install guardrails along the edges of any raised decks or porches.  · Have leaves, snow, and ice cleared regularly.  · Use sand or salt on walkways during winter months.  · In the garage, clean up any spills right away, including grease or oil spills.  What can I do in the bathroom?  · Use night lights.  · Install grab bars by the toilet and in the tub and shower. Do not use towel bars as grab bars.  · Use non-skid mats or decals on the floor of the tub or shower.  · If you need to sit down while you are in the shower, use a plastic, non-slip stool.  · Keep the floor dry. Immediately clean up any water that spills on the floor.  · Remove soap buildup in the tub or shower on a regular basis.  · Attach bath mats securely with double-sided non-slip rug tape.  · Remove throw rugs and other tripping hazards from the floor.  What can I do in the bedroom?  · Use night lights.  · Make sure that a bedside light is easy to reach.  · Do not use oversized bedding that drapes onto the floor.  · Have a firm chair that has side arms to use  for getting dressed.  · Remove throw rugs and other tripping hazards from the floor.  What can I do in the kitchen?  · Clean up any spills right away.  · Avoid walking on wet floors.  · Place frequently used items in easy-to-reach places.  · If you need to reach for something above you, use a sturdy step stool that has a grab bar.  · Keep electrical cables out of the way.  · Do not use floor polish or wax that makes floors slippery. If you have to use wax, make sure that it is non-skid floor wax.  · Remove throw rugs and other tripping hazards from the floor.  What can I do in the stairways?  · Do not leave any items on the stairs.  · Make sure that there are handrails on both sides of the stairs. Fix handrails that are broken or loose. Make sure that handrails are as long as the stairways.  · Check any carpeting to make sure that it is firmly attached to the stairs. Fix any carpet that is loose or worn.  · Avoid having throw rugs at the top or bottom of stairways, or secure the rugs with carpet tape to prevent them from moving.  · Make sure that you have a light switch at the top of the stairs and the bottom of the stairs. If you do not have them, have them installed.  What are some other fall prevention tips?  · Wear closed-toe shoes that fit well and support your feet. Wear shoes that have rubber soles or low heels.  · When you use a stepladder, make sure that it is completely opened and that the sides are firmly locked. Have someone hold the ladder while you are using it. Do not climb a closed stepladder.  · Add color or contrast paint or tape to grab bars and handrails in your home. Place contrasting color strips on the first and last steps.  · Use mobility aids as needed, such as canes, walkers, scooters, and crutches.  · Turn on lights if it is dark. Replace any light bulbs that burn out.  · Set up furniture so that there are clear paths. Keep the furniture in the same spot.  · Fix any uneven floor  surfaces.  · Choose a carpet design that does not hide the edge of steps of a stairway.  · Be aware of any and all pets.  · Review your medicines with your healthcare provider. Some medicines can cause dizziness or changes in blood pressure, which increase your risk of falling.  Talk with your health care provider about other ways that you can decrease your risk of falls. This may include working with a physical therapist or  to improve your strength, balance, and endurance.  This information is not intended to replace advice given to you by your health care provider. Make sure you discuss any questions you have with your health care provider.  Document Released: 12/08/2003 Document Revised: 05/16/2017 Document Reviewed: 01/22/2016  [employer] Interactive Patient Education © 2017 [employer]      Suicidal Feelings: How to Help Yourself  Suicide is the taking of one's own life. If you feel as though life is getting too tough to handle and are thinking about suicide, get help right away. To get help:  · Call your local emergency services (911 in the [place]  · Call a suicide hotline to speak with a trained counselor who understands how you are feeling. The following is a list of suicide hotlines in the [place]. For a list of hotlines in [place], visit www.suicide.org/hotlines/international/wpjldm-ebxhvhn-hxzycdao.html.  ¨ 5-488-799-TALK (1-170.147.4539).  ¨ 0-598-NDGXXQX (1-214.363.2742).  ¨ 1-288.303.1038. This is a hotline for French speakers.  ¨ 2-221-317-4TTY (1-630.156.8311). This is a hotline for TTY users.  ¨ 9-017-4-U-JOSEY (1-616.440.5256). This is a hotline for lesbian, orta, bisexual, transgender, or questioning youth.  · Contact a crisis center or a local suicide prevention center. To find a crisis center or suicide prevention center:  ¨ Call your local hospital, clinic, community service organization, mental health center, social service provider, or health department. Ask for assistance in  connecting to a crisis center.  ¨ Visit www.suicidepreventionlifeline.org/getinvolved/ for a list of crisis centers in the [place], or visit www.suicideprevention.ca/wfxgkmlh-kmjae-ccylaqk/find-a-crisis-centre for a list of centers in [place].  · Visit the following websites:  ¨ National Suicide Prevention Lifeline: www.suicidepreventionlifeline.org  ¨ Hopeline: www.hopeline.com  ¨ American Foundation for Suicide Prevention: www.afsp.org  ¨ The [employer] Project (for lesbian, orta, bisexual, transgender, or questioning youth): www.theSt. George's Universityvorproject.org  How can I help myself feel better?  · Promise yourself that you will not do anything drastic when you have suicidal feelings. Remember, there is hope. Many people have gotten through suicidal thoughts and feelings, and you will, too. You may have gotten through them before, and this proves that you can get through them again.  · Let family, friends, teachers, or counselors know how you are feeling. Try not to isolate yourself from those who care about you. Remember, they will want to help you. Talk with someone every day, even if you do not feel sociable. Face-to-face conversation is best.  · Call a mental health professional and see one regularly.  · Visit your primary health care provider every year.  · Eat a well-balanced diet, and space your meals so you eat regularly.  · Get plenty of rest.  · Avoid alcohol and drugs, and remove them from your home. They will only make you feel worse.  · If you are thinking of taking a lot of medicine, give your medicine to someone who can give it to you one day at a time. If you are on antidepressants and are concerned you will overdose, let your health care provider know so he or she can give you safer medicines. Ask your mental health professional about the possible side effects of any medicines you are taking.  · Remove weapons, poisons, knives, and anything else that could harm you from your home.  · Try to stick to  routines. Follow a schedule every day. Put self-care on your schedule.  · Make a list of realistic goals, and cross them off when you achieve them. Accomplishments give a sense of worth.  · Wait until you are feeling better before doing the things you find difficult or unpleasant.  · Exercise if you are able. You will feel better if you exercise for even a half hour each day.  · Go out in the sun or into nature. This will help you recover from depression faster. If you have a favorite place to walk, go there.  · Do the things that have always given you pleasure. Play your favorite music, read a good book, paint a picture, play your favorite instrument, or do anything else that takes your mind off your depression if it is safe to do.  · Keep your living space well lit.  · When you are feeling well, write yourself a letter about tips and support that you can read when you are not feeling well.  · Remember that life’s difficulties can be sorted out with help. Conditions can be treated. You can work on thoughts and strategies that serve you well.  This information is not intended to replace advice given to you by your health care provider. Make sure you discuss any questions you have with your health care provider.  Document Released: 06/23/2004 Document Revised: 08/16/2017 Document Reviewed: 04/14/2015  [employer] Interactive Patient Education © 2017 [employer]

## 2019-08-19 NOTE — DISCHARGE PLANNING
Case management Summary:   Met with patient and his wife prior to discharge.   Reviewed all follow up appointments.  I have scheduled new PCP and Podiatrist per his request.  Referral made to [facility] outpatient therapy and they are have accepted referral and are ready to follow.    Luc has delivered specialized cushion to patient.  Patient indicates that he needs a fww and this will be delivered at home per our discussion.During hospitalization, I have provided support and education and have been available for questions and information during hours of operation, communicated with therapy team and MD along with providing links/resources  to outside services.  Patient verbalizes agreement with all plans and has an understanding of the next steps within the post acute services.     CASE MANAGEMENT PLAN OF CARE   Individualized Goals:   1. Patient wants to be able to don and wear his own pants.  2. He would like to be able to go up his 2 entry steps at his home.      Outcome:   1. Met  2. Met

## 2019-08-19 NOTE — PROGRESS NOTES
[facility] Medicine Daily Progress Note    Date of Service  8/19/2019    Chief Complaint:  Hypertension  Diabetes  Hyponatremia    Interval History:  No significant events or changes since last visit    Review of Systems  Review of Systems   Constitutional: Negative for chills and fever.   Respiratory: Negative for shortness of breath.    Cardiovascular: Negative for chest pain.   Gastrointestinal: Negative for abdominal pain, diarrhea, nausea and vomiting.   Psychiatric/Behavioral: The patient is not nervous/anxious.         Physical Exam  Temp:  [36.6 °C (97.8 °F)-36.9 °C (98.5 °F)] 36.9 °C (98.5 °F)  Pulse:  [67-93] 74  Resp:  [18-20] 20  BP: (133-150)/(67-74) 146/73  SpO2:  [95 %-97 %] 97 %    Physical Exam   Constitutional: He is oriented to person, place, and time. He appears well-nourished.   HENT:   Head: Atraumatic.   Eyes: Pupils are equal, round, and reactive to light. Conjunctivae are normal.   Neck: Normal range of motion. Neck supple.   Cardiovascular: Normal rate, regular rhythm, S1 normal and S2 normal.   No murmur heard.  Pulmonary/Chest: Effort normal. He has no wheezes. He has no rhonchi. He has no rales.   Abdominal: Soft. He exhibits no distension. There is no tenderness.   Musculoskeletal: He exhibits no edema.   Neurological: He is alert and oriented to person, place, and time. No sensory deficit.   Skin: Skin is warm and dry. No rash noted. No cyanosis.   Psychiatric: He has a normal mood and affect. His behavior is normal.   Nursing note and vitals reviewed.      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  Wound care    PVD (peripheral vascular disease) ([facility])- (present on  admission)  Assessment & Plan  Plavix on hold post-operatively    Essential hypertension- (present on admission)  Assessment & Plan  BP a little labile and occ rises up  On Norvasc: 10 mg daily  On Lisinopril: 40 mg daily  On Verapamil ER: 180 mg bid  Note: also on Aldactone  Cont to monitor    Type 2 diabetes mellitus without complication, without long-term current use of insulin (HCC)- (present on admission)  Assessment & Plan  Hba1c 6.8 ()  BS   On Metformin: 1000 mg bid  On accuchecks bid  Note: home meds include Metformin, Glipizide, and Januvia  Cont to monitor    Hypomagnesemia  Assessment & Plan  Currently resolved  M.7 ()  On supplements    BPH (benign prostatic hyperplasia)  Assessment & Plan  Monitor for orthostatic hypotension on Hytrin bid    Anemia- (present on admission)  Assessment & Plan  H&H: 8.2 () --> 8.6 (8/15)  Fe: < 10%  B12: 263  Folate: 8.8  S/P PRBC prior to Rehab transfer  2nd to Fe deficiency  ? Also 2nd to recent surgery and Plasmacytoma  S/P IV Fe daily x 4 doses  On oral Fe, B12, folate supplements  Monitor    Dyslipidemia- (present on admission)  Assessment & Plan  On Lipitor

## 2019-08-19 NOTE — DISCHARGE PLANNING
08/19/19  0920   Discharge Instructions - Completed by Case Mgmt   Discharge Location Home with Outpatient Services   Agency Name / Address / Phone [facility] at [address] [place] [address], [place] [address], [phone]. (they will call you to schedule visits)   Outpatient Services Physical Therapy; Speech Therapy; Occupational therapy   Medical equipment Provider / Phone [employer] for specialized wheelchair cushion at [phone].   Medical Equipment Ordered Specialized wheelchair cushion.            Follow-up With  Details  Why  Contact Info   Win Velasco III, M.D. (Neurosurgery)  On 8/23/2019 Friday at 11:15 am check in (you will be seen by Arabella RITCHIE)  [address]  [facility] [address]  [phone]     ANISH [email or web address] (Podiatry)  On 8/27/2019 Tuesday at 1:15 pm  [address] [facility] [address]  [phone]     Zulma Espino D.O.  ([facility])  On 10/3/2019  Thursday at 11:30 am (discharge records have been sent)  [address] [facility] [id number] [phone]

## 2019-08-19 NOTE — CARE PLAN
Problem: Safety  Goal: Will remain free from injury  Note:   Pt uses call light  appropriately. Waits for assistance does not attempt self transfer this shift. Able to verbalize needs.      Problem: IP BLADDER/VOIDING  Goal: STG - PATIENT WILL REMAIN CONTINENT.  Note:   Pt continent of bladder. On bladder meds. Voiding adequately using urinal. He denies dysuria. Will continue to monitor.

## 2019-08-20 NOTE — DISCHARGE PLANNING
Per Lia at [facility], they do not have any FWW's in stock.  DME referral sent to [facility].  Awaiting response.

## 2019-09-17 PROBLEM — D49.7 SPINAL CORD TUMOR: Status: ACTIVE | Noted: 2019-09-17

## 2019-12-03 NOTE — PROGRESS NOTES
Received report from NOC RN, all questions answered, call light within reach, bed locked and in lowest position, bed alarm on. Hourly rounding in place.    no

## 2020-02-03 PROBLEM — C90.00 MULTIPLE MYELOMA (HCC): Status: ACTIVE | Noted: 2020-02-03

## 2020-02-19 PROBLEM — E11.21 DIABETIC NEPHROPATHY (HCC): Status: ACTIVE | Noted: 2020-02-19

## 2020-02-19 PROBLEM — E11.51 TYPE 2 DIABETES MELLITUS WITH PERIPHERAL VASCULAR DISEASE (HCC): Status: ACTIVE | Noted: 2020-02-19

## 2020-02-25 ENCOUNTER — APPOINTMENT (RX ONLY)
Dept: URBAN - METROPOLITAN AREA CLINIC 4 | Facility: CLINIC | Age: 70
Setting detail: DERMATOLOGY
End: 2020-02-25

## 2020-02-25 DIAGNOSIS — L82.1 OTHER SEBORRHEIC KERATOSIS: ICD-10-CM

## 2020-02-25 PROBLEM — D04.4 CARCINOMA IN SITU OF SKIN OF SCALP AND NECK: Status: ACTIVE | Noted: 2020-02-25

## 2020-02-25 PROCEDURE — ? EXCISION

## 2020-02-25 PROCEDURE — 99214 OFFICE O/P EST MOD 30 MIN: CPT | Mod: 25

## 2020-02-25 PROCEDURE — ? COUNSELING

## 2020-02-25 PROCEDURE — 11622 EXC S/N/H/F/G MAL+MRG 1.1-2: CPT

## 2020-02-25 ASSESSMENT — LOCATION ZONE DERM: LOCATION ZONE: TRUNK

## 2020-02-25 ASSESSMENT — LOCATION DETAILED DESCRIPTION DERM: LOCATION DETAILED: RIGHT SUPERIOR UPPER BACK

## 2020-02-25 ASSESSMENT — LOCATION SIMPLE DESCRIPTION DERM: LOCATION SIMPLE: RIGHT UPPER BACK

## 2020-02-25 NOTE — PROCEDURE: EXCISION
Biopsy Photograph Reviewed: Yes
Size Of Lesion In Cm: 1.2
X Size Of Lesion In Cm (Optional): 0
Size Of Margin In Cm: 0.3
Excision Method: Saucerization
Anesthesia Volume In Cc: 3
Did You Provide Opioid Counseling: No
Repair Type: None
Undermining Type: Entire Wound
Debridement Text: The wound edges were debrided prior to proceeding with the closure to facilitate wound healing.
Helical Rim Text: The closure involved the helical rim.
Vermilion Border Text: The closure involved the vermilion border.
Nostril Rim Text: The closure involved the nostril rim.
Retention Suture Text: Retention sutures were placed to support the closure and prevent dehiscence.
Lab: [id number]
Lab Facility: 
Graft Donor Site Bandage (Optional-Leave Blank If You Don't Want In Note): Steri-strips and a pressure bandage were applied to the donor site.
Epidermal Closure Graft Donor Site (Optional): simple interrupted
Billing Type: Third-Party Bill
Excision Depth: dermis
Scalpel Size: Personna blade
Anesthesia Type: 1% lidocaine with epinephrine
Additional Anesthesia Volume In Cc: 9
Hemostasis: Electrocautery
Estimated Blood Loss (Cc): minimal
Detail Level: Detailed
Anesthesia Volume In Cc: 6
Epidermal Sutures: none-secondary intention
Wound Care: Petrolatum
Dressing: pressure dressing
Positioning (Leave Blank If You Do Not Want): The patient was placed in a comfortable position exposing the surgical site.
Pre-Excision Curettage Text (Leave Blank If You Do Not Want): Prior to drawing the surgical margin the visible lesion was removed with electrodesiccation and curettage to clearly define the lesion size.
Complex Repair Preamble Text (Leave Blank If You Do Not Want): Extensive wide undermining was performed.
Intermediate Repair Preamble Text (Leave Blank If You Do Not Want): Undermining was performed with blunt dissection.
Curvilinear Excision Additional Text (Leave Blank If You Do Not Want): The margin was drawn around the clinically apparent lesion.  A curvilinear shape was then drawn on the skin incorporating the lesion and margins.  Incisions were then made along these lines to the appropriate tissue plane and the lesion was extirpated.
Fusiform Excision Additional Text (Leave Blank If You Do Not Want): The margin was drawn around the clinically apparent lesion.  A fusiform shape was then drawn on the skin incorporating the lesion and margins.  Incisions were then made along these lines to the appropriate tissue plane and the lesion was extirpated.
Elliptical Excision Additional Text (Leave Blank If You Do Not Want): The margin was drawn around the clinically apparent lesion.  An elliptical shape was then drawn on the skin incorporating the lesion and margins.  Incisions were then made along these lines to the appropriate tissue plane and the lesion was extirpated.
Saucerization Excision Additional Text (Leave Blank If You Do Not Want): The margin was drawn around the clinically apparent lesion.  Incisions were then made along these lines, in a tangential fashion, to the appropriate tissue plane and the lesion was extirpated.
Slit Excision Additional Text (Leave Blank If You Do Not Want): A linear line was drawn on the skin overlying the lesion. An incision was made slowly until the lesion was visualized.  Once visualized, the lesion was removed with blunt dissection.
Excisional Biopsy Additional Text (Leave Blank If You Do Not Want): The margin was drawn around the clinically apparent lesion. An elliptical shape was then drawn on the skin incorporating the lesion and margins.  Incisions were then made along these lines to the appropriate tissue plane and the lesion was extirpated.
Perilesional Excision Additional Text (Leave Blank If You Do Not Want): The margin was drawn around the clinically apparent lesion. Incisions were then made along these lines to the appropriate tissue plane and the lesion was extirpated.
Repair Performed By Another Provider Text (Leave Blank If You Do Not Want): After the tissue was excised the defect was repaired by another provider.
No Repair - Repaired With Adjacent Surgical Defect Text (Leave Blank If You Do Not Want): After the excision the defect was repaired concurrently with another surgical defect which was in close approximation.
Advancement Flap (Single) Text: The defect edges were debeveled with a #15 scalpel blade.  Given the location of the defect and the proximity to free margins a single advancement flap was deemed most appropriate.  Using a sterile surgical marker, an appropriate advancement flap was drawn incorporating the defect and placing the expected incisions within the relaxed skin tension lines where possible.    The area thus outlined was incised deep to adipose tissue with a #15 scalpel blade.  The skin margins were undermined to an appropriate distance in all directions utilizing iris scissors.
Advancement Flap (Double) Text: The defect edges were debeveled with a #15 scalpel blade.  Given the location of the defect and the proximity to free margins a double advancement flap was deemed most appropriate.  Using a sterile surgical marker, the appropriate advancement flaps were drawn incorporating the defect and placing the expected incisions within the relaxed skin tension lines where possible.    The area thus outlined was incised deep to adipose tissue with a #15 scalpel blade.  The skin margins were undermined to an appropriate distance in all directions utilizing iris scissors.
Burow's Advancement Flap Text: The defect edges were debeveled with a #15 scalpel blade.  Given the location of the defect and the proximity to free margins a Burow's advancement flap was deemed most appropriate.  Using a sterile surgical marker, the appropriate advancement flap was drawn incorporating the defect and placing the expected incisions within the relaxed skin tension lines where possible.    The area thus outlined was incised deep to adipose tissue with a #15 scalpel blade.  The skin margins were undermined to an appropriate distance in all directions utilizing iris scissors.
Chonodrocutaneous Helical Advancement Flap Text: The defect edges were debeveled with a #15 scalpel blade.  Given the location of the defect and the proximity to free margins a chondrocutaneous helical advancement flap was deemed most appropriate.  Using a sterile surgical marker, the appropriate advancement flap was drawn incorporating the defect and placing the expected incisions within the relaxed skin tension lines where possible.    The area thus outlined was incised deep to adipose tissue with a #15 scalpel blade.  The skin margins were undermined to an appropriate distance in all directions utilizing iris scissors.
Crescentic Advancement Flap Text: The defect edges were debeveled with a #15 scalpel blade.  Given the location of the defect and the proximity to free margins a crescentic advancement flap was deemed most appropriate.  Using a sterile surgical marker, the appropriate advancement flap was drawn incorporating the defect and placing the expected incisions within the relaxed skin tension lines where possible.    The area thus outlined was incised deep to adipose tissue with a #15 scalpel blade.  The skin margins were undermined to an appropriate distance in all directions utilizing iris scissors.
A-T Advancement Flap Text: The defect edges were debeveled with a #15 scalpel blade.  Given the location of the defect, shape of the defect and the proximity to free margins an A-T advancement flap was deemed most appropriate.  Using a sterile surgical marker, an appropriate advancement flap was drawn incorporating the defect and placing the expected incisions within the relaxed skin tension lines where possible.    The area thus outlined was incised deep to adipose tissue with a #15 scalpel blade.  The skin margins were undermined to an appropriate distance in all directions utilizing iris scissors.
O-T Advancement Flap Text: The defect edges were debeveled with a #15 scalpel blade.  Given the location of the defect, shape of the defect and the proximity to free margins an O-T advancement flap was deemed most appropriate.  Using a sterile surgical marker, an appropriate advancement flap was drawn incorporating the defect and placing the expected incisions within the relaxed skin tension lines where possible.    The area thus outlined was incised deep to adipose tissue with a #15 scalpel blade.  The skin margins were undermined to an appropriate distance in all directions utilizing iris scissors.
O-L Flap Text: The defect edges were debeveled with a #15 scalpel blade.  Given the location of the defect, shape of the defect and the proximity to free margins an O-L flap was deemed most appropriate.  Using a sterile surgical marker, an appropriate advancement flap was drawn incorporating the defect and placing the expected incisions within the relaxed skin tension lines where possible.    The area thus outlined was incised deep to adipose tissue with a #15 scalpel blade.  The skin margins were undermined to an appropriate distance in all directions utilizing iris scissors.
O-Z Flap Text: The defect edges were debeveled with a #15 scalpel blade.  Given the location of the defect, shape of the defect and the proximity to free margins an O-Z flap was deemed most appropriate.  Using a sterile surgical marker, an appropriate transposition flap was drawn incorporating the defect and placing the expected incisions within the relaxed skin tension lines where possible. The area thus outlined was incised deep to adipose tissue with a #15 scalpel blade.  The skin margins were undermined to an appropriate distance in all directions utilizing iris scissors.
Double O-Z Flap Text: The defect edges were debeveled with a #15 scalpel blade.  Given the location of the defect, shape of the defect and the proximity to free margins a Double O-Z flap was deemed most appropriate.  Using a sterile surgical marker, an appropriate transposition flap was drawn incorporating the defect and placing the expected incisions within the relaxed skin tension lines where possible. The area thus outlined was incised deep to adipose tissue with a #15 scalpel blade.  The skin margins were undermined to an appropriate distance in all directions utilizing iris scissors.
V-Y Flap Text: The defect edges were debeveled with a #15 scalpel blade.  Given the location of the defect, shape of the defect and the proximity to free margins a V-Y flap was deemed most appropriate.  Using a sterile surgical marker, an appropriate advancement flap was drawn incorporating the defect and placing the expected incisions within the relaxed skin tension lines where possible.    The area thus outlined was incised deep to adipose tissue with a #15 scalpel blade.  The skin margins were undermined to an appropriate distance in all directions utilizing iris scissors.
Advancement-Rotation Flap Text: The defect edges were debeveled with a #15 scalpel blade.  Given the location of the defect, shape of the defect and the proximity to free margins an advancement-rotation flap was deemed most appropriate.  Using a sterile surgical marker, an appropriate flap was drawn incorporating the defect and placing the expected incisions within the relaxed skin tension lines where possible. The area thus outlined was incised deep to adipose tissue with a #15 scalpel blade.  The skin margins were undermined to an appropriate distance in all directions utilizing iris scissors.
Mercedes Flap Text: The defect edges were debeveled with a #15 scalpel blade.  Given the location of the defect, shape of the defect and the proximity to free margins a Mercedes flap was deemed most appropriate.  Using a sterile surgical marker, an appropriate advancement flap was drawn incorporating the defect and placing the expected incisions within the relaxed skin tension lines where possible. The area thus outlined was incised deep to adipose tissue with a #15 scalpel blade.  The skin margins were undermined to an appropriate distance in all directions utilizing iris scissors.
Modified Advancement Flap Text: The defect edges were debeveled with a #15 scalpel blade.  Given the location of the defect, shape of the defect and the proximity to free margins a modified advancement flap was deemed most appropriate.  Using a sterile surgical marker, an appropriate advancement flap was drawn incorporating the defect and placing the expected incisions within the relaxed skin tension lines where possible.    The area thus outlined was incised deep to adipose tissue with a #15 scalpel blade.  The skin margins were undermined to an appropriate distance in all directions utilizing iris scissors.
Mucosal Advancement Flap Text: Given the location of the defect, shape of the defect and the proximity to free margins a mucosal advancement flap was deemed most appropriate. Incisions were made with a 15 blade scalpel in the appropriate fashion along the cutaneous vermilion border and the mucosal lip. The remaining actinically damaged mucosal tissue was excised.  The mucosal advancement flap was then elevated to the gingival sulcus with care taken to preserve the neurovascular structures and advanced into the primary defect. Care was taken to ensure that precise realignment of the vermilion border was achieved.
Hatchet Flap Text: The defect edges were debeveled with a #15 scalpel blade.  Given the location of the defect, shape of the defect and the proximity to free margins a hatchet flap was deemed most appropriate.  Using a sterile surgical marker, an appropriate hatchet flap was drawn incorporating the defect and placing the expected incisions within the relaxed skin tension lines where possible.    The area thus outlined was incised deep to adipose tissue with a #15 scalpel blade.  The skin margins were undermined to an appropriate distance in all directions utilizing iris scissors.
Rotation Flap Text: The defect edges were debeveled with a #15 scalpel blade.  Given the location of the defect, shape of the defect and the proximity to free margins a rotation flap was deemed most appropriate.  Using a sterile surgical marker, an appropriate rotation flap was drawn incorporating the defect and placing the expected incisions within the relaxed skin tension lines where possible.    The area thus outlined was incised deep to adipose tissue with a #15 scalpel blade.  The skin margins were undermined to an appropriate distance in all directions utilizing iris scissors.
Spiral Flap Text: The defect edges were debeveled with a #15 scalpel blade.  Given the location of the defect, shape of the defect and the proximity to free margins a spiral flap was deemed most appropriate.  Using a sterile surgical marker, an appropriate rotation flap was drawn incorporating the defect and placing the expected incisions within the relaxed skin tension lines where possible. The area thus outlined was incised deep to adipose tissue with a #15 scalpel blade.  The skin margins were undermined to an appropriate distance in all directions utilizing iris scissors.
Star Wedge Flap Text: The defect edges were debeveled with a #15 scalpel blade.  Given the location of the defect, shape of the defect and the proximity to free margins a star wedge flap was deemed most appropriate.  Using a sterile surgical marker, an appropriate rotation flap was drawn incorporating the defect and placing the expected incisions within the relaxed skin tension lines where possible. The area thus outlined was incised deep to adipose tissue with a #15 scalpel blade.  The skin margins were undermined to an appropriate distance in all directions utilizing iris scissors.
Transposition Flap Text: The defect edges were debeveled with a #15 scalpel blade.  Given the location of the defect and the proximity to free margins a transposition flap was deemed most appropriate.  Using a sterile surgical marker, an appropriate transposition flap was drawn incorporating the defect.    The area thus outlined was incised deep to adipose tissue with a #15 scalpel blade.  The skin margins were undermined to an appropriate distance in all directions utilizing iris scissors.
Muscle Hinge Flap Text: The defect edges were debeveled with a #15 scalpel blade.  Given the size, depth and location of the defect and the proximity to free margins a muscle hinge flap was deemed most appropriate.  Using a sterile surgical marker, an appropriate hinge flap was drawn incorporating the defect. The area thus outlined was incised with a #15 scalpel blade.  The skin margins were undermined to an appropriate distance in all directions utilizing iris scissors.
Melolabial Transposition Flap Text: The defect edges were debeveled with a #15 scalpel blade.  Given the location of the defect and the proximity to free margins a melolabial flap was deemed most appropriate.  Using a sterile surgical marker, an appropriate melolabial transposition flap was drawn incorporating the defect.    The area thus outlined was incised deep to adipose tissue with a #15 scalpel blade.  The skin margins were undermined to an appropriate distance in all directions utilizing iris scissors.
Rhombic Flap Text: The defect edges were debeveled with a #15 scalpel blade.  Given the location of the defect and the proximity to free margins a rhombic flap was deemed most appropriate.  Using a sterile surgical marker, an appropriate rhombic flap was drawn incorporating the defect.    The area thus outlined was incised deep to adipose tissue with a #15 scalpel blade.  The skin margins were undermined to an appropriate distance in all directions utilizing iris scissors.
Rhomboid Transposition Flap Text: The defect edges were debeveled with a #15 scalpel blade.  Given the location of the defect and the proximity to free margins a rhomboid transposition flap was deemed most appropriate.  Using a sterile surgical marker, an appropriate rhomboid flap was drawn incorporating the defect.    The area thus outlined was incised deep to adipose tissue with a #15 scalpel blade.  The skin margins were undermined to an appropriate distance in all directions utilizing iris scissors.
Bi-Rhombic Flap Text: The defect edges were debeveled with a #15 scalpel blade.  Given the location of the defect and the proximity to free margins a bi-rhombic flap was deemed most appropriate.  Using a sterile surgical marker, an appropriate rhombic flap was drawn incorporating the defect. The area thus outlined was incised deep to adipose tissue with a #15 scalpel blade.  The skin margins were undermined to an appropriate distance in all directions utilizing iris scissors.
Helical Rim Advancement Flap Text: The defect edges were debeveled with a #15 blade scalpel.  Given the location of the defect and the proximity to free margins (helical rim) a double helical rim advancement flap was deemed most appropriate.  Using a sterile surgical marker, the appropriate advancement flaps were drawn incorporating the defect and placing the expected incisions between the helical rim and antihelix where possible.  The area thus outlined was incised through and through with a #15 scalpel blade.  With a skin hook and iris scissors, the flaps were gently and sharply undermined and freed up.
Bilateral Helical Rim Advancement Flap Text: The defect edges were debeveled with a #15 blade scalpel.  Given the location of the defect and the proximity to free margins (helical rim) a bilateral helical rim advancement flap was deemed most appropriate.  Using a sterile surgical marker, the appropriate advancement flaps were drawn incorporating the defect and placing the expected incisions between the helical rim and antihelix where possible.  The area thus outlined was incised through and through with a #15 scalpel blade.  With a skin hook and iris scissors, the flaps were gently and sharply undermined and freed up.
Ear Star Wedge Flap Text: The defect edges were debeveled with a #15 blade scalpel.  Given the location of the defect and the proximity to free margins (helical rim) an ear star wedge flap was deemed most appropriate.  Using a sterile surgical marker, the appropriate flap was drawn incorporating the defect and placing the expected incisions between the helical rim and antihelix where possible.  The area thus outlined was incised through and through with a #15 scalpel blade.
Banner Transposition Flap Text: The defect edges were debeveled with a #15 scalpel blade.  Given the location of the defect and the proximity to free margins a Banner transposition flap was deemed most appropriate.  Using a sterile surgical marker, an appropriate flap drawn around the defect. The area thus outlined was incised deep to adipose tissue with a #15 scalpel blade.  The skin margins were undermined to an appropriate distance in all directions utilizing iris scissors.
Bilobed Flap Text: The defect edges were debeveled with a #15 scalpel blade.  Given the location of the defect and the proximity to free margins a bilobe flap was deemed most appropriate.  Using a sterile surgical marker, an appropriate bilobe flap drawn around the defect.    The area thus outlined was incised deep to adipose tissue with a #15 scalpel blade.  The skin margins were undermined to an appropriate distance in all directions utilizing iris scissors.
Bilobed Transposition Flap Text: The defect edges were debeveled with a #15 scalpel blade.  Given the location of the defect and the proximity to free margins a bilobed transposition flap was deemed most appropriate.  Using a sterile surgical marker, an appropriate bilobe flap drawn around the defect.    The area thus outlined was incised deep to adipose tissue with a #15 scalpel blade.  The skin margins were undermined to an appropriate distance in all directions utilizing iris scissors.
Trilobed Flap Text: The defect edges were debeveled with a #15 scalpel blade.  Given the location of the defect and the proximity to free margins a trilobed flap was deemed most appropriate.  Using a sterile surgical marker, an appropriate trilobed flap drawn around the defect.    The area thus outlined was incised deep to adipose tissue with a #15 scalpel blade.  The skin margins were undermined to an appropriate distance in all directions utilizing iris scissors.
Dorsal Nasal Flap Text: The defect edges were debeveled with a #15 scalpel blade.  Given the location of the defect and the proximity to free margins a dorsal nasal flap was deemed most appropriate.  Using a sterile surgical marker, an appropriate dorsal nasal flap was drawn around the defect.    The area thus outlined was incised deep to adipose tissue with a #15 scalpel blade.  The skin margins were undermined to an appropriate distance in all directions utilizing iris scissors.
Island Pedicle Flap Text: The defect edges were debeveled with a #15 scalpel blade.  Given the location of the defect, shape of the defect and the proximity to free margins an island pedicle advancement flap was deemed most appropriate.  Using a sterile surgical marker, an appropriate advancement flap was drawn incorporating the defect, outlining the appropriate donor tissue and placing the expected incisions within the relaxed skin tension lines where possible.    The area thus outlined was incised deep to adipose tissue with a #15 scalpel blade.  The skin margins were undermined to an appropriate distance in all directions around the primary defect and laterally outward around the island pedicle utilizing iris scissors.  There was minimal undermining beneath the pedicle flap.
Island Pedicle Flap With Canthal Suspension Text: The defect edges were debeveled with a #15 scalpel blade.  Given the location of the defect, shape of the defect and the proximity to free margins an island pedicle advancement flap was deemed most appropriate.  Using a sterile surgical marker, an appropriate advancement flap was drawn incorporating the defect, outlining the appropriate donor tissue and placing the expected incisions within the relaxed skin tension lines where possible. The area thus outlined was incised deep to adipose tissue with a #15 scalpel blade.  The skin margins were undermined to an appropriate distance in all directions around the primary defect and laterally outward around the island pedicle utilizing iris scissors.  There was minimal undermining beneath the pedicle flap. A suspension suture was placed in the canthal tendon to prevent tension and prevent ectropion.
Alar Island Pedicle Flap Text: The defect edges were debeveled with a #15 scalpel blade.  Given the location of the defect, shape of the defect and the proximity to the alar rim an island pedicle advancement flap was deemed most appropriate.  Using a sterile surgical marker, an appropriate advancement flap was drawn incorporating the defect, outlining the appropriate donor tissue and placing the expected incisions within the nasal ala running parallel to the alar rim. The area thus outlined was incised with a #15 scalpel blade.  The skin margins were undermined minimally to an appropriate distance in all directions around the primary defect and laterally outward around the island pedicle utilizing iris scissors.  There was minimal undermining beneath the pedicle flap.
Double Island Pedicle Flap Text: The defect edges were debeveled with a #15 scalpel blade.  Given the location of the defect, shape of the defect and the proximity to free margins a double island pedicle advancement flap was deemed most appropriate.  Using a sterile surgical marker, an appropriate advancement flap was drawn incorporating the defect, outlining the appropriate donor tissue and placing the expected incisions within the relaxed skin tension lines where possible.    The area thus outlined was incised deep to adipose tissue with a #15 scalpel blade.  The skin margins were undermined to an appropriate distance in all directions around the primary defect and laterally outward around the island pedicle utilizing iris scissors.  There was minimal undermining beneath the pedicle flap.
Island Pedicle Flap-Requiring Vessel Identification Text: The defect edges were debeveled with a #15 scalpel blade.  Given the location of the defect, shape of the defect and the proximity to free margins an island pedicle advancement flap was deemed most appropriate.  Using a sterile surgical marker, an appropriate advancement flap was drawn, based on the axial vessel mentioned above, incorporating the defect, outlining the appropriate donor tissue and placing the expected incisions within the relaxed skin tension lines where possible.    The area thus outlined was incised deep to adipose tissue with a #15 scalpel blade.  The skin margins were undermined to an appropriate distance in all directions around the primary defect and laterally outward around the island pedicle utilizing iris scissors.  There was minimal undermining beneath the pedicle flap.
Keystone Flap Text: The defect edges were debeveled with a #15 scalpel blade.  Given the location of the defect, shape of the defect a keystone flap was deemed most appropriate.  Using a sterile surgical marker, an appropriate keystone flap was drawn incorporating the defect, outlining the appropriate donor tissue and placing the expected incisions within the relaxed skin tension lines where possible. The area thus outlined was incised deep to adipose tissue with a #15 scalpel blade.  The skin margins were undermined to an appropriate distance in all directions around the primary defect and laterally outward around the flap utilizing iris scissors.
O-T Plasty Text: The defect edges were debeveled with a #15 scalpel blade.  Given the location of the defect, shape of the defect and the proximity to free margins an O-T plasty was deemed most appropriate.  Using a sterile surgical marker, an appropriate O-T plasty was drawn incorporating the defect and placing the expected incisions within the relaxed skin tension lines where possible.    The area thus outlined was incised deep to adipose tissue with a #15 scalpel blade.  The skin margins were undermined to an appropriate distance in all directions utilizing iris scissors.
O-Z Plasty Text: The defect edges were debeveled with a #15 scalpel blade.  Given the location of the defect, shape of the defect and the proximity to free margins an O-Z plasty (double transposition flap) was deemed most appropriate.  Using a sterile surgical marker, the appropriate transposition flaps were drawn incorporating the defect and placing the expected incisions within the relaxed skin tension lines where possible.    The area thus outlined was incised deep to adipose tissue with a #15 scalpel blade.  The skin margins were undermined to an appropriate distance in all directions utilizing iris scissors.  Hemostasis was achieved with electrocautery.  The flaps were then transposed into place, one clockwise and the other counterclockwise, and anchored with interrupted buried subcutaneous sutures.
Double O-Z Plasty Text: The defect edges were debeveled with a #15 scalpel blade.  Given the location of the defect, shape of the defect and the proximity to free margins a Double O-Z plasty (double transposition flap) was deemed most appropriate.  Using a sterile surgical marker, the appropriate transposition flaps were drawn incorporating the defect and placing the expected incisions within the relaxed skin tension lines where possible. The area thus outlined was incised deep to adipose tissue with a #15 scalpel blade.  The skin margins were undermined to an appropriate distance in all directions utilizing iris scissors.  Hemostasis was achieved with electrocautery.  The flaps were then transposed into place, one clockwise and the other counterclockwise, and anchored with interrupted buried subcutaneous sutures.
S Plasty Text: Given the location and shape of the defect, and the orientation of relaxed skin tension lines, an S-plasty was deemed most appropriate for repair.  Using a sterile surgical marker, the appropriate outline of the S-plasty was drawn, incorporating the defect and placing the expected incisions within the relaxed skin tension lines where possible.  The area thus outlined was incised deep to adipose tissue with a #15 scalpel blade.  The skin margins were undermined to an appropriate distance in all directions utilizing iris scissors. The skin flaps were advanced over the defect.  The opposing margins were then approximated with interrupted buried subcutaneous sutures.
V-Y Plasty Text: The defect edges were debeveled with a #15 scalpel blade.  Given the location of the defect, shape of the defect and the proximity to free margins an V-Y advancement flap was deemed most appropriate.  Using a sterile surgical marker, an appropriate advancement flap was drawn incorporating the defect and placing the expected incisions within the relaxed skin tension lines where possible.    The area thus outlined was incised deep to adipose tissue with a #15 scalpel blade.  The skin margins were undermined to an appropriate distance in all directions utilizing iris scissors.
H Plasty Text: Given the location of the defect, shape of the defect and the proximity to free margins a H-plasty was deemed most appropriate for repair.  Using a sterile surgical marker, the appropriate advancement arms of the H-plasty were drawn incorporating the defect and placing the expected incisions within the relaxed skin tension lines where possible. The area thus outlined was incised deep to adipose tissue with a #15 scalpel blade. The skin margins were undermined to an appropriate distance in all directions utilizing iris scissors.  The opposing advancement arms were then advanced into place in opposite direction and anchored with interrupted buried subcutaneous sutures.
W Plasty Text: The lesion was extirpated to the level of the fat with a #15 scalpel blade.  Given the location of the defect, shape of the defect and the proximity to free margins a W-plasty was deemed most appropriate for repair.  Using a sterile surgical marker, the appropriate transposition arms of the W-plasty were drawn incorporating the defect and placing the expected incisions within the relaxed skin tension lines where possible.    The area thus outlined was incised deep to adipose tissue with a #15 scalpel blade.  The skin margins were undermined to an appropriate distance in all directions utilizing iris scissors.  The opposing transposition arms were then transposed into place in opposite direction and anchored with interrupted buried subcutaneous sutures.
Z Plasty Text: The lesion was extirpated to the level of the fat with a #15 scalpel blade.  Given the location of the defect, shape of the defect and the proximity to free margins a Z-plasty was deemed most appropriate for repair.  Using a sterile surgical marker, the appropriate transposition arms of the Z-plasty were drawn incorporating the defect and placing the expected incisions within the relaxed skin tension lines where possible.    The area thus outlined was incised deep to adipose tissue with a #15 scalpel blade.  The skin margins were undermined to an appropriate distance in all directions utilizing iris scissors.  The opposing transposition arms were then transposed into place in opposite direction and anchored with interrupted buried subcutaneous sutures.
Cheek Interpolation Flap Text: A decision was made to reconstruct the defect utilizing an interpolation axial flap and a staged reconstruction.  A telfa template was made of the defect.  This telfa template was then used to outline the Cheek Interpolation flap.  The donor area for the pedicle flap was then injected with anesthesia.  The flap was excised through the skin and subcutaneous tissue down to the layer of the underlying musculature.  The interpolation flap was carefully excised within this deep plane to maintain its blood supply.  The edges of the donor site were undermined.   The donor site was closed in a primary fashion.  The pedicle was then rotated into position and sutured.  Once the tube was sutured into place, adequate blood supply was confirmed with blanching and refill.  The pedicle was then wrapped with xeroform gauze and dressed appropriately with a telfa and gauze bandage to ensure continued blood supply and protect the attached pedicle.
Cheek-To-Nose Interpolation Flap Text: A decision was made to reconstruct the defect utilizing an interpolation axial flap and a staged reconstruction.  A telfa template was made of the defect.  This telfa template was then used to outline the Cheek-To-Nose Interpolation flap.  The donor area for the pedicle flap was then injected with anesthesia.  The flap was excised through the skin and subcutaneous tissue down to the layer of the underlying musculature.  The interpolation flap was carefully excised within this deep plane to maintain its blood supply.  The edges of the donor site were undermined.   The donor site was closed in a primary fashion.  The pedicle was then rotated into position and sutured.  Once the tube was sutured into place, adequate blood supply was confirmed with blanching and refill.  The pedicle was then wrapped with xeroform gauze and dressed appropriately with a telfa and gauze bandage to ensure continued blood supply and protect the attached pedicle.
Interpolation Flap Text: A decision was made to reconstruct the defect utilizing an interpolation axial flap and a staged reconstruction.  A telfa template was made of the defect.  This telfa template was then used to outline the interpolation flap.  The donor area for the pedicle flap was then injected with anesthesia.  The flap was excised through the skin and subcutaneous tissue down to the layer of the underlying musculature.  The interpolation flap was carefully excised within this deep plane to maintain its blood supply.  The edges of the donor site were undermined.   The donor site was closed in a primary fashion.  The pedicle was then rotated into position and sutured.  Once the tube was sutured into place, adequate blood supply was confirmed with blanching and refill.  The pedicle was then wrapped with xeroform gauze and dressed appropriately with a telfa and gauze bandage to ensure continued blood supply and protect the attached pedicle.
Melolabial Interpolation Flap Text: A decision was made to reconstruct the defect utilizing an interpolation axial flap and a staged reconstruction.  A telfa template was made of the defect.  This telfa template was then used to outline the melolabial interpolation flap.  The donor area for the pedicle flap was then injected with anesthesia.  The flap was excised through the skin and subcutaneous tissue down to the layer of the underlying musculature.  The pedicle flap was carefully excised within this deep plane to maintain its blood supply.  The edges of the donor site were undermined.   The donor site was closed in a primary fashion.  The pedicle was then rotated into position and sutured.  Once the tube was sutured into place, adequate blood supply was confirmed with blanching and refill.  The pedicle was then wrapped with xeroform gauze and dressed appropriately with a telfa and gauze bandage to ensure continued blood supply and protect the attached pedicle.
Mastoid Interpolation Flap Text: A decision was made to reconstruct the defect utilizing an interpolation axial flap and a staged reconstruction.  A telfa template was made of the defect.  This telfa template was then used to outline the mastoid interpolation flap.  The donor area for the pedicle flap was then injected with anesthesia.  The flap was excised through the skin and subcutaneous tissue down to the layer of the underlying musculature.  The pedicle flap was carefully excised within this deep plane to maintain its blood supply.  The edges of the donor site were undermined.   The donor site was closed in a primary fashion.  The pedicle was then rotated into position and sutured.  Once the tube was sutured into place, adequate blood supply was confirmed with blanching and refill.  The pedicle was then wrapped with xeroform gauze and dressed appropriately with a telfa and gauze bandage to ensure continued blood supply and protect the attached pedicle.
Posterior Auricular Interpolation Flap Text: A decision was made to reconstruct the defect utilizing an interpolation axial flap and a staged reconstruction.  A telfa template was made of the defect.  This telfa template was then used to outline the posterior auricular interpolation flap.  The donor area for the pedicle flap was then injected with anesthesia.  The flap was excised through the skin and subcutaneous tissue down to the layer of the underlying musculature.  The pedicle flap was carefully excised within this deep plane to maintain its blood supply.  The edges of the donor site were undermined.   The donor site was closed in a primary fashion.  The pedicle was then rotated into position and sutured.  Once the tube was sutured into place, adequate blood supply was confirmed with blanching and refill.  The pedicle was then wrapped with xeroform gauze and dressed appropriately with a telfa and gauze bandage to ensure continued blood supply and protect the attached pedicle.
Paramedian Forehead Flap Text: A decision was made to reconstruct the defect utilizing an interpolation axial flap and a staged reconstruction.  A telfa template was made of the defect.  This telfa template was then used to outline the paramedian forehead pedicle flap.  The donor area for the pedicle flap was then injected with anesthesia.  The flap was excised through the skin and subcutaneous tissue down to the layer of the underlying musculature.  The pedicle flap was carefully excised within this deep plane to maintain its blood supply.  The edges of the donor site were undermined.   The donor site was closed in a primary fashion.  The pedicle was then rotated into position and sutured.  Once the tube was sutured into place, adequate blood supply was confirmed with blanching and refill.  The pedicle was then wrapped with xeroform gauze and dressed appropriately with a telfa and gauze bandage to ensure continued blood supply and protect the attached pedicle.
Lip Wedge Excision Repair Text: Given the location of the defect and the proximity to free margins a full thickness wedge repair was deemed most appropriate.  Using a sterile surgical marker, the appropriate repair was drawn incorporating the defect and placing the expected incisions perpendicular to the vermilion border.  The vermilion border was also meticulously outlined to ensure appropriate reapproximation during the repair.  The area thus outlined was incised through and through with a #15 scalpel blade.  The muscularis and dermis were reaproximated with deep sutures following hemostasis. Care was taken to realign the vermilion border before proceeding with the superficial closure.  Once the vermilion was realigned the superfical and mucosal closure was finished.
Ftsg Text: The defect edges were debeveled with a #15 scalpel blade.  Given the location of the defect, shape of the defect and the proximity to free margins a full thickness skin graft was deemed most appropriate.  Using a sterile surgical marker, the primary defect shape was transferred to the donor site. The area thus outlined was incised deep to adipose tissue with a #15 scalpel blade.  The harvested graft was then trimmed of adipose tissue until only dermis and epidermis was left.  The skin margins of the secondary defect were undermined to an appropriate distance in all directions utilizing iris scissors.  The secondary defect was closed with interrupted buried subcutaneous sutures.  The skin edges were then re-apposed with running  sutures.  The skin graft was then placed in the primary defect and oriented appropriately.
Split-Thickness Skin Graft Text: The defect edges were debeveled with a #15 scalpel blade.  Given the location of the defect, shape of the defect and the proximity to free margins a split thickness skin graft was deemed most appropriate.  Using a sterile surgical marker, the primary defect shape was transferred to the donor site. The split thickness graft was then harvested.  The skin graft was then placed in the primary defect and oriented appropriately.
Cartilage Graft Text: The defect edges were debeveled with a #15 scalpel blade.  Given the location of the defect, shape of the defect, the fact the defect involved a full thickness cartilage defect a cartilage graft was deemed most appropriate.  An appropriate donor site was identified, cleansed, and anesthetized. The cartilage graft was then harvested and transferred to the recipient site, oriented appropriately and then sutured into place.  The secondary defect was then repaired using a primary closure.
Composite Graft Text: The defect edges were debeveled with a #15 scalpel blade.  Given the location of the defect, shape of the defect, the proximity to free margins and the fact the defect was full thickness a composite graft was deemed most appropriate.  The defect was outline and then transferred to the donor site.  A full thickness graft was then excised from the donor site. The graft was then placed in the primary defect, oriented appropriately and then sutured into place.  The secondary defect was then repaired using a primary closure.
Epidermal Autograft Text: The defect edges were debeveled with a #15 scalpel blade.  Given the location of the defect, shape of the defect and the proximity to free margins an epidermal autograft was deemed most appropriate.  Using a sterile surgical marker, the primary defect shape was transferred to the donor site. The epidermal graft was then harvested.  The skin graft was then placed in the primary defect and oriented appropriately.
Dermal Autograft Text: The defect edges were debeveled with a #15 scalpel blade.  Given the location of the defect, shape of the defect and the proximity to free margins a dermal autograft was deemed most appropriate.  Using a sterile surgical marker, the primary defect shape was transferred to the donor site. The area thus outlined was incised deep to adipose tissue with a #15 scalpel blade.  The harvested graft was then trimmed of adipose and epidermal tissue until only dermis was left.  The skin graft was then placed in the primary defect and oriented appropriately.
Skin Substitute Text: The defect edges were debeveled with a #15 scalpel blade.  Given the location of the defect, shape of the defect and the proximity to free margins a skin substitute graft was deemed most appropriate.  The graft material was trimmed to fit the size of the defect. The graft was then placed in the primary defect and oriented appropriately.
Tissue Cultured Epidermal Autograft Text: The defect edges were debeveled with a #15 scalpel blade.  Given the location of the defect, shape of the defect and the proximity to free margins a tissue cultured epidermal autograft was deemed most appropriate.  The graft was then trimmed to fit the size of the defect.  The graft was then placed in the primary defect and oriented appropriately.
Xenograft Text: The defect edges were debeveled with a #15 scalpel blade.  Given the location of the defect, shape of the defect and the proximity to free margins a xenograft was deemed most appropriate.  The graft was then trimmed to fit the size of the defect.  The graft was then placed in the primary defect and oriented appropriately.
Purse String (Intermediate) Text: Given the location of the defect and the characteristics of the surrounding skin a purse string intermediate closure was deemed most appropriate.  Undermining was performed circumfirentially around the surgical defect.  A purse string suture was then placed and tightened.
Purse String (Simple) Text: Given the location of the defect and the characteristics of the surrounding skin a purse string simple closure was deemed most appropriate.  Undermining was performed circumferentially around the surgical defect.  A purse string suture was then placed and tightened.
Partial Purse String (Intermediate) Text: Given the location of the defect and the characteristics of the surrounding skin an intermediate purse string closure was deemed most appropriate.  Undermining was performed circumferentially around the surgical defect.  A purse string suture was then placed and tightened. Wound tension of the circular defect prevented complete closure of the wound.
Partial Purse String (Simple) Text: Given the location of the defect and the characteristics of the surrounding skin a simple purse string closure was deemed most appropriate.  Undermining was performed circumferentially around the surgical defect.  A purse string suture was then placed and tightened. Wound tension of the circular defect prevented complete closure of the wound.
Complex Repair And Single Advancement Flap Text: The defect edges were debeveled with a #15 scalpel blade.  The primary defect was closed partially with a complex linear closure.  Given the location of the remaining defect, shape of the defect and the proximity to free margins a single advancement flap was deemed most appropriate for complete closure of the defect.  Using a sterile surgical marker, an appropriate advancement flap was drawn incorporating the defect and placing the expected incisions within the relaxed skin tension lines where possible.    The area thus outlined was incised deep to adipose tissue with a #15 scalpel blade.  The skin margins were undermined to an appropriate distance in all directions utilizing iris scissors.
Complex Repair And Double Advancement Flap Text: The defect edges were debeveled with a #15 scalpel blade.  The primary defect was closed partially with a complex linear closure.  Given the location of the remaining defect, shape of the defect and the proximity to free margins a double advancement flap was deemed most appropriate for complete closure of the defect.  Using a sterile surgical marker, an appropriate advancement flap was drawn incorporating the defect and placing the expected incisions within the relaxed skin tension lines where possible.    The area thus outlined was incised deep to adipose tissue with a #15 scalpel blade.  The skin margins were undermined to an appropriate distance in all directions utilizing iris scissors.
Complex Repair And Modified Advancement Flap Text: The defect edges were debeveled with a #15 scalpel blade.  The primary defect was closed partially with a complex linear closure.  Given the location of the remaining defect, shape of the defect and the proximity to free margins a modified advancement flap was deemed most appropriate for complete closure of the defect.  Using a sterile surgical marker, an appropriate advancement flap was drawn incorporating the defect and placing the expected incisions within the relaxed skin tension lines where possible.    The area thus outlined was incised deep to adipose tissue with a #15 scalpel blade.  The skin margins were undermined to an appropriate distance in all directions utilizing iris scissors.
Complex Repair And A-T Advancement Flap Text: The defect edges were debeveled with a #15 scalpel blade.  The primary defect was closed partially with a complex linear closure.  Given the location of the remaining defect, shape of the defect and the proximity to free margins an A-T advancement flap was deemed most appropriate for complete closure of the defect.  Using a sterile surgical marker, an appropriate advancement flap was drawn incorporating the defect and placing the expected incisions within the relaxed skin tension lines where possible.    The area thus outlined was incised deep to adipose tissue with a #15 scalpel blade.  The skin margins were undermined to an appropriate distance in all directions utilizing iris scissors.
Complex Repair And O-T Advancement Flap Text: The defect edges were debeveled with a #15 scalpel blade.  The primary defect was closed partially with a complex linear closure.  Given the location of the remaining defect, shape of the defect and the proximity to free margins an O-T advancement flap was deemed most appropriate for complete closure of the defect.  Using a sterile surgical marker, an appropriate advancement flap was drawn incorporating the defect and placing the expected incisions within the relaxed skin tension lines where possible.    The area thus outlined was incised deep to adipose tissue with a #15 scalpel blade.  The skin margins were undermined to an appropriate distance in all directions utilizing iris scissors.
Complex Repair And O-L Flap Text: The defect edges were debeveled with a #15 scalpel blade.  The primary defect was closed partially with a complex linear closure.  Given the location of the remaining defect, shape of the defect and the proximity to free margins an O-L flap was deemed most appropriate for complete closure of the defect.  Using a sterile surgical marker, an appropriate flap was drawn incorporating the defect and placing the expected incisions within the relaxed skin tension lines where possible.    The area thus outlined was incised deep to adipose tissue with a #15 scalpel blade.  The skin margins were undermined to an appropriate distance in all directions utilizing iris scissors.
Complex Repair And Bilobe Flap Text: The defect edges were debeveled with a #15 scalpel blade.  The primary defect was closed partially with a complex linear closure.  Given the location of the remaining defect, shape of the defect and the proximity to free margins a bilobe flap was deemed most appropriate for complete closure of the defect.  Using a sterile surgical marker, an appropriate advancement flap was drawn incorporating the defect and placing the expected incisions within the relaxed skin tension lines where possible.    The area thus outlined was incised deep to adipose tissue with a #15 scalpel blade.  The skin margins were undermined to an appropriate distance in all directions utilizing iris scissors.
Complex Repair And Melolabial Flap Text: The defect edges were debeveled with a #15 scalpel blade.  The primary defect was closed partially with a complex linear closure.  Given the location of the remaining defect, shape of the defect and the proximity to free margins a melolabial flap was deemed most appropriate for complete closure of the defect.  Using a sterile surgical marker, an appropriate advancement flap was drawn incorporating the defect and placing the expected incisions within the relaxed skin tension lines where possible.    The area thus outlined was incised deep to adipose tissue with a #15 scalpel blade.  The skin margins were undermined to an appropriate distance in all directions utilizing iris scissors.
Complex Repair And Rotation Flap Text: The defect edges were debeveled with a #15 scalpel blade.  The primary defect was closed partially with a complex linear closure.  Given the location of the remaining defect, shape of the defect and the proximity to free margins a rotation flap was deemed most appropriate for complete closure of the defect.  Using a sterile surgical marker, an appropriate advancement flap was drawn incorporating the defect and placing the expected incisions within the relaxed skin tension lines where possible.    The area thus outlined was incised deep to adipose tissue with a #15 scalpel blade.  The skin margins were undermined to an appropriate distance in all directions utilizing iris scissors.
Complex Repair And Rhombic Flap Text: The defect edges were debeveled with a #15 scalpel blade.  The primary defect was closed partially with a complex linear closure.  Given the location of the remaining defect, shape of the defect and the proximity to free margins a rhombic flap was deemed most appropriate for complete closure of the defect.  Using a sterile surgical marker, an appropriate advancement flap was drawn incorporating the defect and placing the expected incisions within the relaxed skin tension lines where possible.    The area thus outlined was incised deep to adipose tissue with a #15 scalpel blade.  The skin margins were undermined to an appropriate distance in all directions utilizing iris scissors.
Complex Repair And Transposition Flap Text: The defect edges were debeveled with a #15 scalpel blade.  The primary defect was closed partially with a complex linear closure.  Given the location of the remaining defect, shape of the defect and the proximity to free margins a transposition flap was deemed most appropriate for complete closure of the defect.  Using a sterile surgical marker, an appropriate advancement flap was drawn incorporating the defect and placing the expected incisions within the relaxed skin tension lines where possible.    The area thus outlined was incised deep to adipose tissue with a #15 scalpel blade.  The skin margins were undermined to an appropriate distance in all directions utilizing iris scissors.
Complex Repair And V-Y Plasty Text: The defect edges were debeveled with a #15 scalpel blade.  The primary defect was closed partially with a complex linear closure.  Given the location of the remaining defect, shape of the defect and the proximity to free margins a V-Y plasty was deemed most appropriate for complete closure of the defect.  Using a sterile surgical marker, an appropriate advancement flap was drawn incorporating the defect and placing the expected incisions within the relaxed skin tension lines where possible.    The area thus outlined was incised deep to adipose tissue with a #15 scalpel blade.  The skin margins were undermined to an appropriate distance in all directions utilizing iris scissors.
Complex Repair And M Plasty Text: The defect edges were debeveled with a #15 scalpel blade.  The primary defect was closed partially with a complex linear closure.  Given the location of the remaining defect, shape of the defect and the proximity to free margins an M plasty was deemed most appropriate for complete closure of the defect.  Using a sterile surgical marker, an appropriate advancement flap was drawn incorporating the defect and placing the expected incisions within the relaxed skin tension lines where possible.    The area thus outlined was incised deep to adipose tissue with a #15 scalpel blade.  The skin margins were undermined to an appropriate distance in all directions utilizing iris scissors.
Complex Repair And Double M Plasty Text: The defect edges were debeveled with a #15 scalpel blade.  The primary defect was closed partially with a complex linear closure.  Given the location of the remaining defect, shape of the defect and the proximity to free margins a double M plasty was deemed most appropriate for complete closure of the defect.  Using a sterile surgical marker, an appropriate advancement flap was drawn incorporating the defect and placing the expected incisions within the relaxed skin tension lines where possible.    The area thus outlined was incised deep to adipose tissue with a #15 scalpel blade.  The skin margins were undermined to an appropriate distance in all directions utilizing iris scissors.
Complex Repair And W Plasty Text: The defect edges were debeveled with a #15 scalpel blade.  The primary defect was closed partially with a complex linear closure.  Given the location of the remaining defect, shape of the defect and the proximity to free margins a W plasty was deemed most appropriate for complete closure of the defect.  Using a sterile surgical marker, an appropriate advancement flap was drawn incorporating the defect and placing the expected incisions within the relaxed skin tension lines where possible.    The area thus outlined was incised deep to adipose tissue with a #15 scalpel blade.  The skin margins were undermined to an appropriate distance in all directions utilizing iris scissors.
Complex Repair And Z Plasty Text: The defect edges were debeveled with a #15 scalpel blade.  The primary defect was closed partially with a complex linear closure.  Given the location of the remaining defect, shape of the defect and the proximity to free margins a Z plasty was deemed most appropriate for complete closure of the defect.  Using a sterile surgical marker, an appropriate advancement flap was drawn incorporating the defect and placing the expected incisions within the relaxed skin tension lines where possible.    The area thus outlined was incised deep to adipose tissue with a #15 scalpel blade.  The skin margins were undermined to an appropriate distance in all directions utilizing iris scissors.
Complex Repair And Dorsal Nasal Flap Text: The defect edges were debeveled with a #15 scalpel blade.  The primary defect was closed partially with a complex linear closure.  Given the location of the remaining defect, shape of the defect and the proximity to free margins a dorsal nasal flap was deemed most appropriate for complete closure of the defect.  Using a sterile surgical marker, an appropriate flap was drawn incorporating the defect and placing the expected incisions within the relaxed skin tension lines where possible.    The area thus outlined was incised deep to adipose tissue with a #15 scalpel blade.  The skin margins were undermined to an appropriate distance in all directions utilizing iris scissors.
Complex Repair And Ftsg Text: The defect edges were debeveled with a #15 scalpel blade.  The primary defect was closed partially with a complex linear closure.  Given the location of the defect, shape of the defect and the proximity to free margins a full thickness skin graft was deemed most appropriate to repair the remaining defect.  The graft was trimmed to fit the size of the remaining defect.  The graft was then placed in the primary defect, oriented appropriately, and sutured into place.
Complex Repair And Split-Thickness Skin Graft Text: The defect edges were debeveled with a #15 scalpel blade.  The primary defect was closed partially with a complex linear closure.  Given the location of the defect, shape of the defect and the proximity to free margins a split thickness skin graft was deemed most appropriate to repair the remaining defect.  The graft was trimmed to fit the size of the remaining defect.  The graft was then placed in the primary defect, oriented appropriately, and sutured into place.
Complex Repair And Epidermal Autograft Text: The defect edges were debeveled with a #15 scalpel blade.  The primary defect was closed partially with a complex linear closure.  Given the location of the defect, shape of the defect and the proximity to free margins an epidermal autograft was deemed most appropriate to repair the remaining defect.  The graft was trimmed to fit the size of the remaining defect.  The graft was then placed in the primary defect, oriented appropriately, and sutured into place.
Complex Repair And Dermal Autograft Text: The defect edges were debeveled with a #15 scalpel blade.  The primary defect was closed partially with a complex linear closure.  Given the location of the defect, shape of the defect and the proximity to free margins an dermal autograft was deemed most appropriate to repair the remaining defect.  The graft was trimmed to fit the size of the remaining defect.  The graft was then placed in the primary defect, oriented appropriately, and sutured into place.
Complex Repair And Tissue Cultured Epidermal Autograft Text: The defect edges were debeveled with a #15 scalpel blade.  The primary defect was closed partially with a complex linear closure.  Given the location of the defect, shape of the defect and the proximity to free margins an tissue cultured epidermal autograft was deemed most appropriate to repair the remaining defect.  The graft was trimmed to fit the size of the remaining defect.  The graft was then placed in the primary defect, oriented appropriately, and sutured into place.
Complex Repair And Xenograft Text: The defect edges were debeveled with a #15 scalpel blade.  The primary defect was closed partially with a complex linear closure.  Given the location of the defect, shape of the defect and the proximity to free margins a xenograft was deemed most appropriate to repair the remaining defect.  The graft was trimmed to fit the size of the remaining defect.  The graft was then placed in the primary defect, oriented appropriately, and sutured into place.
Complex Repair And Skin Substitute Graft Text: The defect edges were debeveled with a #15 scalpel blade.  The primary defect was closed partially with a complex linear closure.  Given the location of the remaining defect, shape of the defect and the proximity to free margins a skin substitute graft was deemed most appropriate to repair the remaining defect.  The graft was trimmed to fit the size of the remaining defect.  The graft was then placed in the primary defect, oriented appropriately, and sutured into place.
Path Notes (To The Dermatopathologist): Please check margins.
Consent was obtained from the patient. The risks and benefits to therapy were discussed in detail. Specifically, the risks of infection, scarring, bleeding, prolonged wound healing, incomplete removal, allergy to anesthesia, nerve injury and recurrence were addressed. Prior to the procedure, the treatment site was clearly identified and confirmed by the patient.
Post-Care Instructions: I reviewed with the patient in detail post-care instructions. Patient is not to engage in any heavy lifting, exercise, or swimming for the next 14 days. Should the patient develop any fevers, chills, bleeding, severe pain patient will contact the office immediately.
Home Suture Removal Text: Patient was provided a home suture removal kit and will remove their sutures at home.  If they have any questions or difficulties they will call the office.
Where Do You Want The Question To Include Opioid Counseling Located?: Case Summary Tab
Information: Selecting Yes will display possible errors in your note based on the variables you have selected. This validation is only offered as a suggestion for you. PLEASE NOTE THAT THE VALIDATION TEXT WILL BE REMOVED WHEN YOU FINALIZE YOUR NOTE. IF YOU WANT TO FAX A PRELIMINARY NOTE YOU WILL NEED TO TOGGLE THIS TO 'NO' IF YOU DO NOT WANT IT IN YOUR FAXED NOTE.

## 2020-03-04 PROBLEM — N18.4 CHRONIC RENAL DISEASE, STAGE 4, SEVERELY DECREASED GLOMERULAR FILTRATION RATE (GFR) BETWEEN 15-29 ML/MIN/1.73 SQUARE METER (HCC): Status: ACTIVE | Noted: 2020-03-04

## 2020-04-01 PROBLEM — N18.9 CHRONIC KIDNEY DISEASE: Status: ACTIVE | Noted: 2020-03-04

## 2020-05-11 ENCOUNTER — HOSPITAL ENCOUNTER (OUTPATIENT)
Dept: LAB | Facility: MEDICAL CENTER | Age: 70
End: 2020-05-11
Attending: NURSE PRACTITIONER
Payer: MEDICARE

## 2020-05-11 LAB
ALBUMIN SERPL BCP-MCNC: 4.6 G/DL (ref 3.2–4.9)
ALBUMIN/GLOB SERPL: 1.8 G/DL
ALP SERPL-CCNC: 67 U/L (ref 30–99)
ALT SERPL-CCNC: 20 U/L (ref 2–50)
ANION GAP SERPL CALC-SCNC: 17 MMOL/L (ref 7–16)
AST SERPL-CCNC: 10 U/L (ref 12–45)
BASOPHILS # BLD AUTO: 0.5 % (ref 0–1.8)
BASOPHILS # BLD: 0.03 K/UL (ref 0–0.12)
BILIRUB SERPL-MCNC: 0.3 MG/DL (ref 0.1–1.5)
BUN SERPL-MCNC: 50 MG/DL (ref 8–22)
CALCIUM SERPL-MCNC: 9.9 MG/DL (ref 8.5–10.5)
CHLORIDE SERPL-SCNC: 103 MMOL/L (ref 96–112)
CO2 SERPL-SCNC: 18 MMOL/L (ref 20–33)
CREAT SERPL-MCNC: 2.46 MG/DL (ref 0.5–1.4)
EOSINOPHIL # BLD AUTO: 0.14 K/UL (ref 0–0.51)
EOSINOPHIL NFR BLD: 2.6 % (ref 0–6.9)
ERYTHROCYTE [DISTWIDTH] IN BLOOD BY AUTOMATED COUNT: 47.1 FL (ref 35.9–50)
GLOBULIN SER CALC-MCNC: 2.6 G/DL (ref 1.9–3.5)
GLUCOSE SERPL-MCNC: 135 MG/DL (ref 65–99)
HCT VFR BLD AUTO: 28.9 % (ref 42–52)
HGB BLD-MCNC: 9.6 G/DL (ref 14–18)
IMM GRANULOCYTES # BLD AUTO: 0.02 K/UL (ref 0–0.11)
IMM GRANULOCYTES NFR BLD AUTO: 0.4 % (ref 0–0.9)
LDH SERPL L TO P-CCNC: 157 U/L (ref 107–266)
LYMPHOCYTES # BLD AUTO: 1.4 K/UL (ref 1–4.8)
LYMPHOCYTES NFR BLD: 25.5 % (ref 22–41)
MCH RBC QN AUTO: 34.4 PG (ref 27–33)
MCHC RBC AUTO-ENTMCNC: 33.2 G/DL (ref 33.7–35.3)
MCV RBC AUTO: 103.6 FL (ref 81.4–97.8)
MONOCYTES # BLD AUTO: 0.56 K/UL (ref 0–0.85)
MONOCYTES NFR BLD AUTO: 10.2 % (ref 0–13.4)
NEUTROPHILS # BLD AUTO: 3.34 K/UL (ref 1.82–7.42)
NEUTROPHILS NFR BLD: 60.8 % (ref 44–72)
NRBC # BLD AUTO: 0 K/UL
NRBC BLD-RTO: 0 /100 WBC
PLATELET # BLD AUTO: 152 K/UL (ref 164–446)
PMV BLD AUTO: 10.6 FL (ref 9–12.9)
POTASSIUM SERPL-SCNC: 4.8 MMOL/L (ref 3.6–5.5)
PROT SERPL-MCNC: 7.2 G/DL (ref 6–8.2)
RBC # BLD AUTO: 2.79 M/UL (ref 4.7–6.1)
SODIUM SERPL-SCNC: 138 MMOL/L (ref 135–145)
URATE SERPL-MCNC: 10.2 MG/DL (ref 2.5–8.3)
WBC # BLD AUTO: 5.5 K/UL (ref 4.8–10.8)

## 2020-05-11 PROCEDURE — 83520 IMMUNOASSAY QUANT NOS NONAB: CPT | Mod: 91

## 2020-05-11 PROCEDURE — 80053 COMPREHEN METABOLIC PANEL: CPT

## 2020-05-11 PROCEDURE — 84155 ASSAY OF PROTEIN SERUM: CPT | Mod: XU

## 2020-05-11 PROCEDURE — 82784 ASSAY IGA/IGD/IGG/IGM EACH: CPT

## 2020-05-11 PROCEDURE — 82232 ASSAY OF BETA-2 PROTEIN: CPT

## 2020-05-11 PROCEDURE — 83615 LACTATE (LD) (LDH) ENZYME: CPT

## 2020-05-11 PROCEDURE — 85025 COMPLETE CBC W/AUTO DIFF WBC: CPT

## 2020-05-11 PROCEDURE — 84165 PROTEIN E-PHORESIS SERUM: CPT

## 2020-05-11 PROCEDURE — 86334 IMMUNOFIX E-PHORESIS SERUM: CPT

## 2020-05-11 PROCEDURE — 84550 ASSAY OF BLOOD/URIC ACID: CPT

## 2020-05-13 LAB — B2 MICROGLOB SERPL-MCNC: 16.5 MG/L (ref 1.1–2.4)

## 2020-05-15 LAB
ALBUMIN SERPL ELPH-MCNC: 4.24 G/DL (ref 3.75–5.01)
ALPHA1 GLOB SERPL ELPH-MCNC: 0.27 G/DL (ref 0.19–0.46)
ALPHA2 GLOB SERPL ELPH-MCNC: 0.66 G/DL (ref 0.48–1.05)
B-GLOBULIN SERPL ELPH-MCNC: 1.71 G/DL (ref 0.48–1.1)
EER MONOCLONAL PROTEIN AND FLC, SERUM Q5224: ABNORMAL
GAMMA GLOB SERPL ELPH-MCNC: 0.32 G/DL (ref 0.62–1.51)
IGA SERPL-MCNC: 1340 MG/DL (ref 68–408)
IGG SERPL-MCNC: 212 MG/DL (ref 768–1632)
IGM SERPL-MCNC: <5 MG/DL (ref 35–263)
INTERPRETATION SERPL IFE-IMP: ABNORMAL
INTERPRETATION SERPL IFE-IMP: ABNORMAL
KAPPA LC FREE SER-MCNC: 13.48 MG/L (ref 3.3–19.4)
KAPPA LC FREE/LAMBDA FREE SER NEPH: <0.01 {RATIO} (ref 0.26–1.65)
LAMBDA LC FREE SERPL-MCNC: 4187.68 MG/L (ref 5.71–26.3)
PROT SERPL-MCNC: 7.2 G/DL (ref 6.3–8.2)

## 2020-05-23 ENCOUNTER — APPOINTMENT (RX ONLY)
Dept: URBAN - METROPOLITAN AREA CLINIC 4 | Facility: CLINIC | Age: 70
Setting detail: DERMATOLOGY
End: 2020-05-23

## 2020-05-23 DIAGNOSIS — Z86.007 PERSONAL HISTORY OF IN-SITU NEOPLASM OF SKIN: ICD-10-CM

## 2020-05-23 DIAGNOSIS — Z85.820 PERSONAL HISTORY OF MALIGNANT MELANOMA OF SKIN: ICD-10-CM

## 2020-05-23 DIAGNOSIS — D22 MELANOCYTIC NEVI: ICD-10-CM

## 2020-05-23 PROBLEM — Z85.828 PERSONAL HISTORY OF OTHER MALIGNANT NEOPLASM OF SKIN: Status: ACTIVE | Noted: 2020-05-23

## 2020-05-23 PROBLEM — D22.5 MELANOCYTIC NEVI OF TRUNK: Status: ACTIVE | Noted: 2020-05-23

## 2020-05-23 PROCEDURE — ? ADDITIONAL NOTES

## 2020-05-23 PROCEDURE — ? OBSERVATION

## 2020-05-23 PROCEDURE — 99214 OFFICE O/P EST MOD 30 MIN: CPT

## 2020-05-23 PROCEDURE — ? COUNSELING

## 2020-05-23 ASSESSMENT — LOCATION SIMPLE DESCRIPTION DERM
LOCATION SIMPLE: RIGHT UPPER BACK
LOCATION SIMPLE: ABDOMEN
LOCATION SIMPLE: SCALP

## 2020-05-23 ASSESSMENT — LOCATION ZONE DERM
LOCATION ZONE: SCALP
LOCATION ZONE: TRUNK

## 2020-05-23 ASSESSMENT — LOCATION DETAILED DESCRIPTION DERM
LOCATION DETAILED: RIGHT LATERAL ABDOMEN
LOCATION DETAILED: LEFT SUPERIOR PARIETAL SCALP
LOCATION DETAILED: RIGHT MID-UPPER BACK

## 2020-05-23 NOTE — PROCEDURE: ADDITIONAL NOTES
Additional Notes: If crusting does not clear return to office sooner to recheck.
Detail Level: Simple

## 2020-05-26 ENCOUNTER — HOSPITAL ENCOUNTER (OUTPATIENT)
Dept: LAB | Facility: MEDICAL CENTER | Age: 70
End: 2020-05-26
Attending: NURSE PRACTITIONER
Payer: MEDICARE

## 2020-05-26 LAB
ANION GAP SERPL CALC-SCNC: 18 MMOL/L (ref 7–16)
BUN SERPL-MCNC: 50 MG/DL (ref 8–22)
CALCIUM SERPL-MCNC: 9.9 MG/DL (ref 8.5–10.5)
CHLORIDE SERPL-SCNC: 98 MMOL/L (ref 96–112)
CO2 SERPL-SCNC: 19 MMOL/L (ref 20–33)
CREAT SERPL-MCNC: 2.6 MG/DL (ref 0.5–1.4)
GLUCOSE SERPL-MCNC: 260 MG/DL (ref 65–99)
POTASSIUM SERPL-SCNC: 4.6 MMOL/L (ref 3.6–5.5)
SODIUM SERPL-SCNC: 135 MMOL/L (ref 135–145)
URATE SERPL-MCNC: 8.3 MG/DL (ref 2.5–8.3)

## 2020-05-26 PROCEDURE — 84550 ASSAY OF BLOOD/URIC ACID: CPT

## 2020-05-26 PROCEDURE — 80048 BASIC METABOLIC PNL TOTAL CA: CPT

## 2020-06-08 ENCOUNTER — HOSPITAL ENCOUNTER (OUTPATIENT)
Dept: LAB | Facility: MEDICAL CENTER | Age: 70
End: 2020-06-08
Attending: NURSE PRACTITIONER
Payer: MEDICARE

## 2020-06-08 PROCEDURE — 80048 BASIC METABOLIC PNL TOTAL CA: CPT

## 2020-06-08 PROCEDURE — 84550 ASSAY OF BLOOD/URIC ACID: CPT

## 2020-06-09 LAB
ANION GAP SERPL CALC-SCNC: 16 MMOL/L (ref 7–16)
BUN SERPL-MCNC: 59 MG/DL (ref 8–22)
CALCIUM SERPL-MCNC: 9.5 MG/DL (ref 8.5–10.5)
CHLORIDE SERPL-SCNC: 101 MMOL/L (ref 96–112)
CO2 SERPL-SCNC: 19 MMOL/L (ref 20–33)
CREAT SERPL-MCNC: 2.39 MG/DL (ref 0.5–1.4)
GLUCOSE SERPL-MCNC: 167 MG/DL (ref 65–99)
POTASSIUM SERPL-SCNC: 4.8 MMOL/L (ref 3.6–5.5)
SODIUM SERPL-SCNC: 136 MMOL/L (ref 135–145)
URATE SERPL-MCNC: 7 MG/DL (ref 2.5–8.3)

## 2020-06-29 ENCOUNTER — HOSPITAL ENCOUNTER (OUTPATIENT)
Dept: LAB | Facility: MEDICAL CENTER | Age: 70
End: 2020-06-29
Attending: INTERNAL MEDICINE
Payer: MEDICARE

## 2020-06-29 PROCEDURE — 83615 LACTATE (LD) (LDH) ENZYME: CPT

## 2020-06-29 PROCEDURE — 82784 ASSAY IGA/IGD/IGG/IGM EACH: CPT

## 2020-06-29 PROCEDURE — 83520 IMMUNOASSAY QUANT NOS NONAB: CPT | Mod: 91

## 2020-06-29 PROCEDURE — 84165 PROTEIN E-PHORESIS SERUM: CPT

## 2020-06-29 PROCEDURE — 84155 ASSAY OF PROTEIN SERUM: CPT | Mod: XU

## 2020-06-29 PROCEDURE — 84550 ASSAY OF BLOOD/URIC ACID: CPT

## 2020-06-29 PROCEDURE — 82232 ASSAY OF BETA-2 PROTEIN: CPT

## 2020-06-29 PROCEDURE — 80053 COMPREHEN METABOLIC PANEL: CPT

## 2020-06-30 LAB
ALBUMIN SERPL BCP-MCNC: 4.3 G/DL (ref 3.2–4.9)
ALBUMIN/GLOB SERPL: 1.8 G/DL
ALP SERPL-CCNC: 69 U/L (ref 30–99)
ALT SERPL-CCNC: 25 U/L (ref 2–50)
ANION GAP SERPL CALC-SCNC: 17 MMOL/L (ref 7–16)
AST SERPL-CCNC: 13 U/L (ref 12–45)
BILIRUB SERPL-MCNC: 0.5 MG/DL (ref 0.1–1.5)
BUN SERPL-MCNC: 46 MG/DL (ref 8–22)
CALCIUM SERPL-MCNC: 9.3 MG/DL (ref 8.5–10.5)
CHLORIDE SERPL-SCNC: 101 MMOL/L (ref 96–112)
CO2 SERPL-SCNC: 18 MMOL/L (ref 20–33)
CREAT SERPL-MCNC: 2.12 MG/DL (ref 0.5–1.4)
GLOBULIN SER CALC-MCNC: 2.4 G/DL (ref 1.9–3.5)
GLUCOSE SERPL-MCNC: 191 MG/DL (ref 65–99)
LDH SERPL L TO P-CCNC: 179 U/L (ref 107–266)
POTASSIUM SERPL-SCNC: 4.6 MMOL/L (ref 3.6–5.5)
PROT SERPL-MCNC: 6.7 G/DL (ref 6–8.2)
SODIUM SERPL-SCNC: 136 MMOL/L (ref 135–145)
URATE SERPL-MCNC: 7.3 MG/DL (ref 2.5–8.3)

## 2020-07-02 LAB
B2 MICROGLOB SERPL-MCNC: 13.3 MG/L (ref 1.1–2.4)
IGA SERPL-MCNC: 480 MG/DL (ref 68–408)
IGG SERPL-MCNC: 231 MG/DL (ref 768–1632)
IGM SERPL-MCNC: <5 MG/DL (ref 35–263)
KAPPA LC FREE SER-MCNC: 16.12 MG/L (ref 3.3–19.4)
KAPPA LC FREE/LAMBDA FREE SER NEPH: 0.01 {RATIO} (ref 0.26–1.65)
LAMBDA LC FREE SERPL-MCNC: 1794.02 MG/L (ref 5.71–26.3)

## 2020-07-04 LAB
ALBUMIN SERPL ELPH-MCNC: 3.83 G/DL (ref 3.75–5.01)
ALPHA1 GLOB SERPL ELPH-MCNC: 0.4 G/DL (ref 0.19–0.46)
ALPHA2 GLOB SERPL ELPH-MCNC: 0.82 G/DL (ref 0.48–1.05)
B-GLOBULIN SERPL ELPH-MCNC: 0.96 G/DL (ref 0.48–1.1)
EER PROT ELECT SER Q1092: ABNORMAL
GAMMA GLOB SERPL ELPH-MCNC: 0.28 G/DL (ref 0.62–1.51)
INTERPRETATION SERPL IFE-IMP: ABNORMAL
PROT SERPL-MCNC: 6.3 G/DL (ref 6.3–8.2)

## 2020-08-03 ENCOUNTER — HOSPITAL ENCOUNTER (OUTPATIENT)
Dept: LAB | Facility: MEDICAL CENTER | Age: 70
End: 2020-08-03
Attending: INTERNAL MEDICINE
Payer: MEDICARE

## 2020-08-03 PROCEDURE — 84550 ASSAY OF BLOOD/URIC ACID: CPT

## 2020-08-03 PROCEDURE — 82784 ASSAY IGA/IGD/IGG/IGM EACH: CPT

## 2020-08-03 PROCEDURE — 84165 PROTEIN E-PHORESIS SERUM: CPT

## 2020-08-03 PROCEDURE — 86334 IMMUNOFIX E-PHORESIS SERUM: CPT

## 2020-08-03 PROCEDURE — 84155 ASSAY OF PROTEIN SERUM: CPT

## 2020-08-03 PROCEDURE — 80053 COMPREHEN METABOLIC PANEL: CPT

## 2020-08-03 PROCEDURE — 83615 LACTATE (LD) (LDH) ENZYME: CPT

## 2020-08-03 PROCEDURE — 82232 ASSAY OF BETA-2 PROTEIN: CPT

## 2020-08-03 PROCEDURE — 83520 IMMUNOASSAY QUANT NOS NONAB: CPT | Mod: 91

## 2020-08-04 LAB
ALBUMIN SERPL BCP-MCNC: 4.8 G/DL (ref 3.2–4.9)
ALBUMIN/GLOB SERPL: 3.7 G/DL
ALP SERPL-CCNC: 73 U/L (ref 30–99)
ALT SERPL-CCNC: 20 U/L (ref 2–50)
ANION GAP SERPL CALC-SCNC: 18 MMOL/L (ref 7–16)
AST SERPL-CCNC: 15 U/L (ref 12–45)
BILIRUB SERPL-MCNC: 0.4 MG/DL (ref 0.1–1.5)
BUN SERPL-MCNC: 42 MG/DL (ref 8–22)
CALCIUM SERPL-MCNC: 8.9 MG/DL (ref 8.5–10.5)
CHLORIDE SERPL-SCNC: 97 MMOL/L (ref 96–112)
CO2 SERPL-SCNC: 20 MMOL/L (ref 20–33)
CREAT SERPL-MCNC: 2.34 MG/DL (ref 0.5–1.4)
GLOBULIN SER CALC-MCNC: 1.3 G/DL (ref 1.9–3.5)
GLUCOSE SERPL-MCNC: 206 MG/DL (ref 65–99)
LDH SERPL L TO P-CCNC: 162 U/L (ref 107–266)
POTASSIUM SERPL-SCNC: 4.8 MMOL/L (ref 3.6–5.5)
PROT SERPL-MCNC: 6.1 G/DL (ref 6–8.2)
SODIUM SERPL-SCNC: 135 MMOL/L (ref 135–145)
URATE SERPL-MCNC: 7.5 MG/DL (ref 2.5–8.3)

## 2020-08-05 LAB — B2 MICROGLOB SERPL-MCNC: 10.7 MG/L (ref 1.1–2.4)

## 2020-08-07 LAB
ALBUMIN SERPL ELPH-MCNC: 4.22 G/DL (ref 3.75–5.01)
ALPHA1 GLOB SERPL ELPH-MCNC: 0.34 G/DL (ref 0.19–0.46)
ALPHA2 GLOB SERPL ELPH-MCNC: 0.74 G/DL (ref 0.48–1.05)
B-GLOBULIN SERPL ELPH-MCNC: 0.73 G/DL (ref 0.48–1.1)
EER MONOCLONAL PROTEIN AND FLC, SERUM Q5224: ABNORMAL
GAMMA GLOB SERPL ELPH-MCNC: 0.26 G/DL (ref 0.62–1.51)
IGA SERPL-MCNC: 213 MG/DL (ref 68–408)
IGG SERPL-MCNC: 214 MG/DL (ref 768–1632)
IGM SERPL-MCNC: <5 MG/DL (ref 35–263)
INTERPRETATION SERPL IFE-IMP: ABNORMAL
INTERPRETATION SERPL IFE-IMP: ABNORMAL
KAPPA LC FREE SER-MCNC: 19.9 MG/L (ref 3.3–19.4)
KAPPA LC FREE/LAMBDA FREE SER NEPH: 0.03 {RATIO} (ref 0.26–1.65)
LAMBDA LC FREE SERPL-MCNC: 665.1 MG/L (ref 5.71–26.3)
PROT SERPL-MCNC: 6.3 G/DL (ref 6.3–8.2)

## 2020-08-31 ENCOUNTER — HOSPITAL ENCOUNTER (OUTPATIENT)
Facility: MEDICAL CENTER | Age: 70
End: 2020-08-31
Attending: INTERNAL MEDICINE
Payer: MEDICARE

## 2020-08-31 PROCEDURE — 80053 COMPREHEN METABOLIC PANEL: CPT

## 2020-08-31 PROCEDURE — 83520 IMMUNOASSAY QUANT NOS NONAB: CPT | Mod: 91

## 2020-08-31 PROCEDURE — 82784 ASSAY IGA/IGD/IGG/IGM EACH: CPT

## 2020-08-31 PROCEDURE — 82232 ASSAY OF BETA-2 PROTEIN: CPT

## 2020-08-31 PROCEDURE — 84165 PROTEIN E-PHORESIS SERUM: CPT

## 2020-08-31 PROCEDURE — 84155 ASSAY OF PROTEIN SERUM: CPT

## 2020-08-31 PROCEDURE — 86334 IMMUNOFIX E-PHORESIS SERUM: CPT

## 2020-09-01 LAB
ALBUMIN SERPL BCP-MCNC: 4.5 G/DL (ref 3.2–4.9)
ALBUMIN/GLOB SERPL: 3.2 G/DL
ALP SERPL-CCNC: 72 U/L (ref 30–99)
ALT SERPL-CCNC: 23 U/L (ref 2–50)
ANION GAP SERPL CALC-SCNC: 17 MMOL/L (ref 7–16)
AST SERPL-CCNC: 12 U/L (ref 12–45)
BILIRUB SERPL-MCNC: 0.3 MG/DL (ref 0.1–1.5)
BUN SERPL-MCNC: 37 MG/DL (ref 8–22)
CALCIUM SERPL-MCNC: 8.9 MG/DL (ref 8.5–10.5)
CHLORIDE SERPL-SCNC: 104 MMOL/L (ref 96–112)
CO2 SERPL-SCNC: 20 MMOL/L (ref 20–33)
CREAT SERPL-MCNC: 2.13 MG/DL (ref 0.5–1.4)
GLOBULIN SER CALC-MCNC: 1.4 G/DL (ref 1.9–3.5)
GLUCOSE SERPL-MCNC: 147 MG/DL (ref 65–99)
POTASSIUM SERPL-SCNC: 4.5 MMOL/L (ref 3.6–5.5)
PROT SERPL-MCNC: 5.9 G/DL (ref 6–8.2)
SODIUM SERPL-SCNC: 141 MMOL/L (ref 135–145)

## 2020-09-02 LAB — B2 MICROGLOB SERPL-MCNC: 11.7 MG/L (ref 1.1–2.4)

## 2020-09-03 LAB
ALBUMIN SERPL ELPH-MCNC: 3.79 G/DL (ref 3.75–5.01)
ALPHA1 GLOB SERPL ELPH-MCNC: 0.35 G/DL (ref 0.19–0.46)
ALPHA2 GLOB SERPL ELPH-MCNC: 0.79 G/DL (ref 0.48–1.05)
B-GLOBULIN SERPL ELPH-MCNC: 0.7 G/DL (ref 0.48–1.1)
EER MONOCLONAL PROTEIN AND FLC, SERUM Q5224: ABNORMAL
GAMMA GLOB SERPL ELPH-MCNC: 0.26 G/DL (ref 0.62–1.51)
IGA SERPL-MCNC: 196 MG/DL (ref 68–408)
IGG SERPL-MCNC: 213 MG/DL (ref 768–1632)
IGM SERPL-MCNC: <5 MG/DL (ref 35–263)
INTERPRETATION SERPL IFE-IMP: ABNORMAL
INTERPRETATION SERPL IFE-IMP: ABNORMAL
KAPPA LC FREE SER-MCNC: 19.34 MG/L (ref 3.3–19.4)
KAPPA LC FREE/LAMBDA FREE SER NEPH: 0.02 {RATIO} (ref 0.26–1.65)
LAMBDA LC FREE SERPL-MCNC: 1035.56 MG/L (ref 5.71–26.3)
PROT SERPL-MCNC: 5.9 G/DL (ref 6.3–8.2)

## 2020-10-05 ENCOUNTER — HOSPITAL ENCOUNTER (OUTPATIENT)
Facility: MEDICAL CENTER | Age: 70
End: 2020-10-05
Attending: NURSE PRACTITIONER
Payer: MEDICARE

## 2020-10-05 PROCEDURE — 80053 COMPREHEN METABOLIC PANEL: CPT

## 2020-10-05 PROCEDURE — 84155 ASSAY OF PROTEIN SERUM: CPT

## 2020-10-05 PROCEDURE — 83520 IMMUNOASSAY QUANT NOS NONAB: CPT | Mod: 91

## 2020-10-05 PROCEDURE — 82784 ASSAY IGA/IGD/IGG/IGM EACH: CPT

## 2020-10-05 PROCEDURE — 84165 PROTEIN E-PHORESIS SERUM: CPT

## 2020-10-05 PROCEDURE — 82232 ASSAY OF BETA-2 PROTEIN: CPT

## 2020-10-06 LAB
ALBUMIN SERPL BCP-MCNC: 4.4 G/DL (ref 3.2–4.9)
ALBUMIN/GLOB SERPL: 2.3 G/DL
ALP SERPL-CCNC: 74 U/L (ref 30–99)
ALT SERPL-CCNC: 23 U/L (ref 2–50)
ANION GAP SERPL CALC-SCNC: 16 MMOL/L (ref 7–16)
AST SERPL-CCNC: 11 U/L (ref 12–45)
BILIRUB SERPL-MCNC: 0.5 MG/DL (ref 0.1–1.5)
BUN SERPL-MCNC: 40 MG/DL (ref 8–22)
CALCIUM SERPL-MCNC: 8.9 MG/DL (ref 8.5–10.5)
CHLORIDE SERPL-SCNC: 99 MMOL/L (ref 96–112)
CO2 SERPL-SCNC: 21 MMOL/L (ref 20–33)
CREAT SERPL-MCNC: 1.86 MG/DL (ref 0.5–1.4)
GLOBULIN SER CALC-MCNC: 1.9 G/DL (ref 1.9–3.5)
GLUCOSE SERPL-MCNC: 191 MG/DL (ref 65–99)
POTASSIUM SERPL-SCNC: 4.6 MMOL/L (ref 3.6–5.5)
PROT SERPL-MCNC: 6.3 G/DL (ref 6–8.2)
SODIUM SERPL-SCNC: 136 MMOL/L (ref 135–145)

## 2020-10-08 LAB
B2 MICROGLOB SERPL-MCNC: 9.7 MG/L (ref 1.1–2.4)
IGA SERPL-MCNC: 180 MG/DL (ref 68–408)
IGG SERPL-MCNC: 232 MG/DL (ref 768–1632)
IGM SERPL-MCNC: <5 MG/DL (ref 35–263)
KAPPA LC FREE SER-MCNC: 31.3 MG/L (ref 3.3–19.4)
KAPPA LC FREE/LAMBDA FREE SER NEPH: 0.05 {RATIO} (ref 0.26–1.65)
LAMBDA LC FREE SERPL-MCNC: 616.25 MG/L (ref 5.71–26.3)

## 2020-10-09 LAB
ALBUMIN SERPL ELPH-MCNC: 3.88 G/DL (ref 3.75–5.01)
ALPHA1 GLOB SERPL ELPH-MCNC: 0.35 G/DL (ref 0.19–0.46)
ALPHA2 GLOB SERPL ELPH-MCNC: 0.78 G/DL (ref 0.48–1.05)
B-GLOBULIN SERPL ELPH-MCNC: 0.72 G/DL (ref 0.48–1.1)
EER PROT ELECT SER Q1092: ABNORMAL
GAMMA GLOB SERPL ELPH-MCNC: 0.27 G/DL (ref 0.62–1.51)
INTERPRETATION SERPL IFE-IMP: ABNORMAL
PROT SERPL-MCNC: 6 G/DL (ref 6.3–8.2)

## 2020-10-26 ENCOUNTER — HOSPITAL ENCOUNTER (OUTPATIENT)
Dept: LAB | Facility: MEDICAL CENTER | Age: 70
End: 2020-10-26
Attending: NURSE PRACTITIONER
Payer: MEDICARE

## 2020-10-26 LAB
ALBUMIN SERPL BCP-MCNC: 4.5 G/DL (ref 3.2–4.9)
ALBUMIN/GLOB SERPL: 2.6 G/DL
ALP SERPL-CCNC: 84 U/L (ref 30–99)
ALT SERPL-CCNC: 17 U/L (ref 2–50)
ANION GAP SERPL CALC-SCNC: 13 MMOL/L (ref 7–16)
AST SERPL-CCNC: 13 U/L (ref 12–45)
BILIRUB SERPL-MCNC: 0.5 MG/DL (ref 0.1–1.5)
BUN SERPL-MCNC: 36 MG/DL (ref 8–22)
CALCIUM SERPL-MCNC: 9.2 MG/DL (ref 8.5–10.5)
CHLORIDE SERPL-SCNC: 103 MMOL/L (ref 96–112)
CO2 SERPL-SCNC: 24 MMOL/L (ref 20–33)
CREAT SERPL-MCNC: 1.99 MG/DL (ref 0.5–1.4)
GLOBULIN SER CALC-MCNC: 1.7 G/DL (ref 1.9–3.5)
GLUCOSE SERPL-MCNC: 145 MG/DL (ref 65–99)
POTASSIUM SERPL-SCNC: 4 MMOL/L (ref 3.6–5.5)
PROT SERPL-MCNC: 6.2 G/DL (ref 6–8.2)
SODIUM SERPL-SCNC: 140 MMOL/L (ref 135–145)

## 2020-10-26 PROCEDURE — 84165 PROTEIN E-PHORESIS SERUM: CPT

## 2020-10-26 PROCEDURE — 84155 ASSAY OF PROTEIN SERUM: CPT | Mod: XU

## 2020-10-26 PROCEDURE — 82784 ASSAY IGA/IGD/IGG/IGM EACH: CPT

## 2020-10-26 PROCEDURE — 82232 ASSAY OF BETA-2 PROTEIN: CPT

## 2020-10-26 PROCEDURE — 83520 IMMUNOASSAY QUANT NOS NONAB: CPT | Mod: 91

## 2020-10-26 PROCEDURE — 80053 COMPREHEN METABOLIC PANEL: CPT

## 2020-10-28 LAB
B2 MICROGLOB SERPL-MCNC: 7.8 MG/L (ref 1.1–2.4)
IGA SERPL-MCNC: 157 MG/DL (ref 68–408)
IGG SERPL-MCNC: 346 MG/DL (ref 768–1632)
IGM SERPL-MCNC: 6 MG/DL (ref 35–263)

## 2020-10-29 LAB
ALBUMIN SERPL ELPH-MCNC: 4.21 G/DL (ref 3.75–5.01)
ALPHA1 GLOB SERPL ELPH-MCNC: 0.33 G/DL (ref 0.19–0.46)
ALPHA2 GLOB SERPL ELPH-MCNC: 0.79 G/DL (ref 0.48–1.05)
B-GLOBULIN SERPL ELPH-MCNC: 0.7 G/DL (ref 0.48–1.1)
EER PROT ELECT SER Q1092: ABNORMAL
GAMMA GLOB SERPL ELPH-MCNC: 0.36 G/DL (ref 0.62–1.51)
INTERPRETATION SERPL IFE-IMP: ABNORMAL
KAPPA LC FREE SER-MCNC: 29.3 MG/L (ref 3.3–19.4)
KAPPA LC FREE/LAMBDA FREE SER NEPH: 0.06 {RATIO} (ref 0.26–1.65)
LAMBDA LC FREE SERPL-MCNC: 497.5 MG/L (ref 5.71–26.3)
PROT SERPL-MCNC: 6.4 G/DL (ref 6.3–8.2)

## 2020-11-19 ENCOUNTER — NON-PROVIDER VISIT (OUTPATIENT)
Dept: VASCULAR LAB | Facility: MEDICAL CENTER | Age: 70
End: 2020-11-19
Attending: INTERNAL MEDICINE
Payer: MEDICARE

## 2020-11-19 PROCEDURE — 93786 AMBL BP MNTR W/SW REC ONLY: CPT

## 2020-11-19 PROCEDURE — 93788 AMBL BP MNTR W/SW A/R: CPT

## 2020-11-19 PROCEDURE — 93790 AMBL BP MNTR W/SW I&R: CPT | Performed by: INTERNAL MEDICINE

## 2020-11-19 NOTE — NON-PROVIDER
Placed 24hr BP monitor on patient and and notified him to bring it back in 24 hours. The first reading was at 2:38.

## 2020-11-25 NOTE — PROGRESS NOTES
Ambulatory blood pressure monitor results reviewed  Full report under media tab  Reasonable data acquisition    Mean daytime: 147/64 consistent with poorly controlled out of office systolic blood pressure    Nocturnal dip: Appears blunted    Clinical correlation needed.  [facility] will follow up with me at [facility] as scheduled    Michael Bloch, MD  [facility]

## 2021-01-13 RX ORDER — ATORVASTATIN CALCIUM 40 MG/1
40 TABLET, FILM COATED ORAL DAILY
Qty: 90 TAB | Refills: 1 | Status: SHIPPED | OUTPATIENT
Start: 2021-01-13 | End: 2021-07-07

## 2021-02-01 ENCOUNTER — HOSPITAL ENCOUNTER (OUTPATIENT)
Facility: MEDICAL CENTER | Age: 71
End: 2021-02-01
Attending: NURSE PRACTITIONER
Payer: MEDICARE

## 2021-02-01 PROCEDURE — 84165 PROTEIN E-PHORESIS SERUM: CPT

## 2021-02-01 PROCEDURE — 82784 ASSAY IGA/IGD/IGG/IGM EACH: CPT

## 2021-02-01 PROCEDURE — 80053 COMPREHEN METABOLIC PANEL: CPT

## 2021-02-01 PROCEDURE — 84155 ASSAY OF PROTEIN SERUM: CPT

## 2021-02-01 PROCEDURE — 82232 ASSAY OF BETA-2 PROTEIN: CPT

## 2021-02-01 PROCEDURE — 83520 IMMUNOASSAY QUANT NOS NONAB: CPT | Mod: 91

## 2021-02-02 LAB
ALBUMIN SERPL BCP-MCNC: 4.5 G/DL (ref 3.2–4.9)
ALBUMIN/GLOB SERPL: 2.6 G/DL
ALP SERPL-CCNC: 69 U/L (ref 30–99)
ALT SERPL-CCNC: 19 U/L (ref 2–50)
ANION GAP SERPL CALC-SCNC: 14 MMOL/L (ref 7–16)
AST SERPL-CCNC: 16 U/L (ref 12–45)
BILIRUB SERPL-MCNC: 0.5 MG/DL (ref 0.1–1.5)
BUN SERPL-MCNC: 49 MG/DL (ref 8–22)
CALCIUM SERPL-MCNC: 9.1 MG/DL (ref 8.5–10.5)
CHLORIDE SERPL-SCNC: 102 MMOL/L (ref 96–112)
CO2 SERPL-SCNC: 19 MMOL/L (ref 20–33)
CREAT SERPL-MCNC: 2.29 MG/DL (ref 0.5–1.4)
GLOBULIN SER CALC-MCNC: 1.7 G/DL (ref 1.9–3.5)
GLUCOSE SERPL-MCNC: 146 MG/DL (ref 65–99)
POTASSIUM SERPL-SCNC: 4.6 MMOL/L (ref 3.6–5.5)
PROT SERPL-MCNC: 6.2 G/DL (ref 6–8.2)
SODIUM SERPL-SCNC: 135 MMOL/L (ref 135–145)

## 2021-02-03 LAB — B2 MICROGLOB SERPL-MCNC: 9.8 MG/L (ref 1.1–2.4)

## 2021-02-04 LAB
IGA SERPL-MCNC: 154 MG/DL (ref 68–408)
IGG SERPL-MCNC: 236 MG/DL (ref 768–1632)
IGM SERPL-MCNC: 8 MG/DL (ref 35–263)
KAPPA LC FREE SER-MCNC: 25.48 MG/L (ref 3.3–19.4)
KAPPA LC FREE/LAMBDA FREE SER NEPH: 0.04 {RATIO} (ref 0.26–1.65)
LAMBDA LC FREE SERPL-MCNC: 605.54 MG/L (ref 5.71–26.3)

## 2021-02-05 LAB
ALBUMIN SERPL ELPH-MCNC: 3.81 G/DL (ref 3.75–5.01)
ALPHA1 GLOB SERPL ELPH-MCNC: 0.41 G/DL (ref 0.19–0.46)
ALPHA2 GLOB SERPL ELPH-MCNC: 0.9 G/DL (ref 0.48–1.05)
B-GLOBULIN SERPL ELPH-MCNC: 0.71 G/DL (ref 0.48–1.1)
EER PROT ELECT SER Q1092: ABNORMAL
GAMMA GLOB SERPL ELPH-MCNC: 0.27 G/DL (ref 0.62–1.51)
INTERPRETATION SERPL IFE-IMP: ABNORMAL
PROT SERPL-MCNC: 6.1 G/DL (ref 6.3–8.2)

## 2021-02-12 ENCOUNTER — APPOINTMENT (RX ONLY)
Dept: URBAN - METROPOLITAN AREA CLINIC 4 | Facility: CLINIC | Age: 71
Setting detail: DERMATOLOGY
End: 2021-02-12

## 2021-02-12 DIAGNOSIS — L57.0 ACTINIC KERATOSIS: ICD-10-CM

## 2021-02-12 PROBLEM — D48.5 NEOPLASM OF UNCERTAIN BEHAVIOR OF SKIN: Status: ACTIVE | Noted: 2021-02-12

## 2021-02-12 PROCEDURE — 17271 DSTR MAL LES S/N/H/F/G 0.6-1: CPT

## 2021-02-12 PROCEDURE — 17271 DSTR MAL LES S/N/H/F/G 0.6-1: CPT | Mod: 76

## 2021-02-12 PROCEDURE — ? BIOPSY BY SHAVE METHOD AND DESTRUCTION

## 2021-02-12 PROCEDURE — 11103 TANGNTL BX SKIN EA SEP/ADDL: CPT

## 2021-02-12 PROCEDURE — 99212 OFFICE O/P EST SF 10 MIN: CPT | Mod: 25

## 2021-02-12 PROCEDURE — 17281 DSTR MAL LS F/E/E/N/L/M .6-1: CPT

## 2021-02-12 PROCEDURE — 11102 TANGNTL BX SKIN SINGLE LES: CPT | Mod: 59

## 2021-02-12 PROCEDURE — ? COUNSELING

## 2021-02-12 ASSESSMENT — LOCATION ZONE DERM: LOCATION ZONE: NECK

## 2021-02-12 ASSESSMENT — LOCATION DETAILED DESCRIPTION DERM: LOCATION DETAILED: RIGHT SUPERIOR LATERAL NECK

## 2021-02-12 ASSESSMENT — LOCATION SIMPLE DESCRIPTION DERM: LOCATION SIMPLE: NECK

## 2021-02-12 NOTE — PROCEDURE: COUNSELING
Detail Level: Zone
Nicotinamide Supplementation Recommendations: Recommended continuing nicotinamide 500mg BID
Patient Specific Counseling (Will Not Stick From Patient To Patient): Defer tx until next visit

## 2021-02-12 NOTE — PROCEDURE: BIOPSY BY SHAVE METHOD AND DESTRUCTION
Detail Level: Detailed
Biopsy Type: H and E
Bill As?: Malignant Destruction
Size Of Lesion In Cm (Optional): 0
Size Of Lesion After Curettage: 0.8
Anesthesia Type: 1% lidocaine with epinephrine
Anesthesia Volume In Cc: 0.5
Hemostasis: Drysol
Destruction Type: electrodesiccation
Number Of Curettages: 2
Wound Care: Petrolatum
Lab: [id number]
Lab Facility: 
Render Path Notes In Note?: No
Consent: Written consent was obtained and risks were reviewed including but not limited to scarring, infection, bleeding, scabbing, incomplete removal, nerve damage and allergy to anesthesia.
Post-Care Instructions: I reviewed with the patient in detail post-care instructions. Patient is to keep the biopsy site dry overnight, and then apply bacitracin twice daily until healed. Patient may apply hydrogen peroxide soaks to remove any crusting.
Notification Instructions: Patient will be notified of biopsy results. However, patient instructed to call the office if not contacted within 2 weeks.
Billing Type: Third-Party Bill
Bill As?: Biopsy by Shave Method
Size Of Lesion After Curettage: 0.7
Size Of Lesion After Curettage: 0.9

## 2021-03-08 RX ORDER — TERAZOSIN 5 MG/1
5 CAPSULE ORAL EVERY MORNING
OUTPATIENT
Start: 2021-03-08

## 2021-03-12 RX ORDER — TERAZOSIN 5 MG/1
5 CAPSULE ORAL EVERY MORNING
OUTPATIENT
Start: 2021-03-12

## 2021-04-22 NOTE — CARE PLAN
Patient demonstrates good safety technique this shift.  Asks for assistance when needed and does not attempt self transfer.  Able to verbalize needs.  Will continue to monitor.    occasional use

## 2021-06-01 ENCOUNTER — HOSPITAL ENCOUNTER (OUTPATIENT)
Facility: MEDICAL CENTER | Age: 71
End: 2021-06-01
Attending: INTERNAL MEDICINE
Payer: MEDICARE

## 2021-06-01 PROCEDURE — 84155 ASSAY OF PROTEIN SERUM: CPT | Mod: XU

## 2021-06-01 PROCEDURE — 80053 COMPREHEN METABOLIC PANEL: CPT

## 2021-06-01 PROCEDURE — 83520 IMMUNOASSAY QUANT NOS NONAB: CPT

## 2021-06-01 PROCEDURE — 86334 IMMUNOFIX E-PHORESIS SERUM: CPT

## 2021-06-01 PROCEDURE — 82232 ASSAY OF BETA-2 PROTEIN: CPT

## 2021-06-01 PROCEDURE — 82784 ASSAY IGA/IGD/IGG/IGM EACH: CPT

## 2021-06-01 PROCEDURE — 84165 PROTEIN E-PHORESIS SERUM: CPT

## 2021-06-02 LAB
ALBUMIN SERPL BCP-MCNC: 4.5 G/DL (ref 3.2–4.9)
ALBUMIN/GLOB SERPL: 2.1 G/DL
ALP SERPL-CCNC: 90 U/L (ref 30–99)
ALT SERPL-CCNC: 15 U/L (ref 2–50)
ANION GAP SERPL CALC-SCNC: 17 MMOL/L (ref 7–16)
AST SERPL-CCNC: 13 U/L (ref 12–45)
BILIRUB SERPL-MCNC: 0.3 MG/DL (ref 0.1–1.5)
BUN SERPL-MCNC: 48 MG/DL (ref 8–22)
CALCIUM SERPL-MCNC: 9.2 MG/DL (ref 8.5–10.5)
CHLORIDE SERPL-SCNC: 99 MMOL/L (ref 96–112)
CO2 SERPL-SCNC: 21 MMOL/L (ref 20–33)
CREAT SERPL-MCNC: 2.42 MG/DL (ref 0.5–1.4)
GLOBULIN SER CALC-MCNC: 2.1 G/DL (ref 1.9–3.5)
GLUCOSE SERPL-MCNC: 225 MG/DL (ref 65–99)
POTASSIUM SERPL-SCNC: 4.9 MMOL/L (ref 3.6–5.5)
PROT SERPL-MCNC: 6.6 G/DL (ref 6–8.2)
SODIUM SERPL-SCNC: 137 MMOL/L (ref 135–145)

## 2021-06-03 LAB — B2 MICROGLOB SERPL-MCNC: 10.9 MG/L (ref 1.1–2.4)

## 2021-06-05 LAB
ALBUMIN SERPL ELPH-MCNC: 3.94 G/DL (ref 3.75–5.01)
ALPHA1 GLOB SERPL ELPH-MCNC: 0.36 G/DL (ref 0.19–0.46)
ALPHA2 GLOB SERPL ELPH-MCNC: 0.85 G/DL (ref 0.48–1.05)
B-GLOBULIN SERPL ELPH-MCNC: 1.07 G/DL (ref 0.48–1.1)
EER MONOCLONAL PROTEIN AND FLC, SERUM Q5224: ABNORMAL
GAMMA GLOB SERPL ELPH-MCNC: 0.28 G/DL (ref 0.62–1.51)
IGA SERPL-MCNC: 594 MG/DL (ref 68–408)
IGG SERPL-MCNC: 232 MG/DL (ref 768–1632)
IGM SERPL-MCNC: <10 MG/DL (ref 35–263)
INTERPRETATION SERPL IFE-IMP: ABNORMAL
INTERPRETATION SERPL IFE-IMP: ABNORMAL
KAPPA LC FREE SER-MCNC: 18.58 MG/L (ref 3.3–19.4)
KAPPA LC FREE/LAMBDA FREE SER NEPH: 0.02 {RATIO} (ref 0.26–1.65)
LAMBDA LC FREE SERPL-MCNC: 864.73 MG/L (ref 5.71–26.3)
PROT SERPL-MCNC: 6.5 G/DL (ref 6.3–8.2)

## 2021-06-11 ENCOUNTER — APPOINTMENT (RX ONLY)
Dept: URBAN - METROPOLITAN AREA CLINIC 4 | Facility: CLINIC | Age: 71
Setting detail: DERMATOLOGY
End: 2021-06-11

## 2021-06-11 DIAGNOSIS — L81.4 OTHER MELANIN HYPERPIGMENTATION: ICD-10-CM

## 2021-06-11 DIAGNOSIS — L82.1 OTHER SEBORRHEIC KERATOSIS: ICD-10-CM

## 2021-06-11 DIAGNOSIS — L57.0 ACTINIC KERATOSIS: ICD-10-CM

## 2021-06-11 DIAGNOSIS — Z85.828 PERSONAL HISTORY OF OTHER MALIGNANT NEOPLASM OF SKIN: ICD-10-CM

## 2021-06-11 PROBLEM — D48.5 NEOPLASM OF UNCERTAIN BEHAVIOR OF SKIN: Status: ACTIVE | Noted: 2021-06-11

## 2021-06-11 PROCEDURE — ? LIQUID NITROGEN

## 2021-06-11 PROCEDURE — 17000 DESTRUCT PREMALG LESION: CPT | Mod: 59

## 2021-06-11 PROCEDURE — ? BIOPSY BY SHAVE METHOD AND DESTRUCTION

## 2021-06-11 PROCEDURE — 99213 OFFICE O/P EST LOW 20 MIN: CPT | Mod: 25

## 2021-06-11 PROCEDURE — 17003 DESTRUCT PREMALG LES 2-14: CPT

## 2021-06-11 PROCEDURE — ? OBSERVATION

## 2021-06-11 PROCEDURE — ? COUNSELING

## 2021-06-11 PROCEDURE — 17271 DSTR MAL LES S/N/H/F/G 0.6-1: CPT

## 2021-06-11 ASSESSMENT — LOCATION DETAILED DESCRIPTION DERM
LOCATION DETAILED: RIGHT FOREHEAD
LOCATION DETAILED: LEFT MID PREAURICULAR CHEEK
LOCATION DETAILED: LEFT MEDIAL FRONTAL SCALP
LOCATION DETAILED: RIGHT SUPERIOR PARIETAL SCALP
LOCATION DETAILED: RIGHT CENTRAL PARIETAL SCALP
LOCATION DETAILED: LEFT SUPERIOR LATERAL UPPER BACK
LOCATION DETAILED: LEFT CENTRAL FRONTAL SCALP
LOCATION DETAILED: RIGHT LATERAL MANDIBULAR CHEEK
LOCATION DETAILED: RIGHT SUPERIOR UPPER BACK
LOCATION DETAILED: LEFT SUPERIOR ANTERIOR NECK

## 2021-06-11 ASSESSMENT — LOCATION SIMPLE DESCRIPTION DERM
LOCATION SIMPLE: SCALP
LOCATION SIMPLE: LEFT SCALP
LOCATION SIMPLE: RIGHT CHEEK
LOCATION SIMPLE: RIGHT FOREHEAD
LOCATION SIMPLE: LEFT ANTERIOR NECK
LOCATION SIMPLE: LEFT UPPER BACK
LOCATION SIMPLE: RIGHT UPPER BACK
LOCATION SIMPLE: LEFT CHEEK

## 2021-06-11 ASSESSMENT — LOCATION ZONE DERM
LOCATION ZONE: NECK
LOCATION ZONE: TRUNK
LOCATION ZONE: FACE
LOCATION ZONE: SCALP

## 2021-06-11 NOTE — PROCEDURE: BIOPSY BY SHAVE METHOD AND DESTRUCTION
Detail Level: Detailed
Biopsy Type: H and E
Bill As?: Malignant Destruction
Size Of Lesion In Cm (Optional): 0
Size Of Lesion After Curettage: 0.9
Anesthesia Type: 1% lidocaine with epinephrine
Anesthesia Volume In Cc: 0.5
Hemostasis: Drysol
Destruction Type: electrodesiccation
Number Of Curettages: 2
Wound Care: Petrolatum
Lab: [id number]
Lab Facility: 
Render Path Notes In Note?: No
Consent: Written consent was obtained and risks were reviewed including but not limited to scarring, infection, bleeding, scabbing, incomplete removal, nerve damage and allergy to anesthesia.
Post-Care Instructions: I reviewed with the patient in detail post-care instructions. Patient is to keep the biopsy site dry overnight, and then apply bacitracin twice daily until healed. Patient may apply hydrogen peroxide soaks to remove any crusting.
Notification Instructions: Patient will be notified of biopsy results. However, patient instructed to call the office if not contacted within 2 weeks.
Billing Type: Third-Party Bill

## 2021-06-11 NOTE — PROCEDURE: LIQUID NITROGEN
Number Of Freeze-Thaw Cycles: 2 freeze-thaw cycles
Render Note In Bullet Format When Appropriate: No
Post-Care Instructions: I reviewed with the patient in detail post-care instructions. Patient is to wear sunprotection, and avoid picking at any of the treated lesions. Pt may apply Vaseline to crusted or scabbing areas.
Consent: The patient's consent was obtained including but not limited to risks of blistering, scarring, darker or lighter pigmentary change, and recurrence.
Duration Of Freeze Thaw-Cycle (Seconds): 0
Detail Level: Detailed

## 2021-06-11 NOTE — PROCEDURE: COUNSELING
Nicotinamide Supplementation Recommendations: Recommended continuing nicotinamide 500mg BID
Detail Level: Zone
Detail Level: Detailed

## 2021-06-21 ENCOUNTER — HOSPITAL ENCOUNTER (OUTPATIENT)
Facility: MEDICAL CENTER | Age: 71
End: 2021-06-21
Attending: INTERNAL MEDICINE
Payer: MEDICARE

## 2021-06-21 PROCEDURE — 83520 IMMUNOASSAY QUANT NOS NONAB: CPT | Mod: 91

## 2021-06-23 LAB
KAPPA LC FREE SER-MCNC: 26.9 MG/L (ref 3.3–19.4)
KAPPA LC FREE/LAMBDA FREE SER NEPH: 0.05 {RATIO} (ref 0.26–1.65)
LAMBDA LC FREE SERPL-MCNC: 509.93 MG/L (ref 5.71–26.3)

## 2021-07-06 ENCOUNTER — HOSPITAL ENCOUNTER (OUTPATIENT)
Facility: MEDICAL CENTER | Age: 71
End: 2021-07-06
Attending: INTERNAL MEDICINE
Payer: MEDICARE

## 2021-07-06 PROCEDURE — 82232 ASSAY OF BETA-2 PROTEIN: CPT

## 2021-07-06 PROCEDURE — 84165 PROTEIN E-PHORESIS SERUM: CPT

## 2021-07-06 PROCEDURE — 84155 ASSAY OF PROTEIN SERUM: CPT

## 2021-07-06 PROCEDURE — 86334 IMMUNOFIX E-PHORESIS SERUM: CPT

## 2021-07-06 PROCEDURE — 82784 ASSAY IGA/IGD/IGG/IGM EACH: CPT

## 2021-07-06 PROCEDURE — 83520 IMMUNOASSAY QUANT NOS NONAB: CPT

## 2021-07-07 RX ORDER — ATORVASTATIN CALCIUM 40 MG/1
40 TABLET, FILM COATED ORAL EVERY EVENING
Qty: 90 TABLET | Refills: 1 | Status: SHIPPED | OUTPATIENT
Start: 2021-07-07 | End: 2022-01-03

## 2021-07-08 LAB — B2 MICROGLOB SERPL-MCNC: 7.3 MG/L (ref 1.1–2.4)

## 2021-07-13 LAB
ALBUMIN SERPL ELPH-MCNC: 3.8 G/DL (ref 3.75–5.01)
ALPHA1 GLOB SERPL ELPH-MCNC: 0.37 G/DL (ref 0.19–0.46)
ALPHA2 GLOB SERPL ELPH-MCNC: 0.78 G/DL (ref 0.48–1.05)
B-GLOBULIN SERPL ELPH-MCNC: 0.7 G/DL (ref 0.48–1.1)
EER MONOCLONAL PROTEIN AND FLC, SERUM Q5224: ABNORMAL
GAMMA GLOB SERPL ELPH-MCNC: 0.24 G/DL (ref 0.62–1.51)
IGA SERPL-MCNC: 202 MG/DL (ref 68–408)
IGG SERPL-MCNC: 223 MG/DL (ref 768–1632)
IGM SERPL-MCNC: 15 MG/DL (ref 35–263)
INTERPRETATION SERPL IFE-IMP: ABNORMAL
INTERPRETATION SERPL IFE-IMP: ABNORMAL
KAPPA LC FREE SER-MCNC: 29.56 MG/L (ref 3.3–19.4)
KAPPA LC FREE/LAMBDA FREE SER NEPH: 0.08 {RATIO} (ref 0.26–1.65)
LAMBDA LC FREE SERPL-MCNC: 370.6 MG/L (ref 5.71–26.3)
PROT SERPL-MCNC: 5.9 G/DL (ref 6.3–8.2)

## 2021-08-02 ENCOUNTER — HOSPITAL ENCOUNTER (OUTPATIENT)
Dept: LAB | Facility: MEDICAL CENTER | Age: 71
End: 2021-08-02
Attending: INTERNAL MEDICINE
Payer: MEDICARE

## 2021-08-02 PROCEDURE — 82784 ASSAY IGA/IGD/IGG/IGM EACH: CPT

## 2021-08-02 PROCEDURE — 86334 IMMUNOFIX E-PHORESIS SERUM: CPT

## 2021-08-02 PROCEDURE — 83520 IMMUNOASSAY QUANT NOS NONAB: CPT | Mod: 91

## 2021-08-02 PROCEDURE — 84165 PROTEIN E-PHORESIS SERUM: CPT

## 2021-08-02 PROCEDURE — 84155 ASSAY OF PROTEIN SERUM: CPT

## 2021-08-02 PROCEDURE — 82232 ASSAY OF BETA-2 PROTEIN: CPT

## 2021-08-04 LAB — B2 MICROGLOB SERPL-MCNC: 11.6 MG/L (ref 1.1–2.4)

## 2021-08-05 LAB
ALBUMIN SERPL ELPH-MCNC: 3.4 G/DL (ref 3.75–5.01)
ALPHA1 GLOB SERPL ELPH-MCNC: 0.38 G/DL (ref 0.19–0.46)
ALPHA2 GLOB SERPL ELPH-MCNC: 0.76 G/DL (ref 0.48–1.05)
B-GLOBULIN SERPL ELPH-MCNC: 0.65 G/DL (ref 0.48–1.1)
EER MONOCLONAL PROTEIN AND FLC, SERUM Q5224: ABNORMAL
GAMMA GLOB SERPL ELPH-MCNC: 0.21 G/DL (ref 0.62–1.51)
IGA SERPL-MCNC: 172 MG/DL (ref 68–408)
IGG SERPL-MCNC: 164 MG/DL (ref 768–1632)
IGM SERPL-MCNC: <10 MG/DL (ref 35–263)
INTERPRETATION SERPL IFE-IMP: ABNORMAL
INTERPRETATION SERPL IFE-IMP: ABNORMAL
KAPPA LC FREE SER-MCNC: 25.68 MG/L (ref 3.3–19.4)
KAPPA LC FREE/LAMBDA FREE SER NEPH: 0.09 {RATIO} (ref 0.26–1.65)
LAMBDA LC FREE SERPL-MCNC: 297.23 MG/L (ref 5.71–26.3)
PROT SERPL-MCNC: 5.4 G/DL (ref 6.3–8.2)

## 2021-09-07 ENCOUNTER — HOSPITAL ENCOUNTER (OUTPATIENT)
Facility: MEDICAL CENTER | Age: 71
End: 2021-09-07
Attending: NURSE PRACTITIONER
Payer: MEDICARE

## 2021-09-07 PROCEDURE — 84155 ASSAY OF PROTEIN SERUM: CPT

## 2021-09-07 PROCEDURE — 82784 ASSAY IGA/IGD/IGG/IGM EACH: CPT

## 2021-09-07 PROCEDURE — 86334 IMMUNOFIX E-PHORESIS SERUM: CPT

## 2021-09-07 PROCEDURE — 83520 IMMUNOASSAY QUANT NOS NONAB: CPT

## 2021-09-07 PROCEDURE — 82232 ASSAY OF BETA-2 PROTEIN: CPT

## 2021-09-07 PROCEDURE — 84165 PROTEIN E-PHORESIS SERUM: CPT

## 2021-09-09 LAB — B2 MICROGLOB SERPL-MCNC: 10 MG/L (ref 1.1–2.4)

## 2021-09-10 LAB
ALBUMIN SERPL ELPH-MCNC: 3.6 G/DL (ref 3.75–5.01)
ALPHA1 GLOB SERPL ELPH-MCNC: 0.34 G/DL (ref 0.19–0.46)
ALPHA2 GLOB SERPL ELPH-MCNC: 0.77 G/DL (ref 0.48–1.05)
B-GLOBULIN SERPL ELPH-MCNC: 0.68 G/DL (ref 0.48–1.1)
EER MONOCLONAL PROTEIN AND FLC, SERUM Q5224: ABNORMAL
GAMMA GLOB SERPL ELPH-MCNC: 0.21 G/DL (ref 0.62–1.51)
IGA SERPL-MCNC: 275 MG/DL (ref 68–408)
IGG SERPL-MCNC: 201 MG/DL (ref 768–1632)
IGM SERPL-MCNC: <10 MG/DL (ref 35–263)
INTERPRETATION SERPL IFE-IMP: ABNORMAL
INTERPRETATION SERPL IFE-IMP: ABNORMAL
KAPPA LC FREE SER-MCNC: 28.98 MG/L (ref 3.3–19.4)
KAPPA LC FREE/LAMBDA FREE SER NEPH: 0.08 {RATIO} (ref 0.26–1.65)
LAMBDA LC FREE SERPL-MCNC: 385.48 MG/L (ref 5.71–26.3)
PROT SERPL-MCNC: 5.6 G/DL (ref 6.3–8.2)

## 2021-09-17 ENCOUNTER — APPOINTMENT (RX ONLY)
Dept: URBAN - METROPOLITAN AREA CLINIC 4 | Facility: CLINIC | Age: 71
Setting detail: DERMATOLOGY
End: 2021-09-17

## 2021-09-17 DIAGNOSIS — L21.8 OTHER SEBORRHEIC DERMATITIS: ICD-10-CM

## 2021-09-17 DIAGNOSIS — D485 NEOPLASM OF UNCERTAIN BEHAVIOR OF SKIN: ICD-10-CM

## 2021-09-17 PROBLEM — D48.5 NEOPLASM OF UNCERTAIN BEHAVIOR OF SKIN: Status: ACTIVE | Noted: 2021-09-17

## 2021-09-17 PROCEDURE — ? COUNSELING

## 2021-09-17 PROCEDURE — 99213 OFFICE O/P EST LOW 20 MIN: CPT | Mod: 25

## 2021-09-17 PROCEDURE — 11102 TANGNTL BX SKIN SINGLE LES: CPT | Mod: 59

## 2021-09-17 PROCEDURE — ? BIOPSY BY SHAVE METHOD AND DESTRUCTION

## 2021-09-17 PROCEDURE — ? OBSERVATION

## 2021-09-17 PROCEDURE — 17271 DSTR MAL LES S/N/H/F/G 0.6-1: CPT

## 2021-09-17 PROCEDURE — 11103 TANGNTL BX SKIN EA SEP/ADDL: CPT

## 2021-09-17 ASSESSMENT — LOCATION ZONE DERM: LOCATION ZONE: SCALP

## 2021-09-17 ASSESSMENT — LOCATION SIMPLE DESCRIPTION DERM: LOCATION SIMPLE: POSTERIOR SCALP

## 2021-09-17 ASSESSMENT — LOCATION DETAILED DESCRIPTION DERM: LOCATION DETAILED: LEFT POSTERIOR PARIETAL SCALP

## 2021-09-17 NOTE — PROCEDURE: BIOPSY BY SHAVE METHOD AND DESTRUCTION
Detail Level: Detailed
Biopsy Type: H and E
Bill As?: Biopsy by Shave Method
Size Of Lesion In Cm (Optional): 0
Size Of Lesion After Curettage: 0.8
Anesthesia Type: 1% lidocaine with epinephrine
Anesthesia Volume In Cc: 0.5
Hemostasis: Drysol
Destruction Type: electrodesiccation
Number Of Curettages: 2
Wound Care: Petrolatum
Lab: [id number]
Lab Facility: 
Render Path Notes In Note?: No
Consent: Written consent was obtained and risks were reviewed including but not limited to scarring, infection, bleeding, scabbing, incomplete removal, nerve damage and allergy to anesthesia.
Post-Care Instructions: I reviewed with the patient in detail post-care instructions. Patient is to keep the biopsy site dry overnight, and then apply bacitracin twice daily until healed. Patient may apply hydrogen peroxide soaks to remove any crusting.
Notification Instructions: Patient will be notified of biopsy results. However, patient instructed to call the office if not contacted within 2 weeks.
Billing Type: Third-Party Bill
Size Of Lesion After Curettage: 1
Bill As?: Malignant Destruction

## 2021-10-11 ENCOUNTER — HOSPITAL ENCOUNTER (OUTPATIENT)
Dept: LAB | Facility: MEDICAL CENTER | Age: 71
End: 2021-10-11
Attending: NURSE PRACTITIONER
Payer: MEDICARE

## 2021-10-11 PROCEDURE — 82784 ASSAY IGA/IGD/IGG/IGM EACH: CPT

## 2021-10-11 PROCEDURE — 83520 IMMUNOASSAY QUANT NOS NONAB: CPT

## 2021-10-11 PROCEDURE — 86334 IMMUNOFIX E-PHORESIS SERUM: CPT

## 2021-10-11 PROCEDURE — 84155 ASSAY OF PROTEIN SERUM: CPT

## 2021-10-11 PROCEDURE — 82232 ASSAY OF BETA-2 PROTEIN: CPT

## 2021-10-11 PROCEDURE — 84165 PROTEIN E-PHORESIS SERUM: CPT

## 2021-10-14 LAB
ALBUMIN SERPL ELPH-MCNC: 3.26 G/DL (ref 3.75–5.01)
ALPHA1 GLOB SERPL ELPH-MCNC: 0.44 G/DL (ref 0.19–0.46)
ALPHA2 GLOB SERPL ELPH-MCNC: 0.8 G/DL (ref 0.48–1.05)
B-GLOBULIN SERPL ELPH-MCNC: 0.81 G/DL (ref 0.48–1.1)
B2 MICROGLOB SERPL-MCNC: 12.6 MG/L (ref 1.1–2.4)
EER MONOCLONAL PROTEIN AND FLC, SERUM Q5224: ABNORMAL
GAMMA GLOB SERPL ELPH-MCNC: 0.19 G/DL (ref 0.62–1.51)
IGA SERPL-MCNC: 324 MG/DL (ref 68–408)
IGG SERPL-MCNC: 224 MG/DL (ref 768–1632)
IGM SERPL-MCNC: <10 MG/DL (ref 35–263)
INTERPRETATION SERPL IFE-IMP: ABNORMAL
INTERPRETATION SERPL IFE-IMP: ABNORMAL
KAPPA LC FREE SER-MCNC: 19.79 MG/L (ref 3.3–19.4)
KAPPA LC FREE/LAMBDA FREE SER NEPH: 0.04 {RATIO} (ref 0.26–1.65)
LAMBDA LC FREE SERPL-MCNC: 451.71 MG/L (ref 5.71–26.3)
PROT SERPL-MCNC: 5.5 G/DL (ref 6.3–8.2)

## 2021-11-22 ENCOUNTER — HOSPITAL ENCOUNTER (OUTPATIENT)
Dept: LAB | Facility: MEDICAL CENTER | Age: 71
End: 2021-11-22
Attending: NURSE PRACTITIONER
Payer: MEDICARE

## 2021-11-22 PROCEDURE — 82232 ASSAY OF BETA-2 PROTEIN: CPT

## 2021-11-25 LAB — B2 MICROGLOB SERPL-MCNC: 10.2 MG/L (ref 1.1–2.4)

## 2021-12-06 ENCOUNTER — HOSPITAL ENCOUNTER (OUTPATIENT)
Facility: MEDICAL CENTER | Age: 71
End: 2021-12-06
Attending: NURSE PRACTITIONER
Payer: MEDICARE

## 2021-12-06 PROCEDURE — 80053 COMPREHEN METABOLIC PANEL: CPT

## 2021-12-06 PROCEDURE — 84165 PROTEIN E-PHORESIS SERUM: CPT

## 2021-12-06 PROCEDURE — 82784 ASSAY IGA/IGD/IGG/IGM EACH: CPT

## 2021-12-06 PROCEDURE — 84155 ASSAY OF PROTEIN SERUM: CPT

## 2021-12-06 PROCEDURE — 83520 IMMUNOASSAY QUANT NOS NONAB: CPT | Mod: 91

## 2021-12-06 PROCEDURE — 82232 ASSAY OF BETA-2 PROTEIN: CPT

## 2021-12-06 PROCEDURE — 86334 IMMUNOFIX E-PHORESIS SERUM: CPT

## 2021-12-06 PROCEDURE — 84156 ASSAY OF PROTEIN URINE: CPT

## 2021-12-07 LAB
ALBUMIN SERPL BCP-MCNC: 4.2 G/DL (ref 3.2–4.9)
ALBUMIN/GLOB SERPL: 2.5 G/DL
ALP SERPL-CCNC: 87 U/L (ref 30–99)
ALT SERPL-CCNC: 42 U/L (ref 2–50)
ANION GAP SERPL CALC-SCNC: 14 MMOL/L (ref 7–16)
AST SERPL-CCNC: 22 U/L (ref 12–45)
BILIRUB SERPL-MCNC: 0.5 MG/DL (ref 0.1–1.5)
BUN SERPL-MCNC: 33 MG/DL (ref 8–22)
CALCIUM SERPL-MCNC: 8.8 MG/DL (ref 8.5–10.5)
CHLORIDE SERPL-SCNC: 98 MMOL/L (ref 96–112)
CO2 SERPL-SCNC: 21 MMOL/L (ref 20–33)
CREAT SERPL-MCNC: 2.46 MG/DL (ref 0.5–1.4)
GLOBULIN SER CALC-MCNC: 1.7 G/DL (ref 1.9–3.5)
GLUCOSE SERPL-MCNC: 143 MG/DL (ref 65–99)
POTASSIUM SERPL-SCNC: 4.4 MMOL/L (ref 3.6–5.5)
PROT SERPL-MCNC: 5.9 G/DL (ref 6–8.2)
SODIUM SERPL-SCNC: 133 MMOL/L (ref 135–145)

## 2021-12-10 LAB
ALBUMIN SERPL ELPH-MCNC: 3.59 G/DL (ref 3.75–5.01)
ALPHA1 GLOB SERPL ELPH-MCNC: 0.42 G/DL (ref 0.19–0.46)
ALPHA2 GLOB SERPL ELPH-MCNC: 0.86 G/DL (ref 0.48–1.05)
B-GLOBULIN SERPL ELPH-MCNC: 0.91 G/DL (ref 0.48–1.1)
B2 MICROGLOB SERPL-MCNC: 10.9 MG/L (ref 1.1–2.4)
EER MONOCLONAL PROTEIN AND FLC, SERUM Q5224: ABNORMAL
GAMMA GLOB SERPL ELPH-MCNC: 0.22 G/DL (ref 0.62–1.51)
IGA SERPL-MCNC: 416 MG/DL (ref 68–408)
IGG SERPL-MCNC: 174 MG/DL (ref 768–1632)
IGM SERPL-MCNC: <10 MG/DL (ref 35–263)
INTERPRETATION SERPL IFE-IMP: ABNORMAL
INTERPRETATION SERPL IFE-IMP: ABNORMAL
KAPPA LC FREE 24H UR-MRATE: ABNORMAL MG/D
KAPPA LC FREE SER-MCNC: 23.76 MG/L (ref 3.3–19.4)
KAPPA LC FREE UR-MCNC: 37.92 MG/L (ref 0–32.9)
KAPPA LC FREE/LAMBDA FREE SER NEPH: 0.05 {RATIO} (ref 0.26–1.65)
LAMBDA LC FREE 24H UR-MRATE: ABNORMAL MG/D
LAMBDA LC FREE SERPL-MCNC: 480.17 MG/L (ref 5.71–26.3)
LAMBDA LC FREE UR-MCNC: 196.84 MG/L (ref 0–3.79)
PROT 24H UR-MRATE: ABNORMAL MG/D
PROT SERPL-MCNC: 6 G/DL (ref 6.3–8.2)

## 2021-12-13 LAB
ALBUMIN 24H MFR UR ELPH: 78.6 %
ALPHA1 GLOB 24H MFR UR ELPH: 6.7 %
ALPHA2 GLOB 24H MFR UR ELPH: 5.8 %
B-GLOBULIN 24H MFR UR ELPH: 7.6 %
COLLECT DURATION TIME SPEC: NORMAL HRS
EER MONOCLONAL PROTEIN STUDY, 24 HOUR U Q5964: NORMAL
GAMMA GLOB 24H MFR UR ELPH: 1.3 %
INTERPRETATION UR IFE-IMP: NORMAL
M PROTEIN 24H MFR UR ELPH: 2.4 %
M PROTEIN 24H UR ELPH-MRATE: NORMAL MG/24 HRS
PROT 24H UR-MRATE: NORMAL MG/D (ref 40–150)
PROT UR-MCNC: 184 MG/DL
SPECIMEN VOL ?TM UR: NORMAL ML

## 2021-12-17 ENCOUNTER — APPOINTMENT (RX ONLY)
Dept: URBAN - METROPOLITAN AREA CLINIC 4 | Facility: CLINIC | Age: 71
Setting detail: DERMATOLOGY
End: 2021-12-17

## 2021-12-17 DIAGNOSIS — D22 MELANOCYTIC NEVI: ICD-10-CM

## 2021-12-17 DIAGNOSIS — D17 BENIGN LIPOMATOUS NEOPLASM: ICD-10-CM

## 2021-12-17 DIAGNOSIS — L57.0 ACTINIC KERATOSIS: ICD-10-CM

## 2021-12-17 DIAGNOSIS — L81.4 OTHER MELANIN HYPERPIGMENTATION: ICD-10-CM

## 2021-12-17 DIAGNOSIS — L82.1 OTHER SEBORRHEIC KERATOSIS: ICD-10-CM

## 2021-12-17 PROBLEM — D22.5 MELANOCYTIC NEVI OF TRUNK: Status: ACTIVE | Noted: 2021-12-17

## 2021-12-17 PROBLEM — D17.1 BENIGN LIPOMATOUS NEOPLASM OF SKIN AND SUBCUTANEOUS TISSUE OF TRUNK: Status: ACTIVE | Noted: 2021-12-17

## 2021-12-17 PROBLEM — C44.41 BASAL CELL CARCINOMA OF SKIN OF SCALP AND NECK: Status: ACTIVE | Noted: 2021-12-17

## 2021-12-17 PROBLEM — D48.5 NEOPLASM OF UNCERTAIN BEHAVIOR OF SKIN: Status: ACTIVE | Noted: 2021-12-17

## 2021-12-17 PROCEDURE — 17271 DSTR MAL LES S/N/H/F/G 0.6-1: CPT

## 2021-12-17 PROCEDURE — 11102 TANGNTL BX SKIN SINGLE LES: CPT | Mod: 59

## 2021-12-17 PROCEDURE — 17000 DESTRUCT PREMALG LESION: CPT | Mod: 59

## 2021-12-17 PROCEDURE — 11103 TANGNTL BX SKIN EA SEP/ADDL: CPT

## 2021-12-17 PROCEDURE — ? BIOPSY BY SHAVE METHOD

## 2021-12-17 PROCEDURE — 99213 OFFICE O/P EST LOW 20 MIN: CPT | Mod: 25

## 2021-12-17 PROCEDURE — ? LIQUID NITROGEN

## 2021-12-17 PROCEDURE — 17003 DESTRUCT PREMALG LES 2-14: CPT

## 2021-12-17 PROCEDURE — ? COUNSELING

## 2021-12-17 PROCEDURE — ? BIOPSY BY SHAVE METHOD AND DESTRUCTION

## 2021-12-17 ASSESSMENT — LOCATION ZONE DERM
LOCATION ZONE: ARM
LOCATION ZONE: TRUNK
LOCATION ZONE: EAR
LOCATION ZONE: FACE

## 2021-12-17 ASSESSMENT — LOCATION SIMPLE DESCRIPTION DERM
LOCATION SIMPLE: RIGHT FOREHEAD
LOCATION SIMPLE: UPPER BACK
LOCATION SIMPLE: LEFT FOREHEAD
LOCATION SIMPLE: RIGHT EAR
LOCATION SIMPLE: RIGHT CHEEK
LOCATION SIMPLE: CHEST
LOCATION SIMPLE: RIGHT SHOULDER
LOCATION SIMPLE: LEFT EAR
LOCATION SIMPLE: LEFT UPPER BACK
LOCATION SIMPLE: RIGHT LOWER BACK
LOCATION SIMPLE: LEFT CHEEK

## 2021-12-17 ASSESSMENT — LOCATION DETAILED DESCRIPTION DERM
LOCATION DETAILED: RIGHT LATERAL FOREHEAD
LOCATION DETAILED: RIGHT INFERIOR LATERAL MALAR CHEEK
LOCATION DETAILED: INFERIOR THORACIC SPINE
LOCATION DETAILED: LEFT SUPERIOR FOREHEAD
LOCATION DETAILED: LEFT MID-UPPER BACK
LOCATION DETAILED: LEFT SUPERIOR LATERAL MALAR CHEEK
LOCATION DETAILED: RIGHT SUPERIOR MEDIAL FOREHEAD
LOCATION DETAILED: LEFT INFERIOR UPPER BACK
LOCATION DETAILED: LEFT SUPERIOR UPPER BACK
LOCATION DETAILED: RIGHT POSTERIOR SHOULDER
LOCATION DETAILED: RIGHT SUPERIOR CENTRAL MALAR CHEEK
LOCATION DETAILED: LEFT SCAPHA
LOCATION DETAILED: LEFT LATERAL MALAR CHEEK
LOCATION DETAILED: RIGHT SUPERIOR LATERAL MIDBACK
LOCATION DETAILED: RIGHT CENTRAL MALAR CHEEK
LOCATION DETAILED: LEFT INFERIOR LATERAL MALAR CHEEK
LOCATION DETAILED: RIGHT SCAPHA
LOCATION DETAILED: LEFT LATERAL SUPERIOR CHEST

## 2021-12-17 NOTE — PROCEDURE: LIQUID NITROGEN
Consent: The patient's consent was obtained including but not limited to risks of blistering, scarring, darker or lighter pigmentary change, and recurrence.
Render Note In Bullet Format When Appropriate: No
Duration Of Freeze Thaw-Cycle (Seconds): 0
Show Applicator Variable?: Yes
Post-Care Instructions: I reviewed with the patient in detail post-care instructions. Patient is to wear sunprotection, and avoid picking at any of the treated lesions. Pt may apply Vaseline to crusted or scabbing areas.
Detail Level: Detailed
Number Of Freeze-Thaw Cycles: 2 freeze-thaw cycles

## 2021-12-17 NOTE — PROCEDURE: BIOPSY BY SHAVE METHOD AND DESTRUCTION
Detail Level: Detailed
Biopsy Type: H and E
Bill As?: Malignant Destruction
Size Of Lesion In Cm (Optional): 0
Size Of Lesion After Curettage: 0.8
Anesthesia Type: 1% lidocaine with epinephrine
Anesthesia Volume In Cc: 0.5
Hemostasis: Drysol
Destruction Type: electrodesiccation
Number Of Curettages: 2
Wound Care: Petrolatum
Lab: [id number]
Lab Facility: 
Path Notes (To The Dermatopathologist): PLEASE CHECK MARGINS
Render Path Notes In Note?: No
Consent: Written consent was obtained and risks were reviewed including but not limited to scarring, infection, bleeding, scabbing, incomplete removal, nerve damage and allergy to anesthesia.
Post-Care Instructions: I reviewed with the patient in detail post-care instructions. Patient is to keep the biopsy site dry overnight, and then apply bacitracin twice daily until healed. Patient may apply hydrogen peroxide soaks to remove any crusting.
Notification Instructions: Patient will be notified of biopsy results. However, patient instructed to call the office if not contacted within 2 weeks.
Billing Type: Third-Party Bill

## 2022-03-07 RX ORDER — TERAZOSIN 5 MG/1
CAPSULE ORAL
Qty: 90 CAPSULE | Refills: 0 | Status: SHIPPED | OUTPATIENT
Start: 2022-03-07 | End: 2022-06-06 | Stop reason: SDUPTHER

## 2022-03-14 ENCOUNTER — HOSPITAL ENCOUNTER (OUTPATIENT)
Facility: MEDICAL CENTER | Age: 72
End: 2022-03-14
Attending: INTERNAL MEDICINE
Payer: MEDICARE

## 2022-03-14 PROCEDURE — 86769 SARS-COV-2 COVID-19 ANTIBODY: CPT

## 2022-03-16 LAB
SARS-COV-2 IGG SERPL IA-ACNC: 1.26 IV
SARS-COV-2 IGG SERPL QL IA: POSITIVE

## 2022-03-29 PROBLEM — G93.41 ACUTE METABOLIC ENCEPHALOPATHY: Status: ACTIVE | Noted: 2022-03-29

## 2022-04-27 ENCOUNTER — APPOINTMENT (RX ONLY)
Dept: URBAN - METROPOLITAN AREA CLINIC 4 | Facility: CLINIC | Age: 72
Setting detail: DERMATOLOGY
End: 2022-04-27

## 2022-04-27 DIAGNOSIS — L57.0 ACTINIC KERATOSIS: ICD-10-CM

## 2022-04-27 PROBLEM — C44.41 BASAL CELL CARCINOMA OF SKIN OF SCALP AND NECK: Status: ACTIVE | Noted: 2022-04-27

## 2022-04-27 PROCEDURE — 11102 TANGNTL BX SKIN SINGLE LES: CPT

## 2022-04-27 PROCEDURE — 17000 DESTRUCT PREMALG LESION: CPT | Mod: 59

## 2022-04-27 PROCEDURE — 17003 DESTRUCT PREMALG LES 2-14: CPT

## 2022-04-27 PROCEDURE — ? LIQUID NITROGEN

## 2022-04-27 PROCEDURE — ? BIOPSY BY SHAVE METHOD AND DESTRUCTION

## 2022-04-27 ASSESSMENT — LOCATION SIMPLE DESCRIPTION DERM
LOCATION SIMPLE: RIGHT SCALP
LOCATION SIMPLE: LEFT FOREHEAD
LOCATION SIMPLE: RIGHT FOREHEAD
LOCATION SIMPLE: RIGHT OCCIPITAL SCALP
LOCATION SIMPLE: SCALP

## 2022-04-27 ASSESSMENT — LOCATION DETAILED DESCRIPTION DERM
LOCATION DETAILED: RIGHT MEDIAL FRONTAL SCALP
LOCATION DETAILED: LEFT CENTRAL PARIETAL SCALP
LOCATION DETAILED: RIGHT FOREHEAD
LOCATION DETAILED: LEFT FOREHEAD
LOCATION DETAILED: RIGHT SUPERIOR OCCIPITAL SCALP
LOCATION DETAILED: LEFT SUPERIOR FOREHEAD
LOCATION DETAILED: RIGHT CENTRAL PARIETAL SCALP

## 2022-04-27 ASSESSMENT — LOCATION ZONE DERM
LOCATION ZONE: FACE
LOCATION ZONE: SCALP

## 2022-04-27 NOTE — PROCEDURE: BIOPSY BY SHAVE METHOD AND DESTRUCTION
Detail Level: Detailed
Biopsy Type: H and E
Bill As?: Biopsy by Shave Method
Size Of Lesion In Cm (Optional): 0
Size Of Lesion After Curettage: 0.4
Anesthesia Type: 1% lidocaine with epinephrine
Anesthesia Volume In Cc: 0.5
Hemostasis: Drysol
Destruction Type: electrodesiccation
Number Of Curettages: 2
Wound Care: Petrolatum
Lab: [id number]
Lab Facility: 
Render Path Notes In Note?: No
Consent: Written consent was obtained and risks were reviewed including but not limited to scarring, infection, bleeding, scabbing, incomplete removal, nerve damage and allergy to anesthesia.
Post-Care Instructions: I reviewed with the patient in detail post-care instructions. Patient is to keep the biopsy site dry overnight, and then apply bacitracin twice daily until healed. Patient may apply hydrogen peroxide soaks to remove any crusting.
Notification Instructions: Patient will be notified of biopsy results. However, patient instructed to call the office if not contacted within 2 weeks.
Billing Type: Third-Party Bill

## 2022-05-02 ENCOUNTER — HOSPITAL ENCOUNTER (OUTPATIENT)
Facility: MEDICAL CENTER | Age: 72
End: 2022-05-02
Attending: NURSE PRACTITIONER
Payer: MEDICARE

## 2022-05-02 PROCEDURE — 84165 PROTEIN E-PHORESIS SERUM: CPT

## 2022-05-02 PROCEDURE — 86334 IMMUNOFIX E-PHORESIS SERUM: CPT

## 2022-05-02 PROCEDURE — 84155 ASSAY OF PROTEIN SERUM: CPT

## 2022-05-02 PROCEDURE — 83521 IG LIGHT CHAINS FREE EACH: CPT | Mod: 91

## 2022-05-03 LAB — AMBIGUOUS DTTM AMBI4: NORMAL

## 2022-05-04 LAB
KAPPA LC FREE SER-MCNC: 22.61 MG/L (ref 3.3–19.4)
KAPPA LC FREE/LAMBDA FREE SER NEPH: 0.03 {RATIO} (ref 0.26–1.65)
LAMBDA LC FREE SERPL-MCNC: 782.96 MG/L (ref 5.71–26.3)

## 2022-05-06 LAB
ALBUMIN SERPL ELPH-MCNC: 3.38 G/DL (ref 3.75–5.01)
ALPHA1 GLOB SERPL ELPH-MCNC: 0.4 G/DL (ref 0.19–0.46)
ALPHA2 GLOB SERPL ELPH-MCNC: 0.81 G/DL (ref 0.48–1.05)
B-GLOBULIN SERPL ELPH-MCNC: 1.08 G/DL (ref 0.48–1.1)
GAMMA GLOB SERPL ELPH-MCNC: 0.32 G/DL (ref 0.62–1.51)
INTERPRETATION SERPL IFE-IMP: ABNORMAL
INTERPRETATION SERPL IFE-IMP: ABNORMAL
PATHOLOGY STUDY: ABNORMAL
PROT SERPL-MCNC: 6 G/DL (ref 6.3–8.2)

## 2022-05-09 ENCOUNTER — APPOINTMENT (RX ONLY)
Dept: URBAN - METROPOLITAN AREA CLINIC 4 | Facility: CLINIC | Age: 72
Setting detail: DERMATOLOGY
End: 2022-05-09

## 2022-05-09 PROBLEM — C44.42 SQUAMOUS CELL CARCINOMA OF SKIN OF SCALP AND NECK: Status: ACTIVE | Noted: 2022-05-09

## 2022-05-09 PROCEDURE — 11621 EXC S/N/H/F/G MAL+MRG 0.6-1: CPT

## 2022-05-09 PROCEDURE — ? EXCISION

## 2022-05-09 NOTE — PROCEDURE: EXCISION
Biopsy Photograph Reviewed: Yes
Size Of Lesion In Cm: 1
X Size Of Lesion In Cm (Optional): 0
Anesthesia Volume In Cc: 0.5
Was An Eye Clamp Used?: No
Eye Clamp Note Details: An eye clamp was used during the procedure.
Excision Method: Saucerization
Saucerization Depth: dermis and superficial adipose tissue
Repair Type: None
Suturegard Retention Suture: 2-0 Nylon
Retention Suture Bite Size: 3 mm
Length To Time In Minutes Device Was In Place: 10
Undermining Type: Entire Wound
Debridement Text: The wound edges were debrided prior to proceeding with the closure to facilitate wound healing.
Helical Rim Text: The closure involved the helical rim.
Vermilion Border Text: The closure involved the vermilion border.
Nostril Rim Text: The closure involved the nostril rim.
Retention Suture Text: Retention sutures were placed to support the closure and prevent dehiscence.
Lab: [id number]
Lab Facility: 
Graft Donor Site Bandage (Optional-Leave Blank If You Don't Want In Note): Steri-strips and a pressure bandage were applied to the donor site.
Epidermal Closure Graft Donor Site (Optional): simple interrupted
Billing Type: Third-Party Bill
Excision Depth: adipose tissue
Scalpel Size: dermablade
Anesthesia Type: 1% lidocaine with epinephrine
Additional Anesthesia Volume In Cc: 9
Hemostasis: Electrocautery
Estimated Blood Loss (Cc): minimal
Detail Level: Detailed
Anesthesia Volume In Cc: 6
Epidermal Closure: none-secondary intention
Wound Care: Petrolatum
Dressing: pressure dressing
Suturegard Intro: Intraoperative tissue expansion was performed, utilizing the SUTUREGARD device, in order to reduce wound tension.
Suturegard Body: The suture ends were repeatedly re-tightened and re-clamped to achieve the desired tissue expansion.
Hemigard Intro: Due to skin fragility and wound tension, it was decided to use HEMIGARD adhesive retention suture devices to permit a linear closure. The skin was cleaned and dried for a 6cm distance away from the wound. Excessive hair, if present, was removed to allow for adhesion.
Hemigard Postcare Instructions: The HEMIGARD strips are to remain completely dry for at least 5-7 days.
Positioning (Leave Blank If You Do Not Want): The patient was placed in a comfortable position exposing the surgical site.
Pre-Excision Curettage Text (Leave Blank If You Do Not Want): Prior to drawing the surgical margin the visible lesion was removed with electrodesiccation and curettage to clearly define the lesion size.
Complex Repair Preamble Text (Leave Blank If You Do Not Want): Extensive wide undermining was performed.
Intermediate Repair Preamble Text (Leave Blank If You Do Not Want): Undermining was performed with blunt dissection.
Curvilinear Excision Additional Text (Leave Blank If You Do Not Want): The margin was drawn around the clinically apparent lesion.  A curvilinear shape was then drawn on the skin incorporating the lesion and margins.  Incisions were then made along these lines to the appropriate tissue plane and the lesion was extirpated.
Fusiform Excision Additional Text (Leave Blank If You Do Not Want): The margin was drawn around the clinically apparent lesion.  A fusiform shape was then drawn on the skin incorporating the lesion and margins.  Incisions were then made along these lines to the appropriate tissue plane and the lesion was extirpated.
Elliptical Excision Additional Text (Leave Blank If You Do Not Want): The margin was drawn around the clinically apparent lesion.  An elliptical shape was then drawn on the skin incorporating the lesion and margins.  Incisions were then made along these lines to the appropriate tissue plane and the lesion was extirpated.
Saucerization Excision Additional Text (Leave Blank If You Do Not Want): The margin was drawn around the clinically apparent lesion.  Incisions were then made along these lines, in a tangential fashion, to the appropriate tissue plane and the lesion was extirpated.
Slit Excision Additional Text (Leave Blank If You Do Not Want): A linear line was drawn on the skin overlying the lesion. An incision was made slowly until the lesion was visualized.  Once visualized, the lesion was removed with blunt dissection.
Excisional Biopsy Additional Text (Leave Blank If You Do Not Want): The margin was drawn around the clinically apparent lesion. An elliptical shape was then drawn on the skin incorporating the lesion and margins.  Incisions were then made along these lines to the appropriate tissue plane and the lesion was extirpated.
Perilesional Excision Additional Text (Leave Blank If You Do Not Want): The margin was drawn around the clinically apparent lesion. Incisions were then made along these lines to the appropriate tissue plane and the lesion was extirpated.
Repair Performed By Another Provider Text (Leave Blank If You Do Not Want): After the tissue was excised the defect was repaired by another provider.
No Repair - Repaired With Adjacent Surgical Defect Text (Leave Blank If You Do Not Want): After the excision the defect was repaired concurrently with another surgical defect which was in close approximation.
Adjacent Tissue Transfer Text: The defect edges were debeveled with a #15 scalpel blade.  Given the location of the defect and the proximity to free margins an adjacent tissue transfer was deemed most appropriate.  Using a sterile surgical marker, an appropriate flap was drawn incorporating the defect and placing the expected incisions within the relaxed skin tension lines where possible.    The area thus outlined was incised deep to adipose tissue with a #15 scalpel blade.  The skin margins were undermined to an appropriate distance in all directions utilizing iris scissors.
Advancement Flap (Single) Text: The defect edges were debeveled with a #15 scalpel blade.  Given the location of the defect and the proximity to free margins a single advancement flap was deemed most appropriate.  Using a sterile surgical marker, an appropriate advancement flap was drawn incorporating the defect and placing the expected incisions within the relaxed skin tension lines where possible.    The area thus outlined was incised deep to adipose tissue with a #15 scalpel blade.  The skin margins were undermined to an appropriate distance in all directions utilizing iris scissors.
Advancement Flap (Double) Text: The defect edges were debeveled with a #15 scalpel blade.  Given the location of the defect and the proximity to free margins a double advancement flap was deemed most appropriate.  Using a sterile surgical marker, the appropriate advancement flaps were drawn incorporating the defect and placing the expected incisions within the relaxed skin tension lines where possible.    The area thus outlined was incised deep to adipose tissue with a #15 scalpel blade.  The skin margins were undermined to an appropriate distance in all directions utilizing iris scissors.
Burow's Advancement Flap Text: The defect edges were debeveled with a #15 scalpel blade.  Given the location of the defect and the proximity to free margins a Burow's advancement flap was deemed most appropriate.  Using a sterile surgical marker, the appropriate advancement flap was drawn incorporating the defect and placing the expected incisions within the relaxed skin tension lines where possible.    The area thus outlined was incised deep to adipose tissue with a #15 scalpel blade.  The skin margins were undermined to an appropriate distance in all directions utilizing iris scissors.
Chonodrocutaneous Helical Advancement Flap Text: The defect edges were debeveled with a #15 scalpel blade.  Given the location of the defect and the proximity to free margins a chondrocutaneous helical advancement flap was deemed most appropriate.  Using a sterile surgical marker, the appropriate advancement flap was drawn incorporating the defect and placing the expected incisions within the relaxed skin tension lines where possible.    The area thus outlined was incised deep to adipose tissue with a #15 scalpel blade.  The skin margins were undermined to an appropriate distance in all directions utilizing iris scissors.
Crescentic Advancement Flap Text: The defect edges were debeveled with a #15 scalpel blade.  Given the location of the defect and the proximity to free margins a crescentic advancement flap was deemed most appropriate.  Using a sterile surgical marker, the appropriate advancement flap was drawn incorporating the defect and placing the expected incisions within the relaxed skin tension lines where possible.    The area thus outlined was incised deep to adipose tissue with a #15 scalpel blade.  The skin margins were undermined to an appropriate distance in all directions utilizing iris scissors.
A-T Advancement Flap Text: The defect edges were debeveled with a #15 scalpel blade.  Given the location of the defect, shape of the defect and the proximity to free margins an A-T advancement flap was deemed most appropriate.  Using a sterile surgical marker, an appropriate advancement flap was drawn incorporating the defect and placing the expected incisions within the relaxed skin tension lines where possible.    The area thus outlined was incised deep to adipose tissue with a #15 scalpel blade.  The skin margins were undermined to an appropriate distance in all directions utilizing iris scissors.
O-T Advancement Flap Text: The defect edges were debeveled with a #15 scalpel blade.  Given the location of the defect, shape of the defect and the proximity to free margins an O-T advancement flap was deemed most appropriate.  Using a sterile surgical marker, an appropriate advancement flap was drawn incorporating the defect and placing the expected incisions within the relaxed skin tension lines where possible.    The area thus outlined was incised deep to adipose tissue with a #15 scalpel blade.  The skin margins were undermined to an appropriate distance in all directions utilizing iris scissors.
O-L Flap Text: The defect edges were debeveled with a #15 scalpel blade.  Given the location of the defect, shape of the defect and the proximity to free margins an O-L flap was deemed most appropriate.  Using a sterile surgical marker, an appropriate advancement flap was drawn incorporating the defect and placing the expected incisions within the relaxed skin tension lines where possible.    The area thus outlined was incised deep to adipose tissue with a #15 scalpel blade.  The skin margins were undermined to an appropriate distance in all directions utilizing iris scissors.
O-Z Flap Text: The defect edges were debeveled with a #15 scalpel blade.  Given the location of the defect, shape of the defect and the proximity to free margins an O-Z flap was deemed most appropriate.  Using a sterile surgical marker, an appropriate transposition flap was drawn incorporating the defect and placing the expected incisions within the relaxed skin tension lines where possible. The area thus outlined was incised deep to adipose tissue with a #15 scalpel blade.  The skin margins were undermined to an appropriate distance in all directions utilizing iris scissors.
Double O-Z Flap Text: The defect edges were debeveled with a #15 scalpel blade.  Given the location of the defect, shape of the defect and the proximity to free margins a Double O-Z flap was deemed most appropriate.  Using a sterile surgical marker, an appropriate transposition flap was drawn incorporating the defect and placing the expected incisions within the relaxed skin tension lines where possible. The area thus outlined was incised deep to adipose tissue with a #15 scalpel blade.  The skin margins were undermined to an appropriate distance in all directions utilizing iris scissors.
V-Y Flap Text: The defect edges were debeveled with a #15 scalpel blade.  Given the location of the defect, shape of the defect and the proximity to free margins a V-Y flap was deemed most appropriate.  Using a sterile surgical marker, an appropriate advancement flap was drawn incorporating the defect and placing the expected incisions within the relaxed skin tension lines where possible.    The area thus outlined was incised deep to adipose tissue with a #15 scalpel blade.  The skin margins were undermined to an appropriate distance in all directions utilizing iris scissors.
Advancement-Rotation Flap Text: The defect edges were debeveled with a #15 scalpel blade.  Given the location of the defect, shape of the defect and the proximity to free margins an advancement-rotation flap was deemed most appropriate.  Using a sterile surgical marker, an appropriate flap was drawn incorporating the defect and placing the expected incisions within the relaxed skin tension lines where possible. The area thus outlined was incised deep to adipose tissue with a #15 scalpel blade.  The skin margins were undermined to an appropriate distance in all directions utilizing iris scissors.
Mercedes Flap Text: The defect edges were debeveled with a #15 scalpel blade.  Given the location of the defect, shape of the defect and the proximity to free margins a Mercedes flap was deemed most appropriate.  Using a sterile surgical marker, an appropriate advancement flap was drawn incorporating the defect and placing the expected incisions within the relaxed skin tension lines where possible. The area thus outlined was incised deep to adipose tissue with a #15 scalpel blade.  The skin margins were undermined to an appropriate distance in all directions utilizing iris scissors.
Modified Advancement Flap Text: The defect edges were debeveled with a #15 scalpel blade.  Given the location of the defect, shape of the defect and the proximity to free margins a modified advancement flap was deemed most appropriate.  Using a sterile surgical marker, an appropriate advancement flap was drawn incorporating the defect and placing the expected incisions within the relaxed skin tension lines where possible.    The area thus outlined was incised deep to adipose tissue with a #15 scalpel blade.  The skin margins were undermined to an appropriate distance in all directions utilizing iris scissors.
Mucosal Advancement Flap Text: Given the location of the defect, shape of the defect and the proximity to free margins a mucosal advancement flap was deemed most appropriate. Incisions were made with a 15 blade scalpel in the appropriate fashion along the cutaneous vermilion border and the mucosal lip. The remaining actinically damaged mucosal tissue was excised.  The mucosal advancement flap was then elevated to the gingival sulcus with care taken to preserve the neurovascular structures and advanced into the primary defect. Care was taken to ensure that precise realignment of the vermilion border was achieved.
Peng Advancement Flap Text: The defect edges were debeveled with a #15 scalpel blade.  Given the location of the defect, shape of the defect and the proximity to free margins a Peng advancement flap was deemed most appropriate.  Using a sterile surgical marker, an appropriate advancement flap was drawn incorporating the defect and placing the expected incisions within the relaxed skin tension lines where possible. The area thus outlined was incised deep to adipose tissue with a #15 scalpel blade.  The skin margins were undermined to an appropriate distance in all directions utilizing iris scissors.
Hatchet Flap Text: The defect edges were debeveled with a #15 scalpel blade.  Given the location of the defect, shape of the defect and the proximity to free margins a hatchet flap was deemed most appropriate.  Using a sterile surgical marker, an appropriate hatchet flap was drawn incorporating the defect and placing the expected incisions within the relaxed skin tension lines where possible.    The area thus outlined was incised deep to adipose tissue with a #15 scalpel blade.  The skin margins were undermined to an appropriate distance in all directions utilizing iris scissors.
Rotation Flap Text: The defect edges were debeveled with a #15 scalpel blade.  Given the location of the defect, shape of the defect and the proximity to free margins a rotation flap was deemed most appropriate.  Using a sterile surgical marker, an appropriate rotation flap was drawn incorporating the defect and placing the expected incisions within the relaxed skin tension lines where possible.    The area thus outlined was incised deep to adipose tissue with a #15 scalpel blade.  The skin margins were undermined to an appropriate distance in all directions utilizing iris scissors.
Spiral Flap Text: The defect edges were debeveled with a #15 scalpel blade.  Given the location of the defect, shape of the defect and the proximity to free margins a spiral flap was deemed most appropriate.  Using a sterile surgical marker, an appropriate rotation flap was drawn incorporating the defect and placing the expected incisions within the relaxed skin tension lines where possible. The area thus outlined was incised deep to adipose tissue with a #15 scalpel blade.  The skin margins were undermined to an appropriate distance in all directions utilizing iris scissors.
Staged Advancement Flap Text: The defect edges were debeveled with a #15 scalpel blade.  Given the location of the defect, shape of the defect and the proximity to free margins a staged advancement flap was deemed most appropriate.  Using a sterile surgical marker, an appropriate advancement flap was drawn incorporating the defect and placing the expected incisions within the relaxed skin tension lines where possible. The area thus outlined was incised deep to adipose tissue with a #15 scalpel blade.  The skin margins were undermined to an appropriate distance in all directions utilizing iris scissors.
Star Wedge Flap Text: The defect edges were debeveled with a #15 scalpel blade.  Given the location of the defect, shape of the defect and the proximity to free margins a star wedge flap was deemed most appropriate.  Using a sterile surgical marker, an appropriate rotation flap was drawn incorporating the defect and placing the expected incisions within the relaxed skin tension lines where possible. The area thus outlined was incised deep to adipose tissue with a #15 scalpel blade.  The skin margins were undermined to an appropriate distance in all directions utilizing iris scissors.
Transposition Flap Text: The defect edges were debeveled with a #15 scalpel blade.  Given the location of the defect and the proximity to free margins a transposition flap was deemed most appropriate.  Using a sterile surgical marker, an appropriate transposition flap was drawn incorporating the defect.    The area thus outlined was incised deep to adipose tissue with a #15 scalpel blade.  The skin margins were undermined to an appropriate distance in all directions utilizing iris scissors.
Muscle Hinge Flap Text: The defect edges were debeveled with a #15 scalpel blade.  Given the size, depth and location of the defect and the proximity to free margins a muscle hinge flap was deemed most appropriate.  Using a sterile surgical marker, an appropriate hinge flap was drawn incorporating the defect. The area thus outlined was incised with a #15 scalpel blade.  The skin margins were undermined to an appropriate distance in all directions utilizing iris scissors.
Mustarde Flap Text: The defect edges were debeveled with a #15 scalpel blade.  Given the size, depth and location of the defect and the proximity to free margins a Mustarde flap was deemed most appropriate.  Using a sterile surgical marker, an appropriate flap was drawn incorporating the defect. The area thus outlined was incised with a #15 scalpel blade.  The skin margins were undermined to an appropriate distance in all directions utilizing iris scissors.
Nasal Turnover Hinge Flap Text: The defect edges were debeveled with a #15 scalpel blade.  Given the size, depth, location of the defect and the defect being full thickness a nasal turnover hinge flap was deemed most appropriate.  Using a sterile surgical marker, an appropriate hinge flap was drawn incorporating the defect. The area thus outlined was incised with a #15 scalpel blade. The flap was designed to recreate the nasal mucosal lining and the alar rim. The skin margins were undermined to an appropriate distance in all directions utilizing iris scissors.
Nasalis-Muscle-Based Myocutaneous Island Pedicle Flap Text: Using a #15 blade, an incision was made around the donor flap to the level of the nasalis muscle. Wide lateral undermining was then performed in both the subcutaneous plane above the nasalis muscle, and in a submuscular plane just above periosteum. This allowed the formation of a free nasalis muscle axial pedicle (based on the angular artery) which was still attached to the actual cutaneous flap, increasing its mobility and vascular viability. Hemostasis was obtained with pinpoint electrocoagulation. The flap was mobilized into position and the pivotal anchor points positioned and stabilized with buried interrupted sutures. Subcutaneous and dermal tissues were closed in a multilayered fashion with sutures. Tissue redundancies were excised, and the epidermal edges were apposed without significant tension and sutured with sutures.
Orbicularis Oris Muscle Flap Text: The defect edges were debeveled with a #15 scalpel blade.  Given that the defect affected the competency of the oral sphincter an orbicularis oris muscle flap was deemed most appropriate to restore this competency and normal muscle function.  Using a sterile surgical marker, an appropriate flap was drawn incorporating the defect. The area thus outlined was incised with a #15 scalpel blade.
Melolabial Transposition Flap Text: The defect edges were debeveled with a #15 scalpel blade.  Given the location of the defect and the proximity to free margins a melolabial flap was deemed most appropriate.  Using a sterile surgical marker, an appropriate melolabial transposition flap was drawn incorporating the defect.    The area thus outlined was incised deep to adipose tissue with a #15 scalpel blade.  The skin margins were undermined to an appropriate distance in all directions utilizing iris scissors.
Rhombic Flap Text: The defect edges were debeveled with a #15 scalpel blade.  Given the location of the defect and the proximity to free margins a rhombic flap was deemed most appropriate.  Using a sterile surgical marker, an appropriate rhombic flap was drawn incorporating the defect.    The area thus outlined was incised deep to adipose tissue with a #15 scalpel blade.  The skin margins were undermined to an appropriate distance in all directions utilizing iris scissors.
Rhomboid Transposition Flap Text: The defect edges were debeveled with a #15 scalpel blade.  Given the location of the defect and the proximity to free margins a rhomboid transposition flap was deemed most appropriate.  Using a sterile surgical marker, an appropriate rhomboid flap was drawn incorporating the defect.    The area thus outlined was incised deep to adipose tissue with a #15 scalpel blade.  The skin margins were undermined to an appropriate distance in all directions utilizing iris scissors.
Bi-Rhombic Flap Text: The defect edges were debeveled with a #15 scalpel blade.  Given the location of the defect and the proximity to free margins a bi-rhombic flap was deemed most appropriate.  Using a sterile surgical marker, an appropriate rhombic flap was drawn incorporating the defect. The area thus outlined was incised deep to adipose tissue with a #15 scalpel blade.  The skin margins were undermined to an appropriate distance in all directions utilizing iris scissors.
Helical Rim Advancement Flap Text: The defect edges were debeveled with a #15 blade scalpel.  Given the location of the defect and the proximity to free margins (helical rim) a double helical rim advancement flap was deemed most appropriate.  Using a sterile surgical marker, the appropriate advancement flaps were drawn incorporating the defect and placing the expected incisions between the helical rim and antihelix where possible.  The area thus outlined was incised through and through with a #15 scalpel blade.  With a skin hook and iris scissors, the flaps were gently and sharply undermined and freed up.
Bilateral Helical Rim Advancement Flap Text: The defect edges were debeveled with a #15 blade scalpel.  Given the location of the defect and the proximity to free margins (helical rim) a bilateral helical rim advancement flap was deemed most appropriate.  Using a sterile surgical marker, the appropriate advancement flaps were drawn incorporating the defect and placing the expected incisions between the helical rim and antihelix where possible.  The area thus outlined was incised through and through with a #15 scalpel blade.  With a skin hook and iris scissors, the flaps were gently and sharply undermined and freed up.
Ear Star Wedge Flap Text: The defect edges were debeveled with a #15 blade scalpel.  Given the location of the defect and the proximity to free margins (helical rim) an ear star wedge flap was deemed most appropriate.  Using a sterile surgical marker, the appropriate flap was drawn incorporating the defect and placing the expected incisions between the helical rim and antihelix where possible.  The area thus outlined was incised through and through with a #15 scalpel blade.
Banner Transposition Flap Text: The defect edges were debeveled with a #15 scalpel blade.  Given the location of the defect and the proximity to free margins a Banner transposition flap was deemed most appropriate.  Using a sterile surgical marker, an appropriate flap drawn around the defect. The area thus outlined was incised deep to adipose tissue with a #15 scalpel blade.  The skin margins were undermined to an appropriate distance in all directions utilizing iris scissors.
Bilobed Flap Text: The defect edges were debeveled with a #15 scalpel blade.  Given the location of the defect and the proximity to free margins a bilobe flap was deemed most appropriate.  Using a sterile surgical marker, an appropriate bilobe flap drawn around the defect.    The area thus outlined was incised deep to adipose tissue with a #15 scalpel blade.  The skin margins were undermined to an appropriate distance in all directions utilizing iris scissors.
Bilobed Transposition Flap Text: The defect edges were debeveled with a #15 scalpel blade.  Given the location of the defect and the proximity to free margins a bilobed transposition flap was deemed most appropriate.  Using a sterile surgical marker, an appropriate bilobe flap drawn around the defect.    The area thus outlined was incised deep to adipose tissue with a #15 scalpel blade.  The skin margins were undermined to an appropriate distance in all directions utilizing iris scissors.
Trilobed Flap Text: The defect edges were debeveled with a #15 scalpel blade.  Given the location of the defect and the proximity to free margins a trilobed flap was deemed most appropriate.  Using a sterile surgical marker, an appropriate trilobed flap drawn around the defect.    The area thus outlined was incised deep to adipose tissue with a #15 scalpel blade.  The skin margins were undermined to an appropriate distance in all directions utilizing iris scissors.
Dorsal Nasal Flap Text: The defect edges were debeveled with a #15 scalpel blade.  Given the location of the defect and the proximity to free margins a dorsal nasal flap was deemed most appropriate.  Using a sterile surgical marker, an appropriate dorsal nasal flap was drawn around the defect.    The area thus outlined was incised deep to adipose tissue with a #15 scalpel blade.  The skin margins were undermined to an appropriate distance in all directions utilizing iris scissors.
Island Pedicle Flap Text: The defect edges were debeveled with a #15 scalpel blade.  Given the location of the defect, shape of the defect and the proximity to free margins an island pedicle advancement flap was deemed most appropriate.  Using a sterile surgical marker, an appropriate advancement flap was drawn incorporating the defect, outlining the appropriate donor tissue and placing the expected incisions within the relaxed skin tension lines where possible.    The area thus outlined was incised deep to adipose tissue with a #15 scalpel blade.  The skin margins were undermined to an appropriate distance in all directions around the primary defect and laterally outward around the island pedicle utilizing iris scissors.  There was minimal undermining beneath the pedicle flap.
Island Pedicle Flap With Canthal Suspension Text: The defect edges were debeveled with a #15 scalpel blade.  Given the location of the defect, shape of the defect and the proximity to free margins an island pedicle advancement flap was deemed most appropriate.  Using a sterile surgical marker, an appropriate advancement flap was drawn incorporating the defect, outlining the appropriate donor tissue and placing the expected incisions within the relaxed skin tension lines where possible. The area thus outlined was incised deep to adipose tissue with a #15 scalpel blade.  The skin margins were undermined to an appropriate distance in all directions around the primary defect and laterally outward around the island pedicle utilizing iris scissors.  There was minimal undermining beneath the pedicle flap. A suspension suture was placed in the canthal tendon to prevent tension and prevent ectropion.
Alar Island Pedicle Flap Text: The defect edges were debeveled with a #15 scalpel blade.  Given the location of the defect, shape of the defect and the proximity to the alar rim an island pedicle advancement flap was deemed most appropriate.  Using a sterile surgical marker, an appropriate advancement flap was drawn incorporating the defect, outlining the appropriate donor tissue and placing the expected incisions within the nasal ala running parallel to the alar rim. The area thus outlined was incised with a #15 scalpel blade.  The skin margins were undermined minimally to an appropriate distance in all directions around the primary defect and laterally outward around the island pedicle utilizing iris scissors.  There was minimal undermining beneath the pedicle flap.
Double Island Pedicle Flap Text: The defect edges were debeveled with a #15 scalpel blade.  Given the location of the defect, shape of the defect and the proximity to free margins a double island pedicle advancement flap was deemed most appropriate.  Using a sterile surgical marker, an appropriate advancement flap was drawn incorporating the defect, outlining the appropriate donor tissue and placing the expected incisions within the relaxed skin tension lines where possible.    The area thus outlined was incised deep to adipose tissue with a #15 scalpel blade.  The skin margins were undermined to an appropriate distance in all directions around the primary defect and laterally outward around the island pedicle utilizing iris scissors.  There was minimal undermining beneath the pedicle flap.
Island Pedicle Flap-Requiring Vessel Identification Text: The defect edges were debeveled with a #15 scalpel blade.  Given the location of the defect, shape of the defect and the proximity to free margins an island pedicle advancement flap was deemed most appropriate.  Using a sterile surgical marker, an appropriate advancement flap was drawn, based on the axial vessel mentioned above, incorporating the defect, outlining the appropriate donor tissue and placing the expected incisions within the relaxed skin tension lines where possible.    The area thus outlined was incised deep to adipose tissue with a #15 scalpel blade.  The skin margins were undermined to an appropriate distance in all directions around the primary defect and laterally outward around the island pedicle utilizing iris scissors.  There was minimal undermining beneath the pedicle flap.
Keystone Flap Text: The defect edges were debeveled with a #15 scalpel blade.  Given the location of the defect, shape of the defect a keystone flap was deemed most appropriate.  Using a sterile surgical marker, an appropriate keystone flap was drawn incorporating the defect, outlining the appropriate donor tissue and placing the expected incisions within the relaxed skin tension lines where possible. The area thus outlined was incised deep to adipose tissue with a #15 scalpel blade.  The skin margins were undermined to an appropriate distance in all directions around the primary defect and laterally outward around the flap utilizing iris scissors.
O-T Plasty Text: The defect edges were debeveled with a #15 scalpel blade.  Given the location of the defect, shape of the defect and the proximity to free margins an O-T plasty was deemed most appropriate.  Using a sterile surgical marker, an appropriate O-T plasty was drawn incorporating the defect and placing the expected incisions within the relaxed skin tension lines where possible.    The area thus outlined was incised deep to adipose tissue with a #15 scalpel blade.  The skin margins were undermined to an appropriate distance in all directions utilizing iris scissors.
O-Z Plasty Text: The defect edges were debeveled with a #15 scalpel blade.  Given the location of the defect, shape of the defect and the proximity to free margins an O-Z plasty (double transposition flap) was deemed most appropriate.  Using a sterile surgical marker, the appropriate transposition flaps were drawn incorporating the defect and placing the expected incisions within the relaxed skin tension lines where possible.    The area thus outlined was incised deep to adipose tissue with a #15 scalpel blade.  The skin margins were undermined to an appropriate distance in all directions utilizing iris scissors.  Hemostasis was achieved with electrocautery.  The flaps were then transposed into place, one clockwise and the other counterclockwise, and anchored with interrupted buried subcutaneous sutures.
Double O-Z Plasty Text: The defect edges were debeveled with a #15 scalpel blade.  Given the location of the defect, shape of the defect and the proximity to free margins a Double O-Z plasty (double transposition flap) was deemed most appropriate.  Using a sterile surgical marker, the appropriate transposition flaps were drawn incorporating the defect and placing the expected incisions within the relaxed skin tension lines where possible. The area thus outlined was incised deep to adipose tissue with a #15 scalpel blade.  The skin margins were undermined to an appropriate distance in all directions utilizing iris scissors.  Hemostasis was achieved with electrocautery.  The flaps were then transposed into place, one clockwise and the other counterclockwise, and anchored with interrupted buried subcutaneous sutures.
V-Y Plasty Text: The defect edges were debeveled with a #15 scalpel blade.  Given the location of the defect, shape of the defect and the proximity to free margins an V-Y advancement flap was deemed most appropriate.  Using a sterile surgical marker, an appropriate advancement flap was drawn incorporating the defect and placing the expected incisions within the relaxed skin tension lines where possible.    The area thus outlined was incised deep to adipose tissue with a #15 scalpel blade.  The skin margins were undermined to an appropriate distance in all directions utilizing iris scissors.
H Plasty Text: Given the location of the defect, shape of the defect and the proximity to free margins a H-plasty was deemed most appropriate for repair.  Using a sterile surgical marker, the appropriate advancement arms of the H-plasty were drawn incorporating the defect and placing the expected incisions within the relaxed skin tension lines where possible. The area thus outlined was incised deep to adipose tissue with a #15 scalpel blade. The skin margins were undermined to an appropriate distance in all directions utilizing iris scissors.  The opposing advancement arms were then advanced into place in opposite direction and anchored with interrupted buried subcutaneous sutures.
W Plasty Text: The lesion was extirpated to the level of the fat with a #15 scalpel blade.  Given the location of the defect, shape of the defect and the proximity to free margins a W-plasty was deemed most appropriate for repair.  Using a sterile surgical marker, the appropriate transposition arms of the W-plasty were drawn incorporating the defect and placing the expected incisions within the relaxed skin tension lines where possible.    The area thus outlined was incised deep to adipose tissue with a #15 scalpel blade.  The skin margins were undermined to an appropriate distance in all directions utilizing iris scissors.  The opposing transposition arms were then transposed into place in opposite direction and anchored with interrupted buried subcutaneous sutures.
Z Plasty Text: The lesion was extirpated to the level of the fat with a #15 scalpel blade.  Given the location of the defect, shape of the defect and the proximity to free margins a Z-plasty was deemed most appropriate for repair.  Using a sterile surgical marker, the appropriate transposition arms of the Z-plasty were drawn incorporating the defect and placing the expected incisions within the relaxed skin tension lines where possible.    The area thus outlined was incised deep to adipose tissue with a #15 scalpel blade.  The skin margins were undermined to an appropriate distance in all directions utilizing iris scissors.  The opposing transposition arms were then transposed into place in opposite direction and anchored with interrupted buried subcutaneous sutures.
Zygomaticofacial Flap Text: Given the location of the defect, shape of the defect and the proximity to free margins a zygomaticofacial flap was deemed most appropriate for repair.  Using a sterile surgical marker, the appropriate flap was drawn incorporating the defect and placing the expected incisions within the relaxed skin tension lines where possible. The area thus outlined was incised deep to adipose tissue with a #15 scalpel blade with preservation of a vascular pedicle.  The skin margins were undermined to an appropriate distance in all directions utilizing iris scissors.  The flap was then placed into the defect and anchored with interrupted buried subcutaneous sutures.
Cheek Interpolation Flap Text: A decision was made to reconstruct the defect utilizing an interpolation axial flap and a staged reconstruction.  A telfa template was made of the defect.  This telfa template was then used to outline the Cheek Interpolation flap.  The donor area for the pedicle flap was then injected with anesthesia.  The flap was excised through the skin and subcutaneous tissue down to the layer of the underlying musculature.  The interpolation flap was carefully excised within this deep plane to maintain its blood supply.  The edges of the donor site were undermined.   The donor site was closed in a primary fashion.  The pedicle was then rotated into position and sutured.  Once the tube was sutured into place, adequate blood supply was confirmed with blanching and refill.  The pedicle was then wrapped with xeroform gauze and dressed appropriately with a telfa and gauze bandage to ensure continued blood supply and protect the attached pedicle.
Cheek-To-Nose Interpolation Flap Text: A decision was made to reconstruct the defect utilizing an interpolation axial flap and a staged reconstruction.  A telfa template was made of the defect.  This telfa template was then used to outline the Cheek-To-Nose Interpolation flap.  The donor area for the pedicle flap was then injected with anesthesia.  The flap was excised through the skin and subcutaneous tissue down to the layer of the underlying musculature.  The interpolation flap was carefully excised within this deep plane to maintain its blood supply.  The edges of the donor site were undermined.   The donor site was closed in a primary fashion.  The pedicle was then rotated into position and sutured.  Once the tube was sutured into place, adequate blood supply was confirmed with blanching and refill.  The pedicle was then wrapped with xeroform gauze and dressed appropriately with a telfa and gauze bandage to ensure continued blood supply and protect the attached pedicle.
Interpolation Flap Text: A decision was made to reconstruct the defect utilizing an interpolation axial flap and a staged reconstruction.  A telfa template was made of the defect.  This telfa template was then used to outline the interpolation flap.  The donor area for the pedicle flap was then injected with anesthesia.  The flap was excised through the skin and subcutaneous tissue down to the layer of the underlying musculature.  The interpolation flap was carefully excised within this deep plane to maintain its blood supply.  The edges of the donor site were undermined.   The donor site was closed in a primary fashion.  The pedicle was then rotated into position and sutured.  Once the tube was sutured into place, adequate blood supply was confirmed with blanching and refill.  The pedicle was then wrapped with xeroform gauze and dressed appropriately with a telfa and gauze bandage to ensure continued blood supply and protect the attached pedicle.
Melolabial Interpolation Flap Text: A decision was made to reconstruct the defect utilizing an interpolation axial flap and a staged reconstruction.  A telfa template was made of the defect.  This telfa template was then used to outline the melolabial interpolation flap.  The donor area for the pedicle flap was then injected with anesthesia.  The flap was excised through the skin and subcutaneous tissue down to the layer of the underlying musculature.  The pedicle flap was carefully excised within this deep plane to maintain its blood supply.  The edges of the donor site were undermined.   The donor site was closed in a primary fashion.  The pedicle was then rotated into position and sutured.  Once the tube was sutured into place, adequate blood supply was confirmed with blanching and refill.  The pedicle was then wrapped with xeroform gauze and dressed appropriately with a telfa and gauze bandage to ensure continued blood supply and protect the attached pedicle.
Mastoid Interpolation Flap Text: A decision was made to reconstruct the defect utilizing an interpolation axial flap and a staged reconstruction.  A telfa template was made of the defect.  This telfa template was then used to outline the mastoid interpolation flap.  The donor area for the pedicle flap was then injected with anesthesia.  The flap was excised through the skin and subcutaneous tissue down to the layer of the underlying musculature.  The pedicle flap was carefully excised within this deep plane to maintain its blood supply.  The edges of the donor site were undermined.   The donor site was closed in a primary fashion.  The pedicle was then rotated into position and sutured.  Once the tube was sutured into place, adequate blood supply was confirmed with blanching and refill.  The pedicle was then wrapped with xeroform gauze and dressed appropriately with a telfa and gauze bandage to ensure continued blood supply and protect the attached pedicle.
Posterior Auricular Interpolation Flap Text: A decision was made to reconstruct the defect utilizing an interpolation axial flap and a staged reconstruction.  A telfa template was made of the defect.  This telfa template was then used to outline the posterior auricular interpolation flap.  The donor area for the pedicle flap was then injected with anesthesia.  The flap was excised through the skin and subcutaneous tissue down to the layer of the underlying musculature.  The pedicle flap was carefully excised within this deep plane to maintain its blood supply.  The edges of the donor site were undermined.   The donor site was closed in a primary fashion.  The pedicle was then rotated into position and sutured.  Once the tube was sutured into place, adequate blood supply was confirmed with blanching and refill.  The pedicle was then wrapped with xeroform gauze and dressed appropriately with a telfa and gauze bandage to ensure continued blood supply and protect the attached pedicle.
Paramedian Forehead Flap Text: A decision was made to reconstruct the defect utilizing an interpolation axial flap and a staged reconstruction.  A telfa template was made of the defect.  This telfa template was then used to outline the paramedian forehead pedicle flap.  The donor area for the pedicle flap was then injected with anesthesia.  The flap was excised through the skin and subcutaneous tissue down to the layer of the underlying musculature.  The pedicle flap was carefully excised within this deep plane to maintain its blood supply.  The edges of the donor site were undermined.   The donor site was closed in a primary fashion.  The pedicle was then rotated into position and sutured.  Once the tube was sutured into place, adequate blood supply was confirmed with blanching and refill.  The pedicle was then wrapped with xeroform gauze and dressed appropriately with a telfa and gauze bandage to ensure continued blood supply and protect the attached pedicle.
Lip Wedge Excision Repair Text: Given the location of the defect and the proximity to free margins a full thickness wedge repair was deemed most appropriate.  Using a sterile surgical marker, the appropriate repair was drawn incorporating the defect and placing the expected incisions perpendicular to the vermilion border.  The vermilion border was also meticulously outlined to ensure appropriate reapproximation during the repair.  The area thus outlined was incised through and through with a #15 scalpel blade.  The muscularis and dermis were reaproximated with deep sutures following hemostasis. Care was taken to realign the vermilion border before proceeding with the superficial closure.  Once the vermilion was realigned the superfical and mucosal closure was finished.
Ftsg Text: The defect edges were debeveled with a #15 scalpel blade.  Given the location of the defect, shape of the defect and the proximity to free margins a full thickness skin graft was deemed most appropriate.  Using a sterile surgical marker, the primary defect shape was transferred to the donor site. The area thus outlined was incised deep to adipose tissue with a #15 scalpel blade.  The harvested graft was then trimmed of adipose tissue until only dermis and epidermis was left.  The skin margins of the secondary defect were undermined to an appropriate distance in all directions utilizing iris scissors.  The secondary defect was closed with interrupted buried subcutaneous sutures.  The skin edges were then re-apposed with running  sutures.  The skin graft was then placed in the primary defect and oriented appropriately.
Split-Thickness Skin Graft Text: The defect edges were debeveled with a #15 scalpel blade.  Given the location of the defect, shape of the defect and the proximity to free margins a split thickness skin graft was deemed most appropriate.  Using a sterile surgical marker, the primary defect shape was transferred to the donor site. The split thickness graft was then harvested.  The skin graft was then placed in the primary defect and oriented appropriately.
Burow's Graft Text: The defect edges were debeveled with a #15 scalpel blade.  Given the location of the defect, shape of the defect, the proximity to free margins and the presence of a standing cone deformity a Burow's skin graft was deemed most appropriate. The standing cone was removed and this tissue was then trimmed to the shape of the primary defect. The adipose tissue was also removed until only dermis and epidermis were left.  The skin margins of the secondary defect were undermined to an appropriate distance in all directions utilizing iris scissors.  The secondary defect was closed with interrupted buried subcutaneous sutures.  The skin edges were then re-apposed with running  sutures.  The skin graft was then placed in the primary defect and oriented appropriately.
Cartilage Graft Text: The defect edges were debeveled with a #15 scalpel blade.  Given the location of the defect, shape of the defect, the fact the defect involved a full thickness cartilage defect a cartilage graft was deemed most appropriate.  An appropriate donor site was identified, cleansed, and anesthetized. The cartilage graft was then harvested and transferred to the recipient site, oriented appropriately and then sutured into place.  The secondary defect was then repaired using a primary closure.
Composite Graft Text: The defect edges were debeveled with a #15 scalpel blade.  Given the location of the defect, shape of the defect, the proximity to free margins and the fact the defect was full thickness a composite graft was deemed most appropriate.  The defect was outline and then transferred to the donor site.  A full thickness graft was then excised from the donor site. The graft was then placed in the primary defect, oriented appropriately and then sutured into place.  The secondary defect was then repaired using a primary closure.
Epidermal Autograft Text: The defect edges were debeveled with a #15 scalpel blade.  Given the location of the defect, shape of the defect and the proximity to free margins an epidermal autograft was deemed most appropriate.  Using a sterile surgical marker, the primary defect shape was transferred to the donor site. The epidermal graft was then harvested.  The skin graft was then placed in the primary defect and oriented appropriately.
Dermal Autograft Text: The defect edges were debeveled with a #15 scalpel blade.  Given the location of the defect, shape of the defect and the proximity to free margins a dermal autograft was deemed most appropriate.  Using a sterile surgical marker, the primary defect shape was transferred to the donor site. The area thus outlined was incised deep to adipose tissue with a #15 scalpel blade.  The harvested graft was then trimmed of adipose and epidermal tissue until only dermis was left.  The skin graft was then placed in the primary defect and oriented appropriately.
Skin Substitute Text: The defect edges were debeveled with a #15 scalpel blade.  Given the location of the defect, shape of the defect and the proximity to free margins a skin substitute graft was deemed most appropriate.  The graft material was trimmed to fit the size of the defect. The graft was then placed in the primary defect and oriented appropriately.
Tissue Cultured Epidermal Autograft Text: The defect edges were debeveled with a #15 scalpel blade.  Given the location of the defect, shape of the defect and the proximity to free margins a tissue cultured epidermal autograft was deemed most appropriate.  The graft was then trimmed to fit the size of the defect.  The graft was then placed in the primary defect and oriented appropriately.
Xenograft Text: The defect edges were debeveled with a #15 scalpel blade.  Given the location of the defect, shape of the defect and the proximity to free margins a xenograft was deemed most appropriate.  The graft was then trimmed to fit the size of the defect.  The graft was then placed in the primary defect and oriented appropriately.
Purse String (Intermediate) Text: Given the location of the defect and the characteristics of the surrounding skin a purse string intermediate closure was deemed most appropriate.  Undermining was performed circumfirentially around the surgical defect.  A purse string suture was then placed and tightened.
Purse String (Simple) Text: Given the location of the defect and the characteristics of the surrounding skin a purse string simple closure was deemed most appropriate.  Undermining was performed circumferentially around the surgical defect.  A purse string suture was then placed and tightened.
Partial Purse String (Intermediate) Text: Given the location of the defect and the characteristics of the surrounding skin an intermediate purse string closure was deemed most appropriate.  Undermining was performed circumferentially around the surgical defect.  A purse string suture was then placed and tightened. Wound tension of the circular defect prevented complete closure of the wound.
Partial Purse String (Simple) Text: Given the location of the defect and the characteristics of the surrounding skin a simple purse string closure was deemed most appropriate.  Undermining was performed circumferentially around the surgical defect.  A purse string suture was then placed and tightened. Wound tension of the circular defect prevented complete closure of the wound.
Complex Repair And Single Advancement Flap Text: The defect edges were debeveled with a #15 scalpel blade.  The primary defect was closed partially with a complex linear closure.  Given the location of the remaining defect, shape of the defect and the proximity to free margins a single advancement flap was deemed most appropriate for complete closure of the defect.  Using a sterile surgical marker, an appropriate advancement flap was drawn incorporating the defect and placing the expected incisions within the relaxed skin tension lines where possible.    The area thus outlined was incised deep to adipose tissue with a #15 scalpel blade.  The skin margins were undermined to an appropriate distance in all directions utilizing iris scissors.
Complex Repair And Double Advancement Flap Text: The defect edges were debeveled with a #15 scalpel blade.  The primary defect was closed partially with a complex linear closure.  Given the location of the remaining defect, shape of the defect and the proximity to free margins a double advancement flap was deemed most appropriate for complete closure of the defect.  Using a sterile surgical marker, an appropriate advancement flap was drawn incorporating the defect and placing the expected incisions within the relaxed skin tension lines where possible.    The area thus outlined was incised deep to adipose tissue with a #15 scalpel blade.  The skin margins were undermined to an appropriate distance in all directions utilizing iris scissors.
Complex Repair And Modified Advancement Flap Text: The defect edges were debeveled with a #15 scalpel blade.  The primary defect was closed partially with a complex linear closure.  Given the location of the remaining defect, shape of the defect and the proximity to free margins a modified advancement flap was deemed most appropriate for complete closure of the defect.  Using a sterile surgical marker, an appropriate advancement flap was drawn incorporating the defect and placing the expected incisions within the relaxed skin tension lines where possible.    The area thus outlined was incised deep to adipose tissue with a #15 scalpel blade.  The skin margins were undermined to an appropriate distance in all directions utilizing iris scissors.
Complex Repair And A-T Advancement Flap Text: The defect edges were debeveled with a #15 scalpel blade.  The primary defect was closed partially with a complex linear closure.  Given the location of the remaining defect, shape of the defect and the proximity to free margins an A-T advancement flap was deemed most appropriate for complete closure of the defect.  Using a sterile surgical marker, an appropriate advancement flap was drawn incorporating the defect and placing the expected incisions within the relaxed skin tension lines where possible.    The area thus outlined was incised deep to adipose tissue with a #15 scalpel blade.  The skin margins were undermined to an appropriate distance in all directions utilizing iris scissors.
Complex Repair And O-T Advancement Flap Text: The defect edges were debeveled with a #15 scalpel blade.  The primary defect was closed partially with a complex linear closure.  Given the location of the remaining defect, shape of the defect and the proximity to free margins an O-T advancement flap was deemed most appropriate for complete closure of the defect.  Using a sterile surgical marker, an appropriate advancement flap was drawn incorporating the defect and placing the expected incisions within the relaxed skin tension lines where possible.    The area thus outlined was incised deep to adipose tissue with a #15 scalpel blade.  The skin margins were undermined to an appropriate distance in all directions utilizing iris scissors.
Complex Repair And O-L Flap Text: The defect edges were debeveled with a #15 scalpel blade.  The primary defect was closed partially with a complex linear closure.  Given the location of the remaining defect, shape of the defect and the proximity to free margins an O-L flap was deemed most appropriate for complete closure of the defect.  Using a sterile surgical marker, an appropriate flap was drawn incorporating the defect and placing the expected incisions within the relaxed skin tension lines where possible.    The area thus outlined was incised deep to adipose tissue with a #15 scalpel blade.  The skin margins were undermined to an appropriate distance in all directions utilizing iris scissors.
Complex Repair And Bilobe Flap Text: The defect edges were debeveled with a #15 scalpel blade.  The primary defect was closed partially with a complex linear closure.  Given the location of the remaining defect, shape of the defect and the proximity to free margins a bilobe flap was deemed most appropriate for complete closure of the defect.  Using a sterile surgical marker, an appropriate advancement flap was drawn incorporating the defect and placing the expected incisions within the relaxed skin tension lines where possible.    The area thus outlined was incised deep to adipose tissue with a #15 scalpel blade.  The skin margins were undermined to an appropriate distance in all directions utilizing iris scissors.
Complex Repair And Melolabial Flap Text: The defect edges were debeveled with a #15 scalpel blade.  The primary defect was closed partially with a complex linear closure.  Given the location of the remaining defect, shape of the defect and the proximity to free margins a melolabial flap was deemed most appropriate for complete closure of the defect.  Using a sterile surgical marker, an appropriate advancement flap was drawn incorporating the defect and placing the expected incisions within the relaxed skin tension lines where possible.    The area thus outlined was incised deep to adipose tissue with a #15 scalpel blade.  The skin margins were undermined to an appropriate distance in all directions utilizing iris scissors.
Complex Repair And Rotation Flap Text: The defect edges were debeveled with a #15 scalpel blade.  The primary defect was closed partially with a complex linear closure.  Given the location of the remaining defect, shape of the defect and the proximity to free margins a rotation flap was deemed most appropriate for complete closure of the defect.  Using a sterile surgical marker, an appropriate advancement flap was drawn incorporating the defect and placing the expected incisions within the relaxed skin tension lines where possible.    The area thus outlined was incised deep to adipose tissue with a #15 scalpel blade.  The skin margins were undermined to an appropriate distance in all directions utilizing iris scissors.
Complex Repair And Rhombic Flap Text: The defect edges were debeveled with a #15 scalpel blade.  The primary defect was closed partially with a complex linear closure.  Given the location of the remaining defect, shape of the defect and the proximity to free margins a rhombic flap was deemed most appropriate for complete closure of the defect.  Using a sterile surgical marker, an appropriate advancement flap was drawn incorporating the defect and placing the expected incisions within the relaxed skin tension lines where possible.    The area thus outlined was incised deep to adipose tissue with a #15 scalpel blade.  The skin margins were undermined to an appropriate distance in all directions utilizing iris scissors.
Complex Repair And Transposition Flap Text: The defect edges were debeveled with a #15 scalpel blade.  The primary defect was closed partially with a complex linear closure.  Given the location of the remaining defect, shape of the defect and the proximity to free margins a transposition flap was deemed most appropriate for complete closure of the defect.  Using a sterile surgical marker, an appropriate advancement flap was drawn incorporating the defect and placing the expected incisions within the relaxed skin tension lines where possible.    The area thus outlined was incised deep to adipose tissue with a #15 scalpel blade.  The skin margins were undermined to an appropriate distance in all directions utilizing iris scissors.
Complex Repair And V-Y Plasty Text: The defect edges were debeveled with a #15 scalpel blade.  The primary defect was closed partially with a complex linear closure.  Given the location of the remaining defect, shape of the defect and the proximity to free margins a V-Y plasty was deemed most appropriate for complete closure of the defect.  Using a sterile surgical marker, an appropriate advancement flap was drawn incorporating the defect and placing the expected incisions within the relaxed skin tension lines where possible.    The area thus outlined was incised deep to adipose tissue with a #15 scalpel blade.  The skin margins were undermined to an appropriate distance in all directions utilizing iris scissors.
Complex Repair And M Plasty Text: The defect edges were debeveled with a #15 scalpel blade.  The primary defect was closed partially with a complex linear closure.  Given the location of the remaining defect, shape of the defect and the proximity to free margins an M plasty was deemed most appropriate for complete closure of the defect.  Using a sterile surgical marker, an appropriate advancement flap was drawn incorporating the defect and placing the expected incisions within the relaxed skin tension lines where possible.    The area thus outlined was incised deep to adipose tissue with a #15 scalpel blade.  The skin margins were undermined to an appropriate distance in all directions utilizing iris scissors.
Complex Repair And Double M Plasty Text: The defect edges were debeveled with a #15 scalpel blade.  The primary defect was closed partially with a complex linear closure.  Given the location of the remaining defect, shape of the defect and the proximity to free margins a double M plasty was deemed most appropriate for complete closure of the defect.  Using a sterile surgical marker, an appropriate advancement flap was drawn incorporating the defect and placing the expected incisions within the relaxed skin tension lines where possible.    The area thus outlined was incised deep to adipose tissue with a #15 scalpel blade.  The skin margins were undermined to an appropriate distance in all directions utilizing iris scissors.
Complex Repair And W Plasty Text: The defect edges were debeveled with a #15 scalpel blade.  The primary defect was closed partially with a complex linear closure.  Given the location of the remaining defect, shape of the defect and the proximity to free margins a W plasty was deemed most appropriate for complete closure of the defect.  Using a sterile surgical marker, an appropriate advancement flap was drawn incorporating the defect and placing the expected incisions within the relaxed skin tension lines where possible.    The area thus outlined was incised deep to adipose tissue with a #15 scalpel blade.  The skin margins were undermined to an appropriate distance in all directions utilizing iris scissors.
Complex Repair And Z Plasty Text: The defect edges were debeveled with a #15 scalpel blade.  The primary defect was closed partially with a complex linear closure.  Given the location of the remaining defect, shape of the defect and the proximity to free margins a Z plasty was deemed most appropriate for complete closure of the defect.  Using a sterile surgical marker, an appropriate advancement flap was drawn incorporating the defect and placing the expected incisions within the relaxed skin tension lines where possible.    The area thus outlined was incised deep to adipose tissue with a #15 scalpel blade.  The skin margins were undermined to an appropriate distance in all directions utilizing iris scissors.
Complex Repair And Dorsal Nasal Flap Text: The defect edges were debeveled with a #15 scalpel blade.  The primary defect was closed partially with a complex linear closure.  Given the location of the remaining defect, shape of the defect and the proximity to free margins a dorsal nasal flap was deemed most appropriate for complete closure of the defect.  Using a sterile surgical marker, an appropriate flap was drawn incorporating the defect and placing the expected incisions within the relaxed skin tension lines where possible.    The area thus outlined was incised deep to adipose tissue with a #15 scalpel blade.  The skin margins were undermined to an appropriate distance in all directions utilizing iris scissors.
Complex Repair And Ftsg Text: The defect edges were debeveled with a #15 scalpel blade.  The primary defect was closed partially with a complex linear closure.  Given the location of the defect, shape of the defect and the proximity to free margins a full thickness skin graft was deemed most appropriate to repair the remaining defect.  The graft was trimmed to fit the size of the remaining defect.  The graft was then placed in the primary defect, oriented appropriately, and sutured into place.
Complex Repair And Burow's Graft Text: The defect edges were debeveled with a #15 scalpel blade.  The primary defect was closed partially with a complex linear closure.  Given the location of the defect, shape of the defect, the proximity to free margins and the presence of a standing cone deformity a Burow's graft was deemed most appropriate to repair the remaining defect.  The graft was trimmed to fit the size of the remaining defect.  The graft was then placed in the primary defect, oriented appropriately, and sutured into place.
Complex Repair And Split-Thickness Skin Graft Text: The defect edges were debeveled with a #15 scalpel blade.  The primary defect was closed partially with a complex linear closure.  Given the location of the defect, shape of the defect and the proximity to free margins a split thickness skin graft was deemed most appropriate to repair the remaining defect.  The graft was trimmed to fit the size of the remaining defect.  The graft was then placed in the primary defect, oriented appropriately, and sutured into place.
Complex Repair And Epidermal Autograft Text: The defect edges were debeveled with a #15 scalpel blade.  The primary defect was closed partially with a complex linear closure.  Given the location of the defect, shape of the defect and the proximity to free margins an epidermal autograft was deemed most appropriate to repair the remaining defect.  The graft was trimmed to fit the size of the remaining defect.  The graft was then placed in the primary defect, oriented appropriately, and sutured into place.
Complex Repair And Dermal Autograft Text: The defect edges were debeveled with a #15 scalpel blade.  The primary defect was closed partially with a complex linear closure.  Given the location of the defect, shape of the defect and the proximity to free margins an dermal autograft was deemed most appropriate to repair the remaining defect.  The graft was trimmed to fit the size of the remaining defect.  The graft was then placed in the primary defect, oriented appropriately, and sutured into place.
Complex Repair And Tissue Cultured Epidermal Autograft Text: The defect edges were debeveled with a #15 scalpel blade.  The primary defect was closed partially with a complex linear closure.  Given the location of the defect, shape of the defect and the proximity to free margins an tissue cultured epidermal autograft was deemed most appropriate to repair the remaining defect.  The graft was trimmed to fit the size of the remaining defect.  The graft was then placed in the primary defect, oriented appropriately, and sutured into place.
Complex Repair And Xenograft Text: The defect edges were debeveled with a #15 scalpel blade.  The primary defect was closed partially with a complex linear closure.  Given the location of the defect, shape of the defect and the proximity to free margins a xenograft was deemed most appropriate to repair the remaining defect.  The graft was trimmed to fit the size of the remaining defect.  The graft was then placed in the primary defect, oriented appropriately, and sutured into place.
Complex Repair And Skin Substitute Graft Text: The defect edges were debeveled with a #15 scalpel blade.  The primary defect was closed partially with a complex linear closure.  Given the location of the remaining defect, shape of the defect and the proximity to free margins a skin substitute graft was deemed most appropriate to repair the remaining defect.  The graft was trimmed to fit the size of the remaining defect.  The graft was then placed in the primary defect, oriented appropriately, and sutured into place.
Path Notes (To The Dermatopathologist): Please check margins.
Consent was obtained from the patient. The risks and benefits to therapy were discussed in detail. Specifically, the risks of infection, scarring, bleeding, prolonged wound healing, incomplete removal, allergy to anesthesia, nerve injury and recurrence were addressed. Prior to the procedure, the treatment site was clearly identified and confirmed by the patient.
Post-Care Instructions: I reviewed with the patient in detail post-care instructions. Patient is not to engage in any heavy lifting, exercise, or swimming for the next 14 days. Should the patient develop any fevers, chills, bleeding, severe pain patient will contact the office immediately.
Home Suture Removal Text: Patient was provided a home suture removal kit and will remove their sutures at home.  If they have any questions or difficulties they will call the office.
Where Do You Want The Question To Include Opioid Counseling Located?: Case Summary Tab
Information: Selecting Yes will display possible errors in your note based on the variables you have selected. This validation is only offered as a suggestion for you. PLEASE NOTE THAT THE VALIDATION TEXT WILL BE REMOVED WHEN YOU FINALIZE YOUR NOTE. IF YOU WANT TO FAX A PRELIMINARY NOTE YOU WILL NEED TO TOGGLE THIS TO 'NO' IF YOU DO NOT WANT IT IN YOUR FAXED NOTE.

## 2022-06-08 ENCOUNTER — HOSPITAL ENCOUNTER (OUTPATIENT)
Dept: LAB | Facility: MEDICAL CENTER | Age: 72
End: 2022-06-08
Attending: INTERNAL MEDICINE
Payer: MEDICARE

## 2022-06-08 PROCEDURE — 82232 ASSAY OF BETA-2 PROTEIN: CPT

## 2022-06-08 PROCEDURE — 83521 IG LIGHT CHAINS FREE EACH: CPT

## 2022-06-08 PROCEDURE — 84165 PROTEIN E-PHORESIS SERUM: CPT

## 2022-06-08 PROCEDURE — 86334 IMMUNOFIX E-PHORESIS SERUM: CPT

## 2022-06-08 PROCEDURE — 82784 ASSAY IGA/IGD/IGG/IGM EACH: CPT

## 2022-06-08 PROCEDURE — 84155 ASSAY OF PROTEIN SERUM: CPT

## 2022-06-10 LAB — B2 MICROGLOB SERPL-MCNC: 16.9 MG/L

## 2022-06-12 LAB
ALBUMIN SERPL ELPH-MCNC: 3.51 G/DL (ref 3.75–5.01)
ALPHA1 GLOB SERPL ELPH-MCNC: 0.35 G/DL (ref 0.19–0.46)
ALPHA2 GLOB SERPL ELPH-MCNC: 0.79 G/DL (ref 0.48–1.05)
B-GLOBULIN SERPL ELPH-MCNC: 1.28 G/DL (ref 0.48–1.1)
EER MONOCLONAL PROTEIN AND FLC, SERUM Q5224: ABNORMAL
GAMMA GLOB SERPL ELPH-MCNC: 0.27 G/DL (ref 0.62–1.51)
IGA SERPL-MCNC: 1010 MG/DL (ref 68–408)
IGG SERPL-MCNC: 158 MG/DL (ref 768–1632)
IGM SERPL-MCNC: <10 MG/DL (ref 35–263)
INTERPRETATION SERPL IFE-IMP: ABNORMAL
INTERPRETATION SERPL IFE-IMP: ABNORMAL
KAPPA LC FREE SER-MCNC: 26.36 MG/L (ref 3.3–19.4)
KAPPA LC FREE/LAMBDA FREE SER NEPH: 0.02 {RATIO} (ref 0.26–1.65)
LAMBDA LC FREE SERPL-MCNC: 1170.46 MG/L (ref 5.71–26.3)
PROT SERPL-MCNC: 6.2 G/DL (ref 6.3–8.2)

## 2022-11-17 NOTE — HPI: SKIN LESION
Is This A New Presentation, Or A Follow-Up?: Skin Lesion
What Type Of Note Output Would You Prefer (Optional)?: Standard Output
How Severe Is Your Skin Lesion?: moderate
Has Your Skin Lesion Been Treated?: not been treated
no hematuria/no flank pain R

## (undated) DEVICE — SENSOR SPO2 NEO LNCS ADHESIVE (20/BX) SEE USER NOTES

## (undated) DEVICE — BLANKET WARMING LOWER BODY - (10/CA) INACTIVE USE #8585

## (undated) DEVICE — MASK ANESTHESIA ADULT  - (100/CA)

## (undated) DEVICE — GLOVE BIOGEL PI INDICATOR SZ 7.0 SURGICAL PF LF - (50/BX 4BX/CA)

## (undated) DEVICE — NEPTUNE 4 PORT MANIFOLD - (20/PK)

## (undated) DEVICE — HEMOSTAT SURG ABSORBABLE - 4 X 8 IN SURGICEL (24EA/CA)

## (undated) DEVICE — HEADREST PRONEVIEW LARGE - (10/CA)

## (undated) DEVICE — TRAY CATHETER FOLEY URINE METER W/STATLOCK 350ML (10EA/CA)

## (undated) DEVICE — DRAPE MAYO STAND - (30/CA)

## (undated) DEVICE — KIT GEL-FLOW NT ABORBABLE GELATIN (6EA/BX)

## (undated) DEVICE — BLADE SURGICAL CLIPPER - (50EA/CA)

## (undated) DEVICE — NEEDLE SPINAL NON-SAFETY 18 GA X 3 IN (25EA/BX)

## (undated) DEVICE — DRAPE LARGE 3 QUARTER - (20/CA)

## (undated) DEVICE — CHLORAPREP 26 ML APPLICATOR - ORANGE TINT(25/CA)

## (undated) DEVICE — SUTURE 2-0 VICRYL PLUS CT-1 - 8 X 18 INCH(12/BX)

## (undated) DEVICE — BLANKET WARMING UPPER BODY - (10/CA)

## (undated) DEVICE — SPHERE NAVIGATION STEALTH (5EA/TY 12TY/PK)

## (undated) DEVICE — KIT ROOM DECONTAMINATION

## (undated) DEVICE — CONTAINER SPECIMEN BAG OR - STERILE 4 OZ W/LID (100EA/CA)

## (undated) DEVICE — PACK JACKSON TABLE KIT W/OUT - HR (6EA/CA)

## (undated) DEVICE — COVER MAYO STAND X-LG - (22EA/CA)

## (undated) DEVICE — SEALER BIPOLAR 2.3 AQUAMANTYS

## (undated) DEVICE — GLOVE BIOGEL INDICATOR SZ 6 SURGICAL PF LTX -(50/BX)

## (undated) DEVICE — DRAPE LAPAROTOMY T SHEET - (12EA/CA)

## (undated) DEVICE — SET EXTENSION WITH 2 PORTS (48EA/CA) ***PART #2C8610 IS A SUBSTITUTE*****

## (undated) DEVICE — GOWN SURGEONS X-LARGE - DISP. (30/CA)

## (undated) DEVICE — TUBING C&T SET FLYING LEADS DRAIN TUBING (10EA/BX)

## (undated) DEVICE — SUTURE GENERAL

## (undated) DEVICE — DRAPE 36X28IN RAD CARM BND BG - (25/CA) O

## (undated) DEVICE — PACK NEURO - (2EA/CA)

## (undated) DEVICE — TUBING CLEARLINK DUO-VENT - C-FLO (48EA/CA)

## (undated) DEVICE — LACTATED RINGERS INJ. 500 ML - (24EA/CA)

## (undated) DEVICE — SYRINGE SAFETY 10 ML 18 GA X 1 1/2 BLUNT LL (100/BX 4BX/CA)

## (undated) DEVICE — KIT ANESTHESIA W/CIRCUIT & 3/LT BAG W/FILTER (20EA/CA)

## (undated) DEVICE — GLOVE BIOGEL SZ 6 PF LATEX - (50EA/BX 4BX/CA)

## (undated) DEVICE — GLOVE BIOGEL SZ 8 SURGICAL PF LTX - (50PR/BX 4BX/CA)

## (undated) DEVICE — CANISTER SUCTION 3000ML MECHANICAL FILTER AUTO SHUTOFF MEDI-VAC NONSTERILE LF DISP  (40EA/CA)

## (undated) DEVICE — ELECTRODE 850 FOAM ADHESIVE - HYDROGEL RADIOTRNSPRNT (50/PK)

## (undated) DEVICE — GOWN SURGEONS LARGE - (32/CA)

## (undated) DEVICE — SPONGE GAUZESTER 4 X 4 4PLY - (128PK/CA)

## (undated) DEVICE — LACTATED RINGERS INJ 1000 ML - (14EA/CA 60CA/PF)

## (undated) DEVICE — TOOL DISSECT MATCH HEAD

## (undated) DEVICE — DRAPE STRLE REG TOWEL 18X24 - (10/BX 4BX/CA)"

## (undated) DEVICE — SODIUM CHL IRRIGATION 0.9% 1000ML (12EA/CA)

## (undated) DEVICE — SYRINGE DISP. 60 CC LL - (30/BX, 12BX/CA)**WHEN THESE ARE GONE ORDER #500206**

## (undated) DEVICE — GLOVE BIOGEL SZ 7 SURGICAL PF LTX - (50PR/BX 4BX/CA)

## (undated) DEVICE — PROTECTOR ULNA NERVE - (36PR/CA)

## (undated) DEVICE — TOWELS CLOTH SURGICAL - (4/PK 20PK/CA)

## (undated) DEVICE — SUTURE 0 VICRYL PLUS CT-1 - 8 X 18 INCH (12/BX)

## (undated) DEVICE — ELECTRODE DUAL RETURN W/ CORD - (50/PK)

## (undated) DEVICE — INTRAOP NEURO IN OR 1:1 PER 15 MIN

## (undated) DEVICE — BOVIE BLADE COATED &INSULATED - 25/PK

## (undated) DEVICE — SUCTION INSTRUMENT YANKAUER BULBOUS TIP W/O VENT (50EA/CA)

## (undated) DEVICE — GLOVE BIOGEL PI INDICATOR SZ 8.0 SURGICAL PF LF -(50/BX 4BX/CA)

## (undated) DEVICE — GLOVE BIOGEL PI ORTHO SZ 7 PF LF (40PR/BX)

## (undated) DEVICE — SET LEADWIRE 5 LEAD BEDSIDE DISPOSABLE ECG (1SET OF 5/EA)

## (undated) DEVICE — SYRINGE ASEPTO - (50EA/CA

## (undated) DEVICE — MIDAS LUBRICATOR DIFFUSER PACK (4EA/CA)

## (undated) DEVICE — GLOVE BIOGEL INDICATOR SZ 8 SURGICAL PF LTX - (50/BX 4BX/CA)